# Patient Record
Sex: MALE | Race: WHITE | NOT HISPANIC OR LATINO | URBAN - METROPOLITAN AREA
[De-identification: names, ages, dates, MRNs, and addresses within clinical notes are randomized per-mention and may not be internally consistent; named-entity substitution may affect disease eponyms.]

---

## 2017-10-31 ENCOUNTER — INPATIENT (INPATIENT)
Facility: HOSPITAL | Age: 72
LOS: 6 days | Discharge: HOME | End: 2017-11-07
Attending: INTERNAL MEDICINE | Admitting: INTERNAL MEDICINE
Payer: MEDICARE

## 2017-10-31 VITALS
SYSTOLIC BLOOD PRESSURE: 108 MMHG | OXYGEN SATURATION: 96 % | HEART RATE: 119 BPM | DIASTOLIC BLOOD PRESSURE: 69 MMHG | RESPIRATION RATE: 18 BRPM

## 2017-10-31 DIAGNOSIS — Z29.9 ENCOUNTER FOR PROPHYLACTIC MEASURES, UNSPECIFIED: ICD-10-CM

## 2017-10-31 DIAGNOSIS — I10 ESSENTIAL (PRIMARY) HYPERTENSION: ICD-10-CM

## 2017-10-31 DIAGNOSIS — R55 SYNCOPE AND COLLAPSE: ICD-10-CM
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
## 2017-10-31 DIAGNOSIS — I48.91 UNSPECIFIED ATRIAL FIBRILLATION: ICD-10-CM

## 2017-10-31 DIAGNOSIS — I50.9 HEART FAILURE, UNSPECIFIED: ICD-10-CM

## 2017-10-31 DIAGNOSIS — G47.30 SLEEP APNEA, UNSPECIFIED: ICD-10-CM

## 2017-10-31 PROBLEM — Z00.00 ENCOUNTER FOR PREVENTIVE HEALTH EXAMINATION: Status: ACTIVE | Noted: 2017-10-31

## 2017-10-31 LAB
ALBUMIN SERPL ELPH-MCNC: 3.9 G/DL — SIGNIFICANT CHANGE UP (ref 3.3–5)
ALP SERPL-CCNC: 48 U/L — SIGNIFICANT CHANGE UP (ref 40–120)
ALT FLD-CCNC: 13 U/L — SIGNIFICANT CHANGE UP (ref 4–41)
APPEARANCE UR: CLEAR — SIGNIFICANT CHANGE UP
APTT BLD: 27 SEC — LOW (ref 27.5–37.4)
AST SERPL-CCNC: 17 U/L — SIGNIFICANT CHANGE UP (ref 4–40)
BACTERIA # UR AUTO: SIGNIFICANT CHANGE UP
BASOPHILS # BLD AUTO: 0.03 K/UL — SIGNIFICANT CHANGE UP (ref 0–0.2)
BASOPHILS NFR BLD AUTO: 0.4 % — SIGNIFICANT CHANGE UP (ref 0–2)
BILIRUB SERPL-MCNC: 0.5 MG/DL — SIGNIFICANT CHANGE UP (ref 0.2–1.2)
BILIRUB UR-MCNC: NEGATIVE — SIGNIFICANT CHANGE UP
BLD GP AB SCN SERPL QL: NEGATIVE — SIGNIFICANT CHANGE UP
BLOOD UR QL VISUAL: NEGATIVE — SIGNIFICANT CHANGE UP
BUN SERPL-MCNC: 62 MG/DL — HIGH (ref 7–23)
CALCIUM SERPL-MCNC: 8.8 MG/DL — SIGNIFICANT CHANGE UP (ref 8.4–10.5)
CHLORIDE SERPL-SCNC: 98 MMOL/L — SIGNIFICANT CHANGE UP (ref 98–107)
CK MB BLD-MCNC: 2.51 NG/ML — SIGNIFICANT CHANGE UP (ref 1–6.6)
CK MB BLD-MCNC: SIGNIFICANT CHANGE UP (ref 0–2.5)
CK SERPL-CCNC: 55 U/L — SIGNIFICANT CHANGE UP (ref 30–200)
CO2 SERPL-SCNC: 28 MMOL/L — SIGNIFICANT CHANGE UP (ref 22–31)
COLOR SPEC: SIGNIFICANT CHANGE UP
CREAT SERPL-MCNC: 1.81 MG/DL — HIGH (ref 0.5–1.3)
EOSINOPHIL # BLD AUTO: 0.07 K/UL — SIGNIFICANT CHANGE UP (ref 0–0.5)
EOSINOPHIL NFR BLD AUTO: 0.9 % — SIGNIFICANT CHANGE UP (ref 0–6)
GLUCOSE SERPL-MCNC: 148 MG/DL — HIGH (ref 70–99)
GLUCOSE UR-MCNC: NEGATIVE — SIGNIFICANT CHANGE UP
HCT VFR BLD CALC: 29 % — LOW (ref 39–50)
HGB BLD-MCNC: 8.9 G/DL — LOW (ref 13–17)
HYALINE CASTS # UR AUTO: SIGNIFICANT CHANGE UP (ref 0–?)
IMM GRANULOCYTES # BLD AUTO: 0.06 # — SIGNIFICANT CHANGE UP
IMM GRANULOCYTES NFR BLD AUTO: 0.8 % — SIGNIFICANT CHANGE UP (ref 0–1.5)
INR BLD: 1.19 — HIGH (ref 0.88–1.17)
KETONES UR-MCNC: NEGATIVE — SIGNIFICANT CHANGE UP
LEUKOCYTE ESTERASE UR-ACNC: NEGATIVE — SIGNIFICANT CHANGE UP
LYMPHOCYTES # BLD AUTO: 1.81 K/UL — SIGNIFICANT CHANGE UP (ref 1–3.3)
LYMPHOCYTES # BLD AUTO: 23.3 % — SIGNIFICANT CHANGE UP (ref 13–44)
MCHC RBC-ENTMCNC: 27.8 PG — SIGNIFICANT CHANGE UP (ref 27–34)
MCHC RBC-ENTMCNC: 30.7 % — LOW (ref 32–36)
MCV RBC AUTO: 90.6 FL — SIGNIFICANT CHANGE UP (ref 80–100)
MONOCYTES # BLD AUTO: 0.53 K/UL — SIGNIFICANT CHANGE UP (ref 0–0.9)
MONOCYTES NFR BLD AUTO: 6.8 % — SIGNIFICANT CHANGE UP (ref 2–14)
MUCOUS THREADS # UR AUTO: SIGNIFICANT CHANGE UP
NEUTROPHILS # BLD AUTO: 5.28 K/UL — SIGNIFICANT CHANGE UP (ref 1.8–7.4)
NEUTROPHILS NFR BLD AUTO: 67.8 % — SIGNIFICANT CHANGE UP (ref 43–77)
NITRITE UR-MCNC: NEGATIVE — SIGNIFICANT CHANGE UP
NRBC # FLD: 0 — SIGNIFICANT CHANGE UP
OB PNL STL: POSITIVE — SIGNIFICANT CHANGE UP
PH UR: 6 — SIGNIFICANT CHANGE UP (ref 4.6–8)
PLATELET # BLD AUTO: 149 K/UL — LOW (ref 150–400)
PMV BLD: 11 FL — SIGNIFICANT CHANGE UP (ref 7–13)
POTASSIUM SERPL-MCNC: 4.6 MMOL/L — SIGNIFICANT CHANGE UP (ref 3.5–5.3)
POTASSIUM SERPL-SCNC: 4.6 MMOL/L — SIGNIFICANT CHANGE UP (ref 3.5–5.3)
PROT SERPL-MCNC: 6.8 G/DL — SIGNIFICANT CHANGE UP (ref 6–8.3)
PROT UR-MCNC: NEGATIVE — SIGNIFICANT CHANGE UP
PROTHROM AB SERPL-ACNC: 13.4 SEC — HIGH (ref 9.8–13.1)
RBC # BLD: 3.2 M/UL — LOW (ref 4.2–5.8)
RBC # FLD: 15.4 % — HIGH (ref 10.3–14.5)
RBC CASTS # UR COMP ASSIST: SIGNIFICANT CHANGE UP (ref 0–?)
RH IG SCN BLD-IMP: POSITIVE — SIGNIFICANT CHANGE UP
SODIUM SERPL-SCNC: 139 MMOL/L — SIGNIFICANT CHANGE UP (ref 135–145)
SP GR SPEC: 1.01 — SIGNIFICANT CHANGE UP (ref 1–1.03)
SQUAMOUS # UR AUTO: SIGNIFICANT CHANGE UP
TROPONIN T SERPL-MCNC: < 0.06 NG/ML — SIGNIFICANT CHANGE UP (ref 0–0.06)
UROBILINOGEN FLD QL: NORMAL E.U. — SIGNIFICANT CHANGE UP (ref 0.1–0.2)
WBC # BLD: 7.78 K/UL — SIGNIFICANT CHANGE UP (ref 3.8–10.5)
WBC # FLD AUTO: 7.78 K/UL — SIGNIFICANT CHANGE UP (ref 3.8–10.5)
WBC UR QL: SIGNIFICANT CHANGE UP (ref 0–?)

## 2017-10-31 PROCEDURE — 71010: CPT | Mod: 26

## 2017-10-31 PROCEDURE — 72125 CT NECK SPINE W/O DYE: CPT | Mod: 26

## 2017-10-31 PROCEDURE — 70450 CT HEAD/BRAIN W/O DYE: CPT | Mod: 26

## 2017-10-31 RX ORDER — METOPROLOL TARTRATE 50 MG
5 TABLET ORAL ONCE
Qty: 0 | Refills: 0 | Status: DISCONTINUED | OUTPATIENT
Start: 2017-10-31 | End: 2017-10-31

## 2017-10-31 RX ORDER — ATORVASTATIN CALCIUM 80 MG/1
40 TABLET, FILM COATED ORAL AT BEDTIME
Qty: 0 | Refills: 0 | Status: DISCONTINUED | OUTPATIENT
Start: 2017-10-31 | End: 2017-11-07

## 2017-10-31 RX ORDER — FUROSEMIDE 40 MG
40 TABLET ORAL DAILY
Qty: 0 | Refills: 0 | Status: DISCONTINUED | OUTPATIENT
Start: 2017-10-31 | End: 2017-11-01

## 2017-10-31 RX ORDER — OMEGA-3 ACID ETHYL ESTERS 1 G
2 CAPSULE ORAL
Qty: 0 | Refills: 0 | Status: DISCONTINUED | OUTPATIENT
Start: 2017-10-31 | End: 2017-11-07

## 2017-10-31 RX ORDER — DEXTROSE 50 % IN WATER 50 %
12.5 SYRINGE (ML) INTRAVENOUS ONCE
Qty: 0 | Refills: 0 | Status: DISCONTINUED | OUTPATIENT
Start: 2017-10-31 | End: 2017-11-07

## 2017-10-31 RX ORDER — METOPROLOL TARTRATE 50 MG
5 TABLET ORAL ONCE
Qty: 0 | Refills: 0 | Status: COMPLETED | OUTPATIENT
Start: 2017-10-31 | End: 2017-10-31

## 2017-10-31 RX ORDER — CARVEDILOL PHOSPHATE 80 MG/1
6.25 CAPSULE, EXTENDED RELEASE ORAL EVERY 12 HOURS
Qty: 0 | Refills: 0 | Status: DISCONTINUED | OUTPATIENT
Start: 2017-10-31 | End: 2017-11-02

## 2017-10-31 RX ORDER — PANTOPRAZOLE SODIUM 20 MG/1
40 TABLET, DELAYED RELEASE ORAL
Qty: 0 | Refills: 0 | Status: DISCONTINUED | OUTPATIENT
Start: 2017-10-31 | End: 2017-11-02

## 2017-10-31 RX ORDER — DEXTROSE 50 % IN WATER 50 %
1 SYRINGE (ML) INTRAVENOUS ONCE
Qty: 0 | Refills: 0 | Status: DISCONTINUED | OUTPATIENT
Start: 2017-10-31 | End: 2017-11-07

## 2017-10-31 RX ORDER — ASPIRIN/CALCIUM CARB/MAGNESIUM 324 MG
81 TABLET ORAL DAILY
Qty: 0 | Refills: 0 | Status: DISCONTINUED | OUTPATIENT
Start: 2017-10-31 | End: 2017-11-07

## 2017-10-31 RX ORDER — TAMSULOSIN HYDROCHLORIDE 0.4 MG/1
0.4 CAPSULE ORAL AT BEDTIME
Qty: 0 | Refills: 0 | Status: DISCONTINUED | OUTPATIENT
Start: 2017-10-31 | End: 2017-11-07

## 2017-10-31 RX ORDER — INSULIN LISPRO 100/ML
VIAL (ML) SUBCUTANEOUS
Qty: 0 | Refills: 0 | Status: DISCONTINUED | OUTPATIENT
Start: 2017-10-31 | End: 2017-11-07

## 2017-10-31 RX ORDER — DEXTROSE 50 % IN WATER 50 %
25 SYRINGE (ML) INTRAVENOUS ONCE
Qty: 0 | Refills: 0 | Status: DISCONTINUED | OUTPATIENT
Start: 2017-10-31 | End: 2017-11-07

## 2017-10-31 RX ORDER — CALCIUM CARBONATE 500(1250)
1 TABLET ORAL DAILY
Qty: 0 | Refills: 0 | Status: DISCONTINUED | OUTPATIENT
Start: 2017-10-31 | End: 2017-11-07

## 2017-10-31 RX ORDER — INSULIN LISPRO 100/ML
VIAL (ML) SUBCUTANEOUS AT BEDTIME
Qty: 0 | Refills: 0 | Status: DISCONTINUED | OUTPATIENT
Start: 2017-10-31 | End: 2017-11-07

## 2017-10-31 RX ORDER — SODIUM CHLORIDE 9 MG/ML
1000 INJECTION, SOLUTION INTRAVENOUS
Qty: 0 | Refills: 0 | Status: DISCONTINUED | OUTPATIENT
Start: 2017-10-31 | End: 2017-11-07

## 2017-10-31 RX ORDER — BECLOMETHASONE DIPROPIONATE 40 UG/1
1 AEROSOL, METERED RESPIRATORY (INHALATION)
Qty: 0 | Refills: 0 | Status: DISCONTINUED | OUTPATIENT
Start: 2017-10-31 | End: 2017-11-07

## 2017-10-31 RX ORDER — TIOTROPIUM BROMIDE 18 UG/1
1 CAPSULE ORAL; RESPIRATORY (INHALATION) DAILY
Qty: 0 | Refills: 0 | Status: DISCONTINUED | OUTPATIENT
Start: 2017-10-31 | End: 2017-11-07

## 2017-10-31 RX ORDER — GLUCAGON INJECTION, SOLUTION 0.5 MG/.1ML
1 INJECTION, SOLUTION SUBCUTANEOUS ONCE
Qty: 0 | Refills: 0 | Status: DISCONTINUED | OUTPATIENT
Start: 2017-10-31 | End: 2017-11-07

## 2017-10-31 RX ADMIN — TAMSULOSIN HYDROCHLORIDE 0.4 MILLIGRAM(S): 0.4 CAPSULE ORAL at 23:49

## 2017-10-31 RX ADMIN — Medication 5 MILLIGRAM(S): at 21:04

## 2017-10-31 RX ADMIN — ATORVASTATIN CALCIUM 40 MILLIGRAM(S): 80 TABLET, FILM COATED ORAL at 23:49

## 2017-10-31 NOTE — ED ADULT NURSE NOTE - OBJECTIVE STATEMENT
Pt. received in [facility]. Pt. is 71 yo male brought in after a syncopal episode with +LOC lasting 20 seconds. Pt. states he was sitting down and fell backwards pt. and son denies he hit his head. No evidence of trauma to head. Pt was on coumadin but has been off for 1 week because last INR was 11. Denies lightheadedness, dizziness, chest pain, n/v, fever/chills. Respirations appear even and unlabored. Abdomen is soft, nontender, nondistended. Skin is warm, dry, and intact. 20g IV lock in place right AC. Labs sent. Will continue to monitor.

## 2017-10-31 NOTE — ED ADULT TRIAGE NOTE - CHIEF COMPLAINT QUOTE
Pt had syncopal episode with +LOC lasting 20 seconds. Was sitting down and fell backwards. Hit back of head, on coumadin and aspirin. Hypotensive on scene 90/30. EMs placed 20g to left wrist, infusing normal saline. Pt had syncopal episode with +LOC lasting 20 seconds. Was sitting down and fell backwards. Hit back of head, on coumadin and aspirin. Hypotensive on scene 90/30. EMS placed 20g to left wrist, infusing normal saline. Pt had syncopal episode with +LOC lasting 20 seconds. Was sitting down and fell backwards. Hit back of head, on coumadin and aspirin. Off coumadin for 1 week because last INR was 11. Hypotensive on scene 90/30. EMS placed 20g to left wrist, infusing normal saline.

## 2017-10-31 NOTE — H&P ADULT - ASSESSMENT
73 y/o male with PMHx of MI, HERIBERTO on CPAP, CHF on Lasix, A-fib was on Coumadin stopped 1 week ago due to INR of 11,pre-syncope due to orthostatic hypotension, borderline DM, HTN, BPH, GERD presents to ED after syncopal episode. Admitted to telemetry.

## 2017-10-31 NOTE — ED PROVIDER NOTE - ATTENDING CONTRIBUTION TO CARE
Dr. Kirkland:  I have personally performed a face to face bedside history and physical examination of this patient. I have discussed the history, examination, review of systems, assessment and plan of management with the resident. I have reviewed the electronic medical record and amended it to reflect my history, review of systems, physical exam, assessment and plan.    72M h/o afib on Coumadin, CHF, DM, HTN, presents after a syncopal episode at home today.  Per son, patient was trying on different BIPAP masks, has had his BP running a bit low all day (low 100s, high 90s), and then suddenly syncopized from sitting position and fell backwards, hitting occiput of head.  Regained consciousness in approximately 20 seconds with normal mental status.  Denies fever/chills, cp, sob, n/v/d, ha.  Last week, INR was 11, has been not taking his coumadin the last few days.    Exam:  - nad  - head atraumatic  - perrl, eomi  - irregular heart rate  - ctab  - abd soft ntnd  - no focal neuro deficits    A/P  - syncope and fall with head trauma  - cbc, cmp, trop, coags  - cxr  - ekg  - ct head Dr. Kirkland:  I performed a face to face bedside interview with patient regarding history of present illness, review of symptoms and past medical history. I completed an independent physical exam.  I have discussed patient's plan of care with PA.   I agree with note as stated above, having amended the EMR as needed to reflect my findings.   This includes HISTORY OF PRESENT ILLNESS, HIV, PAST MEDICAL/SURGICAL/FAMILY/SOCIAL HISTORY, ALLERGIES AND HOME MEDICATIONS, REVIEW OF SYSTEMS, PHYSICAL EXAM, and any PROGRESS NOTES during the time I functioned as the attending physician for this patient.    72M h/o afib on Coumadin, CHF, DM, HTN, presents after a syncopal episode at home today.  Per son, patient was trying on different BIPAP masks, has had his BP running a bit low all day (low 100s, high 90s), and then suddenly syncopized from sitting position and fell backwards, hitting occiput of head.  Regained consciousness in approximately 20 seconds with normal mental status.  Denies fever/chills, cp, sob, n/v/d, ha.  Last week, INR was 11, has been not taking his coumadin the last few days.    Exam:  - nad  - head atraumatic  - perrl, eomi  - irregular heart rate  - ctab  - abd soft ntnd  - no focal neuro deficits    A/P  - syncope and fall with head trauma  - cbc, cmp, trop, coags  - cxr  - ekg  - ct head

## 2017-10-31 NOTE — H&P ADULT - NSHPPHYSICALEXAM_GEN_ALL_CORE
Appearance: Normal, NAD, NRD.  HEENT:   Normal oral mucosa, PERRL, EOMI	  Lymphatic: No lymphadenopathy  Cardiovascular: Normal S1 S2, No JVD, No murmurs, No edema  Respiratory: Lungs clear to auscultation, no rales/rhonchi/wheezing	  Psychiatry: A & O x 3, Mood & affect appropriate  Gastrointestinal:  Soft, Non-tender, ND + BS	  Skin: No rashes, No ecchymoses, No cyanosis  Neurologic: Non-focal, sensation intact,  strength 5/5 B/L, CN grossly intact  Extremities: Normal range of motion, No clubbing, cyanosis 1+ non-pitting LE L>R  Vascular: Peripheral pulses palpable

## 2017-10-31 NOTE — ED PROVIDER NOTE - MUSCULOSKELETAL NECK EXAM
no deformity, pain or tenderness. no restriction of movement/No bony tenderness. full ROM of all joints b/l

## 2017-10-31 NOTE — H&P ADULT - HISTORY OF PRESENT ILLNESS
71 y/o male with PMHx of MI, HERIBERTO on CPAP, CHF on Lasix, A-fib was on Coumadin stopped 1 week ago due to INR of 11,pre-syncope due to orthostatic hypotension, borderline DM, HTN, BPH, GERD presents to ED after syncopal episode. Patient states he was at home sitting in chair trying on bipap masks with his respiratory therapist. Paitent then became dizzy with mask on then passed out. Son who witnessed syncope said episode lasted 20 seconds, with fall backwards hitting his head. Endorses previous episodes of dizziness during the fall-self resolved. Per patient normal echo 6 months ago with cardiologist Dr. Schmidt at [facility]. 90/30 hypotensive at scene by EMS. Son states has syncope due to orthostatics. Orthostatics performed in ED negative. Patient states he was instructed by cardiologist to stop Coumadin stopped due to INR of 11. Patient noticed darker stools for last 2-3days, found to be occult positive in ED. Denies any other bleeding sx/signs.    Currently asymptomatic, no pain or further trauma. 71 y/o male with PMHx of MI, HERIBERTO on CPAP, CHF on Lasix, A-fib was on Coumadin stopped 1 week ago due to INR of 11, borderline DM, HTN, BPH, GERD presents to ED after syncopal episode. Patient states he was at home sitting in chair trying on bipap masks with his respiratory therapist. Paitent then became dizzy with mask on then passed out. Son who witnessed syncope said episode lasted 20 seconds, with fall backwards hitting his head. Endorses previous episodes of dizziness during the fall-self resolved. Per patient normal echo 6 months ago with cardiologist Dr. Schmidt at [facility]. 90/30 hypotensive at scene by EMS. Son states has syncope due to orthostatics. Orthostatics performed in ED negative. Patient states he was instructed by cardiologist to stop Coumadin stopped due to INR of 11. Patient noticed darker stools for last 2-3days, found to be occult positive in ED. Denies any other bleeding sx/signs.    Currently asymptomatic, no pain or further trauma.

## 2017-10-31 NOTE — H&P ADULT - PROBLEM SELECTOR PLAN 1
Monitor on telemetry for continuos cardiac monitoring  Serial EKGs and trend cardiac enzymes  Fall and seizure precautions  Orthostatics negative in ED, will repeat in AM  Echocardiogram to evaluate LVSF.

## 2017-10-31 NOTE — H&P ADULT - NSHPLABSRESULTS_GEN_ALL_CORE
CT BRAIN: No acute intracranial bleeding, calvarial fracture, or scalp   hematoma.  CT CERVICAL SPINE: No fracture or subluxation. Degenerative changes of the cervical spine.  EKG: Afib with RVR 108bpm  CEx 1 neg  UA: clear

## 2017-10-31 NOTE — ED PROVIDER NOTE - PMH
AF (atrial fibrillation)    BPH (benign prostatic hyperplasia)    CHF (congestive heart failure)    Diabetes insipidus    Essential hypertension, benign    GERD (gastroesophageal reflux disease)    Hypertension    MI (myocardial infarction)    Sleep apnea

## 2017-10-31 NOTE — H&P ADULT - NSHPREVIEWOFSYSTEMS_GEN_ALL_CORE
Constitutional: No fever; no chills; fatigue or weight loss/gain; no diaphoresis  Skin: No rash, itching, ulcerations, dryness, pressure ulcer.  Eyes: no diplopia; no photophobia; no blurred vision   ENT: no hearing difficulty; no ear pain; no tinnitus; no vertigo; no nasal congestion; no nasal discharge; no nose bleeds no sore throat.  Endocrine: No thyroid problems.  Cardiovascular: No chest pain; no palpitations; no dyspnea on exertion; no PND; no orthopnea;   Respiratory: no shortness of breath; no wheezing; no dyspnea; no cough.  Gastrointestinal: No abdominal pain; no nausea; vomiting; or diarrhea.  Genitourinary: No dysuria.  Musculoskeletal: no arthralgia; no arthritis; no joint swelling; no muscle weakness  Neurologic: no transient paralysis; no weakness; no paresthesias; no difficulty walking; no headache; no syncope

## 2017-10-31 NOTE — ED PROVIDER NOTE - CARDIAC, MLM
Normal rate, regular rhythm.  Heart sounds S1, S2.  No murmurs, rubs or gallops. No LE edema. No calf tenderness b/l.

## 2017-10-31 NOTE — ED PROVIDER NOTE - OBJECTIVE STATEMENT
73 y/o male with pmhx of MI, CHF, A-fib on aspirin and Lasix, was on Coumadin stopped 1 week ago due to INR of 11),pre-syncope due to orthostatic hypotension, borderline DM, HTN, BPH, GERD, sleep apnea, presents to ED for fall secondary to syncope. Pt state he was at home sitting in chair trying on bipap masks with his respiratory therapist. Pt became lightheaded with mask on, witnessed syncope by son 20 seconds, with fall backwards hitting his head. No other preceding sx, no dizziness. Normal echo 6 months ago with cardiologist Dr. Schmidt at [facility]. 90/30 hypotensive at scene by EMS. Son states has syncope due to orthostatics. Currently asymptomatic, no pain or further trauma. No fever, chills, cp, sob, palpitations, edema, recent travel, seizure activity, jerking, weakness, numbness, facial drooping, slurred speech, urinary or bowel incontinence. Social drinker once a week.

## 2017-10-31 NOTE — H&P ADULT - ATTENDING COMMENTS
72 year old man with CAD, a-fib, and HERIBERTO presents with syncope in the setting of acute blood loss anemia and SHEYLA.    #Syncope- in the setting of acute blood loss anemia and FOBT+ stools.  Please type and screen patient, as H/H continues to fall.  Dr. Mccray of GI called.  Please check echo.    #A-fib- currently rate controlled.  No AC in the setting of blood loss anemia and labile INRs.  Continue Coreg.    #SHEYLA- likely due to poor perfusion in the setting of blood loss.  Baseline creatinine 0.8.  Renal consult called- Dr. Nuñez.     Care discussed at length with patient. 72 year old man with CAD, a-fib, and HERIBERTO presents with syncope in the setting of acute blood loss anemia and SHEYLA.    #Syncope- in the setting of acute blood loss anemia and FOBT+ stools.  Please type and screen patient, as H/H continues to fall.  Dr. Mccray of GI called.  Please check echo.    #A-fib- currently rate controlled.  No AC in the setting of blood loss anemia and labile INRs.  Continue Coreg.    #SHEYLA- likely due to poor perfusion in the setting of blood loss.  Baseline creatinine 0.8.  Hold Lasix.  Renal consult called- Dr. Nuñez.     Care discussed at length with patient.

## 2017-10-31 NOTE — H&P ADULT - PROBLEM SELECTOR PLAN 6
Monitor on telemetry for continuous cardiac monitoring  Currently euvolemic-Monitor fluid status and I&Os closely  PO lasix 40mg QD   TTE to evaluate LVEF  Consider ACE   Monitor daily weights, 1500 cc fluid restriction, sodium restriction.  Check CBC, BMP, Mg, BNP. Monitor on telemetry for continuous cardiac monitoring  Currently euvolemic-Monitor fluid status and I&Os closely  Prelim CXR shows clear lungs will f/u official read   PO lasix 40mg QD   TTE to evaluate LVEF  Consider ACE   Monitor daily weights, 1500 cc fluid restriction, sodium restriction.  Check CBC, BMP, Mg, BNP.

## 2017-10-31 NOTE — H&P ADULT - NSHPSOCIALHISTORY_GEN_ALL_CORE
Social History:  · Marital Status	   · Occupation	Retired- [employer]  · Lives With	spouse; dog    Substance Use History:  · Substance Use	never used    Alcohol Use History:  · Have you ever consumed alcohol	yes... 1 glass of wine every other day      Tobacco Usage:  · Tobacco Usage: Former smoker  · Tobacco Type: cigarettes  · Last Tobacco Use: 20 years ago  · Number of Packs per Day: 4-5 PPD  · Number of yrs: 20 years

## 2017-10-31 NOTE — ED ADULT NURSE NOTE - CHIEF COMPLAINT QUOTE
Pt had syncopal episode with +LOC lasting 20 seconds. Was sitting down and fell backwards. Hit back of head, on coumadin and aspirin. Off coumadin for 1 week because last INR was 11. Hypotensive on scene 90/30. EMS placed 20g to left wrist, infusing normal saline.

## 2017-10-31 NOTE — ED PROVIDER NOTE - MEDICAL DECISION MAKING DETAILS
71 y/o male with pmhx of MI, CHF, A-fib on aspirin and Lasix, was on Coumadin stopped 1 week ago due to INR of 11),pre-syncope due to orthostatic hypotension, borderline DM, HTN, BPH, GERD, sleep apnea, presents to ED for fall secondary to syncope. plan: r/o hemorrhage, CT head, c-spine, labs, cardiac enzymes, ekg, admit

## 2017-11-01 DIAGNOSIS — N40.0 BENIGN PROSTATIC HYPERPLASIA WITHOUT LOWER URINARY TRACT SYMPTOMS: ICD-10-CM

## 2017-11-01 DIAGNOSIS — I10 ESSENTIAL (PRIMARY) HYPERTENSION: ICD-10-CM

## 2017-11-01 DIAGNOSIS — N17.9 ACUTE KIDNEY FAILURE, UNSPECIFIED: ICD-10-CM

## 2017-11-01 DIAGNOSIS — R60.0 LOCALIZED EDEMA: ICD-10-CM

## 2017-11-01 LAB
BASOPHILS # BLD AUTO: 0.02 K/UL — SIGNIFICANT CHANGE UP (ref 0–0.2)
BASOPHILS # BLD AUTO: 0.02 K/UL — SIGNIFICANT CHANGE UP (ref 0–0.2)
BASOPHILS NFR BLD AUTO: 0.3 % — SIGNIFICANT CHANGE UP (ref 0–2)
BASOPHILS NFR BLD AUTO: 0.3 % — SIGNIFICANT CHANGE UP (ref 0–2)
BLD GP AB SCN SERPL QL: NEGATIVE — SIGNIFICANT CHANGE UP
BUN SERPL-MCNC: 56 MG/DL — HIGH (ref 7–23)
CALCIUM SERPL-MCNC: 8.6 MG/DL — SIGNIFICANT CHANGE UP (ref 8.4–10.5)
CHLORIDE SERPL-SCNC: 101 MMOL/L — SIGNIFICANT CHANGE UP (ref 98–107)
CHOLEST SERPL-MCNC: 142 MG/DL — SIGNIFICANT CHANGE UP (ref 120–199)
CK MB BLD-MCNC: 2.22 NG/ML — SIGNIFICANT CHANGE UP (ref 1–6.6)
CK SERPL-CCNC: 53 U/L — SIGNIFICANT CHANGE UP (ref 30–200)
CO2 SERPL-SCNC: 24 MMOL/L — SIGNIFICANT CHANGE UP (ref 22–31)
CREAT ?TM UR-MCNC: 32.4 MG/DL — SIGNIFICANT CHANGE UP
CREAT SERPL-MCNC: 1.42 MG/DL — HIGH (ref 0.5–1.3)
EOSINOPHIL # BLD AUTO: 0.09 K/UL — SIGNIFICANT CHANGE UP (ref 0–0.5)
EOSINOPHIL # BLD AUTO: 0.09 K/UL — SIGNIFICANT CHANGE UP (ref 0–0.5)
EOSINOPHIL NFR BLD AUTO: 1.1 % — SIGNIFICANT CHANGE UP (ref 0–6)
EOSINOPHIL NFR BLD AUTO: 1.3 % — SIGNIFICANT CHANGE UP (ref 0–6)
FERRITIN SERPL-MCNC: 22.33 NG/ML — LOW (ref 30–400)
FERRITIN SERPL-MCNC: 29.15 NG/ML — LOW (ref 30–400)
FOLATE SERPL-MCNC: 12 NG/ML — SIGNIFICANT CHANGE UP (ref 4.7–20)
GLUCOSE SERPL-MCNC: 109 MG/DL — HIGH (ref 70–99)
HBA1C BLD-MCNC: 6.4 % — HIGH (ref 4–5.6)
HCT VFR BLD CALC: 24.6 % — LOW (ref 39–50)
HCT VFR BLD CALC: 25.5 % — LOW (ref 39–50)
HDLC SERPL-MCNC: 32 MG/DL — LOW (ref 35–55)
HGB BLD-MCNC: 7.7 G/DL — LOW (ref 13–17)
HGB BLD-MCNC: 8.2 G/DL — LOW (ref 13–17)
IMM GRANULOCYTES # BLD AUTO: 0.04 # — SIGNIFICANT CHANGE UP
IMM GRANULOCYTES # BLD AUTO: 0.05 # — SIGNIFICANT CHANGE UP
IMM GRANULOCYTES NFR BLD AUTO: 0.5 % — SIGNIFICANT CHANGE UP (ref 0–1.5)
IMM GRANULOCYTES NFR BLD AUTO: 0.7 % — SIGNIFICANT CHANGE UP (ref 0–1.5)
INR BLD: 1.19 — HIGH (ref 0.88–1.17)
IRON SATN MFR SERPL: 293 UG/DL — SIGNIFICANT CHANGE UP (ref 155–535)
IRON SATN MFR SERPL: 314 UG/DL — SIGNIFICANT CHANGE UP (ref 155–535)
IRON SATN MFR SERPL: 37 UG/DL — LOW (ref 45–165)
IRON SATN MFR SERPL: 45 UG/DL — SIGNIFICANT CHANGE UP (ref 45–165)
LIPID PNL WITH DIRECT LDL SERPL: 81 MG/DL — SIGNIFICANT CHANGE UP
LYMPHOCYTES # BLD AUTO: 1.9 K/UL — SIGNIFICANT CHANGE UP (ref 1–3.3)
LYMPHOCYTES # BLD AUTO: 1.99 K/UL — SIGNIFICANT CHANGE UP (ref 1–3.3)
LYMPHOCYTES # BLD AUTO: 24.1 % — SIGNIFICANT CHANGE UP (ref 13–44)
LYMPHOCYTES # BLD AUTO: 28.8 % — SIGNIFICANT CHANGE UP (ref 13–44)
MAGNESIUM SERPL-MCNC: 1.6 MG/DL — SIGNIFICANT CHANGE UP (ref 1.6–2.6)
MCHC RBC-ENTMCNC: 28.4 PG — SIGNIFICANT CHANGE UP (ref 27–34)
MCHC RBC-ENTMCNC: 28.6 PG — SIGNIFICANT CHANGE UP (ref 27–34)
MCHC RBC-ENTMCNC: 31.3 % — LOW (ref 32–36)
MCHC RBC-ENTMCNC: 32.2 % — SIGNIFICANT CHANGE UP (ref 32–36)
MCV RBC AUTO: 88.2 FL — SIGNIFICANT CHANGE UP (ref 80–100)
MCV RBC AUTO: 91.4 FL — SIGNIFICANT CHANGE UP (ref 80–100)
MONOCYTES # BLD AUTO: 0.59 K/UL — SIGNIFICANT CHANGE UP (ref 0–0.9)
MONOCYTES # BLD AUTO: 0.74 K/UL — SIGNIFICANT CHANGE UP (ref 0–0.9)
MONOCYTES NFR BLD AUTO: 8.6 % — SIGNIFICANT CHANGE UP (ref 2–14)
MONOCYTES NFR BLD AUTO: 9.4 % — SIGNIFICANT CHANGE UP (ref 2–14)
NEUTROPHILS # BLD AUTO: 4.16 K/UL — SIGNIFICANT CHANGE UP (ref 1.8–7.4)
NEUTROPHILS # BLD AUTO: 5.11 K/UL — SIGNIFICANT CHANGE UP (ref 1.8–7.4)
NEUTROPHILS NFR BLD AUTO: 60.3 % — SIGNIFICANT CHANGE UP (ref 43–77)
NEUTROPHILS NFR BLD AUTO: 64.6 % — SIGNIFICANT CHANGE UP (ref 43–77)
NRBC # FLD: 0 — SIGNIFICANT CHANGE UP
NRBC # FLD: 0 — SIGNIFICANT CHANGE UP
NT-PROBNP SERPL-SCNC: 1375 PG/ML — SIGNIFICANT CHANGE UP
PHOSPHATE SERPL-MCNC: 3.1 MG/DL — SIGNIFICANT CHANGE UP (ref 2.5–4.5)
PLATELET # BLD AUTO: 133 K/UL — LOW (ref 150–400)
PLATELET # BLD AUTO: 157 K/UL — SIGNIFICANT CHANGE UP (ref 150–400)
PMV BLD: 10.9 FL — SIGNIFICANT CHANGE UP (ref 7–13)
PMV BLD: 11.6 FL — SIGNIFICANT CHANGE UP (ref 7–13)
POTASSIUM SERPL-MCNC: 4 MMOL/L — SIGNIFICANT CHANGE UP (ref 3.5–5.3)
POTASSIUM SERPL-SCNC: 4 MMOL/L — SIGNIFICANT CHANGE UP (ref 3.5–5.3)
PROTHROM AB SERPL-ACNC: 13.4 SEC — HIGH (ref 9.8–13.1)
RBC # BLD: 2.69 M/UL — LOW (ref 4.2–5.8)
RBC # BLD: 2.89 M/UL — LOW (ref 4.2–5.8)
RBC # FLD: 15.5 % — HIGH (ref 10.3–14.5)
RBC # FLD: 15.5 % — HIGH (ref 10.3–14.5)
RETICS #: 0.1 10X6/UL — HIGH (ref 0.02–0.07)
RETICS/RBC NFR: 3 % — HIGH (ref 0.5–2.5)
RH IG SCN BLD-IMP: POSITIVE — SIGNIFICANT CHANGE UP
SODIUM SERPL-SCNC: 142 MMOL/L — SIGNIFICANT CHANGE UP (ref 135–145)
SODIUM UR-SCNC: 90 MEQ/L — SIGNIFICANT CHANGE UP
TRIGL SERPL-MCNC: 252 MG/DL — HIGH (ref 10–149)
TROPONIN T SERPL-MCNC: < 0.06 NG/ML — SIGNIFICANT CHANGE UP (ref 0–0.06)
TSH SERPL-MCNC: 1.84 UIU/ML — SIGNIFICANT CHANGE UP (ref 0.27–4.2)
UIBC SERPL-MCNC: 256 UG/DL — SIGNIFICANT CHANGE UP (ref 110–370)
UIBC SERPL-MCNC: 269 UG/DL — SIGNIFICANT CHANGE UP (ref 110–370)
UUN UR-MCNC: 409.8 MG/DL — SIGNIFICANT CHANGE UP
VIT B12 SERPL-MCNC: 327 PG/ML — SIGNIFICANT CHANGE UP (ref 200–900)
WBC # BLD: 6.9 K/UL — SIGNIFICANT CHANGE UP (ref 3.8–10.5)
WBC # BLD: 7.9 K/UL — SIGNIFICANT CHANGE UP (ref 3.8–10.5)
WBC # FLD AUTO: 6.9 K/UL — SIGNIFICANT CHANGE UP (ref 3.8–10.5)
WBC # FLD AUTO: 7.9 K/UL — SIGNIFICANT CHANGE UP (ref 3.8–10.5)

## 2017-11-01 PROCEDURE — 74176 CT ABD & PELVIS W/O CONTRAST: CPT | Mod: 26

## 2017-11-01 RX ORDER — DIGOXIN 250 MCG
0.5 TABLET ORAL ONCE
Qty: 0 | Refills: 0 | Status: COMPLETED | OUTPATIENT
Start: 2017-11-01 | End: 2017-11-01

## 2017-11-01 RX ORDER — SODIUM CHLORIDE 9 MG/ML
500 INJECTION INTRAMUSCULAR; INTRAVENOUS; SUBCUTANEOUS ONCE
Qty: 0 | Refills: 0 | Status: COMPLETED | OUTPATIENT
Start: 2017-11-01 | End: 2017-11-01

## 2017-11-01 RX ORDER — DIGOXIN 250 MCG
0.25 TABLET ORAL EVERY 8 HOURS
Qty: 0 | Refills: 0 | Status: COMPLETED | OUTPATIENT
Start: 2017-11-01 | End: 2017-11-02

## 2017-11-01 RX ORDER — INFLUENZA VIRUS VACCINE 15; 15; 15; 15 UG/.5ML; UG/.5ML; UG/.5ML; UG/.5ML
0.5 SUSPENSION INTRAMUSCULAR ONCE
Qty: 0 | Refills: 0 | Status: DISCONTINUED | OUTPATIENT
Start: 2017-11-01 | End: 2017-11-07

## 2017-11-01 RX ORDER — DIGOXIN 250 MCG
0.12 TABLET ORAL DAILY
Qty: 0 | Refills: 0 | Status: DISCONTINUED | OUTPATIENT
Start: 2017-11-03 | End: 2017-11-02

## 2017-11-01 RX ADMIN — Medication 40 MILLIGRAM(S): at 05:31

## 2017-11-01 RX ADMIN — Medication 81 MILLIGRAM(S): at 11:18

## 2017-11-01 RX ADMIN — TAMSULOSIN HYDROCHLORIDE 0.4 MILLIGRAM(S): 0.4 CAPSULE ORAL at 21:13

## 2017-11-01 RX ADMIN — ATORVASTATIN CALCIUM 40 MILLIGRAM(S): 80 TABLET, FILM COATED ORAL at 21:13

## 2017-11-01 RX ADMIN — Medication 0.5 MILLIGRAM(S): at 23:24

## 2017-11-01 RX ADMIN — BECLOMETHASONE DIPROPIONATE 1 PUFF(S): 40 AEROSOL, METERED RESPIRATORY (INHALATION) at 10:14

## 2017-11-01 RX ADMIN — Medication 2 GRAM(S): at 17:34

## 2017-11-01 RX ADMIN — TIOTROPIUM BROMIDE 1 CAPSULE(S): 18 CAPSULE ORAL; RESPIRATORY (INHALATION) at 10:14

## 2017-11-01 RX ADMIN — BECLOMETHASONE DIPROPIONATE 1 PUFF(S): 40 AEROSOL, METERED RESPIRATORY (INHALATION) at 21:13

## 2017-11-01 RX ADMIN — Medication 1: at 17:35

## 2017-11-01 RX ADMIN — Medication 1: at 13:43

## 2017-11-01 RX ADMIN — CARVEDILOL PHOSPHATE 6.25 MILLIGRAM(S): 80 CAPSULE, EXTENDED RELEASE ORAL at 05:31

## 2017-11-01 RX ADMIN — PANTOPRAZOLE SODIUM 40 MILLIGRAM(S): 20 TABLET, DELAYED RELEASE ORAL at 05:31

## 2017-11-01 RX ADMIN — SODIUM CHLORIDE 1000 MILLILITER(S): 9 INJECTION INTRAMUSCULAR; INTRAVENOUS; SUBCUTANEOUS at 17:55

## 2017-11-01 RX ADMIN — Medication 1 TABLET(S): at 11:18

## 2017-11-01 RX ADMIN — Medication 2 GRAM(S): at 06:32

## 2017-11-01 NOTE — CHART NOTE - NSCHARTNOTEFT_GEN_A_CORE
Pt noted in Afib w/ RVR on telemetry, -140bpm. Pt was in the bathroom at this time, now resting HR 120s. Pt is asymptomatic, resting comfortably with no complaints. Denies CP, SOB, palpitations, dizziness, weakness, lightheadedness, abd pain, n/v/d.     Vital Signs: T(C): 37 (01 Nov 2017 13:20), Max: 37 (01 Nov 2017 13:20). T(F): 98.6 (01 Nov 2017 13:20), Max: 98.6 (01 Nov 2017 13:20). HR: 106 (01 Nov 2017 15:53) (84 - 135). BP: 92/63 (01 Nov 2017 15:47) (87/51 - 125/81). RR: 18 (01 Nov 2017 13:20) (18 - 20). SpO2: 98% (01 Nov 2017 15:53) (95% - 100%)    A+O x 3, resting comfortably, NAD  Cardio: Irregular rhythm, tachycardic, no m/g/r  Pulm: CTA b/l, no w/r/r  Abd: Soft NT, ND. No guarding, no rebound tenderness.  LE: no edema, cyanosis    TELE: Afib with RVR -140bpm, previously Afib HR 80-90bpm    71 y/o male with PMHx of MI, HERIBERTO on CPAP, HF on Lasix, borderline DM, HTN, BPH, GERD, A-fib was on Coumadin stopped 1 week ago due to INR of 11 (did not come to ED at this time, p/w syncopal episode, found to have anemia in setting of occult positive stool, now with Afib w/ RVR.   1) Afib w/ RVR: Case discussed with Dr. Garcia, recommend IV Cardizem, will given 5mg x 1, if no improvement will consider additional dose of 5mg if BP can tolerate. Pt current off AC in setting of anemia likely due to GIB. Continue Coreg 6.25mg BID. Will continue to monitoring closely. Continue telemetry.  2) Acute anemia, occult blood positive: GI c/s appreciated, conservative management per Dr. Mccray, CT abd/pelvis ordered. Continue PPI daily. Will follow up results. Continue to hold AC for now. Venodynes for DVT ppx. Pt noted in Afib w/ RVR on telemetry, -140bpm. Pt was in the bathroom at this time, now resting HR 120s. Pt is asymptomatic, resting comfortably with no complaints. Denies CP, SOB, palpitations, dizziness, weakness, lightheadedness, abd pain, n/v/d.     Vital Signs: T(C): 37 (01 Nov 2017 13:20), Max: 37 (01 Nov 2017 13:20). T(F): 98.6 (01 Nov 2017 13:20), Max: 98.6 (01 Nov 2017 13:20). HR: 106 (01 Nov 2017 15:53) (84 - 135). BP: 92/63 (01 Nov 2017 15:47) (87/51 - 125/81). RR: 18 (01 Nov 2017 13:20) (18 - 20). SpO2: 98% (01 Nov 2017 15:53) (95% - 100%)    A+O x 3, resting comfortably, NAD  Cardio: Irregular rhythm, tachycardic, no m/g/r  Pulm: CTA b/l, no w/r/r  Abd: Soft NT, ND. No guarding, no rebound tenderness.  LE: no edema, cyanosis    TELE: Afib with RVR -140bpm, previously Afib HR 80-90bpm    73 y/o male with PMHx of MI, HERIBERTO on CPAP, HF on Lasix, borderline DM, HTN, BPH, GERD, A-fib was on Coumadin stopped 1 week ago due to INR of 11 (did not come to ED at this time, p/w syncopal episode, found to have anemia in setting of occult positive stool, now with Afib w/ RVR.   1) Afib w/ RVR: Case discussed with Dr. Garcia, recommend IV Cardizem, will given 5mg x 1, if no improvement will consider additional dose of 5mg if BP can tolerate. Pt current off AC in setting of anemia likely due to GIB. Continue Coreg 6.25mg BID. Will continue to monitoring closely. Continue telemetry.  2) Acute anemia, occult blood positive: GI c/s appreciated, conservative management per Dr. Mccray, CT abd/pelvis ordered. Continue PPI daily. Will follow up results. Continue to hold AC for now. Venodynes for DVT ppx.    Addendum * 11/1, 17:52  Pt with hypotension shortly after Cardizem dose, asymptomatic, resting comfortably with wife and son at bedside. Denies dizziness, weakness, lightheadedness, change in vision, CP, SOB, palpitations. Telemetry: Afib, HR 90-115bpm. Discussed with Dr. Garcia, will given 500cc bolus NS x 1. Will continue to monitoring and reassess BP and HR. If HR still poorly controlled this evening, will consider starting digoxin load.

## 2017-11-01 NOTE — CHART NOTE - NSCHARTNOTEFT_GEN_A_CORE
Patient laying in bed in NAD. Denies chest pain , shortness of breath or palpitations .   On telemetry Heart rate remains 110BPM - 150   Will order digoxin load - as previously outlined   discussed with patient

## 2017-11-01 NOTE — PROVIDER CONTACT NOTE (OTHER) - ASSESSMENT
Patient has denies c/p or sob. Patient denies any dizziness threw out shift. Patient sitting at edge of bed with family at bedside. Vs (see flow sheet from 1453 - [id number]). Afib rvr on tele

## 2017-11-01 NOTE — PROGRESS NOTE ADULT - SUBJECTIVE AND OBJECTIVE BOX
Patient is a 72y old  Male who presents with a chief complaint of syncope    HPI:  71 y/o male with PMHx of MI, HERIBERTO on CPAP, CHF on Lasix, A-fib was on Coumadin stopped 1 week ago due to INR of 11, borderline DM, HTN, BPH, GERD presents to ED after syncopal episode. Patient states he was at home sitting in chair trying on bipap masks with his respiratory therapist. Paitent then became dizzy with mask on then passed out. Son who witnessed syncope said episode lasted 20 seconds, with fall backwards hitting his head. Endorses previous episodes of dizziness during the fall-self resolved. Per patient normal echo 6 months ago with cardiologist Dr. Schmidt at [facility]. 90/30 hypotensive at scene by EMS. Son states has syncope due to orthostatics. Orthostatics performed in ED negative. Patient states he was instructed by cardiologist to stop Coumadin stopped due to INR of 11. Patient noticed darker stools for last 2-3days, found to be occult positive in ED. Denies any other bleeding sx/signs.    Currently asymptomatic, no pain or further trauma. (31 Oct 2017 21:42)      PAST MEDICAL & SURGICAL HISTORY:  Sleep apnea  GERD (gastroesophageal reflux disease)  BPH (benign prostatic hyperplasia)  CHF (congestive heart failure)  MI (myocardial infarction)  Hypertension  Essential hypertension, benign  Diabetes insipidus  AF (atrial fibrillation)  No significant past surgical history      MEDICATIONS  (STANDING):  aspirin enteric coated 81 milliGRAM(s) Oral daily  atorvastatin 40 milliGRAM(s) Oral at bedtime  beclomethasone  80 MICROgram(s) Inhaler 1 Puff(s) Inhalation two times a day  calcium carbonate 500 mG (Tums) Chewable 1 Tablet(s) Chew daily  carvedilol 6.25 milliGRAM(s) Oral every 12 hours  dextrose 5%. 1000 milliLiter(s) (50 mL/Hr) IV Continuous <Continuous>  dextrose 50% Injectable 12.5 Gram(s) IV Push once  dextrose 50% Injectable 25 Gram(s) IV Push once  dextrose 50% Injectable 25 Gram(s) IV Push once  influenza   Vaccine 0.5 milliLiter(s) IntraMuscular once  insulin lispro (HumaLOG) corrective regimen sliding scale   SubCutaneous three times a day before meals  insulin lispro (HumaLOG) corrective regimen sliding scale   SubCutaneous at bedtime  omega-3-Acid Ethyl Esters 2 Gram(s) Oral two times a day  pantoprazole    Tablet 40 milliGRAM(s) Oral before breakfast  tamsulosin 0.4 milliGRAM(s) Oral at bedtime  tiotropium 18 MICROgram(s) Capsule 1 Capsule(s) Inhalation daily      Allergies    penicillin (Unknown)    Intolerances        SOCIAL HISTORY:  Denies ETOh,Smoking,     FAMILY HISTORY:  No pertinent family history in first degree relatives      REVIEW OF SYSTEMS:    CONSTITUTIONAL: No weakness, fevers or chills  EYES/ENT: No visual changes;  No vertigo or throat pain   NECK: No pain or stiffness  RESPIRATORY: No cough, wheezing, hemoptysis; No shortness of breath  CARDIOVASCULAR: No chest pain or palpitations  GASTROINTESTINAL: No abdominal or epigastric pain. No nausea, vomiting, or hematemesis; No diarrhea or constipation. No melena or hematochezia.  GENITOURINARY: No dysuria, frequency or hematuria  NEUROLOGICAL: No numbness or weakness  SKIN: No itching, burning, rashes, or lesions   All other review of systems is negative unless indicated above.    VITAL:  T(C): , Max: 36.9 (10-31-17 @ 23:44)  T(F): , Max: 98.5 (10-31-17 @ 23:44)  HR: 104 (11-01-17 @ 11:32)  BP: 110/68 (11-01-17 @ 05:28)  BP(mean): --  RR: 18 (11-01-17 @ 05:28)  SpO2: 96% (11-01-17 @ 11:32)  Wt(kg): --    I and O's:    Height (cm): 165.1 (10-31 @ 23:47)  Weight (kg): 85.729 (10-31 @ 23:47)  BMI (kg/m2): 31.5 (10-31 @ 23:47)  BSA (m2): 1.93 (10-31 @ 23:47)    PHYSICAL EXAM:    Constitutional: NAD  HEENT: PERRLA,   Neck: No JVD  Respiratory: CTA B/L  Cardiovascular: S1 and S2  Gastrointestinal: BS+, soft, NT/ND  Extremities: No peripheral edema  Neurological: A/O x 3, no focal deficits  Psychiatric: Normal mood, normal affect  : No Reeder  Skin: No rashes  Access: Not applicable  Back: No CVA tenderness    LABS:                        8.2    7.90  )-----------( 157      ( 01 Nov 2017 11:33 )             25.5     11-01    142  |  101  |  56<H>  ----------------------------<  109<H>  4.0   |  24  |  1.42<H>    Ca    8.6      01 Nov 2017 06:30  Phos  3.1     11-01  Mg     1.6     11-01    TPro  6.8  /  Alb  3.9  /  TBili  0.5  /  DBili  x   /  AST  17  /  ALT  13  /  AlkPhos  48  10-31          RADIOLOGY & ADDITIONAL STUDIES:

## 2017-11-01 NOTE — CONSULT NOTE ADULT - ATTENDING COMMENTS
[facility] NEPHROLOGY  Liam Hernandez M.D.  Jose C Nuñez D.O.  Christine Nguyen M.D.  Shruthi Schmidt, MSN, ANP-C    Telephone: [phone]  Facsimile: [phone]    [address] [place] [address]

## 2017-11-01 NOTE — CONSULT NOTE ADULT - PROBLEM SELECTOR RECOMMENDATION 3
Patient with trace RLE edema which he states is chronic and secondary to varicose veins. Agree with holding lasix 40 mg daily for now (patient received a dose this morning). Monitor UO.

## 2017-11-02 DIAGNOSIS — D50.0 IRON DEFICIENCY ANEMIA SECONDARY TO BLOOD LOSS (CHRONIC): ICD-10-CM

## 2017-11-02 LAB
BACTERIA UR CULT: SIGNIFICANT CHANGE UP
BUN SERPL-MCNC: 49 MG/DL — HIGH (ref 7–23)
CALCIUM SERPL-MCNC: 8.2 MG/DL — LOW (ref 8.4–10.5)
CHLORIDE SERPL-SCNC: 100 MMOL/L — SIGNIFICANT CHANGE UP (ref 98–107)
CO2 SERPL-SCNC: 24 MMOL/L — SIGNIFICANT CHANGE UP (ref 22–31)
CREAT SERPL-MCNC: 1.34 MG/DL — HIGH (ref 0.5–1.3)
DIGOXIN SERPL-MCNC: 8.5 NG/ML — CRITICAL HIGH (ref 0.8–2)
GLUCOSE SERPL-MCNC: 110 MG/DL — HIGH (ref 70–99)
HCT VFR BLD CALC: 21.6 % — LOW (ref 39–50)
HCT VFR BLD CALC: 24.6 % — LOW (ref 39–50)
HCT VFR BLD CALC: 27.2 % — LOW (ref 39–50)
HGB BLD-MCNC: 6.8 G/DL — CRITICAL LOW (ref 13–17)
HGB BLD-MCNC: 7.7 G/DL — LOW (ref 13–17)
HGB BLD-MCNC: 8.6 G/DL — LOW (ref 13–17)
MAGNESIUM SERPL-MCNC: 1.7 MG/DL — SIGNIFICANT CHANGE UP (ref 1.6–2.6)
MCHC RBC-ENTMCNC: 27.4 PG — SIGNIFICANT CHANGE UP (ref 27–34)
MCHC RBC-ENTMCNC: 28.5 PG — SIGNIFICANT CHANGE UP (ref 27–34)
MCHC RBC-ENTMCNC: 28.6 PG — SIGNIFICANT CHANGE UP (ref 27–34)
MCHC RBC-ENTMCNC: 31.3 % — LOW (ref 32–36)
MCHC RBC-ENTMCNC: 31.5 % — LOW (ref 32–36)
MCHC RBC-ENTMCNC: 31.6 % — LOW (ref 32–36)
MCV RBC AUTO: 86.6 FL — SIGNIFICANT CHANGE UP (ref 80–100)
MCV RBC AUTO: 90.8 FL — SIGNIFICANT CHANGE UP (ref 80–100)
MCV RBC AUTO: 91.1 FL — SIGNIFICANT CHANGE UP (ref 80–100)
NRBC # FLD: 0 — SIGNIFICANT CHANGE UP
PHOSPHATE SERPL-MCNC: 3.2 MG/DL — SIGNIFICANT CHANGE UP (ref 2.5–4.5)
PLATELET # BLD AUTO: 140 K/UL — LOW (ref 150–400)
PLATELET # BLD AUTO: 142 K/UL — LOW (ref 150–400)
PLATELET # BLD AUTO: 170 K/UL — SIGNIFICANT CHANGE UP (ref 150–400)
PMV BLD: 11.1 FL — SIGNIFICANT CHANGE UP (ref 7–13)
PMV BLD: 11.5 FL — SIGNIFICANT CHANGE UP (ref 7–13)
PMV BLD: 12 FL — SIGNIFICANT CHANGE UP (ref 7–13)
POTASSIUM SERPL-MCNC: 4 MMOL/L — SIGNIFICANT CHANGE UP (ref 3.5–5.3)
POTASSIUM SERPL-SCNC: 4 MMOL/L — SIGNIFICANT CHANGE UP (ref 3.5–5.3)
RBC # BLD: 2.38 M/UL — LOW (ref 4.2–5.8)
RBC # BLD: 2.7 M/UL — LOW (ref 4.2–5.8)
RBC # BLD: 3.14 M/UL — LOW (ref 4.2–5.8)
RBC # FLD: 15.6 % — HIGH (ref 10.3–14.5)
RBC # FLD: 15.7 % — HIGH (ref 10.3–14.5)
RBC # FLD: 18.2 % — HIGH (ref 10.3–14.5)
SODIUM SERPL-SCNC: 137 MMOL/L — SIGNIFICANT CHANGE UP (ref 135–145)
SPECIMEN SOURCE: SIGNIFICANT CHANGE UP
WBC # BLD: 6.24 K/UL — SIGNIFICANT CHANGE UP (ref 3.8–10.5)
WBC # BLD: 7.31 K/UL — SIGNIFICANT CHANGE UP (ref 3.8–10.5)
WBC # BLD: 8.16 K/UL — SIGNIFICANT CHANGE UP (ref 3.8–10.5)
WBC # FLD AUTO: 6.24 K/UL — SIGNIFICANT CHANGE UP (ref 3.8–10.5)
WBC # FLD AUTO: 7.31 K/UL — SIGNIFICANT CHANGE UP (ref 3.8–10.5)
WBC # FLD AUTO: 8.16 K/UL — SIGNIFICANT CHANGE UP (ref 3.8–10.5)

## 2017-11-02 RX ORDER — CARVEDILOL PHOSPHATE 80 MG/1
3.12 CAPSULE, EXTENDED RELEASE ORAL EVERY 12 HOURS
Qty: 0 | Refills: 0 | Status: DISCONTINUED | OUTPATIENT
Start: 2017-11-02 | End: 2017-11-07

## 2017-11-02 RX ORDER — DIPHENHYDRAMINE HCL 50 MG
25 CAPSULE ORAL ONCE
Qty: 0 | Refills: 0 | Status: COMPLETED | OUTPATIENT
Start: 2017-11-02 | End: 2017-11-02

## 2017-11-02 RX ORDER — ACETAMINOPHEN 500 MG
650 TABLET ORAL ONCE
Qty: 0 | Refills: 0 | Status: COMPLETED | OUTPATIENT
Start: 2017-11-02 | End: 2017-11-02

## 2017-11-02 RX ORDER — LANOLIN ALCOHOL/MO/W.PET/CERES
3 CREAM (GRAM) TOPICAL AT BEDTIME
Qty: 0 | Refills: 0 | Status: DISCONTINUED | OUTPATIENT
Start: 2017-11-02 | End: 2017-11-07

## 2017-11-02 RX ORDER — PANTOPRAZOLE SODIUM 20 MG/1
40 TABLET, DELAYED RELEASE ORAL EVERY 12 HOURS
Qty: 0 | Refills: 0 | Status: DISCONTINUED | OUTPATIENT
Start: 2017-11-02 | End: 2017-11-07

## 2017-11-02 RX ADMIN — Medication 25 MILLIGRAM(S): at 10:02

## 2017-11-02 RX ADMIN — Medication 2 GRAM(S): at 06:13

## 2017-11-02 RX ADMIN — Medication 3 MILLIGRAM(S): at 03:12

## 2017-11-02 RX ADMIN — Medication 2 GRAM(S): at 18:30

## 2017-11-02 RX ADMIN — PANTOPRAZOLE SODIUM 40 MILLIGRAM(S): 20 TABLET, DELAYED RELEASE ORAL at 18:30

## 2017-11-02 RX ADMIN — Medication 1 TABLET(S): at 11:53

## 2017-11-02 RX ADMIN — Medication: at 18:31

## 2017-11-02 RX ADMIN — PANTOPRAZOLE SODIUM 40 MILLIGRAM(S): 20 TABLET, DELAYED RELEASE ORAL at 06:13

## 2017-11-02 RX ADMIN — ATORVASTATIN CALCIUM 40 MILLIGRAM(S): 80 TABLET, FILM COATED ORAL at 21:21

## 2017-11-02 RX ADMIN — CARVEDILOL PHOSPHATE 3.12 MILLIGRAM(S): 80 CAPSULE, EXTENDED RELEASE ORAL at 18:31

## 2017-11-02 RX ADMIN — TIOTROPIUM BROMIDE 1 CAPSULE(S): 18 CAPSULE ORAL; RESPIRATORY (INHALATION) at 09:36

## 2017-11-02 RX ADMIN — Medication 0.25 MILLIGRAM(S): at 06:50

## 2017-11-02 RX ADMIN — Medication 650 MILLIGRAM(S): at 10:02

## 2017-11-02 RX ADMIN — BECLOMETHASONE DIPROPIONATE 1 PUFF(S): 40 AEROSOL, METERED RESPIRATORY (INHALATION) at 09:36

## 2017-11-02 RX ADMIN — TAMSULOSIN HYDROCHLORIDE 0.4 MILLIGRAM(S): 0.4 CAPSULE ORAL at 21:21

## 2017-11-02 RX ADMIN — Medication 81 MILLIGRAM(S): at 11:53

## 2017-11-02 RX ADMIN — BECLOMETHASONE DIPROPIONATE 1 PUFF(S): 40 AEROSOL, METERED RESPIRATORY (INHALATION) at 21:22

## 2017-11-02 RX ADMIN — Medication 650 MILLIGRAM(S): at 11:00

## 2017-11-02 RX ADMIN — Medication 1: at 13:13

## 2017-11-02 NOTE — PROVIDER CONTACT NOTE (OTHER) - BACKGROUND
(Admit Diagnosis) Syncope and collapse  (PMH) Hypertension  (PMH) BPH (benign prostatic hyperplasia)  (PMH) GERD (gastroesophageal reflux disease

## 2017-11-02 NOTE — PROGRESS NOTE ADULT - PROBLEM SELECTOR PLAN 4
Patient with trace RLE edema which he states is chronic and secondary to varicose veins. Continue holding lasix 40 mg daily for now in setting of GIB and hypotension. Monitor UO.

## 2017-11-02 NOTE — PROGRESS NOTE ADULT - SUBJECTIVE AND OBJECTIVE BOX
chief complaint : syncope    Subjective : pt denies chest pain, shortness of breath, nausea,v  , bleeding per rectum    MEDICATIONS  (STANDING):  aspirin enteric coated 81 milliGRAM(s) Oral daily  atorvastatin 40 milliGRAM(s) Oral at bedtime  beclomethasone  80 MICROgram(s) Inhaler 1 Puff(s) Inhalation two times a day  calcium carbonate 500 mG (Tums) Chewable 1 Tablet(s) Chew daily  carvedilol 3.125 milliGRAM(s) Oral every 12 hours  dextrose 5%. 1000 milliLiter(s) (50 mL/Hr) IV Continuous <Continuous>  dextrose 50% Injectable 12.5 Gram(s) IV Push once  dextrose 50% Injectable 25 Gram(s) IV Push once  dextrose 50% Injectable 25 Gram(s) IV Push once  influenza   Vaccine 0.5 milliLiter(s) IntraMuscular once  insulin lispro (HumaLOG) corrective regimen sliding scale   SubCutaneous three times a day before meals  insulin lispro (HumaLOG) corrective regimen sliding scale   SubCutaneous at bedtime  omega-3-Acid Ethyl Esters 2 Gram(s) Oral two times a day  pantoprazole  Injectable 40 milliGRAM(s) IV Push every 12 hours  tamsulosin 0.4 milliGRAM(s) Oral at bedtime  tiotropium 18 MICROgram(s) Capsule 1 Capsule(s) Inhalation daily    MEDICATIONS  (PRN):  dextrose Gel 1 Dose(s) Oral once PRN Blood Glucose LESS THAN 70 milliGRAM(s)/deciliter  glucagon  Injectable 1 milliGRAM(s) IntraMuscular once PRN Glucose LESS THAN 70 milligrams/deciliter  melatonin 3 milliGRAM(s) Oral at bedtime PRN Insomnia    Vital Signs Last 24 Hrs  T(C): 36.7 (2017 20:04), Max: 36.8 (2017 06:06)  T(F): 98 (2017 20:04), Max: 98.3 (2017 14:03)  HR: 104 (2017 20:32) (70 - 110)  BP: 111/79 (2017 20:04) (97/69 - 111/79)  BP(mean): --  RR: 18 (2017 20:04) (16 - 18)  SpO2: 98% (2017 20:32) (92% - 100%)    Constitutional: No fever, fatigue  Skin: No rash.  Eyes: No recent vision problems or eye pain.  ENT: No congestion, ear pain, or sore throat.  Cardiovascular: No chest pain or palpation.  Respiratory: No cough, shortness of breath, congestion, or wheezing.  Gastrointestinal: No abdominal pain, nausea, vomiting, or diarrhea.  Genitourinary: No dysuria.  Musculoskeletal: No joint swelling.  Neurologic: No headache.      PHYSICAL EXAM:    Gen: NAD, calm  Cards: Irregularly irregular, +S1/S2, no M/G/R  Resp: CTA B/L  GI: soft, NT/ND, NABS  Vascular: trace RLE edema chronic as per patient  neuro - pt alert awake oriented     LABS:      137  |  100  |  49<H>  ----------------------------<  110<H>  4.0   |  24  |  1.34<H>    Ca    8.2<L>      2017 06:45  Phos  3.2       Mg     1.7         TPro  6.8  /  Alb  3.9  /  TBili  0.5  /  DBili      /  AST  17  /  ALT  13  /  AlkPhos  48  10-31    Creatinine Trend: 1.34 <--, 1.42 <--, 1.81 <--                        6.8    6.24  )-----------( 140      ( 2017 06:45 )             21.6     Urine Studies:  Urinalysis Basic - ( 31 Oct 2017 20:00 )    Color: PLYEL / Appearance: CLEAR / S.009 / pH: 6.0  Gluc: NEGATIVE / Ketone: NEGATIVE  / Bili: NEGATIVE / Urobili: NORMAL E.U.   Blood: NEGATIVE / Protein: NEGATIVE / Nitrite: NEGATIVE   Leuk Esterase: NEGATIVE / RBC: 0-2 / WBC 2-5   Sq Epi: OCC / Non Sq Epi:  / Bacteria: FEW      Sodium, Random Urine: 90 meq/L ( @ 11:49)  Creatinine, Random Urine: 32.40 mg/dL ( @ 11:49)

## 2017-11-02 NOTE — PROGRESS NOTE ADULT - SUBJECTIVE AND OBJECTIVE BOX
[facility] NEPHROLOGY- PROGRESS NOTE    72 year old male with history of CHF presents with syncopal episode. Nephrology consulted for elevated Scr.    REVIEW OF SYSTEMS:  Gen: no changes in weight  Cards: no chest pain  Resp: no dyspnea  GI: no nausea or vomiting or diarrhea  Vascular: no LE edema    penicillin (Unknown)      Hospital Medications: Medications reviewed    VITALS:  T(F): 98.3 (17 @ 14:03), Max: 98.6 (17 @ 21:10)  HR: 103 (17 @ 15:20)  BP: 99/70 (17 @ 14:03)  RR: 16 (17 @ 14:03)  SpO2: 95% (17 @ 15:20)  Wt(kg): --  Height (cm): 165.1 (10-31 @ 23:47)  Weight (kg): 85.729 (10-31 @ 23:47)  BMI (kg/m2): 31.5 (10-31 @ 23:47)  BSA (m2): 1.93 (10-31 @ 23:47)     @ 07:  -   @ 07:00  --------------------------------------------------------  IN: 1260 mL / OUT: 200 mL / NET: 1060 mL     @ 07:01  -   @ 15:35  --------------------------------------------------------  IN: 240 mL / OUT: 0 mL / NET: 240 mL      PHYSICAL EXAM:    Gen: NAD, calm  Cards: Irregularly irregular, +S1/S2, no M/G/R  Resp: CTA B/L  GI: soft, NT/ND, NABS  Vascular: trace RLE edema chronic as per patient    LABS:      137  |  100  |  49<H>  ----------------------------<  110<H>  4.0   |  24  |  1.34<H>    Ca    8.2<L>      2017 06:45  Phos  3.2       Mg     1.7         TPro  6.8  /  Alb  3.9  /  TBili  0.5  /  DBili      /  AST  17  /  ALT  13  /  AlkPhos  48  10-31    Creatinine Trend: 1.34 <--, 1.42 <--, 1.81 <--                        6.8    6.24  )-----------( 140      ( 2017 06:45 )             21.6     Urine Studies:  Urinalysis Basic - ( 31 Oct 2017 20:00 )    Color: PLYEL / Appearance: CLEAR / S.009 / pH: 6.0  Gluc: NEGATIVE / Ketone: NEGATIVE  / Bili: NEGATIVE / Urobili: NORMAL E.U.   Blood: NEGATIVE / Protein: NEGATIVE / Nitrite: NEGATIVE   Leuk Esterase: NEGATIVE / RBC: 0-2 / WBC 2-5   Sq Epi: OCC / Non Sq Epi:  / Bacteria: FEW      Sodium, Random Urine: 90 meq/L ( @ 11:49)  Creatinine, Random Urine: 32.40 mg/dL ( @ 11:49)

## 2017-11-02 NOTE — CHART NOTE - NSCHARTNOTEFT_GEN_A_CORE
Case discussed with Dr. Dupont, labs reviewed, H/H 6.8/21.6. Pt admits to melena on admission, but denies any further episodes over the last 24hrs. Dr. Garcia recommended 1 unit PRBC, consent obtained, indication, risk/benefits explained to Pt. Pt admits to receiving transfusion in the past without complication or adverse reaction. Tylenol and Benadryl ordered prior to PRBC. CT a/p reviewed. Continue IV PPI BID. Repeat CBC post-PRBC. Will continue to monitor closely, follow up GI recommendations.

## 2017-11-02 NOTE — PROGRESS NOTE ADULT - PROBLEM SELECTOR PLAN 2
Likely hemodynamically mediated secondary to hypotension in setting of lasix use, anemia and supratherapeutic INR which can cause warfarin nephropathy. UA bland with hyaline casts and low FeUrea suggestive of low renal perfusion. Defer renal US at this time given improving renal function. Avoid nephrotoxins. Monitor electrolytes.

## 2017-11-02 NOTE — PROGRESS NOTE ADULT - SUBJECTIVE AND OBJECTIVE BOX
Cardiovascular Disease Progress Note    Overnight events: No acute events overnight. Mr. De Dios feels well. No chest pain or SOB.   Otherwise review of systems negative    Objective Findings:  T(C): 36.8 (17 @ 06:06), Max: 37 (17 @ 13:20)  HR: 108 (17 @ 07:30) (70 - 140)  BP: 97/69 (17 @ 06:06) (87/51 - 108/76)  RR: 16 (17 @ 06:06) (16 - 18)  SpO2: 93% (17 @ 07:30) (92% - 100%)  Wt(kg): --  Daily     Daily Weight in k.2 (2017 07:30)      Physical Exam:  Gen: NAD  HEENT: EOMI  CV: IIR, normal S1 + S2, no m/r/g  Lungs: CTAB  Abd: soft, non-tender  Ext: No edema    Telemetry: A-fib 100-110d    Laboratory Data:                        7.7    8.16  )-----------( 170      ( 2017 23:30 )             24.6         142  |  101  |  56<H>  ----------------------------<  109<H>  4.0   |  24  |  1.42<H>    Ca    8.6      2017 06:30  Phos  3.1       Mg     1.6         TPro  6.8  /  Alb  3.9  /  TBili  0.5  /  DBili  x   /  AST  17  /  ALT  13  /  AlkPhos  48  10-31    PT/INR - ( 2017 07:00 )   PT: 13.4 SEC;   INR: 1.19          PTT - ( 31 Oct 2017 18:03 )  PTT:27.0 SEC  CARDIAC MARKERS ( 2017 00:45 )  x     / < 0.06 ng/mL / 53 u/L / 2.22 ng/mL / x      CARDIAC MARKERS ( 31 Oct 2017 17:31 )  x     / < 0.06 ng/mL / 55 u/L / 2.51 ng/mL / x              Inpatient Medications:  MEDICATIONS  (STANDING):  aspirin enteric coated 81 milliGRAM(s) Oral daily  atorvastatin 40 milliGRAM(s) Oral at bedtime  beclomethasone  80 MICROgram(s) Inhaler 1 Puff(s) Inhalation two times a day  calcium carbonate 500 mG (Tums) Chewable 1 Tablet(s) Chew daily  carvedilol 6.25 milliGRAM(s) Oral every 12 hours  dextrose 5%. 1000 milliLiter(s) (50 mL/Hr) IV Continuous <Continuous>  dextrose 50% Injectable 12.5 Gram(s) IV Push once  dextrose 50% Injectable 25 Gram(s) IV Push once  dextrose 50% Injectable 25 Gram(s) IV Push once  digoxin     Tablet 0.125 milliGRAM(s) Oral daily  digoxin  Injectable 0.25 milliGRAM(s) IV Push every 8 hours  influenza   Vaccine 0.5 milliLiter(s) IntraMuscular once  insulin lispro (HumaLOG) corrective regimen sliding scale   SubCutaneous three times a day before meals  insulin lispro (HumaLOG) corrective regimen sliding scale   SubCutaneous at bedtime  omega-3-Acid Ethyl Esters 2 Gram(s) Oral two times a day  pantoprazole    Tablet 40 milliGRAM(s) Oral before breakfast  tamsulosin 0.4 milliGRAM(s) Oral at bedtime  tiotropium 18 MICROgram(s) Capsule 1 Capsule(s) Inhalation daily      Assessment: 72 year old man with CAD, a-fib, and HERIBERTO presents with syncope in the setting of acute blood loss anemia and SHEYLA.    #Syncope- in the setting of acute blood loss anemia and FOBT+ stools.  Please check iron studies.  Hemoglobin pending this morning.  GI input noted.  Awaiting echo    #A-fib- better rate controlled with digoxin.  No AC for now.  GI work up pending.   Continue Coreg.    #SHEYLA- likely due to poor perfusion in the setting of anemia, and a-fib with RVR.  Continue holding Lasix.  Renal input appreciated.     Care discussed at length with patient.  72 year old man with CAD, a-fib, and HERIBERTO presents with syncope in the setting of acute blood loss anemia and SHEYLA.      Over 35 minutes spent on total encounter; more than 50% of the visit was spent counseling and/or coordinating care by the attending physician.      Ed Garcia M.D.   Cardiovascular Disease  [phone]

## 2017-11-03 LAB
BASOPHILS # BLD AUTO: 0.04 K/UL — SIGNIFICANT CHANGE UP (ref 0–0.2)
BASOPHILS NFR BLD AUTO: 0.5 % — SIGNIFICANT CHANGE UP (ref 0–2)
BUN SERPL-MCNC: 36 MG/DL — HIGH (ref 7–23)
CALCIUM SERPL-MCNC: 9 MG/DL — SIGNIFICANT CHANGE UP (ref 8.4–10.5)
CHLORIDE SERPL-SCNC: 103 MMOL/L — SIGNIFICANT CHANGE UP (ref 98–107)
CO2 SERPL-SCNC: 23 MMOL/L — SIGNIFICANT CHANGE UP (ref 22–31)
CREAT SERPL-MCNC: 1.23 MG/DL — SIGNIFICANT CHANGE UP (ref 0.5–1.3)
EOSINOPHIL # BLD AUTO: 0.11 K/UL — SIGNIFICANT CHANGE UP (ref 0–0.5)
EOSINOPHIL NFR BLD AUTO: 1.3 % — SIGNIFICANT CHANGE UP (ref 0–6)
GLUCOSE SERPL-MCNC: 102 MG/DL — HIGH (ref 70–99)
HCT VFR BLD CALC: 28.8 % — LOW (ref 39–50)
HGB BLD-MCNC: 9.1 G/DL — LOW (ref 13–17)
IMM GRANULOCYTES # BLD AUTO: 0.05 # — SIGNIFICANT CHANGE UP
IMM GRANULOCYTES NFR BLD AUTO: 0.6 % — SIGNIFICANT CHANGE UP (ref 0–1.5)
LYMPHOCYTES # BLD AUTO: 2.95 K/UL — SIGNIFICANT CHANGE UP (ref 1–3.3)
LYMPHOCYTES # BLD AUTO: 34.2 % — SIGNIFICANT CHANGE UP (ref 13–44)
MAGNESIUM SERPL-MCNC: 1.9 MG/DL — SIGNIFICANT CHANGE UP (ref 1.6–2.6)
MCHC RBC-ENTMCNC: 27.7 PG — SIGNIFICANT CHANGE UP (ref 27–34)
MCHC RBC-ENTMCNC: 31.6 % — LOW (ref 32–36)
MCV RBC AUTO: 87.5 FL — SIGNIFICANT CHANGE UP (ref 80–100)
MONOCYTES # BLD AUTO: 0.81 K/UL — SIGNIFICANT CHANGE UP (ref 0–0.9)
MONOCYTES NFR BLD AUTO: 9.4 % — SIGNIFICANT CHANGE UP (ref 2–14)
NEUTROPHILS # BLD AUTO: 4.66 K/UL — SIGNIFICANT CHANGE UP (ref 1.8–7.4)
NEUTROPHILS NFR BLD AUTO: 54 % — SIGNIFICANT CHANGE UP (ref 43–77)
NRBC # FLD: 0 — SIGNIFICANT CHANGE UP
PHOSPHATE SERPL-MCNC: 3.7 MG/DL — SIGNIFICANT CHANGE UP (ref 2.5–4.5)
PLATELET # BLD AUTO: 143 K/UL — LOW (ref 150–400)
PMV BLD: 11.5 FL — SIGNIFICANT CHANGE UP (ref 7–13)
POTASSIUM SERPL-MCNC: 4.6 MMOL/L — SIGNIFICANT CHANGE UP (ref 3.5–5.3)
POTASSIUM SERPL-SCNC: 4.6 MMOL/L — SIGNIFICANT CHANGE UP (ref 3.5–5.3)
RBC # BLD: 3.29 M/UL — LOW (ref 4.2–5.8)
RBC # FLD: 18.3 % — HIGH (ref 10.3–14.5)
SODIUM SERPL-SCNC: 141 MMOL/L — SIGNIFICANT CHANGE UP (ref 135–145)
WBC # BLD: 8.62 K/UL — SIGNIFICANT CHANGE UP (ref 3.8–10.5)
WBC # FLD AUTO: 8.62 K/UL — SIGNIFICANT CHANGE UP (ref 3.8–10.5)

## 2017-11-03 RX ORDER — SODIUM FERRIC GLUCONAT/SUCROSE 62.5MG/5ML
125 AMPUL (ML) INTRAVENOUS ONCE
Qty: 0 | Refills: 0 | Status: COMPLETED | OUTPATIENT
Start: 2017-11-03 | End: 2017-11-03

## 2017-11-03 RX ORDER — GABAPENTIN 400 MG/1
300 CAPSULE ORAL THREE TIMES A DAY
Qty: 0 | Refills: 0 | Status: DISCONTINUED | OUTPATIENT
Start: 2017-11-03 | End: 2017-11-07

## 2017-11-03 RX ORDER — MAGNESIUM OXIDE 400 MG ORAL TABLET 241.3 MG
400 TABLET ORAL
Qty: 0 | Refills: 0 | Status: COMPLETED | OUTPATIENT
Start: 2017-11-03 | End: 2017-11-05

## 2017-11-03 RX ORDER — SOD SULF/SODIUM/NAHCO3/KCL/PEG
4000 SOLUTION, RECONSTITUTED, ORAL ORAL ONCE
Qty: 0 | Refills: 0 | Status: COMPLETED | OUTPATIENT
Start: 2017-11-03 | End: 2017-11-04

## 2017-11-03 RX ADMIN — Medication 81 MILLIGRAM(S): at 11:19

## 2017-11-03 RX ADMIN — Medication 110 MILLIGRAM(S): at 11:17

## 2017-11-03 RX ADMIN — BECLOMETHASONE DIPROPIONATE 1 PUFF(S): 40 AEROSOL, METERED RESPIRATORY (INHALATION) at 09:05

## 2017-11-03 RX ADMIN — Medication 2 GRAM(S): at 17:14

## 2017-11-03 RX ADMIN — CARVEDILOL PHOSPHATE 3.12 MILLIGRAM(S): 80 CAPSULE, EXTENDED RELEASE ORAL at 05:57

## 2017-11-03 RX ADMIN — Medication 3 MILLIGRAM(S): at 21:49

## 2017-11-03 RX ADMIN — MAGNESIUM OXIDE 400 MG ORAL TABLET 400 MILLIGRAM(S): 241.3 TABLET ORAL at 17:14

## 2017-11-03 RX ADMIN — TIOTROPIUM BROMIDE 1 CAPSULE(S): 18 CAPSULE ORAL; RESPIRATORY (INHALATION) at 09:05

## 2017-11-03 RX ADMIN — PANTOPRAZOLE SODIUM 40 MILLIGRAM(S): 20 TABLET, DELAYED RELEASE ORAL at 05:57

## 2017-11-03 RX ADMIN — PANTOPRAZOLE SODIUM 40 MILLIGRAM(S): 20 TABLET, DELAYED RELEASE ORAL at 17:14

## 2017-11-03 RX ADMIN — BECLOMETHASONE DIPROPIONATE 1 PUFF(S): 40 AEROSOL, METERED RESPIRATORY (INHALATION) at 21:48

## 2017-11-03 RX ADMIN — GABAPENTIN 300 MILLIGRAM(S): 400 CAPSULE ORAL at 21:48

## 2017-11-03 RX ADMIN — CARVEDILOL PHOSPHATE 3.12 MILLIGRAM(S): 80 CAPSULE, EXTENDED RELEASE ORAL at 17:14

## 2017-11-03 RX ADMIN — TAMSULOSIN HYDROCHLORIDE 0.4 MILLIGRAM(S): 0.4 CAPSULE ORAL at 21:48

## 2017-11-03 RX ADMIN — Medication 2 GRAM(S): at 05:57

## 2017-11-03 RX ADMIN — ATORVASTATIN CALCIUM 40 MILLIGRAM(S): 80 TABLET, FILM COATED ORAL at 21:48

## 2017-11-03 RX ADMIN — Medication 1 TABLET(S): at 11:19

## 2017-11-03 NOTE — PROGRESS NOTE ADULT - PROBLEM SELECTOR PLAN 3
Patient with pedal edema noted today for which can restart lasix 40 mg daily. Monitor UO. Patient with pedal edema noted today for which restart lasix 40 mg daily on 11/4. Monitor UO.

## 2017-11-03 NOTE — CONSULT NOTE ADULT - ASSESSMENT
72 year old male with history of CHF presents with syncopal episode. Nephrology consulted for elevated Scr.
72M with anemia and iron deficiency from GI source, better since the transfusion, will be going for GI w/u, will recommend:  - IV iron for now until after GI w/u when PO iron can be started  - obtain B12, folate, retic, LD  - protein electrophoresis, IFx  - full GI w/u.  - hold off anticoagulation for now  - rest of the treatment as per medicine, cardiology and GI  - discussed issues in detail with him and then PA

## 2017-11-03 NOTE — PROGRESS NOTE ADULT - SUBJECTIVE AND OBJECTIVE BOX
[facility] NEPHROLOGY- PROGRESS NOTE    72 year old male with history of CHF presents with syncopal episode. Nephrology consulted for elevated Scr.    REVIEW OF SYSTEMS:  Gen: no changes in weight  Cards: no chest pain  Resp: no dyspnea  GI: no nausea or vomiting or diarrhea  Vascular: no LE edema    penicillin (Unknown)      Hospital Medications: Medications reviewed    VITALS:  T(F): 98.3 (11-03-17 @ 13:50), Max: 98.3 (11-03-17 @ 13:50)  HR: 97 (11-03-17 @ 13:50)  BP: 108/66 (11-03-17 @ 13:50)  RR: 18 (11-03-17 @ 13:50)  SpO2: 96% (11-03-17 @ 13:50)  Wt(kg): --    11-02 @ 07:01  -  11-03 @ 07:00  --------------------------------------------------------  IN: 460 mL / OUT: 0 mL / NET: 460 mL      PHYSICAL EXAM:    Gen: NAD, calm  Cards: Irregularly irregular, +S1/S2, no M/G/R  Resp: CTA B/L  GI: soft, NT/ND, NABS  Vascular: trace RLE edema chronic as per patient    LABS:  11-03    141  |  103  |  36<H>  ----------------------------<  102<H>  4.6   |  23  |  1.23    Ca    9.0      03 Nov 2017 06:00  Phos  3.7     11-03  Mg     1.9     11-03      Creatinine Trend: 1.23 <--, 1.34 <--, 1.42 <--, 1.81 <--                        9.1    8.62  )-----------( 143      ( 03 Nov 2017 06:00 )             28.[facility] NEPHROLOGY- PROGRESS NOTE    72 year old male with history of CHF presents with syncopal episode. Nephrology consulted for elevated Scr.    REVIEW OF SYSTEMS:  Gen: no changes in weight  Cards: no chest pain  Resp: no dyspnea  GI: no nausea or vomiting or diarrhea  Vascular: no LE edema    penicillin (Unknown)      Hospital Medications: Medications reviewed    VITALS:  T(F): 98.3 (11-03-17 @ 13:50), Max: 98.3 (11-03-17 @ 13:50)  HR: 97 (11-03-17 @ 13:50)  BP: 108/66 (11-03-17 @ 13:50)  RR: 18 (11-03-17 @ 13:50)  SpO2: 96% (11-03-17 @ 13:50)  Wt(kg): --    11-02 @ 07:01  -  11-03 @ 07:00  --------------------------------------------------------  IN: 460 mL / OUT: 0 mL / NET: 460 mL      PHYSICAL EXAM:    Gen: NAD, calm  Cards: Irregularly irregular, +S1/S2, no M/G/R  Resp: CTA B/L  GI: soft, NT/ND, NABS  Vascular: + pedal edema only    LABS:  11-03    141  |  103  |  36<H>  ----------------------------<  102<H>  4.6   |  23  |  1.23    Ca    9.0      03 Nov 2017 06:00  Phos  3.7     11-03  Mg     1.9     11-03      Creatinine Trend: 1.23 <--, 1.34 <--, 1.42 <--, 1.81 <--                        9.1    8.62  )-----------( 143      ( 03 Nov 2017 06:00 )             28.8

## 2017-11-03 NOTE — PROGRESS NOTE ADULT - SUBJECTIVE AND OBJECTIVE BOX
chief complaint : syncope    Subjective : pt denies chest pain, shortness of breath, nausea,v  , bleeding per rectum      MEDICATIONS  (STANDING):  aspirin enteric coated 81 milliGRAM(s) Oral daily  atorvastatin 40 milliGRAM(s) Oral at bedtime  beclomethasone  80 MICROgram(s) Inhaler 1 Puff(s) Inhalation two times a day  calcium carbonate 500 mG (Tums) Chewable 1 Tablet(s) Chew daily  carvedilol 3.125 milliGRAM(s) Oral every 12 hours  dextrose 5%. 1000 milliLiter(s) (50 mL/Hr) IV Continuous <Continuous>  dextrose 50% Injectable 12.5 Gram(s) IV Push once  dextrose 50% Injectable 25 Gram(s) IV Push once  dextrose 50% Injectable 25 Gram(s) IV Push once  gabapentin 300 milliGRAM(s) Oral three times a day  influenza   Vaccine 0.5 milliLiter(s) IntraMuscular once  insulin lispro (HumaLOG) corrective regimen sliding scale   SubCutaneous three times a day before meals  insulin lispro (HumaLOG) corrective regimen sliding scale   SubCutaneous at bedtime  magnesium oxide 400 milliGRAM(s) Oral two times a day with meals  omega-3-Acid Ethyl Esters 2 Gram(s) Oral two times a day  pantoprazole  Injectable 40 milliGRAM(s) IV Push every 12 hours  polyethylene glycol/electrolyte Solution. 4000 milliLiter(s) Oral once  tamsulosin 0.4 milliGRAM(s) Oral at bedtime  tiotropium 18 MICROgram(s) Capsule 1 Capsule(s) Inhalation daily    MEDICATIONS  (PRN):  dextrose Gel 1 Dose(s) Oral once PRN Blood Glucose LESS THAN 70 milliGRAM(s)/deciliter  glucagon  Injectable 1 milliGRAM(s) IntraMuscular once PRN Glucose LESS THAN 70 milligrams/deciliter  melatonin 3 milliGRAM(s) Oral at bedtime PRN Insomnia      Vital Signs Last 24 Hrs  T(C): 36.7 (2017 21:46), Max: 36.8 (2017 13:50)  T(F): 98 (2017 21:46), Max: 98.3 (2017 13:50)  HR: 97 (2017 21:46) (85 - 109)  BP: 108/63 (2017 21:46) (101/73 - 123/71)  BP(mean): --  RR: 18 (2017 21:46) (18 - 20)  SpO2: 98% (2017 21:46) (92% - 98%)  Constitutional: No fever, fatigue  Skin: No rash.  Eyes: No recent vision problems or eye pain.  ENT: No congestion, ear pain, or sore throat.  Cardiovascular: No chest pain or palpation.  Respiratory: No cough, shortness of breath, congestion, or wheezing.  Gastrointestinal: No abdominal pain, nausea, vomiting, or diarrhea.  Genitourinary: No dysuria.  Musculoskeletal: No joint swelling.  Neurologic: No headache.      PHYSICAL EXAM:    Gen: NAD, calm  Cards: Irregularly irregular, +S1/S2, no M/G/R  Resp: CTA B/L  GI: soft, NT/ND, NABS  Vascular: trace RLE edema chronic as per patient  neuro - pt alert awake oriented     LABS:      141  |  103  |  36<H>  ----------------------------<  102<H>  4.6   |  23  |  1.23    Ca    9.0      2017 06:00  Phos  3.7       Mg     1.9           Creatinine Trend: 1.23 <--, 1.34 <--, 1.42 <--, 1.81 <--                        9.1    8.62  )-----------( 143      ( 2017 06:00 )             28.8     Urine Studies:  Urinalysis Basic - ( 31 Oct 2017 20:00 )    Color: PLYEL / Appearance: CLEAR / S.009 / pH: 6.0  Gluc: NEGATIVE / Ketone: NEGATIVE  / Bili: NEGATIVE / Urobili: NORMAL E.U.   Blood: NEGATIVE / Protein: NEGATIVE / Nitrite: NEGATIVE   Leuk Esterase: NEGATIVE / RBC: 0-2 / WBC 2-5   Sq Epi: OCC / Non Sq Epi:  / Bacteria: FEW      Sodium, Random Urine: 90 meq/L ( @ 11:49)  Creatinine, Random Urine: 32.40 mg/dL ( @ 11:49)

## 2017-11-03 NOTE — PROGRESS NOTE ADULT - SUBJECTIVE AND OBJECTIVE BOX
Cardiovascular Disease Progress Note    Overnight events: Mr. Pinto received 1U pRBCs yesterday. He denies chest pain or SOB.   Otherwise review of systems negative    Objective Findings:  T(C): 36.2 (17 @ 05:54), Max: 36.8 (17 @ 14:03)  HR: 86 (17 @ 07:00) (86 - 109)  BP: 101/73 (17 @ 05:54) (99/70 - 111/79)  RR: 18 (17 @ 05:54) (16 - 18)  SpO2: 92% (17 @ 07:00) (92% - 100%)  Wt(kg): --  Daily     Daily Weight in k (2017 05:54)      Physical Exam:  Gen: NAD  HEENT: EOMI  CV: IIR, normal S1 + S2, no m/r/g  Lungs: CTAB  Abd: soft, non-tender  Ext: No edema    Telemetry: A-fib 90s    Laboratory Data:                        9.1    8.62  )-----------( 143      ( 2017 06:00 )             28.8     11-    141  |  103  |  36<H>  ----------------------------<  102<H>  4.6   |  23  |  1.23    Ca    9.0      2017 06:00  Phos  3.7     11-03  Mg     1.9     11-03        Inpatient Medications:  MEDICATIONS  (STANDING):  aspirin enteric coated 81 milliGRAM(s) Oral daily  atorvastatin 40 milliGRAM(s) Oral at bedtime  beclomethasone  80 MICROgram(s) Inhaler 1 Puff(s) Inhalation two times a day  calcium carbonate 500 mG (Tums) Chewable 1 Tablet(s) Chew daily  carvedilol 3.125 milliGRAM(s) Oral every 12 hours  dextrose 5%. 1000 milliLiter(s) (50 mL/Hr) IV Continuous <Continuous>  dextrose 50% Injectable 12.5 Gram(s) IV Push once  dextrose 50% Injectable 25 Gram(s) IV Push once  dextrose 50% Injectable 25 Gram(s) IV Push once  influenza   Vaccine 0.5 milliLiter(s) IntraMuscular once  insulin lispro (HumaLOG) corrective regimen sliding scale   SubCutaneous three times a day before meals  insulin lispro (HumaLOG) corrective regimen sliding scale   SubCutaneous at bedtime  omega-3-Acid Ethyl Esters 2 Gram(s) Oral two times a day  pantoprazole  Injectable 40 milliGRAM(s) IV Push every 12 hours  polyethylene glycol/electrolyte Solution. 4000 milliLiter(s) Oral once  tamsulosin 0.4 milliGRAM(s) Oral at bedtime  tiotropium 18 MICROgram(s) Capsule 1 Capsule(s) Inhalation daily      Assessment: 72 year old man with CAD, a-fib, and HERIBERTO presents with syncope in the setting of acute blood loss anemia and SHEYLA.    #Syncope- in the setting of acute blood loss anemia and FOBT+ stools.  Patient s/p 1 Unit pRBCs on  with adequate response  GI f/u this morning noted- plan to prep over the weekend and scope next week unless patient decompensates.   Awaiting echo    #A-fib- better rate controlled   Hold digoxin for supratherapeutic levels.  No AC until GI work up completed.  Continue ASA, as Hb currently stable.  Continue Coreg.    #Anemia- Fe studies consistent with Fe deficiency with low ferritin.  Hematology called- Dr. Sotomayor.     #SHEYLA- likely due to poor perfusion in the setting of anemia, and a-fib with RVR.  Creatinine improving.  Continue holding Lasix.  Renal input appreciated.     Care discussed at length with patient. 72 year old man with CAD, a-fib, and HERIBERTO presents with syncope in the setting of acute blood loss anemia and SHEYLA.      Over 35 minutes spent on total encounter; more than 50% of the visit was spent counseling and/or coordinating care by the attending physician.      Ed Garcia M.D.   Cardiovascular Disease  [phone]

## 2017-11-03 NOTE — CONSULT NOTE ADULT - SUBJECTIVE AND OBJECTIVE BOX
73 y/o male with PMHx of MI, HERIBERTO on CPAP, CHF on Lasix, A-fib was on Coumadin stopped 1 week ago due to INR of 11, borderline DM, HTN, BPH, GERD presents to ED after syncopal episode. Patient states he was at home sitting in chair trying on bipap masks with his respiratory therapist. Paitent then became dizzy with mask on then passed out. Son who witnessed syncope said episode lasted 20 seconds, with fall backwards hitting his head. Endorses previous episodes of dizziness during the fall-self resolved. Per patient normal echo 6 months ago with cardiologist Dr. Schmidt at [facility]. 90/30 hypotensive at scene by EMS. Son states has syncope due to orthostatics. Orthostatics performed in ED negative. Patient states he was instructed by cardiologist to stop Coumadin stopped due to INR of 11. Patient noticed darker stools for last 2-3days, found to be occult positive in ED. Denies any other bleeding sx/signs.    Currently asymptomatic, no pain or further trauma.     Review of Systems:  Review of Systems: Constitutional: No fever; no chills; fatigue or weight loss/gain; no diaphoresis  Skin: No rash, itching, ulcerations, dryness, pressure ulcer.  Eyes: no diplopia; no photophobia; no blurred vision   ENT: no hearing difficulty; no ear pain; no tinnitus; no vertigo; no nasal congestion; no nasal discharge; no nose bleeds no sore throat.  Endocrine: No thyroid problems.  Cardiovascular: No chest pain; no palpitations; no dyspnea on exertion; no PND; no orthopnea;   Respiratory: no shortness of breath; no wheezing; no dyspnea; no cough.  Gastrointestinal: No abdominal pain; no nausea; vomiting; or diarrhea.  Genitourinary: No dysuria.  Musculoskeletal: no arthralgia; no arthritis; no joint swelling; no muscle weakness Neurologic: no transient paralysis; no weakness; no paresthesias; no difficulty walking; no headache; no syncope	      Allergies and Intolerances:        Allergies:  	penicillin: Drug, Unknown    Home Medications:   * Patient Currently Takes Medications as of 31-Oct-2017 21:05 documented in Structured Notes  · 	Fish Oil 500 mg oral capsule: 2 cap(s) orally 2 times a day  · 	Protonix 40 mg oral delayed release tablet: 1 tab(s) orally once a day  · 	Qvar 80 mcg/inh inhalation aerosol: 1 puff(s) inhaled 2 times a day  · 	Spiriva 18 mcg inhalation capsule: 1 cap(s) inhaled once a day  · 	tamsulosin 0.4 mg oral capsule: 1 cap(s) orally once a day  · 	atorvastatin 40 mg oral tablet: 1 tab(s) orally once a day  · 	Lasix 40 mg oral tablet: 1 tab(s) orally once a day  · 	metFORMIN 500 mg oral tablet: 1 tab(s) orally 2 times a day  · 	Coreg 6.25 mg oral tablet: 1 tab(s) orally 2 times a day  · 	calcium (as carbonate) 500 mg oral tablet: 500 milligram(s) orally once a day  · 	aspirin 81 mg oral tablet: 1 tab(s) orally once a day    Patient History:   Past Medical History:  AF (atrial fibrillation)    BPH (benign prostatic hyperplasia)    CHF (congestive heart failure)    Diabetes insipidus    Essential hypertension, benign    GERD (gastroesophageal reflux disease)    Hypertension    MI (myocardial infarction)    Sleep apnea.    Past Surgical History:  No significant past surgical history.    Family History:  No pertinent family history in first degree relatives.    Social History:  Social History (marital status, living situation, occupation, tobacco use, alcohol and drug use, and sexual history): Social History:  · Marital Status    · Occupation Retired- Banker  · Lives With spouse; dog   Substance Use History:  · Substance Use never used   Alcohol Use History:  · Have you ever consumed alcohol yes... 1 glass of wine every other day    Tobacco Usage:  · Tobacco Usage: Former smoker  · Tobacco Type: cigarettes  · Last Tobacco Use: 20 years ago  · Number of Packs per Day: 4-5 PPD · Number of yrs: 20 years	    Tobacco Screening:  · Core Measure Site	No	  · Has the patient used tobacco in the past 30 days?	No	    Risk Assessment:   Present on Admission:  Deep Venous Thrombosis	no	  Pulmonary Embolus	no	  Urinary Catheter	no	  Central Venous Catheter/PICC Line	no	  Surgical Site Incision	no	  Pressure Ulcer(s)	no	    Heart Failure:  Does this patient have a history of or has been diagnosed with heart failure? yes.     LV Function Assessment (LVS function was evaluated before arrival and/or during hospitalization) no.    Hepatitis C Screen (per [facility] Department of Health, hepatitis C screening must be offered to every individual born between 1945 and 1965)	Offered and patient declined	      Physical Exam:  Physical Exam: Appearance: Normal, NAD, NRD.  HEENT:   Normal oral mucosa, PERRL, EOMI   Lymphatic: No lymphadenopathy  Cardiovascular: Normal S1 S2, No JVD, No murmurs, No edema  Respiratory: Lungs clear to auscultation, no rales/rhonchi/wheezing   Psychiatry: A & O x 3, Mood & affect appropriate  Gastrointestinal:  Soft, Non-tender, ND + BS   Skin: No rashes, No ecchymoses, No cyanosis  Neurologic: Non-focal, sensation intact,  strength 5/5 B/L, CN grossly intact  Extremities: Normal range of motion, No clubbing, cyanosis 1+ non-pitting LE L>R Vascular: Peripheral pulses palpable	      Labs and Results:  Labs, Radiology, Cardiology, and Other Results: CT BRAIN: No acute intracranial bleeding, calvarial fracture, or scalp   hematoma.  CT CERVICAL SPINE: No fracture or subluxation. Degenerative changes of the cervical spine.  EKG: Afib with RVR 108bpm  CEx 1 neg UA: clear	    Assessment and Plan:   Assessment:  · Assessment		  73 y/o male with PMHx of MI, HERIBERTO on CPAP, CHF on Lasix, A-fib was on Coumadin stopped 1 week ago due to INR of 11,pre-syncope due to orthostatic hypotension, borderline DM, HTN, BPH, GERD presents to ED after syncopal episode. Admitted to telemetry.     Problem/Plan - 1:  ·  Problem: Syncope.  Plan: Monitor on telemetry for continuos cardiac monitoring  Serial EKGs and trend cardiac enzymes  Fall and seizure precautions  Orthostatics negative in ED, will repeat in AM  Echocardiogram to evaluate LVSF.     Problem/Plan - 2:  ·  Problem: Fall.  Plan: CTH negative  CT cervical spine- negative for fractures  Fall precautions.     Problem/Plan - 3:  ·  Problem: AF (atrial fibrillation).  Plan: Patient's admission EKG revealed CHADS2 score of 4    TTE ordered to evaluate LVEF  AC on hold due to occult positive- f/u CBC in AM.     Problem/Plan - 4:  ·  Problem: Hypertension.  Plan: Continue antihypertensives  Monitor BP serially  Sodium restriction.     Problem/Plan - 5:  ·  Problem: Sleep apnea.  Plan: On CPAP QHS.     Problem/Plan - 6:  Problem: CHF (congestive heart failure). Plan: Monitor on telemetry for continuous cardiac monitoring  Currently euvolemic-Monitor fluid status and I&Os closely
[facility] NEPHROLOGY- CONSULTATION NOTE    72 year old male with history of below presents with syncopal episode. Nephrology consulted for elevated Scr.    Patient denies any prior history of kidney disease, microscopic hematuria or proteinuria. Patient admits to poor PO intake at home PTA with light-headedness. Patient was found to be hypotensive by EMS on arrival and reports taking lasix 40 mg daily at home. Patient admits to supratherapeutic INR for which advised to hold coumadin by outpatient cardiologist. Patient admits to h/o BPH but denies any urinary symptoms including nocturia.    REVIEW OF SYSTEMS:  Gen: no changes in weight, + light-headedness now resolved  HEENT: no rhinorrhea  Neck: no sore throat  Cards: no chest pain  Resp: no dyspnea  GI: no nausea or vomiting or diarrhea, + poor PO intake PTA  : no dysuria or hematuria  Vascular: trace RLE edema secondary to varicose veins as per patient  Derm: no rashes  Neuro: no numbness/tingling    penicillin (Unknown)      Home Medications Reviewed  Hospital Medications:   MEDICATIONS  (STANDING):  aspirin enteric coated 81 milliGRAM(s) Oral daily  atorvastatin 40 milliGRAM(s) Oral at bedtime  beclomethasone  80 MICROgram(s) Inhaler 1 Puff(s) Inhalation two times a day  calcium carbonate 500 mG (Tums) Chewable 1 Tablet(s) Chew daily  carvedilol 6.25 milliGRAM(s) Oral every 12 hours  dextrose 5%. 1000 milliLiter(s) (50 mL/Hr) IV Continuous <Continuous>  dextrose 50% Injectable 12.5 Gram(s) IV Push once  dextrose 50% Injectable 25 Gram(s) IV Push once  dextrose 50% Injectable 25 Gram(s) IV Push once  influenza   Vaccine 0.5 milliLiter(s) IntraMuscular once  insulin lispro (HumaLOG) corrective regimen sliding scale   SubCutaneous three times a day before meals  insulin lispro (HumaLOG) corrective regimen sliding scale   SubCutaneous at bedtime  omega-3-Acid Ethyl Esters 2 Gram(s) Oral two times a day  pantoprazole    Tablet 40 milliGRAM(s) Oral before breakfast  tamsulosin 0.4 milliGRAM(s) Oral at bedtime  tiotropium 18 MICROgram(s) Capsule 1 Capsule(s) Inhalation daily      PAST MEDICAL & SURGICAL HISTORY:  Sleep apnea  GERD (gastroesophageal reflux disease)  BPH (benign prostatic hyperplasia)  CHF (congestive heart failure)  MI (myocardial infarction)  Hypertension  Essential hypertension, benign  Diabetes insipidus  AF (atrial fibrillation)  No significant past surgical history    FAMILY HISTORY:  No pertinent family history in first degree relatives      SOCIAL HISTORY:  Denies toxic substance use     VITALS:  T(F): 98.1 (17 @ 05:28), Max: 98.5 (10-31-17 @ 23:44)  HR: 100 (17 @ 06:50)  BP: 110/68 (17 @ 05:28)  RR: 18 (17 @ 05:28)  SpO2: 95% (17 @ 06:50)  Wt(kg): --    Height (cm): 165.1 (10-31 @ 23:47)  Weight (kg): 85.729 (10-31 @ 23:47)  BMI (kg/m2): 31.5 (10-31 @ 23:47)  BSA (m2): 1.93 (10-31 @ 23:47)    PHYSICAL EXAM:  Gen: NAD, calm  HEENT: MMM  Neck: no JVD  Cards: Irregularly irregular, +S1/S2, no M/G/R  Resp: CTA B/L  GI: soft, NT/ND, NABS  : no CVA tenderness  Vascular: trace RLE edema only  Derm: no rashes  Neuro: non-focal    LABS:      142  |  101  |  56<H>  ----------------------------<  109<H>  4.0   |  24  |  1.42<H>    Ca    8.6      2017 06:30  Phos  3.1       Mg     1.6         TPro  6.8  /  Alb  3.9  /  TBili  0.5  /  DBili      /  AST  17  /  ALT  13  /  AlkPhos  48  10-31    Creatinine Trend: 1.42 <--, 1.81 <--                        7.7    6.90  )-----------( 133      ( 2017 07:00 )             24.6     Urine Studies:  Urinalysis Basic - ( 31 Oct 2017 20:00 )    Color: PLYEL / Appearance: CLEAR / S.009 / pH: 6.0  Gluc: NEGATIVE / Ketone: NEGATIVE  / Bili: NEGATIVE / Urobili: NORMAL E.U.   Blood: NEGATIVE / Protein: NEGATIVE / Nitrite: NEGATIVE   Leuk Esterase: NEGATIVE / RBC: 0-2 / WBC 2-5   Sq Epi: OCC / Non Sq Epi:  / Bacteria: FEW          RADIOLOGY & ADDITIONAL STUDIES:  < from: Xray Chest 1 View AP-PORTABLE IMMEDIATE (10.31.17 @ 18:37) >  IMPRESSION:   Clear lungs.    < end of copied text >
Patient is a 72y old  Male who presents with a chief complaint os passing out, found to have severe anemia and guaiac positive, s/p transfusion and doing better. Pt has h/o bleeding before but does not remember if he ever had any blood transfusion. A detailed ROS is otherwise unremarkable, no weight loss.    PAST MEDICAL & SURGICAL HISTORY:  Sleep apnea  GERD (gastroesophageal reflux disease)  BPH (benign prostatic hyperplasia)  CHF (congestive heart failure)  MI (myocardial infarction)  Hypertension  Essential hypertension, benign  Diabetes insipidus  AF (atrial fibrillation)  No significant past surgical history    Meds:  aspirin enteric coated 81 milliGRAM(s) Oral daily  atorvastatin 40 milliGRAM(s) Oral at bedtime  beclomethasone  80 MICROgram(s) Inhaler 1 Puff(s) Inhalation two times a day  calcium carbonate 500 mG (Tums) Chewable 1 Tablet(s) Chew daily  carvedilol 3.125 milliGRAM(s) Oral every 12 hours  dextrose 5%. 1000 milliLiter(s) IV Continuous <Continuous>  dextrose 50% Injectable 12.5 Gram(s) IV Push once  dextrose 50% Injectable 25 Gram(s) IV Push once  dextrose 50% Injectable 25 Gram(s) IV Push once  dextrose Gel 1 Dose(s) Oral once PRN  glucagon  Injectable 1 milliGRAM(s) IntraMuscular once PRN  influenza   Vaccine 0.5 milliLiter(s) IntraMuscular once  insulin lispro (HumaLOG) corrective regimen sliding scale   SubCutaneous three times a day before meals  insulin lispro (HumaLOG) corrective regimen sliding scale   SubCutaneous at bedtime  melatonin 3 milliGRAM(s) Oral at bedtime PRN  omega-3-Acid Ethyl Esters 2 Gram(s) Oral two times a day  pantoprazole  Injectable 40 milliGRAM(s) IV Push every 12 hours  polyethylene glycol/electrolyte Solution. 4000 milliLiter(s) Oral once  tamsulosin 0.4 milliGRAM(s) Oral at bedtime  tiotropium 18 MICROgram(s) Capsule 1 Capsule(s) Inhalation daily    Allergies:  penicillin (Unknown)    SHx: Quit smoking 20 years ago, smoked 3-4 PPD for 20 years, drinks wine socially.    FAMILY HISTORY:  No pertinent family history in first degree relatives      Vital Signs Last 24 Hrs  T(C): 36.2 (03 Nov 2017 05:54), Max: 36.8 (02 Nov 2017 14:03)  T(F): 97.2 (03 Nov 2017 05:54), Max: 98.3 (02 Nov 2017 14:03)  HR: 86 (03 Nov 2017 07:00) (86 - 109)  BP: 101/73 (03 Nov 2017 05:54) (99/70 - 111/79)  BP(mean): --  RR: 18 (03 Nov 2017 05:54) (16 - 18)  SpO2: 92% (03 Nov 2017 07:00) (92% - 100%)                          9.1    8.62  )-----------( 143      ( 03 Nov 2017 06:00 )             28.8       11-03    141  |  103  |  36<H>  ----------------------------<  102<H>  4.6   |  23  |  1.23    Ca    9.0      03 Nov 2017 06:00  Phos  3.7     11-03  Mg     1.9     11-03        ferritin ~20-30 (low)    CT abd and pelvis: IMPRESSION:  No acute abnormality in the abdomen and pelvis.  Colonic diverticulosis without acute diverticulitis.

## 2017-11-03 NOTE — PROGRESS NOTE ADULT - SUBJECTIVE AND OBJECTIVE BOX
APPLE CROW:[id number],   72yMale followed for:  penicillin (Unknown)    PAST MEDICAL & SURGICAL HISTORY:  Sleep apnea  GERD (gastroesophageal reflux disease)  BPH (benign prostatic hyperplasia)  CHF (congestive heart failure)  MI (myocardial infarction)  Hypertension  Essential hypertension, benign  Diabetes insipidus  AF (atrial fibrillation)  No significant past surgical history    FAMILY HISTORY:  No pertinent family history in first degree relatives    MEDICATIONS  (STANDING):  aspirin enteric coated 81 milliGRAM(s) Oral daily  atorvastatin 40 milliGRAM(s) Oral at bedtime  beclomethasone  80 MICROgram(s) Inhaler 1 Puff(s) Inhalation two times a day  calcium carbonate 500 mG (Tums) Chewable 1 Tablet(s) Chew daily  carvedilol 3.125 milliGRAM(s) Oral every 12 hours  dextrose 5%. 1000 milliLiter(s) (50 mL/Hr) IV Continuous <Continuous>  dextrose 50% Injectable 12.5 Gram(s) IV Push once  dextrose 50% Injectable 25 Gram(s) IV Push once  dextrose 50% Injectable 25 Gram(s) IV Push once  influenza   Vaccine 0.5 milliLiter(s) IntraMuscular once  insulin lispro (HumaLOG) corrective regimen sliding scale   SubCutaneous three times a day before meals  insulin lispro (HumaLOG) corrective regimen sliding scale   SubCutaneous at bedtime  omega-3-Acid Ethyl Esters 2 Gram(s) Oral two times a day  pantoprazole  Injectable 40 milliGRAM(s) IV Push every 12 hours  tamsulosin 0.4 milliGRAM(s) Oral at bedtime  tiotropium 18 MICROgram(s) Capsule 1 Capsule(s) Inhalation daily    MEDICATIONS  (PRN):  dextrose Gel 1 Dose(s) Oral once PRN Blood Glucose LESS THAN 70 milliGRAM(s)/deciliter  glucagon  Injectable 1 milliGRAM(s) IntraMuscular once PRN Glucose LESS THAN 70 milligrams/deciliter  melatonin 3 milliGRAM(s) Oral at bedtime PRN Insomnia      Vital Signs Last 24 Hrs  T(C): 36.2 (03 Nov 2017 05:54), Max: 36.8 (02 Nov 2017 14:03)  T(F): 97.2 (03 Nov 2017 05:54), Max: 98.3 (02 Nov 2017 14:03)  HR: 109 (03 Nov 2017 05:54) (93 - 109)  BP: 101/73 (03 Nov 2017 05:54) (99/70 - 111/79)  BP(mean): --  RR: 18 (03 Nov 2017 05:54) (16 - 18)  SpO2: 96% (03 Nov 2017 05:54) (94% - 100%)  nc/at  s1s2  cta  soft, nt, nd no guarding or rebound  no c/c/e    CBC Full  -  ( 03 Nov 2017 06:00 )  WBC Count : 8.62 K/uL  Hemoglobin : 9.1 g/dL  Hematocrit : 28.8 %  Platelet Count - Automated : 143 K/uL  Mean Cell Volume : 87.5 fL  Mean Cell Hemoglobin : 27.7 pg  Mean Cell Hemoglobin Concentration : 31.6 %  Auto Neutrophil # : 4.66 K/uL  Auto Lymphocyte # : 2.95 K/uL  Auto Monocyte # : 0.81 K/uL  Auto Eosinophil # : 0.11 K/uL  Auto Basophil # : 0.04 K/uL  Auto Neutrophil % : 54.0 %  Auto Lymphocyte % : 34.2 %  Auto Monocyte % : 9.4 %  Auto Eosinophil % : 1.3 %  Auto Basophil % : 0.5 %    11-02    137  |  100  |  49<H>  ----------------------------<  110<H>  4.0   |  24  |  1.34<H>    Ca    8.2<L>      02 Nov 2017 06:45  Phos  3.2     11-02  Mg     1.7     11-02

## 2017-11-03 NOTE — PROGRESS NOTE ADULT - PROBLEM SELECTOR PLAN 1
Resolving and likely hemodynamically mediated secondary to hypotension in setting of lasix use, anemia and supratherapeutic INR which can cause warfarin nephropathy. UA bland with hyaline casts and low FeUrea suggestive of low renal perfusion. Avoid nephrotoxins. Monitor electrolytes.

## 2017-11-04 LAB
BUN SERPL-MCNC: 22 MG/DL — SIGNIFICANT CHANGE UP (ref 7–23)
CALCIUM SERPL-MCNC: 8.5 MG/DL — SIGNIFICANT CHANGE UP (ref 8.4–10.5)
CHLORIDE SERPL-SCNC: 106 MMOL/L — SIGNIFICANT CHANGE UP (ref 98–107)
CO2 SERPL-SCNC: 28 MMOL/L — SIGNIFICANT CHANGE UP (ref 22–31)
CREAT SERPL-MCNC: 1.18 MG/DL — SIGNIFICANT CHANGE UP (ref 0.5–1.3)
FOLATE SERPL-MCNC: 15.9 NG/ML — SIGNIFICANT CHANGE UP (ref 4.7–20)
GLUCOSE SERPL-MCNC: 103 MG/DL — HIGH (ref 70–99)
HCT VFR BLD CALC: 25 % — LOW (ref 39–50)
HGB BLD-MCNC: 7.9 G/DL — LOW (ref 13–17)
LDH SERPL L TO P-CCNC: 139 U/L — SIGNIFICANT CHANGE UP (ref 135–225)
MAGNESIUM SERPL-MCNC: 1.9 MG/DL — SIGNIFICANT CHANGE UP (ref 1.6–2.6)
MCHC RBC-ENTMCNC: 27.7 PG — SIGNIFICANT CHANGE UP (ref 27–34)
MCHC RBC-ENTMCNC: 31.6 % — LOW (ref 32–36)
MCV RBC AUTO: 87.7 FL — SIGNIFICANT CHANGE UP (ref 80–100)
NRBC # FLD: 0 — SIGNIFICANT CHANGE UP
PHOSPHATE SERPL-MCNC: 3.9 MG/DL — SIGNIFICANT CHANGE UP (ref 2.5–4.5)
PLATELET # BLD AUTO: 127 K/UL — LOW (ref 150–400)
PMV BLD: 11.5 FL — SIGNIFICANT CHANGE UP (ref 7–13)
POTASSIUM SERPL-MCNC: 4.7 MMOL/L — SIGNIFICANT CHANGE UP (ref 3.5–5.3)
POTASSIUM SERPL-SCNC: 4.7 MMOL/L — SIGNIFICANT CHANGE UP (ref 3.5–5.3)
PROT SERPL-MCNC: 5.6 G/DL — LOW (ref 6–8.3)
PROT UR-MCNC: 8.3 MG/DL — SIGNIFICANT CHANGE UP
RBC # BLD: 2.85 M/UL — LOW (ref 4.2–5.8)
RBC # FLD: 18.1 % — HIGH (ref 10.3–14.5)
RETICS #: 0.1 10X6/UL — HIGH (ref 0.02–0.07)
RETICS/RBC NFR: 3.7 % — HIGH (ref 0.5–2.5)
SODIUM SERPL-SCNC: 144 MMOL/L — SIGNIFICANT CHANGE UP (ref 135–145)
VIT B12 SERPL-MCNC: 358 PG/ML — SIGNIFICANT CHANGE UP (ref 200–900)
WBC # BLD: 5.84 K/UL — SIGNIFICANT CHANGE UP (ref 3.8–10.5)
WBC # FLD AUTO: 5.84 K/UL — SIGNIFICANT CHANGE UP (ref 3.8–10.5)

## 2017-11-04 PROCEDURE — 84166 PROTEIN E-PHORESIS/URINE/CSF: CPT | Mod: 26

## 2017-11-04 PROCEDURE — 86335 IMMUNFIX E-PHORSIS/URINE/CSF: CPT | Mod: 26

## 2017-11-04 PROCEDURE — 84165 PROTEIN E-PHORESIS SERUM: CPT | Mod: 26

## 2017-11-04 PROCEDURE — 86334 IMMUNOFIX E-PHORESIS SERUM: CPT | Mod: 26

## 2017-11-04 RX ORDER — SODIUM FERRIC GLUCONAT/SUCROSE 62.5MG/5ML
125 AMPUL (ML) INTRAVENOUS ONCE
Qty: 0 | Refills: 0 | Status: COMPLETED | OUTPATIENT
Start: 2017-11-05 | End: 2017-11-05

## 2017-11-04 RX ORDER — SOD SULF/SODIUM/NAHCO3/KCL/PEG
4000 SOLUTION, RECONSTITUTED, ORAL ORAL ONCE
Qty: 0 | Refills: 0 | Status: COMPLETED | OUTPATIENT
Start: 2017-11-04 | End: 2017-11-05

## 2017-11-04 RX ADMIN — Medication 2 GRAM(S): at 05:32

## 2017-11-04 RX ADMIN — BECLOMETHASONE DIPROPIONATE 1 PUFF(S): 40 AEROSOL, METERED RESPIRATORY (INHALATION) at 09:23

## 2017-11-04 RX ADMIN — Medication 1 TABLET(S): at 13:14

## 2017-11-04 RX ADMIN — BECLOMETHASONE DIPROPIONATE 1 PUFF(S): 40 AEROSOL, METERED RESPIRATORY (INHALATION) at 21:38

## 2017-11-04 RX ADMIN — ATORVASTATIN CALCIUM 40 MILLIGRAM(S): 80 TABLET, FILM COATED ORAL at 21:38

## 2017-11-04 RX ADMIN — MAGNESIUM OXIDE 400 MG ORAL TABLET 400 MILLIGRAM(S): 241.3 TABLET ORAL at 09:24

## 2017-11-04 RX ADMIN — CARVEDILOL PHOSPHATE 3.12 MILLIGRAM(S): 80 CAPSULE, EXTENDED RELEASE ORAL at 17:17

## 2017-11-04 RX ADMIN — Medication 10 MILLIGRAM(S): at 13:13

## 2017-11-04 RX ADMIN — CARVEDILOL PHOSPHATE 3.12 MILLIGRAM(S): 80 CAPSULE, EXTENDED RELEASE ORAL at 05:32

## 2017-11-04 RX ADMIN — TIOTROPIUM BROMIDE 1 CAPSULE(S): 18 CAPSULE ORAL; RESPIRATORY (INHALATION) at 09:23

## 2017-11-04 RX ADMIN — PANTOPRAZOLE SODIUM 40 MILLIGRAM(S): 20 TABLET, DELAYED RELEASE ORAL at 17:17

## 2017-11-04 RX ADMIN — MAGNESIUM OXIDE 400 MG ORAL TABLET 400 MILLIGRAM(S): 241.3 TABLET ORAL at 17:17

## 2017-11-04 RX ADMIN — GABAPENTIN 300 MILLIGRAM(S): 400 CAPSULE ORAL at 21:38

## 2017-11-04 RX ADMIN — PANTOPRAZOLE SODIUM 40 MILLIGRAM(S): 20 TABLET, DELAYED RELEASE ORAL at 05:32

## 2017-11-04 RX ADMIN — TAMSULOSIN HYDROCHLORIDE 0.4 MILLIGRAM(S): 0.4 CAPSULE ORAL at 21:38

## 2017-11-04 RX ADMIN — Medication 81 MILLIGRAM(S): at 13:13

## 2017-11-04 RX ADMIN — Medication 4000 MILLILITER(S): at 13:13

## 2017-11-04 RX ADMIN — GABAPENTIN 300 MILLIGRAM(S): 400 CAPSULE ORAL at 13:14

## 2017-11-04 RX ADMIN — GABAPENTIN 300 MILLIGRAM(S): 400 CAPSULE ORAL at 05:32

## 2017-11-04 RX ADMIN — Medication 2 GRAM(S): at 17:17

## 2017-11-04 NOTE — PROGRESS NOTE ADULT - SUBJECTIVE AND OBJECTIVE BOX
APPLE CROW:[id number],   72yMale followed for: gib  penicillin (Unknown)    PAST MEDICAL & SURGICAL HISTORY:  Sleep apnea  GERD (gastroesophageal reflux disease)  BPH (benign prostatic hyperplasia)  CHF (congestive heart failure)  MI (myocardial infarction)  Hypertension  Essential hypertension, benign  Diabetes insipidus  AF (atrial fibrillation)  No significant past surgical history    FAMILY HISTORY:  No pertinent family history in first degree relatives    MEDICATIONS  (STANDING):  aspirin enteric coated 81 milliGRAM(s) Oral daily  atorvastatin 40 milliGRAM(s) Oral at bedtime  beclomethasone  80 MICROgram(s) Inhaler 1 Puff(s) Inhalation two times a day  calcium carbonate 500 mG (Tums) Chewable 1 Tablet(s) Chew daily  carvedilol 3.125 milliGRAM(s) Oral every 12 hours  dextrose 5%. 1000 milliLiter(s) (50 mL/Hr) IV Continuous <Continuous>  dextrose 50% Injectable 12.5 Gram(s) IV Push once  dextrose 50% Injectable 25 Gram(s) IV Push once  dextrose 50% Injectable 25 Gram(s) IV Push once  gabapentin 300 milliGRAM(s) Oral three times a day  influenza   Vaccine 0.5 milliLiter(s) IntraMuscular once  insulin lispro (HumaLOG) corrective regimen sliding scale   SubCutaneous three times a day before meals  insulin lispro (HumaLOG) corrective regimen sliding scale   SubCutaneous at bedtime  magnesium oxide 400 milliGRAM(s) Oral two times a day with meals  omega-3-Acid Ethyl Esters 2 Gram(s) Oral two times a day  pantoprazole  Injectable 40 milliGRAM(s) IV Push every 12 hours  polyethylene glycol/electrolyte Solution. 4000 milliLiter(s) Oral once  tamsulosin 0.4 milliGRAM(s) Oral at bedtime  tiotropium 18 MICROgram(s) Capsule 1 Capsule(s) Inhalation daily    MEDICATIONS  (PRN):  dextrose Gel 1 Dose(s) Oral once PRN Blood Glucose LESS THAN 70 milliGRAM(s)/deciliter  glucagon  Injectable 1 milliGRAM(s) IntraMuscular once PRN Glucose LESS THAN 70 milligrams/deciliter  melatonin 3 milliGRAM(s) Oral at bedtime PRN Insomnia      Vital Signs Last 24 Hrs  T(C): 36.6 (04 Nov 2017 05:31), Max: 36.8 (03 Nov 2017 13:50)  T(F): 97.9 (04 Nov 2017 05:31), Max: 98.3 (03 Nov 2017 13:50)  HR: 92 (04 Nov 2017 05:31) (85 - 100)  BP: 107/67 (04 Nov 2017 05:31) (107/67 - 123/71)  BP(mean): --  RR: 18 (04 Nov 2017 05:31) (18 - 20)  SpO2: 96% (04 Nov 2017 05:31) (93% - 98%)  nc/at  s1s2  cta  soft, nt, nd no guarding or rebound  no c/c/e    CBC Full  -  ( 04 Nov 2017 06:40 )  WBC Count : 5.84 K/uL  Hemoglobin : 7.9 g/dL  Hematocrit : 25.0 %  Platelet Count - Automated : 127 K/uL  Mean Cell Volume : 87.7 fL  Mean Cell Hemoglobin : 27.7 pg  Mean Cell Hemoglobin Concentration : 31.6 %  Auto Neutrophil # : x  Auto Lymphocyte # : x  Auto Monocyte # : x  Auto Eosinophil # : x  Auto Basophil # : x  Auto Neutrophil % : x  Auto Lymphocyte % : x  Auto Monocyte % : x  Auto Eosinophil % : x  Auto Basophil % : x    11-04    144  |  106  |  22  ----------------------------<  103<H>  4.7   |  28  |  1.18    Ca    8.5      04 Nov 2017 06:40  Phos  3.9     11-04  Mg     1.9     11-04    TPro  5.6<L>  /  Alb  x   /  TBili  x   /  DBili  x   /  AST  x   /  ALT  x   /  AlkPhos  x   11-04

## 2017-11-04 NOTE — PROGRESS NOTE ADULT - PROBLEM SELECTOR PLAN 3
Patient with pedal edema noted. c/w Lasix 40 mg daily (started 11/4). Pt on Golyte, if frequent diarrhea; will hold Lasix. Monitor UO.

## 2017-11-04 NOTE — PROGRESS NOTE ADULT - SUBJECTIVE AND OBJECTIVE BOX
[facility] NEPHROLOGY- PROGRESS NOTE    72 year old male with history of CHF presents with syncopal episode. Nephrology consulted for elevated Scr.    REVIEW OF SYSTEMS:  Gen: no changes in weight  Cards: no chest pain  Resp: no dyspnea  GI: no nausea or vomiting or diarrhea  Vascular: no LE edema    penicillin (Unknown)      Hospital Medications: Medications reviewed    VITALS:  T(F): 97.9 (17 @ 05:31), Max: 98.3 (17 @ 13:50)  HR: 92 (17 @ 09:13)  BP: 107/67 (17 @ 05:31)  RR: 18 (17 @ 05:31)  SpO2: 95% (17 @ 09:13)  Wt(kg): --     @ 07:01  -   @ 07:00  --------------------------------------------------------  IN: 1367 mL / OUT: 0 mL / NET: 1367 mL     @ 08:01  -   @ 12:42  --------------------------------------------------------  IN: 0 mL / OUT: 200 mL / NET: -200 mL      PHYSICAL EXAM:    Gen: NAD, calm  Cards: Irregularly irregular, +S1/S2, no M/G/R  Resp: CTA B/L  GI: soft, NT/ND, NABS  Vascular: + pedal edema only    LABS:      144  |  106  |  22  ----------------------------<  103<H>  4.7   |  28  |  1.18    Ca    8.5      2017 06:40  Phos  3.9       Mg     1.9         TPro  5.6<L>  /  Alb      /  TBili      /  DBili      /  AST      /  ALT      /  AlkPhos          Creatinine Trend: 1.18 <--, 1.23 <--, 1.34 <--, 1.42 <--, 1.81 <--                        7.9    5.84  )-----------( 127      ( 2017 06:40 )             25.0     Urine Studies:  Urinalysis Basic - ( 31 Oct 2017 20:00 )    Color: PLYEL / Appearance: CLEAR / S.009 / pH: 6.0  Gluc: NEGATIVE / Ketone: NEGATIVE  / Bili: NEGATIVE / Urobili: NORMAL E.U.   Blood: NEGATIVE / Protein: NEGATIVE / Nitrite: NEGATIVE   Leuk Esterase: NEGATIVE / RBC: 0-2 / WBC 2-5   Sq Epi: OCC / Non Sq Epi:  / Bacteria: FEW      Protein, Random Urine: 8.3 mg/dL ( @ 09:14)  Sodium, Random Urine: 90 meq/L ( @ 11:49)  Creatinine, Random Urine: 32.40 mg/dL ( @ 11:49)

## 2017-11-04 NOTE — PROGRESS NOTE ADULT - SUBJECTIVE AND OBJECTIVE BOX
Cardiovascular Disease Progress Note    Overnight events: No acute events overnight. Mr. Pinto feels well. No chest pain or SOB.   Otherwise review of systems negative    Objective Findings:  T(C): 36.6 (17 @ 05:31), Max: 36.8 (17 @ 13:50)  HR: 92 (17 @ 09:13) (85 - 100)  BP: 107/67 (17 @ 05:31) (107/67 - 110/78)  RR: 18 (17 @ 05:31) (18 - 18)  SpO2: 95% (17 @ 09:13) (93% - 98%)  Wt(kg): --  Daily     Daily Weight in k.3 (2017 07:37)      Physical Exam:  Gen: NAD  HEENT: EOMI  CV: RRR, normal S1 + S2, no m/r/g  Lungs: CTAB  Abd: soft, non-tender  Ext: No edema    Telemetry: a-fib 100s    Laboratory Data:                        7.9    5.84  )-----------( 127      ( 2017 06:40 )             25.0     11-    144  |  106  |  22  ----------------------------<  103<H>  4.7   |  28  |  1.18    Ca    8.5      2017 06:40  Phos  3.9     -  Mg     1.9         TPro  5.6<L>  /  Alb  x   /  TBili  x   /  DBili  x   /  AST  x   /  ALT  x   /  AlkPhos  x   11-04              Inpatient Medications:  MEDICATIONS  (STANDING):  aspirin enteric coated 81 milliGRAM(s) Oral daily  atorvastatin 40 milliGRAM(s) Oral at bedtime  beclomethasone  80 MICROgram(s) Inhaler 1 Puff(s) Inhalation two times a day  bisacodyl 10 milliGRAM(s) Oral once  calcium carbonate 500 mG (Tums) Chewable 1 Tablet(s) Chew daily  carvedilol 3.125 milliGRAM(s) Oral every 12 hours  dextrose 5%. 1000 milliLiter(s) (50 mL/Hr) IV Continuous <Continuous>  dextrose 50% Injectable 12.5 Gram(s) IV Push once  dextrose 50% Injectable 25 Gram(s) IV Push once  dextrose 50% Injectable 25 Gram(s) IV Push once  gabapentin 300 milliGRAM(s) Oral three times a day  influenza   Vaccine 0.5 milliLiter(s) IntraMuscular once  insulin lispro (HumaLOG) corrective regimen sliding scale   SubCutaneous three times a day before meals  insulin lispro (HumaLOG) corrective regimen sliding scale   SubCutaneous at bedtime  magnesium oxide 400 milliGRAM(s) Oral two times a day with meals  omega-3-Acid Ethyl Esters 2 Gram(s) Oral two times a day  pantoprazole  Injectable 40 milliGRAM(s) IV Push every 12 hours  polyethylene glycol/electrolyte Solution. 4000 milliLiter(s) Oral once  polyethylene glycol/electrolyte Solution. 4000 milliLiter(s) Oral once  tamsulosin 0.4 milliGRAM(s) Oral at bedtime  tiotropium 18 MICROgram(s) Capsule 1 Capsule(s) Inhalation daily      Assessment: 72 year old man with CAD, a-fib, and HERIBERTO presents with syncope in the setting of acute blood loss anemia and SHEYLA.    #Syncope- in the setting of acute blood loss anemia and FOBT+ stools.  Patient s/p 1 Unit pRBCs on  with adequate response  GI f/u this morning noted- plan for EGD .  Awaiting echo    #A-fib- better rate controlled   Hold digoxin for supratherapeutic levels.  No AC until GI work up completed.  Continue ASA, as Hb currently stable.  Continue Coreg.    #Anemia- Fe studies consistent with Fe deficiency with low ferritin.  Hematology input noted- patient s/p IV iron on 11/3.     #SHEYLA- likely due to poor perfusion in the setting of anemia, and a-fib with RVR.  Creatinine improving.  Resume Lasix today.  Renal input appreciated.     Care discussed at length with patient. 72 year old man with CAD, a-fib, and HERIBERTO presents with syncope in the setting of acute blood loss anemia and SHEYLA.    Over 35 minutes spent on total encounter; more than 50% of the visit was spent counseling and/or coordinating care by the attending physician.      Ed Garcia M.D.   Cardiovascular Disease  [phone]

## 2017-11-05 LAB
BLD GP AB SCN SERPL QL: NEGATIVE — SIGNIFICANT CHANGE UP
BUN SERPL-MCNC: 15 MG/DL — SIGNIFICANT CHANGE UP (ref 7–23)
CALCIUM SERPL-MCNC: 8.2 MG/DL — LOW (ref 8.4–10.5)
CHLORIDE SERPL-SCNC: 102 MMOL/L — SIGNIFICANT CHANGE UP (ref 98–107)
CO2 SERPL-SCNC: 28 MMOL/L — SIGNIFICANT CHANGE UP (ref 22–31)
CREAT SERPL-MCNC: 1.18 MG/DL — SIGNIFICANT CHANGE UP (ref 0.5–1.3)
GLUCOSE SERPL-MCNC: 96 MG/DL — SIGNIFICANT CHANGE UP (ref 70–99)
HAV IGM SER-ACNC: NONREACTIVE — SIGNIFICANT CHANGE UP
HBV CORE IGM SER-ACNC: NONREACTIVE — SIGNIFICANT CHANGE UP
HBV SURFACE AG SER-ACNC: NONREACTIVE — SIGNIFICANT CHANGE UP
HCT VFR BLD CALC: 25.1 % — LOW (ref 39–50)
HCT VFR BLD CALC: 27.2 % — LOW (ref 39–50)
HCV AB S/CO SERPL IA: 0.14 S/CO — SIGNIFICANT CHANGE UP
HCV AB SERPL-IMP: SIGNIFICANT CHANGE UP
HGB BLD-MCNC: 7.8 G/DL — LOW (ref 13–17)
HGB BLD-MCNC: 8.8 G/DL — LOW (ref 13–17)
MAGNESIUM SERPL-MCNC: 2 MG/DL — SIGNIFICANT CHANGE UP (ref 1.6–2.6)
MCHC RBC-ENTMCNC: 27.7 PG — SIGNIFICANT CHANGE UP (ref 27–34)
MCHC RBC-ENTMCNC: 27.8 PG — SIGNIFICANT CHANGE UP (ref 27–34)
MCHC RBC-ENTMCNC: 31.1 % — LOW (ref 32–36)
MCHC RBC-ENTMCNC: 32.4 % — SIGNIFICANT CHANGE UP (ref 32–36)
MCV RBC AUTO: 85.8 FL — SIGNIFICANT CHANGE UP (ref 80–100)
MCV RBC AUTO: 89 FL — SIGNIFICANT CHANGE UP (ref 80–100)
NRBC # FLD: 0 — SIGNIFICANT CHANGE UP
NRBC # FLD: 0 — SIGNIFICANT CHANGE UP
PHOSPHATE SERPL-MCNC: 3.8 MG/DL — SIGNIFICANT CHANGE UP (ref 2.5–4.5)
PLATELET # BLD AUTO: 129 K/UL — LOW (ref 150–400)
PLATELET # BLD AUTO: 143 K/UL — LOW (ref 150–400)
PMV BLD: 11 FL — SIGNIFICANT CHANGE UP (ref 7–13)
PMV BLD: 11.7 FL — SIGNIFICANT CHANGE UP (ref 7–13)
POTASSIUM SERPL-MCNC: 3.9 MMOL/L — SIGNIFICANT CHANGE UP (ref 3.5–5.3)
POTASSIUM SERPL-SCNC: 3.9 MMOL/L — SIGNIFICANT CHANGE UP (ref 3.5–5.3)
RBC # BLD: 2.82 M/UL — LOW (ref 4.2–5.8)
RBC # BLD: 3.17 M/UL — LOW (ref 4.2–5.8)
RBC # FLD: 17.2 % — HIGH (ref 10.3–14.5)
RBC # FLD: 17.7 % — HIGH (ref 10.3–14.5)
RH IG SCN BLD-IMP: POSITIVE — SIGNIFICANT CHANGE UP
RHEUMATOID FACT SERPL-ACNC: 8.9 IU/ML — SIGNIFICANT CHANGE UP
SODIUM SERPL-SCNC: 140 MMOL/L — SIGNIFICANT CHANGE UP (ref 135–145)
WBC # BLD: 5.4 K/UL — SIGNIFICANT CHANGE UP (ref 3.8–10.5)
WBC # BLD: 7.01 K/UL — SIGNIFICANT CHANGE UP (ref 3.8–10.5)
WBC # FLD AUTO: 5.4 K/UL — SIGNIFICANT CHANGE UP (ref 3.8–10.5)
WBC # FLD AUTO: 7.01 K/UL — SIGNIFICANT CHANGE UP (ref 3.8–10.5)

## 2017-11-05 RX ADMIN — TIOTROPIUM BROMIDE 1 CAPSULE(S): 18 CAPSULE ORAL; RESPIRATORY (INHALATION) at 09:38

## 2017-11-05 RX ADMIN — Medication 4000 MILLILITER(S): at 09:41

## 2017-11-05 RX ADMIN — CARVEDILOL PHOSPHATE 3.12 MILLIGRAM(S): 80 CAPSULE, EXTENDED RELEASE ORAL at 17:10

## 2017-11-05 RX ADMIN — Medication 3 MILLIGRAM(S): at 21:26

## 2017-11-05 RX ADMIN — BECLOMETHASONE DIPROPIONATE 1 PUFF(S): 40 AEROSOL, METERED RESPIRATORY (INHALATION) at 21:26

## 2017-11-05 RX ADMIN — Medication 2 GRAM(S): at 17:10

## 2017-11-05 RX ADMIN — MAGNESIUM OXIDE 400 MG ORAL TABLET 400 MILLIGRAM(S): 241.3 TABLET ORAL at 09:38

## 2017-11-05 RX ADMIN — Medication 2 GRAM(S): at 05:13

## 2017-11-05 RX ADMIN — TAMSULOSIN HYDROCHLORIDE 0.4 MILLIGRAM(S): 0.4 CAPSULE ORAL at 21:27

## 2017-11-05 RX ADMIN — GABAPENTIN 300 MILLIGRAM(S): 400 CAPSULE ORAL at 05:12

## 2017-11-05 RX ADMIN — Medication 1 TABLET(S): at 13:12

## 2017-11-05 RX ADMIN — ATORVASTATIN CALCIUM 40 MILLIGRAM(S): 80 TABLET, FILM COATED ORAL at 21:26

## 2017-11-05 RX ADMIN — PANTOPRAZOLE SODIUM 40 MILLIGRAM(S): 20 TABLET, DELAYED RELEASE ORAL at 17:10

## 2017-11-05 RX ADMIN — CARVEDILOL PHOSPHATE 3.12 MILLIGRAM(S): 80 CAPSULE, EXTENDED RELEASE ORAL at 05:12

## 2017-11-05 RX ADMIN — PANTOPRAZOLE SODIUM 40 MILLIGRAM(S): 20 TABLET, DELAYED RELEASE ORAL at 05:13

## 2017-11-05 RX ADMIN — GABAPENTIN 300 MILLIGRAM(S): 400 CAPSULE ORAL at 21:27

## 2017-11-05 RX ADMIN — GABAPENTIN 300 MILLIGRAM(S): 400 CAPSULE ORAL at 13:12

## 2017-11-05 RX ADMIN — Medication 110 MILLIGRAM(S): at 05:12

## 2017-11-05 RX ADMIN — BECLOMETHASONE DIPROPIONATE 1 PUFF(S): 40 AEROSOL, METERED RESPIRATORY (INHALATION) at 09:38

## 2017-11-05 RX ADMIN — Medication 81 MILLIGRAM(S): at 13:12

## 2017-11-05 NOTE — PROGRESS NOTE ADULT - SUBJECTIVE AND OBJECTIVE BOX
HPI:  73 y/o male with PMHx of MI, HERIBERTO on CPAP, CHF on Lasix, A-fib was on Coumadin stopped 1 week ago due to INR of 11, borderline DM, HTN, BPH, GERD presents to ED after syncopal episode. Patient states he was at home sitting in chair trying on bipap masks with his respiratory therapist. Paitent then became dizzy with mask on then passed out. Son who witnessed syncope said episode lasted 20 seconds, with fall backwards hitting his head. Endorses previous episodes of dizziness during the fall-self resolved. Per patient normal echo 6 months ago with cardiologist Dr. Schmidt at [facility]. 90/30 hypotensive at scene by EMS. Son states has syncope due to orthostatics. Orthostatics performed in ED negative. Patient states he was instructed by cardiologist to stop Coumadin stopped due to INR of 11. Patient noticed darker stools for last 2-3days, found to be occult positive in ED. Denies any other bleeding sx/signs.    Currently asymptomatic, no pain or further trauma. (31 Oct 2017 21:42)      aspirin enteric coated 81 milliGRAM(s) Oral daily  atorvastatin 40 milliGRAM(s) Oral at bedtime  beclomethasone  80 MICROgram(s) Inhaler 1 Puff(s) Inhalation two times a day  calcium carbonate 500 mG (Tums) Chewable 1 Tablet(s) Chew daily  carvedilol 3.125 milliGRAM(s) Oral every 12 hours  dextrose 5%. 1000 milliLiter(s) IV Continuous <Continuous>  dextrose 50% Injectable 12.5 Gram(s) IV Push once  dextrose 50% Injectable 25 Gram(s) IV Push once  dextrose 50% Injectable 25 Gram(s) IV Push once  dextrose Gel 1 Dose(s) Oral once PRN  gabapentin 300 milliGRAM(s) Oral three times a day  glucagon  Injectable 1 milliGRAM(s) IntraMuscular once PRN  influenza   Vaccine 0.5 milliLiter(s) IntraMuscular once  insulin lispro (HumaLOG) corrective regimen sliding scale   SubCutaneous three times a day before meals  insulin lispro (HumaLOG) corrective regimen sliding scale   SubCutaneous at bedtime  melatonin 3 milliGRAM(s) Oral at bedtime PRN  omega-3-Acid Ethyl Esters 2 Gram(s) Oral two times a day  pantoprazole  Injectable 40 milliGRAM(s) IV Push every 12 hours  tamsulosin 0.4 milliGRAM(s) Oral at bedtime  tiotropium 18 MICROgram(s) Capsule 1 Capsule(s) Inhalation daily      Vital Signs Last 24 Hrs  T(C): 36.8 (05 Nov 2017 11:38), Max: 36.8 (05 Nov 2017 11:38)  T(F): 98.2 (05 Nov 2017 11:38), Max: 98.2 (05 Nov 2017 11:38)  HR: 96 (05 Nov 2017 11:38) (69 - 100)  BP: 110/71 (05 Nov 2017 11:38) (102/65 - 110/71)  BP(mean): --  RR: 18 (05 Nov 2017 11:38) (17 - 18)  SpO2: 95% (05 Nov 2017 11:38) (94% - 97%)                          7.8    5.40  )-----------( 129      ( 05 Nov 2017 06:20 )             25.1       11-05    140  |  102  |  15  ----------------------------<  96  3.9   |  28  |  1.18    Ca    8.2<L>      05 Nov 2017 06:20  Phos  3.8     11-05  Mg     2.0     11-05    TPro  5.6<L>  /  Alb  x   /  TBili  x   /  DBili  x   /  AST  x   /  ALT  x   /  AlkPhos  x   11-04

## 2017-11-05 NOTE — PROGRESS NOTE ADULT - SUBJECTIVE AND OBJECTIVE BOX
Cardiovascular Disease Progress Note    Overnight events: No acute events overnight. Mr. Pinto feels well. No chest pain or SOB.   Otherwise review of systems negative    Objective Findings:  T(C): 36.6 (11-05-17 @ 05:11), Max: 36.8 (11-04-17 @ 12:59)  HR: 98 (11-05-17 @ 05:11) (69 - 98)  BP: 102/65 (11-05-17 @ 05:11) (102/65 - 111/71)  RR: 18 (11-05-17 @ 05:11) (17 - 18)  SpO2: 96% (11-05-17 @ 05:11) (95% - 97%)  Wt(kg): --  Daily     Daily       Physical Exam:  Gen: NAD  HEENT: EOMI  CV: IIR, normal S1 + S2, no m/r/g  Lungs: CTAB  Abd: soft, non-tender  Ext: 1+ pedal edema    Telemetry: a-fib 100s    Laboratory Data:                        7.8    5.40  )-----------( 129      ( 05 Nov 2017 06:20 )             25.1     11-04    144  |  106  |  22  ----------------------------<  103<H>  4.7   |  28  |  1.18    Ca    8.5      04 Nov 2017 06:40  Phos  3.9     11-04  Mg     1.9     11-04    TPro  5.6<L>  /  Alb  x   /  TBili  x   /  DBili  x   /  AST  x   /  ALT  x   /  AlkPhos  x   11-04              Inpatient Medications:  MEDICATIONS  (STANDING):  aspirin enteric coated 81 milliGRAM(s) Oral daily  atorvastatin 40 milliGRAM(s) Oral at bedtime  beclomethasone  80 MICROgram(s) Inhaler 1 Puff(s) Inhalation two times a day  calcium carbonate 500 mG (Tums) Chewable 1 Tablet(s) Chew daily  carvedilol 3.125 milliGRAM(s) Oral every 12 hours  dextrose 5%. 1000 milliLiter(s) (50 mL/Hr) IV Continuous <Continuous>  dextrose 50% Injectable 12.5 Gram(s) IV Push once  dextrose 50% Injectable 25 Gram(s) IV Push once  dextrose 50% Injectable 25 Gram(s) IV Push once  gabapentin 300 milliGRAM(s) Oral three times a day  influenza   Vaccine 0.5 milliLiter(s) IntraMuscular once  insulin lispro (HumaLOG) corrective regimen sliding scale   SubCutaneous three times a day before meals  insulin lispro (HumaLOG) corrective regimen sliding scale   SubCutaneous at bedtime  magnesium oxide 400 milliGRAM(s) Oral two times a day with meals  omega-3-Acid Ethyl Esters 2 Gram(s) Oral two times a day  pantoprazole  Injectable 40 milliGRAM(s) IV Push every 12 hours  polyethylene glycol/electrolyte Solution. 4000 milliLiter(s) Oral once  tamsulosin 0.4 milliGRAM(s) Oral at bedtime  tiotropium 18 MICROgram(s) Capsule 1 Capsule(s) Inhalation daily      Assessment:  72 year old man with CAD, a-fib, and HERIBERTO presents with syncope in the setting of acute blood loss anemia and SHEYLA.    #Syncope- in the setting of acute blood loss anemia and FOBT+ stools.  Patient s/p 1 Unit pRBCs on 11/2 with adequate response  GI f/u this morning noted- plan for EGD 11/6.  No signs or symptoms of ACS or decompensated CHF. A-fib rate controlled.  No cardiac contraindication to proceeding with EGD.     #A-fib- better rate controlled   No AC until GI work up completed.  Continue ASA, as Hb currently stable.  Continue Coreg.    #Anemia- Fe studies consistent with Fe deficiency with low ferritin.  Hematology input noted- patient s/p IV iron on 11/3.     #SHEYLA- likely due to poor perfusion in the setting of anemia, and a-fib with RVR.  Creatinine pending today.  Lasix now resumed.  Renal input appreciated.     Over 35 minutes spent on total encounter; more than 50% of the visit was spent counseling and/or coordinating care by the attending physician.      Ed Garcia M.D.   Cardiovascular Disease  [phone] Cardiovascular Disease Progress Note    Overnight events: No acute events overnight. Mr. Pinto feels well. No chest pain or SOB.   Otherwise review of systems negative    Objective Findings:  T(C): 36.6 (11-05-17 @ 05:11), Max: 36.8 (11-04-17 @ 12:59)  HR: 98 (11-05-17 @ 05:11) (69 - 98)  BP: 102/65 (11-05-17 @ 05:11) (102/65 - 111/71)  RR: 18 (11-05-17 @ 05:11) (17 - 18)  SpO2: 96% (11-05-17 @ 05:11) (95% - 97%)  Wt(kg): --  Daily     Daily       Physical Exam:  Gen: NAD  HEENT: EOMI  CV: IIR, normal S1 + S2, no m/r/g  Lungs: CTAB  Abd: soft, non-tender  Ext: trace pedal edema in R leg.    Telemetry: a-fib 100s    Laboratory Data:                        7.8    5.40  )-----------( 129      ( 05 Nov 2017 06:20 )             25.1     11-04    144  |  106  |  22  ----------------------------<  103<H>  4.7   |  28  |  1.18    Ca    8.5      04 Nov 2017 06:40  Phos  3.9     11-04  Mg     1.9     11-04    TPro  5.6<L>  /  Alb  x   /  TBili  x   /  DBili  x   /  AST  x   /  ALT  x   /  AlkPhos  x   11-04              Inpatient Medications:  MEDICATIONS  (STANDING):  aspirin enteric coated 81 milliGRAM(s) Oral daily  atorvastatin 40 milliGRAM(s) Oral at bedtime  beclomethasone  80 MICROgram(s) Inhaler 1 Puff(s) Inhalation two times a day  calcium carbonate 500 mG (Tums) Chewable 1 Tablet(s) Chew daily  carvedilol 3.125 milliGRAM(s) Oral every 12 hours  dextrose 5%. 1000 milliLiter(s) (50 mL/Hr) IV Continuous <Continuous>  dextrose 50% Injectable 12.5 Gram(s) IV Push once  dextrose 50% Injectable 25 Gram(s) IV Push once  dextrose 50% Injectable 25 Gram(s) IV Push once  gabapentin 300 milliGRAM(s) Oral three times a day  influenza   Vaccine 0.5 milliLiter(s) IntraMuscular once  insulin lispro (HumaLOG) corrective regimen sliding scale   SubCutaneous three times a day before meals  insulin lispro (HumaLOG) corrective regimen sliding scale   SubCutaneous at bedtime  magnesium oxide 400 milliGRAM(s) Oral two times a day with meals  omega-3-Acid Ethyl Esters 2 Gram(s) Oral two times a day  pantoprazole  Injectable 40 milliGRAM(s) IV Push every 12 hours  polyethylene glycol/electrolyte Solution. 4000 milliLiter(s) Oral once  tamsulosin 0.4 milliGRAM(s) Oral at bedtime  tiotropium 18 MICROgram(s) Capsule 1 Capsule(s) Inhalation daily      Assessment:  72 year old man with CAD, a-fib, and HERIBERTO presents with syncope in the setting of acute blood loss anemia and SHEYLA.    #Syncope- in the setting of acute blood loss anemia and FOBT+ stools.  Patient s/p 1 Unit pRBCs on 11/2 with adequate response  GI f/u this morning noted- plan for EGD 11/6.  No signs or symptoms of ACS or decompensated CHF. A-fib rate controlled.  No cardiac contraindication to proceeding with EGD.     #A-fib- better rate controlled   No AC until GI work up completed.  Continue ASA, as Hb currently stable.  Continue Coreg.    #Anemia- Fe studies consistent with Fe deficiency with low ferritin.  Hematology input noted- patient s/p IV iron on 11/3.     #SHEYLA- likely due to poor perfusion in the setting of anemia, and a-fib with RVR.  Creatinine pending today.  Lasix now resumed (patient euvolemic on exam).  Renal input appreciated.     Over 35 minutes spent on total encounter; more than 50% of the visit was spent counseling and/or coordinating care by the attending physician.      Ed Garcia M.D.   Cardiovascular Disease  [phone]

## 2017-11-05 NOTE — PROGRESS NOTE ADULT - SUBJECTIVE AND OBJECTIVE BOX
[facility] NEPHROLOGY- PROGRESS NOTE    72 year old male with history of CHF presents with syncopal episode. Nephrology consulted for elevated Scr.    REVIEW OF SYSTEMS: Pt receiving 1 unit prbc tx.   Gen: no changes in weight  Cards: no chest pain  Resp: no dyspnea  GI: no nausea or vomiting. Pt with 10 episodes of diarrhea this am (due to Golyte)  Vascular: no LE edema    penicillin (Unknown)      Hospital Medications: Medications reviewed    VITALS:  T(F): 98.2 (17 @ 11:38), Max: 98.2 (17 @ 11:38)  HR: 96 (17 @ 11:38)  BP: 110/71 (17 @ 11:38)  RR: 18 (17 @ 11:38)  SpO2: 95% (17 @ 11:38)  Wt(kg): --     @ 08:01  -   @ 07:00  --------------------------------------------------------  IN: 514 mL / OUT: 200 mL / NET: 314 mL     @ 07:01  -   @ 13:54  --------------------------------------------------------  IN: 200 mL / OUT: 0 mL / NET: 200 mL        PHYSICAL EXAM:    Gen: NAD, calm  Cards: Irregularly irregular, +S1/S2, no M/G/R  Resp: CTA B/L  GI: soft, NT/ND, NABS  Vascular: + Rt trace pedal edema, L pedal edema resolved.     LABS:      140  |  102  |  15  ----------------------------<  96  3.9   |  28  |  1.18    Ca    8.2<L>      2017 06:20  Phos  3.8       Mg     2.0         TPro  5.6<L>  /  Alb      /  TBili      /  DBili      /  AST      /  ALT      /  AlkPhos          Creatinine Trend: 1.18 <--, 1.18 <--, 1.23 <--, 1.34 <--, 1.42 <--, 1.81 <--                        7.8    5.40  )-----------( 129      ( 2017 06:20 )             25.1     Urine Studies:  Urinalysis Basic - ( 31 Oct 2017 20:00 )    Color: PLYEL / Appearance: CLEAR / S.009 / pH: 6.0  Gluc: NEGATIVE / Ketone: NEGATIVE  / Bili: NEGATIVE / Urobili: NORMAL E.U.   Blood: NEGATIVE / Protein: NEGATIVE / Nitrite: NEGATIVE   Leuk Esterase: NEGATIVE / RBC: 0-2 / WBC 2-5   Sq Epi: OCC / Non Sq Epi:  / Bacteria: FEW      Protein, Random Urine: 8.3 mg/dL ( @ 09:14)  Sodium, Random Urine: 90 meq/L ( @ 11:49)  Creatinine, Random Urine: 32.40 mg/dL ( @ 11:49)

## 2017-11-05 NOTE — PROGRESS NOTE ADULT - PROBLEM SELECTOR PLAN 3
pedal edema- improving. Pt with multiple episodes of diarrhea 2/2 Golyte. Will hold Lasix for now. Monitor UO.

## 2017-11-06 ENCOUNTER — RESULT REVIEW (OUTPATIENT)
Age: 72
End: 2017-11-06

## 2017-11-06 LAB
ANA TITR SER: NEGATIVE — SIGNIFICANT CHANGE UP
BUN SERPL-MCNC: 13 MG/DL — SIGNIFICANT CHANGE UP (ref 7–23)
CALCIUM SERPL-MCNC: 8.2 MG/DL — LOW (ref 8.4–10.5)
CHLORIDE SERPL-SCNC: 100 MMOL/L — SIGNIFICANT CHANGE UP (ref 98–107)
CO2 SERPL-SCNC: 27 MMOL/L — SIGNIFICANT CHANGE UP (ref 22–31)
CREAT SERPL-MCNC: 1.26 MG/DL — SIGNIFICANT CHANGE UP (ref 0.5–1.3)
GAS PNL BLDMV: SIGNIFICANT CHANGE UP
GAS PNL BLDMV: SIGNIFICANT CHANGE UP
GLUCOSE SERPL-MCNC: 89 MG/DL — SIGNIFICANT CHANGE UP (ref 70–99)
HCT VFR BLD CALC: 26.1 % — LOW (ref 39–50)
HGB BLD-MCNC: 8.4 G/DL — LOW (ref 13–17)
MAGNESIUM SERPL-MCNC: 1.9 MG/DL — SIGNIFICANT CHANGE UP (ref 1.6–2.6)
MCHC RBC-ENTMCNC: 27.9 PG — SIGNIFICANT CHANGE UP (ref 27–34)
MCHC RBC-ENTMCNC: 32.2 % — SIGNIFICANT CHANGE UP (ref 32–36)
MCV RBC AUTO: 86.7 FL — SIGNIFICANT CHANGE UP (ref 80–100)
NRBC # FLD: 0 — SIGNIFICANT CHANGE UP
PHOSPHATE SERPL-MCNC: 3.9 MG/DL — SIGNIFICANT CHANGE UP (ref 2.5–4.5)
PLATELET # BLD AUTO: 134 K/UL — LOW (ref 150–400)
PMV BLD: 10.3 FL — SIGNIFICANT CHANGE UP (ref 7–13)
POTASSIUM SERPL-MCNC: 3.8 MMOL/L — SIGNIFICANT CHANGE UP (ref 3.5–5.3)
POTASSIUM SERPL-SCNC: 3.8 MMOL/L — SIGNIFICANT CHANGE UP (ref 3.5–5.3)
RBC # BLD: 3.01 M/UL — LOW (ref 4.2–5.8)
RBC # FLD: 17.7 % — HIGH (ref 10.3–14.5)
SODIUM SERPL-SCNC: 139 MMOL/L — SIGNIFICANT CHANGE UP (ref 135–145)
WBC # BLD: 8.01 K/UL — SIGNIFICANT CHANGE UP (ref 3.8–10.5)
WBC # FLD AUTO: 8.01 K/UL — SIGNIFICANT CHANGE UP (ref 3.8–10.5)

## 2017-11-06 PROCEDURE — 88305 TISSUE EXAM BY PATHOLOGIST: CPT | Mod: 26

## 2017-11-06 PROCEDURE — 93306 TTE W/DOPPLER COMPLETE: CPT | Mod: 26

## 2017-11-06 RX ORDER — FERROUS SULFATE 325(65) MG
325 TABLET ORAL DAILY
Qty: 0 | Refills: 0 | Status: DISCONTINUED | OUTPATIENT
Start: 2017-11-06 | End: 2017-11-07

## 2017-11-06 RX ORDER — FUROSEMIDE 40 MG
40 TABLET ORAL DAILY
Qty: 0 | Refills: 0 | Status: DISCONTINUED | OUTPATIENT
Start: 2017-11-06 | End: 2017-11-06

## 2017-11-06 RX ORDER — PREGABALIN 225 MG/1
1000 CAPSULE ORAL DAILY
Qty: 0 | Refills: 0 | Status: DISCONTINUED | OUTPATIENT
Start: 2017-11-06 | End: 2017-11-07

## 2017-11-06 RX ADMIN — Medication 2 GRAM(S): at 05:32

## 2017-11-06 RX ADMIN — GABAPENTIN 300 MILLIGRAM(S): 400 CAPSULE ORAL at 20:40

## 2017-11-06 RX ADMIN — Medication 2 GRAM(S): at 18:08

## 2017-11-06 RX ADMIN — CARVEDILOL PHOSPHATE 3.12 MILLIGRAM(S): 80 CAPSULE, EXTENDED RELEASE ORAL at 05:33

## 2017-11-06 RX ADMIN — ATORVASTATIN CALCIUM 40 MILLIGRAM(S): 80 TABLET, FILM COATED ORAL at 20:40

## 2017-11-06 RX ADMIN — BECLOMETHASONE DIPROPIONATE 1 PUFF(S): 40 AEROSOL, METERED RESPIRATORY (INHALATION) at 20:40

## 2017-11-06 RX ADMIN — Medication 3 MILLIGRAM(S): at 20:40

## 2017-11-06 RX ADMIN — CARVEDILOL PHOSPHATE 3.12 MILLIGRAM(S): 80 CAPSULE, EXTENDED RELEASE ORAL at 18:08

## 2017-11-06 RX ADMIN — GABAPENTIN 300 MILLIGRAM(S): 400 CAPSULE ORAL at 05:33

## 2017-11-06 RX ADMIN — Medication 81 MILLIGRAM(S): at 13:07

## 2017-11-06 RX ADMIN — GABAPENTIN 300 MILLIGRAM(S): 400 CAPSULE ORAL at 13:07

## 2017-11-06 RX ADMIN — Medication 1 TABLET(S): at 13:07

## 2017-11-06 RX ADMIN — PANTOPRAZOLE SODIUM 40 MILLIGRAM(S): 20 TABLET, DELAYED RELEASE ORAL at 18:09

## 2017-11-06 RX ADMIN — PANTOPRAZOLE SODIUM 40 MILLIGRAM(S): 20 TABLET, DELAYED RELEASE ORAL at 05:33

## 2017-11-06 RX ADMIN — TAMSULOSIN HYDROCHLORIDE 0.4 MILLIGRAM(S): 0.4 CAPSULE ORAL at 20:40

## 2017-11-06 NOTE — PROGRESS NOTE ADULT - NEGATIVE GASTROINTESTINAL SYMPTOMS
no vomiting/no diarrhea/no nausea/no constipation
no constipation/no nausea/no vomiting/no melena
no nausea/no vomiting/no diarrhea/no constipation

## 2017-11-06 NOTE — PROGRESS NOTE ADULT - SUBJECTIVE AND OBJECTIVE BOX
Cardiovascular Disease Progress Note    Overnight events: No acute events overnight. Mr. Pinto underwent EGD and colonoscopy this morning. He denies chest pain or SOB.   Otherwise review of systems negative    Objective Findings:  T(C): 36.7 (17 @ 05:56), Max: 37 (17 @ 22:35)  HR: 82 (17 @ 07:50) (71 - 110)  BP: 122/84 (17 @ 05:56) (104/65 - 123/67)  RR: 16 (17 @ 05:56) (16 - 18)  SpO2: 94% (17 @ 07:36) (94% - 96%)  Wt(kg): --  Daily Height in cm: 165 (2017 02:01)    Daily Weight in k.6 (2017 05:56)      Physical Exam:  Gen: NAD  HEENT: EOMI  CV: RRR, normal S1 + S2, no m/r/g  Lungs: CTAB  Abd: soft, non-tender  Ext: No edema    Telemetry: a-fib 70-100s; no ectopy    Laboratory Data:                        8.4    8.01  )-----------( 134      ( 2017 04:40 )             26.1     11-    139  |  100  |  13  ----------------------------<  89  3.8   |  27  |  1.26    Ca    8.2<L>      2017 04:40  Phos  3.9       Mg     1.9     -      Inpatient Medications:  MEDICATIONS  (STANDING):  aspirin enteric coated 81 milliGRAM(s) Oral daily  atorvastatin 40 milliGRAM(s) Oral at bedtime  beclomethasone  80 MICROgram(s) Inhaler 1 Puff(s) Inhalation two times a day  calcium carbonate 500 mG (Tums) Chewable 1 Tablet(s) Chew daily  carvedilol 3.125 milliGRAM(s) Oral every 12 hours  dextrose 5%. 1000 milliLiter(s) (50 mL/Hr) IV Continuous <Continuous>  dextrose 50% Injectable 12.5 Gram(s) IV Push once  dextrose 50% Injectable 25 Gram(s) IV Push once  dextrose 50% Injectable 25 Gram(s) IV Push once  gabapentin 300 milliGRAM(s) Oral three times a day  influenza   Vaccine 0.5 milliLiter(s) IntraMuscular once  insulin lispro (HumaLOG) corrective regimen sliding scale   SubCutaneous three times a day before meals  insulin lispro (HumaLOG) corrective regimen sliding scale   SubCutaneous at bedtime  omega-3-Acid Ethyl Esters 2 Gram(s) Oral two times a day  pantoprazole  Injectable 40 milliGRAM(s) IV Push every 12 hours  tamsulosin 0.4 milliGRAM(s) Oral at bedtime  tiotropium 18 MICROgram(s) Capsule 1 Capsule(s) Inhalation daily      Assessment:  72 year old man with CAD, a-fib, and HERIBERTO presents with syncope in the setting of acute blood loss anemia and SHEYLA.    #Syncope- in the setting of acute blood loss anemia and FOBT+ stools.  EGD/colonoscopy performed this morning- no signs of acute bleed; possible secondary to diverticulosis.   No signs or symptoms of post-procedural ACS or decompensated CHF.    #A-fib- better rate controlled   Would resume anticoagulation with warfarin if okay with GI (CHADSII-Vasc = 4)  Continue ASA.  Continue Coreg.    #Anemia- Fe studies consistent with Fe deficiency with low ferritin.  Hematology input noted- patient s/p IV iron.     #SHEYLA- likely due to poor perfusion in the setting of anemia, and a-fib with RVR.  Creatinine at baseline today.   Resume Lasix once patient tolerating PO and okay with renal team.    D/C Planning likely .      Over 35 minutes spent on total encounter; more than 50% of the visit was spent counseling and/or coordinating care by the attending physician.      Ed Garcia M.D.   Cardiovascular Disease  [phone] Cardiovascular Disease Progress Note    Overnight events: No acute events overnight. Mr. Pinto underwent EGD and colonoscopy this morning. He denies chest pain or SOB.   Otherwise review of systems negative    Objective Findings:  T(C): 36.7 (17 @ 05:56), Max: 37 (17 @ 22:35)  HR: 82 (17 @ 07:50) (71 - 110)  BP: 122/84 (17 @ 05:56) (104/65 - 123/67)  RR: 16 (17 @ 05:56) (16 - 18)  SpO2: 94% (17 @ 07:36) (94% - 96%)  Wt(kg): --  Daily Height in cm: 165 (2017 02:01)    Daily Weight in k.6 (2017 05:56)      Physical Exam:  Gen: NAD  HEENT: EOMI  CV: RRR, normal S1 + S2, no m/r/g  Lungs: CTAB  Abd: soft, non-tender  Ext: No edema    Telemetry: a-fib 70-100s; no ectopy    Laboratory Data:                        8.4    8.01  )-----------( 134      ( 2017 04:40 )             26.1     11-    139  |  100  |  13  ----------------------------<  89  3.8   |  27  |  1.26    Ca    8.2<L>      2017 04:40  Phos  3.9       Mg     1.9     -      Inpatient Medications:  MEDICATIONS  (STANDING):  aspirin enteric coated 81 milliGRAM(s) Oral daily  atorvastatin 40 milliGRAM(s) Oral at bedtime  beclomethasone  80 MICROgram(s) Inhaler 1 Puff(s) Inhalation two times a day  calcium carbonate 500 mG (Tums) Chewable 1 Tablet(s) Chew daily  carvedilol 3.125 milliGRAM(s) Oral every 12 hours  dextrose 5%. 1000 milliLiter(s) (50 mL/Hr) IV Continuous <Continuous>  dextrose 50% Injectable 12.5 Gram(s) IV Push once  dextrose 50% Injectable 25 Gram(s) IV Push once  dextrose 50% Injectable 25 Gram(s) IV Push once  gabapentin 300 milliGRAM(s) Oral three times a day  influenza   Vaccine 0.5 milliLiter(s) IntraMuscular once  insulin lispro (HumaLOG) corrective regimen sliding scale   SubCutaneous three times a day before meals  insulin lispro (HumaLOG) corrective regimen sliding scale   SubCutaneous at bedtime  omega-3-Acid Ethyl Esters 2 Gram(s) Oral two times a day  pantoprazole  Injectable 40 milliGRAM(s) IV Push every 12 hours  tamsulosin 0.4 milliGRAM(s) Oral at bedtime  tiotropium 18 MICROgram(s) Capsule 1 Capsule(s) Inhalation daily      Assessment:  72 year old man with CAD, a-fib, and HERIBERTO presents with syncope in the setting of acute blood loss anemia and SHEYLA.    #Syncope- in the setting of acute blood loss anemia and FOBT+ stools.  EGD/colonoscopy performed this morning- no signs of acute bleed; possible secondary to diverticulosis.   No signs or symptoms of post-procedural ACS or decompensated CHF.    #A-fib- better rate controlled   CHADSII-Vasc = 4  Case discussed with GI- plan to resume warfarin after capsule study, as no obvious source of bleeding was identified.   Risk and benefits explained to the patient.   Continue ASA.  Continue Coreg.    #Anemia- Fe studies consistent with Fe deficiency with low ferritin.  Hematology input noted- patient s/p IV iron.     #SHEYLA- likely due to poor perfusion in the setting of anemia, and a-fib with RVR.  Creatinine at baseline today.   Resume Lasix , once patient tolerating PO today.    D/C Planning .    Over 35 minutes spent on total encounter; more than 50% of the visit was spent counseling and/or coordinating care by the attending physician.      Ed Garcia M.D.   Cardiovascular Disease  [phone]

## 2017-11-06 NOTE — PROGRESS NOTE ADULT - SUBJECTIVE AND OBJECTIVE BOX
[facility] NEPHROLOGY- PROGRESS NOTE    72 year old male with history of CHF presents with syncopal episode. Nephrology consulted for elevated Scr.    REVIEW OF SYSTEMS:   Gen: no changes in weight  Cards: no chest pain  Resp: no dyspnea  GI: no nausea or vomiting, + resolving diarrhea  Vascular: no LE edema    penicillin (Unknown)      Hospital Medications: Medications reviewed    VITALS:  T(F): 98.1 (11-06-17 @ 05:56), Max: 98.6 (11-05-17 @ 22:35)  HR: 102 (11-06-17 @ 13:42)  BP: 125/82 (11-06-17 @ 13:42)  RR: 17 (11-06-17 @ 13:42)  SpO2: 92% (11-06-17 @ 13:42)  Wt(kg): --    11-05 @ 07:01  -  11-06 @ 07:00  --------------------------------------------------------  IN: 500 mL / OUT: 0 mL / NET: 500 mL    11-06 @ 07:01  -  11-06 @ 16:09  --------------------------------------------------------  IN: 240 mL / OUT: 0 mL / NET: 240 mL      PHYSICAL EXAM:    Gen: NAD, calm  Cards: Irregularly irregular, +S1/S2, no M/G/R  Resp: CTA B/L  GI: soft, NT/ND, NABS  Vascular: no LE edema B/L      LABS:  11-06    139  |  100  |  13  ----------------------------<  89  3.8   |  27  |  1.26    Ca    8.2<L>      06 Nov 2017 04:40  Phos  3.9     11-06  Mg     1.9     11-06      Creatinine Trend: 1.26 <--, 1.18 <--, 1.18 <--, 1.23 <--, 1.34 <--, 1.42 <--, 1.81 <--                        8.4    8.01  )-----------( 134      ( 06 Nov 2017 04:40 )             26.1

## 2017-11-06 NOTE — PROGRESS NOTE ADULT - SUBJECTIVE AND OBJECTIVE BOX
APPLE CROW:[id number],   72yMale followed for: gib  penicillin (Unknown)    PAST MEDICAL & SURGICAL HISTORY:  Sleep apnea  GERD (gastroesophageal reflux disease)  BPH (benign prostatic hyperplasia)  CHF (congestive heart failure)  MI (myocardial infarction)  Hypertension  Essential hypertension, benign  Diabetes insipidus  AF (atrial fibrillation)  No significant past surgical history    FAMILY HISTORY:  No pertinent family history in first degree relatives    MEDICATIONS  (STANDING):  aspirin enteric coated 81 milliGRAM(s) Oral daily  atorvastatin 40 milliGRAM(s) Oral at bedtime  beclomethasone  80 MICROgram(s) Inhaler 1 Puff(s) Inhalation two times a day  calcium carbonate 500 mG (Tums) Chewable 1 Tablet(s) Chew daily  carvedilol 3.125 milliGRAM(s) Oral every 12 hours  dextrose 5%. 1000 milliLiter(s) (50 mL/Hr) IV Continuous <Continuous>  dextrose 50% Injectable 12.5 Gram(s) IV Push once  dextrose 50% Injectable 25 Gram(s) IV Push once  dextrose 50% Injectable 25 Gram(s) IV Push once  gabapentin 300 milliGRAM(s) Oral three times a day  influenza   Vaccine 0.5 milliLiter(s) IntraMuscular once  insulin lispro (HumaLOG) corrective regimen sliding scale   SubCutaneous three times a day before meals  insulin lispro (HumaLOG) corrective regimen sliding scale   SubCutaneous at bedtime  omega-3-Acid Ethyl Esters 2 Gram(s) Oral two times a day  pantoprazole  Injectable 40 milliGRAM(s) IV Push every 12 hours  tamsulosin 0.4 milliGRAM(s) Oral at bedtime  tiotropium 18 MICROgram(s) Capsule 1 Capsule(s) Inhalation daily    MEDICATIONS  (PRN):  dextrose Gel 1 Dose(s) Oral once PRN Blood Glucose LESS THAN 70 milliGRAM(s)/deciliter  glucagon  Injectable 1 milliGRAM(s) IntraMuscular once PRN Glucose LESS THAN 70 milligrams/deciliter  melatonin 3 milliGRAM(s) Oral at bedtime PRN Insomnia      Vital Signs Last 24 Hrs  T(C): 36.7 (06 Nov 2017 05:56), Max: 37 (05 Nov 2017 22:35)  T(F): 98.1 (06 Nov 2017 05:56), Max: 98.6 (05 Nov 2017 22:35)  HR: 82 (06 Nov 2017 07:50) (71 - 110)  BP: 122/84 (06 Nov 2017 05:56) (104/65 - 123/67)  BP(mean): --  RR: 16 (06 Nov 2017 05:56) (16 - 18)  SpO2: 94% (06 Nov 2017 07:36) (94% - 96%)  nc/at  s1s2  cta  soft, nt, nd no guarding or rebound  no c/c/e    CBC Full  -  ( 06 Nov 2017 04:40 )  WBC Count : 8.01 K/uL  Hemoglobin : 8.4 g/dL  Hematocrit : 26.1 %  Platelet Count - Automated : 134 K/uL  Mean Cell Volume : 86.7 fL  Mean Cell Hemoglobin : 27.9 pg  Mean Cell Hemoglobin Concentration : 32.2 %  Auto Neutrophil # : x  Auto Lymphocyte # : x  Auto Monocyte # : x  Auto Eosinophil # : x  Auto Basophil # : x  Auto Neutrophil % : x  Auto Lymphocyte % : x  Auto Monocyte % : x  Auto Eosinophil % : x  Auto Basophil % : x    11-06    139  |  100  |  13  ----------------------------<  89  3.8   |  27  |  1.26    Ca    8.2<L>      06 Nov 2017 04:40  Phos  3.9     11-06  Mg     1.9     11-06

## 2017-11-06 NOTE — PROGRESS NOTE ADULT - SUBJECTIVE AND OBJECTIVE BOX
Overnight events: No acute events overnight. pt underwent EGD and colonoscopy this morning. He denies chest pain or SOB.   Otherwise review of systems negative    Objective Findings:  T(C): 36.7 (17 @ 05:56), Max: 37 (17 @ 22:35)  HR: 82 (17 @ 07:50) (71 - 110)  BP: 122/84 (17 @ 05:56) (104/65 - 123/67)  RR: 16 (17 @ 05:56) (16 - 18)  SpO2: 94% (17 @ 07:36) (94% - 96%)  Wt(kg): --  Daily Height in cm: 165 (2017 02:01)    Daily Weight in k.6 (2017 05:56)      Physical Exam:  Gen: NAD  HEENT: EOMI  CV: RRR, normal S1 + S2, no m/r/g  Lungs: CTAB  Abd: soft, non-tender  Ext: No edema    Telemetry: a-fib 70-100s; no ectopy    Laboratory Data:                        8.4    8.01  )-----------( 134      ( 2017 04:40 )             26.1     11-    139  |  100  |  13  ----------------------------<  89  3.8   |  27  |  1.26    Ca    8.2<L>      2017 04:40  Phos  3.9       Mg     1.9     -      Inpatient Medications:  MEDICATIONS  (STANDING):  aspirin enteric coated 81 milliGRAM(s) Oral daily  atorvastatin 40 milliGRAM(s) Oral at bedtime  beclomethasone  80 MICROgram(s) Inhaler 1 Puff(s) Inhalation two times a day  calcium carbonate 500 mG (Tums) Chewable 1 Tablet(s) Chew daily  carvedilol 3.125 milliGRAM(s) Oral every 12 hours  dextrose 5%. 1000 milliLiter(s) (50 mL/Hr) IV Continuous <Continuous>  dextrose 50% Injectable 12.5 Gram(s) IV Push once  dextrose 50% Injectable 25 Gram(s) IV Push once  dextrose 50% Injectable 25 Gram(s) IV Push once  gabapentin 300 milliGRAM(s) Oral three times a day  influenza   Vaccine 0.5 milliLiter(s) IntraMuscular once  insulin lispro (HumaLOG) corrective regimen sliding scale   SubCutaneous three times a day before meals  insulin lispro (HumaLOG) corrective regimen sliding scale   SubCutaneous at bedtime  omega-3-Acid Ethyl Esters 2 Gram(s) Oral two times a day  pantoprazole  Injectable 40 milliGRAM(s) IV Push every 12 hours  tamsulosin 0.4 milliGRAM(s) Oral at bedtime  tiotropium 18 MICROgram(s) Capsule 1 Capsule(s) Inhalation daily      Assessment:  72 year old man with CAD, a-fib, and HERIBERTO presents with syncope in the setting of acute blood loss anemia and SHEYLA.    #Syncope- in the setting of acute blood loss anemia and FOBT+ stools.  EGD/colonoscopy performed this morning- no signs of acute bleed; possible secondary to diverticulosis.   No signs or symptoms of post-procedural ACS or decompensated CHF.    #A-fib- better rate controlled   CHADSII-Vasc = 4  Case discussed with GI- plan to resume warfarin after capsule study, as no obvious source of bleeding was identified.   Risk and benefits explained to the patient.   Continue ASA.  Continue Coreg.    #Anemia- Fe studies consistent with Fe deficiency with low ferritin.  Hematology input noted- patient s/p IV iron.     #SHEYLA- likely due to poor perfusion in the setting of anemia, and a-fib with RVR.  Creatinine at baseline today.   Resume Lasix , once patient tolerating PO today.

## 2017-11-06 NOTE — PROGRESS NOTE ADULT - SUBJECTIVE AND OBJECTIVE BOX
Pt has been sleepy since the procedures today, was fine after EGD, colonoscopy as per him but tired since the echo. None other active symptoms on ROS.    Meds:  aspirin enteric coated 81 milliGRAM(s) Oral daily  atorvastatin 40 milliGRAM(s) Oral at bedtime  beclomethasone  80 MICROgram(s) Inhaler 1 Puff(s) Inhalation two times a day  calcium carbonate 500 mG (Tums) Chewable 1 Tablet(s) Chew daily  carvedilol 3.125 milliGRAM(s) Oral every 12 hours  dextrose 5%. 1000 milliLiter(s) IV Continuous <Continuous>  dextrose 50% Injectable 12.5 Gram(s) IV Push once  dextrose 50% Injectable 25 Gram(s) IV Push once  dextrose 50% Injectable 25 Gram(s) IV Push once  dextrose Gel 1 Dose(s) Oral once PRN  gabapentin 300 milliGRAM(s) Oral three times a day  glucagon  Injectable 1 milliGRAM(s) IntraMuscular once PRN  influenza   Vaccine 0.5 milliLiter(s) IntraMuscular once  insulin lispro (HumaLOG) corrective regimen sliding scale   SubCutaneous three times a day before meals  insulin lispro (HumaLOG) corrective regimen sliding scale   SubCutaneous at bedtime  melatonin 3 milliGRAM(s) Oral at bedtime PRN  omega-3-Acid Ethyl Esters 2 Gram(s) Oral two times a day  pantoprazole  Injectable 40 milliGRAM(s) IV Push every 12 hours  tamsulosin 0.4 milliGRAM(s) Oral at bedtime  tiotropium 18 MICROgram(s) Capsule 1 Capsule(s) Inhalation daily      Vital Signs Last 24 Hrs  T(C): 36.7 (2017 05:56), Max: 37 (2017 22:35)  T(F): 98.1 (2017 05:56), Max: 98.6 (2017 22:35)  HR: 102 (2017 13:42) (71 - 110)  BP: 125/82 (2017 13:42) (122/84 - 125/82)  BP(mean): --  RR: 17 (2017 13:42) (16 - 17)  SpO2: 92% (2017 13:42) (92% - 96%)                          8.4    8.01  )-----------( 134      ( 2017 04:40 )             26.1       11-06    139  |  100  |  13  ----------------------------<  89  3.8   |  27  |  1.26    Ca    8.2<L>      2017 04:40  Phos  3.9     11-  Mg     1.9     -          [facility]: Special Hematology Pathology:                       Department of Pathology   Laboratory Medicine                           [phone]  [place], [place] [id number] [phone]            PATIENT: APPLE CROW                            /SEX:      47 - M         ACCT#: [id number]                             REG DR:       MELISSA MORENO                            UNIT#: [id number]                               LOCATION:     [facility]                                                 SPECIAL HEMATOLOGY PATHOLOGY              SUBMITTING DR:  MELISSA MORENO                  SPECIMEN #: [id number]                           [address] [place] [address]                          DATE OBTAINED : 17                       [place]     [id number]                     DATE SUBMITTED: 17                                                        DATE REPORTED:  17       TELEPHONE:      [phone]                       STATUS: DAVID                                 DR: MELISSA MORENO                   ====================================================================================              ****DIAGNOSIS****                                      URINE PROTEIN ELECTROPHORESIS      ===========================================================                      Albumin  -  PRESENT      Alpha-1 Globulin  -  NONE SEEN      Alpha-2 Globulin  -  NONE SEEN      Beta Globulins    -  TRACE      Gamma Globulins   -  TARCE      Total Protein     -  8.3 MG/DL      Specimen Type     -  RANDOM URINE X100 CONC             ASSESSMENT:        NORMAL URINE PROTEIN ELECTROPHORESIS           PROCEDURES: 17535 - PE CSF, D89.0    --------------------------------------------------------------------------------------------         Gavi STRATTON MD 17 1716              --------------------------------------------------------------------------------------------                                                                                                                       ** END OF REPORT **     (17 @ 08:41)    Special Hematology Pathology:                       Department of Pathology   Laboratory Medicine                           [phone] [facility].  [place], [place] [id number] [phone]            PATIENT: APPLE CROW                            /SEX:      47 - M         ACCT#: [id number]                             REG DR:       MELISSA MORENO                            UNIT#: [id number]                               LOCATION:     [facility]                                                 SPECIAL HEMATOLOGY PATHOLOGY              SUBMITTING DR:  MELISSA MORENO                  SPECIMEN #: [id number]                           [address] [place] [address]                          DATE OBTAINED : 17                       [place]     [address]                     DATE SUBMITTED: 17                                                        DATE REPORTED:  17       TELEPHONE:      [phone]                       STATUS: DAVID                                 DR: MELISSA MORENO I                   ====================================================================================              ****DIAGNOSIS****                                      SERUM PROTEIN ELECTROPHORESIS      ===========================================================                                            Reference Range         Total Protein -  5.60                6.2 - 8.2 G/DL             Albumin   -  2.86                3.3 - 4.4 G/DL      Alpha-1 Globulin -  0.27                0.1 - 0.3 G/DL      Alpha-2 Globulin -  1.00                0.6 - 1.0 G/DL      Beta Globulins   -  0.92                0.6 - 1.1 G/DL      Gamma Globulins  -  0.55                0.7 - 1.7 G/DL             ASSESSMENT:        HYPOALBUMINEMIA AND HYPOGAMMAGLOBULINEMIA. MONOCLONAL GAMMOPATHY CANNOT BE      RULED OUT. SERUM AND URINE IMMUNOFIXATION ELECTROPHORESIS      ARE RECOMMENDED IF NOT PREVIOUSLY PERFORMED.           PROCEDURES: 04318 - PROT EL, E88.09, D80.1    --------------------------------------------------------------------------------------------         Signed                                          KODI STRATTON MD 17 1710              --------------------------------------------------------------------------------------------     EGD: Findings:       The esophagus was normal.       The stomach was normal. gastric polyps bx       The examined duodenum was normal.                                                                    Impression:          - Normal esophagus.                       - Normal stomach. gastric polyps, bx                       - Normal examined duodenum.                       - duodenum bx, r/o celiac  Recommendation:      - Resume regular diet.                                                                                   Attending Participation:       I personally performed the entire procedure.      Colonoscopy: Findings:       Multiple small and large-mouthed diverticula were found in the entire        colon.       The colon (entire examined portion) appeared normal.                                                                                   Impression:          - Preparation of the colon was poor.             - Diverticulosis in the entire examined colon.                       - No specimens collected.  Recommendation:      - Discharge patient to home. ? divertcular bleed, will                        need outpatient capsule. risk of missing lesion or mass                        discussed                                                                                                               ** END OF REPORT **     (17 @ 08:41)

## 2017-11-07 ENCOUNTER — TRANSCRIPTION ENCOUNTER (OUTPATIENT)
Age: 72
End: 2017-11-07

## 2017-11-07 VITALS — WEIGHT: 185.41 LBS

## 2017-11-07 LAB
BUN SERPL-MCNC: 15 MG/DL — SIGNIFICANT CHANGE UP (ref 7–23)
CALCIUM SERPL-MCNC: 8.4 MG/DL — SIGNIFICANT CHANGE UP (ref 8.4–10.5)
CHLORIDE SERPL-SCNC: 101 MMOL/L — SIGNIFICANT CHANGE UP (ref 98–107)
CO2 SERPL-SCNC: 26 MMOL/L — SIGNIFICANT CHANGE UP (ref 22–31)
CREAT SERPL-MCNC: 1.35 MG/DL — HIGH (ref 0.5–1.3)
GAS PNL BLDMV: SIGNIFICANT CHANGE UP
GAS PNL BLDMV: SIGNIFICANT CHANGE UP
GLUCOSE SERPL-MCNC: 128 MG/DL — HIGH (ref 70–99)
HCT VFR BLD CALC: 29.3 % — LOW (ref 39–50)
HGB BLD-MCNC: 9.3 G/DL — LOW (ref 13–17)
MAGNESIUM SERPL-MCNC: 2 MG/DL — SIGNIFICANT CHANGE UP (ref 1.6–2.6)
MCHC RBC-ENTMCNC: 28 PG — SIGNIFICANT CHANGE UP (ref 27–34)
MCHC RBC-ENTMCNC: 31.7 % — LOW (ref 32–36)
MCV RBC AUTO: 88.3 FL — SIGNIFICANT CHANGE UP (ref 80–100)
NRBC # FLD: 0 — SIGNIFICANT CHANGE UP
PLATELET # BLD AUTO: 135 K/UL — LOW (ref 150–400)
PMV BLD: 11.3 FL — SIGNIFICANT CHANGE UP (ref 7–13)
POTASSIUM SERPL-MCNC: 4.1 MMOL/L — SIGNIFICANT CHANGE UP (ref 3.5–5.3)
POTASSIUM SERPL-SCNC: 4.1 MMOL/L — SIGNIFICANT CHANGE UP (ref 3.5–5.3)
RBC # BLD: 3.32 M/UL — LOW (ref 4.2–5.8)
RBC # FLD: 17.6 % — HIGH (ref 10.3–14.5)
SODIUM SERPL-SCNC: 140 MMOL/L — SIGNIFICANT CHANGE UP (ref 135–145)
WBC # BLD: 6.06 K/UL — SIGNIFICANT CHANGE UP (ref 3.8–10.5)
WBC # FLD AUTO: 6.06 K/UL — SIGNIFICANT CHANGE UP (ref 3.8–10.5)

## 2017-11-07 RX ORDER — FUROSEMIDE 40 MG
1 TABLET ORAL
Qty: 0 | Refills: 0 | COMMUNITY

## 2017-11-07 RX ORDER — CARVEDILOL PHOSPHATE 80 MG/1
1 CAPSULE, EXTENDED RELEASE ORAL
Qty: 60 | Refills: 0
Start: 2017-11-07 | End: 2017-12-07

## 2017-11-07 RX ORDER — PREGABALIN 225 MG/1
1 CAPSULE ORAL
Qty: 0 | Refills: 0 | DISCHARGE
Start: 2017-11-07

## 2017-11-07 RX ORDER — FUROSEMIDE 40 MG
40 TABLET ORAL DAILY
Qty: 0 | Refills: 0 | Status: DISCONTINUED | OUTPATIENT
Start: 2017-11-07 | End: 2017-11-07

## 2017-11-07 RX ORDER — FUROSEMIDE 40 MG
40 TABLET ORAL EVERY OTHER DAY
Qty: 0 | Refills: 0 | Status: DISCONTINUED | OUTPATIENT
Start: 2017-11-07 | End: 2017-11-07

## 2017-11-07 RX ORDER — CARVEDILOL PHOSPHATE 80 MG/1
1 CAPSULE, EXTENDED RELEASE ORAL
Qty: 0 | Refills: 0 | COMMUNITY

## 2017-11-07 RX ADMIN — PANTOPRAZOLE SODIUM 40 MILLIGRAM(S): 20 TABLET, DELAYED RELEASE ORAL at 06:23

## 2017-11-07 RX ADMIN — Medication 2 GRAM(S): at 06:23

## 2017-11-07 RX ADMIN — Medication 325 MILLIGRAM(S): at 11:54

## 2017-11-07 RX ADMIN — Medication 40 MILLIGRAM(S): at 06:23

## 2017-11-07 RX ADMIN — CARVEDILOL PHOSPHATE 3.12 MILLIGRAM(S): 80 CAPSULE, EXTENDED RELEASE ORAL at 06:23

## 2017-11-07 RX ADMIN — Medication 1 TABLET(S): at 11:54

## 2017-11-07 RX ADMIN — PREGABALIN 1000 MICROGRAM(S): 225 CAPSULE ORAL at 11:54

## 2017-11-07 RX ADMIN — Medication 81 MILLIGRAM(S): at 11:54

## 2017-11-07 RX ADMIN — Medication 40 MILLIGRAM(S): at 11:54

## 2017-11-07 RX ADMIN — GABAPENTIN 300 MILLIGRAM(S): 400 CAPSULE ORAL at 06:23

## 2017-11-07 NOTE — DISCHARGE NOTE ADULT - CARE PROVIDER_API CALL
Senthil Mccray (DO), Gastroenterology; Internal Medicine  [address] [place] [address]  Phone: [phone]  Fax: [phone]

## 2017-11-07 NOTE — DISCHARGE NOTE ADULT - PATIENT PORTAL LINK FT
“You can access the [employer] Patient Portal, offered by [facility], by registering with the following website: [email or web address]”

## 2017-11-07 NOTE — DISCHARGE NOTE ADULT - MEDICATION SUMMARY - MEDICATIONS TO CHANGE
I will SWITCH the dose or number of times a day I take the medications listed below when I get home from the hospital:    Lasix 40 mg oral tablet  -- 1 tab(s) by mouth once a day    Coreg 6.25 mg oral tablet  -- 1 tab(s) by mouth 2 times a day

## 2017-11-07 NOTE — DISCHARGE NOTE ADULT - HOSPITAL COURSE
71 y/o male with PMHx of MI, HERIBERTO on CPAP, CHF on Lasix, A-fib was on Coumadin stopped 1 week ago due to INR of 11, borderline DM, HTN, BPH, GERD presents to ED after syncopal episode. Patient states he was at home sitting in chair trying on bipap masks with his respiratory therapist. Paitent then became dizzy with mask on then passed out. Son who witnessed syncope said episode lasted 20 seconds, with fall backwards hitting his head. Endorses previous episodes of dizziness during the fall-self resolved. Per patient normal echo 6 months ago with cardiologist Dr. Schmidt at [facility]. 90/30 hypotensive at scene by EMS. Son states has syncope due to orthostatics. Orthostatics performed in ED negative. Patient states he was instructed by cardiologist to stop Coumadin stopped due to INR of 11. Patient noticed darker stools for last 2-3days, found to be occult positive in ED. Denies any other bleeding sx/signs.    CT BRAIN: No acute intracranial bleeding, calvarial fracture, or scalp hematoma.  CT CERVICAL SPINE: No fracture or subluxation. Degenerative changes of the cervical spine.  CXR clear lungs  EKG: Afib with RVR 108bpm  CEx 2 neg  UA: clear  11/1: Cardio: acute blood loss anemia and FOBT+ stools. Please type and screen patient, as H/H continues to fall. Dr. Mccray of GI called. Please check echo. Hold lasix, renal c/s  11/1: Renal: SHEYLA secondary to hypotension in setting of lasix use, anemia and supratherapeutic INR which can cause warfarin nephropathy. UA bland with hyaline casts suggestive of low renal perfusion. Agree with holding lasix for now. Check urine sodium, urine urea, urine creatinine. Defer renal US at this time given improving renal function but will check on 11/2 if SHEYLA has not resolved given h/o BPH r/o urinary retention. Avoid nephrotoxins. Monitor electrolytes. Likely hemodynamically mediated secondary to hypotension in setting of lasix use, anemia and supratherapeutic INR which can cause warfarin nephropathy. UA bland with hyaline casts suggestive of low renal perfusion. Agree with holding lasix for now. Defer renal US at this time given improving renal function but will check on 11/2 if SHEYLA has not resolved given h/o BPH r/o urinary retention. Avoid nephrotoxins. Monitor electrolytes.    11/1: GI c/s Shazia: pt reports dark stool but not black. constipation and straining for last few days reports hemorrhoids, normal colon reported 4 years.  recommend iron studies, conservaitve managment sec to sycocpe and chf.  ppi dialy, ct.  r/b a/c will be discussed after exams complete.  11/1: Cardio: acute blood loss anemia and FOBT+ stools. Please type and screen patient, as H/H continues to fall. Dr. Mccray of GI called. Please check echo. Hold lasix, renal c/s    11/2: CT abd/pelvis: No acute abnormality in the abdomen and pelvis. Colonic diverticulosis without acute diverticulitis.  11/2: GI: ct scan, reports fairly recent endosocpic eval.  conservaitve managment. further recomemndations after ct.  11/2: Renal: Decrease coreg to 3.125 mg twice daily. Monitor BP.   11/3 Renal: Acute kidney injury- Resolving and likely hemodynamically mediated secondary to hypotension in setting of lasix use, anemia and supratherapeutic INR which can cause warfarin nephropathy. UA bland with hyaline casts and low FeUrea suggestive of low renal perfusion. Avoid nephrotoxins. Monitor electrolytes.  Edema, lower extremity.  Plan: Patient with pedal edema noted today for which restart lasix 40 mg daily on 11/4. Monitor UO.  11/3 Med: pt denies chest pain, shortness of breath, nausea, v, bleeding per rectum. Anemia due to blood loss: fobt, gi f/u , poss endoscopy, transfuse and frequent cbc check. SHEYLA: Defer renal US at this time given improving renal function.  11/3 Heme/Onc: severe anemia and guaiac positive, s/p transfusion and doing better. Pt has h/o bleeding before but does not remember if he ever had any blood transfusion. A detailed ROS is otherwise unremarkable, no weight loss.- IV iron for now until after GI w/u when PO iron can be started, obtain B12, folate, retic, LD, protein electrophoresis, IFx, full GI w/u, hold off anticoagulation for now  11/3 Cards: #Syncope- in the setting of acute blood loss anemia and FOBT+ stools.  Patient s/p 1 Unit pRBCs on 11/2 with adequate response, GI f/u this morning noted- plan to prep over the weekend and scope next week unless patient decompensates. Awaiting echo. #A-fib- better rate controlled-Hold digoxin for supratherapeutic levels. No AC until GI work up completed.Continue ASA, as Hb currently stable. Continue Coreg. #Anemia- Fe studies consistent with Fe deficiency with low ferritin. Hematology called- Dr. Sotomayor. #SHEYLA- likely due to poor perfusion in the setting of anemia, and a-fib with RVR. Creatinine improving. Continue holding Lasix. Renal input appreciated.  11/3 GI: pt with decrease in hemeglobin, but hard stool.  does not seem like acute gib?  ct negative. recommend guaic and hematology eval.  will plan pan endosocpy next week.  can not do over weekend unless emergent.  will do 2 day prep with goltye.  can do over weekend, if drop in hgb or overt bleeding  11/4 GI:plan for egd Monday. 8am.   11/4 cards: awaiting tte, plan for EGD 11/6. hold AC until; GI workup, resume lasix today  11/4 renal: resolving SHEYLA likely hypotension from lasix and anemia, continue lasix   11/4 hem: IV iron for now. hold off AC  11/5 cards: syncope in setting of acute blood looss anemia, + FOBT. plan for EGD. Afib rate controlled. no cardiac contraindication for scope.   11/5 hem: IV iron for now until agfter GI workup. start b12 [id number] agfter GI w/ui. hold off on AC. f/u viraj hepatitis profile   11/5 renal: UA bland and hyaline casts suggestive of low renal perfusion. SHEYLA resolved.   11/6 GI: egd and colon without source of bleeidng, patient with diverticulosis, ? source.  recommend capsule as outpatient.  11/6 cards: plan to resume warfarin after capsule study, as no obvious source of bleeding was identified. Continue ASA.Continue Coreg.  Anemia- Fe studies consistent with Fe deficiency with low ferritin.  Hematology input noted- patient s/p IV iron.   SHEYLA- likely due to poor perfusion in the setting of anemia, and a-fib with RVR.Resume Lasix 11/7, once patient tolerating PO today.  Echo- EF 62%, Mitral annular calcification, otherwise normal mitral    valve. Mild mitral regurgitation.  2. Calcified trileaflet aortic valve with decreased  opening. Peak transaortic valve gradient equals 46 mm Hg,  mean transaortic valve gradient equals 20 mm Hg, estimated  aortic valve area equals 1.1 sqcm (by continuity equation),  consistent with moderate aortic stenosis.  3. Moderately dilated left atrium.  LA volume index = 45  cc/m2.  4. Normal left ventricular internal dimensions and wall  thicknesses.  5. Normal left ventricular systolic function. No segmental  wall motion abnormalities.  6. The right ventricle is not well visualized; grossly  normal right ventricular systolic function.  7. Estimated right ventricular systolic pressure equals 49  mm Hg, assuming right atrial pressure equals 10 mm Hg,  consistent with mild pulmonary hypertension.    D/C Planning 11/7.    11/6 Renal:Can restart lasix 40 mg daily tomorrow on discharge. 71 y/o male with PMHx of MI, HERIBERTO on CPAP, CHF on Lasix, A-fib was on Coumadin stopped 1 week ago due to INR of 11, borderline DM, HTN, BPH, GERD presents to ED after syncopal episode. Patient states he was at home sitting in chair trying on bipap masks with his respiratory therapist. Paitent then became dizzy with mask on then passed out. Son who witnessed syncope said episode lasted 20 seconds, with fall backwards hitting his head. Endorses previous episodes of dizziness during the fall-self resolved. Per patient normal echo 6 months ago with cardiologist Dr. Schmidt at [facility]. 90/30 hypotensive at scene by EMS. Son states has syncope due to orthostatics. Orthostatics performed in ED negative. Patient states he was instructed by cardiologist to stop Coumadin stopped due to INR of 11. Patient noticed darker stools for last 2-3days, found to be occult positive in ED. Denies any other bleeding sx/signs.    On admission:   CT BRAIN: No acute intracranial bleeding, calvarial fracture, or scalp hematoma.  CT CERVICAL SPINE: No fracture or subluxation. Degenerative changes of the cervical spine.  CXR clear lungs  EKG: Afib with RVR 108bpm  CEx 2 neg  UA: clear    11/1: Cardio: acute blood loss anemia and FOBT+ stools. Please type and screen patient, as H/H continues to fall. Dr. Mccray of GI called. Please check echo. Hold lasix, renal c/s  11/1: Renal: SHEYLA secondary to hypotension in setting of lasix use, anemia and supratherapeutic INR which can cause warfarin nephropathy. UA bland with hyaline casts suggestive of low renal perfusion. Agree with holding lasix for now. Check urine sodium, urine urea, urine creatinine. Defer renal US at this time given improving renal function but will check on 11/2 if SHEYLA has not resolved given h/o BPH r/o urinary retention. Avoid nephrotoxins. Monitor electrolytes. Likely hemodynamically mediated secondary to hypotension in setting of lasix use, anemia and supratherapeutic INR which can cause warfarin nephropathy. UA bland with hyaline casts suggestive of low renal perfusion. Agree with holding lasix for now. Defer renal US at this time given improving renal function but will check on 11/2 if SHEYLA has not resolved given h/o BPH r/o urinary retention. Avoid nephrotoxins. Monitor electrolytes.    11/1: GI c/s Shazia: pt reports dark stool but not black. constipation and straining for last few days reports hemorrhoids, normal colon reported 4 years.  recommend iron studies, conservaitve managment sec to sycocpe and chf.  ppi dialy, ct.  r/b a/c will be discussed after exams complete.  11/1: Cardio: acute blood loss anemia and FOBT+ stools. Please type and screen patient, as H/H continues to fall. Dr. Mccray of GI called. Please check echo. Hold lasix, renal c/s    11/2: CT abd/pelvis: No acute abnormality in the abdomen and pelvis. Colonic diverticulosis without acute diverticulitis.    11/2: Renal: Decrease coreg to 3.125 mg twice daily. Monitor BP.   Renal consulted for Acute kidney injury- Resolving and likely hemodynamically mediated secondary to hypotension in setting of lasix use, anemia and supratherapeutic INR which can cause warfarin nephropathy. UA bland with hyaline casts and low FeUrea suggestive of low renal perfusion. Coreg dose decreased to 3.124mg twice a day.  11/3 Med: pt denies chest pain, shortness of breath, nausea, v, bleeding per rectum. Anemia due to blood loss: fobt, gi f/u , poss endoscopy, transfuse and frequent cbc check. SHEYLA: Defer renal US at this time given improving renal function.  11/3 Heme/Onc: severe anemia and guaiac positive, s/p transfusion and doing better. Pt has h/o bleeding before but does not remember if he ever had any blood transfusion. A detailed ROS is otherwise unremarkable, no weight loss.- IV iron for now until after GI w/u when PO iron can be started, obtain B12, folate, retic, LD, protein electrophoresis, IFx, full GI w/u, hold off anticoagulation for now  11/3 Cards: #Syncope- in the setting of acute blood loss anemia and FOBT+ stools.  Patient s/p 1 Unit pRBCs on 11/2 with adequate response, GI f/u this morning noted- plan to prep over the weekend and scope next week unless patient decompensates. Awaiting echo. #A-fib- better rate controlled-Hold digoxin for supratherapeutic levels. No AC until GI work up completed.Continue ASA, as Hb currently stable. Continue Coreg. #Anemia- Fe studies consistent with Fe deficiency with low ferritin. Hematology called- Dr. Sotomayor. #SHEYLA- likely due to poor perfusion in the setting of anemia, and a-fib with RVR. Creatinine improving. Continue holding Lasix. Renal input appreciated.  11/3 GI: pt with decrease in hemeglobin, but hard stool.  does not seem like acute gib?  ct negative. recommend guaic and hematology eval.  will plan pan endosocpy next week.  can not do over weekend unless emergent.  will do 2 day prep with goltye.  can do over weekend, if drop in hgb or overt bleeding  11/4 GI:plan for egd Monday. 8am.   11/4 cards: awaiting tte, plan for EGD 11/6. hold AC until; GI workup, resume lasix today  11/4 renal: resolving SHEYLA likely hypotension from lasix and anemia, continue lasix   11/4 hem: IV iron for now. hold off AC  11/5 cards: syncope in setting of acute blood looss anemia, + FOBT. plan for EGD. Afib rate controlled. no cardiac contraindication for scope.   11/5 hem: IV iron for now until agfter GI workup. start b12 [id number] agfter GI w/ui. hold off on AC. f/u viraj hepatitis profile   11/5 renal: UA bland and hyaline casts suggestive of low renal perfusion. SHEYLA resolved.   11/6 GI: egd and colon without source of bleeidng, patient with diverticulosis, ? source.  recommend capsule as outpatient.  11/6 cards: plan to resume warfarin after capsule study, as no obvious source of bleeding was identified. Continue ASA.Continue Coreg.  Anemia- Fe studies consistent with Fe deficiency with low ferritin.  Hematology input noted- patient s/p IV iron.   SHEYLA- likely due to poor perfusion in the setting of anemia, and a-fib with RVR.Resume Lasix 11/7, once patient tolerating PO today.  Echo- EF 62%, Mitral annular calcification, otherwise normal mitral    valve. Mild mitral regurgitation.  2. Calcified trileaflet aortic valve with decreased  opening. Peak transaortic valve gradient equals 46 mm Hg,  mean transaortic valve gradient equals 20 mm Hg, estimated  aortic valve area equals 1.1 sqcm (by continuity equation),  consistent with moderate aortic stenosis.  3. Moderately dilated left atrium.  LA volume index = 45  cc/m2.  4. Normal left ventricular internal dimensions and wall  thicknesses.  5. Normal left ventricular systolic function. No segmental  wall motion abnormalities.  6. The right ventricle is not well visualized; grossly  normal right ventricular systolic function.  7. Estimated right ventricular systolic pressure equals 49  mm Hg, assuming right atrial pressure equals 10 mm Hg,  consistent with mild pulmonary hypertension.    D/C Planning 11/7.    11/6 Renal:Can restart lasix 40 mg daily tomorrow on discharge. 71 y/o male with PMHx of MI, HERIBERTO on CPAP, CHF on Lasix, A-fib was on Coumadin stopped 1 week ago due to INR of 11, borderline DM, HTN, BPH, GERD presents to ED after syncopal episode. Patient states he was at home sitting in chair trying on bipap masks with his respiratory therapist. Paitent then became dizzy with mask on then passed out. Son who witnessed syncope said episode lasted 20 seconds, with fall backwards hitting his head. Endorses previous episodes of dizziness during the fall-self resolved. Per patient normal echo 6 months ago with cardiologist Dr. Schmidt at [facility]. 90/30 hypotensive at scene by EMS. Son states has syncope due to orthostatics. Orthostatics performed in ED negative. Patient states he was instructed by cardiologist to stop Coumadin stopped due to INR of 11. Patient noticed darker stools for last 2-3days, found to be occult positive in ED. Denies any other bleeding sx/signs.    On admission:   CT BRAIN: No acute intracranial bleeding, calvarial fracture, or scalp hematoma.  CT CERVICAL SPINE: No fracture or subluxation. Degenerative changes of the cervical spine.  CXR clear lungs  EKG: Afib with RVR 108bpm  CEx 2 neg  UA: clear  A-fib with RVR on admission s/p cardizem IV and dig load. Pt became hypotensive but responded to IVF bolus.     11/1: Cardio: acute blood loss anemia and FOBT+ stools. Please type and screen patient, as H/H continues to fall. Dr. Mccray of GI called. Please check echo. Hold lasix, renal c/s    11/2: CT abd/pelvis: No acute abnormality in the abdomen and pelvis. Colonic diverticulosis without acute diverticulitis.    GI c/s with Dr. Mccray: pt reports dark stool but not black. constipation and straining for last few days reports hemorrhoids, normal colon reported 4 years.  recommend iron studies, conservaitve managment sec to sycocpe and chf.  ppi dialy, ct.  r/b a/c will be discussed after exams complete. Egd and colon performed without source of bleeidng, patient with diverticulosis, ? source.  Recommend capsule as outpatient.    Renal consulted for Acute kidney injury- Resolving and likely hemodynamically mediated secondary to hypotension in setting of lasix use, anemia and supratherapeutic INR which can cause warfarin nephropathy. UA bland with hyaline casts and low FeUrea suggestive of low renal perfusion. Coreg dose decreased to 3.124mg twice a day.    Heme/Onc c/s for severe anemia and guaiac positive, s/p transfusion and doing better.  IV iron for now until after GI w/u when PO iron can be started, hold off anticoagulation for now    Echo- EF 62%, Mitral annular calcification, otherwise normal mitral    valve. Mild mitral regurgitation.  2. Calcified trileaflet aortic valve with decreased  opening. Peak transaortic valve gradient equals 46 mm Hg,  mean transaortic valve gradient equals 20 mm Hg, estimated  aortic valve area equals 1.1 sqcm (by continuity equation),  consistent with moderate aortic stenosis.  3. Moderately dilated left atrium.  LA volume index = 45  cc/m2.  4. Normal left ventricular internal dimensions and wall  thicknesses.  5. Normal left ventricular systolic function. No segmental  wall motion abnormalities.  6. The right ventricle is not well visualized; grossly  normal right ventricular systolic function.  7. Estimated right ventricular systolic pressure equals 49  mm Hg, assuming right atrial pressure equals 10 mm Hg,  consistent with mild pulmonary hypertension.    Cards: No signs or symptoms of post-procedural ACS or decompensated CHF.A-fib- better rate controlled CHADSII-Vasc = 4 plan to resume warfarin after capsule study, as no obvious source of bleeding was identified.Continue ASA.Continue Coreg.    SHEYLA resolved.  Restart Lasix 40mg every other day as per nephrology. Case discussed with nephrology and Dr. Garcia, pt not to be sent home on AC (pending capsule study) pt cleared for d/c with outpt cardiology and GI follow up, 73 y/o male with PMHx of MI, HERIBERTO on CPAP, CHF on Lasix, A-fib was on Coumadin stopped 1 week ago due to INR of 11, borderline DM, HTN, BPH, GERD presents to ED after syncopal episode. Patient states he was at home sitting in chair trying on bipap masks with his respiratory therapist. Paitent then became dizzy with mask on then passed out. Son who witnessed syncope said episode lasted 20 seconds, with fall backwards hitting his head. Endorses previous episodes of dizziness during the fall-self resolved. Per patient normal echo 6 months ago with cardiologist Dr. Schmidt at [facility]. 90/30 hypotensive at scene by EMS. Son states has syncope due to orthostatics. Orthostatics performed in ED negative. Patient states he was instructed by cardiologist to stop Coumadin stopped due to INR of 11. Patient noticed darker stools for last 2-3days, found to be occult positive in ED. Denies any other bleeding sx/signs.    On admission:   CT BRAIN: No acute intracranial bleeding, calvarial fracture, or scalp hematoma.  CT CERVICAL SPINE: No fracture or subluxation. Degenerative changes of the cervical spine.  CXR clear lungs  EKG: Afib with RVR 108bpm  CEx 2 neg  UA: clear  A-fib with RVR on admission s/p cardizem IV and dig load. Pt became hypotensive but responded to IVF bolus.     11/1: Cardio: acute blood loss anemia and FOBT+ stools. Please type and screen patient, as H/H continues to fall. Dr. Mccray of GI called. Please check echo. Hold lasix, renal c/s    11/2: CT abd/pelvis: No acute abnormality in the abdomen and pelvis. Colonic diverticulosis without acute diverticulitis.    GI c/s with Dr. Mccray: pt reports dark stool but not black. constipation and straining for last few days reports hemorrhoids, normal colon reported 4 years.  recommend iron studies, conservaitve managment sec to sycocpe and chf.  ppi dialy, ct.  r/b a/c will be discussed after exams complete. Egd and colon performed without source of bleeidng, patient with diverticulosis, ? source.  Recommend capsule as outpatient.    Renal consulted for Acute kidney injury- Resolving and likely hemodynamically mediated secondary to hypotension in setting of lasix use, anemia and supratherapeutic INR which can cause warfarin nephropathy. UA bland with hyaline casts and low FeUrea suggestive of low renal perfusion. Coreg dose decreased to 3.124mg twice a day.    Heme/Onc c/s for severe anemia and guaiac positive, s/p transfusion and doing better.  IV iron for now until after GI w/u when PO iron can be started, hold off anticoagulation for now    Echo- EF 62%, Mitral annular calcification, otherwise normal mitral    valve. Mild mitral regurgitation.  2. Calcified trileaflet aortic valve with decreased  opening. Peak transaortic valve gradient equals 46 mm Hg,  mean transaortic valve gradient equals 20 mm Hg, estimated  aortic valve area equals 1.1 sqcm (by continuity equation),  consistent with moderate aortic stenosis.  3. Moderately dilated left atrium.  LA volume index = 45  cc/m2.  4. Normal left ventricular internal dimensions and wall  thicknesses.  5. Normal left ventricular systolic function. No segmental  wall motion abnormalities.  6. The right ventricle is not well visualized; grossly  normal right ventricular systolic function.  7. Estimated right ventricular systolic pressure equals 49  mm Hg, assuming right atrial pressure equals 10 mm Hg,  consistent with mild pulmonary hypertension.    Cards: No signs or symptoms of post-procedural ACS or decompensated CHF.A-fib- better rate controlled CHADSII-Vasc = 4 plan to resume warfarin after capsule study, as no obvious source of bleeding was identified.Continue ASA.Continue Coreg.    SHEYLA resolved.  Restart Lasix 40mg every other day as per nephrology. Case discussed with nephrology and Dr. Garcia, pt not to be sent home on AC (pending capsule study) pt cleared for d/c with outpt cardiology (patient has a private cardiologist) and GI follow up,

## 2017-11-07 NOTE — PROGRESS NOTE ADULT - SUBJECTIVE AND OBJECTIVE BOX
PAPLE CROW:[id number],   72yMale followed for:  penicillin (Unknown)    PAST MEDICAL & SURGICAL HISTORY:  Sleep apnea  GERD (gastroesophageal reflux disease)  BPH (benign prostatic hyperplasia)  CHF (congestive heart failure)  MI (myocardial infarction)  Hypertension  Essential hypertension, benign  Diabetes insipidus  AF (atrial fibrillation)  No significant past surgical history    FAMILY HISTORY:  No pertinent family history in first degree relatives    MEDICATIONS  (STANDING):  aspirin enteric coated 81 milliGRAM(s) Oral daily  atorvastatin 40 milliGRAM(s) Oral at bedtime  beclomethasone  80 MICROgram(s) Inhaler 1 Puff(s) Inhalation two times a day  calcium carbonate 500 mG (Tums) Chewable 1 Tablet(s) Chew daily  carvedilol 3.125 milliGRAM(s) Oral every 12 hours  cyanocobalamin 1000 MICROGram(s) Oral daily  dextrose 5%. 1000 milliLiter(s) (50 mL/Hr) IV Continuous <Continuous>  dextrose 50% Injectable 12.5 Gram(s) IV Push once  dextrose 50% Injectable 25 Gram(s) IV Push once  dextrose 50% Injectable 25 Gram(s) IV Push once  ferrous    sulfate 325 milliGRAM(s) Oral daily  furosemide    Tablet 40 milliGRAM(s) Oral daily  gabapentin 300 milliGRAM(s) Oral three times a day  influenza   Vaccine 0.5 milliLiter(s) IntraMuscular once  insulin lispro (HumaLOG) corrective regimen sliding scale   SubCutaneous three times a day before meals  insulin lispro (HumaLOG) corrective regimen sliding scale   SubCutaneous at bedtime  omega-3-Acid Ethyl Esters 2 Gram(s) Oral two times a day  pantoprazole  Injectable 40 milliGRAM(s) IV Push every 12 hours  tamsulosin 0.4 milliGRAM(s) Oral at bedtime  tiotropium 18 MICROgram(s) Capsule 1 Capsule(s) Inhalation daily    MEDICATIONS  (PRN):  dextrose Gel 1 Dose(s) Oral once PRN Blood Glucose LESS THAN 70 milliGRAM(s)/deciliter  glucagon  Injectable 1 milliGRAM(s) IntraMuscular once PRN Glucose LESS THAN 70 milligrams/deciliter  melatonin 3 milliGRAM(s) Oral at bedtime PRN Insomnia      Vital Signs Last 24 Hrs  T(C): 37 (07 Nov 2017 06:30), Max: 37 (07 Nov 2017 06:30)  T(F): 98.6 (07 Nov 2017 06:30), Max: 98.6 (07 Nov 2017 06:30)  HR: 98 (07 Nov 2017 06:30) (71 - 107)  BP: 106/65 (07 Nov 2017 06:30) (106/65 - 125/82)  BP(mean): --  RR: 16 (07 Nov 2017 06:30) (15 - 17)  SpO2: 96% (07 Nov 2017 06:30) (92% - 100%)  nc/at  s1s2  cta  soft, nt, nd no guarding or rebound  no c/c/e    CBC Full  -  ( 06 Nov 2017 04:40 )  WBC Count : 8.01 K/uL  Hemoglobin : 8.4 g/dL  Hematocrit : 26.1 %  Platelet Count - Automated : 134 K/uL  Mean Cell Volume : 86.7 fL  Mean Cell Hemoglobin : 27.9 pg  Mean Cell Hemoglobin Concentration : 32.2 %  Auto Neutrophil # : x  Auto Lymphocyte # : x  Auto Monocyte # : x  Auto Eosinophil # : x  Auto Basophil # : x  Auto Neutrophil % : x  Auto Lymphocyte % : x  Auto Monocyte % : x  Auto Eosinophil % : x  Auto Basophil % : x    11-06    139  |  100  |  13  ----------------------------<  89  3.8   |  27  |  1.26    Ca    8.2<L>      06 Nov 2017 04:40  Phos  3.9     11-06  Mg     1.9     11-06

## 2017-11-07 NOTE — PROGRESS NOTE ADULT - SUBJECTIVE AND OBJECTIVE BOX
[facility] NEPHROLOGY- PROGRESS NOTE    72 year old male with history of CHF presents with syncopal episode. Nephrology consulted for elevated Scr.    REVIEW OF SYSTEMS:   Gen: no changes in weight  Cards: no chest pain  Resp: no dyspnea  GI: no nausea or vomiting, + resolving diarrhea  Vascular: no LE edema    penicillin (Unknown)      Hospital Medications: Medications reviewed      VITALS:  T(F): 98.6 (11-07-17 @ 06:30), Max: 98.6 (11-07-17 @ 06:30)  HR: 93 (11-07-17 @ 07:35)  BP: 106/65 (11-07-17 @ 06:30)  RR: 16 (11-07-17 @ 06:30)  SpO2: 96% (11-07-17 @ 07:35)  Wt(kg): --    11-06 @ 07:01  -  11-07 @ 07:00  --------------------------------------------------------  IN: 640 mL / OUT: 0 mL / NET: 640 mL      PHYSICAL EXAM:    Gen: NAD, calm  Cards: Irregularly irregular, +S1/S2, no M/G/R  Resp: CTA B/L  GI: soft, NT/ND, NABS  Vascular: no LE edema B/L      LABS:  11-07    140  |  101  |  15  ----------------------------<  128<H>  4.1   |  26  |  1.35<H>    Ca    8.4      07 Nov 2017 07:00  Phos  3.9     11-06  Mg     2.0     11-07      Creatinine Trend: 1.35 <--, 1.26 <--, 1.18 <--, 1.18 <--, 1.23 <--, 1.34 <--, 1.42 <--, 1.81 <--                        9.3    6.06  )-----------( 135      ( 07 Nov 2017 07:00 )             29.3

## 2017-11-07 NOTE — PROGRESS NOTE ADULT - ASSESSMENT
pt with decrease in hemeglobin, but hard stool.  does not seem like acute gib?  ct negative. recommend guaic and hematology eval.  will plan pan endosocpy next week.  can not do over weekend unless emergent.  will do 2 day prep with goltye.  can do over weekend in er if drop in hgb or overt bleeding
72 year old male with history of CHF presents with syncopal episode. Nephrology consulted for elevated Scr.
72 year old male with history of CHF presents with syncopal episode. found to have acute blood loss anemia
72 year old male with history of CHF presents with syncopal episode. found to have acute blood loss anemia
appreciate hematology.  still ? of gib.  will plan pan endosocpy on monday. scheduled 8 am. r/b/a discussed
ct scan, reports fairly recent endosocpic eval.  conservaitve managment. further recomemndations after ct.
egd and colon without source of bleeidng, patient with diverticulosis, ? source.  recommend capsule as outpatient.
no sign gib.  continue rx op capsule
pt reports dark stool but not black. constipation and straining for last few days reports hemorrhoids, normal colon reported 4 years.  recommend iron studies, conservaitve managment sec to sycnocpe and chf.  ppi dialy, ct.  r/b a/c will be discussed after exams complete.
72M with anemia and iron deficiency from GI source, better since the transfusion, will be going for GI w/u, will recommend:  - IV iron for now until after GI w/u when PO iron can be started  - B12 low normal, start oral B12 1000 mcg daily after GI w/u  - protein electrophoresis, IFx  - full GI w/u.  - hold off anticoagulation for now  - plts lower and fluctuating, will obtain RF, ESPINOZA, hepatitis profile and HIV 1/2.  - rest of the treatment as per medicine, cardiology and GI  - discussed issues in detail with him and questions answered
72M with anemia and iron deficiency from GI source, needing another transfusion today, on IV iron, will be going for GI w/u, will recommend:  - IV iron for now until after GI w/u when PO iron can be started  - B12 low normal, start oral B12 1000 mcg daily after GI w/u  - protein electrophoresis, IFx  - full on GI w/u.  - hold off anticoagulation for now  - plts lower and fluctuating, f/u on ESPINOZA, hepatitis profile and HIV 1/2.  - rest of the treatment as per medicine, cardiology and GI  - discussed issues in detail with him and questions answered
72M with anemia and iron deficiency from GI source, needing another transfusion today, on IV iron, will be going for GI w/u, will recommend:  - IV iron for now until after GI w/u when PO iron can be started  - B12 low normal, start oral B12 1000 mcg daily after GI w/u  - f/u on IFx studies  - full on GI w/u to continue as outpt  - hold off anticoagulation for now  - rest of the treatment as per medicine, cardiology and GI

## 2017-11-07 NOTE — PROGRESS NOTE ADULT - PROBLEM SELECTOR PLAN 1
Resolving and likely hemodynamically mediated secondary to hypotension in setting of lasix use, anemia and supratherapeutic INR which can cause warfarin nephropathy. UA bland with hyaline casts and low FeUrea suggestive of low renal perfusion. Scr mildly increased today however within acceptable range. Avoid nephrotoxins. Monitor electrolytes.

## 2017-11-07 NOTE — PROGRESS NOTE ADULT - PROBLEM SELECTOR PLAN 3
Resolved. Would start lasix 40 mg every other day on discharge and titrate as needed to daily as outpatient. Monitor UO.

## 2017-11-07 NOTE — DISCHARGE NOTE ADULT - MEDICATION SUMMARY - MEDICATIONS TO TAKE
I will START or STAY ON the medications listed below when I get home from the hospital:    aspirin 81 mg oral tablet  -- 1 tab(s) by mouth once a day  -- Indication: For A-fib, heart health    calcium (as carbonate) 500 mg oral tablet  -- 500 milligram(s) by mouth once a day  -- Indication: For Antacid    tamsulosin 0.4 mg oral capsule  -- 1 cap(s) by mouth once a day  -- Indication: For BPH (benign prostatic hyperplasia)    metFORMIN 500 mg oral tablet  -- 1 tab(s) by mouth 2 times a day  -- Indication: For diabetes    atorvastatin 40 mg oral tablet  -- 1 tab(s) by mouth once a day  -- Indication: For Cholesterol    carvedilol 3.125 mg oral tablet  -- 1 tab(s) by mouth every 12 hours  -- Indication: For AFib    Spiriva 18 mcg inhalation capsule  -- 1 cap(s) inhaled once a day  -- Indication: For Shortness of breath    Lasix 40 mg oral tablet  -- 1 tab(s) by mouth every other day   -- Indication: For CHF (congestive heart failure)    ferrous sulfate 325 mg (65 mg elemental iron) oral tablet  -- 1 tab(s) by mouth once a day  -- Indication: For Anemia     Fish Oil 500 mg oral capsule  -- 2 cap(s) by mouth 2 times a day  -- Indication: For CHolesterol    Protonix 40 mg oral delayed release tablet  -- 1 tab(s) by mouth once a day  -- Indication: For GERD (gastroesophageal reflux disease)    Qvar 80 mcg/inh inhalation aerosol  -- 1 puff(s) inhaled 2 times a day  -- Indication: For Shorthness of breath    cyanocobalamin 1000 mcg oral tablet  -- 1 tab(s) by mouth once a day  -- Indication: For Supplement

## 2017-11-07 NOTE — PROGRESS NOTE ADULT - PROVIDER SPECIALTY LIST ADULT
Cardiology
Gastroenterology
Heme/Onc
Internal Medicine
Nephrology
Gastroenterology
Heme/Onc
Heme/Onc

## 2017-11-07 NOTE — PROGRESS NOTE ADULT - ATTENDING COMMENTS
[facility] NEPHROLOGY  Liam Hernandez M.D.  Jose C Nuñez D.O.  Christine Nguyen M.D.  Shruthi Schmidt, MSN, ANP-C    Telephone: [phone]  Facsimile: [phone]    [address] [place] [address]
[facility] NEPHROLOGY  Liam Hernandez M.D.  Jose C Nuñez D.O.  Christine Nguyen M.D.  Shruthi Schmidt, MSN, ANP-C    Telephone: [phone]  Facsimile: [phone]    [address] [place] [address]
[facility] NEPHROLOGY  Liam Hernandez M.D.  Jose C Nuñez D.O.  Christine Nguyen M.D.  Shruthi Schmidt, MSN, ANP-C    Telephone: [phone]  Facsimile: [phone]    [address] [place] [address]
[facility] NEPHROLOGY  Liam Hernandez M.D.  Jose C Nuñez D.O.  Christine Nguyen M.D.  Shruthi Schmidt, MSN, ANP-C    Telephone: [phone]  Facsimile: [phone]    [address] [place] [address]
[facility] NEPHROLOGY  Liam Hernandez M.D.  Jose C Nuñez D.O.  Christine Nguyen M.D.  Shruthi Schmidt, MSN, ANP-C    Telephone: [phone]  Facsimile: [phone]    [address] [place] [address]
[facility] NEPHROLOGY  Liam Hernandez M.D.  Jose C Nuñez D.O.  Christine Nguyen M.D.  Shruthi Schmidt, MSN, ANP-C    Telephone: [phone]  Facsimile: [phone]    [address] [place] [address]
[facility] NEPHROLOGY  Liam Hernandez M.D.  Jose C Nuñez D.O.  Christine Nguyen M.D.  Shruthi Schmidt, MSN, ANP-C    Telephone: [phone]  Facsimile: [phone]    [address] [place] [address]

## 2017-11-07 NOTE — DISCHARGE NOTE ADULT - INSTRUCTIONS
Continue diet modification. Avoid complex carbohydrates such as bread, pasta, cereal, white rice, white potatoes, etc. Avoid concentrated sugar as found in desserts, candy, soda, juice, etc. Consume a diet based on lean protein (chicken, fish) and vegetables.

## 2017-11-07 NOTE — DISCHARGE NOTE ADULT - PLAN OF CARE
No source of bleeding identified with endoscopy and coloscopy. You will need capsule study as outpatient to identify source of bleeding.   Follow up with your GI doctor for capsule study. Please call to arrange appointment as soon as possible. Do not take coumadin until source of bleeding has been identified with capsule study. Continue Aspirin. Continue Coreg at decreased dose.   Follow up with your cardiologist for further monitoring in 1. Please call to arrange appointment. Ensure medication compliance. Take Lasix every other day for now until you see your cardiologist. Follow up with your primary care physician for further monitoring in 1 week. Please call to arrange appointment. Now resolved. Monitor renal function Follow up with your primary care physician for further monitoring in 1-2 weeks. Please call to arrange appointment. Ensure medication compliance to maintain A1C<7 Continue metformin.  Follow up with your primary care physician for further monitoring in 1-2 weeks. Please call to arrange appointment.   Continue diet modification. Avoid complex carbohydrates such as bread, pasta, cereal, white rice, white potatoes, etc. Avoid concentrated sugar as found in desserts, candy, soda, juice, etc. Consume a diet based on lean protein (chicken, fish) and vegetables.

## 2018-01-23 NOTE — ED PROVIDER NOTE - GASTROINTESTINAL, MLM
Patient's mother called and wanted to schedule her follow up appointments from her hospital stay. I confirmed times, dates, providers and locations.  
Abdomen soft, non-tender, no guarding. No CVA tenderness b/l.

## 2019-12-12 NOTE — CONSULT NOTE ADULT - PROBLEM SELECTOR RECOMMENDATION 9
Eating Heart-Healthy Food: Using the DASH Plan    Eating for your heart doesnt have to be hard or boring. You just need to know how to make healthier choices. The DASH eating plan has been developed to help you do just that. DASH stands for Dietary Approaches to Stop Hypertension. It is a plan that has been proven to be healthier for your heart and to lower your risk for high blood pressure. It can also help lower your risk for cancer, heart disease, osteoporosis, and diabetes.  Choosing from each food group  Choose foods from each of the food groups below each day. Try to get the recommended number of servings for each food group. The serving numbers are based on a diet of 2,000 calories a day. Talk to your doctor if youre unsure about your calorie needs. Along with getting the correct servings, the DASH plan also recommends a sodium intake less than 2,300 mg per day.        Grains  Servings: 6 to 8 a day  A serving is:  · 1 slice bread  · 1 ounce dry cereal  · Half a cup cooked rice, pasta or cereal  Best choices: Whole grains and any grains high in fiber. Vegetables  Servings: 4 to 5 a day  A serving is:  · 1 cup raw leafy vegetable  · Half a cup cut-up raw or cooked vegetable  · Half a cup vegetable juice  Best choices: Fresh or frozen vegetables prepared without added salt or fat.   Fruits  Servings: 4 to 5 a day  A serving is:  · 1 medium fruit  · One-quarter cup dried fruit  · Half a cup fresh, frozen, or canned fruit  · Half a cup of 100% fruit juices  Best choices: A variety of fresh fruits of different colors. Whole fruits are a better choice than fruit juices. Low-fat or fat-free dairy  Servings: 2 to 3 a day  A serving is:  · 1 cup milk  · 1 cup yogurt  · One and a half ounces cheese  Best choices: Skim or 1% milk, low-fat or fat-free yogurt or buttermilk, and low-fat cheeses.         Lean meats, poultry, fish  Servings: 6 or fewer a day  A serving is:  · 1 ounce cooked meats, poultry, or fish  · 1  egg  Best choices: Lean poultry and fish. Trim away visible fat. Broil, grill, roast, or boil instead of frying. Remove skin from poultry before eating. Limit how much red meat you eat.  Nuts, seeds, beans  Servings: 4 to 5 a week  A serving is:  · One-third cup nuts (one and a half ounces)  · 2 tablespoons nut butter or seeds  · Half a cup cooked dry beans or legumes  Best choices: Dry roasted nuts with no salt added, lentils, kidney beans, garbanzo beans, and whole treadwell beans.   Fats and oils  Servings: 2 to 3 a day  A serving is:  · 1 teaspoon vegetable oil  · 1 teaspoon soft margarine  · 1 tablespoon mayonnaise  · 2 tablespoons salad dressing  Best choices: Nut and vegetable oils (nontropical vegetable oils), such as olive and canola oil. Sweets  Servings: 5 a week or fewer  A serving is:  · 1 tablespoon sugar, maple syrup, or honey  · 1 tablespoon jam or jelly  · 1 half-ounce jelly beans (about 15)  · 1 cup lemonade  Best choices: Dried fruit can be a satisfying sweet. Choose low-fat sweets. And watch your serving sizes!      For more on the DASH eating plan, visit:  www.nhlbi.nih.gov/health/health-topics/topics/dash   Date Last Reviewed: 6/1/2016  © 3101-4299 [employer]. [address] [place] [address], [place] [address]. All rights reserved. This information is not intended as a substitute for professional medical care. Always follow your healthcare professional's instructions.        Aerobic Exercise for a Healthy Heart  Exercise is a lot more than an energy booster and a stress reliever. It also strengthens your heart muscle, lowers your blood pressure and cholesterol, and burns calories. It can also improve your resting muscle tone, and your mood.     Remember, some activity is better than none.    Choose an aerobic activity  Choose an activity that makes your heart and lungs work harder than they do when you rest or walk normally. This aerobic exercise can improve the way your heart and other  muscles use oxygen. Make it fun by exercising with a friend and choosing an activity you enjoy. Here are some ideas:  · Walking  · Swimming  · Bicycling  · Stair climbing  · Dancing  · Jogging  · Gardening  Exercise regularly  If you havent been exercising regularly,  get your doctors OK first. Then start slowly.  Here are some tips:  · Begin exercising 3 times a week for 5 to 10 minutes at a time.  · When you feel comfortable, add a few minutes each session.  · Slowly build up to exercising 3 to 4 times each week. Each session should last for 40 minutes, on average, and involve moderate- to vigorous-intensity physical activity.  · If you have been given nitroglycerin, be sure to carry it when you exercise.  · If you get chest pain (angina) when youre exercising, stop what youre doing, take your nitroglycerin, and call your doctor.  Date Last Reviewed: 6/2/2016  © 7013-8536 [employer]. [address] [place] [address], [place] [address]. All rights reserved. This information is not intended as a substitute for professional medical care. Always follow your healthcare professional's instructions.         Likely hemodynamically mediated secondary to hypotension in setting of lasix use, anemia and supratherapeutic INR which can cause warfarin nephropathy. UA bland with hyaline casts suggestive of low renal perfusion. Agree with holding lasix for now. Defer renal US at this time given improving renal function but will check on 11/2 if SHEYLA has not resolved given h/o BPH r/o urinary retention. Avoid nephrotoxins. Monitor electrolytes. Likely hemodynamically mediated secondary to hypotension in setting of lasix use, anemia and supratherapeutic INR which can cause warfarin nephropathy. UA bland with hyaline casts suggestive of low renal perfusion. Agree with holding lasix for now. Check urine sodium, urine urea, urine creatinine. Defer renal US at this time given improving renal function but will check on 11/2 if SHEYLA has not resolved given h/o BPH r/o urinary retention. Avoid nephrotoxins. Monitor electrolytes.

## 2021-01-13 ENCOUNTER — INPATIENT (INPATIENT)
Facility: HOSPITAL | Age: 76
LOS: 2 days | Discharge: HOME | End: 2021-01-16
Attending: HOSPITALIST | Admitting: HOSPITALIST
Payer: MEDICARE

## 2021-01-13 VITALS
HEART RATE: 123 BPM | TEMPERATURE: 98 F | SYSTOLIC BLOOD PRESSURE: 123 MMHG | DIASTOLIC BLOOD PRESSURE: 77 MMHG | OXYGEN SATURATION: 100 % | RESPIRATION RATE: 18 BRPM | HEIGHT: 64.96 IN

## 2021-01-13 DIAGNOSIS — K21.9 GASTRO-ESOPHAGEAL REFLUX DISEASE WITHOUT ESOPHAGITIS: ICD-10-CM

## 2021-01-13 DIAGNOSIS — I50.9 HEART FAILURE, UNSPECIFIED: ICD-10-CM

## 2021-01-13 DIAGNOSIS — R79.1 ABNORMAL COAGULATION PROFILE: ICD-10-CM

## 2021-01-13 DIAGNOSIS — N17.9 ACUTE KIDNEY FAILURE, UNSPECIFIED: ICD-10-CM

## 2021-01-13 DIAGNOSIS — I48.91 UNSPECIFIED ATRIAL FIBRILLATION: ICD-10-CM

## 2021-01-13 DIAGNOSIS — K92.2 GASTROINTESTINAL HEMORRHAGE, UNSPECIFIED: ICD-10-CM

## 2021-01-13 DIAGNOSIS — Z29.9 ENCOUNTER FOR PROPHYLACTIC MEASURES, UNSPECIFIED: ICD-10-CM

## 2021-01-13 DIAGNOSIS — D64.9 ANEMIA, UNSPECIFIED: ICD-10-CM

## 2021-01-13 DIAGNOSIS — I10 ESSENTIAL (PRIMARY) HYPERTENSION: ICD-10-CM

## 2021-01-13 DIAGNOSIS — N40.0 BENIGN PROSTATIC HYPERPLASIA WITHOUT LOWER URINARY TRACT SYMPTOMS: ICD-10-CM

## 2021-01-13 LAB
ALBUMIN SERPL ELPH-MCNC: 4.1 G/DL — SIGNIFICANT CHANGE UP (ref 3.3–5)
ALP SERPL-CCNC: 59 U/L — SIGNIFICANT CHANGE UP (ref 40–120)
ALT FLD-CCNC: 15 U/L — SIGNIFICANT CHANGE UP (ref 4–41)
ANION GAP SERPL CALC-SCNC: 12 MMOL/L — SIGNIFICANT CHANGE UP (ref 7–14)
APTT BLD: 48.8 SEC — HIGH (ref 27–36.3)
AST SERPL-CCNC: 16 U/L — SIGNIFICANT CHANGE UP (ref 4–40)
BILIRUB SERPL-MCNC: 0.6 MG/DL — SIGNIFICANT CHANGE UP (ref 0.2–1.2)
BLD GP AB SCN SERPL QL: NEGATIVE — SIGNIFICANT CHANGE UP
BLOOD GAS ARTERIAL COMPREHENSIVE WITH CREATININE RESULT: SIGNIFICANT CHANGE UP
BUN SERPL-MCNC: 59 MG/DL — HIGH (ref 7–23)
CALCIUM SERPL-MCNC: 9.6 MG/DL — SIGNIFICANT CHANGE UP (ref 8.4–10.5)
CHLORIDE SERPL-SCNC: 99 MMOL/L — SIGNIFICANT CHANGE UP (ref 98–107)
CO2 SERPL-SCNC: 28 MMOL/L — SIGNIFICANT CHANGE UP (ref 22–31)
CREAT SERPL-MCNC: 1.64 MG/DL — HIGH (ref 0.5–1.3)
GLUCOSE SERPL-MCNC: 128 MG/DL — HIGH (ref 70–99)
HCT VFR BLD CALC: 26.4 % — LOW (ref 39–50)
HCT VFR BLD CALC: 28.8 % — LOW (ref 39–50)
HGB BLD-MCNC: 8.2 G/DL — LOW (ref 13–17)
HGB BLD-MCNC: 8.9 G/DL — LOW (ref 13–17)
INR BLD: 7.04 RATIO — CRITICAL HIGH (ref 0.88–1.16)
LIDOCAIN IGE QN: 44 U/L — SIGNIFICANT CHANGE UP (ref 7–60)
MCHC RBC-ENTMCNC: 29.7 PG — SIGNIFICANT CHANGE UP (ref 27–34)
MCHC RBC-ENTMCNC: 30.1 PG — SIGNIFICANT CHANGE UP (ref 27–34)
MCHC RBC-ENTMCNC: 30.9 GM/DL — LOW (ref 32–36)
MCHC RBC-ENTMCNC: 31.1 GM/DL — LOW (ref 32–36)
MCV RBC AUTO: 95.7 FL — SIGNIFICANT CHANGE UP (ref 80–100)
MCV RBC AUTO: 97.3 FL — SIGNIFICANT CHANGE UP (ref 80–100)
NRBC # BLD: 0 /100 WBCS — SIGNIFICANT CHANGE UP
NRBC # BLD: 0 /100 WBCS — SIGNIFICANT CHANGE UP
NRBC # FLD: 0 K/UL — SIGNIFICANT CHANGE UP
NRBC # FLD: 0 K/UL — SIGNIFICANT CHANGE UP
NT-PROBNP SERPL-SCNC: 2337 PG/ML — HIGH
OB PNL STL: POSITIVE
PLATELET # BLD AUTO: 147 K/UL — LOW (ref 150–400)
PLATELET # BLD AUTO: 165 K/UL — SIGNIFICANT CHANGE UP (ref 150–400)
POTASSIUM SERPL-MCNC: 4 MMOL/L — SIGNIFICANT CHANGE UP (ref 3.5–5.3)
POTASSIUM SERPL-SCNC: 4 MMOL/L — SIGNIFICANT CHANGE UP (ref 3.5–5.3)
PROT SERPL-MCNC: 6.8 G/DL — SIGNIFICANT CHANGE UP (ref 6–8.3)
PROTHROM AB SERPL-ACNC: 73.1 SEC — HIGH (ref 10.6–13.6)
RBC # BLD: 2.76 M/UL — LOW (ref 4.2–5.8)
RBC # BLD: 2.96 M/UL — LOW (ref 4.2–5.8)
RBC # FLD: 16.8 % — HIGH (ref 10.3–14.5)
RBC # FLD: 16.9 % — HIGH (ref 10.3–14.5)
RH IG SCN BLD-IMP: POSITIVE — SIGNIFICANT CHANGE UP
SARS-COV-2 RNA SPEC QL NAA+PROBE: SIGNIFICANT CHANGE UP
SODIUM SERPL-SCNC: 139 MMOL/L — SIGNIFICANT CHANGE UP (ref 135–145)
WBC # BLD: 8.23 K/UL — SIGNIFICANT CHANGE UP (ref 3.8–10.5)
WBC # BLD: 9.26 K/UL — SIGNIFICANT CHANGE UP (ref 3.8–10.5)
WBC # FLD AUTO: 8.23 K/UL — SIGNIFICANT CHANGE UP (ref 3.8–10.5)
WBC # FLD AUTO: 9.26 K/UL — SIGNIFICANT CHANGE UP (ref 3.8–10.5)

## 2021-01-13 PROCEDURE — 99285 EMERGENCY DEPT VISIT HI MDM: CPT

## 2021-01-13 PROCEDURE — 71045 X-RAY EXAM CHEST 1 VIEW: CPT | Mod: 26

## 2021-01-13 PROCEDURE — 99223 1ST HOSP IP/OBS HIGH 75: CPT | Mod: GC

## 2021-01-13 RX ORDER — ATORVASTATIN CALCIUM 80 MG/1
40 TABLET, FILM COATED ORAL AT BEDTIME
Refills: 0 | Status: DISCONTINUED | OUTPATIENT
Start: 2021-01-13 | End: 2021-01-16

## 2021-01-13 RX ORDER — FERROUS SULFATE 325(65) MG
1 TABLET ORAL
Qty: 0 | Refills: 0 | DISCHARGE

## 2021-01-13 RX ORDER — TIOTROPIUM BROMIDE 18 UG/1
1 CAPSULE ORAL; RESPIRATORY (INHALATION) DAILY
Refills: 0 | Status: DISCONTINUED | OUTPATIENT
Start: 2021-01-13 | End: 2021-01-16

## 2021-01-13 RX ORDER — SODIUM CHLORIDE 9 MG/ML
1000 INJECTION INTRAMUSCULAR; INTRAVENOUS; SUBCUTANEOUS
Refills: 0 | Status: DISCONTINUED | OUTPATIENT
Start: 2021-01-13 | End: 2021-01-14

## 2021-01-13 RX ORDER — PANTOPRAZOLE SODIUM 20 MG/1
40 TABLET, DELAYED RELEASE ORAL EVERY 12 HOURS
Refills: 0 | Status: DISCONTINUED | OUTPATIENT
Start: 2021-01-13 | End: 2021-01-16

## 2021-01-13 RX ORDER — PANTOPRAZOLE SODIUM 20 MG/1
80 TABLET, DELAYED RELEASE ORAL ONCE
Refills: 0 | Status: COMPLETED | OUTPATIENT
Start: 2021-01-13 | End: 2021-01-13

## 2021-01-13 RX ORDER — PROTHROMBIN COMPLEX CONCENTRATE (HUMAN) 25.5; 16.5; 24; 22; 22; 26 [IU]/ML; [IU]/ML; [IU]/ML; [IU]/ML; [IU]/ML; [IU]/ML
1500 POWDER, FOR SOLUTION INTRAVENOUS ONCE
Refills: 0 | Status: COMPLETED | OUTPATIENT
Start: 2021-01-13 | End: 2021-01-13

## 2021-01-13 RX ORDER — OMEGA-3 ACID ETHYL ESTERS 1 G
2 CAPSULE ORAL
Refills: 0 | Status: DISCONTINUED | OUTPATIENT
Start: 2021-01-13 | End: 2021-01-16

## 2021-01-13 RX ORDER — TAMSULOSIN HYDROCHLORIDE 0.4 MG/1
0.4 CAPSULE ORAL AT BEDTIME
Refills: 0 | Status: DISCONTINUED | OUTPATIENT
Start: 2021-01-13 | End: 2021-01-16

## 2021-01-13 RX ORDER — PHYTONADIONE (VIT K1) 5 MG
10 TABLET ORAL ONCE
Refills: 0 | Status: COMPLETED | OUTPATIENT
Start: 2021-01-13 | End: 2021-01-13

## 2021-01-13 RX ORDER — METFORMIN HYDROCHLORIDE 850 MG/1
1 TABLET ORAL
Qty: 0 | Refills: 0 | DISCHARGE

## 2021-01-13 RX ORDER — CARVEDILOL PHOSPHATE 80 MG/1
3.12 CAPSULE, EXTENDED RELEASE ORAL EVERY 12 HOURS
Refills: 0 | Status: DISCONTINUED | OUTPATIENT
Start: 2021-01-13 | End: 2021-01-14

## 2021-01-13 RX ORDER — FUROSEMIDE 40 MG
1 TABLET ORAL
Qty: 0 | Refills: 0 | DISCHARGE

## 2021-01-13 RX ADMIN — TAMSULOSIN HYDROCHLORIDE 0.4 MILLIGRAM(S): 0.4 CAPSULE ORAL at 22:25

## 2021-01-13 RX ADMIN — PANTOPRAZOLE SODIUM 80 MILLIGRAM(S): 20 TABLET, DELAYED RELEASE ORAL at 19:41

## 2021-01-13 RX ADMIN — ATORVASTATIN CALCIUM 40 MILLIGRAM(S): 80 TABLET, FILM COATED ORAL at 22:25

## 2021-01-13 RX ADMIN — PROTHROMBIN COMPLEX CONCENTRATE (HUMAN) 400 INTERNATIONAL UNIT(S): 25.5; 16.5; 24; 22; 22; 26 POWDER, FOR SOLUTION INTRAVENOUS at 19:41

## 2021-01-13 RX ADMIN — Medication 102 MILLIGRAM(S): at 19:41

## 2021-01-13 RX ADMIN — CARVEDILOL PHOSPHATE 3.12 MILLIGRAM(S): 80 CAPSULE, EXTENDED RELEASE ORAL at 22:29

## 2021-01-13 RX ADMIN — SODIUM CHLORIDE 75 MILLILITER(S): 9 INJECTION INTRAMUSCULAR; INTRAVENOUS; SUBCUTANEOUS at 22:25

## 2021-01-13 NOTE — H&P ADULT - PROBLEM SELECTOR PLAN 9
Hx of anemia, baseline hb of 8-9  -previously was on iron tablets, but no longer  -will continue to monitor  -outpatient followup with his hematologist

## 2021-01-13 NOTE — H&P ADULT - PROBLEM SELECTOR PLAN 5
Hx of CHF, on lasix 40 daily  -holding lasix for now  -TTE in 2017 showing EF 62%, nm LV, mild MR and AS, no need to repeat TTE at this point Hx of CHF  -TTE in 2017 showing EF 62%, nm LV, mild MR and AS, no need to repeat TTE at this point Hx of CHF, was previously on lasix, now on carvidilol and valsartan  -TTE in 2017 showing EF 62%, nm LV, mild MR and AS, no need to repeat TTE at this point  -f/u CXR and pro-bnp Hx of CHF, was previously on lasix, now on carvidilol and valsartan  -hold valsartan and resume coreg  -pt states on lasix but does not know does and is not on his list of medications - confirm with pharmacy and hold off on resume for now pending stabilization of GIB, mild edema on lower extremities  -duplex to r/o DVT given RLE swelling worse  -TTE in 2017 showing EF 62%, nm LV, mild MR and AS, no need to repeat TTE at this point  -f/u CXR and pro-bnp

## 2021-01-13 NOTE — H&P ADULT - PROBLEM SELECTOR PLAN 8
holding valsartan in the setting of srinath  holding carvedilol in the setting of volume down status holding valsartan in the setting of srinath  c/w carvedilol

## 2021-01-13 NOTE — H&P ADULT - ATTENDING COMMENTS
I have personally seen, examined, and participated in the care of this patient.  I have reviewed all pertinent clinical information, including history, physical exam, plan, and the resident's note and agree except as noted.    74 yo male with PMH of HTN, GERD and Afib on Coumadin (5mg daily) who p/w melena and abdominal pain x3 days, found to have INR Of 7.0. Hgb 8.9--8.2, HD    #GIB  Melena for several days. Reportedly past EGD normal several years ago and C-scope with diverticulosis in 2017. Hgb 8.9 and repeat 8.2. HD stable. INR initially elevated to 7 and now repeat 1.6 s/p kcentra and vit K  -repeat CBC in AM for trend q12  -s/p kcentra and vitamin K and INR 7--1.60  -hold coumadin  -GI consult emailed  -IV PPI  -iron studies    #Supratherapeutic INR  INR elevated to 7.04 repeat 1.60  -unclear etiology of elevated INR, given that patient is not any new diet, has been having adequate po intake, and has not changed coumadin dose (5mg daily)  -hold coumadin  -s/p kaycentra and iv vit K  -daily INR    #Afib with RVR  Now rate controlled and resume coreg, hold coumadin    #CHF  RLE swelling worse than LLE. Will order duplex. No SOB.   -confirm home lasix but hold for now, hold valsartan given SHEYLA, resume coreg  -CXR clear, no crackles  -TTE in 2017 showing EF 62%, nm LV, mild MR and AS, no need to repeat TTE at this point    #SHEYLA  Cr rise to 1.64 (baseline 1.35 from 2017) likely dehydration and gi bleed   -BUN also elevated in the setting of likely upper gi bleed  -hold further maint fluids for now given no longer dizzy and LE edema    #GERD  PPI    #BPH  Tamsulosin    #HTN  Resume coreg. Hold valsartan and confirm lasix dose but hold for now

## 2021-01-13 NOTE — ED PROVIDER NOTE - PROGRESS NOTE DETAILS
RANDELL ACOSTA:  Lab work shows pO2 of 28.  Lab work was ordered accidentally as arterial blood gas.  Sample was a venous blood gas.

## 2021-01-13 NOTE — H&P ADULT - NSHPOUTPATIENTPROVIDERS_GEN_ALL_CORE
Hematologist: Dr. Sylvester [phone]  PCP: Dr. Tim Navarrete [address] [place] [address] [phone]

## 2021-01-13 NOTE — H&P ADULT - HISTORY OF PRESENT ILLNESS
APPLE CROW  75y  Male  Patient is a 75y old  Male who presents with a chief complaint of melena APPLE CROW  75y  Male  HPI: Patient is a 76 yo male with PMH of HTN, GERD and Afib on Coumadin who presents with "black stools" for the past 2-3 days. Patient reports that the stools have been very dark and black but hasn’t noticed any gross red blood with his bowel movements. His last bowel movement was yesterday morning which he said looked slightly less black/more brown than his few previous stools. He went to his hematologist this morning regarding these complaints and was told to come in to the hospital. Patient also reports that he had right and left lower abdominal pain, 6/10 in severity, that started around the same time 2-3 days ago but has resolved after taking Imodium. He reports feeling feverish but was afebrile when he took his temperature at home. Also endorses mild nausea last night but no episodes of vomiting. Denies chest pain, increased urinary frequency, hematuria or dysuria. Reports feeling dizzy occasionally but says he’s been feeling this way for a long time.   ED course: Temp 98.4, , /77, RR 18, SpO2 100%. CBC s/f HgB 8.9, Hct 28.8. PT 73.1, INR 7.04, aPTT 48.8. CMP s/f BUN 59, Cr 1.64. Fecal occult blood positive. Given IV pantoprazole 80 mg, IV phytonadione 10 mg, IV PCC 1500 units.    APPLE CROW  75y  Male  HPI: Patient is a 74 yo male with PMH of HTN, GERD and Afib on Coumadin (5mg daily) who presents with "black stools" for the past 2-3 days. Patient reports that the stools have been very dark and black but hasn’t noticed any gross red blood with his bowel movements. His last bowel movement was yesterday morning (1/12 AM) which he said looked slightly less black/more brown than his few previous stools. He went to his hematologist this morning regarding these complaints and was told to come in to the hospital. Patient also reports that he had right and left lower abdominal pain, 6/10 in severity, that started around the same time 2-3 days ago but has resolved after taking Imodium. He reports feeling feverish but was afebrile when he took his temperature at home. Also endorses mild nausea last night but no episodes of vomiting. Denies chest pain, increased urinary frequency, hematuria or dysuria. Reports feeling dizzy occasionally but says he’s been feeling this way for a long time.   ED course: Temp 98.4,  (afib), /77, RR 18, SpO2 100%. CBC s/f HgB 8.9, PT 73.1, INR 7.04, CMP s/f BUN 59, Cr 1.64. Fecal occult blood positive. Given IV pantoprazole 80 mg, IV phytonadione 10 mg, Kaycentra x1.    76 yo male with PMH of HTN, GERD and Afib on Coumadin (5mg daily) who presents with "black stools" for the past 2-3 days. Patient reports that the stools have been very dark and black but hasn’t noticed any gross red blood with his bowel movements. His last bowel movement was yesterday morning (1/12 AM) which he said looked slightly less black/more brown than his few previous stools. He went to his hematologist this morning regarding these complaints and was told to come in to the hospital. Patient also reports that he had right and left lower abdominal pain, 6/10 in severity, that started around the same time 2-3 days ago but has resolved after taking Imodium. He reports feeling feverish but was afebrile when he took his temperature at home. Also endorses mild nausea last night but no episodes of vomiting. Denies chest pain, increased urinary frequency, hematuria or dysuria. Reports feeling dizzy occasionally but says he’s been feeling this way for a long time.   ED course: Temp 98.4,  (afib), /77, RR 18, SpO2 100%. CBC s/f HgB 8.9, PT 73.1, INR 7.04, CMP s/f BUN 59, Cr 1.64. Fecal occult blood positive. Given IV pantoprazole 80 mg, IV phytonadione 10 mg, Kaycentra x1.

## 2021-01-13 NOTE — H&P ADULT - ASSESSMENT
Mr. Pinto is a pleasant 74 yo male with PMH of HTN, GERD and Afib on Coumadin (5mg daily) who p/w melena and abdominal pain x3 days, found to have INR Of 7.0.  Mr. Pinto is a pleasant 74 yo male with PMH of HTN, GERD and Afib on Coumadin (5mg daily) who p/w melena and abdominal pain x3 days, found to have INR Of 7.0. Hgb 8.9--8.2, HD stable

## 2021-01-13 NOTE — H&P ADULT - PROBLEM SELECTOR PLAN 3
Cr rise to 1.64 (baseline 1.35 from 2017) likely dehydration and gi bleed   -BUN also elevated in the setting of likely upper gi bleed  -will start maintenance fluids: 75cc NS/hr for now Cr rise to 1.64 (baseline 1.35 from 2017) likely dehydration and gi bleed   -BUN also elevated in the setting of likely upper gi bleed  -hold further maint fluids for now given no longer dizzy and LE edema

## 2021-01-13 NOTE — ED ADULT NURSE REASSESSMENT NOTE - NS ED NURSE REASSESS COMMENT FT1
Report received from previous shift RN, pt is AOX4, ambulatory, A.fib on cardiac monitor, ambulated to bathroom without difficulty, denies any SOB, dizziness, or chest pain at this time. Rpt labs to be done at 2330. Pt is well-appearing, offers no complaints. Pt currently taken for CXR. Awaiting bed assignment. WIll continue to monitor

## 2021-01-13 NOTE — H&P ADULT - NSHPLABSRESULTS_GEN_ALL_CORE
LABS:  The Labs were reviewed by me   The Radiology was reviewed by me    EKG tracing reviewed by me    01-13    139  |  99  |  59<H>  ----------------------------<  128<H>  4.0   |  28  |  1.64<H>    Ca    9.6      13 Jan 2021 18:00    TPro  6.8  /  Alb  4.1  /  TBili  0.6  /  DBili  x   /  AST  16  /  ALT  15  /  AlkPhos  59  01-13          PT/INR - ( 13 Jan 2021 18:00 )   PT: 73.1 sec;   INR: 7.04 ratio         PTT - ( 13 Jan 2021 18:00 )  PTT:48.8 sec                ABG - ( 13 Jan 2021 18:00 )  pH, Arterial: 7.35  pH, Blood: x     /  pCO2: 54    /  pO2: 28    / HCO3: 26    / Base Excess: 3.7   /  SaO2: 41.1                         8.9    9.26  )-----------( 165      ( 13 Jan 2021 18:00 )             28.8     CAPILLARY BLOOD GLUCOSE      POCT Blood Glucose.: 147 mg/dL (13 Jan 2021 16:56)    RADIOLOGY & ADDITIONAL TESTS:    EKG: a fib w/ RVR hr in 127. no st elevations/depressions, no t wave abnormalities. LABS:  The Labs were reviewed by me   The Radiology was reviewed by me    EKG tracing reviewed by me    01-13    139  |  99  |  59<H>  ----------------------------<  128<H>  4.0   |  28  |  1.64<H>    Ca    9.6      13 Jan 2021 18:00    TPro  6.8  /  Alb  4.1  /  TBili  0.6  /  DBili  x   /  AST  16  /  ALT  15  /  AlkPhos  59  01-13          PT/INR - ( 13 Jan 2021 18:00 )   PT: 73.1 sec;   INR: 7.04 ratio         PTT - ( 13 Jan 2021 18:00 )  PTT:48.8 sec                ABG - ( 13 Jan 2021 18:00 )  pH, Arterial: 7.35  pH, Blood: x     /  pCO2: 54    /  pO2: 28    / HCO3: 26    / Base Excess: 3.7   /  SaO2: 41.1                         8.9    9.26  )-----------( 165      ( 13 Jan 2021 18:00 )             28.8     CAPILLARY BLOOD GLUCOSE      POCT Blood Glucose.: 147 mg/dL (13 Jan 2021 16:56)    RADIOLOGY & ADDITIONAL TESTS:    I have personally reviewed this patient's EKG and my interpretation is afib with rvr @127bpm    I have personally reviewed this patient's CXR and my interpretation is clear lungs

## 2021-01-13 NOTE — H&P ADULT - NSICDXPASTMEDICALHX_GEN_ALL_CORE_FT
PAST MEDICAL HISTORY:  AF (atrial fibrillation)     BPH (benign prostatic hyperplasia)     CHF (congestive heart failure)     Diabetes insipidus     Essential hypertension, benign     GERD (gastroesophageal reflux disease)     Hypertension     MI (myocardial infarction)     Sleep apnea

## 2021-01-13 NOTE — H&P ADULT - PROBLEM SELECTOR PLAN 2
INR elevated to 7.04  -unclear etiology of elevated INR, given that patient is not any new diet, has been having adequate po intake, and has not changed coumadin dose (5mg daily)  -hold coumadin  -s/p kaycentra and iv vit K  -daily INR INR elevated to 7.04  -unclear etiology of elevated INR, given that patient is not any new diet, has been having adequate po intake, and has not changed coumadin dose (5mg daily)  -hold coumadin  -s/p kaycentra and iv vit K  -daily INR  -repeat INR tonight INR elevated to 7.04 repeat 1.60  -unclear etiology of elevated INR, given that patient is not any new diet, has been having adequate po intake, and has not changed coumadin dose (5mg daily)  -hold coumadin  -s/p kaycentra and iv vit K  -daily INR

## 2021-01-13 NOTE — ED PROVIDER NOTE - CLINICAL SUMMARY MEDICAL DECISION MAKING FREE TEXT BOX
Pt is a 76 y/o M PMhx GERD, Afib on coumadin, GERD p/w dark red stools x 3 days -- r/o GI bleed -- labs, pt/inr, ekg

## 2021-01-13 NOTE — H&P ADULT - NSHPSOCIALHISTORY_GEN_ALL_CORE
Retired, previous . Lives at home w/ wife and dog. Former smoker ~150pack years (5ppd x 30 years) and quit 25 years ago. denies alcohol or other illicit drug use.

## 2021-01-13 NOTE — ED PROVIDER NOTE - ATTENDING CONTRIBUTION TO CARE
Dr Bloch, ATTENDING MD-  I performed a face to face bedside interview with patient regarding history of present illness, review of symptoms and past medical history. I completed an independent physical exam.  I have discussed patient's plan of care with PA.   Documentation as above in the note.  Patient examined, well appearing NAD HEENT nml heart sounds s1s2 irregular tachy lungs clear, abd soft nontender extrem right lower leg swelling no calf tenderness, skin nml Mental status nml

## 2021-01-13 NOTE — H&P ADULT - NSHPPHYSICALEXAM_GEN_ALL_CORE
T(C): 36.9 (01-13-21 @ 16:48), Max: 36.9 (01-13-21 @ 16:48)  HR: 117 (01-13-21 @ 18:41) (117 - 123)  BP: 107/57 (01-13-21 @ 18:41) (107/57 - 123/77)  RR: 16 (01-13-21 @ 18:41) (16 - 18)  SpO2: 97% (01-13-21 @ 18:41) (97% - 100%)  Wt(kg): --Vital Signs Last 24 Hrs  T(C): 36.9 (13 Jan 2021 16:48), Max: 36.9 (13 Jan 2021 16:48)  T(F): 98.4 (13 Jan 2021 16:48), Max: 98.4 (13 Jan 2021 16:48)  HR: 117 (13 Jan 2021 18:41) (117 - 123)  BP: 107/57 (13 Jan 2021 18:41) (107/57 - 123/77)  BP(mean): --  RR: 16 (13 Jan 2021 18:41) (16 - 18)  SpO2: 97% (13 Jan 2021 18:41) (97% - 100%)    PHYSICAL EXAM:  GENERAL: NAD, well-groomed, well-developed  HEAD:  Atraumatic, Normocephalic  EYES: EOMI, PERRLA, conjunctiva and sclera clear  ENMT: No tonsillar erythema, exudates, or enlargement; Moist mucous membranes, No lesions  NECK: Supple, No JVD, Normal thyroid  NERVOUS SYSTEM:  Alert & Oriented X3, Good concentration; Motor Strength 5/5 B/L upper and lower extremities; DTRs 2+ intact and symmetric  CHEST/LUNG: Clear to percussion bilaterally; No rales, rhonchi, wheezing, or rubs  HEART: Regular rate and rhythm; No murmurs, rubs, or gallops  ABDOMEN: Soft, Nontender, Nondistended; Bowel sounds present.   EXTREMITIES:  2+ Peripheral Pulses, No clubbing, cyanosis, or edema  LYMPH: No lymphadenopathy noted  SKIN: bruises on Left upper extremity  Rectal done by ED (deferred by me given patient is in hallway without privacy): stool normal color T(C): 36.9 (01-13-21 @ 16:48), Max: 36.9 (01-13-21 @ 16:48)  HR: 117 (01-13-21 @ 18:41) (117 - 123)  BP: 107/57 (01-13-21 @ 18:41) (107/57 - 123/77)  RR: 16 (01-13-21 @ 18:41) (16 - 18)  SpO2: 97% (01-13-21 @ 18:41) (97% - 100%)  Wt(kg): --Vital Signs Last 24 Hrs  T(C): 36.9 (13 Jan 2021 16:48), Max: 36.9 (13 Jan 2021 16:48)  T(F): 98.4 (13 Jan 2021 16:48), Max: 98.4 (13 Jan 2021 16:48)  HR: 117 (13 Jan 2021 18:41) (117 - 123)  BP: 107/57 (13 Jan 2021 18:41) (107/57 - 123/77)  BP(mean): --  RR: 16 (13 Jan 2021 18:41) (16 - 18)  SpO2: 97% (13 Jan 2021 18:41) (97% - 100%)    PHYSICAL EXAM:  GENERAL: NAD, well-groomed, well-developed  HEAD:  Atraumatic, Normocephalic  EYES: EOMI, PERRLA, conjunctiva and sclera clear  ENMT: No tonsillar erythema, exudates, or enlargement; Moist mucous membranes, No lesions  NECK: Supple, No JVD, Normal thyroid  NERVOUS SYSTEM:  Alert & Oriented X3, Good concentration; Motor Strength 5/5 B/L upper and lower extremities; DTRs 2+ intact and symmetric  CHEST/LUNG: Clear to percussion bilaterally; No rales, rhonchi, wheezing, or rubs  HEART: Regular rate and rhythm; No murmurs, rubs, or gallops  ABDOMEN: Soft, Nontender, Nondistended; Bowel sounds present.   EXTREMITIES:  2+ Peripheral Pulses, No clubbing, cyanosis. 1+ pitting edema b/l lower extremities, subjectively pt reports R>L  SKIN: bruises on Left upper extremity  Rectal done by ED (deferred by me given patient is in hallway without privacy): stool normal color

## 2021-01-13 NOTE — H&P ADULT - PROBLEM SELECTOR PLAN 1
P/w melanotic stools x 3 days, w/ elevated BUN and BUN/Cr ratio of 36, likely has a component of upper GI bleed (also has hx of GERD)  -Hb stable right now at 8.9 (baseline seems to be 8-10 from 2017)  -s/p protonix 80 IV, will start protonix 40 IV BID  -s/p reversal of coumadin with kaycentra and IV vitamin K  -given stable hb and hemodynamic stability, will avoid transfusion in CHF pt for now  -keep active type and screen and 2 large bore IVs  -GI emailed for role of endoscopy/colonscopy (pt had both in 2017, showed only diverticulosis) P/w melanotic stools x 3 days, w/ elevated BUN and BUN/Cr ratio of 36, likely has a component of upper GI bleed (also has hx of GERD)  -Hb stable right now at 8.9 (baseline seems to be 8-10 from 2017)  -s/p protonix 80 IV, will start protonix 40 IV BID  -s/p reversal of coumadin with kaycentra and IV vitamin K  -given stable hb and hemodynamic stability, will avoid transfusion in CHF pt for now  -keep active type and screen and 2 large bore IVs  -GI emailed for role of endoscopy/colonscopy (pt had both in 2017, EGD was normal, colonoscopy showed  diverticulosis) P/w melanotic stools x 3 days, w/ elevated BUN and BUN/Cr ratio of 36, likely has a component of upper GI bleed (also has hx of GERD)  -Hb stable right now at 8.9 (baseline seems to be 8-10 from 2017) and repeat 8.2  -s/p protonix 80 IV, will start protonix 40 IV BID  -s/p reversal of coumadin with kaycentra and IV vitamin K  -given stable hb and hemodynamic stability, will avoid transfusion in CHF pt for now  -keep active type and screen and 2 large bore IVs  -GI emailed for role of endoscopy/colonscopy (pt had both in 2017, EGD was normal, colonoscopy showed  diverticulosis)

## 2021-01-13 NOTE — ED PROVIDER NOTE - OBJECTIVE STATEMENT
Pt is a 74 y/o M PMhx GERD, Afib on coumadin, GERD p/w dark red stools x 3 days.  Pt reports developing dark red stools for past 3 days.  Pt reported mild lower abdominal pain, which resolved after taking anti-diarrheal medication.  Pt denies any fevers, chills, nausea, vomiting, chest pain, SOB, palpitations, lightheadedness, brbpr.

## 2021-01-13 NOTE — ED ADULT NURSE NOTE - OBJECTIVE STATEMENT
Received pt to bed 24, A+Ox4, ambulatory. C/O Dark stool x 3 days. pt states he is on Coumadin. also endorses abdominal pain. Respirations even and unlabored, normal work of breathing, no accessory muscle use, speaking in full clear uninterrupted sentences. ABD is soft, tender, non distended with normal active bowel sounds. Pt denies any chest pain, SOB, headache, dizziness, N/V/D, fever, chills.   20G to RAC and 20G to the Right forearm, Labs sent, will continue to monitor.

## 2021-01-14 PROBLEM — I10 ESSENTIAL (PRIMARY) HYPERTENSION: Chronic | Status: ACTIVE | Noted: 2017-10-31

## 2021-01-14 PROBLEM — I21.9 ACUTE MYOCARDIAL INFARCTION, UNSPECIFIED: Chronic | Status: ACTIVE | Noted: 2017-10-31

## 2021-01-14 PROBLEM — G47.30 SLEEP APNEA, UNSPECIFIED: Chronic | Status: ACTIVE | Noted: 2017-10-31

## 2021-01-14 PROBLEM — K21.9 GASTRO-ESOPHAGEAL REFLUX DISEASE WITHOUT ESOPHAGITIS: Chronic | Status: ACTIVE | Noted: 2017-10-31

## 2021-01-14 PROBLEM — N40.0 BENIGN PROSTATIC HYPERPLASIA WITHOUT LOWER URINARY TRACT SYMPTOMS: Chronic | Status: ACTIVE | Noted: 2017-10-31

## 2021-01-14 LAB
ALBUMIN SERPL ELPH-MCNC: 3.8 G/DL — SIGNIFICANT CHANGE UP (ref 3.3–5)
ALP SERPL-CCNC: 49 U/L — SIGNIFICANT CHANGE UP (ref 40–120)
ALT FLD-CCNC: 13 U/L — SIGNIFICANT CHANGE UP (ref 4–41)
ANION GAP SERPL CALC-SCNC: 11 MMOL/L — SIGNIFICANT CHANGE UP (ref 7–14)
APTT BLD: 31.4 SEC — SIGNIFICANT CHANGE UP (ref 27–36.3)
AST SERPL-CCNC: 15 U/L — SIGNIFICANT CHANGE UP (ref 4–40)
BASOPHILS # BLD AUTO: 0.02 K/UL — SIGNIFICANT CHANGE UP (ref 0–0.2)
BASOPHILS NFR BLD AUTO: 0.3 % — SIGNIFICANT CHANGE UP (ref 0–2)
BILIRUB SERPL-MCNC: 1 MG/DL — SIGNIFICANT CHANGE UP (ref 0.2–1.2)
BLD GP AB SCN SERPL QL: NEGATIVE — SIGNIFICANT CHANGE UP
BUN SERPL-MCNC: 51 MG/DL — HIGH (ref 7–23)
CALCIUM SERPL-MCNC: 9.4 MG/DL — SIGNIFICANT CHANGE UP (ref 8.4–10.5)
CHLORIDE SERPL-SCNC: 104 MMOL/L — SIGNIFICANT CHANGE UP (ref 98–107)
CO2 SERPL-SCNC: 26 MMOL/L — SIGNIFICANT CHANGE UP (ref 22–31)
CREAT SERPL-MCNC: 1.42 MG/DL — HIGH (ref 0.5–1.3)
EOSINOPHIL # BLD AUTO: 0.09 K/UL — SIGNIFICANT CHANGE UP (ref 0–0.5)
EOSINOPHIL NFR BLD AUTO: 1.2 % — SIGNIFICANT CHANGE UP (ref 0–6)
FERRITIN SERPL-MCNC: 69 NG/ML — SIGNIFICANT CHANGE UP (ref 30–400)
GLUCOSE SERPL-MCNC: 82 MG/DL — SIGNIFICANT CHANGE UP (ref 70–99)
HCT VFR BLD CALC: 27 % — LOW (ref 39–50)
HCV AB S/CO SERPL IA: 0.06 S/CO — SIGNIFICANT CHANGE UP (ref 0–0.99)
HCV AB SERPL-IMP: SIGNIFICANT CHANGE UP
HGB BLD-MCNC: 8.2 G/DL — LOW (ref 13–17)
IANC: 4.62 K/UL — SIGNIFICANT CHANGE UP (ref 1.5–8.5)
IMM GRANULOCYTES NFR BLD AUTO: 1.2 % — SIGNIFICANT CHANGE UP (ref 0–1.5)
INR BLD: 1.45 RATIO — HIGH (ref 0.88–1.16)
INR BLD: 1.6 RATIO — HIGH (ref 0.88–1.16)
IRON SATN MFR SERPL: 26 % — SIGNIFICANT CHANGE UP (ref 14–50)
IRON SATN MFR SERPL: 74 UG/DL — SIGNIFICANT CHANGE UP (ref 45–165)
LYMPHOCYTES # BLD AUTO: 2.14 K/UL — SIGNIFICANT CHANGE UP (ref 1–3.3)
LYMPHOCYTES # BLD AUTO: 28.3 % — SIGNIFICANT CHANGE UP (ref 13–44)
MAGNESIUM SERPL-MCNC: 1.8 MG/DL — SIGNIFICANT CHANGE UP (ref 1.6–2.6)
MCHC RBC-ENTMCNC: 29.8 PG — SIGNIFICANT CHANGE UP (ref 27–34)
MCHC RBC-ENTMCNC: 30.4 GM/DL — LOW (ref 32–36)
MCV RBC AUTO: 98.2 FL — SIGNIFICANT CHANGE UP (ref 80–100)
MONOCYTES # BLD AUTO: 0.59 K/UL — SIGNIFICANT CHANGE UP (ref 0–0.9)
MONOCYTES NFR BLD AUTO: 7.8 % — SIGNIFICANT CHANGE UP (ref 2–14)
NEUTROPHILS # BLD AUTO: 4.62 K/UL — SIGNIFICANT CHANGE UP (ref 1.8–7.4)
NEUTROPHILS NFR BLD AUTO: 61.2 % — SIGNIFICANT CHANGE UP (ref 43–77)
NRBC # BLD: 0 /100 WBCS — SIGNIFICANT CHANGE UP
NRBC # FLD: 0.02 K/UL — HIGH
PHOSPHATE SERPL-MCNC: 3.1 MG/DL — SIGNIFICANT CHANGE UP (ref 2.5–4.5)
PLATELET # BLD AUTO: 155 K/UL — SIGNIFICANT CHANGE UP (ref 150–400)
POTASSIUM SERPL-MCNC: 3.6 MMOL/L — SIGNIFICANT CHANGE UP (ref 3.5–5.3)
POTASSIUM SERPL-SCNC: 3.6 MMOL/L — SIGNIFICANT CHANGE UP (ref 3.5–5.3)
PROT SERPL-MCNC: 6 G/DL — SIGNIFICANT CHANGE UP (ref 6–8.3)
PROTHROM AB SERPL-ACNC: 16.4 SEC — HIGH (ref 10.6–13.6)
PROTHROM AB SERPL-ACNC: 18 SEC — HIGH (ref 10.6–13.6)
RBC # BLD: 2.75 M/UL — LOW (ref 4.2–5.8)
RBC # FLD: 16.8 % — HIGH (ref 10.3–14.5)
RH IG SCN BLD-IMP: POSITIVE — SIGNIFICANT CHANGE UP
SODIUM SERPL-SCNC: 141 MMOL/L — SIGNIFICANT CHANGE UP (ref 135–145)
TIBC SERPL-MCNC: 282 UG/DL — SIGNIFICANT CHANGE UP (ref 220–430)
UIBC SERPL-MCNC: 208 UG/DL — SIGNIFICANT CHANGE UP (ref 110–370)
WBC # BLD: 7.55 K/UL — SIGNIFICANT CHANGE UP (ref 3.8–10.5)
WBC # FLD AUTO: 7.55 K/UL — SIGNIFICANT CHANGE UP (ref 3.8–10.5)

## 2021-01-14 PROCEDURE — 99233 SBSQ HOSP IP/OBS HIGH 50: CPT | Mod: GC

## 2021-01-14 PROCEDURE — 93970 EXTREMITY STUDY: CPT | Mod: 26

## 2021-01-14 RX ORDER — BUDESONIDE, MICRONIZED 100 %
0.5 POWDER (GRAM) MISCELLANEOUS
Refills: 0 | Status: DISCONTINUED | OUTPATIENT
Start: 2021-01-14 | End: 2021-01-16

## 2021-01-14 RX ORDER — ALLOPURINOL 300 MG
100 TABLET ORAL DAILY
Refills: 0 | Status: DISCONTINUED | OUTPATIENT
Start: 2021-01-14 | End: 2021-01-16

## 2021-01-14 RX ORDER — BUDESONIDE, MICRONIZED 100 %
0.5 POWDER (GRAM) MISCELLANEOUS
Refills: 0 | Status: DISCONTINUED | OUTPATIENT
Start: 2021-01-14 | End: 2021-01-14

## 2021-01-14 RX ORDER — CARVEDILOL PHOSPHATE 80 MG/1
3.12 CAPSULE, EXTENDED RELEASE ORAL EVERY 12 HOURS
Refills: 0 | Status: DISCONTINUED | OUTPATIENT
Start: 2021-01-14 | End: 2021-01-16

## 2021-01-14 RX ADMIN — Medication 2 GRAM(S): at 06:35

## 2021-01-14 RX ADMIN — PANTOPRAZOLE SODIUM 40 MILLIGRAM(S): 20 TABLET, DELAYED RELEASE ORAL at 06:35

## 2021-01-14 RX ADMIN — PANTOPRAZOLE SODIUM 40 MILLIGRAM(S): 20 TABLET, DELAYED RELEASE ORAL at 16:45

## 2021-01-14 RX ADMIN — TIOTROPIUM BROMIDE 1 CAPSULE(S): 18 CAPSULE ORAL; RESPIRATORY (INHALATION) at 09:56

## 2021-01-14 RX ADMIN — Medication 2 GRAM(S): at 16:45

## 2021-01-14 RX ADMIN — Medication 100 MILLIGRAM(S): at 10:31

## 2021-01-14 RX ADMIN — TAMSULOSIN HYDROCHLORIDE 0.4 MILLIGRAM(S): 0.4 CAPSULE ORAL at 21:14

## 2021-01-14 RX ADMIN — CARVEDILOL PHOSPHATE 3.12 MILLIGRAM(S): 80 CAPSULE, EXTENDED RELEASE ORAL at 11:14

## 2021-01-14 RX ADMIN — ATORVASTATIN CALCIUM 40 MILLIGRAM(S): 80 TABLET, FILM COATED ORAL at 21:14

## 2021-01-14 NOTE — PROGRESS NOTE ADULT - PROBLEM SELECTOR PLAN 5
Hx of CHF, was previously on lasix, now on carvidilol and valsartan  -hold valsartan and resume coreg  -pt states on lasix but does not know does and is not on his list of medications - confirm with pharmacy and hold off on resume for now pending stabilization of GIB, mild edema on lower extremities  -duplex to r/o DVT given RLE swelling worse  -TTE in 2017 showing EF 62%, nm LV, mild MR and AS, no need to repeat TTE at this point  -f/u CXR and pro-bnp -hold valsartan and lasix and c/w carvedilol  -B/l LE duplex negative for DVT  -TTE in 2017 showing EF 62%, nm LV, mild MR and AS, no need to repeat TTE at this point  -Pro-BNP 2337, CXR clear lungs.   -Orthostatics negative    #COPD  -150 pack year history, quit 25 years ago  -C/w Spiriva and Qvar  -SCDs

## 2021-01-14 NOTE — PROGRESS NOTE ADULT - PROBLEM SELECTOR PLAN 4
EKG showing afib, on coumadin and carvedilol at home  -holding AC in the setting of active gi bleed  -c/w carvedilol 3.125 BID for rate control  -TTE in 2017 showing EF 62%, nm LV, mild MR and AS, no need to repeat TTE at this point EKG showing afib, on coumadin and carvedilol at home  -CHADSVASC=4  -holding AC in the setting of active gi bleed  -c/w carvedilol 3.125 BID for rate control with changes in hold parameters  -TTE in 2017 showing EF 62%, nm LV, mild MR and AS, no need to repeat TTE at this point.  -Continue tele monitoring

## 2021-01-14 NOTE — PROGRESS NOTE ADULT - SUBJECTIVE AND OBJECTIVE BOX
Princess Clement, PGY-1  Internal Medicine  Pager: [phone]/CHRISTIAN [phone]    PROGRESS NOTE:     Patient is a 75y old  Male who presents with a chief complaint of melena (13 Jan 2021 20:30)      SUBJECTIVE / OVERNIGHT EVENTS:    ADDITIONAL REVIEW OF SYSTEMS:    MEDICATIONS  (STANDING):  atorvastatin 40 milliGRAM(s) Oral at bedtime  carvedilol 3.125 milliGRAM(s) Oral every 12 hours  omega-3-Acid Ethyl Esters 2 Gram(s) Oral two times a day  pantoprazole  Injectable 40 milliGRAM(s) IV Push every 12 hours  tamsulosin 0.4 milliGRAM(s) Oral at bedtime  tiotropium 18 MICROgram(s) Capsule 1 Capsule(s) Inhalation daily    MEDICATIONS  (PRN):      CAPILLARY BLOOD GLUCOSE      POCT Blood Glucose.: 147 mg/dL (13 Jan 2021 16:56)    I&O's Summary      PHYSICAL EXAM:  Vital Signs Last 24 Hrs  T(C): 36.6 (14 Jan 2021 06:26), Max: 36.9 (13 Jan 2021 16:48)  T(F): 97.9 (14 Jan 2021 06:26), Max: 98.4 (13 Jan 2021 16:48)  HR: 105 (14 Jan 2021 06:26) (88 - 123)  BP: 117/96 (14 Jan 2021 06:26) (107/57 - 130/89)  BP(mean): --  RR: 18 (14 Jan 2021 06:26) (16 - 18)  SpO2: 95% (14 Jan 2021 06:26) (95% - 100%)    CONSTITUTIONAL: NAD, well-developed  RESPIRATORY: Normal respiratory effort; lungs are clear to auscultation bilaterally  CARDIOVASCULAR: Regular rate and rhythm, normal S1 and S2, no murmur/rub/gallop; No lower extremity edema; Peripheral pulses are 2+ bilaterally  ABDOMEN: Nontender to palpation, normoactive bowel sounds, no rebound/guarding; No hepatosplenomegaly  MUSCLOSKELETAL: no clubbing or cyanosis of digits; no joint swelling or tenderness to palpation  PSYCH: A+O to person, place, and time; affect appropriate    LABS:                        8.2    8.23  )-----------( 147      ( 13 Jan 2021 23:44 )             26.4     01-13    139  |  99  |  59<H>  ----------------------------<  128<H>  4.0   |  28  |  1.64<H>    Ca    9.6      13 Jan 2021 18:00    TPro  6.8  /  Alb  4.1  /  TBili  0.6  /  DBili  x   /  AST  16  /  ALT  15  /  AlkPhos  59  01-13    PT/INR - ( 13 Jan 2021 23:44 )   PT: 18.0 sec;   INR: 1.60 ratio         PTT - ( 13 Jan 2021 18:00 )  PTT:48.8 sec            RADIOLOGY & ADDITIONAL TESTS:  Results Reviewed:   Imaging Personally Reviewed:  Electrocardiogram Personally Reviewed:    COORDINATION OF CARE:  Care Discussed with Consultants/Other Providers [Y/N]:  Prior or Outpatient Records Reviewed [Y/N]:   Princess Clement, PGY-1  Internal Medicine  Pager: [phone]/CHRISTIAN [phone]    PROGRESS NOTE:     Patient is a 75y old  Male who presents with a chief complaint of melena (13 Jan 2021 20:30)      SUBJECTIVE / OVERNIGHT EVENTS: Mr. Pinto is a pleasant 76 yo man with PMH of Afib on Coumadin 5mg qd, GERD, CHF, COPD, and HTN, who presents with melena and abdominal pain x3 days, found to have INR 7, most likely consistent with UGIB in the setting of supratherapeutic INR of unclear etiology vs GERD.    ADDITIONAL REVIEW OF SYSTEMS: Above, otherwise negative.     MEDICATIONS  (STANDING):  atorvastatin 40 milliGRAM(s) Oral at bedtime  carvedilol 3.125 milliGRAM(s) Oral every 12 hours  omega-3-Acid Ethyl Esters 2 Gram(s) Oral two times a day  pantoprazole  Injectable 40 milliGRAM(s) IV Push every 12 hours  tamsulosin 0.4 milliGRAM(s) Oral at bedtime  tiotropium 18 MICROgram(s) Capsule 1 Capsule(s) Inhalation daily    MEDICATIONS  (PRN):      CAPILLARY BLOOD GLUCOSE      POCT Blood Glucose.: 147 mg/dL (13 Jan 2021 16:56)    I&O's Summary      PHYSICAL EXAM:  Vital Signs Last 24 Hrs  T(C): 36.6 (14 Jan 2021 06:26), Max: 36.9 (13 Jan 2021 16:48)  T(F): 97.9 (14 Jan 2021 06:26), Max: 98.4 (13 Jan 2021 16:48)  HR: 105 (14 Jan 2021 06:26) (88 - 123)  BP: 117/96 (14 Jan 2021 06:26) (107/57 - 130/89)  BP(mean): --  RR: 18 (14 Jan 2021 06:26) (16 - 18)  SpO2: 95% (14 Jan 2021 06:26) (95% - 100%)    CONSTITUTIONAL: NAD, well-developed, middle aged male  RESPIRATORY: Normal respiratory effort; lungs are clear to auscultation bilaterally  CARDIOVASCULAR: Regular rate and rhythm, normal S1 and S2, no murmur/rub/gallop; No lower extremity edema; Peripheral pulses are 2+ bilaterally  ABDOMEN: Soft, Nondistended, Nontender to palpation, normoactive bowel sounds, no rebound/guarding; No hepatosplenomegaly  MUSCLOSKELETAL: no clubbing or cyanosis of digits; no joint swelling or tenderness to palpation  PSYCH: A+O to person, place, and time; affect appropriate    LABS:                        8.2    8.23  )-----------( 147      ( 13 Jan 2021 23:44 )             26.4     01-13    139  |  99  |  59<H>  ----------------------------<  128<H>  4.0   |  28  |  1.64<H>    Ca    9.6      13 Jan 2021 18:00    TPro  6.8  /  Alb  4.1  /  TBili  0.6  /  DBili  x   /  AST  16  /  ALT  15  /  AlkPhos  59  01-13    PT/INR - ( 13 Jan 2021 23:44 )   PT: 18.0 sec;   INR: 1.60 ratio         PTT - ( 13 Jan 2021 18:00 )  PTT:48.8 sec            RADIOLOGY & ADDITIONAL TESTS:  Results Reviewed:   Imaging Personally Reviewed:  Electrocardiogram Personally Reviewed:    COORDINATION OF CARE:  Care Discussed with Consultants/Other Providers [Y/N]:  Prior or Outpatient Records Reviewed [Y/N]:

## 2021-01-14 NOTE — PROGRESS NOTE ADULT - PROBLEM SELECTOR PLAN 10
Diet: NPO  Dvt PPx: SCDs (INR now 7)  Dispo: pending gi workup    #COPD: c/w spiriva and qvar Diet: Clear, NPO MN  Dvt PPx: SCDs (INR now 7)  Dispo: pending gi workup    #COPD: c/w spiriva and qvar

## 2021-01-14 NOTE — PROGRESS NOTE ADULT - ATTENDING COMMENTS
Patient seen and examined. Agree with above note by resident.    #Melena in setting of supratherapeutic INR - s/p INR reversal. No further melena today. Seen by Gi. Plan for EGD tomorrow. C/w protonix 40IV BID. Clear diet today.     #Afib - rate controlled. C/w coreg with parameters. Hold coumadin in setting of GIB.    Plan discussed with patient and HS Patient seen and examined. Agree with above note by resident.    This is a 74 y/o M with afib on coumadin who presented with melena and found to have supratherapeutic INR. S/p INR reversal in ED with Kcentra.    #Melena in setting of supratherapeutic INR - s/p INR reversal. No further melena today. Seen by GI. Plan for EGD on 1/15. C/w protonix 40IV BID. Clear diet today.     #Afib - rate controlled. C/w coreg with parameters. Hold coumadin in setting of GIB.    #SHEYLA - baseline cr unknown. In 2017 1.1-1.3. Possibly from GIB? Cr improving today.    #HFpEF - TTE in 2017 with preserved EF. Currently appears euvolemic. Holding lasix and ARB due to low BP    Plan discussed with patient and HS

## 2021-01-14 NOTE — PROGRESS NOTE ADULT - PROBLEM SELECTOR PLAN 1
P/w melanotic stools x 3 days, w/ elevated BUN and BUN/Cr ratio of 36, likely has a component of upper GI bleed (also has hx of GERD)  -Hb stable right now at 8.9 (baseline seems to be 8-10 from 2017) and repeat 8.2  -s/p protonix 80 IV, will start protonix 40 IV BID  -s/p reversal of coumadin with kaycentra and IV vitamin K  -given stable hb and hemodynamic stability, will avoid transfusion in CHF pt for now  -keep active type and screen and 2 large bore IVs  -GI emailed for role of endoscopy/colonscopy (pt had both in 2017, EGD was normal, colonoscopy showed  diverticulosis) P/w melanotic stools x 3 days, w/ elevated BUN and BUN/Cr ratio of 36, most likely secondary to supratherapeutic INR vs GERD  -Hb stable at 8.2, with baseline ~8-10 in 2017. Obtain CBC if evidence of bleeding.  -C/w protonix 40 IV BID.  -EGD scheduled for tomorrow. Prior EGD in 2017 was normal and prior colonoscopy in 2017 showed diverticulosis.  -s/p reversal of coumadin with kaycentra and IV vitamin K  -given stable hb and hemodynamic stability, will avoid transfusion in CHF pt for now  -keep active type and screen and 2 large bore IVs

## 2021-01-14 NOTE — PROGRESS NOTE ADULT - PROBLEM SELECTOR PLAN 2
INR elevated to 7.04 repeat 1.60  -unclear etiology of elevated INR, given that patient is not any new diet, has been having adequate po intake, and has not changed coumadin dose (5mg daily)  -hold coumadin  -s/p kaycentra and iv vit K  -daily INR INR elevated to 7.04 s/p kaycentra and iv vit K, now repeat INR 1.45. Due to unclear etiology given no change in diet, adequate PO intake, no change in Coumadin dose (5mg daily), and no new medications. Endorses prior history of elevated INR with unremarkable workup for etiology.  -hold Coumadin  -daily INR  -s/p kaycentra and iv vit K

## 2021-01-14 NOTE — PROGRESS NOTE ADULT - PROBLEM SELECTOR PLAN 3
Cr rise to 1.64 (baseline 1.35 from 2017) likely dehydration and gi bleed   -BUN also elevated in the setting of likely upper gi bleed  -hold further maint fluids for now given no longer dizzy and LE edema Cr rise to 1.64 (baseline 1.35 from 2017) likely dehydration and gi bleed, BUN also elevated in the setting of likely upper gi bleed  -Hold valsartan  -No maintenance fluids given LE edema and lack of dizziness

## 2021-01-15 ENCOUNTER — RESULT REVIEW (OUTPATIENT)
Age: 76
End: 2021-01-15

## 2021-01-15 ENCOUNTER — TRANSCRIPTION ENCOUNTER (OUTPATIENT)
Age: 76
End: 2021-01-15

## 2021-01-15 LAB
ALBUMIN SERPL ELPH-MCNC: 3.3 G/DL — SIGNIFICANT CHANGE UP (ref 3.3–5)
ALP SERPL-CCNC: 47 U/L — SIGNIFICANT CHANGE UP (ref 40–120)
ALT FLD-CCNC: 10 U/L — SIGNIFICANT CHANGE UP (ref 4–41)
ANION GAP SERPL CALC-SCNC: 8 MMOL/L — SIGNIFICANT CHANGE UP (ref 7–14)
APTT BLD: 27.9 SEC — SIGNIFICANT CHANGE UP (ref 27–36.3)
AST SERPL-CCNC: 13 U/L — SIGNIFICANT CHANGE UP (ref 4–40)
BILIRUB SERPL-MCNC: 1.1 MG/DL — SIGNIFICANT CHANGE UP (ref 0.2–1.2)
BUN SERPL-MCNC: 32 MG/DL — HIGH (ref 7–23)
CALCIUM SERPL-MCNC: 9.1 MG/DL — SIGNIFICANT CHANGE UP (ref 8.4–10.5)
CHLORIDE SERPL-SCNC: 105 MMOL/L — SIGNIFICANT CHANGE UP (ref 98–107)
CO2 SERPL-SCNC: 28 MMOL/L — SIGNIFICANT CHANGE UP (ref 22–31)
CREAT SERPL-MCNC: 1.23 MG/DL — SIGNIFICANT CHANGE UP (ref 0.5–1.3)
GLUCOSE BLDC GLUCOMTR-MCNC: 91 MG/DL — SIGNIFICANT CHANGE UP (ref 70–99)
GLUCOSE SERPL-MCNC: 85 MG/DL — SIGNIFICANT CHANGE UP (ref 70–99)
HCT VFR BLD CALC: 24.8 % — LOW (ref 39–50)
HGB BLD-MCNC: 7.7 G/DL — LOW (ref 13–17)
INR BLD: 1.22 RATIO — HIGH (ref 0.88–1.16)
MAGNESIUM SERPL-MCNC: 1.8 MG/DL — SIGNIFICANT CHANGE UP (ref 1.6–2.6)
MCHC RBC-ENTMCNC: 30.4 PG — SIGNIFICANT CHANGE UP (ref 27–34)
MCHC RBC-ENTMCNC: 31 GM/DL — LOW (ref 32–36)
MCV RBC AUTO: 98 FL — SIGNIFICANT CHANGE UP (ref 80–100)
NRBC # BLD: 0 /100 WBCS — SIGNIFICANT CHANGE UP
NRBC # FLD: 0.02 K/UL — HIGH
PHOSPHATE SERPL-MCNC: 4.6 MG/DL — HIGH (ref 2.5–4.5)
PLATELET # BLD AUTO: 130 K/UL — LOW (ref 150–400)
POTASSIUM SERPL-MCNC: 3.8 MMOL/L — SIGNIFICANT CHANGE UP (ref 3.5–5.3)
POTASSIUM SERPL-SCNC: 3.8 MMOL/L — SIGNIFICANT CHANGE UP (ref 3.5–5.3)
PROT SERPL-MCNC: 5.5 G/DL — LOW (ref 6–8.3)
PROTHROM AB SERPL-ACNC: 13.9 SEC — HIGH (ref 10.6–13.6)
RBC # BLD: 2.53 M/UL — LOW (ref 4.2–5.8)
RBC # FLD: 16.9 % — HIGH (ref 10.3–14.5)
SODIUM SERPL-SCNC: 141 MMOL/L — SIGNIFICANT CHANGE UP (ref 135–145)
WBC # BLD: 5.78 K/UL — SIGNIFICANT CHANGE UP (ref 3.8–10.5)
WBC # FLD AUTO: 5.78 K/UL — SIGNIFICANT CHANGE UP (ref 3.8–10.5)

## 2021-01-15 PROCEDURE — 88305 TISSUE EXAM BY PATHOLOGIST: CPT | Mod: 26

## 2021-01-15 PROCEDURE — 88312 SPECIAL STAINS GROUP 1: CPT | Mod: 26

## 2021-01-15 PROCEDURE — 99232 SBSQ HOSP IP/OBS MODERATE 35: CPT | Mod: GC

## 2021-01-15 RX ORDER — FUROSEMIDE 40 MG
1 TABLET ORAL
Qty: 0 | Refills: 0 | DISCHARGE

## 2021-01-15 RX ADMIN — ATORVASTATIN CALCIUM 40 MILLIGRAM(S): 80 TABLET, FILM COATED ORAL at 21:26

## 2021-01-15 RX ADMIN — Medication 2 GRAM(S): at 18:57

## 2021-01-15 RX ADMIN — Medication 2 GRAM(S): at 06:09

## 2021-01-15 RX ADMIN — PANTOPRAZOLE SODIUM 40 MILLIGRAM(S): 20 TABLET, DELAYED RELEASE ORAL at 18:57

## 2021-01-15 RX ADMIN — PANTOPRAZOLE SODIUM 40 MILLIGRAM(S): 20 TABLET, DELAYED RELEASE ORAL at 06:09

## 2021-01-15 RX ADMIN — TAMSULOSIN HYDROCHLORIDE 0.4 MILLIGRAM(S): 0.4 CAPSULE ORAL at 21:26

## 2021-01-15 RX ADMIN — Medication 100 MILLIGRAM(S): at 11:58

## 2021-01-15 RX ADMIN — Medication 1 DROP(S): at 11:58

## 2021-01-15 RX ADMIN — CARVEDILOL PHOSPHATE 3.12 MILLIGRAM(S): 80 CAPSULE, EXTENDED RELEASE ORAL at 18:57

## 2021-01-15 RX ADMIN — TIOTROPIUM BROMIDE 1 CAPSULE(S): 18 CAPSULE ORAL; RESPIRATORY (INHALATION) at 09:27

## 2021-01-15 NOTE — DISCHARGE NOTE PROVIDER - CARE PROVIDER_API CALL
Senthil Mccray (DO)  Gastroenterology; Internal Medicine  [address] [place] [address]  Phone: [phone]  Fax: [phone]  Established Patient  Follow Up Time: 1 week

## 2021-01-15 NOTE — PROGRESS NOTE ADULT - ATTENDING COMMENTS
Patient seen and examined. Agree with above note by resident.    This is a 76 y/o M with afib on coumadin who presented with melena and found to have supratherapeutic INR. S/p INR reversal in ED with Kcentra.    #Melena in setting of supratherapeutic INR - s/p INR reversal. No further melena today. Seen by GI. Plan for EGD on 1/15. C/w protonix 40IV BID. Clear diet today.     #Afib - rate controlled. C/w coreg with parameters. Hold coumadin in setting of GIB. Restart if cleared by GI. Patient to f/u with PCP for INR checks mid next week. Will discharge on 5mg daily.     #SHEYLA - baseline cr unknown. In 2017 1.1-1.3. Possibly from GIB? Cr improving today.    #HFpEF - TTE in 2017 with preserved EF. Currently appears euvolemic. Holding lasix and ARB due to low BP    Plan discussed with patient. He wishes to be discharge post EGD. Plan to DC home later today or tomorrow depending on timing of EGD.

## 2021-01-15 NOTE — DISCHARGE NOTE PROVIDER - CARE PROVIDERS DIRECT ADDRESSES
[email or web address] [facility], later [email or web address]

## 2021-01-15 NOTE — PROGRESS NOTE ADULT - PROBLEM SELECTOR PLAN 4
EKG showing afib, on coumadin and carvedilol at home  -CHADSVASC=4  -holding AC in the setting of active gi bleed  -c/w carvedilol 3.125 BID for rate control with changes in hold parameters  -TTE in 2017 showing EF 62%, nm LV, mild MR and AS, no need to repeat TTE at this point.  -Continue tele monitoring

## 2021-01-15 NOTE — PROGRESS NOTE ADULT - SUBJECTIVE AND OBJECTIVE BOX
Princess Clement, PGY-1  Internal Medicine  Pager: [phone]/[facility] [phone]    PROGRESS NOTE:     Patient is a 75y old  Male who presents with a chief complaint of melena (14 Jan 2021 10:11)      SUBJECTIVE / OVERNIGHT EVENTS:    ADDITIONAL REVIEW OF SYSTEMS:    MEDICATIONS  (STANDING):  allopurinol 100 milliGRAM(s) Oral daily  artificial tears (preservative free) Ophthalmic Solution 1 Drop(s) Both EYES daily  atorvastatin 40 milliGRAM(s) Oral at bedtime  buDESOnide    Inhalation Suspension 0.5 milliGRAM(s) Nebulizer two times a day  carvedilol 3.125 milliGRAM(s) Oral every 12 hours  omega-3-Acid Ethyl Esters 2 Gram(s) Oral two times a day  pantoprazole  Injectable 40 milliGRAM(s) IV Push every 12 hours  tamsulosin 0.4 milliGRAM(s) Oral at bedtime  tiotropium 18 MICROgram(s) Capsule 1 Capsule(s) Inhalation daily    MEDICATIONS  (PRN):      CAPILLARY BLOOD GLUCOSE        I&O's Summary    14 Jan 2021 07:01  -  15 Manjeet 2021 07:00  --------------------------------------------------------  IN: 716 mL / OUT: 400 mL / NET: 316 mL        PHYSICAL EXAM:  Vital Signs Last 24 Hrs  T(C): 36.6 (15 Manjeet 2021 06:04), Max: 36.7 (14 Jan 2021 09:53)  T(F): 97.9 (15 Manjeet 2021 06:04), Max: 98 (14 Jan 2021 09:53)  HR: 105 (15 Manjeet 2021 06:04) (105 - 110)  BP: 99/69 (15 Manjeet 2021 06:04) (99/69 - 107/71)  BP(mean): --  RR: 16 (15 Manjeet 2021 06:04) (16 - 17)  SpO2: 95% (15 Manjeet 2021 06:04) (95% - 95%)    CONSTITUTIONAL: NAD, well-developed  RESPIRATORY: Normal respiratory effort; lungs are clear to auscultation bilaterally  CARDIOVASCULAR: Regular rate and rhythm, normal S1 and S2, no murmur/rub/gallop; No lower extremity edema; Peripheral pulses are 2+ bilaterally  ABDOMEN: Nontender to palpation, normoactive bowel sounds, no rebound/guarding; No hepatosplenomegaly  MUSCLOSKELETAL: no clubbing or cyanosis of digits; no joint swelling or tenderness to palpation  PSYCH: A+O to person, place, and time; affect appropriate    LABS:                        8.2    7.55  )-----------( 155      ( 14 Jan 2021 07:01 )             27.0     01-14    141  |  104  |  51<H>  ----------------------------<  82  3.6   |  26  |  1.42<H>    Ca    9.4      14 Jan 2021 07:01  Phos  3.1     01-14  Mg     1.8     01-14    TPro  6.0  /  Alb  3.8  /  TBili  1.0  /  DBili  x   /  AST  15  /  ALT  13  /  AlkPhos  49  01-14    PT/INR - ( 14 Jan 2021 07:01 )   PT: 16.4 sec;   INR: 1.45 ratio         PTT - ( 14 Jan 2021 07:01 )  PTT:31.4 sec            RADIOLOGY & ADDITIONAL TESTS:  Results Reviewed:   Imaging Personally Reviewed:  Electrocardiogram Personally Reviewed:    COORDINATION OF CARE:  Care Discussed with Consultants/Other Providers [Y/N]:  Prior or Outpatient Records Reviewed [Y/N]:   Princess Clement, PGY-1  Internal Medicine  Pager: [phone]/[facility] [phone]    PROGRESS NOTE:     Patient is a 75y old  Male who presents with a chief complaint of melena (14 Jan 2021 10:11)      SUBJECTIVE / OVERNIGHT EVENTS: No acute events overnight. Tele showed rapid Afib to the 150s overnight. Endorses 1 bowel movement mid-day yesterday, less dark than prior to admission, solid, no bright red blood per rectum. Denies dizziness, nausea, vomiting, or abdominal pain. Expressed frustration with EGD schedule.       ADDITIONAL REVIEW OF SYSTEMS: Above, otherwise negative     MEDICATIONS  (STANDING):  allopurinol 100 milliGRAM(s) Oral daily  artificial tears (preservative free) Ophthalmic Solution 1 Drop(s) Both EYES daily  atorvastatin 40 milliGRAM(s) Oral at bedtime  buDESOnide    Inhalation Suspension 0.5 milliGRAM(s) Nebulizer two times a day  carvedilol 3.125 milliGRAM(s) Oral every 12 hours  omega-3-Acid Ethyl Esters 2 Gram(s) Oral two times a day  pantoprazole  Injectable 40 milliGRAM(s) IV Push every 12 hours  tamsulosin 0.4 milliGRAM(s) Oral at bedtime  tiotropium 18 MICROgram(s) Capsule 1 Capsule(s) Inhalation daily    MEDICATIONS  (PRN):      CAPILLARY BLOOD GLUCOSE        I&O's Summary    14 Jan 2021 07:01  -  15 Manjeet 2021 07:00  --------------------------------------------------------  IN: 716 mL / OUT: 400 mL / NET: 316 mL        PHYSICAL EXAM:  Vital Signs Last 24 Hrs  T(C): 36.6 (15 Manjeet 2021 06:04), Max: 36.7 (14 Jan 2021 09:53)  T(F): 97.9 (15 Manjeet 2021 06:04), Max: 98 (14 Jan 2021 09:53)  HR: 105 (15 Manjeet 2021 06:04) (105 - 110)  BP: 99/69 (15 Amnjeet 2021 06:04) (99/69 - 107/71)  BP(mean): --  RR: 16 (15 Manjeet 2021 06:04) (16 - 17)  SpO2: 95% (15 Manjeet 2021 06:04) (95% - 95%)    CONSTITUTIONAL: NAD, well-developed male  RESPIRATORY: Normal respiratory effort; lungs are clear to auscultation bilaterally  CARDIOVASCULAR: Irregular rate and rhythm, normal S1 and S2, no murmur/rub/gallop; trace pitting extremity edema b/l; Peripheral pulses are 2+ bilaterally  ABDOMEN: Soft, nondistended, nontender to palpation, normoactive bowel sounds, no rebound/guarding; No hepatosplenomegaly  MUSCLOSKELETAL: no clubbing or cyanosis of digits; no joint swelling or tenderness to palpation  PSYCH: A+O to person, place, and time; affect appropriate    LABS:                        8.2    7.55  )-----------( 155      ( 14 Jan 2021 07:01 )             27.0     01-14    141  |  104  |  51<H>  ----------------------------<  82  3.6   |  26  |  1.42<H>    Ca    9.4      14 Jan 2021 07:01  Phos  3.1     01-14  Mg     1.8     01-14    TPro  6.0  /  Alb  3.8  /  TBili  1.0  /  DBili  x   /  AST  15  /  ALT  13  /  AlkPhos  49  01-14    PT/INR - ( 14 Jan 2021 07:01 )   PT: 16.4 sec;   INR: 1.45 ratio         PTT - ( 14 Jan 2021 07:01 )  PTT:31.4 sec            RADIOLOGY & ADDITIONAL TESTS:  Results Reviewed:   Imaging Personally Reviewed:  Electrocardiogram Personally Reviewed:    COORDINATION OF CARE:  Care Discussed with Consultants/Other Providers [Y/N]:  Prior or Outpatient Records Reviewed [Y/N]:

## 2021-01-15 NOTE — DISCHARGE NOTE PROVIDER - HOSPITAL COURSE
Patient is a 74 yo male with PMH of HTN, GERD and Afib on Coumadin (5mg daily) who presented with "black stools" and abdominal pain for the past 2-3 days. In ED, vitals were temp 98.4,  (afib), /77, RR 18, SpO2 100%. CBC s/f  Labs significant for HgB 8.9, PT 73.1, INR 7.04, CMP s/f BUN 59, Cr 1.64. Fecal occult blood positive. Given IV pantoprazole 80 mg, IV phytonadione 10 mg, Kaycentra x1. EGD performed showed _________.   Patient is hemodynamically stable without episodes of melena throughout admissions and prepared for discharge.    Patient is a 74 yo male with PMH of HTN, GERD and Afib on Coumadin (5mg daily) who presented with "black stools" and abdominal pain for the past 2-3 days. In ED, vitals were temp 98.4,  (afib), /77, RR 18, SpO2 100%. CBC s/f  Labs significant for HgB 8.9, PT 73.1, INR 7.04, CMP s/f BUN 59, Cr 1.64. Fecal occult blood positive. Given IV pantoprazole 80 mg, IV phytonadione 10 mg, Kaycentra x1. EGD performed showed no signs of bleed. Per GI, patient will need capsule endoscopy and colonoscopy which can be followed up outpatient. GI wants to hold AC for now with outpatient followup. Patient had a self limited episodes of afib with RVR. Patient is hemodynamically stable without episodes of melena throughout admissions and prepared for discharge.

## 2021-01-15 NOTE — DISCHARGE NOTE PROVIDER - NSDCMRMEDTOKEN_GEN_ALL_CORE_FT
allopurinol 300 mg oral tablet: 1 tab(s) orally once a day  aspirin 81 mg oral tablet: 1 tab(s) orally once a day  atorvastatin 40 mg oral tablet: 1 tab(s) orally once a day  calcium (as carbonate) 500 mg oral tablet: 500 milligram(s) orally once a day  carvedilol 3.125 mg oral tablet: 1 tab(s) orally every 12 hours  Coumadin 5 mg oral tablet: 1 tab(s) orally once a day  Fish Oil 500 mg oral capsule: 2 cap(s) orally 2 times a day  furosemide 40 mg oral tablet: 1 tab(s) orally once a day  Protonix 40 mg oral delayed release tablet: 1 tab(s) orally once a day  Qvar 80 mcg/inh inhalation aerosol: 1 puff(s) inhaled 2 times a day  Spiriva 18 mcg inhalation capsule: 1 cap(s) inhaled once a day  tamsulosin 0.4 mg oral capsule: 1 cap(s) orally once a day  valsartan 160 mg oral tablet: 1 tab(s) orally once a day   allopurinol 300 mg oral tablet: 1 tab(s) orally once a day  aspirin 81 mg oral tablet: 1 tab(s) orally once a day  atorvastatin 40 mg oral tablet: 1 tab(s) orally once a day  calcium (as carbonate) 500 mg oral tablet: 500 milligram(s) orally once a day  carvedilol 3.125 mg oral tablet: 1 tab(s) orally every 12 hours  Coumadin 5 mg oral tablet: 1 tab(s) orally once a day  Fish Oil 500 mg oral capsule: 2 cap(s) orally 2 times a day  Protonix 40 mg oral delayed release tablet: 1 tab(s) orally once a day  Qvar 80 mcg/inh inhalation aerosol: 1 puff(s) inhaled 2 times a day  Spiriva 18 mcg inhalation capsule: 1 cap(s) inhaled once a day  tamsulosin 0.4 mg oral capsule: 1 cap(s) orally once a day  valsartan 160 mg oral tablet: 1 tab(s) orally once a day   allopurinol 300 mg oral tablet: 1 tab(s) orally once a day  aspirin 81 mg oral tablet: 1 tab(s) orally once a day  atorvastatin 40 mg oral tablet: 1 tab(s) orally once a day  calcium (as carbonate) 500 mg oral tablet: 500 milligram(s) orally once a day  carvedilol 3.125 mg oral tablet: 1 tab(s) orally every 12 hours  Fish Oil 500 mg oral capsule: 2 cap(s) orally 2 times a day  Protonix 40 mg oral delayed release tablet: 1 tab(s) orally once a day  Qvar 80 mcg/inh inhalation aerosol: 1 puff(s) inhaled 2 times a day  Spiriva 18 mcg inhalation capsule: 1 cap(s) inhaled once a day  tamsulosin 0.4 mg oral capsule: 1 cap(s) orally once a day  valsartan 160 mg oral tablet: 1 tab(s) orally once a day   allopurinol 300 mg oral tablet: 1 tab(s) orally once a day  aspirin 81 mg oral tablet: 1 tab(s) orally once a day  atorvastatin 40 mg oral tablet: 1 tab(s) orally once a day  calcium (as carbonate) 500 mg oral tablet: 500 milligram(s) orally once a day  carvedilol 6.25 mg oral tablet: 1 tab(s) orally 2 times a day   Fish Oil 500 mg oral capsule: 2 cap(s) orally 2 times a day  Protonix 40 mg oral delayed release tablet: 1 tab(s) orally once a day  Qvar 80 mcg/inh inhalation aerosol: 1 puff(s) inhaled 2 times a day  Spiriva 18 mcg inhalation capsule: 1 cap(s) inhaled once a day  tamsulosin 0.4 mg oral capsule: 1 cap(s) orally once a day  valsartan 160 mg oral tablet: 1 tab(s) orally once a day

## 2021-01-15 NOTE — PROGRESS NOTE ADULT - PROBLEM SELECTOR PLAN 5
-hold valsartan and lasix and c/w carvedilol  -B/l LE duplex negative for DVT  -TTE in 2017 showing EF 62%, nm LV, mild MR and AS, no need to repeat TTE at this point  -Pro-BNP 2337, CXR clear lungs.   -Orthostatics negative    #COPD  -150 pack year history, quit 25 years ago  -C/w Spiriva and Qvar  -SCDs -Restarting valsartan  -Holding Lasix  -c/w carvedilol  -B/l LE duplex negative for DVT  -TTE in 2017 showing EF 62%, nm LV, mild MR and AS, no need to repeat TTE at this point  -Pro-BNP 2337, CXR clear lungs.   -Orthostatics negative    #COPD  -150 pack year history, quit 25 years ago  -C/w Spiriva and Qvar  -SCDs

## 2021-01-15 NOTE — PROGRESS NOTE ADULT - PROBLEM SELECTOR PLAN 1
P/w melanotic stools x 3 days, w/ elevated BUN and BUN/Cr ratio of 36, most likely secondary to supratherapeutic INR vs GERD  -Hb stable at 8.2, with baseline ~8-10 in 2017. Obtain CBC if evidence of bleeding.  -C/w protonix 40 IV BID.  -EGD scheduled for tomorrow. Prior EGD in 2017 was normal and prior colonoscopy in 2017 showed diverticulosis.  -s/p reversal of coumadin with kaycentra and IV vitamin K  -given stable hb and hemodynamic stability, will avoid transfusion in CHF pt for now  -keep active type and screen and 2 large bore IVs P/w melanotic stools x 3 days, w/ elevated BUN and BUN/Cr ratio of 36, most likely secondary to supratherapeutic INR vs GERD  -EGD today. Prior EGD in 2017 was normal and prior colonoscopy in 2017 showed diverticulosis.  -Hb stable at 8.2, with baseline ~8-10 in 2017. Obtain CBC if evidence of bleeding.  -C/w protonix 40 IV BID.  -s/p reversal of coumadin with kaycentra and IV vitamin K  -given stable hb and hemodynamic stability, will avoid transfusion in CHF pt for now  -keep active type and screen and 2 large bore IVs P/w melanotic stools x 3 days, w/ elevated BUN and BUN/Cr ratio of 36, most likely secondary to supratherapeutic INR vs GERD  -EGD today. Prior EGD in 2017 was normal and prior colonoscopy in 2017 showed diverticulosis.  -Hb stable at 8.2, with baseline ~8-10 in 2017. Obtain CBC if evidence of bleeding.  -C/w protonix 40 IV BID.  -s/p reversal of coumadin with kaycentra and IV vitamin K  -given stable hb and hemodynamic stability, will avoid transfusion in CHF pt for now  -keep active type and screen and 2 large bore IVs  -F/u GI re: when to restart Coumadin

## 2021-01-15 NOTE — DISCHARGE NOTE PROVIDER - NSDCCPCAREPLAN_GEN_ALL_CORE_FT
PRINCIPAL DISCHARGE DIAGNOSIS  Diagnosis: GI bleed  Assessment and Plan of Treatment: You were admitted for melena, which are black colored stools due to bleeding in your gastrointestinal tract. An esophagogastroduodenoscopy (EGD) was performed to locate the source of the bleeding. The EGD showed _____________. You were found to have a supratherapeutic INR at approximately 7. We will prescribing your Warfarin at a lower dose to reduce your risk of bleeding. Please follow-up with your primary care provider regarding the dosing of your Warfarin. You were also found to have an acute kidney injury on admission. We are holding your Lasix and continuing valsartan to reduce further injury to your kidneys. Please follow-up with your primary care provider regarding your blood pressure medications.       PRINCIPAL DISCHARGE DIAGNOSIS  Diagnosis: GI bleed  Assessment and Plan of Treatment: You were admitted for melena, which are black colored stools due to bleeding in your gastrointestinal tract. An esophagogastroduodenoscopy (EGD) was performed to locate the source of the bleeding. The EGD showed no stigmata of bleeding. You were found to have a supratherapeutic INR at approximately 7. We are holding your coumadin due to the bleeding please followup with your GI and PCP within 1 week for this. Please also followup with you GI doctor for a capsule endoscopy and CT, unless you agree to a colonoscopy. You were also found to have an acute kidney injury on admission. We are holding your Lasix and continuing valsartan to reduce further injury to your kidneys. WE Please follow-up with your primary care provider regarding your blood pressure medications.      SECONDARY DISCHARGE DIAGNOSES  Diagnosis: AF (atrial fibrillation)  Assessment and Plan of Treatment: Please continue to take your coreg as prescribed and followup with your PCP and cardiologist for this problem. You had minor episodes where your heart rate was fast while you were in the hospital.     PRINCIPAL DISCHARGE DIAGNOSIS  Diagnosis: GI bleed  Assessment and Plan of Treatment: You were admitted for melena, which are black colored stools due to bleeding in your gastrointestinal tract. An esophagogastroduodenoscopy (EGD) was performed to locate the source of the bleeding. The EGD showed no stigmata of bleeding. You were found to have a supratherapeutic INR at approximately 7. We are holding your coumadin due to the bleeding please followup with your GI and PCP within 1 week for this. Please also followup with you GI doctor for a capsule endoscopy and CT, unless you agree to a colonoscopy. You were also found to have an acute kidney injury on admission. We are holding your Lasix and continuing valsartan to reduce further injury to your kidneys. WE Please follow-up with your primary care provider regarding your blood pressure medications.      SECONDARY DISCHARGE DIAGNOSES  Diagnosis: AF (atrial fibrillation)  Assessment and Plan of Treatment: Please continue to take your coreg as prescribed and followup with your PCP and cardiologist for this problem. You had minor episodes where your heart rate was fast while you were in the hospital. We are discharging you on 6.25mg of carvedilol twice daily. Please follow this dose up with your PCP or cardiologist.

## 2021-01-15 NOTE — PROGRESS NOTE ADULT - PROBLEM SELECTOR PLAN 3
Cr rise to 1.64 (baseline 1.35 from 2017) likely dehydration and gi bleed, BUN also elevated in the setting of likely upper gi bleed  -Hold valsartan  -No maintenance fluids given LE edema and lack of dizziness Cr rise to 1.64 (baseline 1.35 from 2017) likely dehydration and gi bleed, BUN also elevated in the setting of likely upper gi bleed  -Ok to start valsartan  -Holding Lasix  -No maintenance fluids given LE edema and lack of dizziness

## 2021-01-15 NOTE — PROGRESS NOTE ADULT - SUBJECTIVE AND OBJECTIVE BOX
APPLE [id number]  75yMale  T(C): 36.5 (01-15-21 @ 15:46), Max: 36.8 (01-15-21 @ 11:56)  HR: 137 (01-15-21 @ 16:46) (103 - 137)  BP: 142/96 (01-15-21 @ 16:46) (99/69 - 142/96)  RR: 16 (01-15-21 @ 16:46) (16 - 17)  SpO2: 99% (01-15-21 @ 16:46) (95% - 99%)  Wt(kg): --  01-14 @ 07:01  -  01-15 @ 07:00  --------------------------------------------------------  IN: 716 mL / OUT: 400 mL / NET: 316 mL      normal cephalic atraumatic  s1s2   clear to ascultation bilaterally  soft, non tender, non distended no guarding or rebound  no clubbing cyanosis or edema

## 2021-01-15 NOTE — PROGRESS NOTE ADULT - PROBLEM SELECTOR PLAN 2
INR elevated to 7.04 s/p kaycentra and iv vit K, now repeat INR 1.45. Due to unclear etiology given no change in diet, adequate PO intake, no change in Coumadin dose (5mg daily), and no new medications. Endorses prior history of elevated INR with unremarkable workup for etiology.  -hold Coumadin  -daily INR  -s/p kaycentra and iv vit K INR elevated to 7.04 s/p kaycentra and iv vit K, now repeat INR 1.45. Due to unclear etiology given no change in diet, adequate PO intake, no change in Coumadin dose (5mg daily), and no new medications. Endorses prior history of elevated INR with unremarkable workup for etiology.  -Hold Coumadin. When restart Coumadin, consider lower dose to get therapeutic INR  -daily INR  -s/p kaycentra and iv vit K

## 2021-01-15 NOTE — PROGRESS NOTE ADULT - PROBLEM SELECTOR PLAN 10
Diet: Clear, NPO MN  Dvt PPx: SCDs (INR now 7)  Dispo: pending gi workup    #COPD: c/w spiriva and qvar

## 2021-01-16 ENCOUNTER — TRANSCRIPTION ENCOUNTER (OUTPATIENT)
Age: 76
End: 2021-01-16

## 2021-01-16 VITALS
OXYGEN SATURATION: 93 % | HEART RATE: 122 BPM | RESPIRATION RATE: 18 BRPM | DIASTOLIC BLOOD PRESSURE: 53 MMHG | SYSTOLIC BLOOD PRESSURE: 103 MMHG | TEMPERATURE: 98 F

## 2021-01-16 LAB
HCT VFR BLD CALC: 25 % — LOW (ref 39–50)
HGB BLD-MCNC: 7.8 G/DL — LOW (ref 13–17)
MCHC RBC-ENTMCNC: 30.7 PG — SIGNIFICANT CHANGE UP (ref 27–34)
MCHC RBC-ENTMCNC: 31.2 GM/DL — LOW (ref 32–36)
MCV RBC AUTO: 98.4 FL — SIGNIFICANT CHANGE UP (ref 80–100)
NRBC # BLD: 0 /100 WBCS — SIGNIFICANT CHANGE UP
NRBC # FLD: 0 K/UL — SIGNIFICANT CHANGE UP
PLATELET # BLD AUTO: 108 K/UL — LOW (ref 150–400)
RBC # BLD: 2.54 M/UL — LOW (ref 4.2–5.8)
RBC # FLD: 17.2 % — HIGH (ref 10.3–14.5)
WBC # BLD: 5.82 K/UL — SIGNIFICANT CHANGE UP (ref 3.8–10.5)
WBC # FLD AUTO: 5.82 K/UL — SIGNIFICANT CHANGE UP (ref 3.8–10.5)

## 2021-01-16 PROCEDURE — 99239 HOSP IP/OBS DSCHRG MGMT >30: CPT | Mod: GC

## 2021-01-16 RX ORDER — CARVEDILOL PHOSPHATE 80 MG/1
1 CAPSULE, EXTENDED RELEASE ORAL
Qty: 60 | Refills: 0
Start: 2021-01-16 | End: 2021-02-14

## 2021-01-16 RX ORDER — CARVEDILOL PHOSPHATE 80 MG/1
6.25 CAPSULE, EXTENDED RELEASE ORAL EVERY 12 HOURS
Refills: 0 | Status: DISCONTINUED | OUTPATIENT
Start: 2021-01-16 | End: 2021-01-16

## 2021-01-16 RX ORDER — WARFARIN SODIUM 2.5 MG/1
1 TABLET ORAL
Qty: 0 | Refills: 0 | DISCHARGE

## 2021-01-16 RX ADMIN — TIOTROPIUM BROMIDE 1 CAPSULE(S): 18 CAPSULE ORAL; RESPIRATORY (INHALATION) at 11:25

## 2021-01-16 RX ADMIN — Medication 1 DROP(S): at 11:25

## 2021-01-16 RX ADMIN — Medication 2 GRAM(S): at 05:16

## 2021-01-16 RX ADMIN — CARVEDILOL PHOSPHATE 3.12 MILLIGRAM(S): 80 CAPSULE, EXTENDED RELEASE ORAL at 05:16

## 2021-01-16 RX ADMIN — Medication 100 MILLIGRAM(S): at 11:25

## 2021-01-16 RX ADMIN — PANTOPRAZOLE SODIUM 40 MILLIGRAM(S): 20 TABLET, DELAYED RELEASE ORAL at 05:16

## 2021-01-16 NOTE — PROGRESS NOTE ADULT - ATTENDING COMMENTS
74 yo man with PMH of HTN, GERD, Afib on Coumadin admitted 1/13 w/ GIB in setting of supratherapeutic INR. Plan for d/c today. Outpatient GI/cards f/u.     #Melena in setting of supratherapeutic INR - s/p INR reversal. pt reports last melanotic event was 1/13. EGD negative, offered capsule and colonoscopy which pt refused. Hgb 7.8 no pRBC required. HD stable. Plan for outpatient follow up with Dr. Mccray. No AC at this time on discharge.     #Afib - runs RVR this morning, increased coreg to 6.25 bid on discharge, was on 20 bid prior to admission. Will f/u with cardiology in outpatient setting. No AC at this time as risk bleed. 76 yo man with PMH of HTN, GERD, Afib on Coumadin admitted 1/13 w/ GIB in setting of supratherapeutic INR. Plan for d/c today. Outpatient GI/cards f/u.     #Melena in setting of supratherapeutic INR - s/p INR reversal. pt reports last melanotic event was 1/13. EGD negative, offered capsule and colonoscopy which pt refused. Hgb 7.8 no pRBC required. HD stable. Plan for outpatient follow up with Dr. Mccray. No AC at this time on discharge.     #Afib - runs RVR this morning, increased coreg to 6.25 bid on discharge, was on 20 bid prior to admission. Will f/u with cardiology in outpatient setting. No AC at this time as risk bleed.    Total time d/c planning 36 minutes.

## 2021-01-16 NOTE — PROGRESS NOTE ADULT - PROBLEM SELECTOR PLAN 5
-Restarting valsartan  -Holding Lasix  -c/w carvedilol  -B/l LE duplex negative for DVT  -TTE in 2017 showing EF 62%, nm LV, mild MR and AS, no need to repeat TTE at this point  -Pro-BNP 2337, CXR clear lungs.   -Orthostatics negative    #COPD  -150 pack year history, quit 25 years ago  -C/w Spiriva and Qvar  -SCDs

## 2021-01-16 NOTE — DISCHARGE NOTE NURSING/CASE MANAGEMENT/SOCIAL WORK - PATIENT PORTAL LINK FT
You can access the FollowMyHealth Patient Portal offered by [facility] by registering at the following website: [email or web address]. By joining [employer]’s FollowMyHealth portal, you will also be able to view your health information using other applications (apps) compatible with our system.

## 2021-01-16 NOTE — PROGRESS NOTE ADULT - ASSESSMENT
Mr. Pinto is a pleasant 74 yo male with PMH of HTN, GERD and Afib on Coumadin (5mg daily) who p/w melena and abdominal pain x3 days, found to have INR Of 7.0. Hgb down trending, will monitor. Pending pill endoscopy. 
full consult to follow, no further bleeding post coagulopathy, pt with po, for egd tomorrow recommend icu/ emergent egd tontight if drop in hgb or overt bleeding
pt with ? avm.  will need capsule, poss repeat colon.  need to weigh risk: benefit fo a/c would not start 10a
workup for gib four years ago negative.  endosocpy normal.  ok with d/c off a/c  pt will need colon but refused, wants less invasive based on risk.  will do ct and capsule can be op.  r/b/a discussed. 
 Mr. Pinto is a pleasant 74 yo male with PMH of HTN, GERD and Afib on Coumadin (5mg daily) who p/w melena and abdominal pain x3 days, found to have INR Of 7.0. Hgb 8.9--8.2, HD stable
 Mr. Pinto is a pleasant 74 yo male with PMH of HTN, GERD and Afib on Coumadin (5mg daily) who p/w melena and abdominal pain x3 days, found to have INR Of 7.0. Hgb 8.9--8.2, HD stable

## 2021-01-16 NOTE — PROGRESS NOTE ADULT - PROBLEM SELECTOR PLAN 1
P/w melanotic stools x 3 days, w/ elevated BUN and BUN/Cr ratio of 36, most likely secondary to supratherapeutic INR vs GERD  -S/p EGD wnl  -Hb stable at 8.2, with baseline ~8-10 in 2017. Obtain CBC if evidence of bleeding.  -C/w protonix 40 IV BID.  -s/p reversal of coumadin with kaycentra and IV vitamin K  -given stable hb and hemodynamic stability, will avoid transfusion in CHF pt for now  -keep active type and screen and 2 large bore IVs  -F/u GI re: when to restart Coumadin P/w melanotic stools x 3 days, w/ elevated BUN and BUN/Cr ratio of 36, most likely secondary to supratherapeutic INR vs GERD  -S/p EGD wnl  -Hb stable at 8.2, with baseline ~8-10 in 2017. Obtain CBC if evidence of bleeding.  -C/w protonix 40 IV BID.  -s/p reversal of coumadin with kaycentra and IV vitamin K  -given stable hb and hemodynamic stability, will avoid transfusion in CHF pt for now  -keep active type and screen and 2 large bore IVs  -F/u GI re: when to restart Coumadin  -Pill endoscopy per GI

## 2021-01-16 NOTE — PROGRESS NOTE ADULT - PROBLEM SELECTOR PLAN 8
holding valsartan in the setting of srinath  c/w carvedilol

## 2021-01-16 NOTE — PROGRESS NOTE ADULT - PROBLEM SELECTOR PLAN 3
Cr rise to 1.64 (baseline 1.35 from 2017) likely dehydration and gi bleed, BUN also elevated in the setting of likely upper gi bleed  -Ok to start valsartan  -Holding Lasix  -No maintenance fluids given LE edema and lack of dizziness

## 2021-01-16 NOTE — PROGRESS NOTE ADULT - SUBJECTIVE AND OBJECTIVE BOX
APPLE [id number]  75yMale  T(C): 36.4 (01-16-21 @ 05:14), Max: 36.8 (01-15-21 @ 11:56)  HR: 115 (01-16-21 @ 05:14) (103 - 137)  BP: 106/69 (01-16-21 @ 05:14) (84/63 - 142/96)  RR: 16 (01-16-21 @ 05:14) (16 - 23)  SpO2: 93% (01-16-21 @ 05:14) (93% - 99%)  Wt(kg): --  normal cephalic atraumatic  s1s2   clear to ascultation bilaterally  soft, non tender, non distended no guarding or rebound  no clubbing cyanosis or edema

## 2021-01-16 NOTE — PROGRESS NOTE ADULT - SUBJECTIVE AND OBJECTIVE BOX
Dr. Danilo Riggs   Internal Medicine PGY-2  Pager [phone] ([facility])/[phone] ([facility])    Patient is a 75y old  Male who presents with a chief complaint of melena (15 Manjeet 2021 17:23)      SUBJECTIVE / OVERNIGHT EVENTS:  Tachy to 115.    MEDICATIONS  (STANDING):  allopurinol 100 milliGRAM(s) Oral daily  artificial tears (preservative free) Ophthalmic Solution 1 Drop(s) Both EYES daily  atorvastatin 40 milliGRAM(s) Oral at bedtime  buDESOnide    Inhalation Suspension 0.5 milliGRAM(s) Nebulizer two times a day  carvedilol 3.125 milliGRAM(s) Oral every 12 hours  omega-3-Acid Ethyl Esters 2 Gram(s) Oral two times a day  pantoprazole  Injectable 40 milliGRAM(s) IV Push every 12 hours  tamsulosin 0.4 milliGRAM(s) Oral at bedtime  tiotropium 18 MICROgram(s) Capsule 1 Capsule(s) Inhalation daily    MEDICATIONS  (PRN):      Vital Signs Last 24 Hrs  T(C): 36.4 (16 Jan 2021 05:14), Max: 36.8 (15 Manjeet 2021 11:56)  T(F): 97.6 (16 Jan 2021 05:14), Max: 98.2 (15 Manjeet 2021 11:56)  HR: 115 (16 Jan 2021 05:14) (103 - 137)  BP: 106/69 (16 Jan 2021 05:14) (84/63 - 142/96)  BP(mean): 68 (15 Manjeet 2021 21:23) (68 - 68)  RR: 16 (16 Jan 2021 05:14) (16 - 23)  SpO2: 93% (16 Jan 2021 05:14) (93% - 99%)  CAPILLARY BLOOD GLUCOSE      POCT Blood Glucose.: 91 mg/dL (15 Manjeet 2021 15:46)    I&O's Summary      CONSTITUTIONAL: NAD, well-developed male  RESPIRATORY: Normal respiratory effort; lungs are clear to auscultation bilaterally  CARDIOVASCULAR: Irregular rate and rhythm, normal S1 and S2, no murmur/rub/gallop; trace pitting extremity edema b/l; Peripheral pulses are 2+ bilaterally  ABDOMEN: Soft, nondistended, nontender to palpation, normoactive bowel sounds, no rebound/guarding; No hepatosplenomegaly  MUSCLOSKELETAL: no clubbing or cyanosis of digits; no joint swelling or tenderness to palpation  PSYCH: A+O to person, place, and time; affect appropriate    LABS:                        7.7    5.78  )-----------( 130      ( 15 Manjeet 2021 07:24 )             24.8     01-15    141  |  105  |  32<H>  ----------------------------<  85  3.8   |  28  |  1.23    Ca    9.1      15 Manjeet 2021 07:24  Phos  4.6     01-15  Mg     1.8     01-15    TPro  5.5<L>  /  Alb  3.3  /  TBili  1.1  /  DBili  x   /  AST  13  /  ALT  10  /  AlkPhos  47  01-15    PT/INR - ( 15 Manjeet 2021 07:24 )   PT: 13.9 sec;   INR: 1.22 ratio         PTT - ( 15 Manjeet 2021 07:24 )  PTT:27.9 sec          RADIOLOGY & ADDITIONAL TESTS:    Imaging Personally Reviewed:    Consultant(s) Notes Reviewed:      Care Discussed with Consultants/Other Providers:   Dr. Danilo Riggs   Internal Medicine PGY-2  Pager [phone] ([facility])/[phone] ([facility])    Patient is a 75y old  Male who presents with a chief complaint of melena (15 Manjeet 2021 17:23)      SUBJECTIVE / OVERNIGHT EVENTS:  Tachy to 115. Denies melena, abdominal pain. No fevers, sweats, chills, CP, SOB, or abdominal pain. Passing gas.     MEDICATIONS  (STANDING):  allopurinol 100 milliGRAM(s) Oral daily  artificial tears (preservative free) Ophthalmic Solution 1 Drop(s) Both EYES daily  atorvastatin 40 milliGRAM(s) Oral at bedtime  buDESOnide    Inhalation Suspension 0.5 milliGRAM(s) Nebulizer two times a day  carvedilol 3.125 milliGRAM(s) Oral every 12 hours  omega-3-Acid Ethyl Esters 2 Gram(s) Oral two times a day  pantoprazole  Injectable 40 milliGRAM(s) IV Push every 12 hours  tamsulosin 0.4 milliGRAM(s) Oral at bedtime  tiotropium 18 MICROgram(s) Capsule 1 Capsule(s) Inhalation daily    MEDICATIONS  (PRN):      Vital Signs Last 24 Hrs  T(C): 36.4 (16 Jan 2021 05:14), Max: 36.8 (15 Manjeet 2021 11:56)  T(F): 97.6 (16 Jan 2021 05:14), Max: 98.2 (15 Manjeet 2021 11:56)  HR: 115 (16 Jan 2021 05:14) (103 - 137)  BP: 106/69 (16 Jan 2021 05:14) (84/63 - 142/96)  BP(mean): 68 (15 Manjeet 2021 21:23) (68 - 68)  RR: 16 (16 Jan 2021 05:14) (16 - 23)  SpO2: 93% (16 Jan 2021 05:14) (93% - 99%)  CAPILLARY BLOOD GLUCOSE      POCT Blood Glucose.: 91 mg/dL (15 Manjeet 2021 15:46)    I&O's Summary      CONSTITUTIONAL: NAD, well-developed male  RESPIRATORY: Normal respiratory effort; lungs are clear to auscultation bilaterally  CARDIOVASCULAR: Irregular rate and rhythm, normal S1 and S2, no murmur/rub/gallop; trace pitting extremity edema b/l; Peripheral pulses are 2+ bilaterally  ABDOMEN: Soft, nondistended, nontender to palpation, normoactive bowel sounds, no rebound/guarding; No hepatosplenomegaly  MUSCLOSKELETAL: no clubbing or cyanosis of digits; no joint swelling or tenderness to palpation  PSYCH: A+O to person, place, and time; affect appropriate    LABS:                        7.7    5.78  )-----------( 130      ( 15 Manjeet 2021 07:24 )             24.8     01-15    141  |  105  |  32<H>  ----------------------------<  85  3.8   |  28  |  1.23    Ca    9.1      15 Manjeet 2021 07:24  Phos  4.6     01-15  Mg     1.8     01-15    TPro  5.5<L>  /  Alb  3.3  /  TBili  1.1  /  DBili  x   /  AST  13  /  ALT  10  /  AlkPhos  47  01-15    PT/INR - ( 15 Manjeet 2021 07:24 )   PT: 13.9 sec;   INR: 1.22 ratio         PTT - ( 15 Manjeet 2021 07:24 )  PTT:27.9 sec          RADIOLOGY & ADDITIONAL TESTS:    Imaging Personally Reviewed:    Consultant(s) Notes Reviewed:      Care Discussed with Consultants/Other Providers:

## 2021-01-16 NOTE — PROGRESS NOTE ADULT - PROBLEM SELECTOR PLAN 2
INR elevated to 7.04 s/p kaycentra and iv vit K, now repeat INR 1.45. Due to unclear etiology given no change in diet, adequate PO intake, no change in Coumadin dose (5mg daily), and no new medications. Endorses prior history of elevated INR with unremarkable workup for etiology.  -Hold Coumadin. When restart Coumadin, consider lower dose to get therapeutic INR  -daily INR  -s/p kaycentra and iv vit K

## 2021-01-26 LAB — SURGICAL PATHOLOGY STUDY: SIGNIFICANT CHANGE UP

## 2021-03-18 NOTE — H&P ADULT - PMH
A/P: 36yo  at 32w6d presents for evaluation of vaginal bleeding episode this afternoon from 130-2pm which resolved. Patient without vaginal bleeding or cramping at this time therefore low concern for abruption vs PTL. Fetal status reassuring on BPP  and NST. Will reevaluate for possible dc to home pending labs a    -CBC, coags, fibrinogen  -T&S and KB sent   -UA     Dr. Hall notified will discuss pending labs   KBeetham PGY3 AF (atrial fibrillation)    BPH (benign prostatic hyperplasia)    CHF (congestive heart failure)    Diabetes insipidus    Essential hypertension, benign    GERD (gastroesophageal reflux disease)    Hypertension    MI (myocardial infarction)    Sleep apnea

## 2021-04-02 ENCOUNTER — INPATIENT (INPATIENT)
Facility: HOSPITAL | Age: 76
LOS: 3 days | Discharge: HOME | End: 2021-04-06
Attending: HOSPITALIST | Admitting: HOSPITALIST
Payer: MEDICARE

## 2021-04-02 VITALS
OXYGEN SATURATION: 90 % | SYSTOLIC BLOOD PRESSURE: 90 MMHG | HEIGHT: 64.96 IN | DIASTOLIC BLOOD PRESSURE: 70 MMHG | HEART RATE: 120 BPM | RESPIRATION RATE: 22 BRPM | TEMPERATURE: 101 F

## 2021-04-02 DIAGNOSIS — R63.8 OTHER SYMPTOMS AND SIGNS CONCERNING FOOD AND FLUID INTAKE: ICD-10-CM

## 2021-04-02 DIAGNOSIS — I48.91 UNSPECIFIED ATRIAL FIBRILLATION: ICD-10-CM

## 2021-04-02 DIAGNOSIS — I50.9 HEART FAILURE, UNSPECIFIED: ICD-10-CM

## 2021-04-02 DIAGNOSIS — I10 ESSENTIAL (PRIMARY) HYPERTENSION: ICD-10-CM

## 2021-04-02 DIAGNOSIS — J96.01 ACUTE RESPIRATORY FAILURE WITH HYPOXIA: ICD-10-CM

## 2021-04-02 DIAGNOSIS — A41.9 SEPSIS, UNSPECIFIED ORGANISM: ICD-10-CM

## 2021-04-02 DIAGNOSIS — U07.1 COVID-19: ICD-10-CM

## 2021-04-02 DIAGNOSIS — J44.9 CHRONIC OBSTRUCTIVE PULMONARY DISEASE, UNSPECIFIED: ICD-10-CM

## 2021-04-02 DIAGNOSIS — Z29.9 ENCOUNTER FOR PROPHYLACTIC MEASURES, UNSPECIFIED: ICD-10-CM

## 2021-04-02 DIAGNOSIS — N17.9 ACUTE KIDNEY FAILURE, UNSPECIFIED: ICD-10-CM

## 2021-04-02 DIAGNOSIS — Z20.822 CONTACT WITH AND (SUSPECTED) EXPOSURE TO COVID-19: ICD-10-CM

## 2021-04-02 LAB
ALBUMIN SERPL ELPH-MCNC: 3.8 G/DL — SIGNIFICANT CHANGE UP (ref 3.3–5)
ALP SERPL-CCNC: 65 U/L — SIGNIFICANT CHANGE UP (ref 40–120)
ALT FLD-CCNC: 18 U/L — SIGNIFICANT CHANGE UP (ref 4–41)
ANION GAP SERPL CALC-SCNC: 16 MMOL/L — HIGH (ref 7–14)
APTT BLD: 33.5 SEC — SIGNIFICANT CHANGE UP (ref 27–36.3)
AST SERPL-CCNC: 31 U/L — SIGNIFICANT CHANGE UP (ref 4–40)
BASOPHILS # BLD AUTO: 0 K/UL — SIGNIFICANT CHANGE UP (ref 0–0.2)
BASOPHILS NFR BLD AUTO: 0 % — SIGNIFICANT CHANGE UP (ref 0–2)
BILIRUB SERPL-MCNC: 0.6 MG/DL — SIGNIFICANT CHANGE UP (ref 0.2–1.2)
BLOOD GAS VENOUS COMPREHENSIVE RESULT: SIGNIFICANT CHANGE UP
BUN SERPL-MCNC: 55 MG/DL — HIGH (ref 7–23)
CALCIUM SERPL-MCNC: 9.1 MG/DL — SIGNIFICANT CHANGE UP (ref 8.4–10.5)
CHLORIDE SERPL-SCNC: 94 MMOL/L — LOW (ref 98–107)
CO2 SERPL-SCNC: 25 MMOL/L — SIGNIFICANT CHANGE UP (ref 22–31)
CREAT SERPL-MCNC: 2.14 MG/DL — HIGH (ref 0.5–1.3)
CRP SERPL-MCNC: 81.9 MG/L — HIGH
D DIMER BLD IA.RAPID-MCNC: <150 NG/ML DDU — SIGNIFICANT CHANGE UP
EOSINOPHIL # BLD AUTO: 0 K/UL — SIGNIFICANT CHANGE UP (ref 0–0.5)
EOSINOPHIL NFR BLD AUTO: 0 % — SIGNIFICANT CHANGE UP (ref 0–6)
FERRITIN SERPL-MCNC: 170 NG/ML — SIGNIFICANT CHANGE UP (ref 30–400)
GLUCOSE SERPL-MCNC: 96 MG/DL — SIGNIFICANT CHANGE UP (ref 70–99)
HCT VFR BLD CALC: 36.4 % — LOW (ref 39–50)
HGB BLD-MCNC: 11.5 G/DL — LOW (ref 13–17)
IANC: 2.58 K/UL — SIGNIFICANT CHANGE UP (ref 1.5–8.5)
IMM GRANULOCYTES NFR BLD AUTO: 0.5 % — SIGNIFICANT CHANGE UP (ref 0–1.5)
INR BLD: 1.41 RATIO — HIGH (ref 0.88–1.16)
LYMPHOCYTES # BLD AUTO: 0.83 K/UL — LOW (ref 1–3.3)
LYMPHOCYTES # BLD AUTO: 21.6 % — SIGNIFICANT CHANGE UP (ref 13–44)
MCHC RBC-ENTMCNC: 26.9 PG — LOW (ref 27–34)
MCHC RBC-ENTMCNC: 31.6 GM/DL — LOW (ref 32–36)
MCV RBC AUTO: 85 FL — SIGNIFICANT CHANGE UP (ref 80–100)
MONOCYTES # BLD AUTO: 0.41 K/UL — SIGNIFICANT CHANGE UP (ref 0–0.9)
MONOCYTES NFR BLD AUTO: 10.7 % — SIGNIFICANT CHANGE UP (ref 2–14)
NEUTROPHILS # BLD AUTO: 2.58 K/UL — SIGNIFICANT CHANGE UP (ref 1.8–7.4)
NEUTROPHILS NFR BLD AUTO: 67.2 % — SIGNIFICANT CHANGE UP (ref 43–77)
NRBC # BLD: 0 /100 WBCS — SIGNIFICANT CHANGE UP
NRBC # FLD: 0 K/UL — SIGNIFICANT CHANGE UP
NT-PROBNP SERPL-SCNC: 2634 PG/ML — HIGH
PLATELET # BLD AUTO: 115 K/UL — LOW (ref 150–400)
POTASSIUM SERPL-MCNC: 3.6 MMOL/L — SIGNIFICANT CHANGE UP (ref 3.5–5.3)
POTASSIUM SERPL-SCNC: 3.6 MMOL/L — SIGNIFICANT CHANGE UP (ref 3.5–5.3)
PROCALCITONIN SERPL-MCNC: 0.2 NG/ML — HIGH (ref 0.02–0.1)
PROT SERPL-MCNC: 6.7 G/DL — SIGNIFICANT CHANGE UP (ref 6–8.3)
PROTHROM AB SERPL-ACNC: 15.9 SEC — HIGH (ref 10.6–13.6)
RBC # BLD: 4.28 M/UL — SIGNIFICANT CHANGE UP (ref 4.2–5.8)
RBC # FLD: 16.7 % — HIGH (ref 10.3–14.5)
SARS-COV-2 RNA SPEC QL NAA+PROBE: DETECTED
SODIUM SERPL-SCNC: 135 MMOL/L — SIGNIFICANT CHANGE UP (ref 135–145)
WBC # BLD: 3.84 K/UL — SIGNIFICANT CHANGE UP (ref 3.8–10.5)
WBC # FLD AUTO: 3.84 K/UL — SIGNIFICANT CHANGE UP (ref 3.8–10.5)

## 2021-04-02 PROCEDURE — 99285 EMERGENCY DEPT VISIT HI MDM: CPT | Mod: 25

## 2021-04-02 PROCEDURE — 71045 X-RAY EXAM CHEST 1 VIEW: CPT | Mod: 26

## 2021-04-02 PROCEDURE — 99223 1ST HOSP IP/OBS HIGH 75: CPT

## 2021-04-02 PROCEDURE — 93010 ELECTROCARDIOGRAM REPORT: CPT

## 2021-04-02 RX ORDER — ACETAMINOPHEN 500 MG
650 TABLET ORAL ONCE
Refills: 0 | Status: COMPLETED | OUTPATIENT
Start: 2021-04-02 | End: 2021-04-02

## 2021-04-02 RX ORDER — SODIUM CHLORIDE 9 MG/ML
1000 INJECTION, SOLUTION INTRAVENOUS ONCE
Refills: 0 | Status: COMPLETED | OUTPATIENT
Start: 2021-04-02 | End: 2021-04-02

## 2021-04-02 RX ORDER — PANTOPRAZOLE SODIUM 20 MG/1
40 TABLET, DELAYED RELEASE ORAL
Refills: 0 | Status: DISCONTINUED | OUTPATIENT
Start: 2021-04-02 | End: 2021-04-06

## 2021-04-02 RX ORDER — ENOXAPARIN SODIUM 100 MG/ML
40 INJECTION SUBCUTANEOUS EVERY 24 HOURS
Refills: 0 | Status: DISCONTINUED | OUTPATIENT
Start: 2021-04-02 | End: 2021-04-02

## 2021-04-02 RX ORDER — REMDESIVIR 5 MG/ML
100 INJECTION INTRAVENOUS EVERY 24 HOURS
Refills: 0 | Status: DISCONTINUED | OUTPATIENT
Start: 2021-04-03 | End: 2021-04-06

## 2021-04-02 RX ORDER — CARVEDILOL PHOSPHATE 80 MG/1
6.25 CAPSULE, EXTENDED RELEASE ORAL EVERY 12 HOURS
Refills: 0 | Status: DISCONTINUED | OUTPATIENT
Start: 2021-04-02 | End: 2021-04-06

## 2021-04-02 RX ORDER — DEXAMETHASONE 0.5 MG/5ML
6 ELIXIR ORAL DAILY
Refills: 0 | Status: DISCONTINUED | OUTPATIENT
Start: 2021-04-03 | End: 2021-04-03

## 2021-04-02 RX ORDER — APIXABAN 2.5 MG/1
2.5 TABLET, FILM COATED ORAL EVERY 12 HOURS
Refills: 0 | Status: DISCONTINUED | OUTPATIENT
Start: 2021-04-02 | End: 2021-04-06

## 2021-04-02 RX ORDER — ALBUTEROL 90 UG/1
2 AEROSOL, METERED ORAL EVERY 4 HOURS
Refills: 0 | Status: DISCONTINUED | OUTPATIENT
Start: 2021-04-02 | End: 2021-04-06

## 2021-04-02 RX ORDER — OMEGA-3 ACID ETHYL ESTERS 1 G
2 CAPSULE ORAL
Refills: 0 | Status: DISCONTINUED | OUTPATIENT
Start: 2021-04-02 | End: 2021-04-04

## 2021-04-02 RX ORDER — VALSARTAN 80 MG/1
160 TABLET ORAL DAILY
Refills: 0 | Status: DISCONTINUED | OUTPATIENT
Start: 2021-04-02 | End: 2021-04-02

## 2021-04-02 RX ORDER — TIOTROPIUM BROMIDE 18 UG/1
1 CAPSULE ORAL; RESPIRATORY (INHALATION) DAILY
Refills: 0 | Status: DISCONTINUED | OUTPATIENT
Start: 2021-04-02 | End: 2021-04-06

## 2021-04-02 RX ORDER — CARVEDILOL PHOSPHATE 80 MG/1
6.25 CAPSULE, EXTENDED RELEASE ORAL ONCE
Refills: 0 | Status: COMPLETED | OUTPATIENT
Start: 2021-04-02 | End: 2021-04-02

## 2021-04-02 RX ORDER — ASPIRIN/CALCIUM CARB/MAGNESIUM 324 MG
81 TABLET ORAL DAILY
Refills: 0 | Status: DISCONTINUED | OUTPATIENT
Start: 2021-04-02 | End: 2021-04-06

## 2021-04-02 RX ORDER — ATORVASTATIN CALCIUM 80 MG/1
40 TABLET, FILM COATED ORAL AT BEDTIME
Refills: 0 | Status: DISCONTINUED | OUTPATIENT
Start: 2021-04-02 | End: 2021-04-06

## 2021-04-02 RX ORDER — REMDESIVIR 5 MG/ML
200 INJECTION INTRAVENOUS EVERY 24 HOURS
Refills: 0 | Status: COMPLETED | OUTPATIENT
Start: 2021-04-02 | End: 2021-04-02

## 2021-04-02 RX ORDER — BECLOMETHASONE DIPROPIONATE 40 UG/1
2 AEROSOL, METERED RESPIRATORY (INHALATION)
Refills: 0 | Status: DISCONTINUED | OUTPATIENT
Start: 2021-04-02 | End: 2021-04-03

## 2021-04-02 RX ORDER — DEXAMETHASONE 0.5 MG/5ML
6 ELIXIR ORAL ONCE
Refills: 0 | Status: COMPLETED | OUTPATIENT
Start: 2021-04-02 | End: 2021-04-02

## 2021-04-02 RX ORDER — REMDESIVIR 5 MG/ML
INJECTION INTRAVENOUS
Refills: 0 | Status: DISCONTINUED | OUTPATIENT
Start: 2021-04-02 | End: 2021-04-06

## 2021-04-02 RX ORDER — ALLOPURINOL 300 MG
300 TABLET ORAL DAILY
Refills: 0 | Status: DISCONTINUED | OUTPATIENT
Start: 2021-04-02 | End: 2021-04-06

## 2021-04-02 RX ADMIN — Medication 650 MILLIGRAM(S): at 10:27

## 2021-04-02 RX ADMIN — PANTOPRAZOLE SODIUM 40 MILLIGRAM(S): 20 TABLET, DELAYED RELEASE ORAL at 18:26

## 2021-04-02 RX ADMIN — REMDESIVIR 500 MILLIGRAM(S): 5 INJECTION INTRAVENOUS at 21:37

## 2021-04-02 RX ADMIN — SODIUM CHLORIDE 1000 MILLILITER(S): 9 INJECTION, SOLUTION INTRAVENOUS at 11:56

## 2021-04-02 RX ADMIN — Medication 6 MILLIGRAM(S): at 10:13

## 2021-04-02 RX ADMIN — Medication 81 MILLIGRAM(S): at 18:25

## 2021-04-02 RX ADMIN — ATORVASTATIN CALCIUM 40 MILLIGRAM(S): 80 TABLET, FILM COATED ORAL at 21:38

## 2021-04-02 RX ADMIN — ALBUTEROL 2 PUFF(S): 90 AEROSOL, METERED ORAL at 18:26

## 2021-04-02 RX ADMIN — CARVEDILOL PHOSPHATE 6.25 MILLIGRAM(S): 80 CAPSULE, EXTENDED RELEASE ORAL at 10:13

## 2021-04-02 RX ADMIN — APIXABAN 2.5 MILLIGRAM(S): 2.5 TABLET, FILM COATED ORAL at 18:26

## 2021-04-02 RX ADMIN — CARVEDILOL PHOSPHATE 6.25 MILLIGRAM(S): 80 CAPSULE, EXTENDED RELEASE ORAL at 18:26

## 2021-04-02 RX ADMIN — SODIUM CHLORIDE 1000 MILLILITER(S): 9 INJECTION, SOLUTION INTRAVENOUS at 10:13

## 2021-04-02 RX ADMIN — ALBUTEROL 2 PUFF(S): 90 AEROSOL, METERED ORAL at 21:38

## 2021-04-02 RX ADMIN — Medication 650 MILLIGRAM(S): at 10:13

## 2021-04-02 NOTE — H&P ADULT - PROBLEM SELECTOR PLAN 2
COVID-19 positive. Wife also has COVID.   - Remdesivir (CrCl 35 4/2) - trend Cr and d/c if creatinine clearance worsens. Presents with SOB and cough. Hypoxic to 90% on RA. Has COPD and says baseline normally around 94%. CXR with interstitial infiltrates consistent with COVID. Hypoxia likely 2/2 COVID PNA. Pending swab.   - F/u pro-BNP, hx of CHF however more likely hypoxia is due to COVID PNA   - Remdesivir and decadron if positive  - Albuterol q4 hours   - ICS  - Goal oxygen level >92%. Has COPD.

## 2021-04-02 NOTE — ED PROVIDER NOTE - ATTENDING CONTRIBUTION TO CARE
75M h/o COPD, baseline O2 94% not on home oxygen.  feeling SOB and back spasm some cough x 2 weeks.  Wife dx with covid.  Fever 2 days ago.  No CP, no abd pain, no N/V, occ loose stool.  Pt was admitted here in Jan for GI bleeding, eliquis was stopped and pt had EGD which was normal - pt later followed up as outpt and had colonoscopy and capsule endoscopy which were normal.  On 4L with sat 99%, RA sat was 90%.  Noted to be hypotensive in triage, improved to 130's systolic in room.  Denies back pain at present.  Likely COVID, will check labs, CXR, EKG, rx steroids, admit given hypoxia.  EKG afib at 130 no luigi.  qtc 441.  no std.  Afib with RVR here however pt febrile.  Will treat fever and rx pt's home coreg to treat RVR.  Admit.  VS:  fever tachycardia.  BP soft    GEN - NAD; malaise;   A+O x3   HEAD - NC/AT     ENT - PEERL, EOMI, mucous membranes  dry, no discharge      NECK: Neck supple, non-tender without lymphadenopathy, no masses, no JVD  PULM - CTA b/l,  symmetric breath sounds  COR -  fast heart sounds    ABD - , ND, NT, soft,  BACK - no CVA tenderness, nontender spine     EXTREMS - no edema, no deformity, warm and well perfused    SKIN - no rash    or bruising      NEUROLOGIC - alert, face symmetric, speech fluent, sensation nl, motor no focal deficit.

## 2021-04-02 NOTE — ED ADULT NURSE NOTE - OBJECTIVE STATEMENT
Pt rec'd in 24, A&Ox4, c/o cough x 2 weeks, had fever 2 days ago, also c/o back spasms over the past 2 weeks. Pt with hx of COPD (not on home O2), Afib on Eliquis. Baseline room air O2 is 94%. Denies chest pain, denies palpitations. Pt's wife currently sick with covid at home. Pt rec'd in 24, A&Ox4, c/o cough x 2 weeks, had fever 2 days ago, also c/o back spasms over the past 2 weeks. Pt with hx of COPD (not on home O2), Afib on Eliquis. Baseline room air O2 is 94%. Denies chest pain, denies palpitations. Pt's wife currently sick with covid at home. Afib noted on cardiac monitor, HR between 110s to 140s.

## 2021-04-02 NOTE — ED ADULT NURSE REASSESSMENT NOTE - NS ED NURSE REASSESS COMMENT FT1
Pt awake, alert and oriented x 4 denies chest pain or shortness of breath.   Respiratory status improved from arrival - pt remains on 2L nasal cannula maintaining spo2 95% and greater.   VSS.   IV site unremarkable.  Fever improved after tylenol.    Pt denies n/v/d.   Voiding to urinal.

## 2021-04-02 NOTE — H&P ADULT - PROBLEM SELECTOR PLAN 6
HTN. Normotensive   - C/w home meds Hx of CHF. TTE in 2017 showing EF 62%, nm LV, mild MR and AS.   - C/w carvedilol 6.125mg BID   - F/u proBNP
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
## 2021-04-02 NOTE — ED PROVIDER NOTE - CLINICAL SUMMARY MEDICAL DECISION MAKING FREE TEXT BOX
75yM w/pmhx HTN, HLD, GERD, afib on eliquis, COPD (not on home O2) presenting with cough x 2 weeks, fever, wife is covid positive. In triage O2 sat 90% on RA, pt states his baseline is 94%, tachycardic, febrile, BP improved after placed in room. Pt is well appearing, lungs clear to auscultation. Concern for covid vs copd exacerbation. Plan; cbc/cmp/vbg/d-dimer, CXR, EKG, will give fluids/tylenol/steroids, pt home medications. Will reassess, pt will require admission 75yM w/pmhx HTN, HLD, GERD, afib on eliquis, COPD (not on home O2) presenting with cough x 2 weeks, fever, wife is covid positive. In triage O2 sat 90% on RA, pt states his baseline is 94%, tachycardic, febrile, BP improved after placed in room. O2 sat 98% on 2L NC. Pt is well appearing, lungs clear to auscultation. Concern for covid vs copd exacerbation. Plan; cbc/cmp/vbg/d-dimer, CXR, EKG, will give fluids/tylenol/steroids, pt home medications. Will reassess, pt will require admission

## 2021-04-02 NOTE — H&P ADULT - NSHPREVIEWOFSYSTEMS_GEN_ALL_CORE
REVIEW OF SYSTEMS:    CONSTITUTIONAL: No fevers or chills. (+) weakness   EYES/ENT: No visual changes;  No vertigo or throat pain   NECK: No pain or stiffness  RESPIRATORY: (+) cough - no wheezing, hemoptysis; No shortness of breath  CARDIOVASCULAR: No chest pain or palpitations  GASTROINTESTINAL: No abdominal or epigastric pain. No nausea, vomiting, or hematemesis; No diarrhea or constipation. No melena or hematochezia.  GENITOURINARY: No dysuria, frequency or hematuria  NEUROLOGICAL: No numbness or weakness  SKIN: No itching, rashes

## 2021-04-02 NOTE — ED PROVIDER NOTE - PROGRESS NOTE DETAILS
RANDELL Huerta: Labs reviewed, pt with SHEYLA, CXR with covid like findings, O2 sat 94% on 3L NC. Spoke with hospitalist who accepts pt, text paged MAR

## 2021-04-02 NOTE — ED PROVIDER NOTE - CARE PLAN
Principal Discharge DX:	Suspected 2019-nCoV infection  Secondary Diagnosis:	Hypoxia  Secondary Diagnosis:	Cough

## 2021-04-02 NOTE — H&P ADULT - NSHPLABSRESULTS_GEN_ALL_CORE
.  LABS:                         11.5   3.84  )-----------( 115      ( 02 Apr 2021 10:39 )             36.4     04-02    135  |  94<L>  |  55<H>  ----------------------------<  96  3.6   |  25  |  2.14<H>    Ca    9.1      02 Apr 2021 10:39    TPro  6.7  /  Alb  3.8  /  TBili  0.6  /  DBili  x   /  AST  31  /  ALT  18  /  AlkPhos  65  04-02    PT/INR - ( 02 Apr 2021 10:39 )   PT: 15.9 sec;   INR: 1.41 ratio         PTT - ( 02 Apr 2021 10:39 )  PTT:33.5 sec              RADIOLOGY, EKG & ADDITIONAL TESTS: Reviewed.

## 2021-04-02 NOTE — H&P ADULT - ASSESSMENT
76 y/o M with PMH HTN, GERD, afib on Eliquis who presents for SOB. He reports that he has been feeling generally unwell for ~2 weeks found to have hypoxic respiratory failure 2/2 COVID PNA.

## 2021-04-02 NOTE — H&P ADULT - NSHPPHYSICALEXAM_GEN_ALL_CORE
.  VITAL SIGNS:  T(C): 37.3 (04-02-21 @ 12:44), Max: 38.2 (04-02-21 @ 08:42)  T(F): 99.2 (04-02-21 @ 12:44), Max: 100.8 (04-02-21 @ 08:42)  HR: 114 (04-02-21 @ 12:44) (108 - 128)  BP: 124/83 (04-02-21 @ 12:44) (90/70 - 135/79)  BP(mean): --  RR: 20 (04-02-21 @ 12:44) (18 - 22)  SpO2: 98% (04-02-21 @ 12:44) (90% - 98%)  Wt(kg): --    PHYSICAL EXAM:    Constitutional: WDWN male resting comfortably in bed; NAD  Head: NC/AT  Eyes: PERRL, EOMI, anicteric sclera  ENT: no nasal discharge; uvula midline, no oropharyngeal erythema or exudates; MMM  Neck: supple; no JVD or thyromegaly  Respiratory: Decreased breath sounds bilaterally, no crackles, no respiratory distress, no tachypnea, comfortable appearing   Cardiac: +S1/S2; RRR; no M/R/G; PMI non-displaced  Gastrointestinal: abdomen soft, NT/ND; no rebound or guarding; +BSx4  Back: spine midline, no bony tenderness or step-offs; no CVAT B/L  Extremities: WWP, no clubbing or cyanosis; no peripheral edema  Musculoskeletal: NROM x4; no joint swelling, tenderness or erythema  Vascular: 2+ radial, femoral, DP/PT pulses B/L  Dermatologic: skin warm, dry and intact; no rashes, wounds, or scars  Lymphatic: no submandibular or cervical LAD  Neurologic: AAOx3; CNII-XII grossly intact; no focal deficits  Psychiatric: affect and characteristics of appearance, verbalizations, behaviors are appropriate

## 2021-04-02 NOTE — H&P ADULT - PROBLEM SELECTOR PLAN 4
Hx of afib. Recently changed from coumadin to Eliquis. EKG on admission with afib with RVR. Tachycardic likely in the setting of infection/sepsis.   - Tele monitoring   - Resume home medications, carvedilol 6.125mg BID   - Eliquis 2.5mg BID BUN 55, Cr. 2.14. Baseline Cr. 1.23 in Jan 2021. Likely pre-renal in the setting of COVID and poor PO intake.   - S/p 1L LR   - Trend Cr.   - If patient is started on remdesivir - CrCl 35 - d/c if kidney function worsens. BUN 55, Cr. 2.14. Baseline Cr. 1.23 in Jan 2021. Likely pre-renal in the setting of COVID and poor PO intake.   - S/p 1L LR   - Trend Cr.   - If patient is started on remdesivir - CrCl 35 - d/c if kidney function worsens.  - Hold valsartan (home med) for now

## 2021-04-02 NOTE — H&P ADULT - PROBLEM SELECTOR PLAN 5
Hx of CHF. TTE in 2017 showing EF 62%, nm LV, mild MR and AS.   - C/w carvedilol 6.125mg BID   - F/u proBNP Hx of afib. Recently changed from coumadin to Eliquis. EKG on admission with afib with RVR. Tachycardic likely in the setting of infection/sepsis.   - Tele monitoring   - Resume home medications, carvedilol 6.125mg BID   - Eliquis 2.5mg BID

## 2021-04-02 NOTE — H&P ADULT - PROBLEM SELECTOR PLAN 3
BUN 55, Cr. 2.14. Baseline Cr. 1.23 in Jan 2021. Likely pre-renal in the setting of COVID and poor PO intake.   - S/p 1L LR   - Trend Cr.   - On remdesivir - CrCl 35 - d/c if kidney function worsens. COVID-19 high suspicion. Wife also has COVID.   - Remdesivir (CrCl 35 4/2) - trend Cr and d/c if creatinine clearance worsens.

## 2021-04-02 NOTE — H&P ADULT - HISTORY OF PRESENT ILLNESS
76 y/o M with PMH HTN, GERD, afib on Eliquis who presents for SOB. He reports that he has been feeling generally unwell for ~2 weeks. He is describing a cough that has been present during that time. He does not know if he's been having fevers. He also had an episode of diarrhea last week which has resolved. He denies SOB. He is reporting lower back pain but no weakness/numbness/tingling in his extremities. Of note, his wife is also COVID positive and admitted to the hospital. He currently feels like his symptoms have improved now that he is on oxygen. He denies HA, vision changes, sore throat, chest pain, abdominal pain, nausea/vomiting diarrhea/constipation, LE swelling, numbness or tingling.

## 2021-04-02 NOTE — H&P ADULT - PROBLEM SELECTOR PLAN 1
Presents with SOB and cough. Hypoxic to 90% on RA. Has COPD and says baseline normally around 94%. CXR with interstitial infiltrates consistent with COVID. Hypoxia likely 2/2 COVID PNA.   - F/u pro-BNP, hx of CHF however more likely hypoxia is due to COVID PNA   - Remdesivir and decadron   - Albuterol q4 hours   - ICS  - Goal oxygen level >92%. Has COPD. Sepsis 2/2 presumed COVID-19 infection. Tachycardic and febrile. S/p 1L fluid resuscitation.  - F/u BCx   - Plan as stated below

## 2021-04-02 NOTE — ED PROVIDER NOTE - OBJECTIVE STATEMENT
75yM w/pmhx HTN, HLD, GERD, afib on eliquis, COPD (not on home O2) presenting with cough x 2 weeks. Pt states his wife is covid positive. Reports cough x 2 weeks at times with yellow phelgm. He states he gets intermittent back pain/spasm and needs to brace against something so he doesn't collapse. Pt feels he may be getting more easily fatigued. Associated he has fever, tmax 102 2 days ago. Denies chest pain, shortness of breath, headache, dizziness, near syncope, abd pain, n/v/d, numbness/tingling, weakness, bowel or bladder incontinence, saddle anesthesia, leg swelling, recent travel or any other concerns. 75yM w/pmhx HTN, HLD, GERD, afib on eliquis, COPD (not on home O2) presenting with cough x 2 weeks. Pt states his wife is covid positive. Reports cough x 2 weeks at times with yellow phlegm. He states he gets intermittent back pain/spasm and needs to brace against something so he doesn't collapse. Pt feels he may be getting more easily fatigued. Associated he has fever, tmax 102 2 days ago. Denies chest pain, shortness of breath, headache, dizziness, near syncope, abd pain, n/v/d, numbness/tingling, weakness, bowel or bladder incontinence, saddle anesthesia, leg swelling, recent travel or any other concerns.

## 2021-04-02 NOTE — ED ADULT TRIAGE NOTE - CHIEF COMPLAINT QUOTE
wife covid + pt has had exposure hx of Afib takes Eliquis wife covid + pt has had exposure hx of Afib takes Eliquis placed on 4L N/C

## 2021-04-03 LAB
ALBUMIN SERPL ELPH-MCNC: 3.6 G/DL — SIGNIFICANT CHANGE UP (ref 3.3–5)
ALP SERPL-CCNC: 62 U/L — SIGNIFICANT CHANGE UP (ref 40–120)
ALT FLD-CCNC: 25 U/L — SIGNIFICANT CHANGE UP (ref 4–41)
ANION GAP SERPL CALC-SCNC: 15 MMOL/L — HIGH (ref 7–14)
AST SERPL-CCNC: 37 U/L — SIGNIFICANT CHANGE UP (ref 4–40)
BASOPHILS # BLD AUTO: 0 K/UL — SIGNIFICANT CHANGE UP (ref 0–0.2)
BASOPHILS NFR BLD AUTO: 0 % — SIGNIFICANT CHANGE UP (ref 0–2)
BILIRUB SERPL-MCNC: 0.4 MG/DL — SIGNIFICANT CHANGE UP (ref 0.2–1.2)
BUN SERPL-MCNC: 57 MG/DL — HIGH (ref 7–23)
CALCIUM SERPL-MCNC: 8.7 MG/DL — SIGNIFICANT CHANGE UP (ref 8.4–10.5)
CHLORIDE SERPL-SCNC: 97 MMOL/L — LOW (ref 98–107)
CO2 SERPL-SCNC: 24 MMOL/L — SIGNIFICANT CHANGE UP (ref 22–31)
COVID-19 SPIKE DOMAIN AB INTERP: NEGATIVE — SIGNIFICANT CHANGE UP
COVID-19 SPIKE DOMAIN ANTIBODY RESULT: 0.4 U/ML — SIGNIFICANT CHANGE UP
CREAT SERPL-MCNC: 1.6 MG/DL — HIGH (ref 0.5–1.3)
EOSINOPHIL # BLD AUTO: 0 K/UL — SIGNIFICANT CHANGE UP (ref 0–0.5)
EOSINOPHIL NFR BLD AUTO: 0 % — SIGNIFICANT CHANGE UP (ref 0–6)
GLUCOSE SERPL-MCNC: 183 MG/DL — HIGH (ref 70–99)
HCT VFR BLD CALC: 36.8 % — LOW (ref 39–50)
HGB BLD-MCNC: 11.6 G/DL — LOW (ref 13–17)
IANC: 1.61 K/UL — SIGNIFICANT CHANGE UP (ref 1.5–8.5)
IMM GRANULOCYTES NFR BLD AUTO: 0.4 % — SIGNIFICANT CHANGE UP (ref 0–1.5)
LYMPHOCYTES # BLD AUTO: 0.7 K/UL — LOW (ref 1–3.3)
LYMPHOCYTES # BLD AUTO: 27.6 % — SIGNIFICANT CHANGE UP (ref 13–44)
MAGNESIUM SERPL-MCNC: 1.8 MG/DL — SIGNIFICANT CHANGE UP (ref 1.6–2.6)
MCHC RBC-ENTMCNC: 27 PG — SIGNIFICANT CHANGE UP (ref 27–34)
MCHC RBC-ENTMCNC: 31.5 GM/DL — LOW (ref 32–36)
MCV RBC AUTO: 85.6 FL — SIGNIFICANT CHANGE UP (ref 80–100)
MONOCYTES # BLD AUTO: 0.22 K/UL — SIGNIFICANT CHANGE UP (ref 0–0.9)
MONOCYTES NFR BLD AUTO: 8.7 % — SIGNIFICANT CHANGE UP (ref 2–14)
NEUTROPHILS # BLD AUTO: 1.61 K/UL — LOW (ref 1.8–7.4)
NEUTROPHILS NFR BLD AUTO: 63.3 % — SIGNIFICANT CHANGE UP (ref 43–77)
NRBC # BLD: 0 /100 WBCS — SIGNIFICANT CHANGE UP
NRBC # FLD: 0 K/UL — SIGNIFICANT CHANGE UP
PHOSPHATE SERPL-MCNC: 3.3 MG/DL — SIGNIFICANT CHANGE UP (ref 2.5–4.5)
PLATELET # BLD AUTO: 135 K/UL — LOW (ref 150–400)
POTASSIUM SERPL-MCNC: 4.2 MMOL/L — SIGNIFICANT CHANGE UP (ref 3.5–5.3)
POTASSIUM SERPL-SCNC: 4.2 MMOL/L — SIGNIFICANT CHANGE UP (ref 3.5–5.3)
PROT SERPL-MCNC: 6.6 G/DL — SIGNIFICANT CHANGE UP (ref 6–8.3)
RBC # BLD: 4.3 M/UL — SIGNIFICANT CHANGE UP (ref 4.2–5.8)
RBC # FLD: 16.5 % — HIGH (ref 10.3–14.5)
SARS-COV-2 IGG+IGM SERPL QL IA: 0.4 U/ML — SIGNIFICANT CHANGE UP
SARS-COV-2 IGG+IGM SERPL QL IA: NEGATIVE — SIGNIFICANT CHANGE UP
SODIUM SERPL-SCNC: 136 MMOL/L — SIGNIFICANT CHANGE UP (ref 135–145)
WBC # BLD: 2.54 K/UL — LOW (ref 3.8–10.5)
WBC # FLD AUTO: 2.54 K/UL — LOW (ref 3.8–10.5)

## 2021-04-03 PROCEDURE — 99233 SBSQ HOSP IP/OBS HIGH 50: CPT

## 2021-04-03 RX ORDER — BECLOMETHASONE DIPROPIONATE 40 UG/1
1 AEROSOL, METERED RESPIRATORY (INHALATION)
Refills: 0 | Status: DISCONTINUED | OUTPATIENT
Start: 2021-04-03 | End: 2021-04-06

## 2021-04-03 RX ORDER — SODIUM CHLORIDE 9 MG/ML
1000 INJECTION INTRAMUSCULAR; INTRAVENOUS; SUBCUTANEOUS
Refills: 0 | Status: DISCONTINUED | OUTPATIENT
Start: 2021-04-03 | End: 2021-04-06

## 2021-04-03 RX ORDER — LANOLIN ALCOHOL/MO/W.PET/CERES
9 CREAM (GRAM) TOPICAL AT BEDTIME
Refills: 0 | Status: DISCONTINUED | OUTPATIENT
Start: 2021-04-03 | End: 2021-04-06

## 2021-04-03 RX ORDER — ACETAMINOPHEN 500 MG
650 TABLET ORAL EVERY 6 HOURS
Refills: 0 | Status: DISCONTINUED | OUTPATIENT
Start: 2021-04-03 | End: 2021-04-06

## 2021-04-03 RX ADMIN — APIXABAN 2.5 MILLIGRAM(S): 2.5 TABLET, FILM COATED ORAL at 17:48

## 2021-04-03 RX ADMIN — Medication 650 MILLIGRAM(S): at 11:01

## 2021-04-03 RX ADMIN — APIXABAN 2.5 MILLIGRAM(S): 2.5 TABLET, FILM COATED ORAL at 06:05

## 2021-04-03 RX ADMIN — Medication 300 MILLIGRAM(S): at 17:49

## 2021-04-03 RX ADMIN — PANTOPRAZOLE SODIUM 40 MILLIGRAM(S): 20 TABLET, DELAYED RELEASE ORAL at 06:08

## 2021-04-03 RX ADMIN — Medication 300 MILLIGRAM(S): at 01:46

## 2021-04-03 RX ADMIN — ALBUTEROL 2 PUFF(S): 90 AEROSOL, METERED ORAL at 11:01

## 2021-04-03 RX ADMIN — Medication 2 GRAM(S): at 07:45

## 2021-04-03 RX ADMIN — SODIUM CHLORIDE 50 MILLILITER(S): 9 INJECTION INTRAMUSCULAR; INTRAVENOUS; SUBCUTANEOUS at 17:49

## 2021-04-03 RX ADMIN — TIOTROPIUM BROMIDE 1 CAPSULE(S): 18 CAPSULE ORAL; RESPIRATORY (INHALATION) at 23:04

## 2021-04-03 RX ADMIN — CARVEDILOL PHOSPHATE 6.25 MILLIGRAM(S): 80 CAPSULE, EXTENDED RELEASE ORAL at 07:45

## 2021-04-03 RX ADMIN — CARVEDILOL PHOSPHATE 6.25 MILLIGRAM(S): 80 CAPSULE, EXTENDED RELEASE ORAL at 22:18

## 2021-04-03 RX ADMIN — Medication 2 GRAM(S): at 17:49

## 2021-04-03 RX ADMIN — Medication 9 MILLIGRAM(S): at 23:04

## 2021-04-03 RX ADMIN — ALBUTEROL 2 PUFF(S): 90 AEROSOL, METERED ORAL at 22:19

## 2021-04-03 RX ADMIN — ALBUTEROL 2 PUFF(S): 90 AEROSOL, METERED ORAL at 15:27

## 2021-04-03 RX ADMIN — ALBUTEROL 2 PUFF(S): 90 AEROSOL, METERED ORAL at 06:07

## 2021-04-03 RX ADMIN — BECLOMETHASONE DIPROPIONATE 2 PUFF(S): 40 AEROSOL, METERED RESPIRATORY (INHALATION) at 22:19

## 2021-04-03 RX ADMIN — REMDESIVIR 500 MILLIGRAM(S): 5 INJECTION INTRAVENOUS at 22:19

## 2021-04-03 RX ADMIN — ALBUTEROL 2 PUFF(S): 90 AEROSOL, METERED ORAL at 20:01

## 2021-04-03 RX ADMIN — Medication 81 MILLIGRAM(S): at 11:01

## 2021-04-03 RX ADMIN — Medication 1 DROP(S): at 06:07

## 2021-04-03 RX ADMIN — Medication 6 MILLIGRAM(S): at 06:07

## 2021-04-03 RX ADMIN — ATORVASTATIN CALCIUM 40 MILLIGRAM(S): 80 TABLET, FILM COATED ORAL at 22:20

## 2021-04-04 LAB
ALBUMIN SERPL ELPH-MCNC: 3.4 G/DL — SIGNIFICANT CHANGE UP (ref 3.3–5)
ALP SERPL-CCNC: 59 U/L — SIGNIFICANT CHANGE UP (ref 40–120)
ALT FLD-CCNC: 36 U/L — SIGNIFICANT CHANGE UP (ref 4–41)
ANION GAP SERPL CALC-SCNC: 12 MMOL/L — SIGNIFICANT CHANGE UP (ref 7–14)
AST SERPL-CCNC: 49 U/L — HIGH (ref 4–40)
BASOPHILS # BLD AUTO: 0.01 K/UL — SIGNIFICANT CHANGE UP (ref 0–0.2)
BASOPHILS NFR BLD AUTO: 0.1 % — SIGNIFICANT CHANGE UP (ref 0–2)
BILIRUB SERPL-MCNC: 0.4 MG/DL — SIGNIFICANT CHANGE UP (ref 0.2–1.2)
BUN SERPL-MCNC: 62 MG/DL — HIGH (ref 7–23)
CALCIUM SERPL-MCNC: 8.4 MG/DL — SIGNIFICANT CHANGE UP (ref 8.4–10.5)
CHLORIDE SERPL-SCNC: 101 MMOL/L — SIGNIFICANT CHANGE UP (ref 98–107)
CO2 SERPL-SCNC: 26 MMOL/L — SIGNIFICANT CHANGE UP (ref 22–31)
CREAT SERPL-MCNC: 1.59 MG/DL — HIGH (ref 0.5–1.3)
CRP SERPL-MCNC: 44.5 MG/L — HIGH
D DIMER BLD IA.RAPID-MCNC: <150 NG/ML DDU — SIGNIFICANT CHANGE UP
EOSINOPHIL # BLD AUTO: 0 K/UL — SIGNIFICANT CHANGE UP (ref 0–0.5)
EOSINOPHIL NFR BLD AUTO: 0 % — SIGNIFICANT CHANGE UP (ref 0–6)
FERRITIN SERPL-MCNC: 198 NG/ML — SIGNIFICANT CHANGE UP (ref 30–400)
GLUCOSE SERPL-MCNC: 221 MG/DL — HIGH (ref 70–99)
HCT VFR BLD CALC: 35.7 % — LOW (ref 39–50)
HGB BLD-MCNC: 11 G/DL — LOW (ref 13–17)
IANC: 6.2 K/UL — SIGNIFICANT CHANGE UP (ref 1.5–8.5)
IMM GRANULOCYTES NFR BLD AUTO: 0.5 % — SIGNIFICANT CHANGE UP (ref 0–1.5)
LYMPHOCYTES # BLD AUTO: 0.84 K/UL — LOW (ref 1–3.3)
LYMPHOCYTES # BLD AUTO: 11.3 % — LOW (ref 13–44)
MAGNESIUM SERPL-MCNC: 1.8 MG/DL — SIGNIFICANT CHANGE UP (ref 1.6–2.6)
MCHC RBC-ENTMCNC: 26.8 PG — LOW (ref 27–34)
MCHC RBC-ENTMCNC: 30.8 GM/DL — LOW (ref 32–36)
MCV RBC AUTO: 87.1 FL — SIGNIFICANT CHANGE UP (ref 80–100)
MONOCYTES # BLD AUTO: 0.32 K/UL — SIGNIFICANT CHANGE UP (ref 0–0.9)
MONOCYTES NFR BLD AUTO: 4.3 % — SIGNIFICANT CHANGE UP (ref 2–14)
NEUTROPHILS # BLD AUTO: 6.2 K/UL — SIGNIFICANT CHANGE UP (ref 1.8–7.4)
NEUTROPHILS NFR BLD AUTO: 83.8 % — HIGH (ref 43–77)
NRBC # BLD: 0 /100 WBCS — SIGNIFICANT CHANGE UP
NRBC # FLD: 0 K/UL — SIGNIFICANT CHANGE UP
PHOSPHATE SERPL-MCNC: 3.3 MG/DL — SIGNIFICANT CHANGE UP (ref 2.5–4.5)
PLATELET # BLD AUTO: 102 K/UL — LOW (ref 150–400)
POTASSIUM SERPL-MCNC: 4.6 MMOL/L — SIGNIFICANT CHANGE UP (ref 3.5–5.3)
POTASSIUM SERPL-SCNC: 4.6 MMOL/L — SIGNIFICANT CHANGE UP (ref 3.5–5.3)
PROT SERPL-MCNC: 6.1 G/DL — SIGNIFICANT CHANGE UP (ref 6–8.3)
RBC # BLD: 4.1 M/UL — LOW (ref 4.2–5.8)
RBC # FLD: 16.2 % — HIGH (ref 10.3–14.5)
SODIUM SERPL-SCNC: 139 MMOL/L — SIGNIFICANT CHANGE UP (ref 135–145)
WBC # BLD: 7.41 K/UL — SIGNIFICANT CHANGE UP (ref 3.8–10.5)
WBC # FLD AUTO: 7.41 K/UL — SIGNIFICANT CHANGE UP (ref 3.8–10.5)

## 2021-04-04 PROCEDURE — 99233 SBSQ HOSP IP/OBS HIGH 50: CPT

## 2021-04-04 RX ORDER — OMEGA-3 ACID ETHYL ESTERS 1 G
4 CAPSULE ORAL
Refills: 0 | Status: DISCONTINUED | OUTPATIENT
Start: 2021-04-04 | End: 2021-04-06

## 2021-04-04 RX ORDER — DEXAMETHASONE 0.5 MG/5ML
6 ELIXIR ORAL DAILY
Refills: 0 | Status: DISCONTINUED | OUTPATIENT
Start: 2021-04-04 | End: 2021-04-06

## 2021-04-04 RX ADMIN — Medication 6 MILLIGRAM(S): at 18:14

## 2021-04-04 RX ADMIN — Medication 2 GRAM(S): at 05:44

## 2021-04-04 RX ADMIN — Medication 9 MILLIGRAM(S): at 22:55

## 2021-04-04 RX ADMIN — ATORVASTATIN CALCIUM 40 MILLIGRAM(S): 80 TABLET, FILM COATED ORAL at 22:55

## 2021-04-04 RX ADMIN — ALBUTEROL 2 PUFF(S): 90 AEROSOL, METERED ORAL at 18:17

## 2021-04-04 RX ADMIN — Medication 650 MILLIGRAM(S): at 13:12

## 2021-04-04 RX ADMIN — TIOTROPIUM BROMIDE 1 CAPSULE(S): 18 CAPSULE ORAL; RESPIRATORY (INHALATION) at 09:25

## 2021-04-04 RX ADMIN — SODIUM CHLORIDE 50 MILLILITER(S): 9 INJECTION INTRAMUSCULAR; INTRAVENOUS; SUBCUTANEOUS at 18:15

## 2021-04-04 RX ADMIN — BECLOMETHASONE DIPROPIONATE 1 PUFF(S): 40 AEROSOL, METERED RESPIRATORY (INHALATION) at 09:25

## 2021-04-04 RX ADMIN — ALBUTEROL 2 PUFF(S): 90 AEROSOL, METERED ORAL at 12:12

## 2021-04-04 RX ADMIN — Medication 4 GRAM(S): at 18:15

## 2021-04-04 RX ADMIN — ALBUTEROL 2 PUFF(S): 90 AEROSOL, METERED ORAL at 22:54

## 2021-04-04 RX ADMIN — CARVEDILOL PHOSPHATE 6.25 MILLIGRAM(S): 80 CAPSULE, EXTENDED RELEASE ORAL at 18:14

## 2021-04-04 RX ADMIN — Medication 100 MILLIGRAM(S): at 05:42

## 2021-04-04 RX ADMIN — REMDESIVIR 500 MILLIGRAM(S): 5 INJECTION INTRAVENOUS at 22:55

## 2021-04-04 RX ADMIN — PANTOPRAZOLE SODIUM 40 MILLIGRAM(S): 20 TABLET, DELAYED RELEASE ORAL at 08:25

## 2021-04-04 RX ADMIN — APIXABAN 2.5 MILLIGRAM(S): 2.5 TABLET, FILM COATED ORAL at 05:43

## 2021-04-04 RX ADMIN — Medication 650 MILLIGRAM(S): at 12:12

## 2021-04-04 RX ADMIN — Medication 81 MILLIGRAM(S): at 12:12

## 2021-04-04 RX ADMIN — APIXABAN 2.5 MILLIGRAM(S): 2.5 TABLET, FILM COATED ORAL at 18:14

## 2021-04-04 RX ADMIN — Medication 300 MILLIGRAM(S): at 12:12

## 2021-04-04 RX ADMIN — BECLOMETHASONE DIPROPIONATE 1 PUFF(S): 40 AEROSOL, METERED RESPIRATORY (INHALATION) at 22:55

## 2021-04-04 RX ADMIN — Medication 100 MILLIGRAM(S): at 18:13

## 2021-04-04 RX ADMIN — ALBUTEROL 2 PUFF(S): 90 AEROSOL, METERED ORAL at 05:42

## 2021-04-04 RX ADMIN — CARVEDILOL PHOSPHATE 6.25 MILLIGRAM(S): 80 CAPSULE, EXTENDED RELEASE ORAL at 05:44

## 2021-04-05 LAB
ALBUMIN SERPL ELPH-MCNC: 3 G/DL — LOW (ref 3.3–5)
ALP SERPL-CCNC: 56 U/L — SIGNIFICANT CHANGE UP (ref 40–120)
ALT FLD-CCNC: 46 U/L — HIGH (ref 4–41)
ANION GAP SERPL CALC-SCNC: 10 MMOL/L — SIGNIFICANT CHANGE UP (ref 7–14)
AST SERPL-CCNC: 40 U/L — SIGNIFICANT CHANGE UP (ref 4–40)
BASOPHILS # BLD AUTO: 0 K/UL — SIGNIFICANT CHANGE UP (ref 0–0.2)
BASOPHILS NFR BLD AUTO: 0 % — SIGNIFICANT CHANGE UP (ref 0–2)
BILIRUB SERPL-MCNC: 0.3 MG/DL — SIGNIFICANT CHANGE UP (ref 0.2–1.2)
BUN SERPL-MCNC: 46 MG/DL — HIGH (ref 7–23)
CALCIUM SERPL-MCNC: 8 MG/DL — LOW (ref 8.4–10.5)
CHLORIDE SERPL-SCNC: 103 MMOL/L — SIGNIFICANT CHANGE UP (ref 98–107)
CO2 SERPL-SCNC: 25 MMOL/L — SIGNIFICANT CHANGE UP (ref 22–31)
CREAT SERPL-MCNC: 1.29 MG/DL — SIGNIFICANT CHANGE UP (ref 0.5–1.3)
CRP SERPL-MCNC: 30 MG/L — HIGH
D DIMER BLD IA.RAPID-MCNC: <150 NG/ML DDU — SIGNIFICANT CHANGE UP
EOSINOPHIL # BLD AUTO: 0 K/UL — SIGNIFICANT CHANGE UP (ref 0–0.5)
EOSINOPHIL NFR BLD AUTO: 0 % — SIGNIFICANT CHANGE UP (ref 0–6)
FERRITIN SERPL-MCNC: 143 NG/ML — SIGNIFICANT CHANGE UP (ref 30–400)
GLUCOSE SERPL-MCNC: 243 MG/DL — HIGH (ref 70–99)
HCT VFR BLD CALC: 34 % — LOW (ref 39–50)
HGB BLD-MCNC: 10.5 G/DL — LOW (ref 13–17)
IANC: 5.82 K/UL — SIGNIFICANT CHANGE UP (ref 1.5–8.5)
IMM GRANULOCYTES NFR BLD AUTO: 0.4 % — SIGNIFICANT CHANGE UP (ref 0–1.5)
LDH SERPL L TO P-CCNC: 201 U/L — SIGNIFICANT CHANGE UP (ref 135–225)
LYMPHOCYTES # BLD AUTO: 0.62 K/UL — LOW (ref 1–3.3)
LYMPHOCYTES # BLD AUTO: 9.1 % — LOW (ref 13–44)
MAGNESIUM SERPL-MCNC: 1.9 MG/DL — SIGNIFICANT CHANGE UP (ref 1.6–2.6)
MCHC RBC-ENTMCNC: 26.7 PG — LOW (ref 27–34)
MCHC RBC-ENTMCNC: 30.9 GM/DL — LOW (ref 32–36)
MCV RBC AUTO: 86.5 FL — SIGNIFICANT CHANGE UP (ref 80–100)
MONOCYTES # BLD AUTO: 0.35 K/UL — SIGNIFICANT CHANGE UP (ref 0–0.9)
MONOCYTES NFR BLD AUTO: 5.1 % — SIGNIFICANT CHANGE UP (ref 2–14)
NEUTROPHILS # BLD AUTO: 5.82 K/UL — SIGNIFICANT CHANGE UP (ref 1.8–7.4)
NEUTROPHILS NFR BLD AUTO: 85.4 % — HIGH (ref 43–77)
NRBC # BLD: 0 /100 WBCS — SIGNIFICANT CHANGE UP
NRBC # FLD: 0 K/UL — SIGNIFICANT CHANGE UP
PHOSPHATE SERPL-MCNC: 2.7 MG/DL — SIGNIFICANT CHANGE UP (ref 2.5–4.5)
PLATELET # BLD AUTO: 90 K/UL — LOW (ref 150–400)
POTASSIUM SERPL-MCNC: 4.8 MMOL/L — SIGNIFICANT CHANGE UP (ref 3.5–5.3)
POTASSIUM SERPL-SCNC: 4.8 MMOL/L — SIGNIFICANT CHANGE UP (ref 3.5–5.3)
PROCALCITONIN SERPL-MCNC: 0.05 NG/ML — SIGNIFICANT CHANGE UP (ref 0.02–0.1)
PROT SERPL-MCNC: 5.9 G/DL — LOW (ref 6–8.3)
RBC # BLD: 3.93 M/UL — LOW (ref 4.2–5.8)
RBC # FLD: 16.2 % — HIGH (ref 10.3–14.5)
SODIUM SERPL-SCNC: 138 MMOL/L — SIGNIFICANT CHANGE UP (ref 135–145)
WBC # BLD: 6.82 K/UL — SIGNIFICANT CHANGE UP (ref 3.8–10.5)
WBC # FLD AUTO: 6.82 K/UL — SIGNIFICANT CHANGE UP (ref 3.8–10.5)

## 2021-04-05 PROCEDURE — 99233 SBSQ HOSP IP/OBS HIGH 50: CPT

## 2021-04-05 RX ADMIN — SODIUM CHLORIDE 50 MILLILITER(S): 9 INJECTION INTRAMUSCULAR; INTRAVENOUS; SUBCUTANEOUS at 11:11

## 2021-04-05 RX ADMIN — TIOTROPIUM BROMIDE 1 CAPSULE(S): 18 CAPSULE ORAL; RESPIRATORY (INHALATION) at 11:10

## 2021-04-05 RX ADMIN — CARVEDILOL PHOSPHATE 6.25 MILLIGRAM(S): 80 CAPSULE, EXTENDED RELEASE ORAL at 06:52

## 2021-04-05 RX ADMIN — ALBUTEROL 2 PUFF(S): 90 AEROSOL, METERED ORAL at 06:51

## 2021-04-05 RX ADMIN — CARVEDILOL PHOSPHATE 6.25 MILLIGRAM(S): 80 CAPSULE, EXTENDED RELEASE ORAL at 18:17

## 2021-04-05 RX ADMIN — ALBUTEROL 2 PUFF(S): 90 AEROSOL, METERED ORAL at 11:10

## 2021-04-05 RX ADMIN — Medication 300 MILLIGRAM(S): at 11:10

## 2021-04-05 RX ADMIN — REMDESIVIR 500 MILLIGRAM(S): 5 INJECTION INTRAVENOUS at 21:56

## 2021-04-05 RX ADMIN — Medication 6 MILLIGRAM(S): at 06:51

## 2021-04-05 RX ADMIN — BECLOMETHASONE DIPROPIONATE 1 PUFF(S): 40 AEROSOL, METERED RESPIRATORY (INHALATION) at 08:59

## 2021-04-05 RX ADMIN — PANTOPRAZOLE SODIUM 40 MILLIGRAM(S): 20 TABLET, DELAYED RELEASE ORAL at 06:52

## 2021-04-05 RX ADMIN — APIXABAN 2.5 MILLIGRAM(S): 2.5 TABLET, FILM COATED ORAL at 18:17

## 2021-04-05 RX ADMIN — Medication 4 GRAM(S): at 06:51

## 2021-04-05 RX ADMIN — SODIUM CHLORIDE 50 MILLILITER(S): 9 INJECTION INTRAMUSCULAR; INTRAVENOUS; SUBCUTANEOUS at 20:00

## 2021-04-05 RX ADMIN — ALBUTEROL 2 PUFF(S): 90 AEROSOL, METERED ORAL at 21:58

## 2021-04-05 RX ADMIN — Medication 4 GRAM(S): at 18:17

## 2021-04-05 RX ADMIN — ATORVASTATIN CALCIUM 40 MILLIGRAM(S): 80 TABLET, FILM COATED ORAL at 21:57

## 2021-04-05 RX ADMIN — APIXABAN 2.5 MILLIGRAM(S): 2.5 TABLET, FILM COATED ORAL at 06:52

## 2021-04-05 RX ADMIN — BECLOMETHASONE DIPROPIONATE 1 PUFF(S): 40 AEROSOL, METERED RESPIRATORY (INHALATION) at 21:58

## 2021-04-05 RX ADMIN — Medication 81 MILLIGRAM(S): at 11:10

## 2021-04-05 RX ADMIN — ALBUTEROL 2 PUFF(S): 90 AEROSOL, METERED ORAL at 18:17

## 2021-04-05 RX ADMIN — Medication 9 MILLIGRAM(S): at 21:56

## 2021-04-05 RX ADMIN — ALBUTEROL 2 PUFF(S): 90 AEROSOL, METERED ORAL at 08:57

## 2021-04-06 ENCOUNTER — TRANSCRIPTION ENCOUNTER (OUTPATIENT)
Age: 76
End: 2021-04-06

## 2021-04-06 VITALS — HEART RATE: 89 BPM | SYSTOLIC BLOOD PRESSURE: 144 MMHG | DIASTOLIC BLOOD PRESSURE: 89 MMHG

## 2021-04-06 DIAGNOSIS — R73.9 HYPERGLYCEMIA, UNSPECIFIED: ICD-10-CM

## 2021-04-06 LAB
A1C WITH ESTIMATED AVERAGE GLUCOSE RESULT: 7 % — HIGH (ref 4–5.6)
ANION GAP SERPL CALC-SCNC: 10 MMOL/L — SIGNIFICANT CHANGE UP (ref 7–14)
BUN SERPL-MCNC: 45 MG/DL — HIGH (ref 7–23)
CALCIUM SERPL-MCNC: 7.9 MG/DL — LOW (ref 8.4–10.5)
CHLORIDE SERPL-SCNC: 104 MMOL/L — SIGNIFICANT CHANGE UP (ref 98–107)
CO2 SERPL-SCNC: 24 MMOL/L — SIGNIFICANT CHANGE UP (ref 22–31)
CREAT SERPL-MCNC: 0.99 MG/DL — SIGNIFICANT CHANGE UP (ref 0.5–1.3)
ESTIMATED AVERAGE GLUCOSE: 154 MG/DL — HIGH (ref 68–114)
GLUCOSE BLDC GLUCOMTR-MCNC: 132 MG/DL — HIGH (ref 70–99)
GLUCOSE BLDC GLUCOMTR-MCNC: 374 MG/DL — HIGH (ref 70–99)
GLUCOSE BLDC GLUCOMTR-MCNC: 384 MG/DL — HIGH (ref 70–99)
GLUCOSE SERPL-MCNC: 320 MG/DL — HIGH (ref 70–99)
HCT VFR BLD CALC: 32.9 % — LOW (ref 39–50)
HGB BLD-MCNC: 9.8 G/DL — LOW (ref 13–17)
MAGNESIUM SERPL-MCNC: 1.8 MG/DL — SIGNIFICANT CHANGE UP (ref 1.6–2.6)
MCHC RBC-ENTMCNC: 26.6 PG — LOW (ref 27–34)
MCHC RBC-ENTMCNC: 29.8 GM/DL — LOW (ref 32–36)
MCV RBC AUTO: 89.4 FL — SIGNIFICANT CHANGE UP (ref 80–100)
NRBC # BLD: 0 /100 WBCS — SIGNIFICANT CHANGE UP
NRBC # FLD: 0 K/UL — SIGNIFICANT CHANGE UP
PHOSPHATE SERPL-MCNC: 2.3 MG/DL — LOW (ref 2.5–4.5)
PLATELET # BLD AUTO: 114 K/UL — LOW (ref 150–400)
POTASSIUM SERPL-MCNC: 4.7 MMOL/L — SIGNIFICANT CHANGE UP (ref 3.5–5.3)
POTASSIUM SERPL-SCNC: 4.7 MMOL/L — SIGNIFICANT CHANGE UP (ref 3.5–5.3)
RBC # BLD: 3.68 M/UL — LOW (ref 4.2–5.8)
RBC # FLD: 16.2 % — HIGH (ref 10.3–14.5)
SODIUM SERPL-SCNC: 138 MMOL/L — SIGNIFICANT CHANGE UP (ref 135–145)
WBC # BLD: 6.29 K/UL — SIGNIFICANT CHANGE UP (ref 3.8–10.5)
WBC # FLD AUTO: 6.29 K/UL — SIGNIFICANT CHANGE UP (ref 3.8–10.5)

## 2021-04-06 PROCEDURE — 99239 HOSP IP/OBS DSCHRG MGMT >30: CPT

## 2021-04-06 RX ORDER — DEXTROSE 50 % IN WATER 50 %
15 SYRINGE (ML) INTRAVENOUS ONCE
Refills: 0 | Status: DISCONTINUED | OUTPATIENT
Start: 2021-04-06 | End: 2021-04-06

## 2021-04-06 RX ORDER — SODIUM CHLORIDE 9 MG/ML
1000 INJECTION, SOLUTION INTRAVENOUS
Refills: 0 | Status: DISCONTINUED | OUTPATIENT
Start: 2021-04-06 | End: 2021-04-06

## 2021-04-06 RX ORDER — ISOPROPYL ALCOHOL, BENZOCAINE .7; .06 ML/ML; ML/ML
1 SWAB TOPICAL
Qty: 100 | Refills: 1
Start: 2021-04-06 | End: 2021-05-25

## 2021-04-06 RX ORDER — METFORMIN HYDROCHLORIDE 850 MG/1
1 TABLET ORAL
Qty: 56 | Refills: 0
Start: 2021-04-06 | End: 2021-05-03

## 2021-04-06 RX ORDER — DEXTROSE 50 % IN WATER 50 %
25 SYRINGE (ML) INTRAVENOUS ONCE
Refills: 0 | Status: DISCONTINUED | OUTPATIENT
Start: 2021-04-06 | End: 2021-04-06

## 2021-04-06 RX ORDER — DEXTROSE 50 % IN WATER 50 %
12.5 SYRINGE (ML) INTRAVENOUS ONCE
Refills: 0 | Status: DISCONTINUED | OUTPATIENT
Start: 2021-04-06 | End: 2021-04-06

## 2021-04-06 RX ORDER — INSULIN LISPRO 100/ML
VIAL (ML) SUBCUTANEOUS
Refills: 0 | Status: DISCONTINUED | OUTPATIENT
Start: 2021-04-06 | End: 2021-04-06

## 2021-04-06 RX ORDER — DEXAMETHASONE 0.5 MG/5ML
1 ELIXIR ORAL
Qty: 5 | Refills: 0
Start: 2021-04-06 | End: 2021-04-10

## 2021-04-06 RX ORDER — GLUCAGON INJECTION, SOLUTION 0.5 MG/.1ML
1 INJECTION, SOLUTION SUBCUTANEOUS ONCE
Refills: 0 | Status: DISCONTINUED | OUTPATIENT
Start: 2021-04-06 | End: 2021-04-06

## 2021-04-06 RX ORDER — ALBUTEROL 90 UG/1
2 AEROSOL, METERED ORAL
Qty: 1 | Refills: 0
Start: 2021-04-06

## 2021-04-06 RX ORDER — VALSARTAN 80 MG/1
1 TABLET ORAL
Qty: 0 | Refills: 0 | DISCHARGE

## 2021-04-06 RX ADMIN — Medication 5: at 17:09

## 2021-04-06 RX ADMIN — ALBUTEROL 2 PUFF(S): 90 AEROSOL, METERED ORAL at 05:03

## 2021-04-06 RX ADMIN — CARVEDILOL PHOSPHATE 6.25 MILLIGRAM(S): 80 CAPSULE, EXTENDED RELEASE ORAL at 17:09

## 2021-04-06 RX ADMIN — APIXABAN 2.5 MILLIGRAM(S): 2.5 TABLET, FILM COATED ORAL at 05:03

## 2021-04-06 RX ADMIN — ALBUTEROL 2 PUFF(S): 90 AEROSOL, METERED ORAL at 00:52

## 2021-04-06 RX ADMIN — TIOTROPIUM BROMIDE 1 CAPSULE(S): 18 CAPSULE ORAL; RESPIRATORY (INHALATION) at 10:22

## 2021-04-06 RX ADMIN — ALBUTEROL 2 PUFF(S): 90 AEROSOL, METERED ORAL at 09:30

## 2021-04-06 RX ADMIN — Medication 300 MILLIGRAM(S): at 13:17

## 2021-04-06 RX ADMIN — BECLOMETHASONE DIPROPIONATE 1 PUFF(S): 40 AEROSOL, METERED RESPIRATORY (INHALATION) at 09:30

## 2021-04-06 RX ADMIN — SODIUM CHLORIDE 50 MILLILITER(S): 9 INJECTION INTRAMUSCULAR; INTRAVENOUS; SUBCUTANEOUS at 13:17

## 2021-04-06 RX ADMIN — ALBUTEROL 2 PUFF(S): 90 AEROSOL, METERED ORAL at 17:12

## 2021-04-06 RX ADMIN — CARVEDILOL PHOSPHATE 6.25 MILLIGRAM(S): 80 CAPSULE, EXTENDED RELEASE ORAL at 05:03

## 2021-04-06 RX ADMIN — ALBUTEROL 2 PUFF(S): 90 AEROSOL, METERED ORAL at 13:16

## 2021-04-06 RX ADMIN — Medication 4 GRAM(S): at 05:03

## 2021-04-06 RX ADMIN — APIXABAN 2.5 MILLIGRAM(S): 2.5 TABLET, FILM COATED ORAL at 17:09

## 2021-04-06 RX ADMIN — PANTOPRAZOLE SODIUM 40 MILLIGRAM(S): 20 TABLET, DELAYED RELEASE ORAL at 06:48

## 2021-04-06 RX ADMIN — Medication 81 MILLIGRAM(S): at 13:17

## 2021-04-06 RX ADMIN — Medication 6 MILLIGRAM(S): at 05:03

## 2021-04-06 NOTE — PROGRESS NOTE ADULT - PROBLEM SELECTOR PROBLEM 5
CHF (congestive heart failure)
AF (atrial fibrillation)

## 2021-04-06 NOTE — PROGRESS NOTE ADULT - PROBLEM SELECTOR PLAN 4
Hx of afib. Recently changed from coumadin to Eliquis. EKG on admission with afib with RVR. Tachycardic likely in the setting of infection/sepsis.   - Tele monitoring   - Resume home medications, Carvedilol 6.125mg BID   - Eliquis 2.5mg BID
- Likely pre-renal in the setting of COVID and poor PO intake. S/p 1L LR

## 2021-04-06 NOTE — PROGRESS NOTE ADULT - PROBLEM SELECTOR PLAN 10
DVT ppx: on Eliquis  Dispo: home, with home O2, (desaturation to 97 during ambulation).   DC planning time: 34 min in coordinating DC plan with patient and team.

## 2021-04-06 NOTE — DISCHARGE NOTE PROVIDER - CARE PROVIDER_API CALL
Dr. Tim Navarrete,   Follow up within 1 week  Phone: (   )    -  Fax: (   )    -  Follow Up Time: 1 week    Dr. Po Jimenes,   Follow up in 1 week for echocardiogram.  Phone: (   )    -  Fax: (   )    -  Follow Up Time: 1 week

## 2021-04-06 NOTE — DISCHARGE NOTE PROVIDER - NSDCMRMEDTOKEN_GEN_ALL_CORE_FT
albuterol 90 mcg/inh inhalation aerosol: 2 puff(s) inhaled every 4 hours  alcohol swabs : Apply topically to affected area 4 times a day   allopurinol 300 mg oral tablet: 1 tab(s) orally once a day  aspirin 81 mg oral tablet: 1 tab(s) orally once a day  atorvastatin 40 mg oral tablet: 1 tab(s) orally once a day  calcium (as carbonate) 500 mg oral tablet: 500 milligram(s) orally once a day  carvedilol 6.25 mg oral tablet: 1 tab(s) orally 2 times a day   dexamethasone 6 mg oral tablet: 1 tab(s) orally once a day  Eliquis 2.5 mg oral tablet: 1 tab(s) orally 2 times a day  Fish Oil 500 mg oral capsule: 2 cap(s) orally 2 times a day  glucometer (per patient&#x27;s insurance): 1 application subcutaneous 2 times a day   lancets: 1 application subcutaneously 4 times a day   metFORMIN 500 mg oral tablet: 1 tab(s) orally 2 times a day   Protonix 40 mg oral delayed release tablet: 1 tab(s) orally once a day  Qvar 80 mcg/inh inhalation aerosol: 1 puff(s) inhaled 2 times a day  Spiriva 18 mcg inhalation capsule: 1 cap(s) inhaled once a day  tamsulosin 0.4 mg oral capsule: 1 cap(s) orally once a day  test strips (per patient&#x27;s insurance): 1 application subcutaneously 4 times a day. ** Compatible with patient&#x27;s glucometer **

## 2021-04-06 NOTE — DISCHARGE NOTE NURSING/CASE MANAGEMENT/SOCIAL WORK - PATIENT PORTAL LINK FT
You can access the FollowMyHealth Patient Portal offered by [facility] by registering at the following website: [email or web address]. By joining [employer]’s FollowMyHealth portal, you will also be able to view your health information using other applications (apps) compatible with our system.

## 2021-04-06 NOTE — DISCHARGE NOTE PROVIDER - HOSPITAL COURSE
76 y/o M with PMH HTN, GERD, afib on Eliquis who presented for SOB, found to have hypoxic respiratory failure 2/2 COVID PNA. CXR with +insterstitial infiltrates. Remained on 2L NC. S/P Rem/Dex. Course c/b SHEYLA, s/p IVF and improved. Afib with intermittent bouts of RVR, stable on Carvedilol and CVA ppx continued with Eliquis. Remaining comorbidities managed approriately while in house. Ambulatory sats stable. CM facilitated delivery of home O2. Patient to f/u outpatient with Cards, and PCP and undergo outpatient TTE. Metformin started on D/C given A1C 7.0. Discussed with Attending Dr. Norton, patient stable for discharge.

## 2021-04-06 NOTE — PROGRESS NOTE ADULT - PROBLEM SELECTOR PLAN 7
- HTN. Normotensive   - Hold Valsartan in the setting of SHEYLA.   - C/w Carvedilol
- Hx of COPD.   - C/w home meds
Hx of COPD.   - C/w home meds
Hx of COPD.   - C/w home meds

## 2021-04-06 NOTE — PROGRESS NOTE ADULT - PROBLEM SELECTOR PLAN 9
DVT ppx: on Eliquis  PT evaluation. Check O2 saturation during ambulation.
DVT ppx: on Eliquis
N: DASH/TLC
DVT ppx: on Eliquis  PT eval

## 2021-04-06 NOTE — PROGRESS NOTE ADULT - ASSESSMENT
76 y/o M with PMH HTN, GERD, afib on Eliquis who presents for SOB. He reports that he has been feeling generally unwell for ~2 weeks found to have hypoxic respiratory failure 2/2 COVID PNA. pulse ox 90%RA in ER. Desaturates on RA on floor w/ bursts of rapid afib
76 y/o M with PMH HTN, GERD, afib on Eliquis who presents for SOB. He reports that he has been feeling generally unwell for ~2 weeks found to have hypoxic respiratory failure 2/2 COVID PNA.
76 y/o M with PMH HTN, GERD, afib on Eliquis who presents for SOB. He reports that he has been feeling generally unwell for ~2 weeks found to have hypoxic respiratory failure 2/2 COVID PNA. pulse ox 90%RA in ER  now 85% on RA on floor w/ bursts of rapid afib
74 y/o M with PMH HTN, GERD, afib on Eliquis who presents for SOB. He reports that he has been feeling generally unwell for ~2 weeks found to have hypoxic respiratory failure 2/2 COVID PNA. pulse ox 90%RA in ER

## 2021-04-06 NOTE — DISCHARGE NOTE PROVIDER - NSDCCPCAREPLAN_GEN_ALL_CORE_FT
PRINCIPAL DISCHARGE DIAGNOSIS  Diagnosis: COVID-19  Assessment and Plan of Treatment: You have been diagnosed with the COVID-19 virus during your hospital stay. You must self quarantine to complete a 14 day time period and until you no longer have fever or symptoms for 3 consecutive days.  Monitor your temperature daily to not any changes and increases.  Please remain in self quaratine while you continue to recover at home.  For questions regarding COVID-19 the  [place] Department of [facility] can be reached at [phone] for further information about COVID-19.   1. You should restrict activities outside your home, except for getting medical care.  2. Do not go to work, school, or public areas.  3. Avoid using public transportation, ride-sharing, or taxis.  4. Separate yourself from other people and animals in your home; Use a seperate bathroom if possible.   5. Call ahead before visiting your doctor and notify the staff that you had or may have COVID 19.   6. Wear a facemask when you are around other people (e.g., sharing a room or vehicle) or pets and before you enter a healthcare provider’s office. If you are unable to wear a mask then the people around you should wear one.   8. Clean your hands often.  Soap and water are the best option if hands are visibly dirty. Avoid touching your eyes, nose, and mouth with unwashed hands.  9. Avoid sharing personal household items.  10. Clean all “high-touch” surfaces everyday to help limit spread of the virus  11. Monitor your symptoms and Seek prompt medical attention if your illness is worsening. If you have a medical emergency and need to call 911, notify the dispatch personnel.  You were on supplemental oxygen during your stay which you will continue at home. Your oxygen tank will be delivered to your house today. You should continue dexamethasone 6 mg once daily for 5 more days to complete your course.      SECONDARY DISCHARGE DIAGNOSES  Diagnosis: SHEYLA (acute kidney injury)  Assessment and Plan of Treatment: You had an acute kidney injury during your stay. This was resolved by the time of your discharge. You should stop taking Valsartan on discharge as this medication can contribute to kidney injury. Follow u pwith your Primary Doctor regarding whether to continue this medicine. Continue your remaining medications.    Diagnosis: Diabetes mellitus, new onset  Assessment and Plan of Treatment: You were diagnosed with new onset diabetes during your stay. Your Hgb A1c was 7.0. On discharge you will start taking Metformin 500 mg twice daily. Check your blood sugars twice daily as instructed by your nurse during your stay. Your medicines have been sent ot your requested pharmacy.    Diagnosis: CHF (congestive heart failure)  Assessment and Plan of Treatment: Please follow up with your Primary Care Doctor and Cardiologist to obtain an echocardiogram as an outpatient.

## 2021-04-06 NOTE — PROGRESS NOTE ADULT - PROBLEM SELECTOR PLAN 3
Likely pre-renal in the setting of COVID and poor PO intake.   - S/p 1L LR   - will give NS at 50/hr due to elevated bnp- bun/cr improving
- Likely pre-renal in the setting of COVID and poor PO intake. S/p 1L LR   - Will give NS at 50/hr due to elevated bnp- bun/cr improving
HbA1c 7. Likely steroid induced hyperglycemia. 200s- uptrended to 300s today.   Anticipate glucose with improved after steroids completed (in 5 days).   DC with Metformin 1000mg BID, and with DM teaching, glucometer.   Patient aware to hold steroids if glucose >400s.   Outpatient f/u with PCP Dr. Tim Navarrete on DC.
BUN 55, Cr. 2.14. Baseline Cr. 1.23 in Jan 2021. Likely pre-renal in the setting of COVID and poor PO intake.   - S/p 1L LR   - will give ns 50 for 1 liter

## 2021-04-06 NOTE — PROGRESS NOTE ADULT - PROBLEM SELECTOR PROBLEM 4
SHEYLA (acute kidney injury)
AF (atrial fibrillation)

## 2021-04-06 NOTE — DISCHARGE NOTE PROVIDER - PROVIDER TOKENS
FREE:[LAST:[Dr. Tim Navarrete],PHONE:[(   )    -],FAX:[(   )    -],ADDRESS:[Follow up within 1 week],FOLLOWUP:[1 week]],FREE:[LAST:[Dr. Po Jimenes],PHONE:[(   )    -],FAX:[(   )    -],ADDRESS:[Follow up in 1 week for echocardiogram.],FOLLOWUP:[1 week]]

## 2021-04-06 NOTE — PROGRESS NOTE ADULT - PROBLEM SELECTOR PLAN 1
- Has COPD and says baseline normally around 94%. Hypoxia 2/2 COVID PNA.   - CXR with interstitial infiltrates consistent with COVID.   - F/u pro-BNP, hx of CHF however more likely hypoxia is due to COVID PNA   - Remdesivir/Decadron stopped 4/3 as O2 levels were improving but restarted 4/4  - Albuterol q4 hours   - Incentive spirometry, chest pt as needed.   - Goal oxygen level >92%. Has COPD.
Presents with SOB and cough. Hypoxic to 90% on RA. Has COPD and says baseline normally around 94%. CXR with interstitial infiltrates consistent with COVID. Hypoxia likely 2/2 COVID PNA.   - F/u pro-BNP, hx of CHF however more likely hypoxia is due to COVID PNA   - Remdesivir for now- will hold off on Decadron  - Albuterol q4 hours   - ICS  - Goal oxygen level >92%. Has COPD.
- Has COPD and says baseline normally around 94%. Hypoxia 2/2 COVID PNA.   - CXR with interstitial infiltrates consistent with COVID.   - Hx of CHF however more likely hypoxia is due to COVID PNA   - s/p Remdesivir/Decadron  - Albuterol q4 hours   - Incentive spirometry, chest pt as needed.   - Goal oxygen level >92%. Has COPD. Ambulatory sats 87%, requires home O2.
Has COPD and says baseline normally around 94%. CXR with interstitial infiltrates consistent with COVID. Hypoxia likely 2/2 COVID PNA.   - F/u pro-BNP, hx of CHF however more likely hypoxia is due to COVID PNA   - Remdesivir - Decadron stopped yesterday as oxygen levels were improving but restarted 4/4  - Albuterol q4 hours   - ICS  - Goal oxygen level >92%. Has COPD.

## 2021-04-06 NOTE — CHART NOTE - NSCHARTNOTEFT_GEN_A_CORE
Pt with AHRF 2/2 COVID 19. Will require home oxygen 2/2 desaturation on ambulation. Room air at rest sat of 96%. Room air ambulation sat of 87% with recovery 93% on ambulation with 2L NC. Patient will require home o2. Tested in a chronic, stable state.

## 2021-04-06 NOTE — PROGRESS NOTE ADULT - PROBLEM SELECTOR PROBLEM 8
COPD (chronic obstructive pulmonary disease)
Nutrition, metabolism, and development symptoms

## 2021-04-06 NOTE — PROGRESS NOTE ADULT - PROBLEM SELECTOR PROBLEM 7
COPD (chronic obstructive pulmonary disease)
Hypertension
COPD (chronic obstructive pulmonary disease)
COPD (chronic obstructive pulmonary disease)

## 2021-04-06 NOTE — PROGRESS NOTE ADULT - PROBLEM SELECTOR PLAN 2
COVID-19 high suspicion. Wife also has COVID. 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
COVID-19 high suspicion. Wife also has COVID. 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  - Remdesivir (CrCl 35 4/2) - trend Cr and d/c if creatinine clearance worsens.
COVID-19 high suspicion. Wife also has COVID. 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  - s/p Remdesivir
COVID-19 high suspicion. Wife also has COVID.   - Remdesivir (CrCl 35 4/2) - trend Cr and d/c if creatinine clearance worsens.

## 2021-04-06 NOTE — PROGRESS NOTE ADULT - SUBJECTIVE AND OBJECTIVE BOX
PROGRESS NOTE:     Patient is a 75y old  Male who presents with a chief complaint of SOB (04 Apr 2021 15:32)    SUBJECTIVE / OVERNIGHT EVENTS: Patient seen and evaluated at bedside. No acute distress evident, no overnight events. Reports feeling well. On 2L NC  saturating 94%. Denies any sob, chest pain, abdominal pain, nausea, vomiting. Gets SOB on exertion.     ADDITIONAL REVIEW OF SYSTEMS:    MEDICATIONS  (STANDING):  ALBUTerol    90 MICROgram(s) HFA Inhaler 2 Puff(s) Inhalation every 4 hours  allopurinol 300 milliGRAM(s) Oral daily  apixaban 2.5 milliGRAM(s) Oral every 12 hours  aspirin  chewable 81 milliGRAM(s) Oral daily  atorvastatin 40 milliGRAM(s) Oral at bedtime  beclomethasone  80 MICROgram(s) Inhaler 1 Puff(s) Inhalation two times a day  carvedilol 6.25 milliGRAM(s) Oral every 12 hours  dexAMETHasone     Tablet 6 milliGRAM(s) Oral daily  melatonin 9 milliGRAM(s) Oral at bedtime  omega-3-Acid Ethyl Esters 4 Gram(s) Oral two times a day  pantoprazole    Tablet 40 milliGRAM(s) Oral before breakfast  remdesivir  IVPB 100 milliGRAM(s) IV Intermittent every 24 hours  remdesivir  IVPB   IV Intermittent   sodium chloride 0.9%. 1000 milliLiter(s) (50 mL/Hr) IV Continuous <Continuous>  tiotropium 18 MICROgram(s) Capsule 1 Capsule(s) Inhalation daily    MEDICATIONS  (PRN):  acetaminophen    Suspension .. 650 milliGRAM(s) Oral every 6 hours PRN Mild Pain (1 - 3), Moderate Pain (4 - 6)  artificial  tears Solution 1 Drop(s) Both EYES three times a day PRN Dry Eyes  benzonatate 100 milliGRAM(s) Oral every 8 hours PRN Cough    CAPILLARY BLOOD GLUCOSE    I&O's Summary  PHYSICAL EXAM:  Vital Signs Last 24 Hrs  T(C): 36.6 (05 Apr 2021 11:16), Max: 36.6 (05 Apr 2021 06:30)  T(F): 97.9 (05 Apr 2021 11:16), Max: 97.9 (05 Apr 2021 11:16)  HR: 104 (05 Apr 2021 11:16) (84 - 104)  BP: 115/71 (05 Apr 2021 11:16) (98/56 - 115/71)  BP(mean): --  RR: 17 (05 Apr 2021 11:16) (17 - 20)  SpO2: 95% (05 Apr 2021 11:16) (94% - 96%)    CONSTITUTIONAL: NAD, well-developed, elderly man, comfortable appearing. NC +   RESPIRATORY: Normal respiratory effort; lungs are clear to auscultation bilaterally  CARDIOVASCULAR: Regular rate and rhythm, normal S1 and S2, no murmur/rub/gallop;   No lower extremity edema; Peripheral pulses are 2+ bilaterally  ABDOMEN: Nontender to palpation, normoactive bowel sounds,  MUSCLOSKELETAL: no clubbing or cyanosis of digits; no joint swelling or tenderness to palpation  PSYCH: A+O to person, place, and time; affect appropriate    LABS: reviewed                         10.5   6.82  )-----------( 90       ( 05 Apr 2021 08:03 )             34.0     04-05    138  |  103  |  46<H>  ----------------------------<  243<H>  4.8   |  25  |  1.29    Ca    8.0<L>      05 Apr 2021 08:03  Phos  2.7     04-05  Mg     1.9     04-05    TPro  5.9<L>  /  Alb  3.0<L>  /  TBili  0.3  /  DBili  x   /  AST  40  /  ALT  46<H>  /  AlkPhos  56  04-05    RADIOLOGY & ADDITIONAL TESTS:  Results Reviewed:   Imaging Personally Reviewed:  Electrocardiogram Personally Reviewed:    COORDINATION OF CARE:  Care Discussed with Consultants/Other Providers [Y/N]:  Prior or Outpatient Records Reviewed [Y/N]:  
[facility] Division of Hospital Medicine  Rajni Haynes MD  Pager [phone]    Patient is a 75y old  Male who presents with a chief complaint of SOB (02 Apr 2021 14:06)      SUBJECTIVE / OVERNIGHT EVENTS: pt seen and examined at 11am- said he came in bc his family wanted him evaluated for weakness.  said he's been having decr po intake, some diarrhea, nausea, leg heaviness.  denied SOB  said he had the GI w/u done from Jan and it was neg- now on eliquis.    per son Uriel over the phone at [phone]- pt with fevers and sore throat for a week  pulse ox 92% Ra at rest at home.    ADDITIONAL REVIEW OF SYSTEMS:    MEDICATIONS  (STANDING):  ALBUTerol    90 MICROgram(s) HFA Inhaler 2 Puff(s) Inhalation every 4 hours  allopurinol 300 milliGRAM(s) Oral daily  apixaban 2.5 milliGRAM(s) Oral every 12 hours  aspirin  chewable 81 milliGRAM(s) Oral daily  atorvastatin 40 milliGRAM(s) Oral at bedtime  beclomethasone  40 MICROgram(s) Inhaler 2 Puff(s) Inhalation two times a day  carvedilol 6.25 milliGRAM(s) Oral every 12 hours  omega-3-Acid Ethyl Esters 2 Gram(s) Oral two times a day  pantoprazole    Tablet 40 milliGRAM(s) Oral before breakfast  remdesivir  IVPB 100 milliGRAM(s) IV Intermittent every 24 hours  remdesivir  IVPB   IV Intermittent   sodium chloride 0.9%. 1000 milliLiter(s) (50 mL/Hr) IV Continuous <Continuous>  tiotropium 18 MICROgram(s) Capsule 1 Capsule(s) Inhalation daily    MEDICATIONS  (PRN):  acetaminophen    Suspension .. 650 milliGRAM(s) Oral every 6 hours PRN Mild Pain (1 - 3), Moderate Pain (4 - 6)  artificial  tears Solution 1 Drop(s) Both EYES three times a day PRN Dry Eyes      CAPILLARY BLOOD GLUCOSE        I&O's Summary      PHYSICAL EXAM:  Vital Signs Last 24 Hrs  T(C): 36.4 (03 Apr 2021 10:58), Max: 36.6 (02 Apr 2021 21:20)  T(F): 97.5 (03 Apr 2021 10:58), Max: 97.8 (02 Apr 2021 21:20)  HR: 89 (03 Apr 2021 10:58) (89 - 94)  BP: 101/70 (03 Apr 2021 10:58) (101/70 - 123/76)  BP(mean): --  RR: 21 (03 Apr 2021 15:24) (18 - 21)  SpO2: 93% (03 Apr 2021 15:24) (93% - 98%)  CONSTITUTIONAL: NAD,  NECK: Supple  RESPIRATORY: Normal respiratory effort; lungs are clear to auscultation bilaterally  CARDIOVASCULAR: Regular rate and rhythm, normal S1 and S2, no murmur/rub/gallop; No lower extremity edema;   ABDOMEN: Nontender to palpation, normoactive bowel sounds, not distended;   MUSCULOSKELETAL:  no clubbing or cyanosis of digits; no joint swelling or tenderness to palpation  PSYCH: A+O to person, place, and time; affect appropriate      LABS:                        11.6   2.54  )-----------( 135      ( 03 Apr 2021 08:54 )             36.8     04-03    136  |  97<L>  |  57<H>  ----------------------------<  183<H>  4.2   |  24  |  1.60<H>    Ca    8.7      03 Apr 2021 09:02  Phos  3.3     04-03  Mg     1.8     04-03    TPro  6.6  /  Alb  3.6  /  TBili  0.4  /  DBili  x   /  AST  37  /  ALT  25  /  AlkPhos  62  04-03    PT/INR - ( 02 Apr 2021 10:39 )   PT: 15.9 sec;   INR: 1.41 ratio         PTT - ( 02 Apr 2021 10:39 )  PTT:33.5 sec          Culture - Blood (collected 02 Apr 2021 14:23)  Source: .Blood Blood-Peripheral  Preliminary Report (03 Apr 2021 15:05):    No growth to date.    Culture - Blood (collected 02 Apr 2021 14:23)  Source: .Blood Blood-Venous  Preliminary Report (03 Apr 2021 15:05):    No growth to date.        RADIOLOGY & ADDITIONAL TESTS:  Results Reviewed:   Imaging Personally Reviewed:  Electrocardiogram Personally Reviewed:    COORDINATION OF CARE:  Care Discussed with Consultants/Other Providers [Y/N]:  Prior or Outpatient Records Reviewed [Y/N]:  
[facility] Division of Hospital Medicine  Rajni Haynes MD  Pager [phone]    Patient is a 75y old  Male who presents with a chief complaint of SOB (03 Apr 2021 16:10)      SUBJECTIVE / OVERNIGHT EVENTS: pt seen and examined at 11a- felt tired still; per RN pt goes into rapid afib when he coughs or moves; did not cough during evaluation- states he's coughing intermittently; pt is worried that his songua will not be walked bc he and his wife are both sick  ADDITIONAL REVIEW OF SYSTEMS:    MEDICATIONS  (STANDING):  ALBUTerol    90 MICROgram(s) HFA Inhaler 2 Puff(s) Inhalation every 4 hours  allopurinol 300 milliGRAM(s) Oral daily  apixaban 2.5 milliGRAM(s) Oral every 12 hours  aspirin  chewable 81 milliGRAM(s) Oral daily  atorvastatin 40 milliGRAM(s) Oral at bedtime  beclomethasone  80 MICROgram(s) Inhaler 1 Puff(s) Inhalation two times a day  carvedilol 6.25 milliGRAM(s) Oral every 12 hours  dexAMETHasone     Tablet 6 milliGRAM(s) Oral daily  melatonin 9 milliGRAM(s) Oral at bedtime  omega-3-Acid Ethyl Esters 4 Gram(s) Oral two times a day  pantoprazole    Tablet 40 milliGRAM(s) Oral before breakfast  remdesivir  IVPB 100 milliGRAM(s) IV Intermittent every 24 hours  remdesivir  IVPB   IV Intermittent   sodium chloride 0.9%. 1000 milliLiter(s) (50 mL/Hr) IV Continuous <Continuous>  tiotropium 18 MICROgram(s) Capsule 1 Capsule(s) Inhalation daily    MEDICATIONS  (PRN):  acetaminophen    Suspension .. 650 milliGRAM(s) Oral every 6 hours PRN Mild Pain (1 - 3), Moderate Pain (4 - 6)  artificial  tears Solution 1 Drop(s) Both EYES three times a day PRN Dry Eyes  benzonatate 100 milliGRAM(s) Oral every 8 hours PRN Cough      CAPILLARY BLOOD GLUCOSE        I&O's Summary      PHYSICAL EXAM:  Vital Signs Last 24 Hrs  T(C): 36.5 (04 Apr 2021 11:45), Max: 36.5 (04 Apr 2021 11:45)  T(F): 97.7 (04 Apr 2021 11:45), Max: 97.7 (04 Apr 2021 11:45)  HR: 106 (04 Apr 2021 11:45) (86 - 106)  BP: 107/65 (04 Apr 2021 11:45) (105/57 - 108/77)  BP(mean): --  RR: 18 (04 Apr 2021 11:45) (18 - 22)  SpO2: 93% (04 Apr 2021 11:45) (86% - 98%)  CONSTITUTIONAL: NAD, well-developed, well-groomed  EYES: conjunctiva and sclera clear  NECK: Supple  RESPIRATORY: Normal respiratory effort; lungs are clear to auscultation bilaterally  CARDIOVASCULAR: Regular rate and rhythm, normal S1 and S2, no murmur/rub/gallop; No lower extremity edema;   ABDOMEN: Nontender to palpation, normoactive bowel sounds, not distended;   MUSCULOSKELETAL:   no clubbing or cyanosis of digits; no joint swelling or tenderness to palpation  PSYCH: A+O to person, place, and time; affect appropriate      LABS:                        11.0   7.41  )-----------( 102      ( 04 Apr 2021 07:24 )             35.7     04-04    139  |  101  |  62<H>  ----------------------------<  221<H>  4.6   |  26  |  1.59<H>    Ca    8.4      04 Apr 2021 07:24  Phos  3.3     04-04  Mg     1.8     04-04    TPro  6.1  /  Alb  3.4  /  TBili  0.4  /  DBili  x   /  AST  49<H>  /  ALT  36  /  AlkPhos  59  04-04              Culture - Blood (collected 02 Apr 2021 14:23)  Source: .Blood Blood-Peripheral  Preliminary Report (03 Apr 2021 15:05):    No growth to date.    Culture - Blood (collected 02 Apr 2021 14:23)  Source: .Blood Blood-Venous  Preliminary Report (03 Apr 2021 15:05):    No growth to date.        RADIOLOGY & ADDITIONAL TESTS:  Results Reviewed:   Imaging Personally Reviewed:  Electrocardiogram Personally Reviewed:    COORDINATION OF CARE:  Care Discussed with Consultants/Other Providers [Y/N]:  Prior or Outpatient Records Reviewed [Y/N]:  
PROGRESS NOTE:     Patient is a 75y old  Male who presents with a chief complaint of SOB (06 Apr 2021 16:52)    SUBJECTIVE / OVERNIGHT EVENTS: Patient seen and evaluated at bedside. NO acute distress evident, no overnight events. Reports feeling well, wants to go home. Denies any SOB, chest pain, abdominal pain, nausea, vomiting. Ambulatory sats 87% on room air- qualifying for home o2.     ADDITIONAL REVIEW OF SYSTEMS:    MEDICATIONS  (STANDING):  ALBUTerol    90 MICROgram(s) HFA Inhaler 2 Puff(s) Inhalation every 4 hours  allopurinol 300 milliGRAM(s) Oral daily  apixaban 2.5 milliGRAM(s) Oral every 12 hours  aspirin  chewable 81 milliGRAM(s) Oral daily  atorvastatin 40 milliGRAM(s) Oral at bedtime  beclomethasone  80 MICROgram(s) Inhaler 1 Puff(s) Inhalation two times a day  carvedilol 6.25 milliGRAM(s) Oral every 12 hours  dexAMETHasone     Tablet 6 milliGRAM(s) Oral daily  dextrose 40% Gel 15 Gram(s) Oral once  dextrose 5%. 1000 milliLiter(s) (50 mL/Hr) IV Continuous <Continuous>  dextrose 5%. 1000 milliLiter(s) (100 mL/Hr) IV Continuous <Continuous>  dextrose 50% Injectable 25 Gram(s) IV Push once  dextrose 50% Injectable 12.5 Gram(s) IV Push once  dextrose 50% Injectable 25 Gram(s) IV Push once  glucagon  Injectable 1 milliGRAM(s) IntraMuscular once  insulin lispro (ADMELOG) corrective regimen sliding scale   SubCutaneous Before meals and at bedtime  melatonin 9 milliGRAM(s) Oral at bedtime  omega-3-Acid Ethyl Esters 4 Gram(s) Oral two times a day  pantoprazole    Tablet 40 milliGRAM(s) Oral before breakfast  remdesivir  IVPB   IV Intermittent   remdesivir  IVPB 100 milliGRAM(s) IV Intermittent every 24 hours  sodium chloride 0.9%. 1000 milliLiter(s) (50 mL/Hr) IV Continuous <Continuous>  tiotropium 18 MICROgram(s) Capsule 1 Capsule(s) Inhalation daily    MEDICATIONS  (PRN):  acetaminophen    Suspension .. 650 milliGRAM(s) Oral every 6 hours PRN Mild Pain (1 - 3), Moderate Pain (4 - 6)  artificial  tears Solution 1 Drop(s) Both EYES three times a day PRN Dry Eyes  benzonatate 100 milliGRAM(s) Oral every 8 hours PRN Cough    CAPILLARY BLOOD GLUCOSE  POCT Blood Glucose.: 374 mg/dL (06 Apr 2021 17:00)  POCT Blood Glucose.: 384 mg/dL (06 Apr 2021 16:45)  POCT Blood Glucose.: 132 mg/dL (06 Apr 2021 13:30)    I&O's Summary    PHYSICAL EXAM:  Vital Signs Last 24 Hrs  T(C): 37 (06 Apr 2021 13:11), Max: 37 (06 Apr 2021 13:11)  T(F): 98.6 (06 Apr 2021 13:11), Max: 98.6 (06 Apr 2021 13:11)  HR: 89 (06 Apr 2021 17:03) (89 - 112)  BP: 144/89 (06 Apr 2021 17:03) (116/67 - 144/89)  BP(mean): --  RR: 18 (06 Apr 2021 13:11) (18 - 18)  SpO2: 96% (06 Apr 2021 13:11) (87% - 97%)    CONSTITUTIONAL: NAD, well-developed, comfortable appearing. NC+  RESPIRATORY: Normal respiratory effort; lungs are clear to auscultation bilaterally  CARDIOVASCULAR: Regular rate and rhythm, normal S1 and S2,  No lower extremity edema; Peripheral pulses are 2+ bilaterally  ABDOMEN: Nontender to palpation, normoactive bowel sounds,  MUSCLOSKELETAL: no clubbing or cyanosis of digits; no joint swelling or tenderness to palpation  PSYCH: A+O to person, place, and time; affect appropriate    LABS: reviewed                         9.8    6.29  )-----------( 114      ( 06 Apr 2021 07:18 )             32.9     04-06    138  |  104  |  45<H>  ----------------------------<  320<H>  4.7   |  24  |  0.99    Ca    7.9<L>      06 Apr 2021 07:18  Phos  2.3     04-06  Mg     1.8     04-06    TPro  5.9<L>  /  Alb  3.0<L>  /  TBili  0.3  /  DBili  x   /  AST  40  /  ALT  46<H>  /  AlkPhos  56  04-05    RADIOLOGY & ADDITIONAL TESTS:  Results Reviewed:   Imaging Personally Reviewed:  Electrocardiogram Personally Reviewed:    COORDINATION OF CARE:  Care Discussed with Consultants/Other Providers [Y/N]:  Prior or Outpatient Records Reviewed [Y/N]:

## 2021-04-06 NOTE — PROGRESS NOTE ADULT - PROBLEM SELECTOR PLAN 5
- Hx of CHF. TTE in 2017 showing EF 62%, nm LV, mild MR and AS.   - C/w Carvedilol 6.125mg BID   - Check new Echo
Hx of CHF. TTE in 2017 showing EF 62%, nm LV, mild MR and AS.   - C/w carvedilol 6.125mg BID   - F/u proBNP
Hx of afib. Recently changed from coumadin to Eliquis. EKG on admission with afib with RVR. Tachycardic likely in the setting of infection/sepsis.   - Resume home medications, Carvedilol 6.125mg BID   - Eliquis 2.5mg BID
Hx of CHF. TTE in 2017 showing EF 62%, nm LV, mild MR and AS.   - C/w carvedilol 6.125mg BID   check new echo

## 2021-04-07 ENCOUNTER — TRANSCRIPTION ENCOUNTER (OUTPATIENT)
Age: 76
End: 2021-04-07

## 2021-04-07 LAB
CULTURE RESULTS: SIGNIFICANT CHANGE UP
CULTURE RESULTS: SIGNIFICANT CHANGE UP
SPECIMEN SOURCE: SIGNIFICANT CHANGE UP
SPECIMEN SOURCE: SIGNIFICANT CHANGE UP

## 2021-04-16 NOTE — PROGRESS NOTE ADULT - SUBJECTIVE AND OBJECTIVE BOX
House supervisor has been notified. [place] [facility] [place] at 0745 and will be admitted at 0800.     Writer LM per Snapshot with date and time of admission.   APPLE CROW:[id number],   72yMale followed for: anemia  penicillin (Unknown)    PAST MEDICAL & SURGICAL HISTORY:  Sleep apnea  GERD (gastroesophageal reflux disease)  BPH (benign prostatic hyperplasia)  CHF (congestive heart failure)  MI (myocardial infarction)  Hypertension  Essential hypertension, benign  Diabetes insipidus  AF (atrial fibrillation)  No significant past surgical history    FAMILY HISTORY:  No pertinent family history in first degree relatives    MEDICATIONS  (STANDING):  aspirin enteric coated 81 milliGRAM(s) Oral daily  atorvastatin 40 milliGRAM(s) Oral at bedtime  beclomethasone  80 MICROgram(s) Inhaler 1 Puff(s) Inhalation two times a day  calcium carbonate 500 mG (Tums) Chewable 1 Tablet(s) Chew daily  carvedilol 6.25 milliGRAM(s) Oral every 12 hours  dextrose 5%. 1000 milliLiter(s) (50 mL/Hr) IV Continuous <Continuous>  dextrose 50% Injectable 12.5 Gram(s) IV Push once  dextrose 50% Injectable 25 Gram(s) IV Push once  dextrose 50% Injectable 25 Gram(s) IV Push once  digoxin     Tablet 0.125 milliGRAM(s) Oral daily  digoxin  Injectable 0.25 milliGRAM(s) IV Push every 8 hours  influenza   Vaccine 0.5 milliLiter(s) IntraMuscular once  insulin lispro (HumaLOG) corrective regimen sliding scale   SubCutaneous three times a day before meals  insulin lispro (HumaLOG) corrective regimen sliding scale   SubCutaneous at bedtime  omega-3-Acid Ethyl Esters 2 Gram(s) Oral two times a day  pantoprazole    Tablet 40 milliGRAM(s) Oral before breakfast  tamsulosin 0.4 milliGRAM(s) Oral at bedtime  tiotropium 18 MICROgram(s) Capsule 1 Capsule(s) Inhalation daily    MEDICATIONS  (PRN):  dextrose Gel 1 Dose(s) Oral once PRN Blood Glucose LESS THAN 70 milliGRAM(s)/deciliter  glucagon  Injectable 1 milliGRAM(s) IntraMuscular once PRN Glucose LESS THAN 70 milligrams/deciliter  melatonin 3 milliGRAM(s) Oral at bedtime PRN Insomnia      Vital Signs Last 24 Hrs  T(C): 36.8 (02 Nov 2017 06:06), Max: 37 (01 Nov 2017 13:20)  T(F): 98.2 (02 Nov 2017 06:06), Max: 98.6 (01 Nov 2017 13:20)  HR: 108 (02 Nov 2017 07:30) (70 - 140)  BP: 97/69 (02 Nov 2017 06:06) (87/51 - 108/76)  BP(mean): --  RR: 16 (02 Nov 2017 06:06) (16 - 18)  SpO2: 93% (02 Nov 2017 07:30) (92% - 100%)  nc/at  s1s2  cta  soft, nt, nd no guarding or rebound  no c/c/e    CBC Full  -  ( 02 Nov 2017 06:45 )  WBC Count : 6.24 K/uL  Hemoglobin : 6.8 g/dL  Hematocrit : 21.6 %  Platelet Count - Automated : 140 K/uL  Mean Cell Volume : 90.8 fL  Mean Cell Hemoglobin : 28.6 pg  Mean Cell Hemoglobin Concentration : 31.5 %  Auto Neutrophil # : x  Auto Lymphocyte # : x  Auto Monocyte # : x  Auto Eosinophil # : x  Auto Basophil # : x  Auto Neutrophil % : x  Auto Lymphocyte % : x  Auto Monocyte % : x  Auto Eosinophil % : x  Auto Basophil % : x    11-02    137  |  100  |  49<H>  ----------------------------<  110<H>  4.0   |  24  |  1.34<H>    Ca    8.2<L>      02 Nov 2017 06:45  Phos  3.2     11-02  Mg     1.7     11-02    TPro  6.8  /  Alb  3.9  /  TBili  0.5  /  DBili  x   /  AST  17  /  ALT  13  /  AlkPhos  48  10-31    PT/INR - ( 01 Nov 2017 07:00 )   PT: 13.4 SEC;   INR: 1.19          PTT - ( 31 Oct 2017 18:03 )  PTT:27.0 SEC

## 2021-07-22 NOTE — ED ADULT NURSE NOTE - CHIEF COMPLAINT QUOTE
Report received from JENNIFER Romero at 12:00.   PT C/O black stools x 5 days, ABD cramping. PHX: AFIB on coumadin, MI, HF, HTN, DM.

## 2021-10-11 NOTE — ED PROVIDER NOTE - CRITICAL CARE INDICATION, MLM
Patient was critically ill with a high probability of imminent or life threatening deterioration. patient was critically ill... Spiral Flap Text: The defect edges were debeveled with a #15 scalpel blade.  Given the location of the defect, shape of the defect and the proximity to free margins a spiral flap was deemed most appropriate.  Using a sterile surgical marker, an appropriate rotation flap was drawn incorporating the defect and placing the expected incisions within the relaxed skin tension lines where possible. The area thus outlined was incised deep to adipose tissue with a #15 scalpel blade.  The skin margins were undermined to an appropriate distance in all directions utilizing iris scissors.

## 2022-01-04 NOTE — PROGRESS NOTE ADULT - SUBJECTIVE AND OBJECTIVE BOX
Pt returned to [facility] room 16. Vitals and assessment as charted. 0.9 infusing, @500  ml to count from PACU. Pt has crackers and water. Family at the bedside. Pt and family verbalized understanding of discharge criteria and call light use. Call light in reach. Cardiovascular Disease Progress Note    Overnight events: No acute events overnight. Mr. Pinto feels well. No chest pain or SOB.   Otherwise review of systems negative    Objective Findings:  T(C): 37 (17 @ 06:30), Max: 37 (17 @ 06:30)  HR: 93 (17 @ 07:35) (74 - 107)  BP: 106/65 (17 @ 06:30) (106/65 - 125/82)  RR: 16 (17 @ 06:30) (15 - 17)  SpO2: 96% (17 @ 07:35) (92% - 100%)  Wt(kg): --  Daily     Daily Weight in k.1 (2017 07:35)      Physical Exam:  Gen: NAD  HEENT: EOMI  CV: IIR, normal S1 + S2, no m/r/g  Lungs: CTAB  Abd: soft, non-tender  Ext: No edema    Telemetry: a-fib 80s    Laboratory Data:                        9.3    6.06  )-----------( 135      ( 2017 07:00 )             29.3     11-07    140  |  101  |  15  ----------------------------<  128<H>  4.1   |  26  |  1.35<H>    Ca    8.4      2017 07:00  Phos  3.9     11-  Mg     2.0     11-07      Inpatient Medications:  MEDICATIONS  (STANDING):  aspirin enteric coated 81 milliGRAM(s) Oral daily  atorvastatin 40 milliGRAM(s) Oral at bedtime  beclomethasone  80 MICROgram(s) Inhaler 1 Puff(s) Inhalation two times a day  calcium carbonate 500 mG (Tums) Chewable 1 Tablet(s) Chew daily  carvedilol 3.125 milliGRAM(s) Oral every 12 hours  cyanocobalamin 1000 MICROGram(s) Oral daily  dextrose 5%. 1000 milliLiter(s) (50 mL/Hr) IV Continuous <Continuous>  dextrose 50% Injectable 12.5 Gram(s) IV Push once  dextrose 50% Injectable 25 Gram(s) IV Push once  dextrose 50% Injectable 25 Gram(s) IV Push once  ferrous    sulfate 325 milliGRAM(s) Oral daily  furosemide    Tablet 40 milliGRAM(s) Oral daily  gabapentin 300 milliGRAM(s) Oral three times a day  influenza   Vaccine 0.5 milliLiter(s) IntraMuscular once  insulin lispro (HumaLOG) corrective regimen sliding scale   SubCutaneous three times a day before meals  insulin lispro (HumaLOG) corrective regimen sliding scale   SubCutaneous at bedtime  omega-3-Acid Ethyl Esters 2 Gram(s) Oral two times a day  pantoprazole  Injectable 40 milliGRAM(s) IV Push every 12 hours  tamsulosin 0.4 milliGRAM(s) Oral at bedtime  tiotropium 18 MICROgram(s) Capsule 1 Capsule(s) Inhalation daily      Assessment: 72 year old man with CAD, a-fib, and HERIBERTO presents with syncope in the setting of acute blood loss anemia and SHEYLA.    #Syncope- in the setting of acute blood loss anemia and FOBT+ stools.  EGD/colonoscopy performed - no signs of acute bleed; possible secondary to diverticulosis.   No signs or symptoms of post-procedural ACS or decompensated CHF.    #A-fib- better rate controlled   CHADSII-Vasc = 4  Case discussed with GI- plan to resume warfarin after capsule study, as no obvious source of bleeding was identified.   Risk and benefits explained to the patient.   Continue ASA.  Continue Coreg.    #Moderate Aortic stenosis-   With preserved LV dysfunction.  Monitor with serial echos q 6-12 months.    #Anemia- Fe studies consistent with Fe deficiency with low ferritin.  Hematology input noted- patient s/p IV iron.     #SHEYLA- likely due to poor perfusion in the setting of anemia, and a-fib with RVR.   Resume Lasix today, as patient tolerating PO.    D/C Planning today.      Over 35 minutes spent on total encounter; more than 50% of the visit was spent counseling and/or coordinating care by the attending physician.      Ed Garcia M.D.   Cardiovascular Disease  [phone] Cardiovascular Disease Progress Note    Overnight events: No acute events overnight. Mr. Pinto feels well. No chest pain or SOB.   Otherwise review of systems negative    Objective Findings:  T(C): 37 (17 @ 06:30), Max: 37 (17 @ 06:30)  HR: 93 (17 @ 07:35) (74 - 107)  BP: 106/65 (17 @ 06:30) (106/65 - 125/82)  RR: 16 (17 @ 06:30) (15 - 17)  SpO2: 96% (17 @ 07:35) (92% - 100%)  Wt(kg): --  Daily     Daily Weight in k.1 (2017 07:35)      Physical Exam:  Gen: NAD  HEENT: EOMI  CV: IIR, normal S1 + S2  Lungs: CTAB  Abd: soft, non-tender  Ext: No edema    Telemetry: a-fib 80s    Laboratory Data:                        9.3    6.06  )-----------( 135      ( 2017 07:00 )             29.3     11-07    140  |  101  |  15  ----------------------------<  128<H>  4.1   |  26  |  1.35<H>    Ca    8.4      2017 07:00  Phos  3.9     11-  Mg     2.0     11-      Inpatient Medications:  MEDICATIONS  (STANDING):  aspirin enteric coated 81 milliGRAM(s) Oral daily  atorvastatin 40 milliGRAM(s) Oral at bedtime  beclomethasone  80 MICROgram(s) Inhaler 1 Puff(s) Inhalation two times a day  calcium carbonate 500 mG (Tums) Chewable 1 Tablet(s) Chew daily  carvedilol 3.125 milliGRAM(s) Oral every 12 hours  cyanocobalamin 1000 MICROGram(s) Oral daily  dextrose 5%. 1000 milliLiter(s) (50 mL/Hr) IV Continuous <Continuous>  dextrose 50% Injectable 12.5 Gram(s) IV Push once  dextrose 50% Injectable 25 Gram(s) IV Push once  dextrose 50% Injectable 25 Gram(s) IV Push once  ferrous    sulfate 325 milliGRAM(s) Oral daily  furosemide    Tablet 40 milliGRAM(s) Oral daily  gabapentin 300 milliGRAM(s) Oral three times a day  influenza   Vaccine 0.5 milliLiter(s) IntraMuscular once  insulin lispro (HumaLOG) corrective regimen sliding scale   SubCutaneous three times a day before meals  insulin lispro (HumaLOG) corrective regimen sliding scale   SubCutaneous at bedtime  omega-3-Acid Ethyl Esters 2 Gram(s) Oral two times a day  pantoprazole  Injectable 40 milliGRAM(s) IV Push every 12 hours  tamsulosin 0.4 milliGRAM(s) Oral at bedtime  tiotropium 18 MICROgram(s) Capsule 1 Capsule(s) Inhalation daily      Assessment: 72 year old man with CAD, a-fib, and HERIBERTO presents with syncope in the setting of acute blood loss anemia and SHEYLA.    #Syncope- in the setting of acute blood loss anemia and FOBT+ stools.  EGD/colonoscopy performed - no signs of acute bleed; possible secondary to diverticulosis.   No signs or symptoms of post-procedural ACS or decompensated CHF.    #A-fib- better rate controlled   CHADSII-Vasc = 4  Case discussed with GI- plan to resume warfarin after capsule study, as no obvious source of bleeding was identified.   Risk and benefits explained to the patient.   Continue ASA.  Continue Coreg.    #Moderate Aortic stenosis-   With preserved LV dysfunction.  Monitor with serial echos q 6-12 months.    #Anemia- Fe studies consistent with Fe deficiency with low ferritin.  Hematology input noted- patient s/p IV iron.     #SHEYLA- likely due to poor perfusion in the setting of anemia, and a-fib with RVR.   Resume Lasix today, as patient tolerating PO.    D/C Planning today.  Patient will f/u with Dr. Mccray for capsule study and his private cardiologist.       Over 35 minutes spent on total encounter; more than 50% of the visit was spent counseling and/or coordinating care by the attending physician.      Ed Garcia M.D.   Cardiovascular Disease  [phone]

## 2022-01-19 NOTE — DISCHARGE NOTE ADULT - USE THE 5 A'S (ASK, ADVISE, ASSESS, ASSIST, ARRANGE)
I lmohm informing pt of this and to prep for colon then she will be ready for EGD  I asked her to please call back if she had any questions  Statement Selected

## 2022-03-09 NOTE — DISCHARGE NOTE ADULT - NS MD DC PLAN IMMU FLU REF OTH
Called patient to schedule the following:   Dr. Donald and Angela in June 2022  Carotid US in June 2022  Pet myocardial in June 2022 [address]  Echo due in June 2022 [address]  Dr. Shine in July 2022    Patient has no voicemail set up.  Writer will try again later to call patient.   Refused

## 2022-03-17 NOTE — DISCHARGE NOTE NURSING/CASE MANAGEMENT/SOCIAL WORK - NSDCPEPT PROEDHF_GEN_ALL_CORE
Call primary care provider for follow up after discharge/Activities as tolerated/Low salt diet/Monitor weight daily/Report signs and symptoms to primary care provider
Temples.../Clavicles.../Calf.../Dorsal hand...

## 2022-05-25 ENCOUNTER — APPOINTMENT (OUTPATIENT)
Dept: ORTHOPEDIC SURGERY | Facility: CLINIC | Age: 77
End: 2022-05-25
Payer: MEDICARE

## 2022-05-25 VITALS — BODY MASS INDEX: 30.05 KG/M2 | HEIGHT: 66 IN | WEIGHT: 187 LBS

## 2022-05-25 DIAGNOSIS — M67.911 UNSPECIFIED DISORDER OF SYNOVIUM AND TENDON, RIGHT SHOULDER: ICD-10-CM

## 2022-05-25 DIAGNOSIS — M67.912 UNSPECIFIED DISORDER OF SYNOVIUM AND TENDON, RIGHT SHOULDER: ICD-10-CM

## 2022-05-25 PROCEDURE — 20610 DRAIN/INJ JOINT/BURSA W/O US: CPT | Mod: LT

## 2022-05-25 PROCEDURE — 99203 OFFICE O/P NEW LOW 30 MIN: CPT | Mod: 25

## 2022-05-25 PROCEDURE — 73030 X-RAY EXAM OF SHOULDER: CPT | Mod: LT

## 2022-05-25 NOTE — DISCUSSION/SUMMARY
[de-identified] : Bilateral shoulder pain significant weakness with rotator cuff testing.  Concern for bilateral rotator cuff tears.  We discussed diagnostic options including MRI we discussed treatment options at length.  He requested injections today\par \par Injection: Right shoulder (Subacromial).\par Indication: Rotator cuff tear.\par \par A discussion was had with the patient regarding this procedure and all questions were answered. All risks, benefits and alternatives were discussed. These included but were not limited to bleeding, infection, and allergic reaction. Alcohol was used to clean the skin, and betadine was used to sterilize and prep the area in the posterior aspect of the right shoulder. Ethyl chloride spray was then used as a topical anesthetic. A 21-gauge needle was used to inject 4cc of 1% lidocaine and 1cc of 40mg/ml methylprednisolone into the right subacromial space. A sterile bandage was then applied. The patient tolerated the procedure well and there were no complications. \par \par Injection: Left shoulder (Subacromial).\par Indication: Rotator cuff tear.\par \par A discussion was had with the patient regarding this procedure and all questions were answered. All risks, benefits and alternatives were discussed. These included but were not limited to bleeding, infection, and allergic reaction. Alcohol was used to clean the skin, and betadine was used to sterilize and prep the area in the posterior aspect of the left shoulder. Ethyl chloride spray was then used as a topical anesthetic. A 21-gauge needle was used to inject 4cc of 1% lidocaine and 1cc of 40mg/ml methylprednisolone into the left subacromial space. A sterile bandage was then applied. The patient tolerated the procedure well and there were no complications. \par \par Physical therapy.  Follow-up as needed.  If not improved will obtain MRIs.  All questions answered

## 2022-05-25 NOTE — PHYSICAL EXAM
[de-identified] : General Exam\par \par Well developed, well nourished\par No apparent distress\par Oriented to person, place, and time\par Mood: Normal\par Affect: Normal\par Balance and coordination: Normal\par Gait: Normal\par \par Left shoulder exam\par \par Inspection: No swelling, ecchymosis or gross deformity.\par Skin: No masses, No lesions\par Tenderness: No bicipital tenderness, no tenderness to the greater tuberosity/RTC insertion, no anterior shoulder/lesser tuberosity tenderness. No tenderness SC joint, clavicle, AC joint.\par ROM: 160/60/T6\par Impingement tests: Positive Daniels\par AC Joint: no pain with cross arm testing\par Biceps: Negative speed\par Strength: 4/5 abduction, external rotation, and internal rotation  Positive drop arm positive external rotation lag positive belly press\par Neuro: AIN, PIN, Ulnar nerve motor intact\par Sensation: Intact to light touch in radial, median, ulnar, and axillary nerve distributions\par Vasc: 2+ radial pulse\par \par Right shoulder exam\par \par Inspection: No swelling, ecchymosis or gross deformity.\par Skin: No masses, No lesions\par Tenderness: No bicipital tenderness, no tenderness to the greater tuberosity/RTC insertion, no anterior shoulder/lesser tuberosity tenderness. No tenderness SC joint, clavicle, AC joint.\par ROM: 160/60/T6\par Impingement tests: Positive Daniels\par AC Joint: no pain with cross arm testing\par Biceps: Negative speed\par Strength: 4/5 abduction, external rotation, and internal rotation.  Positive drop arm positive external rotation lag positive belly press\par Neuro: AIN, PIN, Ulnar nerve motor intact\par Sensation: Intact to light touch in radial, median, ulnar, and axillary nerve distributions\par Vasc: 2+ radial pulse\par  [de-identified] : \par The following radiographs were ordered and read by me during this patients visit. I reviewed each radiograph in detail with the patient and discussed the findings as highlighted below. \par 3 views of both shoulders were obtained today.  There is mild glenohumeral joint spaces otherwise well-maintained.  No fracture

## 2022-08-10 NOTE — PROGRESS NOTE ADULT - PROBLEM SELECTOR PROBLEM 3
Department of Anesthesiology  Postprocedure Note    Patient: Isaura Jimenez  MRN: [id number]  YOB: 1944  Date of evaluation: 8/10/2022      Procedure Summary     Date: 08/10/22 Room / Location: [address] [place] [address] / [address] [place] [address]    Anesthesia Start: 1027 Anesthesia Stop: 3262    Procedure: CHOLECYSTECTOMY LAPAROSCOPIC WITH INTRAOPERATIVE CHOLANGIOGRAM (Abdomen) Diagnosis:       Acute cholecystitis      (Acute cholecystitis [K81.0])    Surgeons: Mulugeta Hamm MD Responsible Provider: Clay Emery DO    Anesthesia Type: general ASA Status: 4          Anesthesia Type: No value filed.     Lucero Phase I:      Lucero Phase II:        Anesthesia Post Evaluation    Patient location during evaluation: bedside  Patient participation: complete - patient participated  Level of consciousness: awake  Pain score: 3  Airway patency: patent  Nausea & Vomiting: no vomiting and no nausea  Complications: no  Cardiovascular status: hemodynamically stable  Respiratory status: acceptable  Hydration status: stable
SHEYLA (acute kidney injury)
Hyperglycemia

## 2022-09-30 ENCOUNTER — INPATIENT (INPATIENT)
Facility: HOSPITAL | Age: 77
LOS: 6 days | Discharge: HOME | End: 2022-10-07
Attending: STUDENT IN AN ORGANIZED HEALTH CARE EDUCATION/TRAINING PROGRAM | Admitting: STUDENT IN AN ORGANIZED HEALTH CARE EDUCATION/TRAINING PROGRAM

## 2022-09-30 VITALS
RESPIRATION RATE: 16 BRPM | HEART RATE: 122 BPM | DIASTOLIC BLOOD PRESSURE: 66 MMHG | OXYGEN SATURATION: 95 % | SYSTOLIC BLOOD PRESSURE: 95 MMHG | TEMPERATURE: 99 F | HEIGHT: 64.96 IN

## 2022-09-30 DIAGNOSIS — A41.9 SEPSIS, UNSPECIFIED ORGANISM: ICD-10-CM

## 2022-09-30 LAB
ALBUMIN SERPL ELPH-MCNC: 4.2 G/DL — SIGNIFICANT CHANGE UP (ref 3.3–5)
ALP SERPL-CCNC: 84 U/L — SIGNIFICANT CHANGE UP (ref 40–120)
ALT FLD-CCNC: 13 U/L — SIGNIFICANT CHANGE UP (ref 4–41)
ANION GAP SERPL CALC-SCNC: 14 MMOL/L — SIGNIFICANT CHANGE UP (ref 7–14)
APPEARANCE UR: CLEAR — SIGNIFICANT CHANGE UP
APTT BLD: 30.8 SEC — SIGNIFICANT CHANGE UP (ref 27–36.3)
AST SERPL-CCNC: 17 U/L — SIGNIFICANT CHANGE UP (ref 4–40)
B PERT DNA SPEC QL NAA+PROBE: SIGNIFICANT CHANGE UP
B PERT+PARAPERT DNA PNL SPEC NAA+PROBE: SIGNIFICANT CHANGE UP
BASOPHILS # BLD AUTO: 0.01 K/UL — SIGNIFICANT CHANGE UP (ref 0–0.2)
BASOPHILS NFR BLD AUTO: 0.1 % — SIGNIFICANT CHANGE UP (ref 0–2)
BILIRUB SERPL-MCNC: 1.3 MG/DL — HIGH (ref 0.2–1.2)
BILIRUB UR-MCNC: NEGATIVE — SIGNIFICANT CHANGE UP
BLD GP AB SCN SERPL QL: NEGATIVE — SIGNIFICANT CHANGE UP
BLOOD GAS VENOUS COMPREHENSIVE RESULT: SIGNIFICANT CHANGE UP
BORDETELLA PARAPERTUSSIS (RAPRVP): SIGNIFICANT CHANGE UP
BUN SERPL-MCNC: 87 MG/DL — HIGH (ref 7–23)
C PNEUM DNA SPEC QL NAA+PROBE: SIGNIFICANT CHANGE UP
CALCIUM SERPL-MCNC: 10.1 MG/DL — SIGNIFICANT CHANGE UP (ref 8.4–10.5)
CHLORIDE SERPL-SCNC: 101 MMOL/L — SIGNIFICANT CHANGE UP (ref 98–107)
CO2 SERPL-SCNC: 25 MMOL/L — SIGNIFICANT CHANGE UP (ref 22–31)
COLOR SPEC: SIGNIFICANT CHANGE UP
CREAT SERPL-MCNC: 2.2 MG/DL — HIGH (ref 0.5–1.3)
CRP SERPL-MCNC: 79.5 MG/L — HIGH
DIFF PNL FLD: NEGATIVE — SIGNIFICANT CHANGE UP
EGFR: 30 ML/MIN/1.73M2 — LOW
EOSINOPHIL # BLD AUTO: 0.02 K/UL — SIGNIFICANT CHANGE UP (ref 0–0.5)
EOSINOPHIL NFR BLD AUTO: 0.3 % — SIGNIFICANT CHANGE UP (ref 0–6)
ERYTHROCYTE [SEDIMENTATION RATE] IN BLOOD: 92 MM/HR — HIGH (ref 1–15)
FLUAV SUBTYP SPEC NAA+PROBE: SIGNIFICANT CHANGE UP
FLUBV RNA SPEC QL NAA+PROBE: SIGNIFICANT CHANGE UP
GLUCOSE SERPL-MCNC: 167 MG/DL — HIGH (ref 70–99)
GLUCOSE UR QL: NEGATIVE — SIGNIFICANT CHANGE UP
HADV DNA SPEC QL NAA+PROBE: SIGNIFICANT CHANGE UP
HCOV 229E RNA SPEC QL NAA+PROBE: SIGNIFICANT CHANGE UP
HCOV HKU1 RNA SPEC QL NAA+PROBE: SIGNIFICANT CHANGE UP
HCOV NL63 RNA SPEC QL NAA+PROBE: SIGNIFICANT CHANGE UP
HCOV OC43 RNA SPEC QL NAA+PROBE: SIGNIFICANT CHANGE UP
HCT VFR BLD CALC: 35.6 % — LOW (ref 39–50)
HGB BLD-MCNC: 10.6 G/DL — LOW (ref 13–17)
HMPV RNA SPEC QL NAA+PROBE: SIGNIFICANT CHANGE UP
HPIV1 RNA SPEC QL NAA+PROBE: SIGNIFICANT CHANGE UP
HPIV2 RNA SPEC QL NAA+PROBE: SIGNIFICANT CHANGE UP
HPIV3 RNA SPEC QL NAA+PROBE: SIGNIFICANT CHANGE UP
HPIV4 RNA SPEC QL NAA+PROBE: SIGNIFICANT CHANGE UP
IANC: 6.18 K/UL — SIGNIFICANT CHANGE UP (ref 1.8–7.4)
IMM GRANULOCYTES NFR BLD AUTO: 0.6 % — SIGNIFICANT CHANGE UP (ref 0–0.9)
INR BLD: 1.45 RATIO — HIGH (ref 0.88–1.16)
KETONES UR-MCNC: NEGATIVE — SIGNIFICANT CHANGE UP
LEUKOCYTE ESTERASE UR-ACNC: NEGATIVE — SIGNIFICANT CHANGE UP
LYMPHOCYTES # BLD AUTO: 0.94 K/UL — LOW (ref 1–3.3)
LYMPHOCYTES # BLD AUTO: 12 % — LOW (ref 13–44)
M PNEUMO DNA SPEC QL NAA+PROBE: SIGNIFICANT CHANGE UP
MCHC RBC-ENTMCNC: 26.4 PG — LOW (ref 27–34)
MCHC RBC-ENTMCNC: 29.8 GM/DL — LOW (ref 32–36)
MCV RBC AUTO: 88.6 FL — SIGNIFICANT CHANGE UP (ref 80–100)
MONOCYTES # BLD AUTO: 0.66 K/UL — SIGNIFICANT CHANGE UP (ref 0–0.9)
MONOCYTES NFR BLD AUTO: 8.4 % — SIGNIFICANT CHANGE UP (ref 2–14)
NEUTROPHILS # BLD AUTO: 6.18 K/UL — SIGNIFICANT CHANGE UP (ref 1.8–7.4)
NEUTROPHILS NFR BLD AUTO: 78.6 % — HIGH (ref 43–77)
NITRITE UR-MCNC: NEGATIVE — SIGNIFICANT CHANGE UP
NRBC # BLD: 0 /100 WBCS — SIGNIFICANT CHANGE UP (ref 0–0)
NRBC # FLD: 0 K/UL — SIGNIFICANT CHANGE UP (ref 0–0)
PH UR: 6 — SIGNIFICANT CHANGE UP (ref 5–8)
PLATELET # BLD AUTO: 117 K/UL — LOW (ref 150–400)
POTASSIUM SERPL-MCNC: 5.2 MMOL/L — SIGNIFICANT CHANGE UP (ref 3.5–5.3)
POTASSIUM SERPL-SCNC: 5.2 MMOL/L — SIGNIFICANT CHANGE UP (ref 3.5–5.3)
PROT SERPL-MCNC: 7.2 G/DL — SIGNIFICANT CHANGE UP (ref 6–8.3)
PROT UR-MCNC: NEGATIVE — SIGNIFICANT CHANGE UP
PROTHROM AB SERPL-ACNC: 16.9 SEC — HIGH (ref 10.5–13.4)
RAPID RVP RESULT: SIGNIFICANT CHANGE UP
RBC # BLD: 4.02 M/UL — LOW (ref 4.2–5.8)
RBC # FLD: 21.4 % — HIGH (ref 10.3–14.5)
RH IG SCN BLD-IMP: POSITIVE — SIGNIFICANT CHANGE UP
RSV RNA SPEC QL NAA+PROBE: SIGNIFICANT CHANGE UP
RV+EV RNA SPEC QL NAA+PROBE: SIGNIFICANT CHANGE UP
SARS-COV-2 RNA SPEC QL NAA+PROBE: SIGNIFICANT CHANGE UP
SODIUM SERPL-SCNC: 140 MMOL/L — SIGNIFICANT CHANGE UP (ref 135–145)
SP GR SPEC: 1.01 — SIGNIFICANT CHANGE UP (ref 1.01–1.05)
UROBILINOGEN FLD QL: SIGNIFICANT CHANGE UP
WBC # BLD: 7.86 K/UL — SIGNIFICANT CHANGE UP (ref 3.8–10.5)
WBC # FLD AUTO: 7.86 K/UL — SIGNIFICANT CHANGE UP (ref 3.8–10.5)

## 2022-09-30 PROCEDURE — 71045 X-RAY EXAM CHEST 1 VIEW: CPT | Mod: 26

## 2022-09-30 PROCEDURE — 76882 US LMTD JT/FCL EVL NVASC XTR: CPT | Mod: 26,RT

## 2022-09-30 PROCEDURE — 93010 ELECTROCARDIOGRAM REPORT: CPT

## 2022-09-30 PROCEDURE — 99285 EMERGENCY DEPT VISIT HI MDM: CPT | Mod: 25

## 2022-09-30 PROCEDURE — 99223 1ST HOSP IP/OBS HIGH 75: CPT | Mod: GC

## 2022-09-30 PROCEDURE — 73590 X-RAY EXAM OF LOWER LEG: CPT | Mod: 26,RT

## 2022-09-30 RX ORDER — SODIUM CHLORIDE 9 MG/ML
1000 INJECTION, SOLUTION INTRAVENOUS
Refills: 0 | Status: DISCONTINUED | OUTPATIENT
Start: 2022-09-30 | End: 2022-10-07

## 2022-09-30 RX ORDER — DEXTROSE 50 % IN WATER 50 %
15 SYRINGE (ML) INTRAVENOUS ONCE
Refills: 0 | Status: DISCONTINUED | OUTPATIENT
Start: 2022-09-30 | End: 2022-10-07

## 2022-09-30 RX ORDER — ACETAMINOPHEN 500 MG
1000 TABLET ORAL ONCE
Refills: 0 | Status: COMPLETED | OUTPATIENT
Start: 2022-09-30 | End: 2022-09-30

## 2022-09-30 RX ORDER — GLUCAGON INJECTION, SOLUTION 0.5 MG/.1ML
1 INJECTION, SOLUTION SUBCUTANEOUS ONCE
Refills: 0 | Status: DISCONTINUED | OUTPATIENT
Start: 2022-09-30 | End: 2022-10-07

## 2022-09-30 RX ORDER — VANCOMYCIN HCL 1 G
1000 VIAL (EA) INTRAVENOUS ONCE
Refills: 0 | Status: COMPLETED | OUTPATIENT
Start: 2022-09-30 | End: 2022-09-30

## 2022-09-30 RX ORDER — TAMSULOSIN HYDROCHLORIDE 0.4 MG/1
1 CAPSULE ORAL
Qty: 0 | Refills: 0 | DISCHARGE

## 2022-09-30 RX ORDER — DEXTROSE 50 % IN WATER 50 %
12.5 SYRINGE (ML) INTRAVENOUS ONCE
Refills: 0 | Status: DISCONTINUED | OUTPATIENT
Start: 2022-09-30 | End: 2022-10-07

## 2022-09-30 RX ORDER — SODIUM CHLORIDE 9 MG/ML
500 INJECTION INTRAMUSCULAR; INTRAVENOUS; SUBCUTANEOUS ONCE
Refills: 0 | Status: COMPLETED | OUTPATIENT
Start: 2022-09-30 | End: 2022-09-30

## 2022-09-30 RX ORDER — LANOLIN ALCOHOL/MO/W.PET/CERES
3 CREAM (GRAM) TOPICAL AT BEDTIME
Refills: 0 | Status: DISCONTINUED | OUTPATIENT
Start: 2022-09-30 | End: 2022-10-07

## 2022-09-30 RX ORDER — CEFAZOLIN SODIUM 1 G
2000 VIAL (EA) INJECTION ONCE
Refills: 0 | Status: COMPLETED | OUTPATIENT
Start: 2022-09-30 | End: 2022-09-30

## 2022-09-30 RX ORDER — ONDANSETRON 8 MG/1
4 TABLET, FILM COATED ORAL EVERY 8 HOURS
Refills: 0 | Status: DISCONTINUED | OUTPATIENT
Start: 2022-09-30 | End: 2022-10-07

## 2022-09-30 RX ORDER — INSULIN LISPRO 100/ML
VIAL (ML) SUBCUTANEOUS
Refills: 0 | Status: DISCONTINUED | OUTPATIENT
Start: 2022-09-30 | End: 2022-10-07

## 2022-09-30 RX ORDER — DEXTROSE 50 % IN WATER 50 %
25 SYRINGE (ML) INTRAVENOUS ONCE
Refills: 0 | Status: DISCONTINUED | OUTPATIENT
Start: 2022-09-30 | End: 2022-10-07

## 2022-09-30 RX ORDER — METFORMIN HYDROCHLORIDE 850 MG/1
0 TABLET ORAL
Qty: 0 | Refills: 0 | DISCHARGE

## 2022-09-30 RX ORDER — METOPROLOL TARTRATE 50 MG
0 TABLET ORAL
Qty: 0 | Refills: 0 | DISCHARGE

## 2022-09-30 RX ORDER — ACETAMINOPHEN 500 MG
650 TABLET ORAL EVERY 6 HOURS
Refills: 0 | Status: DISCONTINUED | OUTPATIENT
Start: 2022-09-30 | End: 2022-10-07

## 2022-09-30 RX ADMIN — SODIUM CHLORIDE 500 MILLILITER(S): 9 INJECTION INTRAMUSCULAR; INTRAVENOUS; SUBCUTANEOUS at 12:28

## 2022-09-30 RX ADMIN — SODIUM CHLORIDE 500 MILLILITER(S): 9 INJECTION INTRAMUSCULAR; INTRAVENOUS; SUBCUTANEOUS at 20:40

## 2022-09-30 RX ADMIN — Medication 250 MILLIGRAM(S): at 14:15

## 2022-09-30 RX ADMIN — Medication 100 MILLIGRAM(S): at 12:28

## 2022-09-30 RX ADMIN — Medication 400 MILLIGRAM(S): at 12:28

## 2022-09-30 NOTE — ED ADULT NURSE REASSESSMENT NOTE - PATIENT ON (OXYGEN DELIVERY METHOD)
-IV diuresis.   -Continue metoprolol.   -Monitor daily weights as well as strict intake and output.      room air

## 2022-09-30 NOTE — H&P ADULT - PROBLEM SELECTOR PLAN 6
Holding regimen i/s/o hypotension/sepsis and SHEYLA  -holding home Carvedilol 6.25mg BID  -holding home Valsartan--1.25 qd  -holding home furosemide 40mg qd Holding regimen i/s/o hypotension/sepsis and SHEYLA  -holding home Carvedilol 6.25mg BID     -Follow up with pt's son regarding this medication  -holding home Valsartan--1.25 qd  -holding home furosemide 40mg qd Holding regimen i/s/o hypotension/sepsis and SHEYLA  -Holding home Carvedilol 6.25mg BID     -Follow up with pt's son regarding this medication, as pt also on Metoprolol Succinate  -Holding home Valsartan-HCTZ 160-1.25mg qd  -Holding home furosemide 40mg qd

## 2022-09-30 NOTE — H&P ADULT - PROBLEM SELECTOR PLAN 3
Likely prerenal i/s/o hypotension/sepsis, but need to r/o post-renal  -f/u post-void bladder scan  -Renally dose medications  -reassess daily for improvement  -if no improvement w/ tx of sepsis/hypotension, consider intrarenal pathology i/s/o DM, HTN  -consider nephrology consult Likely prerenal i/s/o hypotension/sepsis  -Post-void residual bladder scan ~130cc-> likely not post-renal  -Renally dose medications  -reassess BMP daily for improvement  -if no improvement w/ tx of sepsis/hypotension, consider intrarenal pathology i/s/o DM, HTN     -consider nephrology consult Likely prerenal i/s/o hypotension/sepsis  -Post-void residual bladder scan ~130cc-> likely not post-renal  -Renally dose medications  -Reassess BMP daily for improvement  -Hold pt's home Valsartan-HCTZ and furosemide  -Avoid nephrotoxic agents  -If no improvement w/ tx of sepsis/hypotension, consider intrarenal pathology i/s/o DM, HTN     -consider nephrology consult

## 2022-09-30 NOTE — H&P ADULT - PROBLEM SELECTOR PLAN 8
Patient has Echo from 2017 showing Moderate Aortic Stenosis with LA index of 45  -F/u repeat Echo  -caution with fluid resuscitation Patient has Echo from 2017 showing Moderate Aortic Stenosis with LA index of 45  -F/u repeat TTE  -Caution with fluid resuscitation

## 2022-09-30 NOTE — ED PROVIDER NOTE - CLINICAL SUMMARY MEDICAL DECISION MAKING FREE TEXT BOX
Pt is a 76 y/o M PMHx DM type 2, HTN, COPD, a fib on eliquis and metoprolol p/w right shin pain x 1 week. -- concerning for sepsis from cellulitis, possibly infected hematoma, not concerning for compartment syndrome at this time -- labs, blood cx, urine cx, xray tib/fib, surgery consult

## 2022-09-30 NOTE — ED PROVIDER NOTE - PROGRESS NOTE DETAILS
RANDELL ACOSTA:  Surgery requests ultrasound of collection.  They do not recommend any surgical intervention at this time.  They recommend admission to medicine.  Pt accepted for admission under Dr. Sheth.  MAR text paged at this time.

## 2022-09-30 NOTE — ED PROVIDER NOTE - ATTENDING APP SHARED VISIT CONTRIBUTION OF CARE
Brief HPI:  78 y/o M PMHx DM type 2, HTN, COPD, a fib on eliquis and metoprolol p/w right shin pain x 1 week.  Patient struck right shin and since then has been having increasing redness, warmth, pain.  Denies numbness, tingling, fever, chills.      Vitals:   Reviewed    Exam:    GEN:  Non-toxic appearing, non-distressed, speaking full sentences, non-diaphoretic, AAOx3  HEENT:  NCAT, neck supple, EOMI, PERRLA, sclera anicteric, no conjunctival pallor or injection, no stridor, normal voice, no tonsillar exudate, uvula midline  CV:  irregular, s1/s2 audible, no murmurs, rubs or gallops, peripheral pulses 2+ and symmetric  PULM:  non-labored respirations, lungs clear to auscultation bilaterally, no wheezes, crackles or rales  ABD:  non distended, non-tender, no rebound, no guarding, negative Packer's sign, bowel sounds normal, no cvat  RLE:  Right lower leg:  + large area of erythema, +fluctuance, tenderness; no crepitus; no open areas; no palpable cords; 2+ pulse; < 2 sec capillary refill; 2 point discrimination intact; sensation intact to light touch; 5/t strength  NEURO:  AAOx3, CN II-XII intact, motor 5/5 in upper and lower extremities bilaterally, sensation grossly intact in extremities and trunk, finger to nose testing wnl, no nystagmus, negative Romberg, no pronator drift, no gait deficit  SKIN:  as above     A/P:    78 y/o M PMHx DM type 2, HTN, COPD, a fib on eliquis and metoprolol p/w right shin pain x 1 week.  VSS.  Febrile.  Exam consistent with likely infected hematoma with cellulitis.  Low c/f necrotizing fasciitis.  Plan for x-ray, labs, abx, surgery consult.  Anticipate admission.

## 2022-09-30 NOTE — ED ADULT TRIAGE NOTE - CHIEF COMPLAINT QUOTE
c/o wound to right leg s/p injury on bus step, pt states slipped and scraped leg onto bus step last week, noted to be swollen, red, discolored, Denies hitting head/LOC. Hx of afib on Eliqus, DMT2, HTN, BPH

## 2022-09-30 NOTE — H&P ADULT - ATTENDING COMMENTS
78 yo M w/ PMH of T2DM, HTN, HLD COPD, AFib on Eliquis, CAD, ?CHF, Moderate aortic stenosis, and Gout presents with 1 week of R shin pain w/ associated chills. Admitted for sepsis likely 2/2 cellulitis with hematoma vs. abscess. Currently on Vanc. Surgery following for possible I&D. Pt's condition c/b afib with RVR to 120s in setting of sepsis and SHEYLA. Will monitor on tele. F/u TTE given unclear CHF history. Possible Cardiology consult if remains in afib with RVR despite resolution of sepsis.

## 2022-09-30 NOTE — CONSULT NOTE ADULT - ASSESSMENT
INCOMPLETE    - No acute surgical intervention  - Recommend holding eliquis in setting of recent trauma  - Continue IV antibiotics  - F/u RLE ultrasound     Discussed with Dr. Ankit Wilkins, PGY-2  A Team Surgery  #[id number] INCOMPLETE    - No acute surgical intervention  - Recommend holding eliquis in setting of recent trauma  - Continue IV antibiotics  - F/u RLE ultrasound     Discussed with Dr. Ankit Wilkins, PGY-2  A Team Surgery  #[id number] 77M hx DM2, HTN, COPD, a fib on eliquis (last dose this morning 8AM) s/p shin strike with swelling and erythema, likely cellulitis vs. hematoma.     - No acute surgical intervention  - Recommend admission for work up of ?sepsis and SHEYLA  - Recommend holding eliquis in setting of recent trauma  - Continue IV antibiotics  - F/u RLE ultrasound     Discussed with Dr. Ankit Wilkins, PGY-2  A Team Surgery  #[id number] 77M hx DM2, HTN, COPD, a fib on eliquis (last dose this morning 8AM) s/p shin strike with swelling and erythema, likely cellulitis vs. hematoma.     - No acute surgical intervention  - Recommend admission for work up of ?sepsis and SHEYLA  - Recommend holding eliquis in setting of recent trauma  - Continue IV antibiotics  - F/u RLE ultrasound   - Will follow    Discussed with Dr. Ankit Wilkins, PGY-2  A Team Surgery  #[id number]

## 2022-09-30 NOTE — H&P ADULT - NSHPREVIEWOFSYSTEMS_GEN_ALL_CORE
REVIEW OF SYSTEMS:  Constitutional: [ ] negative [ ] fevers [ X ] chills [ ] weight loss [ ] weight gain  HEENT: [ X ] negative [ ] dry eyes [ ] eye irritation [ ] postnasal drip [ ] nasal congestion  CV: [ X ] negative  [ ] chest pain [ ] orthopnea [ ] palpitations [ ] murmur  Resp: [ X ] negative [ ] cough [ ] shortness of breath [ ] dyspnea [ ] wheezing [ ] sputum [ ] hemoptysis  GI: [ X ] negative [ ] nausea [ ] vomiting [ ] diarrhea [ ] constipation [ ] abd pain [ ] dysphagia   : [ X ] negative [ ] dysuria [ ] nocturia [ ] hematuria [ ] increased urinary frequency  Musculoskeletal: [ ] negative [ ] back pain [ ] myalgias [ ] arthralgias [ ] fracture     Right shin pain  Skin: [ X ] negative [ ] rash [ ] itch  Neurological: [ X ] negative [ ] headache [ ] dizziness [ ] syncope [ ] weakness [ ] numbness  Psychiatric: [ X ] negative [ ] anxiety [ ] depression  Endocrine: [ X ] negative [ ] diabetes [ ] thyroid problem  Hematologic/Lymphatic: [ X ] negative [ ] anemia [ ] bleeding problem  Allergic/Immunologic: [ X ] negative [ ] itchy eyes [ ] nasal discharge [ ] hives [ ] angioedema  [ ] All other systems negative  [ ] Unable to assess ROS because ________ Constitutional: +Chills. No generalized weakness, fevers, or weight loss  Eyes: No visual changes, double vision, or eye pain  Ears, Nose, Mouth, Throat: No runny nose, sinus pain, ear pain, tinnitus, sore throat, dysphagia, or odynophagia  Cardiovascular: No chest pain, palpitations, or LE edema  Respiratory: No cough, wheezing, hemoptysis, or shortness of breath  Gastrointestinal: No abdominal pain, nausea/vomiting, diarrhea/constipation, hematemesis, melena, or BRBPR  Genitourinary: No dysuria, frequency, urgency, or hematuria  Musculoskeletal: No joint pain, joint swelling, or decreased ROM  Skin: +R shin pain, swelling, and redness. No pruritus.  Neurologic: No syncope, seizures, headache, paresthesias, numbness, or limb weakness  Psychiatric: No depression, anxiety, or agitation  Endocrine: No heat/cold intolerance, mood swings, sweats, polydipsia, or polyuria  Hematologic/lymphatic: No purpura, petechia, or prolonged or excessive bleeding after dental extraction / injury  Allergic/Immunologic: No anaphylaxis or allergic response to materials, foods, animals    Positives and pertinent negatives noted and all other systems negative.

## 2022-09-30 NOTE — ED PROVIDER NOTE - PHYSICAL EXAMINATION
Right lower leg:  + large area of erythema, +fluctuance, tenderness; no crepitus; no open areas; no palpable cords; 2+ pulse; < 2 sec capillary refill; 2 point discrimination intact; sensation intact to light touch; 5/t strength

## 2022-09-30 NOTE — ED PROVIDER NOTE - NS ED ATTENDING STATEMENT MOD
This was a shared visit with the JOSI. I reviewed and verified the documentation and independently performed the documented:

## 2022-09-30 NOTE — ED ADULT NURSE REASSESSMENT NOTE - NS ED NURSE REASSESS COMMENT FT1
Received report from Day RN Caron Ferguson: Pt A&Ox4, no complaints at this moment, respirations are even and unlabored, CM in progress, NAD, VS noted, Leatha BURGESS at bedside and made aware of VS. Safety precautions implemented as per protocol, awaiting further MD orders, will continue to monitor.

## 2022-09-30 NOTE — ED ADULT NURSE NOTE - OBJECTIVE STATEMENT
pt received in rm 26 AAO x 3. pt reports slip and fall striking right shin with metal step from bus a week ago. pt c/o worsening pain to right shin and chills. pt denies sob, chest pain, n/v/d, fevers, couh. respirations even and unlabored. rect temp obtained 100.5F. right shin appears red and swollen. edema noted to right foot. + pedal pulse to right foot. 20g iv placed to left ac. pt placed on cardiac monitor. will continue to monitor.

## 2022-09-30 NOTE — H&P ADULT - ASSESSMENT
Pt is a 76yo M w/ PMH T2DM, HTN, COPD, A-Fib on eliquis, CAD, Aortic Stenosis, and gout, who presents with 1 week of R shin pain w/ associated chills. Likely etiology     SHEYLA  SIRS? Pt is a 78yo M w/ PMH T2DM, HTN, HLD COPD, A-Fib on eliquis, CAD, Aortic Stenosis, and gout, who presents with 1 week of R shin pain w/ associated chills. Likely etiology hematoma vs cellulitis. Notably, pt meeting SIRS criteria on presentation (Tachycardic, febrile), with likely source-> meets sepsis criteria. Pt also has possible SHEYLA (Cr 2.2, baseline 1.0-1.5), likely prerenal.

## 2022-09-30 NOTE — H&P ADULT - NSICDXPASTMEDICALHX_GEN_ALL_CORE_FT
PAST MEDICAL HISTORY:  AF (atrial fibrillation)     Aortic stenosis     BPH (benign prostatic hyperplasia)     COPD (chronic obstructive pulmonary disease)     GERD (gastroesophageal reflux disease)     Gout     Hypertension     MI (myocardial infarction)     Sleep apnea

## 2022-09-30 NOTE — H&P ADULT - PROBLEM SELECTOR PLAN 1
On admission Tachycardic (120-130), febrile (100.5F), with possible infectious source (R shin). Pt also notably hypotensive in ED to 94/48.  -s/p 1g Vancomycin x1, 2g cefazolin x1, and 1L NS in ED  -f/u BCx and UCx  -reculture q48h if growth or fever spike  -c/w Cefazolin 1g q8h     -redose if renal function deteriorates or CrCl <30 (CrCl 33 at admission)  -Tylenol PRN for fever control  -Gentle rehydration if persistently hypotensive On admission Tachycardic (120-130), febrile (100.5F), with possible infectious source (R shin). Pt also notably hypotensive in ED to 94/48.  -s/p 1g Vancomycin x1, 2g cefazolin x1, and 1L NS in ED  -f/u BCx and UCx  -reculture q48h if growth or fever spike  -c/w Vancomycin, dosed by trough     -f/u Vanc trough in AM prior to next dose  -f/u MRSA swab     -if negative, consider switch to Cefazolin 1g q8h  -Tylenol PRN for fever control  -Gentle rehydration if persistently hypotensive On admission, tachycardic (120-130), febrile (100.5F), with possible infectious source (R shin). Pt also notably hypotensive in ED to 94/48.  -S/p 1g Vancomycin x1, 2g cefazolin x1, and 1L NS in ED  -F/u BCx and UCx  -Reculture q48h if growth or fever spike  -C/w Vancomycin, dosed by level     -f/u Vanc trough in AM prior to next dose  -F/u MRSA swab     -if negative, consider switch to Cefazolin 1g q8h  -Tylenol PRN for fever control  -Gentle rehydration if persistently hypotensive

## 2022-09-30 NOTE — H&P ADULT - PROBLEM SELECTOR PLAN 4
-c/w Metoprolol 50mg ER qd  -Holding home AC i/s/o hematoma pt persistently tachycardic in 110s-120s. Notably, [id number] score 6. At home takes Metoprolol Succ 50mg qd  -Switch to immediate release formulation for finer control as inpatient     -Give Metoprolol Tartrate 12.5mg now if SBP >100     -start Metoprolol Tartrate 25mg BID 12hr after the above dose  -At home pt taking Eliquis 2.5mg BID (last dose this morning)  -Holding AC tonight i/s/o possible hematoma  -Reevaluate Hemoglobin in AM     -If stable, less concern for bleeding-> restart home Eliquis 2.5mg BID With RVR, as pt persistently tachycardic in 110s-120s. Notably, [id number] score 6. At home takes Metoprolol Succinate 50mg qd.  -Switch to immediate release formulation for finer control as inpatient     -Give Metoprolol Tartrate 12.5mg now if SBP >100     -Start Metoprolol Tartrate 25mg BID 12hrs after the above dose  -At home pt taking Eliquis 2.5mg BID (last dose this morning)  -Holding AC tonight i/s/o possible hematoma  -Reevaluate hemoglobin in AM     -If stable, less concern for bleeding-> restart home Eliquis 2.5mg BID  -If remains in afib with RVR despite Metoprolol Tartrate dose, monitor on tele and Cardiology consult  -F/u TTE

## 2022-09-30 NOTE — H&P ADULT - NSHPSOCIALHISTORY_GEN_ALL_CORE
Retired, previously a banker. Retired, previously a banker.  Denies any tobacco, alcohol, or illicit drug use.

## 2022-09-30 NOTE — H&P ADULT - PROBLEM SELECTOR PLAN 11
DVT Prophylaxis: Mechanical i/s/o hematoma (only one leg?)  Diet: consistent carb; DASH  GI Prophylaxis: Pantoprazole 40 DVT Prophylaxis: Pt received Eliquis this AM. Reevaluate following CBC as noted above  Diet: consistent carb; DASH  GI Prophylaxis: Pantoprazole 40 DVT Prophylaxis: Pt received Eliquis this AM. Reevaluate following CBC as noted above given possible hematoma.  Diet: consistent carb; DASH  GI Prophylaxis: Pantoprazole 40mg qd

## 2022-09-30 NOTE — CONSULT NOTE ADULT - SUBJECTIVE AND OBJECTIVE BOX
HPI:        PAST MEDICAL & SURGICAL HISTORY:  AF (atrial fibrillation)      Diabetes insipidus      Essential hypertension, benign      Hypertension      MI (myocardial infarction)      CHF (congestive heart failure)      BPH (benign prostatic hyperplasia)      GERD (gastroesophageal reflux disease)      Sleep apnea      No significant past surgical history          MEDICATIONS  (STANDING):    MEDICATIONS  (PRN):      Allergies    penicillin (Unknown)    Intolerances        SOCIAL HISTORY:    FAMILY HISTORY:          Physical Exam:  General: NAD, resting comfortably  HEENT: NC/AT, EOMI, normal hearing, no oral lesions, no LAD, neck supple  Pulmonary: normal resp effort, CTA-B  Cardiovascular: NSR, no murmurs  Abdominal: soft, ND/NT, no organomegaly  Extremities: WWP, normal strength, no clubbing/cyanosis/edema  Neuro: A/O x 3, CNs II-XII grossly intact, normal sensation, no focal deficits  Pulses: palpable distal pulses    Vital Signs Last 24 Hrs  T(C): 38.1 (30 Sep 2022 12:20), Max: 38.1 (30 Sep 2022 12:20)  T(F): 100.5 (30 Sep 2022 12:20), Max: 100.5 (30 Sep 2022 12:20)  HR: 108 (30 Sep 2022 15:25) (108 - 122)  BP: 101/74 (30 Sep 2022 15:25) (95/66 - 108/80)  BP(mean): --  RR: 20 (30 Sep 2022 15:25) (16 - 20)  SpO2: 98% (30 Sep 2022 15:25) (95% - 98%)    Parameters below as of 30 Sep 2022 15:25  Patient On (Oxygen Delivery Method): room air        I&O's Summary          LABS:                        10.6   7.86  )-----------( 117      ( 30 Sep 2022 12:58 )             35.6     09-30    140  |  101  |  87<H>  ----------------------------<  167<H>  5.2   |  25  |  2.20<H>    Ca    10.1      30 Sep 2022 12:58    TPro  7.2  /  Alb  4.2  /  TBili  1.3<H>  /  DBili  x   /  AST  17  /  ALT  13  /  AlkPhos  84  09-30    PT/INR - ( 30 Sep 2022 12:58 )   PT: 16.9 sec;   INR: 1.45 ratio         PTT - ( 30 Sep 2022 12:58 )  PTT:30.8 sec  Urinalysis Basic - ( 30 Sep 2022 15:20 )    Color: Light Yellow / Appearance: Clear / S.012 / pH: x  Gluc: x / Ketone: Negative  / Bili: Negative / Urobili: <2 mg/dL   Blood: x / Protein: Negative / Nitrite: Negative   Leuk Esterase: Negative / RBC: x / WBC x   Sq Epi: x / Non Sq Epi: x / Bacteria: x      CAPILLARY BLOOD GLUCOSE      POCT Blood Glucose.: 170 mg/dL (30 Sep 2022 11:20)    LIVER FUNCTIONS - ( 30 Sep 2022 12:58 )  Alb: 4.2 g/dL / Pro: 7.2 g/dL / ALK PHOS: 84 U/L / ALT: 13 U/L / AST: 17 U/L / GGT: x             Cultures:      RADIOLOGY & ADDITIONAL STUDIES:      Plan:           HPI:  77M hx DM2, HTN, COPD, a fib on eliquis (last dose this morning 8AM) presents with right shin pain of 1 week. Patient reports trying to get onto bus 1 week ago, however was unable to pull himself up steps, slipped, and struck shin. Reports mild pain, denies fevers. Denies chest pain, SOB, nausea, vomiting.       PAST MEDICAL & SURGICAL HISTORY:  AF (atrial fibrillation)      Diabetes insipidus      Essential hypertension, benign      Hypertension      MI (myocardial infarction)      CHF (congestive heart failure)      BPH (benign prostatic hyperplasia)      GERD (gastroesophageal reflux disease)      Sleep apnea      No significant past surgical history        Allergies    penicillin (Unknown)    Intolerances          Physical Exam:  General: NAD, resting comfortably  HEENT: NC/AT, EOMI, normal hearing  Pulmonary: normal resp effort  Cardiovascular: RRR, warm, well perfused  Abdominal: soft, ND/NT  Extremities: RLE with erythema and eschar, slightly tender to palpation, soft compartments, motor/sensation intact, palpable pulses   Neuro: A/O x 3, CNs II-XII grossly intact, no focal deficits    Vital Signs Last 24 Hrs  T(C): 38.1 (30 Sep 2022 12:20), Max: 38.1 (30 Sep 2022 12:20)  T(F): 100.5 (30 Sep 2022 12:20), Max: 100.5 (30 Sep 2022 12:20)  HR: 108 (30 Sep 2022 15:25) (108 - 122)  BP: 101/74 (30 Sep 2022 15:25) (95/66 - 108/80)  BP(mean): --  RR: 20 (30 Sep 2022 15:25) (16 - 20)  SpO2: 98% (30 Sep 2022 15:25) (95% - 98%)    Parameters below as of 30 Sep 2022 15:25  Patient On (Oxygen Delivery Method): room air        I&O's Summary          LABS:                        10.6   7.86  )-----------( 117      ( 30 Sep 2022 12:58 )             35.6     09-30    140  |  101  |  87<H>  ----------------------------<  167<H>  5.2   |  25  |  2.20<H>    Ca    10.1      30 Sep 2022 12:58    TPro  7.2  /  Alb  4.2  /  TBili  1.3<H>  /  DBili  x   /  AST  17  /  ALT  13  /  AlkPhos  84  09-30    PT/INR - ( 30 Sep 2022 12:58 )   PT: 16.9 sec;   INR: 1.45 ratio         PTT - ( 30 Sep 2022 12:58 )  PTT:30.8 sec  Urinalysis Basic - ( 30 Sep 2022 15:20 )    Color: Light Yellow / Appearance: Clear / S.012 / pH: x  Gluc: x / Ketone: Negative  / Bili: Negative / Urobili: <2 mg/dL   Blood: x / Protein: Negative / Nitrite: Negative   Leuk Esterase: Negative / RBC: x / WBC x   Sq Epi: x / Non Sq Epi: x / Bacteria: x      CAPILLARY BLOOD GLUCOSE      POCT Blood Glucose.: 170 mg/dL (30 Sep 2022 11:20)    LIVER FUNCTIONS - ( 30 Sep 2022 12:58 )  Alb: 4.2 g/dL / Pro: 7.2 g/dL / ALK PHOS: 84 U/L / ALT: 13 U/L / AST: 17 U/L / GGT: x             Cultures:      RADIOLOGY & ADDITIONAL STUDIES:      Plan:

## 2022-09-30 NOTE — H&P ADULT - NSHPPHYSICALEXAM_GEN_ALL_CORE
T(C): 36.7 (09-30-22 @ 22:54), Max: 38.1 (09-30-22 @ 12:20)  HR: 128 (09-30-22 @ 22:54) (108 - 128)  BP: 116/65 (09-30-22 @ 22:54) (87/53 - 116/65)  RR: 18 (09-30-22 @ 22:54) (16 - 22)  SpO2: 96% (09-30-22 @ 22:54) (95% - 98%)    General: Well-groomed, NAD, laying in bed, on RA  HEENT: PERRLA, EOMI, non-icteric  Neck:  symmetric,  JVD absent  Respiratory: Clear to ascultation bilaterally, no crackles/rales, no Resp distress; no accessory muscle use  Cardiovascular:  Irregularly irregular; tachycardic; no murmurs/rubs/gallops  Abdomen: Soft, NT, ND  Extremities: No edema noted  Skin: Right shin and foot are swollen, red, tender; Additional welt on R anterior shin with small scab; shin is tender, firm; no fluctuance noted  Neurological: Sensation grossly intact; strength impaired in RLE 2/2 pain.  Psychiatry: AOx3, appropriate insight/judgement, appropriate affect, recent/remote memory intact T(C): 36.7 (09-30-22 @ 22:54), Max: 38.1 (09-30-22 @ 12:20)  HR: 128 (09-30-22 @ 22:54) (108 - 128)  BP: 116/65 (09-30-22 @ 22:54) (87/53 - 116/65)  RR: 18 (09-30-22 @ 22:54) (16 - 22)  SpO2: 96% (09-30-22 @ 22:54) (95% - 98%)    PHYSICAL EXAM:  General: Awake and alert.  No acute distress.  Head: Normocephalic, atraumatic.    Eyes: PERRL.  EOMI.  No scleral icterus.  No conjunctival pallor.  Mouth: Moist MM.  No oropharyngeal exudates.    Neck: Supple.  Full range of motion.  No JVD.  No LAD.  No thyromegaly.  Trachea midline.    Heart: RRR.  Normal S1 and S2.  No murmurs, rubs, or gallops.  No LE edema b/l.   Lungs: Nonlabored breathing.  Good inspiratory effort.  CTAB.  No wheezes, crackles, or rhonchi.    Abdomen: BS+, soft, NT/ND.  No hepatomegaly.   Skin: Right shin and foot are swollen, red, tender.  Additional welt on R anterior shin with small scab; shin is tender, firm; no fluctuance noted.  Extremities: No cyanosis.  2+ peripheral pulses b/l.  Musculoskeletal: No joint deformities.  No spinal or paraspinal tenderness.  Neuro: A&Ox3. CN II-XII intact.  5/5 motor strength and intact tactile sensation in all extremities.  No focal deficits.

## 2022-09-30 NOTE — ED PROVIDER NOTE - OBJECTIVE STATEMENT
Pt is a 76 y/o M PMHx DM type 2, HTN, COPD, a fib on eliquis and metoprolol p/w right shin pain x 1 week.  Pt reports 1 week ago, pt slipped, striking right shin against the metal step of a bus.  Since then has had gradually worsening pain, redness and swelling at right shin associated with chills.  Pt reports he has been ambulating with walker assistance.  Denies any fevers, numbness, weakness, chest pain, SOB, orthopnea, PND, head trauma, LOC, nausea, vomiting, neck pain, back pain.  Last tetanus shot was 1 month ago.

## 2022-09-30 NOTE — ED ADULT NURSE NOTE - INTERVENTIONS DEFINITIONS
Kenneth to call system/Call bell, personal items and telephone within reach/Instruct patient to call for assistance/Non-slip footwear when patient is off stretcher/Physically safe environment: no spills, clutter or unnecessary equipment/Stretcher in lowest position, wheels locked, appropriate side rails in place/Monitor gait and stability/Monitor for mental status changes and reorient to person, place, and time/Review medications for side effects contributing to fall risk/Reinforce activity limits and safety measures with patient and family

## 2022-09-30 NOTE — H&P ADULT - PROBLEM SELECTOR PLAN 5
-Pt not on O2 at home. On RA this admission  -c/w Home inhaler regimen     -Beclomethasone 80mcg 1 puff BID-> Mometasone 220mcg qd     -Tiotropium 18mcg 1 capsule qd     -Albuterol 2 puffs q6h PRN for wheezing -Pt not on O2 at home. On RA this admission. No S/S of exacerbation.  -C/w home inhaler regimen     -Beclomethasone 80mcg 1 puff BID-> Mometasone 220mcg qd     -Tiotropium 18mcg 1 capsule qd     -Albuterol 2 puffs q6h PRN for wheezing

## 2022-09-30 NOTE — H&P ADULT - NSHPLABSRESULTS_GEN_ALL_CORE
LABS:                          10.6   7.86  )-----------( 117      ( 30 Sep 2022 12:58 )             35.6     09-30    140  |  101  |  87<H>  ----------------------------<  167<H>  5.2   |  25  |  2.20<H>    Ca    10.1      30 Sep 2022 12:58    TPro  7.2  /  Alb  4.2  /  TBili  1.3<H>  /  DBili  x   /  AST  17  /  ALT  13  /  AlkPhos  84  09-30 LABS:                          10.6   7.86  )-----------( 117      ( 30 Sep 2022 12:58 )             35.6         140  |  101  |  87<H>  ----------------------------<  167<H>  5.2   |  25  |  2.20<H>    Ca    10.1      30 Sep 2022 12:58    TPro  7.2  /  Alb  4.2  /  TBili  1.3<H>  /  DBili  x   /  AST  17  /  ALT  13  /  AlkPhos  84      C-Reactive Protein, Serum: 79.5 mg/L (22 @ 12:58)    Blood Gas Profile - Venous (22 @ 12:58)    pH, Venous: 7.35: No collection time indicated, please interpret with caution    pCO2, Venous: 48 mmHg    pO2, Venous: 34 mmHg    HCO3, Venous: 26 mmol/L    Base Excess, Venous: 0.5 mmol/L    Oxygen Saturation, Venous: 53.9 %    Total CO2, Venous: 28.0 mmol/L    Urinalysis Basic - ( 30 Sep 2022 15:20 )    Color: Light Yellow / Appearance: Clear / S.012 / pH: x  Gluc: x / Ketone: Negative  / Bili: Negative / Urobili: <2 mg/dL   Blood: x / Protein: Negative / Nitrite: Negative   Leuk Esterase: Negative / RBC: x / WBC x   Sq Epi: x / Non Sq Epi: x / Bacteria: x    Respiratory Viral Panel with COVID-19 by [facility] (22 @ 12:10)    [facility]    SARS-CoV-2: Not[facility]: This Respiratory Panel uses polymerase chain reaction (PCR) to detect for  adenovirus; coronavirus (HKU1, NL63, 229E, OC43); human metapneumovirus  (hMPV); human enterovirus/rhinovirus (Entero/RV); influenza A; influenza  A/H1; influenza A/H3; influenza A/H1-2009; influenza B; parainfluenza  viruses 1, 2, 3, 4; respiratory syncytial virus; Mycoplasma pneumoniae;  Chlamydophila pneumoniae; and SARS-CoV-2.      IMAGING:  ACC: [id number] EXAM:  XR TIB FIB AP LAT 2 VIEWS RT                        ACC: [id number] EXAM:  XR CHEST PORTABLE URGENT 1V  PROCEDURE DATE:  2022      FINDINGS:  Heart/Vascular: There is cardiomegaly. There are atherosclerotic changes.   The mediastinum appears within normal limits. Hilum cannot be adequately   evaluated.  Pulmonary: Midline trachea. There is no focal infiltrate, congestion or   effusion.  Bones: There is no fracture.  Lines and catheter: None  --- End of Report ---    ACC: [id number] EXAM:  US NONVASC EXT LTD RT   PROCEDURE DATE:  2022     FINDINGS:  In the region of clinical concern there is a complex heterogeneous in   echotexture focus identified measuring 7.5 x 5.0 x 3.3 cm which may   represent either abscess or hematoma. Further characterization with MR   evaluation can be performed. Correlate with clinical presentation.  --- End of Report --- EKG personally reviewed.    Labs personally reviewed.                       10.6   7.86  )-----------( 117      ( 30 Sep 2022 12:58 )             35.6         140  |  101  |  87<H>  ----------------------------<  167<H>  5.2   |  25  |  2.20<H>    Ca    10.1      30 Sep 2022 12:58    TPro  7.2  /  Alb  4.2  /  TBili  1.3<H>  /  DBili  x   /  AST  17  /  ALT  13  /  AlkPhos  84      C-Reactive Protein, Serum: 79.5 mg/L (22 @ 12:58)    Blood Gas Profile - Venous (22 @ 12:58)    pH, Venous: 7.35: No collection time indicated, please interpret with caution    pCO2, Venous: 48 mmHg    pO2, Venous: 34 mmHg    HCO3, Venous: 26 mmol/L    Base Excess, Venous: 0.5 mmol/L    Oxygen Saturation, Venous: 53.9 %    Total CO2, Venous: 28.0 mmol/L    Urinalysis Basic - ( 30 Sep 2022 15:20 )    Color: Light Yellow / Appearance: Clear / S.012 / pH: x  Gluc: x / Ketone: Negative  / Bili: Negative / Urobili: <2 mg/dL   Blood: x / Protein: Negative / Nitrite: Negative   Leuk Esterase: Negative / RBC: x / WBC x   Sq Epi: x / Non Sq Epi: x / Bacteria: x    Respiratory Viral Panel with COVID-19 by [facility] (22 @ 12:10)    Rapid RVP Result: [facility]    [facility]: This Respiratory Panel uses polymerase chain reaction (PCR) to detect for  adenovirus; coronavirus (HKU1, NL63, 229E, OC43); human metapneumovirus  (hMPV); human enterovirus/rhinovirus (Entero/RV); influenza A; influenza  A/H1; influenza A/H3; influenza A/H1-2009; influenza B; parainfluenza  viruses 1, 2, 3, 4; respiratory syncytial virus; Mycoplasma pneumoniae;  Chlamydophila pneumoniae; and SARS-CoV-2.    Imaging personally reviewed.  ACC: [id number] EXAM:  XR TIB FIB AP LAT 2 VIEWS RT                        ACC: [id number] EXAM:  XR CHEST PORTABLE URGENT 1V  PROCEDURE DATE:  2022      FINDINGS:  Heart/Vascular: There is cardiomegaly. There are atherosclerotic changes.   The mediastinum appears within normal limits. Hilum cannot be adequately   evaluated.  Pulmonary: Midline trachea. There is no focal infiltrate, congestion or   effusion.  Bones: There is no fracture.  Lines and catheter: None  --- End of Report ---    ACC: [id number] EXAM:  US NONVASC EXT LTD RT   PROCEDURE DATE:  2022     FINDINGS:  In the region of clinical concern there is a complex heterogeneous in   echotexture focus identified measuring 7.5 x 5.0 x 3.3 cm which may   represent either abscess or hematoma. Further characterization with MR   evaluation can be performed. Correlate with clinical presentation.  --- End of Report --- EKG personally reviewed.  Afib with RVR at 126 bpm. No acute ischemic changes. QTc 437 ms.    Labs personally reviewed.                       10.6   7.86  )-----------( 117      ( 30 Sep 2022 12:58 )             35.6         140  |  101  |  87<H>  ----------------------------<  167<H>  5.2   |  25  |  2.20<H>    Ca    10.1      30 Sep 2022 12:58    TPro  7.2  /  Alb  4.2  /  TBili  1.3<H>  /  DBili  x   /  AST  17  /  ALT  13  /  AlkPhos  84      C-Reactive Protein, Serum: 79.5 mg/L (22 @ 12:58)    Blood Gas Profile - Venous (22 @ 12:58)    pH, Venous: 7.35: No collection time indicated, please interpret with caution    pCO2, Venous: 48 mmHg    pO2, Venous: 34 mmHg    HCO3, Venous: 26 mmol/L    Base Excess, Venous: 0.5 mmol/L    Oxygen Saturation, Venous: 53.9 %    Total CO2, Venous: 28.0 mmol/L    Urinalysis Basic - ( 30 Sep 2022 15:20 )    Color: Light Yellow / Appearance: Clear / S.012 / pH: x  Gluc: x / Ketone: Negative  / Bili: Negative / Urobili: <2 mg/dL   Blood: x / Protein: Negative / Nitrite: Negative   Leuk Esterase: Negative / RBC: x / WBC x   Sq Epi: x / Non Sq Epi: x / Bacteria: x    Respiratory Viral Panel with COVID-19 by [facility] (22 @ 12:10)    Rapid RVP Result: [facility]    SARS-CoV-2: [facility]: This Respiratory Panel uses polymerase chain reaction (PCR) to detect for  adenovirus; coronavirus (HKU1, NL63, 229E, OC43); human metapneumovirus  (hMPV); human enterovirus/rhinovirus (Entero/RV); influenza A; influenza  A/H1; influenza A/H3; influenza A/H1-2009; influenza B; parainfluenza  viruses 1, 2, 3, 4; respiratory syncytial virus; Mycoplasma pneumoniae;  Chlamydophila pneumoniae; and SARS-CoV-2.    Imaging personally reviewed.  ACC: [id number] EXAM:  XR TIB FIB AP LAT 2 VIEWS RT                        ACC: [id number] EXAM:  XR CHEST PORTABLE URGENT 1V  PROCEDURE DATE:  2022      FINDINGS:  Heart/Vascular: There is cardiomegaly. There are atherosclerotic changes.   The mediastinum appears within normal limits. Hilum cannot be adequately   evaluated.  Pulmonary: Midline trachea. There is no focal infiltrate, congestion or   effusion.  Bones: There is no fracture.  Lines and catheter: None  --- End of Report ---    ACC: [id number] EXAM:  US NONVASC EXT LTD RT   PROCEDURE DATE:  2022     FINDINGS:  In the region of clinical concern there is a complex heterogeneous in   echotexture focus identified measuring 7.5 x 5.0 x 3.3 cm which may   represent either abscess or hematoma. Further characterization with MR   evaluation can be performed. Correlate with clinical presentation.  --- End of Report ---

## 2022-09-30 NOTE — H&P ADULT - NSICDXFAMHXNEG_GEN_ALL
No pertinent family history relevant to patient's current condition/cancer/congestive heart failure/heart disease

## 2022-09-30 NOTE — H&P ADULT - HISTORY OF PRESENT ILLNESS
Nathaniel Pinto is a 76yo M w/ PMH T2DM, HTN, COPD, A-Fib on eliquis, CAD, Aortic Stenosis, and gout, who presents with 1 week of R shin pain. Patient states that last Friday (24-Sep-2022), he slipped struck his shin against the metal step of a bus. The shin became swollen shortly thereafter. In the subsequent week, pt had worsening pain, swelling, and redness at the R shin; in the past few days he has also developed occasional chills. Pt has no other symptoms. Denies fevers, chest/abdominal pain, SOB, cough, diaphoresis, dizziness/lightheadedness, changes in BM/urination.    In ED, pt noted to be tachycardic to 120s-130s, with hypotension to 94/68. Notably, pt asymptomatic throughout. This was not responsive to fluid resuscitation with 500cc NS bolus x2. Nathaniel Pinto is a 78yo M w/ PMH T2DM, HTN, HLD, COPD, A-Fib on eliquis, CAD, Aortic Stenosis, and gout, who presents with 1 week of R shin pain. Patient states that last Friday (24-Sep-2022), he slipped struck his shin against the metal step of a bus. The shin became swollen shortly thereafter. In the subsequent week, pt had worsening pain, swelling, and redness at the R shin; in the past few days he has also developed occasional chills. Pt has no other symptoms. Denies fevers, chest/abdominal pain, SOB, cough, diaphoresis, dizziness/lightheadedness, changes in BM/urination.    In ED, pt noted to be tachycardic to 120s-130s, hypotensive to 94/68, and febrile to 100.5F (rectal). Notably, pt asymptomatic throughout. This was not responsive to fluid resuscitation with 500cc NS bolus x2. Nathaniel Pinto is a 76yo M w/ PMH T2DM, HTN, HLD, COPD, A-Fib on eliquis and Metoprolol, CAD, Aortic Stenosis, and gout, who presents with 1 week of R shin pain. Patient states that last Friday (24-Sep-2022), he slipped struck his shin against the metal step of a bus. The shin became swollen shortly thereafter. In the subsequent week, pt had worsening pain, swelling, and redness at the R shin; in the past few days he has also developed occasional chills. Pt has no other symptoms. Denies fevers, chest/abdominal pain, SOB, cough, diaphoresis, dizziness/lightheadedness, changes in BM/urination.    In ED, pt noted to be tachycardic to 120s-130s, hypotensive to 94/68, and febrile to 100.5F (rectal). Notably, pt asymptomatic throughout. This was not responsive to fluid resuscitation with 500cc NS bolus x2. Nathaniel Pinto is a 78yo M w/ PMH Type 2 DM, HTN, HLD, COPD (not on home O2), AFib on Eliquis, CAD, Aortic Stenosis, and Gout, who presents with 1 week of R shin pain. Patient states that last Friday (24-Sep-2022), he slipped struck his shin against the metal step of a bus. The shin became swollen shortly thereafter. In the subsequent week, pt had worsening pain, swelling, and redness at the R shin; in the past few days he has also developed occasional chills. Pt has no other symptoms. Denies fevers, chest/abdominal pain, SOB, cough, diaphoresis, dizziness/lightheadedness, changes in BM/urination.    In ED, pt noted to be tachycardic to 120s-130s, hypotensive to 94/68, and febrile to 100.5F (rectal). Notably, pt asymptomatic throughout. This was not responsive to fluid resuscitation with 500cc NS bolus x2. Nathaniel Pinto is a 78yo M w/ PMH of Type 2 DM, HTN, HLD, COPD (not on home O2), AFib on Eliquis, CAD, Aortic Stenosis, and Gout who presents with 1 week of R shin pain. Patient states that last Friday (24-Sep-2022), he slipped struck his shin against the metal step of a bus. The shin became swollen shortly thereafter. In the subsequent week, pt had worsening pain, swelling, and redness at the R shin; in the past few days he has also developed occasional chills. Pt has no other symptoms. Denies fevers, chest/abdominal pain, SOB, cough, diaphoresis, dizziness/lightheadedness, changes in BM/urination.    In ED, pt noted to be tachycardic to 120s-130s, hypotensive to 94/68, and febrile to 100.5F (rectal). Notably, pt asymptomatic throughout. This was not responsive to fluid resuscitation with 500cc NS bolus x2.

## 2022-09-30 NOTE — H&P ADULT - PROBLEM SELECTOR PLAN 7
Last A1c 7.0 (06-Apr-2021)  -F/u A1c  -holding home Metformin 500 BID  -FSG with meals and at bedtime  -Low-dose ISS  -add Lantus or Lispro as needed based on patient FSGs Last A1c 7.0 (06-Apr-2021)  -F/u A1c  -Holding home Metformin 500 BID  -Check FSG with meals and at bedtime  -Start low-dose ISS  -Add Lantus or Lispro as needed based on patient FSGs

## 2022-09-30 NOTE — H&P ADULT - PROBLEM SELECTOR PLAN 2
Cellulitis vs hematoma vs cellulitis 2/2 hematoma.  -Per gen surg, no intervention at this time.  -Reassess daily for changes in swelling/erythema/pain  -OOB and PT as tolerated  -Pain control w/ tylenol; avoid NSAIDS  -Hold AC i/s/o hematoma Cellulitis vs hematoma vs cellulitis 2/2 hematoma.  -RLE U/S unable to differentiate hematoma vs abscess.     -can f/u with MRI if appropriate  -Per gen surg, no intervention at this time.  -Reassess daily for changes in swelling/erythema/pain  -OOB and PT as tolerated  -Pain control w/ tylenol; avoid NSAIDS  -Hold AC i/s/o hematoma Cellulitis vs hematoma vs cellulitis 2/2 hematoma.  -RLE U/S unable to differentiate hematoma vs abscess.     -can f/u with MRI if appropriate  -General surgery consulted. Per general surgery, no intervention at this time.  -Reassess daily for changes in swelling/erythema/pain  -OOB and PT as tolerated  -Pain control w/ tylenol; avoid NSAIDS  -Hold AC i/s/o hematoma Pt with worsening wound on R shin. Unclear if cellulitis with hematoma vs. abscess.  -RLE U/S unable to differentiate hematoma vs abscess.     -can f/u with MRI if appropriate  -General surgery consulted. Per general surgery, no intervention at this time.  -C/w abx as above  -Reassess daily for changes in swelling/erythema/pain  -OOB and PT as tolerated  -Pain control w/ tylenol; avoid NSAIDS  -Hold AC i/s/o hematoma

## 2022-09-30 NOTE — H&P ADULT - PROBLEM SELECTOR PLAN 12
Pt has hx of CAD and aortic stenosis. PMH in chart notes CHF, but no documentation is found in medical record.  -F/u Echo

## 2022-10-01 DIAGNOSIS — E78.5 HYPERLIPIDEMIA, UNSPECIFIED: ICD-10-CM

## 2022-10-01 DIAGNOSIS — Z29.9 ENCOUNTER FOR PROPHYLACTIC MEASURES, UNSPECIFIED: ICD-10-CM

## 2022-10-01 DIAGNOSIS — I10 ESSENTIAL (PRIMARY) HYPERTENSION: ICD-10-CM

## 2022-10-01 DIAGNOSIS — M10.9 GOUT, UNSPECIFIED: ICD-10-CM

## 2022-10-01 DIAGNOSIS — I48.20 CHRONIC ATRIAL FIBRILLATION, UNSPECIFIED: ICD-10-CM

## 2022-10-01 DIAGNOSIS — I35.0 NONRHEUMATIC AORTIC (VALVE) STENOSIS: ICD-10-CM

## 2022-10-01 DIAGNOSIS — E11.9 TYPE 2 DIABETES MELLITUS WITHOUT COMPLICATIONS: ICD-10-CM

## 2022-10-01 DIAGNOSIS — S81.801A UNSPECIFIED OPEN WOUND, RIGHT LOWER LEG, INITIAL ENCOUNTER: ICD-10-CM

## 2022-10-01 DIAGNOSIS — N17.9 ACUTE KIDNEY FAILURE, UNSPECIFIED: ICD-10-CM

## 2022-10-01 DIAGNOSIS — J44.9 CHRONIC OBSTRUCTIVE PULMONARY DISEASE, UNSPECIFIED: ICD-10-CM

## 2022-10-01 DIAGNOSIS — A41.9 SEPSIS, UNSPECIFIED ORGANISM: ICD-10-CM

## 2022-10-01 LAB
A1C WITH ESTIMATED AVERAGE GLUCOSE RESULT: 7 % — HIGH (ref 4–5.6)
ACANTHOCYTES BLD QL SMEAR: SIGNIFICANT CHANGE UP
ALBUMIN SERPL ELPH-MCNC: 3.9 G/DL — SIGNIFICANT CHANGE UP (ref 3.3–5)
ALP SERPL-CCNC: 78 U/L — SIGNIFICANT CHANGE UP (ref 40–120)
ALT FLD-CCNC: 9 U/L — SIGNIFICANT CHANGE UP (ref 4–41)
ANION GAP SERPL CALC-SCNC: 14 MMOL/L — SIGNIFICANT CHANGE UP (ref 7–14)
ANION GAP SERPL CALC-SCNC: 16 MMOL/L — HIGH (ref 7–14)
AST SERPL-CCNC: 19 U/L — SIGNIFICANT CHANGE UP (ref 4–40)
BASOPHILS # BLD AUTO: 0.01 K/UL — SIGNIFICANT CHANGE UP (ref 0–0.2)
BASOPHILS NFR BLD AUTO: 0.1 % — SIGNIFICANT CHANGE UP (ref 0–2)
BILIRUB SERPL-MCNC: 1 MG/DL — SIGNIFICANT CHANGE UP (ref 0.2–1.2)
BUN SERPL-MCNC: 82 MG/DL — HIGH (ref 7–23)
BUN SERPL-MCNC: 84 MG/DL — HIGH (ref 7–23)
CALCIUM SERPL-MCNC: 10.1 MG/DL — SIGNIFICANT CHANGE UP (ref 8.4–10.5)
CALCIUM SERPL-MCNC: 9.5 MG/DL — SIGNIFICANT CHANGE UP (ref 8.4–10.5)
CHLORIDE SERPL-SCNC: 103 MMOL/L — SIGNIFICANT CHANGE UP (ref 98–107)
CHLORIDE SERPL-SCNC: 105 MMOL/L — SIGNIFICANT CHANGE UP (ref 98–107)
CHOLEST SERPL-MCNC: 128 MG/DL — SIGNIFICANT CHANGE UP
CO2 SERPL-SCNC: 22 MMOL/L — SIGNIFICANT CHANGE UP (ref 22–31)
CO2 SERPL-SCNC: 22 MMOL/L — SIGNIFICANT CHANGE UP (ref 22–31)
CREAT SERPL-MCNC: 1.98 MG/DL — HIGH (ref 0.5–1.3)
CREAT SERPL-MCNC: 2.08 MG/DL — HIGH (ref 0.5–1.3)
CULTURE RESULTS: NO GROWTH — SIGNIFICANT CHANGE UP
EGFR: 32 ML/MIN/1.73M2 — LOW
EGFR: 34 ML/MIN/1.73M2 — LOW
ELLIPTOCYTES BLD QL SMEAR: SIGNIFICANT CHANGE UP
EOSINOPHIL # BLD AUTO: 0.06 K/UL — SIGNIFICANT CHANGE UP (ref 0–0.5)
EOSINOPHIL NFR BLD AUTO: 0.7 % — SIGNIFICANT CHANGE UP (ref 0–6)
ESTIMATED AVERAGE GLUCOSE: 154 — SIGNIFICANT CHANGE UP
GLUCOSE BLDC GLUCOMTR-MCNC: 124 MG/DL — HIGH (ref 70–99)
GLUCOSE BLDC GLUCOMTR-MCNC: 132 MG/DL — HIGH (ref 70–99)
GLUCOSE BLDC GLUCOMTR-MCNC: 133 MG/DL — HIGH (ref 70–99)
GLUCOSE BLDC GLUCOMTR-MCNC: 139 MG/DL — HIGH (ref 70–99)
GLUCOSE SERPL-MCNC: 129 MG/DL — HIGH (ref 70–99)
GLUCOSE SERPL-MCNC: 143 MG/DL — HIGH (ref 70–99)
HCT VFR BLD CALC: 34.7 % — LOW (ref 39–50)
HDLC SERPL-MCNC: 43 MG/DL — SIGNIFICANT CHANGE UP
HGB BLD-MCNC: 10.3 G/DL — LOW (ref 13–17)
IANC: 6.12 K/UL — SIGNIFICANT CHANGE UP (ref 1.8–7.4)
IMM GRANULOCYTES NFR BLD AUTO: 0.7 % — SIGNIFICANT CHANGE UP (ref 0–0.9)
LIPID PNL WITH DIRECT LDL SERPL: 59 MG/DL — SIGNIFICANT CHANGE UP
LYMPHOCYTES # BLD AUTO: 1.15 K/UL — SIGNIFICANT CHANGE UP (ref 1–3.3)
LYMPHOCYTES # BLD AUTO: 13.9 % — SIGNIFICANT CHANGE UP (ref 13–44)
MAGNESIUM SERPL-MCNC: 1.6 MG/DL — SIGNIFICANT CHANGE UP (ref 1.6–2.6)
MANUAL SMEAR VERIFICATION: SIGNIFICANT CHANGE UP
MCHC RBC-ENTMCNC: 26.1 PG — LOW (ref 27–34)
MCHC RBC-ENTMCNC: 29.7 GM/DL — LOW (ref 32–36)
MCV RBC AUTO: 88.1 FL — SIGNIFICANT CHANGE UP (ref 80–100)
MONOCYTES # BLD AUTO: 0.89 K/UL — SIGNIFICANT CHANGE UP (ref 0–0.9)
MONOCYTES NFR BLD AUTO: 10.7 % — SIGNIFICANT CHANGE UP (ref 2–14)
NEUTROPHILS # BLD AUTO: 6.12 K/UL — SIGNIFICANT CHANGE UP (ref 1.8–7.4)
NEUTROPHILS NFR BLD AUTO: 73.9 % — SIGNIFICANT CHANGE UP (ref 43–77)
NON HDL CHOLESTEROL: 85 MG/DL — SIGNIFICANT CHANGE UP
NRBC # BLD: 0 /100 WBCS — SIGNIFICANT CHANGE UP (ref 0–0)
NRBC # FLD: 0 K/UL — SIGNIFICANT CHANGE UP (ref 0–0)
NT-PROBNP SERPL-SCNC: 3696 PG/ML — HIGH
PHOSPHATE SERPL-MCNC: 3.6 MG/DL — SIGNIFICANT CHANGE UP (ref 2.5–4.5)
PLAT MORPH BLD: NORMAL — SIGNIFICANT CHANGE UP
PLATELET # BLD AUTO: 132 K/UL — LOW (ref 150–400)
PLATELET COUNT - ESTIMATE: ABNORMAL
POIKILOCYTOSIS BLD QL AUTO: SIGNIFICANT CHANGE UP
POTASSIUM SERPL-MCNC: 4.6 MMOL/L — SIGNIFICANT CHANGE UP (ref 3.5–5.3)
POTASSIUM SERPL-MCNC: 4.9 MMOL/L — SIGNIFICANT CHANGE UP (ref 3.5–5.3)
POTASSIUM SERPL-SCNC: 4.6 MMOL/L — SIGNIFICANT CHANGE UP (ref 3.5–5.3)
POTASSIUM SERPL-SCNC: 4.9 MMOL/L — SIGNIFICANT CHANGE UP (ref 3.5–5.3)
PROT SERPL-MCNC: 7.1 G/DL — SIGNIFICANT CHANGE UP (ref 6–8.3)
RBC # BLD: 3.94 M/UL — LOW (ref 4.2–5.8)
RBC # FLD: 21.5 % — HIGH (ref 10.3–14.5)
RBC BLD AUTO: ABNORMAL
SODIUM SERPL-SCNC: 139 MMOL/L — SIGNIFICANT CHANGE UP (ref 135–145)
SODIUM SERPL-SCNC: 143 MMOL/L — SIGNIFICANT CHANGE UP (ref 135–145)
SPECIMEN SOURCE: SIGNIFICANT CHANGE UP
TRIGL SERPL-MCNC: 128 MG/DL — SIGNIFICANT CHANGE UP
VANCOMYCIN TROUGH SERPL-MCNC: 9.2 UG/ML — LOW (ref 10–20)
VARIANT LYMPHS # BLD: 2.6 % — SIGNIFICANT CHANGE UP (ref 0–6)
WBC # BLD: 8.29 K/UL — SIGNIFICANT CHANGE UP (ref 3.8–10.5)
WBC # FLD AUTO: 8.29 K/UL — SIGNIFICANT CHANGE UP (ref 3.8–10.5)

## 2022-10-01 PROCEDURE — 99233 SBSQ HOSP IP/OBS HIGH 50: CPT | Mod: GC

## 2022-10-01 PROCEDURE — 99222 1ST HOSP IP/OBS MODERATE 55: CPT

## 2022-10-01 RX ORDER — VANCOMYCIN HCL 1 G
1000 VIAL (EA) INTRAVENOUS EVERY 24 HOURS
Refills: 0 | Status: DISCONTINUED | OUTPATIENT
Start: 2022-10-01 | End: 2022-10-04

## 2022-10-01 RX ORDER — APIXABAN 2.5 MG/1
5 TABLET, FILM COATED ORAL EVERY 12 HOURS
Refills: 0 | Status: DISCONTINUED | OUTPATIENT
Start: 2022-10-01 | End: 2022-10-01

## 2022-10-01 RX ORDER — ALLOPURINOL 300 MG
100 TABLET ORAL DAILY
Refills: 0 | Status: DISCONTINUED | OUTPATIENT
Start: 2022-10-02 | End: 2022-10-07

## 2022-10-01 RX ORDER — ALLOPURINOL 300 MG
300 TABLET ORAL DAILY
Refills: 0 | Status: DISCONTINUED | OUTPATIENT
Start: 2022-10-01 | End: 2022-10-01

## 2022-10-01 RX ORDER — ALBUTEROL 90 UG/1
2 AEROSOL, METERED ORAL EVERY 6 HOURS
Refills: 0 | Status: DISCONTINUED | OUTPATIENT
Start: 2022-10-01 | End: 2022-10-07

## 2022-10-01 RX ORDER — INFLUENZA VIRUS VACCINE 15; 15; 15; 15 UG/.5ML; UG/.5ML; UG/.5ML; UG/.5ML
0.7 SUSPENSION INTRAMUSCULAR ONCE
Refills: 0 | Status: DISCONTINUED | OUTPATIENT
Start: 2022-10-01 | End: 2022-10-07

## 2022-10-01 RX ORDER — MOMETASONE FUROATE 220 UG/1
1 INHALANT RESPIRATORY (INHALATION) DAILY
Refills: 0 | Status: DISCONTINUED | OUTPATIENT
Start: 2022-10-01 | End: 2022-10-07

## 2022-10-01 RX ORDER — SODIUM CHLORIDE 9 MG/ML
1000 INJECTION, SOLUTION INTRAVENOUS
Refills: 0 | Status: DISCONTINUED | OUTPATIENT
Start: 2022-10-01 | End: 2022-10-02

## 2022-10-01 RX ORDER — TIOTROPIUM BROMIDE 18 UG/1
1 CAPSULE ORAL; RESPIRATORY (INHALATION) DAILY
Refills: 0 | Status: DISCONTINUED | OUTPATIENT
Start: 2022-10-01 | End: 2022-10-07

## 2022-10-01 RX ORDER — ATORVASTATIN CALCIUM 80 MG/1
40 TABLET, FILM COATED ORAL AT BEDTIME
Refills: 0 | Status: DISCONTINUED | OUTPATIENT
Start: 2022-10-01 | End: 2022-10-07

## 2022-10-01 RX ORDER — METOPROLOL TARTRATE 50 MG
12.5 TABLET ORAL ONCE
Refills: 0 | Status: COMPLETED | OUTPATIENT
Start: 2022-10-01 | End: 2022-10-01

## 2022-10-01 RX ORDER — METOPROLOL TARTRATE 50 MG
5 TABLET ORAL ONCE
Refills: 0 | Status: COMPLETED | OUTPATIENT
Start: 2022-10-01 | End: 2022-10-01

## 2022-10-01 RX ORDER — PANTOPRAZOLE SODIUM 20 MG/1
40 TABLET, DELAYED RELEASE ORAL
Refills: 0 | Status: DISCONTINUED | OUTPATIENT
Start: 2022-10-01 | End: 2022-10-07

## 2022-10-01 RX ORDER — ACETAMINOPHEN 500 MG
975 TABLET ORAL ONCE
Refills: 0 | Status: COMPLETED | OUTPATIENT
Start: 2022-10-01 | End: 2022-10-01

## 2022-10-01 RX ORDER — APIXABAN 2.5 MG/1
2.5 TABLET, FILM COATED ORAL
Refills: 0 | Status: DISCONTINUED | OUTPATIENT
Start: 2022-10-01 | End: 2022-10-01

## 2022-10-01 RX ADMIN — Medication 650 MILLIGRAM(S): at 05:53

## 2022-10-01 RX ADMIN — Medication 250 MILLIGRAM(S): at 14:15

## 2022-10-01 RX ADMIN — Medication 650 MILLIGRAM(S): at 05:23

## 2022-10-01 RX ADMIN — SODIUM CHLORIDE 100 MILLILITER(S): 9 INJECTION, SOLUTION INTRAVENOUS at 17:00

## 2022-10-01 RX ADMIN — Medication 300 MILLIGRAM(S): at 12:15

## 2022-10-01 RX ADMIN — Medication 1000 MILLIGRAM(S): at 16:59

## 2022-10-01 RX ADMIN — Medication 975 MILLIGRAM(S): at 11:06

## 2022-10-01 RX ADMIN — PANTOPRAZOLE SODIUM 40 MILLIGRAM(S): 20 TABLET, DELAYED RELEASE ORAL at 05:20

## 2022-10-01 RX ADMIN — Medication 975 MILLIGRAM(S): at 12:27

## 2022-10-01 RX ADMIN — Medication 5 MILLIGRAM(S): at 22:42

## 2022-10-01 RX ADMIN — SODIUM CHLORIDE 100 MILLILITER(S): 9 INJECTION, SOLUTION INTRAVENOUS at 15:15

## 2022-10-01 RX ADMIN — TIOTROPIUM BROMIDE 1 CAPSULE(S): 18 CAPSULE ORAL; RESPIRATORY (INHALATION) at 12:15

## 2022-10-01 RX ADMIN — ATORVASTATIN CALCIUM 40 MILLIGRAM(S): 80 TABLET, FILM COATED ORAL at 22:42

## 2022-10-01 NOTE — PHYSICAL THERAPY INITIAL EVALUATION ADULT - PERTINENT HX OF CURRENT PROBLEM, REHAB EVAL
Nathaniel Pinto is a 78yo M w/ PMH of Type 2 DM, HTN, HLD, COPD (not on home O2), AFib on Eliquis, CAD, Aortic Stenosis, and Gout who presents with 1 week of R shin pain. Patient states that last Friday (24-Sep-2022), he slipped struck his shin against the metal step of a bus. The shin became swollen shortly thereafter.

## 2022-10-01 NOTE — PROGRESS NOTE ADULT - ASSESSMENT
Pt is a 76yo M w/ PMH T2DM, HTN, HLD COPD, A-Fib on eliquis, CAD, Aortic Stenosis, and gout, who presents with 1 week of R shin pain w/ associated chills. Likely etiology hematoma vs cellulitis. Notably, pt meeting SIRS criteria on presentation (Tachycardic, febrile), with likely source-> meets sepsis criteria. Pt also has possible SHEYLA (Cr 2.2, baseline 1.0-1.5), likely prerenal.   Pt is a 78yo M w/ PMH T2DM, HTN, HLD COPD, A-Fib on eliquis, CAD, severe Aortic Stenosis, G3 diastolic dysfunction, and gout, who presents with 1 week of R shin pain w/ associated chills. Likely etiology hematoma vs cellulitis. Notably, pt meeting SIRS criteria on presentation (Tachycardic, febrile), with likely source-> meets sepsis criteria. Pt also has possible SHEYLA (Cr 2.2, baseline 1.0-1.5), likely prerenal. Persistently elevated HR s/p 1L NS, not on rate control due to hypotension.

## 2022-10-01 NOTE — PROGRESS NOTE ADULT - PROBLEM SELECTOR PLAN 1
On admission Tachycardic (120-130), febrile (100.5F), with possible infectious source (R shin). Pt also notably hypotensive in ED to 94/48.  -s/p 1g Vancomycin x1, 2g cefazolin x1, and 1L NS in ED  -f/u BCx and UCx  -reculture q48h if growth or fever spike  -c/w Vancomycin, dosed by trough     -f/u Vanc trough in AM prior to next dose  -f/u MRSA swab     -if negative, consider switch to Cefazolin 1g q8h  -Tylenol PRN for fever control  -Gentle rehydration if persistently hypotensive On admission Tachycardic (120-130), febrile (100.5F), with possible infectious source (R shin). Pt also notably hypotensive in ED to 94/48.  -s/p 1g Vancomycin x1, 2g cefazolin x1, and 1L NS in ED  -f/u BCx and UCx  -reculture q48h if growth or fever spike  -c/w Vancomycin, dosed by trough  -f/u MRSA swab     -if negative, consider switch to Cefazolin 1g q8h  -Tylenol PRN for fever control  -Gentle rehydration if persistently hypotensive

## 2022-10-01 NOTE — PROGRESS NOTE ADULT - ASSESSMENT
ASSESSMENT: 78yo M w RLE traumatic wound. US imaging suspicious for abscess vs hematoma.     PLAN:   - Recommend holding eliquis given possible hematoma  - MRI may be helpful if clinical concern for osteomyelitis  - No acute surgical intervention, will continue to monitor  Discussed with Attending Dr. LAURI Ca

## 2022-10-01 NOTE — PROVIDER CONTACT NOTE (OTHER) - ASSESSMENT
Pt alert and oriented x4 Pt alert and oriented x4 92/62  Pt alert and oriented x4 92/62 , pt with right lower leg reddened, swollen, c/o pain 9/10

## 2022-10-01 NOTE — CONSULT NOTE ADULT - NS ATTEND AMEND GEN_ALL_CORE FT
Patient seen and examined during morning rounds.  Assessment and recommendations reviewed with Cardiology/CCU NP, and as outlined above.

## 2022-10-01 NOTE — PROGRESS NOTE ADULT - SUBJECTIVE AND OBJECTIVE BOX
INCOMPLETE NOTE    Jose Carlos Butts | PGY1| Pager: [phone]  Interval Events:    REVIEW OF SYSTEMS:  CONSTITUTIONAL: No weakness, fevers or chills  EYES/ENT: No visual changes;  No vertigo or throat pain   NECK: No pain or stiffness  RESPIRATORY: No cough, wheezing, hemoptysis; No shortness of breath  CARDIOVASCULAR: No chest pain or palpitations  GASTROINTESTINAL: No abdominal or epigastric pain. No nausea, vomiting, or hematemesis; No diarrhea or constipation. No melena or hematochezia.  GENITOURINARY: No dysuria, frequency or hematuria  NEUROLOGICAL: No numbness or weakness  SKIN: No itching, burning, rashes, or lesions   All other review of systems is negative unless indicated above.    OBJECTIVE:  ICU Vital Signs Last 24 Hrs  T(C): 37 (01 Oct 2022 01:13), Max: 38.1 (30 Sep 2022 12:20)  T(F): 98.6 (01 Oct 2022 01:13), Max: 100.5 (30 Sep 2022 12:20)  HR: 118 (01 Oct 2022 03:46) (108 - 128)  BP: 98/63 (01 Oct 2022 03:46) (87/53 - 116/65)  BP(mean): --  ABP: --  ABP(mean): --  RR: 17 (01 Oct 2022 01:13) (16 - 22)  SpO2: 96% (01 Oct 2022 01:13) (95% - 98%)    O2 Parameters below as of 01 Oct 2022 01:13  Patient On (Oxygen Delivery Method): room air              CAPILLARY BLOOD GLUCOSE      POCT Blood Glucose.: 138 mg/dL (30 Sep 2022 18:10)      PHYSICAL EXAM:  General: WN/WD NAD  Neurology: A&Ox3, nonfocal, CARABALLO x 4  Eyes: PERRLA/ EOMI, Gross vision intact  ENT/Neck: Neck supple, trachea midline, No JVD, Gross hearing intact  Respiratory: CTA B/L, No wheezing, rales, rhonchi  CV: RRR, +S1/S2, -S3/S4, no murmurs, rubs or gallops  Abdominal: Soft, NT, ND +BS, No HSM  MSK: 5/5 strength UE/LE bilaterally  Extremities: No edema, 2+ peripheral pulses  Skin: No Rashes, Hematoma, Ecchymosis  Incisions:   Tubes:    HOSPITAL MEDICATIONS:  MEDICATIONS  (STANDING):  allopurinol 300 milliGRAM(s) Oral daily  atorvastatin 40 milliGRAM(s) Oral at bedtime  dextrose 5%. 1000 milliLiter(s) (50 mL/Hr) IV Continuous <Continuous>  dextrose 5%. 1000 milliLiter(s) (100 mL/Hr) IV Continuous <Continuous>  dextrose 50% Injectable 25 Gram(s) IV Push once  dextrose 50% Injectable 12.5 Gram(s) IV Push once  dextrose 50% Injectable 25 Gram(s) IV Push once  glucagon  Injectable 1 milliGRAM(s) IntraMuscular once  influenza  Vaccine (HIGH DOSE) 0.7 milliLiter(s) IntraMuscular once  insulin lispro (ADMELOG) corrective regimen sliding scale   SubCutaneous three times a day before meals  mometasone 220 MICROgram(s) Inhaler 1 Puff(s) Inhalation daily  pantoprazole    Tablet 40 milliGRAM(s) Oral before breakfast  tiotropium 18 MICROgram(s) Capsule 1 Capsule(s) Inhalation daily    MEDICATIONS  (PRN):  acetaminophen     Tablet .. 650 milliGRAM(s) Oral every 6 hours PRN Temp greater or equal to 38C (100.4F), Mild Pain (1 - 3)  ALBUTerol    90 MICROgram(s) HFA Inhaler 2 Puff(s) Inhalation every 6 hours PRN Shortness of Breath and/or Wheezing  aluminum hydroxide/magnesium hydroxide/simethicone Suspension 30 milliLiter(s) Oral every 4 hours PRN Dyspepsia  dextrose Oral Gel 15 Gram(s) Oral once PRN Blood Glucose LESS THAN 70 milliGRAM(s)/deciliter  melatonin 3 milliGRAM(s) Oral at bedtime PRN Insomnia  ondansetron Injectable 4 milliGRAM(s) IV Push every 8 hours PRN Nausea and/or Vomiting      LABS:                        10.3   8.29  )-----------( x        ( 01 Oct 2022 06:20 )             34.7     Hgb Trend: 10.3<--, 10.6<--  0930    140  |  101  |  87<H>  ----------------------------<  167<H>  5.2   |  25  |  2.20<H>    Ca    10.1      30 Sep 2022 12:58    TPro  7.2  /  Alb  4.2  /  TBili  1.3<H>  /  DBili  x   /  AST  17  /  ALT  13  /  AlkPhos  84      Creatinine Trend: 2.20<--  PT/INR - ( 30 Sep 2022 12:58 )   PT: 16.9 sec;   INR: 1.45 ratio         PTT - ( 30 Sep 2022 12:58 )  PTT:30.8 sec  Urinalysis Basic - ( 30 Sep 2022 15:20 )    Color: Light Yellow / Appearance: Clear / S.012 / pH: x  Gluc: x / Ketone: Negative  / Bili: Negative / Urobili: <2 mg/dL   Blood: x / Protein: Negative / Nitrite: Negative   Leuk Esterase: Negative / RBC: x / WBC x   Sq Epi: x / Non Sq Epi: x / Bacteria: x        Venous Blood Gas:   @ 12:58  7.35/48/34/26/53.9  VBG Lactate: 1.7      MICROBIOLOGY:      Jose Carlos Beckie | PGY1| Pager: [phone]  Interval Events: Patient was transferred to the floors; patient is continuously in AFib with tachycardia to 120s; reports continued pain in his RLE with ultrasound showing Abscess vs hematoma; likely requiring MRI for further differentiation. Patient is otherwise asymptomatic.    REVIEW OF SYSTEMS:  CONSTITUTIONAL: No weakness, fevers or chills  EYES/ENT: No visual changes;  No vertigo or throat pain   NECK: No pain or stiffness  RESPIRATORY: No cough, wheezing, hemoptysis; No shortness of breath  CARDIOVASCULAR: No chest pain or palpitations  GASTROINTESTINAL: No abdominal or epigastric pain. No nausea, vomiting, or hematemesis; No diarrhea or constipation. No melena or hematochezia.  NEUROLOGICAL: No numbness or weakness  SKIN: RLE around calf/shins apears erythematous, large non-fluctuant mass on shin, area surrounding the mass is painful. Significant 2+ pitting edema in RLE of the foot  All other review of systems is negative unless indicated above.    OBJECTIVE:  ICU Vital Signs Last 24 Hrs  T(C): 37 (01 Oct 2022 01:13), Max: 38.1 (30 Sep 2022 12:20)  T(F): 98.6 (01 Oct 2022 01:13), Max: 100.5 (30 Sep 2022 12:20)  HR: 118 (01 Oct 2022 03:46) (108 - 128)  BP: 98/63 (01 Oct 2022 03:46) (87/53 - 116/65)  BP(mean): --  ABP: --  ABP(mean): --  RR: 17 (01 Oct 2022 01:13) (16 - 22)  SpO2: 96% (01 Oct 2022 01:13) (95% - 98%)    O2 Parameters below as of 01 Oct 2022 01:13  Patient On (Oxygen Delivery Method): room air              CAPILLARY BLOOD GLUCOSE      POCT Blood Glucose.: 138 mg/dL (30 Sep 2022 18:10)      PHYSICAL EXAM:  General: NAD  Neurology: A&Ox3, nonfocal, CARABALLO x 4  Eyes: PERRLA/ EOMI, Gross vision intact  ENT/Neck: Neck supple, trachea midline, No JVD, Gross hearing intact  Respiratory: CTA B/L, No wheezing, rales, rhonchi  CV: irregularly irregular rate; tachycardic, awaiting telemetry floor acceptance  Abdominal: Soft, NT, ND +BS, No HSM  Extremities: No edema, 2+ peripheral pulses  Skin: No Rashes, Hematoma, Ecchymosis      HOSPITAL MEDICATIONS:  MEDICATIONS  (STANDING):  allopurinol 300 milliGRAM(s) Oral daily  atorvastatin 40 milliGRAM(s) Oral at bedtime  dextrose 5%. 1000 milliLiter(s) (50 mL/Hr) IV Continuous <Continuous>  dextrose 5%. 1000 milliLiter(s) (100 mL/Hr) IV Continuous <Continuous>  dextrose 50% Injectable 25 Gram(s) IV Push once  dextrose 50% Injectable 12.5 Gram(s) IV Push once  dextrose 50% Injectable 25 Gram(s) IV Push once  glucagon  Injectable 1 milliGRAM(s) IntraMuscular once  influenza  Vaccine (HIGH DOSE) 0.7 milliLiter(s) IntraMuscular once  insulin lispro (ADMELOG) corrective regimen sliding scale   SubCutaneous three times a day before meals  mometasone 220 MICROgram(s) Inhaler 1 Puff(s) Inhalation daily  pantoprazole    Tablet 40 milliGRAM(s) Oral before breakfast  tiotropium 18 MICROgram(s) Capsule 1 Capsule(s) Inhalation daily    MEDICATIONS  (PRN):  acetaminophen     Tablet .. 650 milliGRAM(s) Oral every 6 hours PRN Temp greater or equal to 38C (100.4F), Mild Pain (1 - 3)  ALBUTerol    90 MICROgram(s) HFA Inhaler 2 Puff(s) Inhalation every 6 hours PRN Shortness of Breath and/or Wheezing  aluminum hydroxide/magnesium hydroxide/simethicone Suspension 30 milliLiter(s) Oral every 4 hours PRN Dyspepsia  dextrose Oral Gel 15 Gram(s) Oral once PRN Blood Glucose LESS THAN 70 milliGRAM(s)/deciliter  melatonin 3 milliGRAM(s) Oral at bedtime PRN Insomnia  ondansetron Injectable 4 milliGRAM(s) IV Push every 8 hours PRN Nausea and/or Vomiting      LABS:                        10.3   8.29  )-----------( x        ( 01 Oct 2022 06:20 )             34.7     Hgb Trend: 10.3<--, 10.6<--  09-30    140  |  101  |  87<H>  ----------------------------<  167<H>  5.2   |  25  |  2.20<H>    Ca    10.1      30 Sep 2022 12:58    TPro  7.2  /  Alb  4.2  /  TBili  1.3<H>  /  DBili  x   /  AST  17  /  ALT  13  /  AlkPhos  84      Creatinine Trend: 2.20<--  PT/INR - ( 30 Sep 2022 12:58 )   PT: 16.9 sec;   INR: 1.45 ratio         PTT - ( 30 Sep 2022 12:58 )  PTT:30.8 sec  Urinalysis Basic - ( 30 Sep 2022 15:20 )    Color: Light Yellow / Appearance: Clear / S.012 / pH: x  Gluc: x / Ketone: Negative  / Bili: Negative / Urobili: <2 mg/dL   Blood: x / Protein: Negative / Nitrite: Negative   Leuk Esterase: Negative / RBC: x / WBC x   Sq Epi: x / Non Sq Epi: x / Bacteria: x        Venous Blood Gas:   @ 12:58  7.35/48/34/26/53.9  VBG Lactate: 1.7      MICROBIOLOGY:

## 2022-10-01 NOTE — PHYSICAL THERAPY INITIAL EVALUATION ADULT - GAIT PATTERN USED, PT EVAL
Difficulty with bearing weight on RLE.  Pt reporting 9/10 discomfort of RLE prior to ambulation.  Following ambulation pt reporting 8/10.  Improvement with normal gait mechanics after ambulating 15 ft./3-point gait

## 2022-10-01 NOTE — PATIENT PROFILE ADULT - NSPROEXTENSIONSOFSELF_GEN_A_NUR
Post-op shoe applied to pt, pt ambulates with steady gait and use of crutches, pt without any other complaints at this time, pt able to demonstrate proper movement and ambulation with crutches. Pt without SOB, chest apin, or signs of distress.   
none

## 2022-10-01 NOTE — PHYSICAL THERAPY INITIAL EVALUATION ADULT - MANUAL MUSCLE TESTING RESULTS, REHAB EVAL
Upper and lower extremities grossly assessed.  Bilaterally, pt presents with a 4/5 on MMT score./grossly assessed due to

## 2022-10-01 NOTE — PROGRESS NOTE ADULT - PROBLEM SELECTOR PLAN 12
Pt has hx of CAD and aortic stenosis. PMH in chart notes CHF, but no documentation is found in medical record.  -F/u Echo Pt has hx of CAD and aortic stenosis. PMH in chart notes CHF, but no documentation is found in medical record.  ECHO from June 2022 is in the chart  EF 55%  Severe Grade III dysfunction; No LVH  -F/u Echo

## 2022-10-01 NOTE — PROGRESS NOTE ADULT - PROBLEM SELECTOR PLAN 2
Cellulitis vs hematoma vs cellulitis 2/2 hematoma.  -RLE U/S unable to differentiate hematoma vs abscess.     -can f/u with MRI if appropriate  -Per gen surg, no intervention at this time.  -Reassess daily for changes in swelling/erythema/pain  -OOB and PT as tolerated  -Pain control w/ tylenol; avoid NSAIDS  -Hold AC i/s/o hematoma Cellulitis vs hematoma vs cellulitis 2/2 hematoma.  -RLE U/S unable to differentiate hematoma vs abscess.     -can f/u with MRI if appropriate  -Reassess daily for changes in swelling/erythema/pain  -OOB and PT as tolerated  -Pain control w/ tylenol; avoid NSAIDS  - Restarted Eliquis; given hgb is stable.    Appreciate Gen Surg Recs:  Hold Eliquis

## 2022-10-01 NOTE — PATIENT PROFILE ADULT - FALL HARM RISK - HARM RISK INTERVENTIONS

## 2022-10-01 NOTE — CONSULT NOTE ADULT - SUBJECTIVE AND OBJECTIVE BOX
Date of Admission: 9/30/2022    CHIEF COMPLAINT: RLE pain    HISTORY OF PRESENT ILLNESS: 77M with afib on apixaban, severe AS with normal LV function, CAD/MI 30 years ago- no stents, HTN, HLD, gout, COPD, and DM2 presents one week post R shin injury. Patient reports he was boarding a bus and missed the step as it was too high, slipped, and banged his R shin on step of bus. He proceeded to his cardiology appointment where all was well and returned home. Over the last week his R leg has become more painful, swollen, and red. Also reported mild chills. He denied any other associated symptoms such as chest pain, dizziness, SOB, fever, or palpitations.     Admitted to medicine and is now being treated for sepsis with vancomycin. Leg remains red and swollen and ultrasound is inconclusive for cellulitis vs. hematoma. Although patient has a CHADSVASC  of 6, apixaban is on hold due to concern for hematoma. Rhythm remains afib with rates up to 120 and SBP , has not been able to receive any rate control therapy at this time although patient remains asymptomatic without signs of volume overload.    Cardiology is consulted for assistance with rate control in setting of sepsis.       Allergies    penicillin (Unknown)    Intolerances    	    MEDICATIONS:    vancomycin  IVPB 1000 milliGRAM(s) IV Intermittent every 24 hours    ALBUTerol    90 MICROgram(s) HFA Inhaler 2 Puff(s) Inhalation every 6 hours PRN  mometasone 220 MICROgram(s) Inhaler 1 Puff(s) Inhalation daily  tiotropium 18 MICROgram(s) Capsule 1 Capsule(s) Inhalation daily    acetaminophen     Tablet .. 650 milliGRAM(s) Oral every 6 hours PRN  melatonin 3 milliGRAM(s) Oral at bedtime PRN  ondansetron Injectable 4 milliGRAM(s) IV Push every 8 hours PRN    pantoprazole    Tablet 40 milliGRAM(s) Oral before breakfast    allopurinol 300 milliGRAM(s) Oral daily  atorvastatin 40 milliGRAM(s) Oral at bedtime  dextrose 50% Injectable 25 Gram(s) IV Push once  dextrose 50% Injectable 12.5 Gram(s) IV Push once  dextrose 50% Injectable 25 Gram(s) IV Push once  dextrose Oral Gel 15 Gram(s) Oral once PRN  glucagon  Injectable 1 milliGRAM(s) IntraMuscular once  insulin lispro (ADMELOG) corrective regimen sliding scale   SubCutaneous three times a day before meals    dextrose 5%. 1000 milliLiter(s) IV Continuous <Continuous>  dextrose 5%. 1000 milliLiter(s) IV Continuous <Continuous>  influenza  Vaccine (HIGH DOSE) 0.7 milliLiter(s) IntraMuscular once      PAST MEDICAL & SURGICAL HISTORY:  AF (atrial fibrillation)      Hypertension      MI (myocardial infarction)      BPH (benign prostatic hyperplasia)      GERD (gastroesophageal reflux disease)      Sleep apnea      Gout      Aortic stenosis      COPD (chronic obstructive pulmonary disease)      No significant past surgical history      FAMILY HISTORY:  No pertinent family history in first degree relatives    SOCIAL HISTORY:    Smoker  denies  Alcohol denies  Drugs denies      REVIEW OF SYSTEMS:  See HPI. Otherwise, 10 point ROS done and otherwise negative.    PHYSICAL EXAM:  T(C): 36.5 (10-01-22 @ 12:24), Max: 37 (10-01-22 @ 01:13)  HR: 120 (10-01-22 @ 12:24) (108 - 128)  BP: 94/58 (10-01-22 @ 12:24) (87/53 - 116/65)  RR: 17 (10-01-22 @ 12:24) (17 - 22)  SpO2: 95% (10-01-22 @ 12:24) (95% - 98%)  Wt(kg): --  I&O's Summary    01 Oct 2022 07:01  -  01 Oct 2022 13:53  --------------------------------------------------------  IN: 200 mL / OUT: 0 mL / NET: 200 mL        Appearance: NAD, skin W & D	  HEENT:   Normal oral mucosa, PERRL, EOMI	  Cardiovascular: S1S2 irreg irreg  Respiratory: Lungs clear to auscultation	  Psychiatry: A & O x 3, Mood & affect appropriate  Gastrointestinal:  Soft, Non-tender, + BS	  Skin: No rashes, No ecchymoses, No cyanosis	  Neurologic: Non-focal  Extremities: LLE no C/C/E. RLE with significant edema, redness.           LABS:	 	                        10.3   8.29  )-----------( 132      ( 01 Oct 2022 06:20 )             34.7       10-01    139  |  103  |  84<H>  ----------------------------<  143<H>  4.6   |  22  |  2.08<H>    10-01    143  |  105  |  82<H>  ----------------------------<  129<H>  4.9   |  22  |  1.98<H>    Ca    9.5      01 Oct 2022 11:30  Ca    10.1      01 Oct 2022 06:20  Phos  3.6     10-01  Mg     1.60     10-01    TPro  7.1  /  Alb  3.9  /  TBili  1.0  /  DBili  x   /  AST  19  /  ALT  9   /  AlkPhos  78  10-01  TPro  7.2  /  Alb  4.2  /  TBili  1.3<H>  /  DBili  x   /  AST  17  /  ALT  13  /  AlkPhos  84  09-30      proBNP: Serum Pro-Brain Natriuretic Peptide: 3696 pg/mL (10-01 @ 06:20)    TELEMETRY: not on telemetry      ECG:  	atrial fibrillation with -120    RADIOLOGY:  INTERPRETATION:  INDICATION: Right lower leg pain    Chest x-ray    COMPARISON: 4/2/2021    FINDINGS:  Heart/Vascular: There is cardiomegaly. There are atherosclerotic changes.   The mediastinum appears within normal limits. Hilum cannot be adequately   evaluated.  Pulmonary: Midline trachea. There is no focal infiltrate, congestion or   effusion.    Bones: There is no fracture.  Lines and catheter: None    Right tibia and fibula, 4 views    FINDINGS: There is mild generalized swelling. There is no fracture,   dislocation or bony lesion. There are moderate atherosclerotic changes.   The ankle mortise is intact and the talar dome is smooth. There are tiny   plantar and retrocalcaneal spurs.      Impression:    No acute pulmonary disease.    Cardiomegaly.    Mild leg swelling.    --- End of Report ---            ERROL CORONEL DO; Attending Radiologist  This document has been electronically signed. Sep 30 2022  2:23PM

## 2022-10-01 NOTE — PROVIDER CONTACT NOTE (OTHER) - BACKGROUND
Pt admitted with right leg Pt admitted with right leg redness swelling Pt admitted with right shin pain, sepsis

## 2022-10-01 NOTE — PROGRESS NOTE ADULT - SUBJECTIVE AND OBJECTIVE BOX
Surgery Progress Note  76yo M here w R shin wound.    SUBJECTIVE: Pt seen and examined at bedside. Patient comfortable and in no-apparent distress. Denies resting pain, subjective fever, sob, cough.    Vital Signs Last 24 Hrs  T(C): 36.6 (01 Oct 2022 15:11), Max: 37 (01 Oct 2022 01:13)  T(F): 97.8 (01 Oct 2022 15:11), Max: 98.6 (01 Oct 2022 01:13)  HR: 118 (01 Oct 2022 15:11) (115 - 128)  BP: 92/52 (01 Oct 2022 15:11) (87/53 - 116/65)  BP(mean): --  RR: 17 (01 Oct 2022 15:11) (17 - 22)  SpO2: 94% (01 Oct 2022 15:11) (94% - 97%)    Parameters below as of 01 Oct 2022 15:11  Patient On (Oxygen Delivery Method): room air    Physical Exam:  General Appearance: Appears well, NAD  Respiratory: No labored breathing  CV: Pulse regularly present  Wound: R lower leg is    LABS:                        10.3   8.29  )-----------( 132      ( 01 Oct 2022 06:20 )             34.7      Surgery Progress Note  78yo M here w R shin wound.    SUBJECTIVE: Pt seen and examined at bedside. Patient comfortable and in no-apparent distress. Denies resting pain, subjective fever, sob, cough.    Vital Signs Last 24 Hrs  T(C): 36.6 (01 Oct 2022 15:11), Max: 37 (01 Oct 2022 01:13)  T(F): 97.8 (01 Oct 2022 15:11), Max: 98.6 (01 Oct 2022 01:13)  HR: 118 (01 Oct 2022 15:11) (115 - 128)  BP: 92/52 (01 Oct 2022 15:11) (87/53 - 116/65)  BP(mean): --  RR: 17 (01 Oct 2022 15:11) (17 - 22)  SpO2: 94% (01 Oct 2022 15:11) (94% - 97%)    Parameters below as of 01 Oct 2022 15:11  Patient On (Oxygen Delivery Method): room air    Physical Exam:  General Appearance: Appears well, NAD  Respiratory: No labored breathing  CV: Pulse regularly present  Wound: R lower leg is edematous and erythematous w dark central fluctuance present on lateral aspect of shin. Comparable to prior exam on view. The leg is not tender to palpation at skin, fluctuant region is tender to soft palpation.     LABS:                        10.3   8.29  )-----------( 132      ( 01 Oct 2022 06:20 )             34.7     IMAGING:    < from: US Extremity Nonvasc Limited, Right (09.30.22 @ 17:31) >    In the region of clinical concern there is a complex heterogeneous in   echotexture focus identified measuring 7.5 x 5.0 x 3.3 cm which may   represent either abscess or hematoma. Further characterization with MR   evaluation can be performed. Correlate with clinical presentation.    < end of copied text >

## 2022-10-01 NOTE — PROGRESS NOTE ADULT - PROBLEM SELECTOR PLAN 4
pt persistently tachycardic in 110s-120s. Notably, [id number] score 6. At home takes Metoprolol Succ 50mg qd  -Switch to immediate release formulation for finer control as inpatient     -Give Metoprolol Tartrate 12.5mg now if SBP >100     -start Metoprolol Tartrate 25mg BID 12hr after the above dose  -At home pt taking Eliquis 2.5mg BID (last dose this morning)  -Holding AC tonight i/s/o possible hematoma  -Reevaluate Hemoglobin in AM     -If stable, less concern for bleeding-> restart home Eliquis 2.5mg BID

## 2022-10-01 NOTE — PHYSICAL THERAPY INITIAL EVALUATION ADULT - ADDITIONAL COMMENTS
Pt reports living in an apartment no steps to negotiate with his wife.  Pt was independent roughly 2 weeks ago before his mechanical trip.  Pt since has been using DME in order to assist with ambulation.

## 2022-10-01 NOTE — PROGRESS NOTE ADULT - PROBLEM SELECTOR PLAN 3
Likely prerenal i/s/o hypotension/sepsis  -Post-void residual bladder scan ~130cc-> likely not post-renal  -Renally dose medications  -reassess BMP daily for improvement  -if no improvement w/ tx of sepsis/hypotension, consider intrarenal pathology i/s/o DM, HTN     -consider nephrology consult

## 2022-10-01 NOTE — CONSULT NOTE ADULT - ASSESSMENT
77M with afib on apixaban, severe AS with normal LV function, CAD/MI 30 years ago- no stents, HTN, HLD, gout, COPD, and DM2 presents one week post R shin injury, now with concern for sepsis. Patient in afib with -120s and SBP . Cardiology is consulted for assistance with rate control i/s/o sepsis.    D/W Dr. Saleem, cardio fellow:  1. Continuous telemetry  2. Given report of EF 55% and normal LV function can trial  cc/hr over 6 hours and observe  3. Goal HR is 110-115. As BP tolerates would trial metoprolol 12.5 mg po BID with parameters SBP < 90, HR < 50  4. For rates persistently above 120-125 can give IV lopressor  5. Given concern for possible hematoma, monitor off apixaban at present    Cardiology will follow.             77M with afib on apixaban, severe AS with normal LV function, CAD/MI 30 years ago- no stents, HTN, HLD, gout, COPD, and DM2 presents one week post R shin injury, now with concern for sepsis. Patient in afib with -120s and SBP . Cardiology is consulted for assistance with rate control i/s/o sepsis.    D/W Dr. Saleem, cardio fellow:  1. Continuous telemetry  2. Given report of EF 55% and normal LV function can trial  cc/hr over 6 hours and observe  3. Goal HR is 110-115. As BP tolerates would trial metoprolol 12.5 mg po BID with parameters SBP < 90, HR < 50  4. For rates persistently above 120-125 can give IV lopressor  5. A/C on hold given concern for hematoma. CHADSVASC is 6, should be on A/C when safe to do so.    Cardiology will follow.

## 2022-10-02 ENCOUNTER — TRANSCRIPTION ENCOUNTER (OUTPATIENT)
Age: 77
End: 2022-10-02

## 2022-10-02 LAB
ANION GAP SERPL CALC-SCNC: 12 MMOL/L — SIGNIFICANT CHANGE UP (ref 7–14)
BUN SERPL-MCNC: 80 MG/DL — HIGH (ref 7–23)
CALCIUM SERPL-MCNC: 9 MG/DL — SIGNIFICANT CHANGE UP (ref 8.4–10.5)
CHLORIDE SERPL-SCNC: 105 MMOL/L — SIGNIFICANT CHANGE UP (ref 98–107)
CO2 SERPL-SCNC: 24 MMOL/L — SIGNIFICANT CHANGE UP (ref 22–31)
CREAT SERPL-MCNC: 2.23 MG/DL — HIGH (ref 0.5–1.3)
EGFR: 30 ML/MIN/1.73M2 — LOW
GLUCOSE BLDC GLUCOMTR-MCNC: 121 MG/DL — HIGH (ref 70–99)
GLUCOSE BLDC GLUCOMTR-MCNC: 132 MG/DL — HIGH (ref 70–99)
GLUCOSE BLDC GLUCOMTR-MCNC: 161 MG/DL — HIGH (ref 70–99)
GLUCOSE BLDC GLUCOMTR-MCNC: 184 MG/DL — HIGH (ref 70–99)
GLUCOSE SERPL-MCNC: 134 MG/DL — HIGH (ref 70–99)
HCT VFR BLD CALC: 28.6 % — LOW (ref 39–50)
HCT VFR BLD CALC: 29.6 % — LOW (ref 39–50)
HGB BLD-MCNC: 8.6 G/DL — LOW (ref 13–17)
HGB BLD-MCNC: 8.9 G/DL — LOW (ref 13–17)
MAGNESIUM SERPL-MCNC: 1.6 MG/DL — SIGNIFICANT CHANGE UP (ref 1.6–2.6)
MCHC RBC-ENTMCNC: 26.5 PG — LOW (ref 27–34)
MCHC RBC-ENTMCNC: 26.8 PG — LOW (ref 27–34)
MCHC RBC-ENTMCNC: 30.1 GM/DL — LOW (ref 32–36)
MCHC RBC-ENTMCNC: 30.1 GM/DL — LOW (ref 32–36)
MCV RBC AUTO: 88.3 FL — SIGNIFICANT CHANGE UP (ref 80–100)
MCV RBC AUTO: 89.2 FL — SIGNIFICANT CHANGE UP (ref 80–100)
MRSA PCR RESULT.: SIGNIFICANT CHANGE UP
NRBC # BLD: 0 /100 WBCS — SIGNIFICANT CHANGE UP (ref 0–0)
NRBC # BLD: 0 /100 WBCS — SIGNIFICANT CHANGE UP (ref 0–0)
NRBC # FLD: 0 K/UL — SIGNIFICANT CHANGE UP (ref 0–0)
NRBC # FLD: 0 K/UL — SIGNIFICANT CHANGE UP (ref 0–0)
PHOSPHATE SERPL-MCNC: 3.6 MG/DL — SIGNIFICANT CHANGE UP (ref 2.5–4.5)
PLATELET # BLD AUTO: 119 K/UL — LOW (ref 150–400)
PLATELET # BLD AUTO: 125 K/UL — LOW (ref 150–400)
POTASSIUM SERPL-MCNC: 4.6 MMOL/L — SIGNIFICANT CHANGE UP (ref 3.5–5.3)
POTASSIUM SERPL-SCNC: 4.6 MMOL/L — SIGNIFICANT CHANGE UP (ref 3.5–5.3)
RBC # BLD: 3.24 M/UL — LOW (ref 4.2–5.8)
RBC # BLD: 3.32 M/UL — LOW (ref 4.2–5.8)
RBC # FLD: 21.1 % — HIGH (ref 10.3–14.5)
RBC # FLD: 21.1 % — HIGH (ref 10.3–14.5)
S AUREUS DNA NOSE QL NAA+PROBE: SIGNIFICANT CHANGE UP
SODIUM SERPL-SCNC: 141 MMOL/L — SIGNIFICANT CHANGE UP (ref 135–145)
VANCOMYCIN TROUGH SERPL-MCNC: 11.9 UG/ML — SIGNIFICANT CHANGE UP (ref 10–20)
WBC # BLD: 6.74 K/UL — SIGNIFICANT CHANGE UP (ref 3.8–10.5)
WBC # BLD: 7.2 K/UL — SIGNIFICANT CHANGE UP (ref 3.8–10.5)
WBC # FLD AUTO: 6.74 K/UL — SIGNIFICANT CHANGE UP (ref 3.8–10.5)
WBC # FLD AUTO: 7.2 K/UL — SIGNIFICANT CHANGE UP (ref 3.8–10.5)

## 2022-10-02 PROCEDURE — 99232 SBSQ HOSP IP/OBS MODERATE 35: CPT

## 2022-10-02 PROCEDURE — 99233 SBSQ HOSP IP/OBS HIGH 50: CPT | Mod: GC

## 2022-10-02 RX ORDER — SODIUM CHLORIDE 9 MG/ML
1000 INJECTION, SOLUTION INTRAVENOUS
Refills: 0 | Status: DISCONTINUED | OUTPATIENT
Start: 2022-10-02 | End: 2022-10-03

## 2022-10-02 RX ORDER — METOPROLOL TARTRATE 50 MG
5 TABLET ORAL ONCE
Refills: 0 | Status: COMPLETED | OUTPATIENT
Start: 2022-10-02 | End: 2022-10-02

## 2022-10-02 RX ORDER — METOPROLOL TARTRATE 50 MG
12.5 TABLET ORAL EVERY 12 HOURS
Refills: 0 | Status: DISCONTINUED | OUTPATIENT
Start: 2022-10-02 | End: 2022-10-03

## 2022-10-02 RX ORDER — METOPROLOL TARTRATE 50 MG
12.5 TABLET ORAL ONCE
Refills: 0 | Status: COMPLETED | OUTPATIENT
Start: 2022-10-02 | End: 2022-10-02

## 2022-10-02 RX ADMIN — SODIUM CHLORIDE 100 MILLILITER(S): 9 INJECTION, SOLUTION INTRAVENOUS at 10:03

## 2022-10-02 RX ADMIN — Medication 12.5 MILLIGRAM(S): at 11:11

## 2022-10-02 RX ADMIN — Medication 100 MILLIGRAM(S): at 11:12

## 2022-10-02 RX ADMIN — Medication 250 MILLIGRAM(S): at 17:05

## 2022-10-02 RX ADMIN — Medication 5 MILLIGRAM(S): at 01:31

## 2022-10-02 RX ADMIN — Medication 650 MILLIGRAM(S): at 01:39

## 2022-10-02 RX ADMIN — Medication 650 MILLIGRAM(S): at 02:39

## 2022-10-02 RX ADMIN — PANTOPRAZOLE SODIUM 40 MILLIGRAM(S): 20 TABLET, DELAYED RELEASE ORAL at 05:10

## 2022-10-02 RX ADMIN — TIOTROPIUM BROMIDE 1 CAPSULE(S): 18 CAPSULE ORAL; RESPIRATORY (INHALATION) at 09:13

## 2022-10-02 RX ADMIN — Medication 12.5 MILLIGRAM(S): at 17:06

## 2022-10-02 RX ADMIN — ATORVASTATIN CALCIUM 40 MILLIGRAM(S): 80 TABLET, FILM COATED ORAL at 21:17

## 2022-10-02 RX ADMIN — Medication 5 MILLIGRAM(S): at 06:42

## 2022-10-02 RX ADMIN — MOMETASONE FUROATE 1 PUFF(S): 220 INHALANT RESPIRATORY (INHALATION) at 09:12

## 2022-10-02 NOTE — PROGRESS NOTE ADULT - SUBJECTIVE AND OBJECTIVE BOX
OBJECTIVE  T(C): 36.7 (10-02-22 @ 05:10), Max: 36.7 (10-01-22 @ 21:30)  HR: 142 (10-02-22 @ 06:40) (100 - 150)  BP: 90/64 (10-02-22 @ 06:40) (84/56 - 102/66)  RR: 18 (10-02-22 @ 05:10) (17 - 18)  SpO2: 99% (10-02-22 @ 05:10) (94% - 99%)  I&O's Summary    01 Oct 2022 07:01  -  02 Oct 2022 07:00  --------------------------------------------------------  IN: 200 mL / OUT: 0 mL / NET: 200 mL      I&O's Detail    01 Oct 2022 07:01  -  02 Oct 2022 07:00  --------------------------------------------------------  IN:    Oral Fluid: 200 mL  Total IN: 200 mL    OUT:  Total OUT: 0 mL    Total NET: 200 mL        LABS                        8.6    6.74  )-----------( 119      ( 02 Oct 2022 07:26 )             28.6     10    141  |  105  |  80<H>  ----------------------------<  134<H>  4.6   |  24  |  2.23<H>    Ca    9.0      02 Oct 2022 07:26  Phos  3.6     10-  Mg     1.60     10-02    TPro  7.1  /  Alb  3.9  /  TBili  1.0  /  DBili  x   /  AST  19  /  ALT  9   /  AlkPhos  78  10-01    PT/INR - ( 30 Sep 2022 12:58 )   PT: 16.9 sec;   INR: 1.45 ratio         PTT - ( 30 Sep 2022 12:58 )  PTT:30.8 sec  Urinalysis Basic - ( 30 Sep 2022 15:20 )    Color: Light Yellow / Appearance: Clear / S.012 / pH: x  Gluc: x / Ketone: Negative  / Bili: Negative / Urobili: <2 mg/dL   Blood: x / Protein: Negative / Nitrite: Negative   Leuk Esterase: Negative / RBC: x / WBC x   Sq Epi: x / Non Sq Epi: x / Bacteria: x      ORDERS/MEDICATIONS  MEDICATIONS  (STANDING):  allopurinol 100 milliGRAM(s) Oral daily  atorvastatin 40 milliGRAM(s) Oral at bedtime  dextrose 5%. 1000 milliLiter(s) (50 mL/Hr) IV Continuous <Continuous>  dextrose 5%. 1000 milliLiter(s) (100 mL/Hr) IV Continuous <Continuous>  dextrose 50% Injectable 25 Gram(s) IV Push once  dextrose 50% Injectable 12.5 Gram(s) IV Push once  dextrose 50% Injectable 25 Gram(s) IV Push once  glucagon  Injectable 1 milliGRAM(s) IntraMuscular once  influenza  Vaccine (HIGH DOSE) 0.7 milliLiter(s) IntraMuscular once  insulin lispro (ADMELOG) corrective regimen sliding scale   SubCutaneous three times a day before meals  lactated ringers. 1000 milliLiter(s) (100 mL/Hr) IV Continuous <Continuous>  metoprolol tartrate 12.5 milliGRAM(s) Oral every 12 hours  mometasone 220 MICROgram(s) Inhaler 1 Puff(s) Inhalation daily  pantoprazole    Tablet 40 milliGRAM(s) Oral before breakfast  tiotropium 18 MICROgram(s) Capsule 1 Capsule(s) Inhalation daily  vancomycin  IVPB 1000 milliGRAM(s) IV Intermittent every 24 hours    MEDICATIONS  (PRN):  acetaminophen     Tablet .. 650 milliGRAM(s) Oral every 6 hours PRN Temp greater or equal to 38C (100.4F), Mild Pain (1 - 3)  ALBUTerol    90 MICROgram(s) HFA Inhaler 2 Puff(s) Inhalation every 6 hours PRN Shortness of Breath and/or Wheezing  dextrose Oral Gel 15 Gram(s) Oral once PRN Blood Glucose LESS THAN 70 milliGRAM(s)/deciliter  melatonin 3 milliGRAM(s) Oral at bedtime PRN Insomnia  ondansetron Injectable 4 milliGRAM(s) IV Push every 8 hours PRN Nausea and/or Vomiting    Surgery Progress Note  78yo M here w R shin wound.    SUBJECTIVE: Pt seen and examined at bedside. Patient comfortable and in no-apparent distress. Denies resting pain, subjective fever, sob, cough.      Physical Exam:  General Appearance: Appears well, NAD  Respiratory: No labored breathing  CV: Pulse regularly present  Wound: R lower leg is edematous and erythematous w dark central fluctuance present on lateral aspect of shin. Comparable to prior exam on view. The leg is not tender to palpation at skin, fluctuant region is tender to soft palpation.     IMAGING:    < from: US Extremity Nonvasc Limited, Right (22 @ 17:31) >    In the region of clinical concern there is a complex heterogeneous in   echotexture focus identified measuring 7.5 x 5.0 x 3.3 cm which may   represent either abscess or hematoma. Further characterization with MR   evaluation can be performed. Correlate with clinical presentation.    < end of copied text >

## 2022-10-02 NOTE — PROGRESS NOTE ADULT - PROBLEM SELECTOR PLAN 2
Cellulitis vs hematoma vs cellulitis 2/2 hematoma.  -RLE U/S unable to differentiate hematoma vs abscess.     -can f/u with MRI if appropriate  -Reassess daily for changes in swelling/erythema/pain  -OOB and PT as tolerated  -Pain control w/ tylenol; avoid NSAIDS  - Held Eliquis per gen surgery given concern for hematoma; trend Hgb closely

## 2022-10-02 NOTE — PROVIDER CONTACT NOTE (OTHER) - ASSESSMENT
Patient noted with a Rapid A-fib episode of 139 on the heart monitor. Patient in bed resting comfortably with no distress noted. B/P 92/68.

## 2022-10-02 NOTE — DISCHARGE NOTE PROVIDER - NSDCMRMEDTOKEN_GEN_ALL_CORE_FT
albuterol 90 mcg/inh inhalation aerosol: 2 puff(s) inhaled every 4 hours  alcohol swabs : Apply topically to affected area 4 times a day   allopurinol 300 mg oral tablet: 1 tab(s) orally once a day  aspirin 81 mg oral tablet: 1 tab(s) orally once a day  atorvastatin 40 mg oral tablet: 1 tab(s) orally once a day  calcium (as carbonate) 500 mg oral tablet: 500 milligram(s) orally once a day  carvedilol 6.25 mg oral tablet: 1 tab(s) orally 2 times a day   Eliquis 2.5 mg oral tablet: 1 tab(s) orally 2 times a day  Fish Oil 500 mg oral capsule: 2 cap(s) orally 2 times a day  FUROSEMIDE 40MG TAB:   glucometer (per patient&#x27;s insurance): 1 application subcutaneous 2 times a day   lancets: 1 application subcutaneously 4 times a day   METFORMIN 500MG TAB: 1 tab(s) orally 2 times a day  METOPROL SUC 50MG ER TAB: 1 tab(s) orally once a day  Protonix 40 mg oral delayed release tablet: 1 tab(s) orally once a day  Qvar 80 mcg/inh inhalation aerosol: 1 puff(s) inhaled 2 times a day  Spiriva 18 mcg inhalation capsule: 1 cap(s) inhaled once a day  test strips (per patient&#x27;s insurance): 1 application subcutaneously 4 times a day. ** Compatible with patient&#x27;s glucometer **  VALSARTN -12.5 TAB:    albuterol 90 mcg/inh inhalation aerosol: 2 puff(s) inhaled every 4 hours  alcohol swabs : Apply topically to affected area 4 times a day   allopurinol 100 mg oral tablet: 1 tab(s) orally once a day  atorvastatin 40 mg oral tablet: 1 tab(s) orally once a day  cefpodoxime 200 mg oral tablet: 2 tab(s) orally 2 times a day   Eliquis 2.5 mg oral tablet: 1 tab(s) orally 2 times a day  Fish Oil 500 mg oral capsule: 2 cap(s) orally 2 times a day  glucometer (per patient&#x27;s insurance): 1 application subcutaneous 2 times a day   lancets: 1 application subcutaneously 4 times a day   METFORMIN 500MG TAB: 1 tab(s) orally 2 times a day  metoprolol tartrate 75 mg oral tablet: 1 tab(s) orally every 12 hours  Protonix 40 mg oral delayed release tablet: 1 tab(s) orally once a day  Qvar 80 mcg/inh inhalation aerosol: 1 puff(s) inhaled 2 times a day  Spiriva 18 mcg inhalation capsule: 1 cap(s) inhaled once a day  test strips (per patient&#x27;s insurance): 1 application subcutaneously 4 times a day. ** Compatible with patient&#x27;s glucometer **   albuterol 90 mcg/inh inhalation aerosol: 2 puff(s) inhaled every 4 hours  alcohol swabs : Apply topically to affected area 4 times a day   allopurinol 100 mg oral tablet: 1 tab(s) orally once a day  atorvastatin 40 mg oral tablet: 1 tab(s) orally once a day  cefpodoxime 200 mg oral tablet: 2 tab(s) orally 2 times a day   cefpodoxime 200 mg oral tablet: 2 tab(s) orally 2 times a day   Eliquis 2.5 mg oral tablet: 1 tab(s) orally 2 times a day  Fish Oil 500 mg oral capsule: 2 cap(s) orally 2 times a day  glucometer (per patient&#x27;s insurance): 1 application subcutaneous 2 times a day   lancets: 1 application subcutaneously 4 times a day   METFORMIN 500MG TAB: 1 tab(s) orally 2 times a day  metoprolol tartrate 75 mg oral tablet: 1 tab(s) orally every 12 hours  Protonix 40 mg oral delayed release tablet: 1 tab(s) orally once a day  Qvar 80 mcg/inh inhalation aerosol: 1 puff(s) inhaled 2 times a day  Spiriva 18 mcg inhalation capsule: 1 cap(s) inhaled once a day  test strips (per patient&#x27;s insurance): 1 application subcutaneously 4 times a day. ** Compatible with patient&#x27;s glucometer **

## 2022-10-02 NOTE — PROVIDER CONTACT NOTE (OTHER) - ACTION/TREATMENT ORDERED:
Patient noted with a heart rate sustaining at 144 to 150 on the heart monitor. ACP Hansel made aware. One time order for Metoprolol 5mg IVP placed. Will continue to monitor.

## 2022-10-02 NOTE — PROVIDER CONTACT NOTE (OTHER) - ACTION/TREATMENT ORDERED:
Patient noted with a heart rate sustaining at 148 to 150 on the heart monitor. ACP Hansel made aware. Order given for Metoprolol 5mg IVP. No distress noted. Will continue to monitor.

## 2022-10-02 NOTE — PROGRESS NOTE ADULT - PROBLEM SELECTOR PLAN 1
On admission Tachycardic (120-130), febrile (100.5F), with possible infectious source (R shin). Pt also notably hypotensive in ED to 94/48.  -s/p 1g Vancomycin x1, 2g cefazolin x1, and 1L NS in ED  -f/u BCx and UCx  -reculture q48h if growth or fever spike  -c/w Vancomycin, dosed by trough  -f/u MRSA swab     -if negative, consider switch to Cefazolin 1g q8h  -Tylenol PRN for fever control  -Gentle rehydration if persistently hypotensive

## 2022-10-02 NOTE — PROGRESS NOTE ADULT - ASSESSMENT
Pt is a 76yo M w/ PMH T2DM, HTN, HLD COPD, A-Fib on eliquis, CAD, severe Aortic Stenosis, G3 diastolic dysfunction, and gout, who presents with 1 week of R shin pain w/ associated chills. Likely etiology hematoma vs cellulitis. Notably, pt meeting SIRS criteria on presentation (Tachycardic, febrile), with likely source-> meets sepsis criteria. Pt also has possible SHEYLA (Cr 2.2, baseline 1.0-1.5), likely prerenal. Persistently elevated HR s/p 1L NS, not on rate control due to hypotension.

## 2022-10-02 NOTE — PROGRESS NOTE ADULT - PROBLEM SELECTOR PLAN 4
pt persistently tachycardic in 110s-120s. Notably, [id number] score 6. At home takes Metoprolol Succ 50mg qd  -Switch to immediate release formulation to target heart rate    -spoke to cardiology 10/2, better to start metoprolol tartrate 12.5mg BID and better HR control should improve BP as well with improved cardiac output  -At home pt taking Eliquis 2.5mg BID   -Holding AC i/s/o possible hematoma per general surgery     -If Hgb persistently stable, less concern for bleeding-> restart home Eliquis 2.5mg BID

## 2022-10-02 NOTE — DISCHARGE NOTE PROVIDER - ATTENDING DISCHARGE PHYSICAL EXAMINATION:
GENERAL: NAD, well-developed  HEAD:  Atraumatic, Normocephalic  EYES: EOMI, conjunctiva and sclera clear  NECK: Supple  CHEST/LUNG: Clear to auscultation bilaterally; No wheeze, rhonchi, crackles or rales  HEART: Regular rate and rhythm; No murmurs, rubs, or gallops  ABDOMEN: Soft, Nontender, Nondistended; Bowel sounds present  EXTREMITIES:  Trace edema RLE  PSYCH: AAOx3  NEUROLOGY: non-focal  SKIN: +Hard palpable lesion RLE w/ surrounding ecchymosis, no TTP. Area marked off to monitor for improvement. Overall improving

## 2022-10-02 NOTE — PROVIDER CONTACT NOTE (OTHER) - ACTION/TREATMENT ORDERED:
Patient noted with a Rapid A-fib episode of 139 on the heart monitor. ACP Hanesl made aware. Order given for Metoprolol 5mg IVP. V/S in progress Q4H. No distress noted. Will continue to monitor.

## 2022-10-02 NOTE — PROGRESS NOTE ADULT - ASSESSMENT
ASSESSMENT: 76yo M w RLE traumatic wound. US imaging suspicious for abscess vs hematoma.     PLAN:   - Recommend holding eliquis given possible hematoma  - Erythematous region marked for comparison  - Recommend observation for 2 days then MRI if no clinical improvement or new sx  - No acute surgical intervention, will continue to monitor    Discussed with Attending Dr. LAURI BRAXTON Team Surgery  #[id number]

## 2022-10-02 NOTE — PROGRESS NOTE ADULT - PROBLEM SELECTOR PLAN 12
Pt has hx of CAD and aortic stenosis. PMH in chart notes CHF, but no documentation is found in medical record.  ECHO from June 2022 is in the chart  EF 55%  Severe Grade III dysfunction; No LVH  -F/u Echo Implemented All Fall with Harm Risk Interventions:  [place] to call system. Call bell, personal items and telephone within reach. Instruct patient to call for assistance. Room bathroom lighting operational. Non-slip footwear when patient is off stretcher. Physically safe environment: no spills, clutter or unnecessary equipment. Stretcher in lowest position, wheels locked, appropriate side rails in place. Provide visual cue, wrist band, yellow gown, etc. Monitor gait and stability. Monitor for mental status changes and reorient to person, place, and time. Review medications for side effects contributing to fall risk. Reinforce activity limits and safety measures with patient and family. Provide visual clues: red socks.

## 2022-10-02 NOTE — DISCHARGE NOTE PROVIDER - NSDCCPCAREPLAN_GEN_ALL_CORE_FT
PRINCIPAL DISCHARGE DIAGNOSIS  Diagnosis: Sepsis  Assessment and Plan of Treatment:       SECONDARY DISCHARGE DIAGNOSES  Diagnosis: Hematoma  Assessment and Plan of Treatment:

## 2022-10-02 NOTE — PROGRESS NOTE ADULT - SUBJECTIVE AND OBJECTIVE BOX
Patient seen and examined at bedside.    Overnight Events: No acute events, remains in fib w/ RVR, asymptomatic     Review Of Systems:   CONSTITUTIONAL: No weakness, fevers or chills  EYES/ENT: No visual changes;  No dysphagia  NECK: No pain or stiffness  RESPIRATORY: No cough, wheezing, hemoptysis  CARDIOVASCULAR: No chest pain or palpitations; No lower extremity edema  GASTROINTESTINAL: No abdominal or epigastric pain. No nausea, vomiting, or hematemesis; No diarrhea or constipation. No melena or hematochezia.  BACK: No back pain  GENITOURINARY: No dysuria, frequency or hematuria  NEUROLOGICAL: No numbness or weakness  SKIN: No itching, burning, rashes, or lesions   All other review of systems is negative unless indicated above.          Current Meds:  acetaminophen     Tablet .. 650 milliGRAM(s) Oral every 6 hours PRN  ALBUTerol    90 MICROgram(s) HFA Inhaler 2 Puff(s) Inhalation every 6 hours PRN  allopurinol 100 milliGRAM(s) Oral daily  atorvastatin 40 milliGRAM(s) Oral at bedtime  dextrose 5%. 1000 milliLiter(s) IV Continuous <Continuous>  dextrose 5%. 1000 milliLiter(s) IV Continuous <Continuous>  dextrose 50% Injectable 25 Gram(s) IV Push once  dextrose 50% Injectable 12.5 Gram(s) IV Push once  dextrose 50% Injectable 25 Gram(s) IV Push once  dextrose Oral Gel 15 Gram(s) Oral once PRN  glucagon  Injectable 1 milliGRAM(s) IntraMuscular once  influenza  Vaccine (HIGH DOSE) 0.7 milliLiter(s) IntraMuscular once  insulin lispro (ADMELOG) corrective regimen sliding scale   SubCutaneous three times a day before meals  lactated ringers. 1000 milliLiter(s) IV Continuous <Continuous>  melatonin 3 milliGRAM(s) Oral at bedtime PRN  mometasone 220 MICROgram(s) Inhaler 1 Puff(s) Inhalation daily  ondansetron Injectable 4 milliGRAM(s) IV Push every 8 hours PRN  pantoprazole    Tablet 40 milliGRAM(s) Oral before breakfast  tiotropium 18 MICROgram(s) Capsule 1 Capsule(s) Inhalation daily  vancomycin  IVPB 1000 milliGRAM(s) IV Intermittent every 24 hours      Vitals:  T(F): 98 (10-02), Max: 98.1 (10-01)  HR: 142 (10-02) (100 - 150)  BP: 90/64 (10-02) (84/56 - 102/66)  RR: 18 (10-02)  SpO2: 99% (10-02)  I&O's Summary    01 Oct 2022 07:01  -  02 Oct 2022 07:00  --------------------------------------------------------  IN: 200 mL / OUT: 0 mL / NET: 200 mL        Physical Exam:  Appearance: No acute distress; well appearing  Eyes: PERRL, EOMI, pink conjunctiva  HEENT: Normal oral mucosa  Cardiovascular: rapid, irreg, S1, S2, no murmurs, rubs, or gallops; no edema; no JVD  Respiratory: Clear to auscultation bilaterally  Gastrointestinal: soft, non-tender, non-distended with normal bowel sounds  Musculoskeletal: No clubbing; no joint deformity   Neurologic: Non-focal  Lymphatic: No lymphadenopathy  Psychiatry: AAOx3, mood & affect appropriate  Skin: No rashes, ecchymoses, or cyanosis                          8.6    6.74  )-----------( 119      ( 02 Oct 2022 07:26 )             28.6     10-02    141  |  105  |  80<H>  ----------------------------<  134<H>  4.6   |  24  |  2.23<H>    Ca    9.0      02 Oct 2022 07:26  Phos  3.6     10-02  Mg     1.60     10-02    TPro  7.1  /  Alb  3.9  /  TBili  1.0  /  DBili  x   /  AST  19  /  ALT  9   /  AlkPhos  78  10-01    PT/INR - ( 30 Sep 2022 12:58 )   PT: 16.9 sec;   INR: 1.45 ratio         PTT - ( 30 Sep 2022 12:58 )  PTT:30.8 sec      Serum Pro-Brain Natriuretic Peptide: 3696 pg/mL (10-01 @ 06:20)

## 2022-10-02 NOTE — PROVIDER CONTACT NOTE (OTHER) - ASSESSMENT
patient bp 85/55 and hr 137. patient has history of afib, currently no complaints of pain/discomfort asymptomatic

## 2022-10-02 NOTE — PROVIDER CONTACT NOTE (OTHER) - ASSESSMENT
Patient noted with a heart rate sustaining at 148 to 150 on the heart monitor. Patient in bed resting comfortably with no distress noted.

## 2022-10-02 NOTE — PROGRESS NOTE ADULT - PROBLEM SELECTOR PLAN 3
Likely prerenal i/s/o hypotension/sepsis  -Post-void residual bladder scan ~130cc-> likely not post-renal  -Renally dose medications  -reassess BMP daily for improvement  -if no improvement w/ tx of sepsis/hypotension, consider intrarenal pathology i/s/o DM, HTN     -consider nephrology consult if worsening SHEYLA

## 2022-10-02 NOTE — DISCHARGE NOTE PROVIDER - NSDCCPTREATMENT_GEN_ALL_CORE_FT
PRINCIPAL PROCEDURE  Procedure: US extremity right non-vascular limited  Findings and Treatment: < end of copied text >  TECHNIQUE: Real-time ultrasonography was performed utilizing grayscale   and color Doppler technique in the region of clinical concern within the   distal right shin. Multiple images are obtained.  FINDINGS:  In the region of clinical concern there is a complex heterogeneous in   echotexture focus identified measuring 7.5 x 5.0 x 3.3 cm which may   represent either abscess or hematoma. Further characterization with MR   evaluation can be performed. Correlate with clinical presentation.< from: US Extremity Nonvasc Limited, Right (09.30.22 @ 17:31) >        SECONDARY PROCEDURE  Procedure: XR tibia fibula RT 2V  Findings and Treatment:   < end of copied text >  FINDINGS:  Heart/Vascular: There is cardiomegaly. There are atherosclerotic changes.   The mediastinum appears within normal limits. Hilum cannot be adequately   evaluated.  Pulmonary: Midline trachea. There is no focal infiltrate, congestion or   effusion.  Bones: There is no fracture.  Lines and catheter: None  Right tibia and fibula, 4 views  FINDINGS: There is mild generalized swelling. There is no fracture,   dislocation or bony lesion. There are moderate atherosclerotic changes.   The ankle mortise is intact and the talar dome is smooth. There are tiny   plantar and retrocalcaneal spurs.      Procedure: XR chest, 1 view  Findings and Treatment:   < end of copied text >  Impression:  No acute pulmonary disease.  Cardiomegaly.  Mild leg swelling.< from: Xray Tibia + Fibula 2 Views, Right (09.30.22 @ 14:05) >

## 2022-10-02 NOTE — PROVIDER CONTACT NOTE (OTHER) - ASSESSMENT
Patient noted with a heart rate sustaining at 144 to 150 on the heart monitor. Patient in bed resting comfortably with no distress noted.

## 2022-10-02 NOTE — PROGRESS NOTE ADULT - ASSESSMENT
77M with afib on apixaban, severe AS with normal LV function, CAD/MI 30 years ago- no stents, HTN, HLD, gout, COPD, and DM2 presents one week post R shin injury, now with concern for sepsis. Patient in afib with -120s and SBP . Cardiology is consulted for assistance with rate control i/s/o sepsis.    - Continuous telemetry  - patient will benefit from better control of HR, particularly in setting of aortic stenosis, please start metoprolol PO, can trial 12.5mg bid, uptitrate to 25mg bid if HR peristently > 120 BPM, hold for SBP < 80 mmHg  - A/C on hold given concern for hematoma. CHADSVASC is 6, should be on A/C when safe from surgical / bleeding perspective   - please obtain TTE to assess progression of aortic stenosis  - Cards to follow

## 2022-10-02 NOTE — PROGRESS NOTE ADULT - SUBJECTIVE AND OBJECTIVE BOX
PROGRESS NOTE:   Authoted by Dr. Rachelle Melendez MD    Pager [phone] [facility], [id number] [facility]     Patient is a 77y old  Male who presents with a chief complaint of Right Hernandez Wound (02 Oct 2022 09:17)      SUBJECTIVE / OVERNIGHT EVENTS: Pt persistently tachcardic >130 recived 3 pushes of IV lopressor 5mg each. When pt seen, tachcyardic to 140s. Pt denies symptoms. Pt denies chest pain, palpitations, SOB, dizziness/lightheadedhess, syncope, abdominal pain, diarrhea/melena/BRBPR, dysuria/hematuria, focal deficits/weakness. He states that his R ankle/lower leg still is in pain with walking, but does not hurt with palpation. No new drainage or bleeding noticed.     MEDICATIONS  (STANDING):  allopurinol 100 milliGRAM(s) Oral daily  atorvastatin 40 milliGRAM(s) Oral at bedtime  dextrose 5%. 1000 milliLiter(s) (50 mL/Hr) IV Continuous <Continuous>  dextrose 5%. 1000 milliLiter(s) (100 mL/Hr) IV Continuous <Continuous>  dextrose 50% Injectable 25 Gram(s) IV Push once  dextrose 50% Injectable 12.5 Gram(s) IV Push once  dextrose 50% Injectable 25 Gram(s) IV Push once  glucagon  Injectable 1 milliGRAM(s) IntraMuscular once  influenza  Vaccine (HIGH DOSE) 0.7 milliLiter(s) IntraMuscular once  insulin lispro (ADMELOG) corrective regimen sliding scale   SubCutaneous three times a day before meals  lactated ringers. 1000 milliLiter(s) (100 mL/Hr) IV Continuous <Continuous>  metoprolol tartrate 12.5 milliGRAM(s) Oral every 12 hours  mometasone 220 MICROgram(s) Inhaler 1 Puff(s) Inhalation daily  pantoprazole    Tablet 40 milliGRAM(s) Oral before breakfast  tiotropium 18 MICROgram(s) Capsule 1 Capsule(s) Inhalation daily  vancomycin  IVPB 1000 milliGRAM(s) IV Intermittent every 24 hours    MEDICATIONS  (PRN):  acetaminophen     Tablet .. 650 milliGRAM(s) Oral every 6 hours PRN Temp greater or equal to 38C (100.4F), Mild Pain (1 - 3)  ALBUTerol    90 MICROgram(s) HFA Inhaler 2 Puff(s) Inhalation every 6 hours PRN Shortness of Breath and/or Wheezing  dextrose Oral Gel 15 Gram(s) Oral once PRN Blood Glucose LESS THAN 70 milliGRAM(s)/deciliter  melatonin 3 milliGRAM(s) Oral at bedtime PRN Insomnia  ondansetron Injectable 4 milliGRAM(s) IV Push every 8 hours PRN Nausea and/or Vomiting    CAPILLARY BLOOD GLUCOSE  161 (02 Oct 2022 09:00)    POCT Blood Glucose.: 139 mg/dL (01 Oct 2022 21:20)  POCT Blood Glucose.: 124 mg/dL (01 Oct 2022 17:18)  POCT Blood Glucose.: 133 mg/dL (01 Oct 2022 12:27)    I&O's Summary    01 Oct 2022 07:01  -  02 Oct 2022 07:00  --------------------------------------------------------  IN: 200 mL / OUT: 0 mL / NET: 200 mL    PHYSICAL EXAM:  Vital Signs Last 24 Hrs  T(C): 36.7 (02 Oct 2022 05:10), Max: 36.7 (01 Oct 2022 21:30)  T(F): 98 (02 Oct 2022 05:10), Max: 98.1 (01 Oct 2022 21:30)  HR: 142 (02 Oct 2022 06:40) (100 - 150)  BP: 90/64 (02 Oct 2022 06:40) (84/56 - 102/66)  RR: 18 (02 Oct 2022 05:10) (17 - 18)  SpO2: 99% (02 Oct 2022 05:10) (94% - 99%)    Parameters below as of 02 Oct 2022 05:10  Patient On (Oxygen Delivery Method): room air      General: NAD  Neurology: A&Ox3, nonfocal, CARABALLO x 4  Eyes: PERRLA/ EOMI, Gross vision intact  ENT/Neck: Neck supple, trachea midline, No JVD, Gross hearing intact  Respiratory: CTA B/L, No wheezing, rales, rhonchi  CV: irregularly irregular rate; tachycardic, awaiting telemetry floor acceptance  Abdominal: Soft, NT, ND +BS, No HSM  Extremities: No edema, 2+ peripheral pulses  Skin: cellulitis R ankle, Hematoma, Ecchymosis    LABS:                        8.6    6.74  )-----------( 119      ( 02 Oct 2022 07:26 )             28.6     10    141  |  105  |  80<H>  ----------------------------<  134<H>  4.6   |  24  |  2.23<H>    Ca    9.0      02 Oct 2022 07:26  Phos  3.6     10-  Mg     1.60     10-02    TPro  7.1  /  Alb  3.9  /  TBili  1.0  /  DBili  x   /  AST  19  /  ALT  9   /  AlkPhos  78  10    PT/INR - ( 30 Sep 2022 12:58 )   PT: 16.9 sec;   INR: 1.45 ratio         PTT - ( 30 Sep 2022 12:58 )  PTT:30.8 sec      Urinalysis Basic - ( 30 Sep 2022 15:20 )    Color: Light Yellow / Appearance: Clear / S.012 / pH: x  Gluc: x / Ketone: Negative  / Bili: Negative / Urobili: <2 mg/dL   Blood: x / Protein: Negative / Nitrite: Negative   Leuk Esterase: Negative / RBC: x / WBC x   Sq Epi: x / Non Sq Epi: x / Bacteria: x    Culture - Urine (collected 30 Sep 2022 15:20)  Source: Clean Catch Clean Catch (Midstream)  Final Report (01 Oct 2022 16:20):    No growth    Culture - Blood (collected 30 Sep 2022 12:20)  Source: .Blood Blood-Peripheral  Preliminary Report (01 Oct 2022 20:01):    No growth to date.    Culture - Blood (collected 30 Sep 2022 12:10)  Source: .Blood Blood-Peripheral  Preliminary Report (01 Oct 2022 20:01):    No growth to date.    RADIOLOGY & ADDITIONAL TESTS:  No new imaging or tests    COORDINATION OF CARE:  Care Discussed with Consultants/Other Providers [Y/N]:  Prior or Outpatient Records Reviewed [Y/N]:

## 2022-10-02 NOTE — DISCHARGE NOTE PROVIDER - HOSPITAL COURSE
HPI HPI: Nathaniel Pinto is a 78yo M w/ PMH of Type 2 DM, HTN, HLD, COPD (not on home O2), AFib on Eliquis, CAD, Aortic Stenosis, and Gout who presents with 1 week of R shin pain with worsening swelling after striking his shin against the metal step of a bus. Found to have signs of sepsis in the ED.    Hospital Course:   Patient was admitted to the floors out of concern for sepsis and started on vancomycin. He was seen by general surgery who recommended starting with medical management, and further imaging to evaluate for potential hematoma vs abscess. _________________    Patient is stable and safe for discharge to _______ 77M h/o T2DM, HTN, COPD, afib on Eliquis presented with sepsis iso RLE cellulitis, with concern for abscess vs hematoma of R shin, hospital course c/b SHEYLA and AFib w/ RVR.    #Clinical sepsis.   -Met sepsis criteria on admission w/ fever and tachycardia, likely 2/2 cellulitis  -US LE w/ complex heterogeneous in echotexture focus identified measuring 7.5 x 5.0 x 3.3 cm which may   represent either abscess or hematoma  -Likely hematoma w/ overlying cellulitis   -No surg intervention per surgery   -s/p 1g Vancomycin x1, 2g cefazolin x1, and 1L NS in ED  -s/p vancomycin (09/30-10/4)  -s/p IV cefazolin 2g q8   -On dc switch to cefpodoxime 400mg BID for 10 more days (14 day total abx)  -Blood and urine Cx NGTD    #Wound of right leg.   -Pt w/ cellulitis as above  -RLE U/S unable to differentiate hematoma vs abscess.  -Likely hematoma per surg  -Surg deferring resumption of A/C to primary team  -Risk/benefit discussion had w/ pt re: AC. Pt reasonably worried about risks of CVA off AC and worsening hematoma on AC. At this time pt would favor resuming AC while inpatient to monitor response. Pt expresses very strong wishes to be desired 10/7. Remains very resistant about remaining inpatient longer for further monitoring  -Will resumed AC 10/6 and stable response.       #Chronic atrial fibrillation.   - pAFib RVR to 110s-140s. Now improving 90s- low 100s  -On Toprol 50mg QD at home  -c/w lopressor 75mg BID   -[id number] score 6; on Eliquis at home   -Resume AC ; Eliquis 2.5mg BID     #SHEYLA (acute kidney injury).   ·  Plan: Suspect ATN in setting of sepsis  -Overall downtrending from admission  -Monitor sCR  -Renally dose meds  -Avoid nephrotoxic agents.     Problem/Plan - 5:  ·  Problem: COPD (chronic obstructive pulmonary disease).   ·  Plan: -Pt not on O2 at home. On RA this admission  -c/w Home inhaler regimen     -Beclomethasone 80mcg 1 puff BID-> Mometasone 220mcg qd     -Tiotropium 18mcg 1 capsule qd     -Albuterol 2 puffs q6h PRN for wheezing.     Problem/Plan - 6:  ·  Problem: Hypertension.   ·  Plan: Holding regimen i/s/o hypotension/sepsis and SHEYLA  -holding home Carvedilol 6.25mg BID     -Pt states he was likely taken off medication and switched to metoprolol. Will continue to hold for now. Follow up w/ Dr. Doe outpatient prior to resumption.   -holding home Valsartan--1.25 qd  -holding home furosemide 40mg qd.     Problem/Plan - 7:  ·  Problem: Type 2 diabetes mellitus.   ·  Plan: Last A1c 7.0 (06-Apr-2021)  -holding home Metformin 500 BID  -Low-dose ISS  -Monitor fingersticks.     Problem/Plan - 8:  ·  Problem: Aortic stenosis.   ·  Plan: Patient has Echo from 2017 showing Moderate Aortic Stenosis with LA index of 45  -Repeat echo w/ EF 64%, severe right atrial enlargement, moderate aortic stenosis, mod pericardial effusion  -Appreciate cards recs.     Problem/Plan - 9:  ·  Problem: HLD (hyperlipidemia).   ·  Plan: -c/w Atorvastatin 40mg qd.     Problem/Plan - 10:  ·  Problem: Gout.   ·  Plan; -Allopurinol 100 PO qd a/p pharmacy for renal dosing  -hold allopurinol 300 qd.     77M h/o T2DM, HTN, COPD, afib on Eliquis presented with sepsis iso RLE cellulitis, with concern for abscess vs hematoma of R shin, hospital course c/b SHEYLA and AFib w/ RVR.    #Clinical sepsis.   -Met sepsis criteria on admission w/ fever and tachycardia, likely 2/2 cellulitis  -US LE w/ complex heterogeneous in echotexture focus identified measuring 7.5 x 5.0 x 3.3 cm which may   represent either abscess or hematoma  -Likely hematoma w/ overlying cellulitis   -No surg intervention per surgery   -s/p 1g Vancomycin x1, 2g cefazolin x1, and 1L NS in ED  -s/p vancomycin (09/30-10/4)  -s/p IV cefazolin 2g q8   -On dc switch to cefpodoxime 400mg BID for 10 more days (14 day total abx)  -Blood and urine Cx NGTD    #Wound of right leg.   -Pt w/ cellulitis as above  -RLE U/S unable to differentiate hematoma vs abscess.  -Likely hematoma per surg  -Surg deferring resumption of A/C to primary team  -Risk/benefit discussion had w/ pt re: AC. Pt reasonably worried about risks of CVA off AC and worsening hematoma on AC. At this time pt would favor resuming AC while inpatient to monitor response. Pt expresses very strong wishes to be desired 10/7. Remains very resistant about remaining inpatient longer for further monitoring  -Will resumed AC 10/6 and stable response.     #Chronic atrial fibrillation.   - pAFib RVR to 110s-140s. Now improving 90s- low 100s  -On Toprol 50mg QD at home  -c/w lopressor 75mg BID   -[id number] score 6; on Eliquis at home   -Resume AC ; Eliquis 2.5mg BID     #SHEYLA (acute kidney injury).   ·  Plan: Suspect ATN in setting of sepsis  -Overall downtrending from admission  -Monitor sCR  -Renally dose meds  -Avoid nephrotoxic agents.    #COPD (chronic obstructive pulmonary disease).   -Pt not on O2 at home. On RA this admission  -c/w Home inhaler regimen     -Beclomethasone 80mcg 1 puff BID-> Mometasone 220mcg qd     -Tiotropium 18mcg 1 capsule qd     -Albuterol 2 puffs q6h PRN for wheezing.    #Hypertension.   -Holding regimen i/s/o hypotension/sepsis and SHEYLA  -holding home Carvedilol 6.25mg BID  -Pt states he was likely taken off medication and switched to metoprolol. Will continue to hold for now. Follow up w/ Dr. Doe outpatient prior to resumption.   -holding home Valsartan--1.25 qd  -holding home furosemide 40mg qd.    #Type 2 diabetes mellitus.   -Plan: Last A1c 7.0 (06-Apr-2021)  -holding home Metformin 500 BID  -Low-dose ISS  -Monitor fingersticks.    # Aortic stenosis.   - Patient has Echo from 2017 showing Moderate Aortic Stenosis with LA index of 45  - Repeat echo w/ EF 64%, severe right atrial enlargement, moderate aortic stenosis, mod pericardial effusion  -Appreciate cards recs.    HLD (hyperlipidemia).   -c/w Atorvastatin 40mg qd.    Gout.   -Allopurinol 100 PO qd a/p pharmacy for renal dosing  -hold allopurinol 300 qd.    Patient seen and evaluated. Reviewed discharge medications with patient and attending. All new medications requiring new prescriptions were sent to the pharmacy of patient's choice. Reviewed need for prescription for previous home medications and new prescriptions sent if requested. Medically cleared/stable for discharge as per Dr. Mckee with appropriate follow up. Patient understands and agrees with plan of care.

## 2022-10-03 LAB
ANION GAP SERPL CALC-SCNC: 12 MMOL/L — SIGNIFICANT CHANGE UP (ref 7–14)
BUN SERPL-MCNC: 71 MG/DL — HIGH (ref 7–23)
CALCIUM SERPL-MCNC: 8.8 MG/DL — SIGNIFICANT CHANGE UP (ref 8.4–10.5)
CHLORIDE SERPL-SCNC: 107 MMOL/L — SIGNIFICANT CHANGE UP (ref 98–107)
CO2 SERPL-SCNC: 21 MMOL/L — LOW (ref 22–31)
CREAT SERPL-MCNC: 1.96 MG/DL — HIGH (ref 0.5–1.3)
EGFR: 35 ML/MIN/1.73M2 — LOW
GLUCOSE BLDC GLUCOMTR-MCNC: 122 MG/DL — HIGH (ref 70–99)
GLUCOSE BLDC GLUCOMTR-MCNC: 123 MG/DL — HIGH (ref 70–99)
GLUCOSE BLDC GLUCOMTR-MCNC: 125 MG/DL — HIGH (ref 70–99)
GLUCOSE BLDC GLUCOMTR-MCNC: 147 MG/DL — HIGH (ref 70–99)
GLUCOSE SERPL-MCNC: 117 MG/DL — HIGH (ref 70–99)
HCT VFR BLD CALC: 26.9 % — LOW (ref 39–50)
HGB BLD-MCNC: 8.5 G/DL — LOW (ref 13–17)
MAGNESIUM SERPL-MCNC: 1.6 MG/DL — SIGNIFICANT CHANGE UP (ref 1.6–2.6)
MCHC RBC-ENTMCNC: 27.2 PG — SIGNIFICANT CHANGE UP (ref 27–34)
MCHC RBC-ENTMCNC: 31.6 GM/DL — LOW (ref 32–36)
MCV RBC AUTO: 85.9 FL — SIGNIFICANT CHANGE UP (ref 80–100)
NRBC # BLD: 0 /100 WBCS — SIGNIFICANT CHANGE UP (ref 0–0)
NRBC # FLD: 0 K/UL — SIGNIFICANT CHANGE UP (ref 0–0)
PHOSPHATE SERPL-MCNC: 3 MG/DL — SIGNIFICANT CHANGE UP (ref 2.5–4.5)
PLATELET # BLD AUTO: 121 K/UL — LOW (ref 150–400)
POTASSIUM SERPL-MCNC: 4.5 MMOL/L — SIGNIFICANT CHANGE UP (ref 3.5–5.3)
POTASSIUM SERPL-SCNC: 4.5 MMOL/L — SIGNIFICANT CHANGE UP (ref 3.5–5.3)
RBC # BLD: 3.13 M/UL — LOW (ref 4.2–5.8)
RBC # FLD: 21.1 % — HIGH (ref 10.3–14.5)
SODIUM SERPL-SCNC: 140 MMOL/L — SIGNIFICANT CHANGE UP (ref 135–145)
VANCOMYCIN TROUGH SERPL-MCNC: 14.7 UG/ML — SIGNIFICANT CHANGE UP (ref 10–20)
WBC # BLD: 6.82 K/UL — SIGNIFICANT CHANGE UP (ref 3.8–10.5)
WBC # FLD AUTO: 6.82 K/UL — SIGNIFICANT CHANGE UP (ref 3.8–10.5)

## 2022-10-03 PROCEDURE — 93306 TTE W/DOPPLER COMPLETE: CPT | Mod: 26

## 2022-10-03 PROCEDURE — 99233 SBSQ HOSP IP/OBS HIGH 50: CPT | Mod: GC

## 2022-10-03 RX ORDER — METOPROLOL TARTRATE 50 MG
25 TABLET ORAL EVERY 12 HOURS
Refills: 0 | Status: DISCONTINUED | OUTPATIENT
Start: 2022-10-03 | End: 2022-10-05

## 2022-10-03 RX ORDER — METOPROLOL TARTRATE 50 MG
5 TABLET ORAL ONCE
Refills: 0 | Status: COMPLETED | OUTPATIENT
Start: 2022-10-03 | End: 2022-10-03

## 2022-10-03 RX ORDER — MAGNESIUM SULFATE 500 MG/ML
2 VIAL (ML) INJECTION ONCE
Refills: 0 | Status: COMPLETED | OUTPATIENT
Start: 2022-10-03 | End: 2022-10-03

## 2022-10-03 RX ORDER — SODIUM CHLORIDE 9 MG/ML
500 INJECTION, SOLUTION INTRAVENOUS
Refills: 0 | Status: DISCONTINUED | OUTPATIENT
Start: 2022-10-03 | End: 2022-10-04

## 2022-10-03 RX ORDER — METOPROLOL TARTRATE 50 MG
12.5 TABLET ORAL ONCE
Refills: 0 | Status: COMPLETED | OUTPATIENT
Start: 2022-10-03 | End: 2022-10-03

## 2022-10-03 RX ADMIN — ATORVASTATIN CALCIUM 40 MILLIGRAM(S): 80 TABLET, FILM COATED ORAL at 21:20

## 2022-10-03 RX ADMIN — TIOTROPIUM BROMIDE 1 CAPSULE(S): 18 CAPSULE ORAL; RESPIRATORY (INHALATION) at 10:37

## 2022-10-03 RX ADMIN — Medication 25 MILLIGRAM(S): at 18:00

## 2022-10-03 RX ADMIN — PANTOPRAZOLE SODIUM 40 MILLIGRAM(S): 20 TABLET, DELAYED RELEASE ORAL at 05:48

## 2022-10-03 RX ADMIN — MOMETASONE FUROATE 1 PUFF(S): 220 INHALANT RESPIRATORY (INHALATION) at 10:37

## 2022-10-03 RX ADMIN — Medication 5 MILLIGRAM(S): at 03:42

## 2022-10-03 RX ADMIN — Medication 250 MILLIGRAM(S): at 17:11

## 2022-10-03 RX ADMIN — Medication 25 GRAM(S): at 10:36

## 2022-10-03 RX ADMIN — Medication 12.5 MILLIGRAM(S): at 05:49

## 2022-10-03 RX ADMIN — Medication 100 MILLIGRAM(S): at 12:47

## 2022-10-03 NOTE — PROGRESS NOTE ADULT - SUBJECTIVE AND OBJECTIVE BOX
INCOMPLETE NOTE    Jose Carlos Butts | PGY1| Pager: [phone]  Interval Events:    REVIEW OF SYSTEMS:  CONSTITUTIONAL: No weakness, fevers or chills  EYES/ENT: No visual changes;  No vertigo or throat pain   NECK: No pain or stiffness  RESPIRATORY: No cough, wheezing, hemoptysis; No shortness of breath  CARDIOVASCULAR: No chest pain or palpitations  GASTROINTESTINAL: No abdominal or epigastric pain. No nausea, vomiting, or hematemesis; No diarrhea or constipation. No melena or hematochezia.  GENITOURINARY: No dysuria, frequency or hematuria  NEUROLOGICAL: No numbness or weakness  SKIN: No itching, burning, rashes, or lesions   All other review of systems is negative unless indicated above.    OBJECTIVE:  ICU Vital Signs Last 24 Hrs  T(C): 36.2 (03 Oct 2022 05:40), Max: 37.1 (02 Oct 2022 21:00)  T(F): 97.2 (03 Oct 2022 05:40), Max: 98.7 (02 Oct 2022 21:00)  HR: 137 (03 Oct 2022 05:40) (116 - 145)  BP: 91/61 (03 Oct 2022 05:40) (85/55 - 105/68)  BP(mean): --  ABP: --  ABP(mean): --  RR: 18 (03 Oct 2022 05:40) (16 - 18)  SpO2: 99% (03 Oct 2022 05:40) (98% - 99%)    O2 Parameters below as of 03 Oct 2022 05:40  Patient On (Oxygen Delivery Method): room air              10-02 @ 07:01  -  10-03 @ 07:00  --------------------------------------------------------  IN: 698 mL / OUT: 0 mL / NET: 698 mL      CAPILLARY BLOOD GLUCOSE  161 (02 Oct 2022 09:00)      POCT Blood Glucose.: 121 mg/dL (02 Oct 2022 21:37)      PHYSICAL EXAM:  General: WN/WD NAD  Neurology: A&Ox3, nonfocal, CARABALLO x 4  Eyes: PERRLA/ EOMI, Gross vision intact  ENT/Neck: Neck supple, trachea midline, No JVD, Gross hearing intact  Respiratory: CTA B/L, No wheezing, rales, rhonchi  CV: RRR, +S1/S2, -S3/S4, no murmurs, rubs or gallops  Abdominal: Soft, NT, ND +BS, No HSM  MSK: 5/5 strength UE/LE bilaterally  Extremities: No edema, 2+ peripheral pulses  Skin: No Rashes, Hematoma, Ecchymosis  Incisions:   Tubes:    HOSPITAL MEDICATIONS:  MEDICATIONS  (STANDING):  allopurinol 100 milliGRAM(s) Oral daily  atorvastatin 40 milliGRAM(s) Oral at bedtime  dextrose 5%. 1000 milliLiter(s) (50 mL/Hr) IV Continuous <Continuous>  dextrose 5%. 1000 milliLiter(s) (100 mL/Hr) IV Continuous <Continuous>  dextrose 50% Injectable 25 Gram(s) IV Push once  dextrose 50% Injectable 12.5 Gram(s) IV Push once  dextrose 50% Injectable 25 Gram(s) IV Push once  glucagon  Injectable 1 milliGRAM(s) IntraMuscular once  influenza  Vaccine (HIGH DOSE) 0.7 milliLiter(s) IntraMuscular once  insulin lispro (ADMELOG) corrective regimen sliding scale   SubCutaneous three times a day before meals  lactated ringers. 1000 milliLiter(s) (100 mL/Hr) IV Continuous <Continuous>  metoprolol tartrate 12.5 milliGRAM(s) Oral every 12 hours  mometasone 220 MICROgram(s) Inhaler 1 Puff(s) Inhalation daily  pantoprazole    Tablet 40 milliGRAM(s) Oral before breakfast  tiotropium 18 MICROgram(s) Capsule 1 Capsule(s) Inhalation daily  vancomycin  IVPB 1000 milliGRAM(s) IV Intermittent every 24 hours    MEDICATIONS  (PRN):  acetaminophen     Tablet .. 650 milliGRAM(s) Oral every 6 hours PRN Temp greater or equal to 38C (100.4F), Mild Pain (1 - 3)  ALBUTerol    90 MICROgram(s) HFA Inhaler 2 Puff(s) Inhalation every 6 hours PRN Shortness of Breath and/or Wheezing  dextrose Oral Gel 15 Gram(s) Oral once PRN Blood Glucose LESS THAN 70 milliGRAM(s)/deciliter  melatonin 3 milliGRAM(s) Oral at bedtime PRN Insomnia  ondansetron Injectable 4 milliGRAM(s) IV Push every 8 hours PRN Nausea and/or Vomiting      LABS:                        8.9    7.20  )-----------( 125      ( 02 Oct 2022 12:30 )             29.6     Hgb Trend: 8.9<--, 8.6<--, 10.3<--, 10.6<--  10-02    141  |  105  |  80<H>  ----------------------------<  134<H>  4.6   |  24  |  2.23<H>    Ca    9.0      02 Oct 2022 07:26  Phos  3.6     10-02  Mg     1.60     10-02      Creatinine Trend: 2.23<--, 2.08<--, 1.98<--, 2.20<--            MICROBIOLOGY:     Culture - Urine (collected 30 Sep 2022 15:20)  Source: Clean Catch Clean Catch (Midstream)  Final Report (01 Oct 2022 16:20):    No growth    Culture - Blood (collected 30 Sep 2022 12:20)  Source: .Blood Blood-Peripheral  Preliminary Report (01 Oct 2022 20:01):    No growth to date.    Culture - Blood (collected 30 Sep 2022 12:10)  Source: .Blood Blood-Peripheral  Preliminary Report (01 Oct 2022 20:01):    No growth to date.       Jose Carlos Butts | PGY1| Pager: [phone]  Interval Events: Patient was given IV lopressor 5 after being in RVR; started on Lopressor 12.5 PO for continued RVR    REVIEW OF SYSTEMS:  CONSTITUTIONAL: No weakness, fevers or chills  EYES/ENT: No visual changes;  No vertigo or throat pain   NECK: No pain or stiffness  RESPIRATORY: No cough, wheezing, hemoptysis; No shortness of breath  CARDIOVASCULAR: No chest pain or palpitations  GASTROINTESTINAL: No abdominal or epigastric pain. No nausea, vomiting, or hematemesis; No diarrhea or constipation. No melena or hematochezia.  GENITOURINARY: No dysuria, frequency or hematuria  NEUROLOGICAL: No numbness or weakness  SKIN: No itching, burning, rashes, or lesions   All other review of systems is negative unless indicated above.    OBJECTIVE:  ICU Vital Signs Last 24 Hrs  T(C): 36.2 (03 Oct 2022 05:40), Max: 37.1 (02 Oct 2022 21:00)  T(F): 97.2 (03 Oct 2022 05:40), Max: 98.7 (02 Oct 2022 21:00)  HR: 137 (03 Oct 2022 05:40) (116 - 145)  BP: 91/61 (03 Oct 2022 05:40) (85/55 - 105/68)  BP(mean): --  ABP: --  ABP(mean): --  RR: 18 (03 Oct 2022 05:40) (16 - 18)  SpO2: 99% (03 Oct 2022 05:40) (98% - 99%)    O2 Parameters below as of 03 Oct 2022 05:40  Patient On (Oxygen Delivery Method): room air              10-02 @ 07:01  -  10-03 @ 07:00  --------------------------------------------------------  IN: 698 mL / OUT: 0 mL / NET: 698 mL      CAPILLARY BLOOD GLUCOSE  161 (02 Oct 2022 09:00)      POCT Blood Glucose.: 121 mg/dL (02 Oct 2022 21:37)      PHYSICAL EXAM:  General: WN/WD NAD  Neurology: A&Ox3, nonfocal, CARABALLO x 4  Eyes: PERRLA/ EOMI, Gross vision intact  ENT/Neck: Neck supple, trachea midline, No JVD, Gross hearing intact  Respiratory: CTA B/L, No wheezing, rales, rhonchi  CV: RRR, +S1/S2, -S3/S4, no murmurs, rubs or gallops  Abdominal: Soft, NT, ND +BS, No HSM  MSK: 5/5 strength UE/LE bilaterally  Extremities: No edema, 2+ peripheral pulses  Skin: No Rashes, Hematoma, Ecchymosis  Incisions:   Tubes:    HOSPITAL MEDICATIONS:  MEDICATIONS  (STANDING):  allopurinol 100 milliGRAM(s) Oral daily  atorvastatin 40 milliGRAM(s) Oral at bedtime  dextrose 5%. 1000 milliLiter(s) (50 mL/Hr) IV Continuous <Continuous>  dextrose 5%. 1000 milliLiter(s) (100 mL/Hr) IV Continuous <Continuous>  dextrose 50% Injectable 25 Gram(s) IV Push once  dextrose 50% Injectable 12.5 Gram(s) IV Push once  dextrose 50% Injectable 25 Gram(s) IV Push once  glucagon  Injectable 1 milliGRAM(s) IntraMuscular once  influenza  Vaccine (HIGH DOSE) 0.7 milliLiter(s) IntraMuscular once  insulin lispro (ADMELOG) corrective regimen sliding scale   SubCutaneous three times a day before meals  lactated ringers. 1000 milliLiter(s) (100 mL/Hr) IV Continuous <Continuous>  metoprolol tartrate 12.5 milliGRAM(s) Oral every 12 hours  mometasone 220 MICROgram(s) Inhaler 1 Puff(s) Inhalation daily  pantoprazole    Tablet 40 milliGRAM(s) Oral before breakfast  tiotropium 18 MICROgram(s) Capsule 1 Capsule(s) Inhalation daily  vancomycin  IVPB 1000 milliGRAM(s) IV Intermittent every 24 hours    MEDICATIONS  (PRN):  acetaminophen     Tablet .. 650 milliGRAM(s) Oral every 6 hours PRN Temp greater or equal to 38C (100.4F), Mild Pain (1 - 3)  ALBUTerol    90 MICROgram(s) HFA Inhaler 2 Puff(s) Inhalation every 6 hours PRN Shortness of Breath and/or Wheezing  dextrose Oral Gel 15 Gram(s) Oral once PRN Blood Glucose LESS THAN 70 milliGRAM(s)/deciliter  melatonin 3 milliGRAM(s) Oral at bedtime PRN Insomnia  ondansetron Injectable 4 milliGRAM(s) IV Push every 8 hours PRN Nausea and/or Vomiting      LABS:                        8.9    7.20  )-----------( 125      ( 02 Oct 2022 12:30 )             29.6     Hgb Trend: 8.9<--, 8.6<--, 10.3<--, 10.6<--  10-02    141  |  105  |  80<H>  ----------------------------<  134<H>  4.6   |  24  |  2.23<H>    Ca    9.0      02 Oct 2022 07:26  Phos  3.6     10-02  Mg     1.60     10-02      Creatinine Trend: 2.23<--, 2.08<--, 1.98<--, 2.20<--            MICROBIOLOGY:     Culture - Urine (collected 30 Sep 2022 15:20)  Source: Clean Catch Clean Catch (Midstream)  Final Report (01 Oct 2022 16:20):    No growth    Culture - Blood (collected 30 Sep 2022 12:20)  Source: .Blood Blood-Peripheral  Preliminary Report (01 Oct 2022 20:01):    No growth to date.    Culture - Blood (collected 30 Sep 2022 12:10)  Source: .Blood Blood-Peripheral  Preliminary Report (01 Oct 2022 20:01):    No growth to date.       Jose Carlos Butts | PGY1| Pager: [phone]  Interval Events: Patient was given IV lopressor 5 after being in RVR; started on Lopressor 12.5 PO for continued RVR; Telemetry shows AFib with RVR with HR of 110-150s     REVIEW OF SYSTEMS:  CONSTITUTIONAL: No weakness, fevers or chills  EYES/ENT: No visual changes;  No vertigo or throat pain   NECK: No pain or stiffness  RESPIRATORY: No cough, wheezing, hemoptysis; No shortness of breath  CARDIOVASCULAR: No chest pain or palpitations  GASTROINTESTINAL: No abdominal or epigastric pain. No nausea, vomiting, or hematemesis; No diarrhea or constipation. No melena or hematochezia.  GENITOURINARY: No dysuria, frequency or hematuria  NEUROLOGICAL: No numbness or weakness  SKIN: No itching, burning, rashes, or lesions   All other review of systems is negative unless indicated above.    OBJECTIVE:  ICU Vital Signs Last 24 Hrs  T(C): 36.2 (03 Oct 2022 05:40), Max: 37.1 (02 Oct 2022 21:00)  T(F): 97.2 (03 Oct 2022 05:40), Max: 98.7 (02 Oct 2022 21:00)  HR: 137 (03 Oct 2022 05:40) (116 - 145)  BP: 91/61 (03 Oct 2022 05:40) (85/55 - 105/68)  BP(mean): --  ABP: --  ABP(mean): --  RR: 18 (03 Oct 2022 05:40) (16 - 18)  SpO2: 99% (03 Oct 2022 05:40) (98% - 99%)    O2 Parameters below as of 03 Oct 2022 05:40  Patient On (Oxygen Delivery Method): room air              10-02 @ 07:01  -  10-03 @ 07:00  --------------------------------------------------------  IN: 698 mL / OUT: 0 mL / NET: 698 mL      CAPILLARY BLOOD GLUCOSE  161 (02 Oct 2022 09:00)      POCT Blood Glucose.: 121 mg/dL (02 Oct 2022 21:37)      PHYSICAL EXAM:  General: WN/WD NAD  Neurology: A&Ox3, nonfocal, CARABALLO x 4  Eyes: PERRLA/ EOMI, Gross vision intact  ENT/Neck: Neck supple, trachea midline, No JVD, Gross hearing intact  Respiratory: CTA B/L, No wheezing, rales, rhonchi  CV: RRR, +S1/S2, -S3/S4, no murmurs, rubs or gallops  Abdominal: Soft, NT, ND +BS, No HSM  MSK: 5/5 strength UE/LE bilaterally  Extremities: No edema, 2+ peripheral pulses  Skin: No Rashes, Hematoma, Ecchymosis  Incisions:   Tubes:    HOSPITAL MEDICATIONS:  MEDICATIONS  (STANDING):  allopurinol 100 milliGRAM(s) Oral daily  atorvastatin 40 milliGRAM(s) Oral at bedtime  dextrose 5%. 1000 milliLiter(s) (50 mL/Hr) IV Continuous <Continuous>  dextrose 5%. 1000 milliLiter(s) (100 mL/Hr) IV Continuous <Continuous>  dextrose 50% Injectable 25 Gram(s) IV Push once  dextrose 50% Injectable 12.5 Gram(s) IV Push once  dextrose 50% Injectable 25 Gram(s) IV Push once  glucagon  Injectable 1 milliGRAM(s) IntraMuscular once  influenza  Vaccine (HIGH DOSE) 0.7 milliLiter(s) IntraMuscular once  insulin lispro (ADMELOG) corrective regimen sliding scale   SubCutaneous three times a day before meals  lactated ringers. 1000 milliLiter(s) (100 mL/Hr) IV Continuous <Continuous>  metoprolol tartrate 12.5 milliGRAM(s) Oral every 12 hours  mometasone 220 MICROgram(s) Inhaler 1 Puff(s) Inhalation daily  pantoprazole    Tablet 40 milliGRAM(s) Oral before breakfast  tiotropium 18 MICROgram(s) Capsule 1 Capsule(s) Inhalation daily  vancomycin  IVPB 1000 milliGRAM(s) IV Intermittent every 24 hours    MEDICATIONS  (PRN):  acetaminophen     Tablet .. 650 milliGRAM(s) Oral every 6 hours PRN Temp greater or equal to 38C (100.4F), Mild Pain (1 - 3)  ALBUTerol    90 MICROgram(s) HFA Inhaler 2 Puff(s) Inhalation every 6 hours PRN Shortness of Breath and/or Wheezing  dextrose Oral Gel 15 Gram(s) Oral once PRN Blood Glucose LESS THAN 70 milliGRAM(s)/deciliter  melatonin 3 milliGRAM(s) Oral at bedtime PRN Insomnia  ondansetron Injectable 4 milliGRAM(s) IV Push every 8 hours PRN Nausea and/or Vomiting      LABS:                        8.9    7.20  )-----------( 125      ( 02 Oct 2022 12:30 )             29.6     Hgb Trend: 8.9<--, 8.6<--, 10.3<--, 10.6<--  10-02    141  |  105  |  80<H>  ----------------------------<  134<H>  4.6   |  24  |  2.23<H>    Ca    9.0      02 Oct 2022 07:26  Phos  3.6     10-02  Mg     1.60     10-02      Creatinine Trend: 2.23<--, 2.08<--, 1.98<--, 2.20<--            MICROBIOLOGY:     Culture - Urine (collected 30 Sep 2022 15:20)  Source: Clean Catch Clean Catch (Midstream)  Final Report (01 Oct 2022 16:20):    No growth    Culture - Blood (collected 30 Sep 2022 12:20)  Source: .Blood Blood-Peripheral  Preliminary Report (01 Oct 2022 20:01):    No growth to date.    Culture - Blood (collected 30 Sep 2022 12:10)  Source: .Blood Blood-Peripheral  Preliminary Report (01 Oct 2022 20:01):    No growth to date.

## 2022-10-03 NOTE — PROGRESS NOTE ADULT - ASSESSMENT
ASSESSMENT: 78yo M w RLE traumatic wound. US imaging suspicious for abscess vs hematoma.     PLAN:   - Erythematous region marked for comparison, hematoma improving.   - No acute surgical intervention    Discussed with Attending Dr. LAURI BRAXTON Team Surgery  #[id number]

## 2022-10-03 NOTE — PROVIDER CONTACT NOTE (OTHER) - ACTION/TREATMENT ORDERED:
Patient noted with a heart rate sustaining at 120s to 145 on the heart monitor. ACP  made aware. Order given for Metoprolol 5mg IVP. No distress noted. Will continue to monitor. Patient noted with a heart rate sustaining at 120s to 145 on the heart monitor. ACP Ming made aware. Order given for Metoprolol 5mg IVP. No distress noted. Will continue to monitor.

## 2022-10-03 NOTE — PROVIDER CONTACT NOTE (OTHER) - ASSESSMENT
Patient noted with a heart rate sustaining at 120s to 145 on the heart monitor. Patient in bed resting comfortably with no distress noted. B/P 88/63 and patient asymptomatic.

## 2022-10-03 NOTE — PROGRESS NOTE ADULT - ASSESSMENT
Pt is a 78yo M w/ PMH T2DM, HTN, HLD COPD, A-Fib on eliquis, CAD, severe Aortic Stenosis, G3 diastolic dysfunction, and gout, who presents with 1 week of R shin pain w/ associated chills. Likely etiology hematoma vs cellulitis. Notably, pt meeting SIRS criteria on presentation (Tachycardic, febrile), with likely source-> meets sepsis criteria. Pt also has possible SHEYLA (Cr 2.2, baseline 1.0-1.5), likely prerenal. Persistently elevated HR s/p 1L NS, not on rate control due to hypotension.   Pt is a 78yo M w/ PMH T2DM, HTN, HLD COPD, A-Fib on eliquis, CAD, severe Aortic Stenosis, G3 diastolic dysfunction, and gout, who presents with 1 week of R shin pain w/ associated chills. Likely etiology hematoma vs abscess, meeting SIRS criteria on presentation (Tachycardic, febrile) c/b likely prerenal SHEYLA (BL 1.4-1.6). With improvement seen in RLE on vancomycin currently with persistently elevated HR slowly titrating up lopressor as tolerated

## 2022-10-03 NOTE — PROGRESS NOTE ADULT - PROBLEM SELECTOR PLAN 1
On admission Tachycardic (120-130), febrile (100.5F), with possible infectious source (R shin). Pt also notably hypotensive in ED to 94/48.  -s/p 1g Vancomycin x1, 2g cefazolin x1, and 1L NS in ED  -f/u BCx and UCx  -reculture q48h if growth or fever spike  -c/w Vancomycin, dosed by trough  -f/u MRSA swab     -if negative, consider switch to Cefazolin 1g q8h  -Tylenol PRN for fever control  -Gentle rehydration if persistently hypotensive On admission Tachycardic (120-130), febrile (100.5F), with possible infectious source (R shin). Pt also notably hypotensive in ED to 94/48.  -s/p 1g Vancomycin x1, 2g cefazolin x1, and 1L NS in ED  -Tylenol PRN for fever control  -Gentle rehydration if persistently hypotensive (100cc/hr; 5 hours)    Continue 1g Vanc qd; Showing sings of improvement

## 2022-10-03 NOTE — PROGRESS NOTE ADULT - PROBLEM SELECTOR PLAN 12
Pt has hx of CAD and aortic stenosis. PMH in chart notes CHF, but no documentation is found in medical record.  ECHO from June 2022 is in the chart  EF 55%  Severe Grade III dysfunction; No LVH  -F/u Echo

## 2022-10-03 NOTE — PROGRESS NOTE ADULT - PROBLEM SELECTOR PLAN 2
Cellulitis vs hematoma vs cellulitis 2/2 hematoma.  -RLE U/S unable to differentiate hematoma vs abscess.     -can f/u with MRI if appropriate  -Reassess daily for changes in swelling/erythema/pain  -OOB and PT as tolerated  -Pain control w/ tylenol; avoid NSAIDS  - Held Eliquis per gen surgery given concern for hematoma; trend Hgb closely Cellulitis vs hematoma vs cellulitis 2/2 hematoma.  -RLE U/S unable to differentiate hematoma vs abscess.  No progression beyond border marked by GS 10/1  -OOB and PT as tolerated  -Pain control w/ tylenol; avoid NSAIDS  - Held Eliquis per gen surgery given concern for hematoma; trend Hgb closely

## 2022-10-03 NOTE — PROGRESS NOTE ADULT - PROBLEM SELECTOR PLAN 4
pt persistently tachycardic in 110s-120s. Notably, [id number] score 6. At home takes Metoprolol Succ 50mg qd  -Switch to immediate release formulation to target heart rate    -spoke to cardiology 10/2, better to start metoprolol tartrate 12.5mg BID and better HR control should improve BP as well with improved cardiac output  -At home pt taking Eliquis 2.5mg BID   -Holding AC i/s/o possible hematoma per general surgery     -If Hgb persistently stable, less concern for bleeding-> restart home Eliquis 2.5mg BID Likely prerenal i/s/o hypotension/sepsis  -Post-void residual bladder scan ~130cc-> likely not post-renal  -Renally dose medications  -reassess BMP daily for improvement  -if no improvement w/ tx of sepsis/hypotension, consider intrarenal pathology i/s/o DM, HTN     -consider nephrology consult if worsening SHEYLA

## 2022-10-03 NOTE — PROGRESS NOTE ADULT - PROBLEM SELECTOR PLAN 3
Likely prerenal i/s/o hypotension/sepsis  -Post-void residual bladder scan ~130cc-> likely not post-renal  -Renally dose medications  -reassess BMP daily for improvement  -if no improvement w/ tx of sepsis/hypotension, consider intrarenal pathology i/s/o DM, HTN     -consider nephrology consult if worsening SHEYLA pt persistently tachycardic in 110s-120s. Notably, [id number] score 6. At home takes Metoprolol Succ 50mg qd  -Switch to immediate release formulation to target heart rate    -spoke to cardiology 10/2, better to start metoprolol tartrate 12.5mg BID and better HR control should improve BP as well with improved cardiac output  -At home pt taking Eliquis 2.5mg BID   -Holding AC i/s/o possible hematoma per general surgery     -If Hgb persistently stable, less concern for bleeding-> restart home Eliquis 2.5mg BID    Spoke with Dr. Jimenes about patient's AFib and dHF  Reports he was on 100 BID of lopressor at one point, was titrated down to 50 BID; discussed that 12.5 BID is unlikely to do much for his AFib.  With regards to dHF; his dry weight is 170, and ideal weight is 178, when he was overloaded he was 191

## 2022-10-03 NOTE — PROGRESS NOTE ADULT - SUBJECTIVE AND OBJECTIVE BOX
Surgery Progress Note      SUBJECTIVE: Patient seen and examined at bedside with surgical team. No acute events overnight. Reports RLE feels better, less pain today.     OBJECTIVE:    Vital Signs Last 24 Hrs  T(C): 36.2 (03 Oct 2022 05:40), Max: 37.1 (02 Oct 2022 21:00)  T(F): 97.2 (03 Oct 2022 05:40), Max: 98.7 (02 Oct 2022 21:00)  HR: 137 (03 Oct 2022 05:40) (116 - 145)  BP: 91/61 (03 Oct 2022 05:40) (85/55 - 105/68)  BP(mean): --  RR: 18 (03 Oct 2022 05:40) (16 - 18)  SpO2: 99% (03 Oct 2022 05:40) (98% - 99%)    Parameters below as of 03 Oct 2022 05:40  Patient On (Oxygen Delivery Method): room air    I&O's Detail    02 Oct 2022 07:01  -  03 Oct 2022 07:00  --------------------------------------------------------  IN:    Oral Fluid: 698 mL  Total IN: 698 mL    OUT:  Total OUT: 0 mL    Total NET: 698 mL      MEDICATIONS  (STANDING):  allopurinol 100 milliGRAM(s) Oral daily  atorvastatin 40 milliGRAM(s) Oral at bedtime  dextrose 5%. 1000 milliLiter(s) (50 mL/Hr) IV Continuous <Continuous>  dextrose 5%. 1000 milliLiter(s) (100 mL/Hr) IV Continuous <Continuous>  dextrose 50% Injectable 25 Gram(s) IV Push once  dextrose 50% Injectable 12.5 Gram(s) IV Push once  dextrose 50% Injectable 25 Gram(s) IV Push once  glucagon  Injectable 1 milliGRAM(s) IntraMuscular once  influenza  Vaccine (HIGH DOSE) 0.7 milliLiter(s) IntraMuscular once  insulin lispro (ADMELOG) corrective regimen sliding scale   SubCutaneous three times a day before meals  lactated ringers. 1000 milliLiter(s) (100 mL/Hr) IV Continuous <Continuous>  metoprolol tartrate 12.5 milliGRAM(s) Oral every 12 hours  mometasone 220 MICROgram(s) Inhaler 1 Puff(s) Inhalation daily  pantoprazole    Tablet 40 milliGRAM(s) Oral before breakfast  tiotropium 18 MICROgram(s) Capsule 1 Capsule(s) Inhalation daily  vancomycin  IVPB 1000 milliGRAM(s) IV Intermittent every 24 hours    MEDICATIONS  (PRN):  acetaminophen     Tablet .. 650 milliGRAM(s) Oral every 6 hours PRN Temp greater or equal to 38C (100.4F), Mild Pain (1 - 3)  ALBUTerol    90 MICROgram(s) HFA Inhaler 2 Puff(s) Inhalation every 6 hours PRN Shortness of Breath and/or Wheezing  dextrose Oral Gel 15 Gram(s) Oral once PRN Blood Glucose LESS THAN 70 milliGRAM(s)/deciliter  melatonin 3 milliGRAM(s) Oral at bedtime PRN Insomnia  ondansetron Injectable 4 milliGRAM(s) IV Push every 8 hours PRN Nausea and/or Vomiting      PHYSICAL EXAM:  Constitutional: A&Ox3, NAD  Respiratory: Unlabored breathing  Extremities:  RLE hematoma slowly improving, receding from line. Nontender to palpation. No motor/sensory deficits. Palpable DP pulse.     LABS:                        8.9    7.20  )-----------( 125      ( 02 Oct 2022 12:30 )             29.6     10-02    141  |  105  |  80<H>  ----------------------------<  134<H>  4.6   |  24  |  2.23<H>    Ca    9.0      02 Oct 2022 07:26  Phos  3.6     10-02  Mg     1.60     10-02

## 2022-10-03 NOTE — PROVIDER CONTACT NOTE (OTHER) - ASSESSMENT
Patient noted with a heart rate of 137bpm. B/P 91/61. Patient in bed resting comfortably with no distress noted. B/P 88/63 and patient asymptomatic.

## 2022-10-03 NOTE — PROVIDER CONTACT NOTE (OTHER) - ACTION/TREATMENT ORDERED:
Patient noted with a heart rate of 137bpm. B/P 91/61. ACP Ming made aware. Okay given to administer  Metoprolol 12.5 mg PO. No distress noted. V/S in progress Q4H. Will continue to monitor.

## 2022-10-04 LAB
ANION GAP SERPL CALC-SCNC: 12 MMOL/L — SIGNIFICANT CHANGE UP (ref 7–14)
BUN SERPL-MCNC: 64 MG/DL — HIGH (ref 7–23)
CALCIUM SERPL-MCNC: 8.7 MG/DL — SIGNIFICANT CHANGE UP (ref 8.4–10.5)
CHLORIDE SERPL-SCNC: 105 MMOL/L — SIGNIFICANT CHANGE UP (ref 98–107)
CO2 SERPL-SCNC: 21 MMOL/L — LOW (ref 22–31)
CREAT SERPL-MCNC: 1.53 MG/DL — HIGH (ref 0.5–1.3)
EGFR: 47 ML/MIN/1.73M2 — LOW
GLUCOSE BLDC GLUCOMTR-MCNC: 127 MG/DL — HIGH (ref 70–99)
GLUCOSE BLDC GLUCOMTR-MCNC: 134 MG/DL — HIGH (ref 70–99)
GLUCOSE BLDC GLUCOMTR-MCNC: 143 MG/DL — HIGH (ref 70–99)
GLUCOSE BLDC GLUCOMTR-MCNC: 145 MG/DL — HIGH (ref 70–99)
GLUCOSE SERPL-MCNC: 108 MG/DL — HIGH (ref 70–99)
HCT VFR BLD CALC: 26.8 % — LOW (ref 39–50)
HGB BLD-MCNC: 8.2 G/DL — LOW (ref 13–17)
MAGNESIUM SERPL-MCNC: 2 MG/DL — SIGNIFICANT CHANGE UP (ref 1.6–2.6)
MCHC RBC-ENTMCNC: 26.5 PG — LOW (ref 27–34)
MCHC RBC-ENTMCNC: 30.6 GM/DL — LOW (ref 32–36)
MCV RBC AUTO: 86.7 FL — SIGNIFICANT CHANGE UP (ref 80–100)
NRBC # BLD: 0 /100 WBCS — SIGNIFICANT CHANGE UP (ref 0–0)
NRBC # FLD: 0 K/UL — SIGNIFICANT CHANGE UP (ref 0–0)
PHOSPHATE SERPL-MCNC: 3 MG/DL — SIGNIFICANT CHANGE UP (ref 2.5–4.5)
PLAT MORPH BLD: ABNORMAL
PLATELET # BLD AUTO: 95 K/UL — LOW (ref 150–400)
PLATELET CLUMP BLD QL SMEAR: ABNORMAL
PLATELET COUNT - ESTIMATE: ABNORMAL
POTASSIUM SERPL-MCNC: 4.5 MMOL/L — SIGNIFICANT CHANGE UP (ref 3.5–5.3)
POTASSIUM SERPL-SCNC: 4.5 MMOL/L — SIGNIFICANT CHANGE UP (ref 3.5–5.3)
RBC # BLD: 3.09 M/UL — LOW (ref 4.2–5.8)
RBC # FLD: 21.1 % — HIGH (ref 10.3–14.5)
RBC BLD AUTO: ABNORMAL
SODIUM SERPL-SCNC: 138 MMOL/L — SIGNIFICANT CHANGE UP (ref 135–145)
WBC # BLD: 6.26 K/UL — SIGNIFICANT CHANGE UP (ref 3.8–10.5)
WBC # FLD AUTO: 6.26 K/UL — SIGNIFICANT CHANGE UP (ref 3.8–10.5)

## 2022-10-04 PROCEDURE — 99233 SBSQ HOSP IP/OBS HIGH 50: CPT

## 2022-10-04 RX ORDER — CEFAZOLIN SODIUM 1 G
2000 VIAL (EA) INJECTION ONCE
Refills: 0 | Status: COMPLETED | OUTPATIENT
Start: 2022-10-04 | End: 2022-10-04

## 2022-10-04 RX ORDER — CEFAZOLIN SODIUM 1 G
VIAL (EA) INJECTION
Refills: 0 | Status: DISCONTINUED | OUTPATIENT
Start: 2022-10-04 | End: 2022-10-07

## 2022-10-04 RX ORDER — METOPROLOL TARTRATE 50 MG
2.5 TABLET ORAL ONCE
Refills: 0 | Status: COMPLETED | OUTPATIENT
Start: 2022-10-04 | End: 2022-10-04

## 2022-10-04 RX ORDER — CEFAZOLIN SODIUM 1 G
2000 VIAL (EA) INJECTION EVERY 8 HOURS
Refills: 0 | Status: DISCONTINUED | OUTPATIENT
Start: 2022-10-05 | End: 2022-10-07

## 2022-10-04 RX ADMIN — Medication 250 MILLIGRAM(S): at 14:19

## 2022-10-04 RX ADMIN — Medication 12.5 MILLIGRAM(S): at 00:31

## 2022-10-04 RX ADMIN — Medication 100 MILLIGRAM(S): at 11:05

## 2022-10-04 RX ADMIN — MOMETASONE FUROATE 1 PUFF(S): 220 INHALANT RESPIRATORY (INHALATION) at 11:09

## 2022-10-04 RX ADMIN — ATORVASTATIN CALCIUM 40 MILLIGRAM(S): 80 TABLET, FILM COATED ORAL at 21:28

## 2022-10-04 RX ADMIN — Medication 25 MILLIGRAM(S): at 17:23

## 2022-10-04 RX ADMIN — Medication 100 MILLIGRAM(S): at 21:28

## 2022-10-04 RX ADMIN — Medication 25 MILLIGRAM(S): at 06:00

## 2022-10-04 RX ADMIN — Medication 2.5 MILLIGRAM(S): at 22:05

## 2022-10-04 RX ADMIN — TIOTROPIUM BROMIDE 1 CAPSULE(S): 18 CAPSULE ORAL; RESPIRATORY (INHALATION) at 11:06

## 2022-10-04 RX ADMIN — PANTOPRAZOLE SODIUM 40 MILLIGRAM(S): 20 TABLET, DELAYED RELEASE ORAL at 06:00

## 2022-10-04 NOTE — PROGRESS NOTE ADULT - SUBJECTIVE AND OBJECTIVE BOX
Surgery Progress Note      SUBJECTIVE: Patient seen and examined at bedside with surgical team. No acute events overnight.     OBJECTIVE:    Vital Signs Last 24 Hrs  T(C): 36.7 (04 Oct 2022 00:30), Max: 36.9 (03 Oct 2022 17:50)  T(F): 98.1 (04 Oct 2022 00:30), Max: 98.4 (03 Oct 2022 17:50)  HR: 132 (04 Oct 2022 00:30) (114 - 133)  BP: 101/69 (04 Oct 2022 00:30) (88/65 - 116/71)  BP(mean): 82 (04 Oct 2022 00:30) (82 - 87)  RR: 18 (04 Oct 2022 00:30) (16 - 18)  SpO2: 95% (04 Oct 2022 00:30) (95% - 99%)    Parameters below as of 04 Oct 2022 00:30  Patient On (Oxygen Delivery Method): room air    I&O's Detail    02 Oct 2022 07:01  -  03 Oct 2022 07:00  --------------------------------------------------------  IN:    Oral Fluid: 698 mL  Total IN: 698 mL    OUT:  Total OUT: 0 mL    Total NET: 698 mL      03 Oct 2022 07:01  -  04 Oct 2022 06:54  --------------------------------------------------------  IN:    Oral Fluid: 590 mL  Total IN: 590 mL    OUT:  Total OUT: 0 mL    Total NET: 590 mL      MEDICATIONS  (STANDING):  allopurinol 100 milliGRAM(s) Oral daily  atorvastatin 40 milliGRAM(s) Oral at bedtime  dextrose 5%. 1000 milliLiter(s) (50 mL/Hr) IV Continuous <Continuous>  dextrose 5%. 1000 milliLiter(s) (100 mL/Hr) IV Continuous <Continuous>  dextrose 50% Injectable 25 Gram(s) IV Push once  dextrose 50% Injectable 12.5 Gram(s) IV Push once  dextrose 50% Injectable 25 Gram(s) IV Push once  glucagon  Injectable 1 milliGRAM(s) IntraMuscular once  influenza  Vaccine (HIGH DOSE) 0.7 milliLiter(s) IntraMuscular once  insulin lispro (ADMELOG) corrective regimen sliding scale   SubCutaneous three times a day before meals  lactated ringers. 500 milliLiter(s) (100 mL/Hr) IV Continuous <Continuous>  metoprolol tartrate 25 milliGRAM(s) Oral every 12 hours  mometasone 220 MICROgram(s) Inhaler 1 Puff(s) Inhalation daily  pantoprazole    Tablet 40 milliGRAM(s) Oral before breakfast  tiotropium 18 MICROgram(s) Capsule 1 Capsule(s) Inhalation daily  vancomycin  IVPB 1000 milliGRAM(s) IV Intermittent every 24 hours    MEDICATIONS  (PRN):  acetaminophen     Tablet .. 650 milliGRAM(s) Oral every 6 hours PRN Temp greater or equal to 38C (100.4F), Mild Pain (1 - 3)  ALBUTerol    90 MICROgram(s) HFA Inhaler 2 Puff(s) Inhalation every 6 hours PRN Shortness of Breath and/or Wheezing  dextrose Oral Gel 15 Gram(s) Oral once PRN Blood Glucose LESS THAN 70 milliGRAM(s)/deciliter  melatonin 3 milliGRAM(s) Oral at bedtime PRN Insomnia  ondansetron Injectable 4 milliGRAM(s) IV Push every 8 hours PRN Nausea and/or Vomiting      PHYSICAL EXAM:  Constitutional: A&Ox3, NAD  Respiratory: Unlabored breathing  Extremities: RLE hematoma slowly improving. No motor/sensory deficit. Minimally tender to palpation.     LABS:                        8.5    6.82  )-----------( 121      ( 03 Oct 2022 07:30 )             26.9     10-03    140  |  107  |  71<H>  ----------------------------<  117<H>  4.5   |  21<L>  |  1.96<H>    Ca    8.8      03 Oct 2022 07:30  Phos  3.0     10-03  Mg     1.60     10-03

## 2022-10-04 NOTE — PROGRESS NOTE ADULT - PROBLEM SELECTOR PLAN 1
-Met sepsis criteria on admission w/ fever and tachycardia , likely 2/2 cellulitis  -US LE w/  complex heterogeneous in echotexture focus identified measuring 7.5 x 5.0 x 3.3 cm which may   represent either abscess or hematoma  -Likely hematoma w/ overlying cellulitis   -No surg intervention per surgery   -s/p 1g Vancomycin x1, 2g cefazolin x1, and 1L NS in ED  -s/p vancomycin (09/30-10/4)  -Start IV cefazolin 2g q8 given neg MRSA  -Blood and urine Cx NGTD  -RVP negative  -COVID negative  -Will consider repeat imaging if w/o clinical improvement

## 2022-10-04 NOTE — PROGRESS NOTE ADULT - SUBJECTIVE AND OBJECTIVE BOX
Feli Mckee    Pager [phone]    Patient is a 77y old  Male who presents with a chief complaint of Right Hernandez Wound (04 Oct 2022 06:54)      SUBJECTIVE / OVERNIGHT EVENTS: No acute overnight events. This morning pt doing well. Expresses some frustration re: slow clinical improvement. Reports right lower leg pain (mostly around ankles) otherwise without complaints. Denies fevers, chills, nausea, vomiting, chest pain, SOB, abdominal pain, constipation, diarrhea.     MEDICATIONS  (STANDING):  allopurinol 100 milliGRAM(s) Oral daily  atorvastatin 40 milliGRAM(s) Oral at bedtime  dextrose 5%. 1000 milliLiter(s) (50 mL/Hr) IV Continuous <Continuous>  dextrose 5%. 1000 milliLiter(s) (100 mL/Hr) IV Continuous <Continuous>  dextrose 50% Injectable 25 Gram(s) IV Push once  dextrose 50% Injectable 12.5 Gram(s) IV Push once  dextrose 50% Injectable 25 Gram(s) IV Push once  glucagon  Injectable 1 milliGRAM(s) IntraMuscular once  influenza  Vaccine (HIGH DOSE) 0.7 milliLiter(s) IntraMuscular once  insulin lispro (ADMELOG) corrective regimen sliding scale   SubCutaneous three times a day before meals  lactated ringers. 500 milliLiter(s) (100 mL/Hr) IV Continuous <Continuous>  metoprolol tartrate 25 milliGRAM(s) Oral every 12 hours  mometasone 220 MICROgram(s) Inhaler 1 Puff(s) Inhalation daily  pantoprazole    Tablet 40 milliGRAM(s) Oral before breakfast  tiotropium 18 MICROgram(s) Capsule 1 Capsule(s) Inhalation daily  vancomycin  IVPB 1000 milliGRAM(s) IV Intermittent every 24 hours    MEDICATIONS  (PRN):  acetaminophen     Tablet .. 650 milliGRAM(s) Oral every 6 hours PRN Temp greater or equal to 38C (100.4F), Mild Pain (1 - 3)  ALBUTerol    90 MICROgram(s) HFA Inhaler 2 Puff(s) Inhalation every 6 hours PRN Shortness of Breath and/or Wheezing  dextrose Oral Gel 15 Gram(s) Oral once PRN Blood Glucose LESS THAN 70 milliGRAM(s)/deciliter  melatonin 3 milliGRAM(s) Oral at bedtime PRN Insomnia  ondansetron Injectable 4 milliGRAM(s) IV Push every 8 hours PRN Nausea and/or Vomiting    Allergies    penicillin (Unknown)    Intolerances        Vital Signs Last 24 Hrs  T(C): 36.6 (04 Oct 2022 11:20), Max: 36.9 (03 Oct 2022 21:20)  T(F): 97.9 (04 Oct 2022 11:20), Max: 98.4 (03 Oct 2022 21:20)  HR: 108 (04 Oct 2022 11:20) (108 - 132)  BP: 93/55 (04 Oct 2022 11:20) (93/55 - 116/71)  BP(mean): 86 (04 Oct 2022 04:55) (82 - 87)  RR: 17 (04 Oct 2022 11:20) (17 - 18)  SpO2: 96% (04 Oct 2022 11:20) (94% - 97%)    Parameters below as of 04 Oct 2022 11:20  Patient On (Oxygen Delivery Method): room air      Daily     Daily Weight in k.6 (04 Oct 2022 04:55)  CAPILLARY BLOOD GLUCOSE      POCT Blood Glucose.: 134 mg/dL (04 Oct 2022 17:11)  POCT Blood Glucose.: 145 mg/dL (04 Oct 2022 12:24)  POCT Blood Glucose.: 127 mg/dL (04 Oct 2022 08:32)  POCT Blood Glucose.: 125 mg/dL (03 Oct 2022 22:12)    I&O's Summary    03 Oct 2022 07:01  -  04 Oct 2022 07:00  --------------------------------------------------------  IN: 590 mL / OUT: 0 mL / NET: 590 mL    04 Oct 2022 07:01  -  04 Oct 2022 19:19  --------------------------------------------------------  IN: 240 mL / OUT: 0 mL / NET: 240 mL      PHYSICAL EXAM:  GENERAL: NAD, well-developed  HEAD:  Atraumatic, Normocephalic  EYES: EOMI, conjunctiva and sclera clear  NECK: Supple  CHEST/LUNG: Clear to auscultation bilaterally; No wheeze, rhonchi, crackles or rales  HEART: Regular rate and rhythm; No murmurs, rubs, or gallops  ABDOMEN: Soft, Nontender, Nondistended; Bowel sounds present  EXTREMITIES:  Trace edema RLE  PSYCH: AAOx3  NEUROLOGY: non-focal  SKIN: +Hard palpable lesion RLE w/ surrounding ecchymosis, mildly TTP. Area marked off to monitor for improvement         LABS:                        8.2    6.26  )-----------( 95       ( 04 Oct 2022 06:40 )             26.8     Hgb Trend: 8.2<--, 8.5<--, 8.9<--, 8.6<--, 10.3<--  10-04    138  |  105  |  64<H>  ----------------------------<  108<H>  4.5   |  21<L>  |  1.53<H>    Ca    8.7      04 Oct 2022 06:40  Phos  3.0     10-04  Mg     2.00     10-      Creatinine Trend: 1.53<--, 1.96<--, 2.23<--, 2.08<--, 1.98<--, 2.20<--              RADIOLOGY & ADDITIONAL TESTS:    Imaging Personally Reviewed.    Consultant(s) Notes Reviewed.    Care Discussed with Consultants/Other Providers.

## 2022-10-04 NOTE — PROGRESS NOTE ADULT - PROBLEM SELECTOR PLAN 3
Pt w/ AFib RVR to 110s-140s.  -On Toprol 50mg QD at home  -Switched to lopressor 25mg BID  -[id number] score 6; on Eliquis at home   -Holding AC i/s/o possible hematoma   -Monitor on Tele

## 2022-10-04 NOTE — PROGRESS NOTE ADULT - PROBLEM SELECTOR PLAN 2
Pt w/ cellulitis as above  -RLE U/S unable to differentiate hematoma vs abscess.  -Likely hematoma per surg  -Holding  Eliquis 2/2 c/f hematoma  -Rest of mgt as above

## 2022-10-04 NOTE — PROGRESS NOTE ADULT - PROBLEM SELECTOR PLAN 4
Suspect ATN in setting of sepsis  -Overall downtrending from admission  -Monitor sCR  -Renally dose meds  -Avoid nephrotoxic agents

## 2022-10-04 NOTE — PROGRESS NOTE ADULT - ASSESSMENT
77M h/o T2DM, HTN, COPD, afib on Eliquis presented with sepsis iso RLE cellulitis, with concern for abscess vs hematoma of R shin, hospital course c/b SHEYLA and AFib w/ RVR.

## 2022-10-04 NOTE — PROGRESS NOTE ADULT - ASSESSMENT
ASSESSMENT: 78yo M w RLE traumatic wound. US imaging suspicious for abscess vs hematoma.     PLAN:   - Erythematous region marked for comparison, hematoma improving.   - No acute surgical intervention        A Team Surgery  #[id number]

## 2022-10-05 LAB
ANION GAP SERPL CALC-SCNC: 14 MMOL/L — SIGNIFICANT CHANGE UP (ref 7–14)
BUN SERPL-MCNC: 64 MG/DL — HIGH (ref 7–23)
CALCIUM SERPL-MCNC: 8.5 MG/DL — SIGNIFICANT CHANGE UP (ref 8.4–10.5)
CHLORIDE SERPL-SCNC: 106 MMOL/L — SIGNIFICANT CHANGE UP (ref 98–107)
CO2 SERPL-SCNC: 20 MMOL/L — LOW (ref 22–31)
CREAT SERPL-MCNC: 1.85 MG/DL — HIGH (ref 0.5–1.3)
CULTURE RESULTS: SIGNIFICANT CHANGE UP
CULTURE RESULTS: SIGNIFICANT CHANGE UP
EGFR: 37 ML/MIN/1.73M2 — LOW
GLUCOSE BLDC GLUCOMTR-MCNC: 133 MG/DL — HIGH (ref 70–99)
GLUCOSE BLDC GLUCOMTR-MCNC: 137 MG/DL — HIGH (ref 70–99)
GLUCOSE BLDC GLUCOMTR-MCNC: 144 MG/DL — HIGH (ref 70–99)
GLUCOSE BLDC GLUCOMTR-MCNC: 213 MG/DL — HIGH (ref 70–99)
GLUCOSE SERPL-MCNC: 120 MG/DL — HIGH (ref 70–99)
HCT VFR BLD CALC: 26.8 % — LOW (ref 39–50)
HGB BLD-MCNC: 8.2 G/DL — LOW (ref 13–17)
MAGNESIUM SERPL-MCNC: 1.8 MG/DL — SIGNIFICANT CHANGE UP (ref 1.6–2.6)
MCHC RBC-ENTMCNC: 26.7 PG — LOW (ref 27–34)
MCHC RBC-ENTMCNC: 30.6 GM/DL — LOW (ref 32–36)
MCV RBC AUTO: 87.3 FL — SIGNIFICANT CHANGE UP (ref 80–100)
NRBC # BLD: 0 /100 WBCS — SIGNIFICANT CHANGE UP (ref 0–0)
NRBC # FLD: 0 K/UL — SIGNIFICANT CHANGE UP (ref 0–0)
PHOSPHATE SERPL-MCNC: 2.8 MG/DL — SIGNIFICANT CHANGE UP (ref 2.5–4.5)
PLATELET # BLD AUTO: 88 K/UL — LOW (ref 150–400)
POTASSIUM SERPL-MCNC: 4.5 MMOL/L — SIGNIFICANT CHANGE UP (ref 3.5–5.3)
POTASSIUM SERPL-SCNC: 4.5 MMOL/L — SIGNIFICANT CHANGE UP (ref 3.5–5.3)
RBC # BLD: 3.07 M/UL — LOW (ref 4.2–5.8)
RBC # FLD: 20.8 % — HIGH (ref 10.3–14.5)
SODIUM SERPL-SCNC: 140 MMOL/L — SIGNIFICANT CHANGE UP (ref 135–145)
SPECIMEN SOURCE: SIGNIFICANT CHANGE UP
SPECIMEN SOURCE: SIGNIFICANT CHANGE UP
WBC # BLD: 6.61 K/UL — SIGNIFICANT CHANGE UP (ref 3.8–10.5)
WBC # FLD AUTO: 6.61 K/UL — SIGNIFICANT CHANGE UP (ref 3.8–10.5)

## 2022-10-05 PROCEDURE — 99233 SBSQ HOSP IP/OBS HIGH 50: CPT

## 2022-10-05 RX ORDER — METOPROLOL TARTRATE 50 MG
25 TABLET ORAL ONCE
Refills: 0 | Status: COMPLETED | OUTPATIENT
Start: 2022-10-05 | End: 2022-10-05

## 2022-10-05 RX ORDER — METOPROLOL TARTRATE 50 MG
50 TABLET ORAL
Refills: 0 | Status: DISCONTINUED | OUTPATIENT
Start: 2022-10-05 | End: 2022-10-05

## 2022-10-05 RX ORDER — METOPROLOL TARTRATE 50 MG
75 TABLET ORAL EVERY 12 HOURS
Refills: 0 | Status: DISCONTINUED | OUTPATIENT
Start: 2022-10-06 | End: 2022-10-06

## 2022-10-05 RX ADMIN — ATORVASTATIN CALCIUM 40 MILLIGRAM(S): 80 TABLET, FILM COATED ORAL at 21:15

## 2022-10-05 RX ADMIN — PANTOPRAZOLE SODIUM 40 MILLIGRAM(S): 20 TABLET, DELAYED RELEASE ORAL at 06:34

## 2022-10-05 RX ADMIN — Medication 50 MILLIGRAM(S): at 17:02

## 2022-10-05 RX ADMIN — Medication 100 MILLIGRAM(S): at 11:17

## 2022-10-05 RX ADMIN — Medication 100 MILLIGRAM(S): at 21:15

## 2022-10-05 RX ADMIN — Medication 25 MILLIGRAM(S): at 13:42

## 2022-10-05 RX ADMIN — TIOTROPIUM BROMIDE 1 CAPSULE(S): 18 CAPSULE ORAL; RESPIRATORY (INHALATION) at 09:06

## 2022-10-05 RX ADMIN — Medication 25 MILLIGRAM(S): at 06:35

## 2022-10-05 RX ADMIN — Medication 25 MILLIGRAM(S): at 21:30

## 2022-10-05 RX ADMIN — Medication 100 MILLIGRAM(S): at 06:31

## 2022-10-05 RX ADMIN — Medication 3 MILLIGRAM(S): at 21:15

## 2022-10-05 RX ADMIN — MOMETASONE FUROATE 1 PUFF(S): 220 INHALANT RESPIRATORY (INHALATION) at 09:07

## 2022-10-05 RX ADMIN — Medication 100 MILLIGRAM(S): at 13:05

## 2022-10-05 NOTE — PROVIDER CONTACT NOTE (OTHER) - BACKGROUND
(Admit Diagnosis) Sepsis  (PMH) COPD (chronic obstructive pulmonary disease)  (PMH) Aortic stenosis  (PMH) Gout

## 2022-10-05 NOTE — PROVIDER CONTACT NOTE (OTHER) - REASON
PT BP 97/72 and HR
Pt c/o right leg pain 9/10
 , B/P 98/63
Heart rate sustaining at 144 to 150
Heart rate of 137. B/P 91/61
Heart rate sustaining at 120s to 145
Heart rate sustaining at 148 to 150
MEWs  score 7, HR in 130's-140's
bp 85/55
Rapid A-fib episode of 139
-140, asymptomatic
Pt refusing sliding scale insulin

## 2022-10-05 NOTE — PROGRESS NOTE ADULT - PROBLEM SELECTOR PLAN 3
Pt w/ AFib RVR to 110s-140s.  -On Toprol 50mg QD at home  -Switched to lopressor 25mg BID, increase to 75mg BID given improved pressures w/ persistent AFib   -[id number] score 6; on Eliquis at home   -Holding AC i/s/o  hematoma   -Monitor on Tele

## 2022-10-05 NOTE — PROVIDER CONTACT NOTE (OTHER) - ASSESSMENT
Pt fingerstick taken as per orders, resulted 213. Pt refusing Insulin, states "I will wait it out." Educate patient on importance of proper glucose control, pt still refusing insulin.

## 2022-10-05 NOTE — PROVIDER CONTACT NOTE (OTHER) - RECOMMENDATIONS
none at this time
Team made aware
As per ACP Anastasia Melgar, continue to educate patient on insulin control
Tylenol 325 mg 3 tabs for pain as per Dr Omalley at bedside. Dr Butts notified

## 2022-10-05 NOTE — PROGRESS NOTE ADULT - PROBLEM SELECTOR PLAN 1
-Met sepsis criteria on admission w/ fever and tachycardia , likely 2/2 cellulitis  -US LE w/  complex heterogeneous in echotexture focus identified measuring 7.5 x 5.0 x 3.3 cm which may   represent either abscess or hematoma  -Likely hematoma w/ overlying cellulitis   -No surg intervention per surgery   -s/p 1g Vancomycin x1, 2g cefazolin x1, and 1L NS in ED  -s/p vancomycin (09/30-10/4)  -c/w IV cefazolin 2g q8 (10/4-  -Blood and urine Cx NGTD  -RVP negative  -COVID negative  -Will consider repeat imaging if w/o clinical improvement

## 2022-10-05 NOTE — CHART NOTE - NSCHARTNOTEFT_GEN_A_CORE
ELiquis has been held 2/2 to RLE wound/poss hematoma discussed with general surgery resident Calixto Alanis regarding when to resume A/c. As per discussion with resident its risk vs benefits for the patient, decision left up to medicine team as to when to resume.

## 2022-10-05 NOTE — PROVIDER CONTACT NOTE (OTHER) - SITUATION
Pt fingerstick taken as per ore Pt fingerstick taken as per orders, resulted 213. Pt refusing Insulin.

## 2022-10-05 NOTE — PROGRESS NOTE ADULT - SUBJECTIVE AND OBJECTIVE BOX
Surgery Progress Note      SUBJECTIVE: Patient seen and examined at bedside with surgical team. No acute events overnight.     OBJECTIVE:    Vital Signs Last 24 Hrs  T(C): 36.7 (05 Oct 2022 00:48), Max: 36.7 (04 Oct 2022 08:06)  T(F): 98.1 (05 Oct 2022 00:48), Max: 98.1 (04 Oct 2022 21:31)  HR: 104 (05 Oct 2022 00:48) (104 - 130)  BP: 112/75 (05 Oct 2022 00:48) (93/55 - 112/75)  BP(mean): --  RR: 18 (05 Oct 2022 00:48) (16 - 18)  SpO2: 98% (05 Oct 2022 00:48) (94% - 98%)    Parameters below as of 05 Oct 2022 00:48  Patient On (Oxygen Delivery Method): room air    I&O's Detail    03 Oct 2022 07:01  -  04 Oct 2022 07:00  --------------------------------------------------------  IN:    Oral Fluid: 590 mL  Total IN: 590 mL    OUT:  Total OUT: 0 mL    Total NET: 590 mL      04 Oct 2022 07:01  -  05 Oct 2022 06:03  --------------------------------------------------------  IN:    Oral Fluid: 240 mL  Total IN: 240 mL    OUT:  Total OUT: 0 mL    Total NET: 240 mL      MEDICATIONS  (STANDING):  allopurinol 100 milliGRAM(s) Oral daily  atorvastatin 40 milliGRAM(s) Oral at bedtime  ceFAZolin   IVPB      ceFAZolin   IVPB 2000 milliGRAM(s) IV Intermittent every 8 hours  dextrose 5%. 1000 milliLiter(s) (50 mL/Hr) IV Continuous <Continuous>  dextrose 5%. 1000 milliLiter(s) (100 mL/Hr) IV Continuous <Continuous>  dextrose 50% Injectable 25 Gram(s) IV Push once  dextrose 50% Injectable 12.5 Gram(s) IV Push once  dextrose 50% Injectable 25 Gram(s) IV Push once  glucagon  Injectable 1 milliGRAM(s) IntraMuscular once  influenza  Vaccine (HIGH DOSE) 0.7 milliLiter(s) IntraMuscular once  insulin lispro (ADMELOG) corrective regimen sliding scale   SubCutaneous three times a day before meals  metoprolol tartrate 25 milliGRAM(s) Oral every 12 hours  mometasone 220 MICROgram(s) Inhaler 1 Puff(s) Inhalation daily  pantoprazole    Tablet 40 milliGRAM(s) Oral before breakfast  tiotropium 18 MICROgram(s) Capsule 1 Capsule(s) Inhalation daily    MEDICATIONS  (PRN):  acetaminophen     Tablet .. 650 milliGRAM(s) Oral every 6 hours PRN Temp greater or equal to 38C (100.4F), Mild Pain (1 - 3)  ALBUTerol    90 MICROgram(s) HFA Inhaler 2 Puff(s) Inhalation every 6 hours PRN Shortness of Breath and/or Wheezing  dextrose Oral Gel 15 Gram(s) Oral once PRN Blood Glucose LESS THAN 70 milliGRAM(s)/deciliter  melatonin 3 milliGRAM(s) Oral at bedtime PRN Insomnia  ondansetron Injectable 4 milliGRAM(s) IV Push every 8 hours PRN Nausea and/or Vomiting      PHYSICAL EXAM:  Constitutional: NAD  Respiratory: Unlabored breathing  Abdomen: Soft, nondistended, NTTP. No rebound or guarding.  Extremities: RLE hematoma improving. No motor/sensory deficits.     LABS:                        8.2    6.61  )-----------( 88       ( 05 Oct 2022 02:50 )             26.8     10-05    140  |  106  |  64<H>  ----------------------------<  120<H>  4.5   |  20<L>  |  1.85<H>    Ca    8.5      05 Oct 2022 02:50  Phos  2.8     10-05  Mg     1.80     10-05

## 2022-10-05 NOTE — PROVIDER CONTACT NOTE (OTHER) - DATE AND TIME:
01-Oct-2022 03:47
01-Oct-2022 10:05
01-Oct-2022 21:30
03-Oct-2022 03:30
04-Oct-2022
05-Oct-2022 17:52
02-Oct-2022 06:00
30-Sep-2022 23:09
02-Oct-2022 01:00
05-Oct-2022 23:58
02-Oct-2022 10:55
03-Oct-2022 05:40

## 2022-10-05 NOTE — PROGRESS NOTE ADULT - PROBLEM SELECTOR PLAN 2
Pt w/ cellulitis as above  -RLE U/S unable to differentiate hematoma vs abscess.  -Likely hematoma per surg  -Holding  Eliquis 2/2 c/f hematoma  -Surg deferring resumption of A/C to primary team  -Rest of mgt as above

## 2022-10-05 NOTE — PROGRESS NOTE ADULT - ASSESSMENT
ASSESSMENT: 76yo M w RLE traumatic wound. US imaging suspicious for abscess vs hematoma.     PLAN:   - Erythematous region marked for comparison, hematoma improving.   - Continue RLE elevation   - No acute surgical intervention        A Team Surgery  #[id number]

## 2022-10-05 NOTE — PROGRESS NOTE ADULT - SUBJECTIVE AND OBJECTIVE BOX
Feli Mckee    Pager [phone]    Patient is a 77y old  Male who presents with a chief complaint of Right Hernandez Wound (05 Oct 2022 06:03)      SUBJECTIVE / OVERNIGHT EVENTS: No acute overnight events. This morning pt doing well. States leg pain is improving. Denies fevers, chills, nausea, vomiting, chest pain, SOB.    MEDICATIONS  (STANDING):  allopurinol 100 milliGRAM(s) Oral daily  atorvastatin 40 milliGRAM(s) Oral at bedtime  ceFAZolin   IVPB      ceFAZolin   IVPB 2000 milliGRAM(s) IV Intermittent every 8 hours  dextrose 5%. 1000 milliLiter(s) (50 mL/Hr) IV Continuous <Continuous>  dextrose 5%. 1000 milliLiter(s) (100 mL/Hr) IV Continuous <Continuous>  dextrose 50% Injectable 25 Gram(s) IV Push once  dextrose 50% Injectable 12.5 Gram(s) IV Push once  dextrose 50% Injectable 25 Gram(s) IV Push once  glucagon  Injectable 1 milliGRAM(s) IntraMuscular once  influenza  Vaccine (HIGH DOSE) 0.7 milliLiter(s) IntraMuscular once  insulin lispro (ADMELOG) corrective regimen sliding scale   SubCutaneous three times a day before meals  metoprolol tartrate 25 milliGRAM(s) Oral once  mometasone 220 MICROgram(s) Inhaler 1 Puff(s) Inhalation daily  pantoprazole    Tablet 40 milliGRAM(s) Oral before breakfast  tiotropium 18 MICROgram(s) Capsule 1 Capsule(s) Inhalation daily    MEDICATIONS  (PRN):  acetaminophen     Tablet .. 650 milliGRAM(s) Oral every 6 hours PRN Temp greater or equal to 38C (100.4F), Mild Pain (1 - 3)  ALBUTerol    90 MICROgram(s) HFA Inhaler 2 Puff(s) Inhalation every 6 hours PRN Shortness of Breath and/or Wheezing  dextrose Oral Gel 15 Gram(s) Oral once PRN Blood Glucose LESS THAN 70 milliGRAM(s)/deciliter  melatonin 3 milliGRAM(s) Oral at bedtime PRN Insomnia  ondansetron Injectable 4 milliGRAM(s) IV Push every 8 hours PRN Nausea and/or Vomiting    Allergies    penicillin (Unknown)    Intolerances        Vital Signs Last 24 Hrs  T(C): 36.5 (05 Oct 2022 18:38), Max: 37.1 (05 Oct 2022 14:38)  T(F): 97.7 (05 Oct 2022 18:38), Max: 98.7 (05 Oct 2022 14:38)  HR: 104 (05 Oct 2022 18:38) (96 - 130)  BP: 144/87 (05 Oct 2022 18:38) (97/72 - 144/87)  BP(mean): --  RR: 18 (05 Oct 2022 18:38) (16 - 18)  SpO2: 95% (05 Oct 2022 18:38) (94% - 98%)    Parameters below as of 05 Oct 2022 18:38  Patient On (Oxygen Delivery Method): room air      Daily     Daily   CAPILLARY BLOOD GLUCOSE      POCT Blood Glucose.: 213 mg/dL (05 Oct 2022 17:13)  POCT Blood Glucose.: 133 mg/dL (05 Oct 2022 12:28)  POCT Blood Glucose.: 144 mg/dL (05 Oct 2022 08:31)  POCT Blood Glucose.: 143 mg/dL (04 Oct 2022 22:46)    I&O's Summary    04 Oct 2022 07:01  -  05 Oct 2022 07:00  --------------------------------------------------------  IN: 240 mL / OUT: 0 mL / NET: 240 mL        PHYSICAL EXAM:    GENERAL: NAD, well-developed  HEAD:  Atraumatic, Normocephalic  EYES: EOMI, conjunctiva and sclera clear  NECK: Supple  CHEST/LUNG: Clear to auscultation bilaterally; No wheeze, rhonchi, crackles or rales  HEART: Regular rate and rhythm; No murmurs, rubs, or gallops  ABDOMEN: Soft, Nontender, Nondistended; Bowel sounds present  EXTREMITIES:  Trace edema RLE  PSYCH: AAOx3  NEUROLOGY: non-focal  SKIN: +Hard palpable lesion RLE w/ surrounding ecchymosis, no TTP. Area marked off to monitor for improvement. Overall improving       LABS:                        8.2    6.61  )-----------( 88       ( 05 Oct 2022 02:50 )             26.8     Hgb Trend: 8.2<--, 8.2<--, 8.5<--, 8.9<--, 8.6<--  10-05    140  |  106  |  64<H>  ----------------------------<  120<H>  4.5   |  20<L>  |  1.85<H>    Ca    8.5      05 Oct 2022 02:50  Phos  2.8     10-05  Mg     1.80     10-05      Creatinine Trend: 1.85<--, 1.53<--, 1.96<--, 2.23<--, 2.08<--, 1.98<--              RADIOLOGY & ADDITIONAL TESTS:    Imaging Personally Reviewed.    Consultant(s) Notes Reviewed.    Care Discussed with Consultants/Other Providers.

## 2022-10-06 LAB
ANION GAP SERPL CALC-SCNC: 9 MMOL/L — SIGNIFICANT CHANGE UP (ref 7–14)
BUN SERPL-MCNC: 53 MG/DL — HIGH (ref 7–23)
CALCIUM SERPL-MCNC: 8.7 MG/DL — SIGNIFICANT CHANGE UP (ref 8.4–10.5)
CHLORIDE SERPL-SCNC: 109 MMOL/L — HIGH (ref 98–107)
CO2 SERPL-SCNC: 23 MMOL/L — SIGNIFICANT CHANGE UP (ref 22–31)
CREAT SERPL-MCNC: 1.59 MG/DL — HIGH (ref 0.5–1.3)
EGFR: 45 ML/MIN/1.73M2 — LOW
GLUCOSE BLDC GLUCOMTR-MCNC: 118 MG/DL — HIGH (ref 70–99)
GLUCOSE BLDC GLUCOMTR-MCNC: 126 MG/DL — HIGH (ref 70–99)
GLUCOSE BLDC GLUCOMTR-MCNC: 128 MG/DL — HIGH (ref 70–99)
GLUCOSE SERPL-MCNC: 126 MG/DL — HIGH (ref 70–99)
HCT VFR BLD CALC: 29.4 % — LOW (ref 39–50)
HGB BLD-MCNC: 8.9 G/DL — LOW (ref 13–17)
MAGNESIUM SERPL-MCNC: 2 MG/DL — SIGNIFICANT CHANGE UP (ref 1.6–2.6)
MCHC RBC-ENTMCNC: 26.8 PG — LOW (ref 27–34)
MCHC RBC-ENTMCNC: 30.3 GM/DL — LOW (ref 32–36)
MCV RBC AUTO: 88.6 FL — SIGNIFICANT CHANGE UP (ref 80–100)
NRBC # BLD: 0 /100 WBCS — SIGNIFICANT CHANGE UP (ref 0–0)
NRBC # FLD: 0 K/UL — SIGNIFICANT CHANGE UP (ref 0–0)
PHOSPHATE SERPL-MCNC: 3 MG/DL — SIGNIFICANT CHANGE UP (ref 2.5–4.5)
PLATELET # BLD AUTO: 113 K/UL — LOW (ref 150–400)
POTASSIUM SERPL-MCNC: 4.3 MMOL/L — SIGNIFICANT CHANGE UP (ref 3.5–5.3)
POTASSIUM SERPL-SCNC: 4.3 MMOL/L — SIGNIFICANT CHANGE UP (ref 3.5–5.3)
RBC # BLD: 3.32 M/UL — LOW (ref 4.2–5.8)
RBC # FLD: 20.7 % — HIGH (ref 10.3–14.5)
SODIUM SERPL-SCNC: 141 MMOL/L — SIGNIFICANT CHANGE UP (ref 135–145)
WBC # BLD: 6.26 K/UL — SIGNIFICANT CHANGE UP (ref 3.8–10.5)
WBC # FLD AUTO: 6.26 K/UL — SIGNIFICANT CHANGE UP (ref 3.8–10.5)

## 2022-10-06 RX ORDER — METOPROLOL TARTRATE 50 MG
75 TABLET ORAL EVERY 12 HOURS
Refills: 0 | Status: DISCONTINUED | OUTPATIENT
Start: 2022-10-06 | End: 2022-10-07

## 2022-10-06 RX ORDER — APIXABAN 2.5 MG/1
2.5 TABLET, FILM COATED ORAL EVERY 12 HOURS
Refills: 0 | Status: DISCONTINUED | OUTPATIENT
Start: 2022-10-06 | End: 2022-10-07

## 2022-10-06 RX ADMIN — PANTOPRAZOLE SODIUM 40 MILLIGRAM(S): 20 TABLET, DELAYED RELEASE ORAL at 05:02

## 2022-10-06 RX ADMIN — APIXABAN 2.5 MILLIGRAM(S): 2.5 TABLET, FILM COATED ORAL at 13:01

## 2022-10-06 RX ADMIN — Medication 100 MILLIGRAM(S): at 05:02

## 2022-10-06 RX ADMIN — Medication 75 MILLIGRAM(S): at 17:37

## 2022-10-06 RX ADMIN — Medication 100 MILLIGRAM(S): at 12:13

## 2022-10-06 RX ADMIN — Medication 100 MILLIGRAM(S): at 11:05

## 2022-10-06 RX ADMIN — Medication 75 MILLIGRAM(S): at 05:02

## 2022-10-06 RX ADMIN — Medication 100 MILLIGRAM(S): at 21:18

## 2022-10-06 RX ADMIN — ATORVASTATIN CALCIUM 40 MILLIGRAM(S): 80 TABLET, FILM COATED ORAL at 21:20

## 2022-10-06 RX ADMIN — TIOTROPIUM BROMIDE 1 CAPSULE(S): 18 CAPSULE ORAL; RESPIRATORY (INHALATION) at 09:02

## 2022-10-06 RX ADMIN — MOMETASONE FUROATE 1 PUFF(S): 220 INHALANT RESPIRATORY (INHALATION) at 09:02

## 2022-10-06 NOTE — PROGRESS NOTE ADULT - PROBLEM SELECTOR PLAN 8
Patient has Echo from 2017 showing Moderate Aortic Stenosis with LA index of 45  -Repeat echo w/ EF 64%, severe right atrial enlargement, moderate aortic stenosis, mod pericardial effusion  -Appreciate cards recs
Patient has Echo from 2017 showing Moderate Aortic Stenosis with LA index of 45  -Repeat echo w/ EF 64%, severe right atrial enlargement, moderate aortic stenosis, mod pericardial effusion  -Appreciate cards recs
Patient has Echo from 2017 showing Moderate Aortic Stenosis with LA index of 45  -F/u repeat Echo  -caution with fluid resuscitation
Patient has Echo from 2017 showing Moderate Aortic Stenosis with LA index of 45  -F/u repeat Echo  -caution with fluid resuscitation
Patient has Echo from 2017 showing Moderate Aortic Stenosis with LA index of 45  -Repeat echo w/ EF 64%, severe right atrial enlargement, moderate aortic stenosis, mod pericardial effusion  -Appreciate cards recs
Patient has Echo from 2017 showing Moderate Aortic Stenosis with LA index of 45  -F/u repeat Echo  -caution with fluid resuscitation

## 2022-10-06 NOTE — PROGRESS NOTE ADULT - PROBLEM SELECTOR PLAN 6
Holding regimen i/s/o hypotension/sepsis and SHEYLA  -holding home Carvedilol 6.25mg BID     -Follow up with pt's son regarding this medication  -holding home Valsartan--1.25 qd  -holding home furosemide 40mg qd
Holding regimen i/s/o hypotension/sepsis and SHEYLA  -holding home Carvedilol 6.25mg BID     -Pt states he was likely taken off medication and switched to metoprolol. Will continue to hold for now. Follow up w/ Dr. Doe outpatient prior to resumption.   -holding home Valsartan--1.25 qd  -holding home furosemide 40mg qd
Holding regimen i/s/o hypotension/sepsis and SHEYLA  -holding home Carvedilol 6.25mg BID     -Follow up with pt's son regarding this medication  -holding home Valsartan--1.25 qd  -holding home furosemide 40mg qd
Holding regimen i/s/o hypotension/sepsis and SHEYLA  -holding home Carvedilol 6.25mg BID     -Pt states he was likely taken off medication and switched to metoprolol. Will continue to hold for now. Follow up w/ Dr. Doe outpatient prior to resumption.   -holding home Valsartan--1.25 qd  -holding home furosemide 40mg qd

## 2022-10-06 NOTE — PROGRESS NOTE ADULT - PROBLEM SELECTOR PLAN 1
-Met sepsis criteria on admission w/ fever and tachycardia, likely 2/2 cellulitis  -US LE w/ complex heterogeneous in echotexture focus identified measuring 7.5 x 5.0 x 3.3 cm which may   represent either abscess or hematoma  -Likely hematoma w/ overlying cellulitis   -No surg intervention per surgery   -s/p 1g Vancomycin x1, 2g cefazolin x1, and 1L NS in ED  -s/p vancomycin (09/30-10/4)  -c/w IV cefazolin 2g q8 (10/4-  -On dc switch to cefpodoxime 400mg BID for 10 more days (14 day total abx)  -Blood and urine Cx NGTD  -RVP negative  -COVID negative

## 2022-10-06 NOTE — PROGRESS NOTE ADULT - PROBLEM SELECTOR PROBLEM 2
Wound of right leg

## 2022-10-06 NOTE — PROGRESS NOTE ADULT - PROBLEM SELECTOR PLAN 2
Pt w/ cellulitis as above  -RLE U/S unable to differentiate hematoma vs abscess.  -Likely hematoma per surg  -Improving   -Surg deferring resumption of A/C to primary team  -Risk/benefit discussion had w/ pt re: AC. Pt reasonably worried about risks of CVA off AC and worsening hematoma on AC. At this time pt would favor resuming AC while inpatient to monitor response. Pt expresses very strong wishes to be desired 10/7. Remains very resistant about remaining inpatient longer for further monitoring  -Will resume AC today and monitor response.   -Can likely dc tomorrow if stable.   -Rest of mgt as above

## 2022-10-06 NOTE — PROGRESS NOTE ADULT - PROBLEM SELECTOR PLAN 9
-c/w Atorvastatin 40mg qd

## 2022-10-06 NOTE — PROGRESS NOTE ADULT - PROBLEM SELECTOR PLAN 5
-Pt not on O2 at home. On RA this admission  -c/w Home inhaler regimen     -Beclomethasone 80mcg 1 puff BID-> Mometasone 220mcg qd     -Tiotropium 18mcg 1 capsule qd     -Albuterol 2 puffs q6h PRN for wheezing

## 2022-10-06 NOTE — PROGRESS NOTE ADULT - ASSESSMENT
ASSESSMENT: 76yo M w RLE traumatic wound. US imaging suspicious for abscess vs hematoma.     PLAN:   - Erythematous region marked for comparison, hematoma improving.   - Continue RLE elevation   - AC resumption risk/benefit per primary team   - Dispo per primary team   - No acute surgical intervention        A Team Surgery  #[id number]

## 2022-10-06 NOTE — PROGRESS NOTE ADULT - PROBLEM SELECTOR PLAN 7
Last A1c 7.0 (06-Apr-2021)  -holding home Metformin 500 BID  -Low-dose ISS  -Monitor fingersticks
Last A1c 7.0 (06-Apr-2021)  -holding home Metformin 500 BID  -Low-dose ISS  -Monitor fingersticks
Last A1c 7.0 (06-Apr-2021)  -F/u A1c  -holding home Metformin 500 BID  -FSG with meals and at bedtime  -Low-dose ISS  -add Lantus or Lispro as needed based on patient FSGs
Last A1c 7.0 (06-Apr-2021)  -F/u A1c  -holding home Metformin 500 BID  -FSG with meals and at bedtime  -Low-dose ISS  -add Lantus or Lispro as needed based on patient FSGs
Last A1c 7.0 (06-Apr-2021)  -holding home Metformin 500 BID  -Low-dose ISS  -Monitor fingersticks
Last A1c 7.0 (06-Apr-2021)  -F/u A1c  -holding home Metformin 500 BID  -FSG with meals and at bedtime  -Low-dose ISS  -add Lantus or Lispro as needed based on patient FSGs

## 2022-10-06 NOTE — PROGRESS NOTE ADULT - PROBLEM SELECTOR PLAN 3
Pt w/ AFib RVR to 110s-140s. Now improving 90s- low 100s  -On Toprol 50mg QD at home  -c/w 75mg BID   -[id number] score 6; on Eliquis at home   -Resume AC ; Eliquis 2.5mg BID   -Monitor on Tele

## 2022-10-06 NOTE — PROGRESS NOTE ADULT - SUBJECTIVE AND OBJECTIVE BOX
Feli Mckee    Pager [phone]    Patient is a 77y old  Male who presents with a chief complaint of Right Hernandez Wound (06 Oct 2022 07:24)      SUBJECTIVE / OVERNIGHT EVENTS: No acute overnight events. This morning pt doing well, states leg pain is improving overall. Denies fevers, chills, nausea, vomiting, chest pain, SOB, abdominal pain.    MEDICATIONS  (STANDING):  allopurinol 100 milliGRAM(s) Oral daily  apixaban 2.5 milliGRAM(s) Oral every 12 hours  atorvastatin 40 milliGRAM(s) Oral at bedtime  ceFAZolin   IVPB      ceFAZolin   IVPB 2000 milliGRAM(s) IV Intermittent every 8 hours  dextrose 5%. 1000 milliLiter(s) (50 mL/Hr) IV Continuous <Continuous>  dextrose 5%. 1000 milliLiter(s) (100 mL/Hr) IV Continuous <Continuous>  dextrose 50% Injectable 25 Gram(s) IV Push once  dextrose 50% Injectable 12.5 Gram(s) IV Push once  dextrose 50% Injectable 25 Gram(s) IV Push once  glucagon  Injectable 1 milliGRAM(s) IntraMuscular once  influenza  Vaccine (HIGH DOSE) 0.7 milliLiter(s) IntraMuscular once  insulin lispro (ADMELOG) corrective regimen sliding scale   SubCutaneous three times a day before meals  metoprolol tartrate 75 milliGRAM(s) Oral every 12 hours  mometasone 220 MICROgram(s) Inhaler 1 Puff(s) Inhalation daily  pantoprazole    Tablet 40 milliGRAM(s) Oral before breakfast  tiotropium 18 MICROgram(s) Capsule 1 Capsule(s) Inhalation daily    MEDICATIONS  (PRN):  acetaminophen     Tablet .. 650 milliGRAM(s) Oral every 6 hours PRN Temp greater or equal to 38C (100.4F), Mild Pain (1 - 3)  ALBUTerol    90 MICROgram(s) HFA Inhaler 2 Puff(s) Inhalation every 6 hours PRN Shortness of Breath and/or Wheezing  dextrose Oral Gel 15 Gram(s) Oral once PRN Blood Glucose LESS THAN 70 milliGRAM(s)/deciliter  melatonin 3 milliGRAM(s) Oral at bedtime PRN Insomnia  ondansetron Injectable 4 milliGRAM(s) IV Push every 8 hours PRN Nausea and/or Vomiting    Allergies    penicillin (Unknown)    Intolerances        Vital Signs Last 24 Hrs  T(C): 36.8 (06 Oct 2022 09:00), Max: 36.8 (06 Oct 2022 09:00)  T(F): 98.3 (06 Oct 2022 09:00), Max: 98.3 (06 Oct 2022 09:00)  HR: 95 (06 Oct 2022 09:00) (95 - 105)  BP: 100/70 (06 Oct 2022 09:00) (100/68 - 144/87)  BP(mean): --  RR: 18 (06 Oct 2022 09:00) (18 - 18)  SpO2: 95% (06 Oct 2022 09:00) (95% - 97%)    Parameters below as of 06 Oct 2022 09:00  Patient On (Oxygen Delivery Method): room air      Daily     Daily Weight in k.3 (06 Oct 2022 05:00)  CAPILLARY BLOOD GLUCOSE      POCT Blood Glucose.: 126 mg/dL (06 Oct 2022 17:07)  POCT Blood Glucose.: 118 mg/dL (06 Oct 2022 12:19)  POCT Blood Glucose.: 128 mg/dL (06 Oct 2022 08:24)  POCT Blood Glucose.: 137 mg/dL (05 Oct 2022 22:39)    I&O's Summary    05 Oct 2022 07:01  -  06 Oct 2022 07:00  --------------------------------------------------------  IN: 300 mL / OUT: 650 mL / NET: -350 mL        PHYSICAL EXAM:  GENERAL: NAD, well-developed  HEAD:  Atraumatic, Normocephalic  EYES: EOMI, conjunctiva and sclera clear  NECK: Supple  CHEST/LUNG: Clear to auscultation bilaterally; No wheeze, rhonchi, crackles or rales  HEART: Regular rate and rhythm; No murmurs, rubs, or gallops  ABDOMEN: Soft, Nontender, Nondistended; Bowel sounds present  EXTREMITIES:  Trace edema RLE  PSYCH: AAOx3  NEUROLOGY: non-focal  SKIN: +Hard palpable lesion RLE w/ surrounding ecchymosis, no TTP. Area marked off to monitor for improvement. Overall improving     LABS:                        8.9    6.26  )-----------( 113      ( 06 Oct 2022 06:58 )             29.4     Hgb Trend: 8.9<--, 8.2<--, 8.2<--, 8.5<--, 8.9<--  10    141  |  109<H>  |  53<H>  ----------------------------<  126<H>  4.3   |  23  |  1.59<H>    Ca    8.7      06 Oct 2022 06:58  Phos  3.0     10-06  Mg     2.00     10-06      Creatinine Trend: 1.59<--, 1.85<--, 1.53<--, 1.96<--, 2.23<--, 2.08<--              RADIOLOGY & ADDITIONAL TESTS:    Imaging Personally Reviewed.    Consultant(s) Notes Reviewed.    Care Discussed with Consultants/Other Providers.

## 2022-10-06 NOTE — PROGRESS NOTE ADULT - PROBLEM SELECTOR PLAN 11
DVT: Holding eliquis   DIET: CC DASH/TLC  DISPO: Home w/ home PT
DVT Prophylaxis: Pt received Eliquis this AM. Reevaluate following CBC as noted above  Diet: consistent carb; DASH  GI Prophylaxis: Pantoprazole 40
DVT: Holding eliquis   DIET: CC DASH/TLC  DISPO: Home w/ home PT
DVT: Eliquis   DIET: CC DASH/TLC  DISPO: Home w/ home PT

## 2022-10-06 NOTE — PROGRESS NOTE ADULT - SUBJECTIVE AND OBJECTIVE BOX
Surgery Progress Note      SUBJECTIVE: Patient seen and examined at bedside with surgical team. No acute events overnight.     OBJECTIVE:    Vital Signs Last 24 Hrs  T(C): 36.4 (06 Oct 2022 05:00), Max: 37.1 (05 Oct 2022 14:38)  T(F): 97.6 (06 Oct 2022 05:00), Max: 98.7 (05 Oct 2022 14:38)  HR: 99 (06 Oct 2022 05:00) (99 - 124)  BP: 100/68 (06 Oct 2022 05:00) (100/68 - 144/87)  BP(mean): --  RR: 18 (06 Oct 2022 05:00) (18 - 18)  SpO2: 97% (06 Oct 2022 05:00) (95% - 98%)    Parameters below as of 06 Oct 2022 05:00  Patient On (Oxygen Delivery Method): room air    I&O's Detail    05 Oct 2022 07:01  -  06 Oct 2022 07:00  --------------------------------------------------------  IN:    Oral Fluid: 300 mL  Total IN: 300 mL    OUT:    Voided (mL): 650 mL  Total OUT: 650 mL    Total NET: -350 mL      MEDICATIONS  (STANDING):  allopurinol 100 milliGRAM(s) Oral daily  atorvastatin 40 milliGRAM(s) Oral at bedtime  ceFAZolin   IVPB      ceFAZolin   IVPB 2000 milliGRAM(s) IV Intermittent every 8 hours  dextrose 5%. 1000 milliLiter(s) (50 mL/Hr) IV Continuous <Continuous>  dextrose 5%. 1000 milliLiter(s) (100 mL/Hr) IV Continuous <Continuous>  dextrose 50% Injectable 25 Gram(s) IV Push once  dextrose 50% Injectable 12.5 Gram(s) IV Push once  dextrose 50% Injectable 25 Gram(s) IV Push once  glucagon  Injectable 1 milliGRAM(s) IntraMuscular once  influenza  Vaccine (HIGH DOSE) 0.7 milliLiter(s) IntraMuscular once  insulin lispro (ADMELOG) corrective regimen sliding scale   SubCutaneous three times a day before meals  metoprolol tartrate 75 milliGRAM(s) Oral every 12 hours  mometasone 220 MICROgram(s) Inhaler 1 Puff(s) Inhalation daily  pantoprazole    Tablet 40 milliGRAM(s) Oral before breakfast  tiotropium 18 MICROgram(s) Capsule 1 Capsule(s) Inhalation daily    MEDICATIONS  (PRN):  acetaminophen     Tablet .. 650 milliGRAM(s) Oral every 6 hours PRN Temp greater or equal to 38C (100.4F), Mild Pain (1 - 3)  ALBUTerol    90 MICROgram(s) HFA Inhaler 2 Puff(s) Inhalation every 6 hours PRN Shortness of Breath and/or Wheezing  dextrose Oral Gel 15 Gram(s) Oral once PRN Blood Glucose LESS THAN 70 milliGRAM(s)/deciliter  melatonin 3 milliGRAM(s) Oral at bedtime PRN Insomnia  ondansetron Injectable 4 milliGRAM(s) IV Push every 8 hours PRN Nausea and/or Vomiting      PHYSICAL EXAM:  Constitutional:  NAD  Respiratory: Unlabored breathing  Abdomen: Soft, nondistended, NTTP. No rebound or guarding.  Extremities: RLE hematoma slowly improving. No motor/sensory deficit     LABS:                        8.2    6.61  )-----------( 88       ( 05 Oct 2022 02:50 )             26.8     10-05    140  |  106  |  64<H>  ----------------------------<  120<H>  4.5   |  20<L>  |  1.85<H>    Ca    8.5      05 Oct 2022 02:50  Phos  2.8     10-05  Mg     1.80     10-05

## 2022-10-07 ENCOUNTER — TRANSCRIPTION ENCOUNTER (OUTPATIENT)
Age: 77
End: 2022-10-07

## 2022-10-07 VITALS
DIASTOLIC BLOOD PRESSURE: 78 MMHG | OXYGEN SATURATION: 95 % | RESPIRATION RATE: 17 BRPM | TEMPERATURE: 98 F | SYSTOLIC BLOOD PRESSURE: 101 MMHG | HEART RATE: 98 BPM

## 2022-10-07 LAB
ANION GAP SERPL CALC-SCNC: 13 MMOL/L — SIGNIFICANT CHANGE UP (ref 7–14)
BUN SERPL-MCNC: 46 MG/DL — HIGH (ref 7–23)
CALCIUM SERPL-MCNC: 8.8 MG/DL — SIGNIFICANT CHANGE UP (ref 8.4–10.5)
CHLORIDE SERPL-SCNC: 104 MMOL/L — SIGNIFICANT CHANGE UP (ref 98–107)
CO2 SERPL-SCNC: 22 MMOL/L — SIGNIFICANT CHANGE UP (ref 22–31)
CREAT SERPL-MCNC: 1.42 MG/DL — HIGH (ref 0.5–1.3)
EGFR: 51 ML/MIN/1.73M2 — LOW
GLUCOSE BLDC GLUCOMTR-MCNC: 107 MG/DL — HIGH (ref 70–99)
GLUCOSE BLDC GLUCOMTR-MCNC: 131 MG/DL — HIGH (ref 70–99)
GLUCOSE SERPL-MCNC: 110 MG/DL — HIGH (ref 70–99)
HCT VFR BLD CALC: 27.6 % — LOW (ref 39–50)
HGB BLD-MCNC: 8.4 G/DL — LOW (ref 13–17)
MAGNESIUM SERPL-MCNC: 1.7 MG/DL — SIGNIFICANT CHANGE UP (ref 1.6–2.6)
MCHC RBC-ENTMCNC: 26.5 PG — LOW (ref 27–34)
MCHC RBC-ENTMCNC: 30.4 GM/DL — LOW (ref 32–36)
MCV RBC AUTO: 87.1 FL — SIGNIFICANT CHANGE UP (ref 80–100)
NRBC # BLD: 0 /100 WBCS — SIGNIFICANT CHANGE UP (ref 0–0)
NRBC # FLD: 0 K/UL — SIGNIFICANT CHANGE UP (ref 0–0)
PHOSPHATE SERPL-MCNC: 2.5 MG/DL — SIGNIFICANT CHANGE UP (ref 2.5–4.5)
PLATELET # BLD AUTO: 101 K/UL — LOW (ref 150–400)
POTASSIUM SERPL-MCNC: 4.3 MMOL/L — SIGNIFICANT CHANGE UP (ref 3.5–5.3)
POTASSIUM SERPL-SCNC: 4.3 MMOL/L — SIGNIFICANT CHANGE UP (ref 3.5–5.3)
RBC # BLD: 3.17 M/UL — LOW (ref 4.2–5.8)
RBC # FLD: 20.9 % — HIGH (ref 10.3–14.5)
SARS-COV-2 RNA SPEC QL NAA+PROBE: SIGNIFICANT CHANGE UP
SODIUM SERPL-SCNC: 139 MMOL/L — SIGNIFICANT CHANGE UP (ref 135–145)
WBC # BLD: 6.53 K/UL — SIGNIFICANT CHANGE UP (ref 3.8–10.5)
WBC # FLD AUTO: 6.53 K/UL — SIGNIFICANT CHANGE UP (ref 3.8–10.5)

## 2022-10-07 PROCEDURE — 99239 HOSP IP/OBS DSCHRG MGMT >30: CPT

## 2022-10-07 RX ORDER — ALLOPURINOL 300 MG
1 TABLET ORAL
Qty: 0 | Refills: 0 | DISCHARGE

## 2022-10-07 RX ORDER — CALCIUM CARBONATE 500(1250)
500 TABLET ORAL
Qty: 0 | Refills: 0 | DISCHARGE

## 2022-10-07 RX ORDER — METOPROLOL TARTRATE 50 MG
1 TABLET ORAL
Qty: 60 | Refills: 0
Start: 2022-10-07 | End: 2022-11-05

## 2022-10-07 RX ORDER — FUROSEMIDE 40 MG
0 TABLET ORAL
Qty: 0 | Refills: 0 | DISCHARGE

## 2022-10-07 RX ORDER — CEFPODOXIME PROXETIL 100 MG
2 TABLET ORAL
Qty: 24 | Refills: 0
Start: 2022-10-07 | End: 2022-10-12

## 2022-10-07 RX ORDER — METOPROLOL TARTRATE 50 MG
1 TABLET ORAL
Qty: 0 | Refills: 0 | DISCHARGE

## 2022-10-07 RX ORDER — ALLOPURINOL 300 MG
1 TABLET ORAL
Qty: 30 | Refills: 0
Start: 2022-10-07 | End: 2022-11-05

## 2022-10-07 RX ORDER — CEFPODOXIME PROXETIL 100 MG
2 TABLET ORAL
Qty: 40 | Refills: 0
Start: 2022-10-07 | End: 2022-10-16

## 2022-10-07 RX ORDER — ASPIRIN/CALCIUM CARB/MAGNESIUM 324 MG
1 TABLET ORAL
Qty: 0 | Refills: 0 | DISCHARGE

## 2022-10-07 RX ADMIN — APIXABAN 2.5 MILLIGRAM(S): 2.5 TABLET, FILM COATED ORAL at 00:00

## 2022-10-07 RX ADMIN — Medication 100 MILLIGRAM(S): at 11:14

## 2022-10-07 RX ADMIN — PANTOPRAZOLE SODIUM 40 MILLIGRAM(S): 20 TABLET, DELAYED RELEASE ORAL at 04:50

## 2022-10-07 RX ADMIN — Medication 100 MILLIGRAM(S): at 04:52

## 2022-10-07 RX ADMIN — MOMETASONE FUROATE 1 PUFF(S): 220 INHALANT RESPIRATORY (INHALATION) at 09:01

## 2022-10-07 RX ADMIN — TIOTROPIUM BROMIDE 1 CAPSULE(S): 18 CAPSULE ORAL; RESPIRATORY (INHALATION) at 09:01

## 2022-10-07 RX ADMIN — APIXABAN 2.5 MILLIGRAM(S): 2.5 TABLET, FILM COATED ORAL at 12:04

## 2022-10-07 RX ADMIN — Medication 75 MILLIGRAM(S): at 04:50

## 2022-10-07 NOTE — DISCHARGE NOTE NURSING/CASE MANAGEMENT/SOCIAL WORK - PATIENT PORTAL LINK FT
You can access the FollowMyHealth Patient Portal offered by [facility] by registering at the following website: [email or web address]. By joining [facility]’s FollowMyHealth portal, you will also be able to view your health information using other applications (apps) compatible with our system.

## 2022-10-07 NOTE — PROGRESS NOTE ADULT - ASSESSMENT
ASSESSMENT: 76yo M w RLE traumatic wound. US imaging suspicious for abscess vs hematoma.     PLAN:   - Erythematous region marked for comparison, hematoma improving.   - Continue RLE elevation   - AC resumption risk/benefit per primary team   - Dispo per primary team   - No acute surgical intervention  - Please call A team surgery back with questions/concerns         A Team Surgery  #[id number]

## 2022-10-07 NOTE — DISCHARGE NOTE NURSING/CASE MANAGEMENT/SOCIAL WORK - NSDCPEFALRISK_GEN_ALL_CORE
For information on Fall & Injury Prevention, visit: [email or web address] OR  [email or web address] OR  https://www.cdc.gov/steadi/patient.html

## 2022-10-07 NOTE — DIETITIAN INITIAL EVALUATION ADULT - PERTINENT MEDS FT
MEDICATIONS  (STANDING):  allopurinol 100 milliGRAM(s) Oral daily  apixaban 2.5 milliGRAM(s) Oral every 12 hours  atorvastatin 40 milliGRAM(s) Oral at bedtime  ceFAZolin   IVPB      ceFAZolin   IVPB 2000 milliGRAM(s) IV Intermittent every 8 hours  dextrose 5%. 1000 milliLiter(s) (50 mL/Hr) IV Continuous <Continuous>  dextrose 5%. 1000 milliLiter(s) (100 mL/Hr) IV Continuous <Continuous>  dextrose 50% Injectable 25 Gram(s) IV Push once  dextrose 50% Injectable 12.5 Gram(s) IV Push once  dextrose 50% Injectable 25 Gram(s) IV Push once  glucagon  Injectable 1 milliGRAM(s) IntraMuscular once  influenza  Vaccine (HIGH DOSE) 0.7 milliLiter(s) IntraMuscular once  insulin lispro (ADMELOG) corrective regimen sliding scale   SubCutaneous three times a day before meals  metoprolol tartrate 75 milliGRAM(s) Oral every 12 hours  mometasone 220 MICROgram(s) Inhaler 1 Puff(s) Inhalation daily  pantoprazole    Tablet 40 milliGRAM(s) Oral before breakfast  tiotropium 18 MICROgram(s) Capsule 1 Capsule(s) Inhalation daily    MEDICATIONS  (PRN):  acetaminophen     Tablet .. 650 milliGRAM(s) Oral every 6 hours PRN Temp greater or equal to 38C (100.4F), Mild Pain (1 - 3)  ALBUTerol    90 MICROgram(s) HFA Inhaler 2 Puff(s) Inhalation every 6 hours PRN Shortness of Breath and/or Wheezing  dextrose Oral Gel 15 Gram(s) Oral once PRN Blood Glucose LESS THAN 70 milliGRAM(s)/deciliter  melatonin 3 milliGRAM(s) Oral at bedtime PRN Insomnia  ondansetron Injectable 4 milliGRAM(s) IV Push every 8 hours PRN Nausea and/or Vomiting

## 2022-10-07 NOTE — PHARMACOTHERAPY INTERVENTION NOTE - COMMENTS
Discharge medications reviewed with the patient. Current medication schedule was discussed in detail including: medication name, indication, dose, administration times, treatment duration, side effects, drug interactions, and special instructions. Patient questions and concerns were answered and addressed. Patient demonstrated understanding.     Daxa Le, PharmD, [facility]  PGY-1 Pharmacy Resident  [id number]

## 2022-10-07 NOTE — PROGRESS NOTE ADULT - PROVIDER SPECIALTY LIST ADULT
Surgery
Cardiology
Internal Medicine
Internal Medicine
Surgery
Surgery
Internal Medicine
Surgery
Internal Medicine

## 2022-10-07 NOTE — DIETITIAN INITIAL EVALUATION ADULT - NS FNS DIET ORDER
Diet, Regular:   Consistent Carbohydrate {Evening Snack} (CSTCHOSN)  DASH/TLC {Sodium & Cholesterol Restricted} (DASH) (09-30-22 @ 23:27) [Active]

## 2022-10-07 NOTE — PROGRESS NOTE ADULT - ATTENDING COMMENTS
Patient seen and examined during morning rounds.  Assessment and plan reviewed with Cardiology fellow, and as outlined above.
date of service 10/5/22    pt seen and examined  pt chart and imaging reviewed in detail  agree with note above  77M on AC s/p fall with RLE cellulitis and swelling c/w hematoma   no acute surgical intervention at this time  RLE erythema slowly improving - area marked by Sx HS 10/1 to better monitor for improvement  no acute skin compromise  rle much less tender,  recommend cont RLE elevation  cont iv abx per med/ID   serial labs/exam  if no improvement consider repeat imaging in a few days or as needed.  cont supportive care per med/cv/ID  dc planning per med/cv  will follow with you  d/w pt @ bedside in detail.
date of service 10/6/22    pt seen and examined  pt chart and imaging reviewed in detail  agree with note above  77M on AC s/p fall with RLE cellulitis and swelling c/w hematoma   no acute surgical intervention at this time  RLE erythema slowly improving - area marked by Sx HS 10/1 to better monitor for improvement  no acute skin compromise  rle much less tender,  recommend cont RLE elevation  cont iv abx per med/ID   serial labs/exam  resume AC risks/benefits per primary team   cont supportive care per med/cv/ID  dc planning per med/cv  will follow with you  d/w pt @ bedside in detail.
date of service 10/2/22    pt seen and examined  pt chart and imaging reviewed in detail  agree with note above  77M on AC s/p fall with RLE cellulitis and swelling c/w hematoma   no acute surgical intervention at this time  pt reports RLE erythema unchanged - area marked by Sx HS yesterday to better monitor for improvement  no acute skin compromise  rle much less tender today   recommend cont RLE elevation  cont iv abx  serial labs/exam  if no improvement consider repeat imaging in a few days or as needed.  cont supportive care per med/cv/ID  will follow with you  d/w pt @ bedside in detail.
date of service 10/3/22    pt seen and examined  pt chart and imaging reviewed in detail  agree with note above  77M on AC s/p fall with RLE cellulitis and swelling c/w hematoma   no acute surgical intervention at this time  pt reports RLE erythema unchanged - area marked by Sx HS 10/1 to better monitor for improvement  no acute skin compromise  rle much less tender    recommend cont RLE elevation  cont iv abx  serial labs/exam  if no improvement consider repeat imaging in a few days or as needed.  cont supportive care per med/cv/ID  will follow with you  d/w pt @ bedside in detail.
date of service 10/4/22    pt seen and examined  pt chart and imaging reviewed in detail  agree with note above  77M on AC s/p fall with RLE cellulitis and swelling c/w hematoma   no acute surgical intervention at this time  RLE erythema slowly improving - area marked by Sx HS 10/1 to better monitor for improvement  no acute skin compromise  rle much less tender    recommend cont RLE elevation  cont iv abx  serial labs/exam  if no improvement consider repeat imaging in a few days or as needed.  cont supportive care per med/cv/ID  will follow with you  d/w pt @ bedside in detail.
date of service 10/7/22    pt seen and examined  pt chart and imaging reviewed in detail  agree with note above  77M on AC s/p fall with RLE cellulitis and swelling c/w hematoma   no acute surgical intervention at this time  RLE erythema slowly improving - area marked by Sx HS 10/1 to better monitor for improvement  no acute skin compromise  rle much less tender,  recommend cont RLE elevation  cont iv abx per med/ID   serial labs/exam  resume AC risks/benefits per primary team   cont supportive care per med/cv/ID  dc planning per med/cv  will signoff, reconsult prn   d/w pt @ bedside in detail.
date of service 10/1/22    pt seen and examined  pt chart and imaging reviewed in detail  agree with note above  77M on AC s/p fall with RLE cellulitis and swelling c/w hematoma   no acute surgical intervention at this time  pt reports RLE erythema unchanged - area marked by Sx HS today to better monitor for improvement  no acute skin compromise  recommend cont RLE elevation  cont iv abx  serial labs/exam  if no improvement consider repeat imaging in a few days or as needed.  cont supportive care per med/cv/ID  will follow with you  d/w pt @ bedside in detail.
77M h/o T2DM, HTN, COPD, afib on Eliquis presented with sepsis iso RLE cellulitis, with concern for abscess vs hematoma of R shin. Patient reports trauma to the region resulted in hematoma. He initially presented with severe pain and swelling in the area. Since hospitalization, patient now states the pain and swelling has improved clinically. On exam, the lesion is hard on palpation, R ankle and pedal edema noted, patient states it's related to the swelling. Culture has no yield thus far. no surgical intervention indercated per surgical team. Given clinical improvement will c/w abx. also with  Permanent Afib and RVR with rates up to 130s - 140s at rest. Metoprolol is restarted but patient's soft BP resulted in difficult up titration of home med. bolu challenge today, and titrate metoprolol as BP allows. repeat echo per cardiology. SHEYLA, iso sepsis, Cr stable at this time, likely ATN as Cr has not returned to baseline. ctm clinically, UOP. avoid nephrotoxics.     Rest of plan as above.   Plan discussed with patient and medicine team.
Patient seen and examined by myself , case discussed  with resident ,agree with the above finding and plan  76 yo M w/ PMH of T2DM, HTN, HLD COPD, AFib on Eliquis, CAD, ?CHF, Moderate aortic stenosis, and Gout presents with 1 week of R shin pain w/ associated chills. Admitted for sepsis likely 2/2 cellulitis with hematoma vs. abscess. Currently on Vanc. Surgery following for possible I&D. RLE US noted , a complex heterogeneous in  echotexture focus identified measuring 7.5 x 5.0 x 3.3 cm which may represent either abscess or hematoma.  SX F/U appreciated, recommend  observation for 2 days then MRI if no clinical improvement or new sx  , c/w Abx    Pt's condition c/b afib with RVR to 120s in setting of sepsis and SHEYLA. Will monitor on tele.  Cardiology f/u  appreciated recs noted,  Recommend for better control of HR, particularly in setting of aortic stenosis, start metoprolol PO, can trial 12.5mg bid, uptitrate to 25mg bid if HR peristently > 120 BPM, hold for SBP < 80 mmHg and check  TTE to assess progression of aortic stenosis    A/C on hold given concern for hematoma. CHADSVASC is 6,  will resume  A/C when safe to do so.   SHEYLA on CKD : monitor renal fns, avoid nephrotoxics, check urine lytes, agree with holding valsartan and diuretics , will check renal US as cr uptrended   will consider renal eval if Renal fn not improving ,   Rest of the management as above  plan d/w pt at bedside
Patient seen and examined by myself , case discussed  with resident ,agree with the above finding and plan  76 yo M w/ PMH of T2DM, HTN, HLD COPD, AFib on Eliquis, CAD, ?CHF, Moderate aortic stenosis, and Gout presents with 1 week of R shin pain w/ associated chills. Admitted for sepsis likely 2/2 cellulitis with hematoma vs. abscess. Currently on Vanc. Surgery following for possible I&D. RLE US noted , a complex heterogeneous in  echotexture focus identified measuring 7.5 x 5.0 x 3.3 cm which may represent either abscess or hematoma.  will   request SX F/U , c/w Abx    Pt's condition c/b afib with RVR to 120s in setting of sepsis and SHEYLA. Will monitor on tele. Bed board called for tele bed. Cardiology eval appreciated recs noted,  Goal HR is 110-115. As BP tolerates  can give metoprolol 12.5 mg po BID with parameters SBP < 90, HR < 50  and . For rates persistently above 120-125 can give IV lopressor,   A/C on hold given concern for hematoma. CHADSVASC is 6,  will resume  A/C when safe to do so.   SHEYLA on CKD : monitor renal fns, avoid nephrotoxics, check urine lytes, agree with holding valsartan and diuretics , will check renal US and renal eval if Renal fn not improving ,   Rest of the management as above  plan d/w pt at bedside

## 2022-10-07 NOTE — PROGRESS NOTE ADULT - SUBJECTIVE AND OBJECTIVE BOX
Surgery Progress Note      SUBJECTIVE: Patient seen and examined at bedside with surgical team. No acute events overnight.     OBJECTIVE:    Vital Signs Last 24 Hrs  T(C): 36.7 (07 Oct 2022 04:40), Max: 36.8 (06 Oct 2022 09:00)  T(F): 98 (07 Oct 2022 04:40), Max: 98.3 (06 Oct 2022 09:00)  HR: 93 (07 Oct 2022 04:40) (93 - 101)  BP: 103/62 (07 Oct 2022 04:40) (100/70 - 103/72)  BP(mean): --  RR: 17 (07 Oct 2022 04:40) (17 - 18)  SpO2: 97% (07 Oct 2022 04:40) (95% - 97%)    Parameters below as of 07 Oct 2022 04:40  Patient On (Oxygen Delivery Method): room air    I&O's Detail  MEDICATIONS  (STANDING):  allopurinol 100 milliGRAM(s) Oral daily  apixaban 2.5 milliGRAM(s) Oral every 12 hours  atorvastatin 40 milliGRAM(s) Oral at bedtime  ceFAZolin   IVPB      ceFAZolin   IVPB 2000 milliGRAM(s) IV Intermittent every 8 hours  dextrose 5%. 1000 milliLiter(s) (50 mL/Hr) IV Continuous <Continuous>  dextrose 5%. 1000 milliLiter(s) (100 mL/Hr) IV Continuous <Continuous>  dextrose 50% Injectable 25 Gram(s) IV Push once  dextrose 50% Injectable 12.5 Gram(s) IV Push once  dextrose 50% Injectable 25 Gram(s) IV Push once  glucagon  Injectable 1 milliGRAM(s) IntraMuscular once  influenza  Vaccine (HIGH DOSE) 0.7 milliLiter(s) IntraMuscular once  insulin lispro (ADMELOG) corrective regimen sliding scale   SubCutaneous three times a day before meals  metoprolol tartrate 75 milliGRAM(s) Oral every 12 hours  mometasone 220 MICROgram(s) Inhaler 1 Puff(s) Inhalation daily  pantoprazole    Tablet 40 milliGRAM(s) Oral before breakfast  tiotropium 18 MICROgram(s) Capsule 1 Capsule(s) Inhalation daily    MEDICATIONS  (PRN):  acetaminophen     Tablet .. 650 milliGRAM(s) Oral every 6 hours PRN Temp greater or equal to 38C (100.4F), Mild Pain (1 - 3)  ALBUTerol    90 MICROgram(s) HFA Inhaler 2 Puff(s) Inhalation every 6 hours PRN Shortness of Breath and/or Wheezing  dextrose Oral Gel 15 Gram(s) Oral once PRN Blood Glucose LESS THAN 70 milliGRAM(s)/deciliter  melatonin 3 milliGRAM(s) Oral at bedtime PRN Insomnia  ondansetron Injectable 4 milliGRAM(s) IV Push every 8 hours PRN Nausea and/or Vomiting      PHYSICAL EXAM:  Constitutional:  NAD  Respiratory: Unlabored breathing  Abdomen: Soft, nondistended, NTTP. No rebound or guarding.  Extremities: RLE hematoma, slowly improving     LABS:                        8.4    6.53  )-----------( 101      ( 07 Oct 2022 04:20 )             27.6     10-07    139  |  104  |  46<H>  ----------------------------<  110<H>  4.3   |  22  |  1.42<H>    Ca    8.8      07 Oct 2022 04:20  Phos  2.5     10-07  Mg     1.70     10-07

## 2022-10-07 NOTE — DIETITIAN INITIAL EVALUATION ADULT - OTHER INFO
77 male with pmh T2DM, HTN, COPD, afib on Eliquis presented with sepsis iso RLE cellulitis, with concern for abscess vs hematoma of R shin, hospital course c/b SHEYLA and AFib w/ RVR. Surgery following.    Pt reports usual weight ranges from 175 lbs - 191 lbs dependent on whether or not he is on lasix. 175 lbs appears to be dry weight. Current admission weight 194.6 lbs (88.3 kg) currently with 3+ right ankle, right foot edema. Pt reports good appetite at baseline. Pt states he does not check finger sticks at home, but he monitors his carbohydrate intake "within reason." As per h&p, pt takes metformin 2x daily at home for glycemic control. NKFA.    Patient reports good intake in house, consuming 100% of meals. No complaints of N/V/D/C at this time. Denies any issues chewing or swallowing. No nutrition related questions at this time. POCT controlled on admission. A1c 7.0%.

## 2022-10-07 NOTE — DIETITIAN INITIAL EVALUATION ADULT - PERTINENT LABORATORY DATA
10-07 Na 139 mmol/L Glu 110 mg/dL<H> K+ 4.3 mmol/L Cr 1.42 mg/dL<H> BUN 46 mg/dL<H> Phos 2.5 mg/dL    10-07 @ 12:20 POCT 107 mg/dL  10-07 @ 08:43 POCT 131 mg/dL  10-06 @ 17:07 POCT 126 mg/dL    POCT Blood Glucose.: 107 mg/dL (10-07-22 @ 12:20)  A1C with Estimated Average Glucose Result: 7.0 % (10-01-22 @ 06:20)

## 2022-10-07 NOTE — PROGRESS NOTE ADULT - REASON FOR ADMISSION
Right Hernandez Wound

## 2023-02-14 NOTE — H&P ADULT - PROBLEM SELECTOR PLAN 4
normal appearance , no jugular venous distention , Thyroid normal size EKG showing afib, on coumadin and carvedilol at home  -holding AC in the setting of active gi bleed  -holding carvedilol in the setting of volume down status and gi bleed  -if HR>130, would opt of lopressor if hemodynamically stable, or amio push  -TTE in 2017 showing EF 62%, nm LV, mild MR and AS, no need to repeat TTE at this point EKG showing afib, on coumadin and carvedilol at home  -holding AC in the setting of active gi bleed  -holding carvedilol in the setting of volume down status and gi bleed  -if HR>130, would opt for lopressor if hemodynamically stable, or amio push  -TTE in 2017 showing EF 62%, nm LV, mild MR and AS, no need to repeat TTE at this point EKG showing afib, on coumadin and carvedilol at home  -holding AC in the setting of active gi bleed  -c/w carvedilol 3.125 BID for rate control  -TTE in 2017 showing EF 62%, nm LV, mild MR and AS, no need to repeat TTE at this point

## 2023-04-18 NOTE — PATIENT PROFILE ADULT - DO YOU FEEL LIKE HURTING YOURSELF OR OTHERS?
Nursing Transfer Note       [facility] [address]    Transfer via bed    Transfer with cardiac monitoring    Transported by CARL Minor; report given to CARL Hudson     Medicines sent: yes    Chart sent with patient: Yes    Belongings sent with patient: black backpack, pair of black slippers, white tshirt, navy blue pants with brown belt      Notified: patient able to contact family     Bedside Neuro assessment performed: Yes    Upon arrival to floor: cardiac monitor applied, patient oriented to room, call bell in reach, and bed in lowest position     no

## 2023-07-03 ENCOUNTER — INPATIENT (INPATIENT)
Facility: HOSPITAL | Age: 78
LOS: 22 days | Discharge: HOME HEALTH | DRG: 286 | End: 2023-07-26
Attending: STUDENT IN AN ORGANIZED HEALTH CARE EDUCATION/TRAINING PROGRAM | Admitting: HOSPITALIST
Payer: MEDICARE

## 2023-07-03 VITALS
TEMPERATURE: 98 F | HEART RATE: 90 BPM | DIASTOLIC BLOOD PRESSURE: 86 MMHG | RESPIRATION RATE: 16 BRPM | HEIGHT: 66 IN | SYSTOLIC BLOOD PRESSURE: 148 MMHG | WEIGHT: 182.98 LBS | OXYGEN SATURATION: 88 %

## 2023-07-03 DIAGNOSIS — I30.9 ACUTE PERICARDITIS, UNSPECIFIED: ICD-10-CM

## 2023-07-03 LAB
ALBUMIN SERPL ELPH-MCNC: 4.4 G/DL — SIGNIFICANT CHANGE UP (ref 3.3–5)
ALP SERPL-CCNC: 108 U/L — SIGNIFICANT CHANGE UP (ref 40–120)
ALT FLD-CCNC: 20 U/L — SIGNIFICANT CHANGE UP (ref 10–45)
ANION GAP SERPL CALC-SCNC: 12 MMOL/L — SIGNIFICANT CHANGE UP (ref 5–17)
ANISOCYTOSIS BLD QL: SLIGHT — SIGNIFICANT CHANGE UP
AST SERPL-CCNC: 22 U/L — SIGNIFICANT CHANGE UP (ref 10–40)
BASE EXCESS BLDV CALC-SCNC: 5.6 MMOL/L — HIGH (ref -2–3)
BASOPHILS # BLD AUTO: 0.07 K/UL — SIGNIFICANT CHANGE UP (ref 0–0.2)
BASOPHILS NFR BLD AUTO: 0.9 % — SIGNIFICANT CHANGE UP (ref 0–2)
BILIRUB SERPL-MCNC: 0.9 MG/DL — SIGNIFICANT CHANGE UP (ref 0.2–1.2)
BUN SERPL-MCNC: 42 MG/DL — HIGH (ref 7–23)
CA-I SERPL-SCNC: 1.19 MMOL/L — SIGNIFICANT CHANGE UP (ref 1.15–1.33)
CALCIUM SERPL-MCNC: 9.4 MG/DL — SIGNIFICANT CHANGE UP (ref 8.4–10.5)
CHLORIDE BLDV-SCNC: 102 MMOL/L — SIGNIFICANT CHANGE UP (ref 96–108)
CHLORIDE SERPL-SCNC: 103 MMOL/L — SIGNIFICANT CHANGE UP (ref 96–108)
CO2 BLDV-SCNC: 35 MMOL/L — HIGH (ref 22–26)
CO2 SERPL-SCNC: 28 MMOL/L — SIGNIFICANT CHANGE UP (ref 22–31)
CREAT SERPL-MCNC: 1.36 MG/DL — HIGH (ref 0.5–1.3)
DACRYOCYTES BLD QL SMEAR: SLIGHT — SIGNIFICANT CHANGE UP
EGFR: 54 ML/MIN/1.73M2 — LOW
ELLIPTOCYTES BLD QL SMEAR: SLIGHT — SIGNIFICANT CHANGE UP
EOSINOPHIL # BLD AUTO: 0.07 K/UL — SIGNIFICANT CHANGE UP (ref 0–0.5)
EOSINOPHIL NFR BLD AUTO: 0.9 % — SIGNIFICANT CHANGE UP (ref 0–6)
GAS PNL BLDV: 140 MMOL/L — SIGNIFICANT CHANGE UP (ref 136–145)
GAS PNL BLDV: SIGNIFICANT CHANGE UP
GLUCOSE BLDV-MCNC: 108 MG/DL — HIGH (ref 70–99)
GLUCOSE SERPL-MCNC: 115 MG/DL — HIGH (ref 70–99)
HCO3 BLDV-SCNC: 33 MMOL/L — HIGH (ref 22–29)
HCT VFR BLD CALC: 38.5 % — LOW (ref 39–50)
HCT VFR BLDA CALC: 34 % — LOW (ref 39–51)
HGB BLD CALC-MCNC: 11.3 G/DL — LOW (ref 12.6–17.4)
HGB BLD-MCNC: 11.5 G/DL — LOW (ref 13–17)
LACTATE BLDV-MCNC: 0.8 MMOL/L — SIGNIFICANT CHANGE UP (ref 0.5–2)
LYMPHOCYTES # BLD AUTO: 1.03 K/UL — SIGNIFICANT CHANGE UP (ref 1–3.3)
LYMPHOCYTES # BLD AUTO: 14 % — SIGNIFICANT CHANGE UP (ref 13–44)
MANUAL SMEAR VERIFICATION: SIGNIFICANT CHANGE UP
MCHC RBC-ENTMCNC: 25.2 PG — LOW (ref 27–34)
MCHC RBC-ENTMCNC: 29.9 GM/DL — LOW (ref 32–36)
MCV RBC AUTO: 84.4 FL — SIGNIFICANT CHANGE UP (ref 80–100)
METAMYELOCYTES # FLD: 0.9 % — HIGH (ref 0–0)
MICROCYTES BLD QL: SLIGHT — SIGNIFICANT CHANGE UP
MONOCYTES # BLD AUTO: 0.51 K/UL — SIGNIFICANT CHANGE UP (ref 0–0.9)
MONOCYTES NFR BLD AUTO: 7 % — SIGNIFICANT CHANGE UP (ref 2–14)
MYELOCYTES NFR BLD: 0.9 % — HIGH (ref 0–0)
NEUTROPHILS # BLD AUTO: 5.53 K/UL — SIGNIFICANT CHANGE UP (ref 1.8–7.4)
NEUTROPHILS NFR BLD AUTO: 75.4 % — SIGNIFICANT CHANGE UP (ref 43–77)
NT-PROBNP SERPL-SCNC: 2312 PG/ML — HIGH (ref 0–300)
OVALOCYTES BLD QL SMEAR: SLIGHT — SIGNIFICANT CHANGE UP
PCO2 BLDV: 61 MMHG — HIGH (ref 42–55)
PH BLDV: 7.34 — SIGNIFICANT CHANGE UP (ref 7.32–7.43)
PLAT MORPH BLD: ABNORMAL
PLATELET # BLD AUTO: SIGNIFICANT CHANGE UP K/UL (ref 150–400)
PO2 BLDV: 18 MMHG — LOW (ref 25–45)
POIKILOCYTOSIS BLD QL AUTO: SIGNIFICANT CHANGE UP
POLYCHROMASIA BLD QL SMEAR: SLIGHT — SIGNIFICANT CHANGE UP
POTASSIUM BLDV-SCNC: 4.2 MMOL/L — SIGNIFICANT CHANGE UP (ref 3.5–5.1)
POTASSIUM SERPL-MCNC: 4.3 MMOL/L — SIGNIFICANT CHANGE UP (ref 3.5–5.3)
POTASSIUM SERPL-SCNC: 4.3 MMOL/L — SIGNIFICANT CHANGE UP (ref 3.5–5.3)
PROT SERPL-MCNC: 7.1 G/DL — SIGNIFICANT CHANGE UP (ref 6–8.3)
RBC # BLD: 4.56 M/UL — SIGNIFICANT CHANGE UP (ref 4.2–5.8)
RBC # FLD: 20 % — HIGH (ref 10.3–14.5)
RBC BLD AUTO: ABNORMAL
SAO2 % BLDV: 17.6 % — LOW (ref 67–88)
SCHISTOCYTES BLD QL AUTO: SLIGHT — SIGNIFICANT CHANGE UP
SODIUM SERPL-SCNC: 143 MMOL/L — SIGNIFICANT CHANGE UP (ref 135–145)
TROPONIN T, HIGH SENSITIVITY RESULT: 27 NG/L — SIGNIFICANT CHANGE UP (ref 0–51)
TROPONIN T, HIGH SENSITIVITY RESULT: 30 NG/L — SIGNIFICANT CHANGE UP (ref 0–51)
WBC # BLD: 7.34 K/UL — SIGNIFICANT CHANGE UP (ref 3.8–10.5)
WBC # FLD AUTO: 7.34 K/UL — SIGNIFICANT CHANGE UP (ref 3.8–10.5)

## 2023-07-03 PROCEDURE — 71045 X-RAY EXAM CHEST 1 VIEW: CPT | Mod: 26

## 2023-07-03 PROCEDURE — 99285 EMERGENCY DEPT VISIT HI MDM: CPT

## 2023-07-03 RX ORDER — FUROSEMIDE 40 MG
40 TABLET ORAL ONCE
Refills: 0 | Status: COMPLETED | OUTPATIENT
Start: 2023-07-03 | End: 2023-07-03

## 2023-07-03 RX ADMIN — Medication 40 MILLIGRAM(S): at 20:49

## 2023-07-03 NOTE — ED PROVIDER NOTE - CLINICAL SUMMARY MEDICAL DECISION MAKING FREE TEXT BOX
78-year-old male with past medical history of CHF, severe AS, prior MI, A-fib on Eliquis, TAYLER, hypertension, hyperlipidemia, presenting from cardiologist office with pericardial effusion. Patient has shortness of breath and leg swelling however this is at his baseline.  Cardiologist concerned as known pericardial effusion appears larger.  On presentation patient has O2 saturation 99% on 2 L but was initially 88% on room air.  Heart rate 79, /98.  On exam patient has distant heart sounds with normal S1-S2, clear lungs, soft nontender abdomen, bilateral lower extremity edema with 4+ pitting edema.  Patient is currently hemodynamically stable suggesting tamponade less likely.  Will perform cardiac work-up and consult cardiology regarding effusion. 78-year-old male with past medical history of CHF, severe AS, prior MI, A-fib on Eliquis, TAYLER, hypertension, hyperlipidemia, presenting from cardiologist office with inc in size of pericardial effusion and hypoxia to 88% on RA. Pt was there for routine eval. Patient has mild shortness of breath w exertion and leg swelling (R>L) however this is at his baseline.  Effusion measured as large today, previously small circumferential w area of moderate. On presentation patient has O2 saturation 99% on 2 L which we are titrating down, currently to 0.5L.  Heart rate 79, /98. Dry wt 175 lb, 182 lb today. On exam patient has distant heart sounds with normal S1-S2, clear lungs, soft nontender abdomen w mild abd wall pitting edema, bilateral lower extremity edema with 4+ pitting edema R>L.  Patient is currently hemodynamically stable suggesting tamponade less likely, does not require urgent intervention. Will send labs, cardiacs, CXR, EKG, monitor on tele, diuresis per cards recs, TBA for formal echo and further tx.

## 2023-07-03 NOTE — ED PROVIDER NOTE - DIFFERENTIAL DIAGNOSIS
Differential Diagnosis pericardial effusion, unlikley tamponade, acute chf exac w mild degree of hypoxia, COPD exac less likley no abn lung sounds

## 2023-07-03 NOTE — ED PROVIDER NOTE - NS ED ROS FT
General: denies fever, chills  HENT: denies nasal congestion, rhinorrhea  Eyes: denies visual changes, blurred vision  CV: denies chest pain, palpitations  Resp: +SOB denies cough  Abdominal: denies nausea, vomiting, diarrhea, abdominal pain  : denies urinary pain or discharge  MSK: +BL LE swelling, denies muscle aches  Neuro: denies headaches, numbness, tingling  Skin: denies rashes, bruises

## 2023-07-03 NOTE — ED ADULT NURSE REASSESSMENT NOTE - NS ED NURSE REASSESS COMMENT FT1
Pt AOX4, persistent on ambulating to bathroom, declining urinal, pt desats to 83-88% when ambulating, pt educated on episodes of hypoxia, pt verbalizes he understands risks of hypoxia but wants to continue using the bathroom without supplemental oxygen or wheelchair offered by RN. Pt immediately placed back on O2 when back in room

## 2023-07-03 NOTE — ED ADULT TRIAGE NOTE - INTERNATIONAL TRAVEL
I have discussed and recommended the following surgical procedure(s): L45 laminectomy CPT 12166        Admission Type: Observation  Time Needed: 120 minutes  Anesthesia: ORT BFT Anesthesia Types: General  Surgical Assist: Yes  Special Equipment: c-arm, kvng table  Pre-Op Medication Orders:Ancef 2g iv     Pre-Op Visit with Dr. Solis  Needs X-rays: Please send to OR  Pt need post-op Rehab: Physical Therapy Yes      Payor: [facility] HEALTH / Plan: [employer] MEDICARE ADVANTAGE / Product Type: [employer]     Estimated body mass index is 37.89 kg/m² as calculated from the following:    Height as of 12/20/21: 5' 5\" (1.651 m).    Weight as of 12/20/21: 103.3 kg (227 lb 11.2 oz).   No

## 2023-07-03 NOTE — ED PROVIDER NOTE - OBJECTIVE STATEMENT
This is a 78-year-old male with past medical history of CHF, severe AS, prior MI, A-fib on Eliquis, TAYLER, hypertension, hyperlipidemia, presenting from cardiologist office with pericardial effusion.  Patient was at his cardiologist office Dr. Dario Jimenes and was found to have on echo a large pericardial effusion without tamponade.  He has been known to have a moderate effusion noted on echo noted in September showing moderate pericardial effusion to the RA with small circumferential.  Patient is short of breath after walking 50 feet however this is at his baseline, he has also had lower extremity edema at his baseline.  He denies any acute symptoms including worsened short of breath, CP, abdominal pain, abdominal distention, nausea vomiting, numbness tingling, rash.

## 2023-07-03 NOTE — ED ADULT NURSE NOTE - OBJECTIVE STATEMENT
77 y/o male with PMH COPD CHF HTN HLD DM presenting to ED for SOB, and abnormal outpatient echo. Pt reports that outpatient echo today showed "fluid around the heart." Son at bedside notes worsening SOB and pt is unable to ambulate his normal distances before he becomes SOB.  Upon exam pt A&Ox3 gross neuro intact, lungs cta bilaterally, no difficulty speaking in complete sentences, s1s2 heart sounds heard, pulses x 4, ramos x4, abdomen soft nontender nondistended, skin intact. pt denies chest pain,   ha, n/v/d, abdominal pain, f/c, urinary symptoms, hematuria

## 2023-07-03 NOTE — ED PROVIDER NOTE - PHYSICAL EXAMINATION
GENERAL: well appearing in no acute distress, non-toxic appearing  HEAD: normocephalic, atraumatic  HEENT: normal conjunctiva, oral mucosa moist, neck supple, no JVD  CARDIAC: +soft heart sounds, normal S1S2, no appreciable murmurs, 2+ pulses in UE/LE b/l  PULM: normal breath sounds, clear to ascultation bilaterally, no rales, rhonchi, wheezing  GI: abdomen nondistended, soft, nontender, no guarding, rebound tenderness  NEURO: no focal motor or sensory deficits, normal speech, AAOx3  MSK: +BL 4+ pitting edema, no calf tenderness b/l  SKIN: well-perfused, extremities warm, no visible rashes  PSYCH: appropriate mood and affect

## 2023-07-03 NOTE — ED ADULT NURSE NOTE - NSFALLUNIVINTERV_ED_ALL_ED
Bed/Stretcher in lowest position, wheels locked, appropriate side rails in place/Call bell, personal items and telephone in reach/Instruct patient to call for assistance before getting out of bed/chair/stretcher/Non-slip footwear applied when patient is off stretcher/[place] to call system/Physically safe environment - no spills, clutter or unnecessary equipment/Purposeful proactive rounding/Room/bathroom lighting operational, light cord in reach

## 2023-07-04 DIAGNOSIS — I50.9 HEART FAILURE, UNSPECIFIED: ICD-10-CM

## 2023-07-04 DIAGNOSIS — M10.9 GOUT, UNSPECIFIED: ICD-10-CM

## 2023-07-04 DIAGNOSIS — I48.20 CHRONIC ATRIAL FIBRILLATION, UNSPECIFIED: ICD-10-CM

## 2023-07-04 DIAGNOSIS — J44.9 CHRONIC OBSTRUCTIVE PULMONARY DISEASE, UNSPECIFIED: ICD-10-CM

## 2023-07-04 DIAGNOSIS — Z79.899 OTHER LONG TERM (CURRENT) DRUG THERAPY: ICD-10-CM

## 2023-07-04 DIAGNOSIS — I50.32 CHRONIC DIASTOLIC (CONGESTIVE) HEART FAILURE: ICD-10-CM

## 2023-07-04 DIAGNOSIS — I35.0 NONRHEUMATIC AORTIC (VALVE) STENOSIS: ICD-10-CM

## 2023-07-04 DIAGNOSIS — I50.41 ACUTE COMBINED SYSTOLIC (CONGESTIVE) AND DIASTOLIC (CONGESTIVE) HEART FAILURE: ICD-10-CM

## 2023-07-04 DIAGNOSIS — E11.9 TYPE 2 DIABETES MELLITUS WITHOUT COMPLICATIONS: ICD-10-CM

## 2023-07-04 DIAGNOSIS — I38 ENDOCARDITIS, VALVE UNSPECIFIED: ICD-10-CM

## 2023-07-04 DIAGNOSIS — Z29.9 ENCOUNTER FOR PROPHYLACTIC MEASURES, UNSPECIFIED: ICD-10-CM

## 2023-07-04 DIAGNOSIS — J96.01 ACUTE RESPIRATORY FAILURE WITH HYPOXIA: ICD-10-CM

## 2023-07-04 DIAGNOSIS — I31.39 OTHER PERICARDIAL EFFUSION (NONINFLAMMATORY): ICD-10-CM

## 2023-07-04 DIAGNOSIS — I25.10 ATHEROSCLEROTIC HEART DISEASE OF NATIVE CORONARY ARTERY WITHOUT ANGINA PECTORIS: ICD-10-CM

## 2023-07-04 DIAGNOSIS — I50.23 ACUTE ON CHRONIC SYSTOLIC (CONGESTIVE) HEART FAILURE: ICD-10-CM

## 2023-07-04 LAB
ALBUMIN SERPL ELPH-MCNC: 3.8 G/DL — SIGNIFICANT CHANGE UP (ref 3.3–5)
ALP SERPL-CCNC: 89 U/L — SIGNIFICANT CHANGE UP (ref 40–120)
ALT FLD-CCNC: 13 U/L — SIGNIFICANT CHANGE UP (ref 10–45)
ANION GAP SERPL CALC-SCNC: 10 MMOL/L — SIGNIFICANT CHANGE UP (ref 5–17)
AST SERPL-CCNC: 15 U/L — SIGNIFICANT CHANGE UP (ref 10–40)
BASOPHILS # BLD AUTO: 0.02 K/UL — SIGNIFICANT CHANGE UP (ref 0–0.2)
BASOPHILS NFR BLD AUTO: 0.3 % — SIGNIFICANT CHANGE UP (ref 0–2)
BILIRUB SERPL-MCNC: 1 MG/DL — SIGNIFICANT CHANGE UP (ref 0.2–1.2)
BUN SERPL-MCNC: 40 MG/DL — HIGH (ref 7–23)
CALCIUM SERPL-MCNC: 9.1 MG/DL — SIGNIFICANT CHANGE UP (ref 8.4–10.5)
CHLORIDE SERPL-SCNC: 103 MMOL/L — SIGNIFICANT CHANGE UP (ref 96–108)
CLOSURE TME COLL+EPINEP BLD: 98 K/UL — LOW (ref 150–400)
CO2 SERPL-SCNC: 31 MMOL/L — SIGNIFICANT CHANGE UP (ref 22–31)
CREAT SERPL-MCNC: 1.42 MG/DL — HIGH (ref 0.5–1.3)
EGFR: 51 ML/MIN/1.73M2 — LOW
EOSINOPHIL # BLD AUTO: 0.12 K/UL — SIGNIFICANT CHANGE UP (ref 0–0.5)
EOSINOPHIL NFR BLD AUTO: 1.6 % — SIGNIFICANT CHANGE UP (ref 0–6)
GLUCOSE BLDC GLUCOMTR-MCNC: 105 MG/DL — HIGH (ref 70–99)
GLUCOSE BLDC GLUCOMTR-MCNC: 109 MG/DL — HIGH (ref 70–99)
GLUCOSE BLDC GLUCOMTR-MCNC: 115 MG/DL — HIGH (ref 70–99)
GLUCOSE BLDC GLUCOMTR-MCNC: 127 MG/DL — HIGH (ref 70–99)
GLUCOSE SERPL-MCNC: 112 MG/DL — HIGH (ref 70–99)
HCT VFR BLD CALC: 33.9 % — LOW (ref 39–50)
HGB BLD-MCNC: 10.3 G/DL — LOW (ref 13–17)
IMM GRANULOCYTES NFR BLD AUTO: 0.4 % — SIGNIFICANT CHANGE UP (ref 0–0.9)
LYMPHOCYTES # BLD AUTO: 1.28 K/UL — SIGNIFICANT CHANGE UP (ref 1–3.3)
LYMPHOCYTES # BLD AUTO: 17.4 % — SIGNIFICANT CHANGE UP (ref 13–44)
MAGNESIUM SERPL-MCNC: 1.8 MG/DL — SIGNIFICANT CHANGE UP (ref 1.6–2.6)
MCHC RBC-ENTMCNC: 25.4 PG — LOW (ref 27–34)
MCHC RBC-ENTMCNC: 30.4 GM/DL — LOW (ref 32–36)
MCV RBC AUTO: 83.5 FL — SIGNIFICANT CHANGE UP (ref 80–100)
MONOCYTES # BLD AUTO: 0.77 K/UL — SIGNIFICANT CHANGE UP (ref 0–0.9)
MONOCYTES NFR BLD AUTO: 10.5 % — SIGNIFICANT CHANGE UP (ref 2–14)
NEUTROPHILS # BLD AUTO: 5.14 K/UL — SIGNIFICANT CHANGE UP (ref 1.8–7.4)
NEUTROPHILS NFR BLD AUTO: 69.8 % — SIGNIFICANT CHANGE UP (ref 43–77)
NRBC # BLD: 0 /100 WBCS — SIGNIFICANT CHANGE UP (ref 0–0)
PHOSPHATE SERPL-MCNC: 4.1 MG/DL — SIGNIFICANT CHANGE UP (ref 2.5–4.5)
PLATELET # BLD AUTO: SIGNIFICANT CHANGE UP K/UL (ref 150–400)
POTASSIUM SERPL-MCNC: 3.6 MMOL/L — SIGNIFICANT CHANGE UP (ref 3.5–5.3)
POTASSIUM SERPL-SCNC: 3.6 MMOL/L — SIGNIFICANT CHANGE UP (ref 3.5–5.3)
PROT SERPL-MCNC: 6.1 G/DL — SIGNIFICANT CHANGE UP (ref 6–8.3)
RBC # BLD: 4.06 M/UL — LOW (ref 4.2–5.8)
RBC # FLD: 19.9 % — HIGH (ref 10.3–14.5)
SODIUM SERPL-SCNC: 144 MMOL/L — SIGNIFICANT CHANGE UP (ref 135–145)
WBC # BLD: 7.36 K/UL — SIGNIFICANT CHANGE UP (ref 3.8–10.5)
WBC # FLD AUTO: 7.36 K/UL — SIGNIFICANT CHANGE UP (ref 3.8–10.5)

## 2023-07-04 PROCEDURE — 12345: CPT | Mod: NC

## 2023-07-04 PROCEDURE — 99223 1ST HOSP IP/OBS HIGH 75: CPT

## 2023-07-04 RX ORDER — SODIUM CHLORIDE 9 MG/ML
1000 INJECTION, SOLUTION INTRAVENOUS
Refills: 0 | Status: DISCONTINUED | OUTPATIENT
Start: 2023-07-04 | End: 2023-07-26

## 2023-07-04 RX ORDER — ALBUTEROL 90 UG/1
2 AEROSOL, METERED ORAL EVERY 6 HOURS
Refills: 0 | Status: DISCONTINUED | OUTPATIENT
Start: 2023-07-04 | End: 2023-07-26

## 2023-07-04 RX ORDER — TAMSULOSIN HYDROCHLORIDE 0.4 MG/1
0.4 CAPSULE ORAL AT BEDTIME
Refills: 0 | Status: DISCONTINUED | OUTPATIENT
Start: 2023-07-04 | End: 2023-07-26

## 2023-07-04 RX ORDER — METOPROLOL TARTRATE 50 MG
75 TABLET ORAL DAILY
Refills: 0 | Status: DISCONTINUED | OUTPATIENT
Start: 2023-07-04 | End: 2023-07-04

## 2023-07-04 RX ORDER — APIXABAN 2.5 MG/1
2.5 TABLET, FILM COATED ORAL EVERY 12 HOURS
Refills: 0 | Status: DISCONTINUED | OUTPATIENT
Start: 2023-07-04 | End: 2023-07-06

## 2023-07-04 RX ORDER — ATORVASTATIN CALCIUM 80 MG/1
40 TABLET, FILM COATED ORAL AT BEDTIME
Refills: 0 | Status: DISCONTINUED | OUTPATIENT
Start: 2023-07-04 | End: 2023-07-26

## 2023-07-04 RX ORDER — DEXTROSE 50 % IN WATER 50 %
15 SYRINGE (ML) INTRAVENOUS ONCE
Refills: 0 | Status: DISCONTINUED | OUTPATIENT
Start: 2023-07-04 | End: 2023-07-26

## 2023-07-04 RX ORDER — ACETAMINOPHEN 500 MG
650 TABLET ORAL EVERY 6 HOURS
Refills: 0 | Status: DISCONTINUED | OUTPATIENT
Start: 2023-07-04 | End: 2023-07-26

## 2023-07-04 RX ORDER — DEXTROSE 50 % IN WATER 50 %
25 SYRINGE (ML) INTRAVENOUS ONCE
Refills: 0 | Status: DISCONTINUED | OUTPATIENT
Start: 2023-07-04 | End: 2023-07-26

## 2023-07-04 RX ORDER — CHLORHEXIDINE GLUCONATE 213 G/1000ML
1 SOLUTION TOPICAL DAILY
Refills: 0 | Status: DISCONTINUED | OUTPATIENT
Start: 2023-07-04 | End: 2023-07-26

## 2023-07-04 RX ORDER — INSULIN LISPRO 100/ML
VIAL (ML) SUBCUTANEOUS AT BEDTIME
Refills: 0 | Status: DISCONTINUED | OUTPATIENT
Start: 2023-07-04 | End: 2023-07-26

## 2023-07-04 RX ORDER — OMEGA-3 ACID ETHYL ESTERS 1 G
4 CAPSULE ORAL DAILY
Refills: 0 | Status: DISCONTINUED | OUTPATIENT
Start: 2023-07-04 | End: 2023-07-26

## 2023-07-04 RX ORDER — INSULIN LISPRO 100/ML
VIAL (ML) SUBCUTANEOUS
Refills: 0 | Status: DISCONTINUED | OUTPATIENT
Start: 2023-07-04 | End: 2023-07-26

## 2023-07-04 RX ORDER — GLUCAGON INJECTION, SOLUTION 0.5 MG/.1ML
1 INJECTION, SOLUTION SUBCUTANEOUS ONCE
Refills: 0 | Status: DISCONTINUED | OUTPATIENT
Start: 2023-07-04 | End: 2023-07-26

## 2023-07-04 RX ORDER — ALLOPURINOL 300 MG
100 TABLET ORAL DAILY
Refills: 0 | Status: DISCONTINUED | OUTPATIENT
Start: 2023-07-04 | End: 2023-07-26

## 2023-07-04 RX ORDER — PANTOPRAZOLE SODIUM 20 MG/1
40 TABLET, DELAYED RELEASE ORAL
Refills: 0 | Status: DISCONTINUED | OUTPATIENT
Start: 2023-07-04 | End: 2023-07-26

## 2023-07-04 RX ORDER — FUROSEMIDE 40 MG
40 TABLET ORAL
Refills: 0 | Status: DISCONTINUED | OUTPATIENT
Start: 2023-07-04 | End: 2023-07-06

## 2023-07-04 RX ORDER — ASPIRIN/CALCIUM CARB/MAGNESIUM 324 MG
81 TABLET ORAL DAILY
Refills: 0 | Status: DISCONTINUED | OUTPATIENT
Start: 2023-07-04 | End: 2023-07-06

## 2023-07-04 RX ORDER — METOPROLOL TARTRATE 50 MG
75 TABLET ORAL
Refills: 0 | Status: DISCONTINUED | OUTPATIENT
Start: 2023-07-04 | End: 2023-07-26

## 2023-07-04 RX ORDER — MOMETASONE FUROATE 220 UG/1
1 INHALANT RESPIRATORY (INHALATION) DAILY
Refills: 0 | Status: DISCONTINUED | OUTPATIENT
Start: 2023-07-04 | End: 2023-07-06

## 2023-07-04 RX ORDER — TIOTROPIUM BROMIDE 18 UG/1
2 CAPSULE ORAL; RESPIRATORY (INHALATION) DAILY
Refills: 0 | Status: DISCONTINUED | OUTPATIENT
Start: 2023-07-04 | End: 2023-07-26

## 2023-07-04 RX ORDER — DEXTROSE 50 % IN WATER 50 %
12.5 SYRINGE (ML) INTRAVENOUS ONCE
Refills: 0 | Status: DISCONTINUED | OUTPATIENT
Start: 2023-07-04 | End: 2023-07-26

## 2023-07-04 RX ADMIN — Medication 1 DROP(S): at 17:56

## 2023-07-04 RX ADMIN — CHLORHEXIDINE GLUCONATE 1 APPLICATION(S): 213 SOLUTION TOPICAL at 14:11

## 2023-07-04 RX ADMIN — Medication 4 GRAM(S): at 11:13

## 2023-07-04 RX ADMIN — Medication 40 MILLIGRAM(S): at 13:50

## 2023-07-04 RX ADMIN — Medication 75 MILLIGRAM(S): at 06:32

## 2023-07-04 RX ADMIN — Medication 100 MILLIGRAM(S): at 11:13

## 2023-07-04 RX ADMIN — Medication 75 MILLIGRAM(S): at 17:55

## 2023-07-04 RX ADMIN — TAMSULOSIN HYDROCHLORIDE 0.4 MILLIGRAM(S): 0.4 CAPSULE ORAL at 21:48

## 2023-07-04 RX ADMIN — ATORVASTATIN CALCIUM 40 MILLIGRAM(S): 80 TABLET, FILM COATED ORAL at 21:49

## 2023-07-04 RX ADMIN — TIOTROPIUM BROMIDE 2 PUFF(S): 18 CAPSULE ORAL; RESPIRATORY (INHALATION) at 11:14

## 2023-07-04 RX ADMIN — Medication 81 MILLIGRAM(S): at 11:13

## 2023-07-04 RX ADMIN — APIXABAN 2.5 MILLIGRAM(S): 2.5 TABLET, FILM COATED ORAL at 17:26

## 2023-07-04 RX ADMIN — APIXABAN 2.5 MILLIGRAM(S): 2.5 TABLET, FILM COATED ORAL at 06:32

## 2023-07-04 RX ADMIN — MOMETASONE FUROATE 1 PUFF(S): 220 INHALANT RESPIRATORY (INHALATION) at 11:14

## 2023-07-04 RX ADMIN — Medication 40 MILLIGRAM(S): at 08:23

## 2023-07-04 RX ADMIN — PANTOPRAZOLE SODIUM 40 MILLIGRAM(S): 20 TABLET, DELAYED RELEASE ORAL at 06:32

## 2023-07-04 NOTE — H&P ADULT - PROBLEM SELECTOR PLAN 6
Hx of COPD (not on O2 at home), former heavy smoker (up to 4.5 pks/day for ~20 yrs, quit >30 yrs ago).  - clinically does not appear to be in COPD exacerbation  - c/w home Spiriva and Qvar equivalent   - c/w albuterol HFA q6h PRN  - monitor respiratory status

## 2023-07-04 NOTE — H&P ADULT - PROBLEM SELECTOR PLAN 7
Hx of DM2. At home, on Metformin 500mg BID  - monitor FS qAC and qHS  - low ISS for now  - f/u HbA1c

## 2023-07-04 NOTE — H&P ADULT - HISTORY OF PRESENT ILLNESS
78M w/ hx of HFpEF, Afib (on Eliquis), severe AS, mod-severe TR, CAD, HTN, HLD, TAYLER, presenting with worsening pericardial effusion. Sent in by his cardiologist, Dr. Dario Jimenes. He had a TTE on day of presentation (7/3/23) that showed a "large circumferential pericardial effusion up to 2.6 cm without evidence of tamponade physiology." His prior TTE on 10/3/22 had shown a "moderate pericardial effusion, measuring about 1.0 cm superior to the RA; otherwise small circumferential pericardial effusion." Reported that at baseline he has GOFF and b/l LE swelling.    INCOMPLETE    In ED: Afebrile, HR 80s-90s, SBP 140s-150s, RR 16-20, 88% on RA, % on 1-2L O2NC. Labs notable for hsTrop 30->27, SCr 1.36, proBNP 2.3k; VBG pH 7.34, pCO2 61, HCO3 33, lactate 0.8. Admitted to Medicine for further management. 78M w/ hx of HFpEF, Afib (on Eliquis), severe AS, mod-severe TR, CAD, HTN, HLD, TAYLER, presenting with worsening pericardial effusion. Sent in by his cardiologist, Dr. Dario Jimenes. He had a TTE on day of presentation (7/3/23) that showed a "large circumferential pericardial effusion up to 2.6 cm without evidence of tamponade physiology." His prior TTE on 10/3/22 had shown a "moderate pericardial effusion, measuring about 1.0 cm superior to the RA; otherwise small circumferential pericardial effusion." Reported that at baseline he has GOFF and b/l LE swelling.    INCOMPLETE    In ED: Afebrile, HR 80s-90s, SBP 140s-150s, RR 16-20, 88% on RA, % on 1-2L O2NC. Labs notable for hsTrop 30->27, SCr 1.36, proBNP 2.3k; VBG pH 7.34, pCO2 61, HCO3 33, lactate 0.8. Given Lasix 40mg IV x1. Admitted to Medicine for further management. 78M (alt MRN [id number]) w/ hx of Afib (on Eliquis), severe AS, mod-severe TR, CAD/MI (30 yrs ago, no stents), ?CHF, COPD, HTN, HLD, DM2, gout, presenting with worsening pericardial effusion. Sent in by his cardiologist, Dr. Dario Jimenes. He had a TTE on day of presentation (7/3/23) that showed a "large circumferential pericardial effusion up to 2.6 cm without evidence of tamponade physiology." His prior TTE on 10/3/22 had shown a "moderate pericardial effusion, measuring about 1.0 cm superior to the RA; otherwise small circumferential pericardial effusion." Reported that at baseline he has GOFF and b/l LE swelling.    INCOMPLETE    In ED: Afebrile, HR 80s-90s, SBP 140s-150s, RR 16-20, 88% on RA, % on 1-2L O2NC. Labs notable for hsTrop 30->27, SCr 1.36, proBNP 2.3k; VBG pH 7.34, pCO2 61, HCO3 33, lactate 0.8. Given Lasix 40mg IV x1. Admitted to Medicine for further management. 78M (alt MRN [id number]) w/ hx of Afib (on Eliquis), severe AS, mod-severe TR, CAD/MI (30 yrs ago, medically managed, no PCI), ?CHF, COPD (not on O2 at home), former heavy smoker, HTN, HLD, DM2, gout, presenting with worsening pericardial effusion. Sent in from his cardiologist's office. He had a TTE on day of presentation (7/3/23) that showed a "large circumferential pericardial effusion up to 2.6 cm without evidence of tamponade physiology" (decreased RV systolic function was also noted). His prior TTE on 10/3/22 had shown a "moderate pericardial effusion, measuring about 1.0 cm superior to the RA; otherwise small circumferential pericardial effusion." He is unclear what is the cause of pericardial effusion and he is unsure if he has a hx of CHF. Reported that at baseline he has shortness of breath after walking 50 ft and swelling in both legs. Noted that the swelling in his legs today appear to be around his average size. Denied f/c/n/v, CP, SOB at rest, abdominal pain, dysuria, hematuria, hematochezia, melena, diarrhea, constipation.    In ED: Afebrile, HR 80s-90s, SBP 140s-150s, RR 16-20, 88% on RA, % on 1-2L O2NC. Labs notable for hsTrop 30->27, SCr 1.36, proBNP 2.3k; VBG pH 7.34, pCO2 61, HCO3 33, lactate 0.8. Given Lasix 40mg IV x1. Admitted to Medicine for further management. 78M (alt MRN [id number]) w/ hx of Afib (on Eliquis), severe AS, mod-severe TR, CAD/MI (30 yrs ago, medically managed, no PCI), ?CHF, COPD (not on O2 at home), former heavy smoker, HTN, HLD, DM2, gout, presenting with worsening pericardial effusion. Sent in from his cardiologist's office. He had a TTE on day of presentation (7/3/23) that showed a "large circumferential pericardial effusion up to 2.6 cm without evidence of tamponade physiology" (and mildly reduced RV systolic function was also noted). His prior TTE on 10/3/22 had shown a "moderate pericardial effusion, measuring about 1.0 cm superior to the RA; otherwise small circumferential pericardial effusion." He is unclear what is the cause of pericardial effusion and he is unsure if he has a hx of CHF. Reported that at baseline he has shortness of breath after walking 50 ft and swelling in both legs. Noted that the swelling in his legs today appear to be around his average size. Denied f/c/n/v, CP, SOB at rest, abdominal pain, dysuria, hematuria, hematochezia, melena, diarrhea, constipation.    In ED: Afebrile, HR 80s-90s, SBP 140s-150s, RR 16-20, 88% on RA, % on 1-2L O2NC. Labs notable for hsTrop 30->27, SCr 1.36, proBNP 2.3k; VBG pH 7.34, pCO2 61, HCO3 33, lactate 0.8. Given Lasix 40mg IV x1. Admitted to Medicine for further management.

## 2023-07-04 NOTE — PROGRESS NOTE ADULT - ASSESSMENT
78M (alt MRN [id number]) w/ hx of Afib (on Eliquis), severe AS, mod-severe TR, CAD/MI (30 yrs ago, medically managed, no PCI), ?CHF, COPD (not on O2 at home), former heavy smoker, HTN, HLD, DM2, gout, prior moderate pericardial effusion, now presenting with large pericardial effusion w/o tamponade physiology on outpt TTE.

## 2023-07-04 NOTE — H&P ADULT - NSHPREVIEWOFSYSTEMS_GEN_ALL_CORE
REVIEW OF SYSTEMS:    CONSTITUTIONAL: No fevers or chills  EYES:  No visual changes or eye pain  ENMT: No hearing loss or sore throat  RESPIRATORY: No cough, wheezing, hemoptysis; No SOB at rest, but +SOB after walking 50 ft (chronic)  CARDIOVASCULAR: No chest pain or palpitations; +chronic leg swelling  GASTROINTESTINAL: No abdominal pain; No nausea, vomiting, or hematemesis; No diarrhea or constipation; No melena or hematochezia  GENITOURINARY: No dysuria or hematuria  MUSCULOSKELETAL: No joint pain or swelling; No new back pain  NEUROLOGICAL: No headache; No numbness or loss of sensation; No loss of strength in extremities  PSYCHIATRIC: No anxiety or depression  SKIN: +chronic RLE hyperpigmentation; No itching, burning, rashes, or lesions

## 2023-07-04 NOTE — H&P ADULT - PROBLEM SELECTOR PLAN 3
Presented with O2 sat down to 88% on RA. Improved on 1-2L O2NC. Suspect in setting of acute on chronic CHF.  - diuresis for CHF/pericardial effusion as above  - monitor respiratory status, wean off supplemental O2 as tolerated

## 2023-07-04 NOTE — H&P ADULT - PROBLEM SELECTOR PLAN 9
Pt did not have list of meds with him and did not want wife to be called overnight. Prelim med rec performed based on prior discharge med rec in Oct 2022 (from alt MRN chart), Surescripts, and by pt's recollection  - further med rec needed in AM

## 2023-07-04 NOTE — H&P ADULT - ASSESSMENT
78M (alt MRN [id number]) w/ hx of Afib (on Eliquis), severe AS, mod-severe TR, CAD/MI (30 yrs ago, medically managed, no PCI), ?CHF, COPD (not on O2 at home), former heavy smoker, HTN, HLD, DM2, gout, prior moderate pericardial effusion, now presenting with large pericardial effusion w/o tamponade physiology on outpt TTE.

## 2023-07-04 NOTE — PROGRESS NOTE ADULT - PROBLEM SELECTOR PLAN 1
Found to have "large circumferential pericardial effusion up to 2.6 cm without evidence of tamponade physiology" on outpt TTE (7/3/23). Prior TTE (10/3/22) showed "moderate pericardial effusion, measuring about 1.0 cm superior to the RA; otherwise small circumferential pericardial effusion." Unclear underlying etiology of effusion, but possibly in setting of CHF.  - c/w Lasix 40mg IV BID for now  - strict I/Os, monitor UOP  - monitor hemodynamics closely  - eventual repeat TTE  - may need to consider pericardial window/drain if not effective  - f/u Cardiology recs (follows with Dr. Dario Jimenes)

## 2023-07-04 NOTE — PROGRESS NOTE ADULT - PROBLEM SELECTOR PLAN 2
Unclear hx of ?CHF, possibly has chronic HFpEF.   - c/w diuresis as above  - strict I/Os, standing weights daily  - f/u Cardiology recs

## 2023-07-04 NOTE — H&P ADULT - NSICDXPASTMEDICALHX_GEN_ALL_CORE_FT
PAST MEDICAL HISTORY:  CAD (coronary artery disease)     Chronic atrial fibrillation     COPD (chronic obstructive pulmonary disease)     Diabetes mellitus, type 2     Former smoker     Gout     HLD (hyperlipidemia)     HTN (hypertension)     Mild tricuspid regurgitation     Pericardial effusion     Severe aortic stenosis

## 2023-07-04 NOTE — H&P ADULT - PROBLEM SELECTOR PLAN 5
Hx of CAD/MI (30 yrs ago, medically managed, no PCI)  - hsTrop 30->27; EKG w/o signs of acute ischemia; low suspicion of ACS  - c/w home ASA, statin, and metoprolol

## 2023-07-04 NOTE — H&P ADULT - NSHPLABSRESULTS_GEN_ALL_CORE
LABS:                        11.5   7.34  )-----------( Clumped    ( 03 Jul 2023 17:48 )             38.5     07-03    143  |  103  |  42<H>  ----------------------------<  115<H>  4.3   |  28  |  1.36<H>    Ca    9.4      03 Jul 2023 17:48    TPro  7.1  /  Alb  4.4  /  TBili  0.9  /  DBili  x   /  AST  22  /  ALT  20  /  AlkPhos  108  07-03          Urinalysis Basic - ( 03 Jul 2023 17:48 )    Color: x / Appearance: x / SG: x / pH: x  Gluc: 115 mg/dL / Ketone: x  / Bili: x / Urobili: x   Blood: x / Protein: x / Nitrite: x   Leuk Esterase: x / RBC: x / WBC x   Sq Epi: x / Non Sq Epi: x / Bacteria: x        IMAGING/ADDITIONAL TESTS:    EKG (7/3/23) personally reviewed: Afib, HR 94, QTc 455    CXR (7/3/23) personally reviewed: slight haziness in bilateral mid-lower lung fields, concerning for pulm edema    TTE results from [facility] reviewed:    TTE 7/3/23  1.Normal left ventricular cavity size. The left ventricular systolic function is normal with an ejection fraction visually estimated at 55 %.  2.There is paradoxical septal motion.  3.Mildly enlarged right ventricular cavity size and mildly reduced right ventricular systolic function.  4.The right atrium is severely dilated.  5.The left atrium is severely dilated.  6.The aortic valve is severely thickened, calcified and restricted.  Peak and mean gradients are 46 and 26  mm Hg.  The dimensionless index is less than 0.25.  The calculated aortic valve area is 0.9 cm2.  7.Severe aortic stenosis.  8.Mild aortic regurgitation.  9.Mild mitral regurgitation.  10.Moderate-severe tricuspid regurgitation. There is mild to moderate pulmonary hypertension with estimated PA pressure of 50 mm Hg.  11.The aortic root appears normal in size.   12.There is a large circumferential pericardial effusion up to 2.6 cm without evidence of tamponade physiology.  13.Compared to the study from 6/22, the effusion has increased in size    TTE 10/3/22  1. Mitral annular calcification, otherwise normal mitral  valve. Mild mitral regurgitation.   2. Calcified trileaflet aortic valve with decreased  opening. Peak transaortic valve gradient equals 52 mm Hg,  mean transaortic valve gradient equals 23 mm Hg, consistent  with at least moderate aortic stenosis.  3. Normal left ventricular internal dimensions and wall  thicknesses.  4. Normal left ventricular systolic function. No segmental  wall motion abnormalities.  5. Severe right atrial enlargement.  6. Normal right ventricular size and function.  7. Estimated right ventricular systolic pressure equals 60  mm Hg, assuming right atrial pressure equals 10 mm Hg,  consistent with moderate pulmonary hypertension.  8. Moderate pericardial effusion, measuring about  1.0 cm  superior to the right atrium.  Otherwise small  circumferential pericardial effusion.

## 2023-07-04 NOTE — H&P ADULT - PROBLEM SELECTOR PLAN 4
Hx of Afib (on Eliquis at home). CHADSVASC score of 5-6.  - c/w home Eliquis 2.5mg BID (unclear why on reduced dose, clarify with Cardiology)  - c/w home metoprolol succinate 75mg (pt reported being on this BID, but need to clarify; ordered as daily for now)  - monitor on telemetry

## 2023-07-04 NOTE — H&P ADULT - PROBLEM SELECTOR PLAN 2
Unclear hx of ?CHF, possibly has chronic HFpEF. Pt unsure if he has diagnosis, but at home is on Lasix 40mg PO daily (or BID when weight >175lbs). Exam notable for b/l crackles and b/l LE pitting edema. proBNP >2.3k. CXR suggestive of possible pulm edema (on independent read). Prior TTE reports w/o LV systolic dysfunction. Most recent TTE (7/3/23, outpt) notable for EF 55%, mildly reduced RV systolic fx, large pericardial effusion, and severe AS as well as mod-severe TR; overall worsened compared to prior TTE in 10/2022. Concern for acute on chronic CHF, possibly in setting of worsening valvulopathy and c/b pericardial effusion.    - c/w diuresis as above  - strict I/Os, standing weights daily  - f/u Cardiology recs  - consider structural cardiology consult for valvulopathy

## 2023-07-04 NOTE — H&P ADULT - NSHPSOCIALHISTORY_GEN_ALL_CORE
Former heavy smoker (up to 4 to 4.5 pks/day for ~20 yrs, quit 32 yrs ago). Occasional/rare EtOH drinker. Denied ever using illicit/recreational drugs. Used to work as a banker. Ambulates without assistive devices at baseline (has a cane but rarely needs to use it).

## 2023-07-04 NOTE — PROGRESS NOTE ADULT - SUBJECTIVE AND OBJECTIVE BOX
Patient is a 78y old  Male who presents with a chief complaint of worsening pericardial effusion (04 Jul 2023 11:23)      SUBJECTIVE / OVERNIGHT EVENTS:  Pt seen and examined. No acute events overnight. He denies cp, sob. Sating 96% on 1L NC.    MEDICATIONS  (STANDING):  allopurinol 100 milliGRAM(s) Oral daily  apixaban 2.5 milliGRAM(s) Oral every 12 hours  aspirin enteric coated 81 milliGRAM(s) Oral daily  atorvastatin 40 milliGRAM(s) Oral at bedtime  chlorhexidine 2% Cloths 1 Application(s) Topical daily  dextrose 5%. 1000 milliLiter(s) (50 mL/Hr) IV Continuous <Continuous>  dextrose 5%. 1000 milliLiter(s) (100 mL/Hr) IV Continuous <Continuous>  dextrose 50% Injectable 25 Gram(s) IV Push once  dextrose 50% Injectable 12.5 Gram(s) IV Push once  dextrose 50% Injectable 25 Gram(s) IV Push once  furosemide   Injectable 40 milliGRAM(s) IV Push two times a day  glucagon  Injectable 1 milliGRAM(s) IntraMuscular once  insulin lispro (ADMELOG) corrective regimen sliding scale   SubCutaneous three times a day before meals  insulin lispro (ADMELOG) corrective regimen sliding scale   SubCutaneous at bedtime  metoprolol succinate ER 75 milliGRAM(s) Oral two times a day  mometasone 220 MICROgram(s) Inhaler 1 Puff(s) Inhalation daily  omega-3-Acid Ethyl Esters 4 Gram(s) Oral daily  pantoprazole    Tablet 40 milliGRAM(s) Oral before breakfast  tamsulosin 0.4 milliGRAM(s) Oral at bedtime  tiotropium 2.5 MICROgram(s) Inhaler 2 Puff(s) Inhalation daily    MEDICATIONS  (PRN):  acetaminophen     Tablet .. 650 milliGRAM(s) Oral every 6 hours PRN Temp greater or equal to 38C (100.4F), Mild Pain (1 - 3)  albuterol    90 MICROgram(s) HFA Inhaler 2 Puff(s) Inhalation every 6 hours PRN Shortness of Breath and/or Wheezing  carboxymethylcellulose 0.5% (Preservative-Free) Ophthalmic Solution 1 Drop(s) Both EYES two times a day PRN dry eyes  dextrose Oral Gel 15 Gram(s) Oral once PRN Blood Glucose LESS THAN 70 milliGRAM(s)/deciliter      Vital Signs Last 24 Hrs  T(C): 36.9 (04 Jul 2023 11:11), Max: 36.9 (04 Jul 2023 11:11)  T(F): 98.4 (04 Jul 2023 11:11), Max: 98.4 (04 Jul 2023 11:11)  HR: 77 (04 Jul 2023 11:11) (77 - 89)  BP: 118/76 (04 Jul 2023 11:11) (100/66 - 150/95)  BP(mean): --  RR: 18 (04 Jul 2023 12:14) (18 - 20)  SpO2: 95% (04 Jul 2023 12:14) (85% - 100%)    Parameters below as of 04 Jul 2023 12:14  Patient On (Oxygen Delivery Method): nasal cannula  O2 Flow (L/min): 1    CAPILLARY BLOOD GLUCOSE      POCT Blood Glucose.: 127 mg/dL (04 Jul 2023 16:31)  POCT Blood Glucose.: 109 mg/dL (04 Jul 2023 11:51)  POCT Blood Glucose.: 115 mg/dL (04 Jul 2023 07:38)    I&O's Summary      PHYSICAL EXAM:  GENERAL: NAD, well-groomed  HEAD:  Atraumatic, Normocephalic  EYES:conjunctiva and sclera clear  NECK: Supple, No JVD  CHEST/LUNG: Clear to auscultation bilaterally; No wheeze  HEART: Regular rate and rhythm; No murmurs, rubs, or gallops  ABDOMEN: Soft, Nontender, Nondistended; Bowel sounds present  EXTREMITIES:  2+ Peripheral Pulses, No clubbing, cyanosis, or edema  PSYCH: AAOx3  NEUROLOGY: non-focal  SKIN: No rashes or lesions    LABS:                        10.3   7.36  )-----------( Clumped    ( 04 Jul 2023 06:30 )             33.9     07-04    144  |  103  |  40<H>  ----------------------------<  112<H>  3.6   |  31  |  1.42<H>    Ca    9.1      04 Jul 2023 06:30  Phos  4.1     07-04  Mg     1.8     07-04    TPro  6.1  /  Alb  3.8  /  TBili  1.0  /  DBili  x   /  AST  15  /  ALT  13  /  AlkPhos  89  07-04          Urinalysis Basic - ( 04 Jul 2023 06:30 )    Color: x / Appearance: x / SG: x / pH: x  Gluc: 112 mg/dL / Ketone: x  / Bili: x / Urobili: x   Blood: x / Protein: x / Nitrite: x   Leuk Esterase: x / RBC: x / WBC x   Sq Epi: x / Non Sq Epi: x / Bacteria: x

## 2023-07-04 NOTE — H&P ADULT - NSHPPHYSICALEXAM_GEN_ALL_CORE
Vital Signs Last 24 Hrs  T(C): 36.7 (03 Jul 2023 22:00), Max: 36.7 (03 Jul 2023 22:00)  T(F): 98 (03 Jul 2023 22:00), Max: 98 (03 Jul 2023 22:00)  HR: 88 (03 Jul 2023 22:00) (82 - 90)  BP: 150/95 (03 Jul 2023 22:00) (148/86 - 150/95)  BP(mean): --  RR: 18 (03 Jul 2023 22:00) (16 - 20)  SpO2: 94% (03 Jul 2023 22:00) (88% - 100%)    Parameters below as of 03 Jul 2023 22:00  Patient On (Oxygen Delivery Method): nasal cannula  O2 Flow (L/min): 1      CONSTITUTIONAL: NAD, well-developed, well-groomed, laying in bed  EYES: PERRL; sclera clear  ENMT: Moist oral mucosa, no pharyngeal exudates  NECK: Supple, no palpable masses  RESPIRATORY: Normal respiratory effort; bibasilar rales (R>L); No wheezes  CARDIOVASCULAR: Irregularly irregular; Normal S1 and S2; +murmur, No rubs or gallops; 3+ pitting edema in b/l LEs; Peripheral pulses are palpable bilaterally  ABDOMEN: Soft, Nontender, Nondistended; Bowel sounds present  MUSCULOSKELETAL:  No clubbing or cyanosis of digits; No joint swelling or tenderness to palpation  PSYCH: AAOx3 (oriented to person, place, and time); affect appropriate  NEUROLOGY: CN II-XII are intact and symmetric; no gross sensory deficits  SKIN: chronic-appearing hyperpigmentation in RLE; No rashes; No palpable lesions

## 2023-07-04 NOTE — CHART NOTE - NSCHARTNOTEFT_GEN_A_CORE
Home Medication List as follows:    Tamsulosin 0.4 mg daily  Metoprolol succinate 25 mg and 50 mg BID (total 75 mg BID)  Pantoprazole 40 mg daily  Atorvastatin 40 mg daily  Aspirin 81 mg daily  Spiriva 18 mcg cap daily  Qvar 80 mg 2 puffs daily  Eliquis 2.5 mg BID  Furosemide 40 mg daily  Allopurinol 100 mg daily  Omega-3's 4 capsules daily  Metformin 500 mg BID    Copy of med list per patient placed in chart.    Yessica Thakkar PA-C  Department of Medicine

## 2023-07-05 LAB
A1C WITH ESTIMATED AVERAGE GLUCOSE RESULT: 6.1 % — HIGH (ref 4–5.6)
ANION GAP SERPL CALC-SCNC: 12 MMOL/L — SIGNIFICANT CHANGE UP (ref 5–17)
BUN SERPL-MCNC: 41 MG/DL — HIGH (ref 7–23)
CALCIUM SERPL-MCNC: 8.8 MG/DL — SIGNIFICANT CHANGE UP (ref 8.4–10.5)
CHLORIDE SERPL-SCNC: 101 MMOL/L — SIGNIFICANT CHANGE UP (ref 96–108)
CO2 SERPL-SCNC: 29 MMOL/L — SIGNIFICANT CHANGE UP (ref 22–31)
CREAT SERPL-MCNC: 1.74 MG/DL — HIGH (ref 0.5–1.3)
EGFR: 40 ML/MIN/1.73M2 — LOW
ESTIMATED AVERAGE GLUCOSE: 128 MG/DL — HIGH (ref 68–114)
GLUCOSE BLDC GLUCOMTR-MCNC: 107 MG/DL — HIGH (ref 70–99)
GLUCOSE BLDC GLUCOMTR-MCNC: 120 MG/DL — HIGH (ref 70–99)
GLUCOSE BLDC GLUCOMTR-MCNC: 122 MG/DL — HIGH (ref 70–99)
GLUCOSE BLDC GLUCOMTR-MCNC: 125 MG/DL — HIGH (ref 70–99)
GLUCOSE SERPL-MCNC: 100 MG/DL — HIGH (ref 70–99)
HCT VFR BLD CALC: 34.2 % — LOW (ref 39–50)
HCV AB S/CO SERPL IA: 0.1 S/CO — SIGNIFICANT CHANGE UP (ref 0–0.99)
HCV AB SERPL-IMP: SIGNIFICANT CHANGE UP
HGB BLD-MCNC: 10.3 G/DL — LOW (ref 13–17)
MCHC RBC-ENTMCNC: 25.3 PG — LOW (ref 27–34)
MCHC RBC-ENTMCNC: 30.1 GM/DL — LOW (ref 32–36)
MCV RBC AUTO: 84 FL — SIGNIFICANT CHANGE UP (ref 80–100)
NRBC # BLD: 0 /100 WBCS — SIGNIFICANT CHANGE UP (ref 0–0)
PLATELET # BLD AUTO: SIGNIFICANT CHANGE UP K/UL (ref 150–400)
POTASSIUM SERPL-MCNC: 3.8 MMOL/L — SIGNIFICANT CHANGE UP (ref 3.5–5.3)
POTASSIUM SERPL-SCNC: 3.8 MMOL/L — SIGNIFICANT CHANGE UP (ref 3.5–5.3)
RBC # BLD: 4.07 M/UL — LOW (ref 4.2–5.8)
RBC # FLD: 19.5 % — HIGH (ref 10.3–14.5)
SODIUM SERPL-SCNC: 142 MMOL/L — SIGNIFICANT CHANGE UP (ref 135–145)
WBC # BLD: 6.96 K/UL — SIGNIFICANT CHANGE UP (ref 3.8–10.5)
WBC # FLD AUTO: 6.96 K/UL — SIGNIFICANT CHANGE UP (ref 3.8–10.5)

## 2023-07-05 PROCEDURE — 99233 SBSQ HOSP IP/OBS HIGH 50: CPT

## 2023-07-05 PROCEDURE — 99222 1ST HOSP IP/OBS MODERATE 55: CPT

## 2023-07-05 PROCEDURE — 93306 TTE W/DOPPLER COMPLETE: CPT | Mod: 26

## 2023-07-05 RX ADMIN — Medication 100 MILLIGRAM(S): at 11:54

## 2023-07-05 RX ADMIN — APIXABAN 2.5 MILLIGRAM(S): 2.5 TABLET, FILM COATED ORAL at 17:01

## 2023-07-05 RX ADMIN — ATORVASTATIN CALCIUM 40 MILLIGRAM(S): 80 TABLET, FILM COATED ORAL at 21:52

## 2023-07-05 RX ADMIN — Medication 81 MILLIGRAM(S): at 11:55

## 2023-07-05 RX ADMIN — Medication 75 MILLIGRAM(S): at 06:14

## 2023-07-05 RX ADMIN — MOMETASONE FUROATE 1 PUFF(S): 220 INHALANT RESPIRATORY (INHALATION) at 11:55

## 2023-07-05 RX ADMIN — Medication 75 MILLIGRAM(S): at 17:00

## 2023-07-05 RX ADMIN — Medication 4 GRAM(S): at 11:54

## 2023-07-05 RX ADMIN — PANTOPRAZOLE SODIUM 40 MILLIGRAM(S): 20 TABLET, DELAYED RELEASE ORAL at 06:15

## 2023-07-05 RX ADMIN — TAMSULOSIN HYDROCHLORIDE 0.4 MILLIGRAM(S): 0.4 CAPSULE ORAL at 21:52

## 2023-07-05 RX ADMIN — TIOTROPIUM BROMIDE 2 PUFF(S): 18 CAPSULE ORAL; RESPIRATORY (INHALATION) at 11:55

## 2023-07-05 RX ADMIN — CHLORHEXIDINE GLUCONATE 1 APPLICATION(S): 213 SOLUTION TOPICAL at 11:55

## 2023-07-05 RX ADMIN — APIXABAN 2.5 MILLIGRAM(S): 2.5 TABLET, FILM COATED ORAL at 06:15

## 2023-07-05 RX ADMIN — Medication 40 MILLIGRAM(S): at 14:42

## 2023-07-05 RX ADMIN — Medication 40 MILLIGRAM(S): at 06:16

## 2023-07-05 NOTE — CONSULT NOTE ADULT - ASSESSMENT
78M w/ hx of Afib (on Eliquis), severe AS, mod-severe TR, CAD/MI (30 yrs ago, medically managed, no PCI), ?CHF, COPD (not on O2 at home), former heavy smoker, HTN, HLD, DM2, gout, who was sent in for concerning outpatient echocardiogram which demonstrated worsening pericardial effusion.    #Large pericardial effusion  No clinical tamponade patient is hemodynamically stable, with robust heart sounds and no obvious JVD. Outpatient TTE demonstrated LVEF of 55% with large pericardial effusion without tamponade physiology.     Recommendation  - Repeat TTE   - Would avoid hypovolemia   - Evaluate for occult malignancy     Recommendations are preliminary until attending attestation.    Carolyn Mueller MD  Cardiology Fellow- PGY 5 78M w/ hx of Afib (on Eliquis), severe AS, mod-severe TR, CAD/MI (30 yrs ago, medically managed, no PCI), ?CHF, COPD (not on O2 at home), former heavy smoker, HTN, HLD, DM2, gout, who was sent in for concerning outpatient echocardiogram which demonstrated worsening pericardial effusion.    #Large pericardial effusion  No clinical tamponade patient is hemodynamically stable, with robust heart sounds and no obvious JVD. Outpatient TTE demonstrated LVEF of 55% with large pericardial effusion without tamponade physiology.     Recommendation  - Repeat TTE   - Would avoid hypovolemia   - Evaluate for occult malignancy     Recommendations are preliminary until attending attestation.    Carolyn Mueller MD  Cardiology Fellow- PGY 5    This patient was seen and examined personally by me and the plan was discussed with the fellow and/or resident above. Amendments were made as necessary to the above. Agree with the excellent note and plan above. 78M w pAF on eliquis, severe AS, mod-severe TR, CAD no PCI, COPD, former heavy smoker, HTN, HL, DM2 here after outpt TTE w progressive pericardial effusion. Inpatient TTE pending.      Ralph Hernández MD, MPhil, [facility]  Cardiologist, [facility]  ; [facility] and [facility]  Email: [email or web address] on-service contact/call information, go to [email or web address] and use "[employer]" to login.  Outpatient Cardiology appointments, call [phone] to arrange with a colleague; I do not have outpatient Cardiology clinic.

## 2023-07-05 NOTE — PROGRESS NOTE ADULT - PROBLEM SELECTOR PLAN 4
Hx of Afib (on Eliquis at home). CHADSVASC score of 5-6.  - c/w home Eliquis 2.5mg BID (unclear why on reduced dose)  - c/w home metoprolol succinate 75mg   - monitor on telemetry

## 2023-07-05 NOTE — PROGRESS NOTE ADULT - SUBJECTIVE AND OBJECTIVE BOX
Patient is a 78y old  Male who presents with a chief complaint of worsening pericardial effusion (05 Jul 2023 11:42)      SUBJECTIVE / OVERNIGHT EVENTS:  Pt seen and examined. No acute events overnight. He denies cp. Reports mild sob with exertion.    MEDICATIONS  (STANDING):  allopurinol 100 milliGRAM(s) Oral daily  apixaban 2.5 milliGRAM(s) Oral every 12 hours  aspirin enteric coated 81 milliGRAM(s) Oral daily  atorvastatin 40 milliGRAM(s) Oral at bedtime  chlorhexidine 2% Cloths 1 Application(s) Topical daily  dextrose 5%. 1000 milliLiter(s) (100 mL/Hr) IV Continuous <Continuous>  dextrose 5%. 1000 milliLiter(s) (50 mL/Hr) IV Continuous <Continuous>  dextrose 50% Injectable 12.5 Gram(s) IV Push once  dextrose 50% Injectable 25 Gram(s) IV Push once  dextrose 50% Injectable 25 Gram(s) IV Push once  furosemide   Injectable 40 milliGRAM(s) IV Push two times a day  glucagon  Injectable 1 milliGRAM(s) IntraMuscular once  insulin lispro (ADMELOG) corrective regimen sliding scale   SubCutaneous three times a day before meals  insulin lispro (ADMELOG) corrective regimen sliding scale   SubCutaneous at bedtime  metoprolol succinate ER 75 milliGRAM(s) Oral two times a day  mometasone 220 MICROgram(s) Inhaler 1 Puff(s) Inhalation daily  omega-3-Acid Ethyl Esters 4 Gram(s) Oral daily  pantoprazole    Tablet 40 milliGRAM(s) Oral before breakfast  tamsulosin 0.4 milliGRAM(s) Oral at bedtime  tiotropium 2.5 MICROgram(s) Inhaler 2 Puff(s) Inhalation daily    MEDICATIONS  (PRN):  acetaminophen     Tablet .. 650 milliGRAM(s) Oral every 6 hours PRN Temp greater or equal to 38C (100.4F), Mild Pain (1 - 3)  albuterol    90 MICROgram(s) HFA Inhaler 2 Puff(s) Inhalation every 6 hours PRN Shortness of Breath and/or Wheezing  carboxymethylcellulose 0.5% (Preservative-Free) Ophthalmic Solution 1 Drop(s) Both EYES two times a day PRN dry eyes  dextrose Oral Gel 15 Gram(s) Oral once PRN Blood Glucose LESS THAN 70 milliGRAM(s)/deciliter      Vital Signs Last 24 Hrs  T(C): 36.3 (05 Jul 2023 12:27), Max: 37.3 (05 Jul 2023 04:49)  T(F): 97.3 (05 Jul 2023 12:27), Max: 99.1 (05 Jul 2023 04:49)  HR: 83 (05 Jul 2023 12:27) (68 - 83)  BP: 128/85 (05 Jul 2023 12:27) (119/75 - 128/85)  BP(mean): --  RR: 18 (05 Jul 2023 12:27) (18 - 18)  SpO2: 98% (05 Jul 2023 12:27) (95% - 98%)    Parameters below as of 05 Jul 2023 12:27  Patient On (Oxygen Delivery Method): nasal cannula  O2 Flow (L/min): 1    CAPILLARY BLOOD GLUCOSE      POCT Blood Glucose.: 120 mg/dL (05 Jul 2023 12:11)  POCT Blood Glucose.: 107 mg/dL (05 Jul 2023 07:49)  POCT Blood Glucose.: 105 mg/dL (04 Jul 2023 21:41)  POCT Blood Glucose.: 127 mg/dL (04 Jul 2023 16:31)    I&O's Summary      PHYSICAL EXAM:  GENERAL: NAD, well-groomed  HEAD:  Atraumatic, Normocephalic  EYES:conjunctiva and sclera clear  NECK: Supple, No JVD  CHEST/LUNG: Clear to auscultation bilaterally; No wheeze  HEART: Regular rate and rhythm; No murmurs, rubs, or gallops  ABDOMEN: Soft, Nontender, Nondistended; Bowel sounds present  EXTREMITIES:  2+ Peripheral Pulses, No clubbing, cyanosis, or edema  PSYCH: AAOx3  NEUROLOGY: non-focal  SKIN: No rashes or lesions    LABS:                        10.3   6.96  )-----------( Clumped    ( 05 Jul 2023 06:29 )             34.2     07-05    142  |  101  |  41<H>  ----------------------------<  100<H>  3.8   |  29  |  1.74<H>    Ca    8.8      05 Jul 2023 06:29  Phos  4.1     07-04  Mg     1.8     07-04    TPro  6.1  /  Alb  3.8  /  TBili  1.0  /  DBili  x   /  AST  15  /  ALT  13  /  AlkPhos  89  07-04          Urinalysis Basic - ( 05 Jul 2023 06:29 )    Color: x / Appearance: x / SG: x / pH: x  Gluc: 100 mg/dL / Ketone: x  / Bili: x / Urobili: x   Blood: x / Protein: x / Nitrite: x   Leuk Esterase: x / RBC: x / WBC x   Sq Epi: x / Non Sq Epi: x / Bacteria: x        Consultant(s) Notes Reviewed:  Cardiology

## 2023-07-05 NOTE — PROGRESS NOTE ADULT - ASSESSMENT
78M (alt MRN [id number]) w/ hx of Afib (on Eliquis), severe AS, mod-severe TR, CAD/MI (30 yrs ago, medically managed, no PCI), ?CHF, COPD (not on O2 at home), former heavy smoker, HTN, HLD, DM2, gout, prior moderate pericardial effusion, now presenting with large pericardial effusion w/o tamponade physiology on outpt TTE.

## 2023-07-05 NOTE — PROGRESS NOTE ADULT - PROBLEM SELECTOR PLAN 1
- Found to have "large circumferential pericardial effusion up to 2.6 cm without evidence of tamponade physiology" on outpt TTE (7/3/23).  - Prior TTE (10/3/22) showed "moderate pericardial effusion, measuring about 1.0 cm superior to the RA; otherwise small circumferential pericardial effusion." Unclear underlying etiology of effusion, but possibly in setting of CHF.  - c/w Lasix 40mg IV BID for now  - strict I/Os, monitor UOP  - monitor hemodynamics closely  - f/up  TTE report  - may need to consider pericardial window/drain if not effective  - f/u Cardiology recs

## 2023-07-05 NOTE — PROGRESS NOTE ADULT - PROBLEM SELECTOR PLAN 7
Hx of DM2. At home, on Metformin 500mg BID  - monitor FS qAC and qHS  - low ISS for now  - HbA1c 6.1

## 2023-07-06 DIAGNOSIS — N28.9 DISORDER OF KIDNEY AND URETER, UNSPECIFIED: ICD-10-CM

## 2023-07-06 DIAGNOSIS — I35.0 NONRHEUMATIC AORTIC (VALVE) STENOSIS: ICD-10-CM

## 2023-07-06 DIAGNOSIS — N17.9 ACUTE KIDNEY FAILURE, UNSPECIFIED: ICD-10-CM

## 2023-07-06 DIAGNOSIS — I50.33 ACUTE ON CHRONIC DIASTOLIC (CONGESTIVE) HEART FAILURE: ICD-10-CM

## 2023-07-06 DIAGNOSIS — N18.9 CHRONIC KIDNEY DISEASE, UNSPECIFIED: ICD-10-CM

## 2023-07-06 DIAGNOSIS — N18.30 CHRONIC KIDNEY DISEASE, STAGE 3 UNSPECIFIED: ICD-10-CM

## 2023-07-06 LAB
ANION GAP SERPL CALC-SCNC: 13 MMOL/L — SIGNIFICANT CHANGE UP (ref 5–17)
APTT BLD: 32.3 SEC — SIGNIFICANT CHANGE UP (ref 27.5–35.5)
APTT BLD: 85.8 SEC — HIGH (ref 27.5–35.5)
BUN SERPL-MCNC: 46 MG/DL — HIGH (ref 7–23)
CALCIUM SERPL-MCNC: 9.2 MG/DL — SIGNIFICANT CHANGE UP (ref 8.4–10.5)
CHLORIDE SERPL-SCNC: 99 MMOL/L — SIGNIFICANT CHANGE UP (ref 96–108)
CO2 SERPL-SCNC: 32 MMOL/L — HIGH (ref 22–31)
CREAT SERPL-MCNC: 1.66 MG/DL — HIGH (ref 0.5–1.3)
EGFR: 42 ML/MIN/1.73M2 — LOW
GLUCOSE BLDC GLUCOMTR-MCNC: 115 MG/DL — HIGH (ref 70–99)
GLUCOSE BLDC GLUCOMTR-MCNC: 118 MG/DL — HIGH (ref 70–99)
GLUCOSE BLDC GLUCOMTR-MCNC: 138 MG/DL — HIGH (ref 70–99)
GLUCOSE SERPL-MCNC: 103 MG/DL — HIGH (ref 70–99)
HCT VFR BLD CALC: 37.8 % — LOW (ref 39–50)
HCT VFR BLD CALC: 39.3 % — SIGNIFICANT CHANGE UP (ref 39–50)
HGB BLD-MCNC: 11.6 G/DL — LOW (ref 13–17)
HGB BLD-MCNC: 12 G/DL — LOW (ref 13–17)
MCHC RBC-ENTMCNC: 25.3 PG — LOW (ref 27–34)
MCHC RBC-ENTMCNC: 25.5 PG — LOW (ref 27–34)
MCHC RBC-ENTMCNC: 30.5 GM/DL — LOW (ref 32–36)
MCHC RBC-ENTMCNC: 30.7 GM/DL — LOW (ref 32–36)
MCV RBC AUTO: 82.5 FL — SIGNIFICANT CHANGE UP (ref 80–100)
MCV RBC AUTO: 83.6 FL — SIGNIFICANT CHANGE UP (ref 80–100)
NRBC # BLD: 0 /100 WBCS — SIGNIFICANT CHANGE UP (ref 0–0)
PLATELET # BLD AUTO: SIGNIFICANT CHANGE UP (ref 150–400)
PLATELET # BLD AUTO: SIGNIFICANT CHANGE UP K/UL (ref 150–400)
POTASSIUM SERPL-MCNC: 3.9 MMOL/L — SIGNIFICANT CHANGE UP (ref 3.5–5.3)
POTASSIUM SERPL-SCNC: 3.9 MMOL/L — SIGNIFICANT CHANGE UP (ref 3.5–5.3)
RBC # BLD: 4.58 M/UL — SIGNIFICANT CHANGE UP (ref 4.2–5.8)
RBC # BLD: 4.7 M/UL — SIGNIFICANT CHANGE UP (ref 4.2–5.8)
RBC # FLD: 19.3 % — HIGH (ref 10.3–14.5)
RBC # FLD: 19.4 % — HIGH (ref 10.3–14.5)
SODIUM SERPL-SCNC: 144 MMOL/L — SIGNIFICANT CHANGE UP (ref 135–145)
WBC # BLD: 7.66 K/UL — SIGNIFICANT CHANGE UP (ref 3.8–10.5)
WBC # BLD: 8.09 K/UL — SIGNIFICANT CHANGE UP (ref 3.8–10.5)
WBC # FLD AUTO: 7.66 K/UL — SIGNIFICANT CHANGE UP (ref 3.8–10.5)
WBC # FLD AUTO: 8.09 K/UL — SIGNIFICANT CHANGE UP (ref 3.8–10.5)

## 2023-07-06 PROCEDURE — 76770 US EXAM ABDO BACK WALL COMP: CPT | Mod: 26

## 2023-07-06 PROCEDURE — 99223 1ST HOSP IP/OBS HIGH 75: CPT

## 2023-07-06 PROCEDURE — 99222 1ST HOSP IP/OBS MODERATE 55: CPT | Mod: GC

## 2023-07-06 PROCEDURE — 93880 EXTRACRANIAL BILAT STUDY: CPT | Mod: 26

## 2023-07-06 PROCEDURE — 99233 SBSQ HOSP IP/OBS HIGH 50: CPT

## 2023-07-06 RX ORDER — HEPARIN SODIUM 5000 [USP'U]/ML
INJECTION INTRAVENOUS; SUBCUTANEOUS
Qty: 25000 | Refills: 0 | Status: DISCONTINUED | OUTPATIENT
Start: 2023-07-06 | End: 2023-07-14

## 2023-07-06 RX ORDER — HEPARIN SODIUM 5000 [USP'U]/ML
6500 INJECTION INTRAVENOUS; SUBCUTANEOUS EVERY 6 HOURS
Refills: 0 | Status: DISCONTINUED | OUTPATIENT
Start: 2023-07-06 | End: 2023-07-14

## 2023-07-06 RX ORDER — BECLOMETHASONE DIPROPIONATE 40 UG/1
2 AEROSOL, METERED RESPIRATORY (INHALATION)
Refills: 0 | Status: DISCONTINUED | OUTPATIENT
Start: 2023-07-06 | End: 2023-07-26

## 2023-07-06 RX ORDER — LANOLIN ALCOHOL/MO/W.PET/CERES
3 CREAM (GRAM) TOPICAL AT BEDTIME
Refills: 0 | Status: DISCONTINUED | OUTPATIENT
Start: 2023-07-06 | End: 2023-07-26

## 2023-07-06 RX ORDER — HEPARIN SODIUM 5000 [USP'U]/ML
3000 INJECTION INTRAVENOUS; SUBCUTANEOUS EVERY 6 HOURS
Refills: 0 | Status: DISCONTINUED | OUTPATIENT
Start: 2023-07-06 | End: 2023-07-14

## 2023-07-06 RX ADMIN — APIXABAN 2.5 MILLIGRAM(S): 2.5 TABLET, FILM COATED ORAL at 05:22

## 2023-07-06 RX ADMIN — BECLOMETHASONE DIPROPIONATE 2 PUFF(S): 40 AEROSOL, METERED RESPIRATORY (INHALATION) at 22:32

## 2023-07-06 RX ADMIN — HEPARIN SODIUM 1500 UNIT(S)/HR: 5000 INJECTION INTRAVENOUS; SUBCUTANEOUS at 12:43

## 2023-07-06 RX ADMIN — TIOTROPIUM BROMIDE 2 PUFF(S): 18 CAPSULE ORAL; RESPIRATORY (INHALATION) at 12:39

## 2023-07-06 RX ADMIN — PANTOPRAZOLE SODIUM 40 MILLIGRAM(S): 20 TABLET, DELAYED RELEASE ORAL at 05:22

## 2023-07-06 RX ADMIN — Medication 100 MILLIGRAM(S): at 12:39

## 2023-07-06 RX ADMIN — Medication 40 MILLIGRAM(S): at 13:05

## 2023-07-06 RX ADMIN — Medication 3 MILLIGRAM(S): at 23:24

## 2023-07-06 RX ADMIN — Medication 40 MILLIGRAM(S): at 05:21

## 2023-07-06 RX ADMIN — HEPARIN SODIUM 1500 UNIT(S)/HR: 5000 INJECTION INTRAVENOUS; SUBCUTANEOUS at 20:07

## 2023-07-06 RX ADMIN — CHLORHEXIDINE GLUCONATE 1 APPLICATION(S): 213 SOLUTION TOPICAL at 12:45

## 2023-07-06 RX ADMIN — TAMSULOSIN HYDROCHLORIDE 0.4 MILLIGRAM(S): 0.4 CAPSULE ORAL at 22:25

## 2023-07-06 RX ADMIN — Medication 75 MILLIGRAM(S): at 18:07

## 2023-07-06 RX ADMIN — Medication 4 GRAM(S): at 12:40

## 2023-07-06 RX ADMIN — Medication 75 MILLIGRAM(S): at 05:21

## 2023-07-06 RX ADMIN — ATORVASTATIN CALCIUM 40 MILLIGRAM(S): 80 TABLET, FILM COATED ORAL at 22:25

## 2023-07-06 NOTE — PROGRESS NOTE ADULT - ASSESSMENT
78M (alt MRN [id number]) w/ hx of Afib (on Eliquis), severe AS, mod-severe TR, CAD/MI (30 yrs ago, medically managed, no PCI), ?CHF, COPD (not on O2 at home), former heavy smoker, HTN, HLD, DM2, gout, prior moderate pericardial effusion, now presenting with large pericardial effusion w/o tamponade physiology on outpt TTE.

## 2023-07-06 NOTE — CONSULT NOTE ADULT - PROBLEM SELECTOR RECOMMENDATION 4
The patient's Creatinine is slightly elevated, possibly due to low flow. Would recommend getting the Renal Team involved, as he will need two further tests that require IV contrast, as well as the TAVR itself if proceeding, which runs the risk of worsening renal function due to the IV Contrast. Discussed with RANDELL Aguila from Medicine.

## 2023-07-06 NOTE — PROGRESS NOTE ADULT - PROBLEM SELECTOR PLAN 8
- c/w home allopurinol - Cr uptrend to 1.66 from 1.36 on presentation  - d/c IV Lasix  - Nephrology consulted  - monitor BMP

## 2023-07-06 NOTE — PROGRESS NOTE ADULT - ASSESSMENT
78M w/ hx of Afib (on Eliquis), severe AS, mod-severe TR, CAD/MI (30 yrs ago, medically managed, no PCI), HFpEF (TTE 6/3 EF 55%), COPD (not on O2 at home), former heavy smoker (over 25 years agO), HTN, HLD, DM2, gout, presenting with worsening pericardial effusion. Patient was sent in from his cardiologist's office.     He had a TTE on day of presentation (7/3/23) that showed a "large circumferential pericardial effusion up to 2.6 cm without evidence of tamponade physiology" (and mildly reduced RV systolic function was also noted). His prior TTE on 10/3/22 had shown a "moderate pericardial effusion, measuring about 1.0 cm superior to the RA; otherwise small circumferential pericardial effusion." Etiology of pericardial effusion is unclear. Cardiology is considering pericardiocentesis and CT TVAR, renal was consulted to evaluate the patient for contrast tolerance given CKD.     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 Per chart records, patient's renal function was progressively declining from ~2011. Patient is on 40 mg furosemide QD outpatient.   was given one dose of lasix on 6/3, Cre is noted to be uptrending from 1.36 on admission 6/3 to 1.66 today 7/6.     Plan:

## 2023-07-06 NOTE — CONSULT NOTE ADULT - PROBLEM SELECTOR RECOMMENDATION 9
Patient with CKD in setting of HTN, Tobacco Abuse Hx and Heart Disease. Per Javi/OLGA review, appears he has had a SCr ranging from 1.4-2.0 dating back to 2017. Most recent SCr prior to admission was 1.42 on 10/7/22. SCr on admission of 1.36 and has slightly increased to 1.66 today. He has received IV lasix on 7/3 and 7/4.     Recommend a UA. Check spot urine TP/CR to quantify proteinuria. Check HBsAg, Hep C Ab, serum immunofixation, C3 and C4. Check renal US to r/o structural causes. Monitor SCr, electrolytes, and I/O. Avoid NSAIDS, RCAs, and other nephrotoxins. Renally adjust all medications as per GFR or CrCl.     In regards to risk of IMELDA from either imaging or further evaluation requiring IV contrast is difficult to discern. If he were to solely go for PCI, a Stanley score could be used. Based on his current serum creatinine, his Stanley risk score was 7. He is at a 14% risk of contrast-induced nephropathy, and a 0.12% risk of needing dialysis. We conveyed the risk to the patient. However, this score is based on a steady kidney function. Depending on volume status, could consider gentle/judicious IVFs prior to and after contrast imaging but given his significant AV disease, this may be difficult to accomplish.
Patient with Moderate to Large Pericardial effusion. Cardiology to monitor.

## 2023-07-06 NOTE — PROGRESS NOTE ADULT - PROBLEM SELECTOR PLAN 4
Hx of Afib (on Eliquis at home). CHADSVASC score of 5-6.  - hold Eliquis and start heparin gtt for possible pericardiocentesis  - c/w home metoprolol succinate 75mg   - monitor on telemetry

## 2023-07-06 NOTE — CONSULT NOTE ADULT - NS ATTEND AMEND GEN_ALL_CORE FT
Events that transpired leading to the patient's current hospitalization reviewed along with the patient's hospital course.  The patient's past medical history/surgical status family history social history was reviewed.  Agree with 14 point review of systems and physical examination as noted above.    In summary he is a 78-year-old man with a past medical history significant atrial fibrillation/Eliquis, low-flow low gradient severe AS, moderate tricuspid regurgitation, coronary artery disease status post MI 30 years ago (medically managed), COPD, former heavy smoker, hypertension, hyperlipidemia, type 2 diabetes mellitus and gout.  He was sent in by his outpatient cardiologist due to concern for worsening pericardial effusion seen on TTE.    Discussed with the patient and his son Uriel who was contacted on the phone concerning findings on the echocardiogram study.  There is concern the patient has low-flow low gradient severe aortic valve stenosis.  Discussed what is aortic valve stenosis.  The associated signs and symptoms of severe aortic valve stenosis were reviewed.  The natural pathophysiology of untreated severe aortic valve stenosis was gone over.  The various treatment options were gone over along with their pros/cons and risks/benefits including medical therapy, TAVR and SAVR.  If the patient needs to undergo valve replacement he would prefer to undergo TAVR.  Indications and details of the TAVR procedure reviewed.  Benefits and risks were gone over.  Risks include but not limited to infection, bleeding, arrhythmia, TIA/stroke, hemodynamic instability, vascular injury, need for surgery and death.  The necessary work-up prior to the TAVR procedure were gone over including heart CTA, carotid ultrasound, spirometry and angiogram.  Indications and details for the studies were gone over.    At this time to confirm that the patient does have severe low-flow low gradient aortic valve stenosis is recommended that a heart CTA be performed.  Indications for the heart CTA were reviewed.  Timing of CTA is dependent upon clearance by nephrology team due to worsening renal insufficiency.  Creatinine has gone from 1.3-1.7.  Diuretics are currently being held.    Additionally, the patient was found to have a large pericardial effusion without tamponade physiology.  Plan for patient to undergo pericardiocentesis on 7/7/2023.  Unclear etiology of patient's pericardial effusion.  He has been transitioned to heparin drip.  He was previously on Eliquis 2.5 mg twice a day.    Continue metoprolol succinate 75 mg twice a day and atorvastatin 40 mg daily.    Continue telemetry monitoring.    All questions and concerns of the patient and his son/Uriel were reviewed.    Findings and plan were discussed with medicine team, nephrology team and cardiology/Dr. Hernández.

## 2023-07-06 NOTE — PROGRESS NOTE ADULT - SUBJECTIVE AND OBJECTIVE BOX
[facility] DIVISION OF KIDNEY DISEASES AND HYPERTENSION -- [phone]  -- INITIAL CONSULT NOTE  --------------------------------------------------------------------------------  HPI:  78M w/ hx of Afib (on Eliquis), severe AS, mod-severe TR, CAD/MI (30 yrs ago, medically managed, no PCI), HFpEF (TTE 6/3 EF 55%), COPD (not on O2 at home), former heavy smoker (over 25 years agO), HTN, HLD, DM2, gout, presenting with worsening pericardial effusion. Patient was sent in from his cardiologist's office.     He had a TTE on day of presentation (7/3/23) that showed a "large circumferential pericardial effusion up to 2.6 cm without evidence of tamponade physiology" (and mildly reduced RV systolic function was also noted). His prior TTE on 10/3/22 had shown a "moderate pericardial effusion, measuring about 1.0 cm superior to the RA; otherwise small circumferential pericardial effusion." Etiology of pericardial effusion is unclear. Cardiology is considering pericardiocentesis and CT TVAR, renal was consulted to evaluate the patient for contrast tolerance given CKD.     Patient seen and examined at bedside.     Patient denies f/c/n/v, CP abdominal pain, dysuria, hematuria, hematochezia, melena, diarrhea, constipation. His dyspnea is primarily on exertion. Satting well on 2L NC.    PAST HISTORY  --------------------------------------------------------------------------------  PAST MEDICAL & SURGICAL HISTORY:  COPD (chronic obstructive pulmonary disease)      Former smoker      HTN (hypertension)      HLD (hyperlipidemia)      CAD (coronary artery disease)      Chronic atrial fibrillation      Diabetes mellitus, type 2      Gout      Severe aortic stenosis      Pericardial effusion      TR (tricuspid regurgitation)      No significant past surgical history        FAMILY HISTORY:  FH: breast cancer (Mother)      PAST SOCIAL HISTORY: former smoker >25 years ago (4-5 packs per day), no alcohol use.     ALLERGIES & MEDICATIONS  --------------------------------------------------------------------------------  Allergies    penicillins (Unknown)    Intolerances      Standing Inpatient Medications  allopurinol 100 milliGRAM(s) Oral daily  atorvastatin 40 milliGRAM(s) Oral at bedtime  chlorhexidine 2% Cloths 1 Application(s) Topical daily  dextrose 5%. 1000 milliLiter(s) IV Continuous <Continuous>  dextrose 5%. 1000 milliLiter(s) IV Continuous <Continuous>  dextrose 50% Injectable 12.5 Gram(s) IV Push once  dextrose 50% Injectable 25 Gram(s) IV Push once  dextrose 50% Injectable 25 Gram(s) IV Push once  glucagon  Injectable 1 milliGRAM(s) IntraMuscular once  heparin  Infusion.  Unit(s)/Hr IV Continuous <Continuous>  insulin lispro (ADMELOG) corrective regimen sliding scale   SubCutaneous three times a day before meals  insulin lispro (ADMELOG) corrective regimen sliding scale   SubCutaneous at bedtime  metoprolol succinate ER 75 milliGRAM(s) Oral two times a day  mometasone 220 MICROgram(s) Inhaler 1 Puff(s) Inhalation daily  omega-3-Acid Ethyl Esters 4 Gram(s) Oral daily  pantoprazole    Tablet 40 milliGRAM(s) Oral before breakfast  tamsulosin 0.4 milliGRAM(s) Oral at bedtime  tiotropium 2.5 MICROgram(s) Inhaler 2 Puff(s) Inhalation daily    PRN Inpatient Medications  acetaminophen     Tablet .. 650 milliGRAM(s) Oral every 6 hours PRN  albuterol    90 MICROgram(s) HFA Inhaler 2 Puff(s) Inhalation every 6 hours PRN  carboxymethylcellulose 0.5% (Preservative-Free) Ophthalmic Solution 1 Drop(s) Both EYES two times a day PRN  dextrose Oral Gel 15 Gram(s) Oral once PRN  heparin   Injectable 3000 Unit(s) IV Push every 6 hours PRN  heparin   Injectable 6500 Unit(s) IV Push every 6 hours PRN      REVIEW OF SYSTEMS  --------------------------------------------------------------------------------  Gen: No fevers/chills  Head/Eyes/Ears: No HA  Respiratory: + dyspnea, no cough  CV: No chest pain  GI: No abdominal pain, diarrhea  : No dysuria, hematuria,   MSK: + edema b/l R>L  Skin: No rashes    All other systems were reviewed and are negative, except as noted.    VITALS/PHYSICAL EXAM  --------------------------------------------------------------------------------  T(C): 36.9 (07-06-23 @ 11:42), Max: 36.9 (07-06-23 @ 11:42)  HR: 81 (07-06-23 @ 11:42) (66 - 89)  BP: 112/73 (07-06-23 @ 11:42) (108/72 - 118/76)  RR: 18 (07-06-23 @ 11:42) (18 - 18)  SpO2: 95% (07-06-23 @ 11:42) (93% - 98%)  Wt(kg): --        07-05-23 @ 07:01  -  07-06-23 @ 07:00  --------------------------------------------------------  IN: 840 mL / OUT: 800 mL / NET: 40 mL    07-06-23 @ 07:01  -  07-06-23 @ 13:52  --------------------------------------------------------  IN: 480 mL / OUT: 475 mL / NET: 5 mL        Physical Exam:  	Gen: NAD  	HEENT: Anicteric  	Pulm: CTA B/L, no rales, crackles, wheezing   	CV: S1S2+  	Abd: Soft, +BS            Transplant site: RLQ non tender, well healed surgical scar.  	Ext: + LE edema R>L (per patient, hx of R leg trauma)  	Neuro: Awake          : Reeder+ with clear urine in the bag  	Skin: Warm and dry  	Dialysis access: N/A    LABS/STUDIES  --------------------------------------------------------------------------------              12.0   8.09  >-----------<  See note    [07-06-23 @ 07:56]              39.3     144  |  99  |  46  ----------------------------<  103      [07-06-23 @ 07:56]  3.9   |  32  |  1.66        Ca     9.2     [07-06-23 @ 07:56]        PTT: 32.3       [07-06-23 @ 11:42]      Creatinine Trend:  SCr 1.66 [07-06 @ 07:56]  SCr 1.74 [07-05 @ 06:29]  SCr 1.42 [07-04 @ 06:30]  SCr 1.36 [07-03 @ 17:48]    Urinalysis - [07-06-23 @ 07:56]      Color  / Appearance  / SG  / pH       Gluc 103 / Ketone   / Bili  / Urobili        Blood  / Protein  / Leuk Est  / Nitrite       RBC  / WBC  / Hyaline  / Gran  / Sq Epi  / Non Sq Epi  / Bacteria       HCV 0.10, Nonreact      [07-05-23 @ 06:29]      Tacrolimus  Cyclosporine  Sirolimus  Mycophenolate  BK PCR  CMV PCR  Parvo PCR  EBV PCR

## 2023-07-06 NOTE — PROGRESS NOTE ADULT - PROBLEM SELECTOR PLAN 2
Unclear hx of ?CHF, possibly has chronic HFpEF.   - c/w diuresis as above  - strict I/Os, standing weights daily  - Cardiology following ; reccs appreciated Unclear hx of ?CHF, possibly has chronic HFpEF.   - d/c diuresis given SHEYLA  - strict I/Os, standing weights daily  - Cardiology following ; reccs appreciated

## 2023-07-06 NOTE — CONSULT NOTE ADULT - PROBLEM SELECTOR RECOMMENDATION 2
We were asked to evaluate for possible TAVR. We will review his TTE with Dr. Gill Abrams, director of Structural Heart Echocardiography. If severe, he will need both a Cardiac CT to evaluate his Aortic Valve and access, as well as a Cardiac Angiogram to look at his coronary arteries. Both tests will need IV Contrast. Once all testing complete, we will review and come up with best treatment plan for him. Discussed with the patient, and he is agreeable to this plan, as he would prefer to go the ALFRED Route.

## 2023-07-06 NOTE — CONSULT NOTE ADULT - PROBLEM SELECTOR RECOMMENDATION 3
Patient appears to be volume up. He should continue with his furosemide 40 mg IV twice a day. Monitor his daily weight. Keep I<O

## 2023-07-06 NOTE — PROGRESS NOTE ADULT - SUBJECTIVE AND OBJECTIVE BOX
ID:[id number] (alt MRN [facility] [id number]) w/ hx of Afib (on Eliquis), severe AS, mod-severe TR, CAD/MI (30 yrs ago, medically managed, no PCI), ?CHF, COPD (not on O2 at home), former heavy smoker, HTN, HLD, DM2, gout, who was sent in for concerning outpatient echocardiogram which demonstrated worsening pericardial effusi    Patient seen and examined at bedside.    Overnight Events: NAEON. Feeling mildly improved today. Spoke to patient about his effusion being larger but without tamponade physiology. Discussed risk benefit of dx/tx pericardiocentesis, pt amenable. However he took his eliquis today so it cannot happen today. Spoke to IC Dr. Olman Chavez who will review the images to make sure window is safe for cath lab tap. Primary team also spoke to IR who deferred to cardiology. Structural heart team consulted for LFLG sev AS with sx (dyspnea), pending further eval. Pt also has an SHEYLA today Cr 1.3 --> 1.7 thus will hold diuresis as pt grossly asymptomatic and want to be careful iso sev AS. If pt needs CT TAVR he will need to have Cr improvement. Pt also noting he only wants TAVR not SAVR    Telemetry:  AF     Current Meds:  acetaminophen     Tablet .. 650 milliGRAM(s) Oral every 6 hours PRN  albuterol    90 MICROgram(s) HFA Inhaler 2 Puff(s) Inhalation every 6 hours PRN  allopurinol 100 milliGRAM(s) Oral daily  atorvastatin 40 milliGRAM(s) Oral at bedtime  carboxymethylcellulose 0.5% (Preservative-Free) Ophthalmic Solution 1 Drop(s) Both EYES two times a day PRN  chlorhexidine 2% Cloths 1 Application(s) Topical daily  dextrose 5%. 1000 milliLiter(s) IV Continuous <Continuous>  dextrose 5%. 1000 milliLiter(s) IV Continuous <Continuous>  dextrose 50% Injectable 12.5 Gram(s) IV Push once  dextrose 50% Injectable 25 Gram(s) IV Push once  dextrose 50% Injectable 25 Gram(s) IV Push once  dextrose Oral Gel 15 Gram(s) Oral once PRN  furosemide   Injectable 40 milliGRAM(s) IV Push two times a day  glucagon  Injectable 1 milliGRAM(s) IntraMuscular once  heparin   Injectable 3000 Unit(s) IV Push every 6 hours PRN  heparin   Injectable 6500 Unit(s) IV Push every 6 hours PRN  heparin  Infusion.  Unit(s)/Hr IV Continuous <Continuous>  insulin lispro (ADMELOG) corrective regimen sliding scale   SubCutaneous three times a day before meals  insulin lispro (ADMELOG) corrective regimen sliding scale   SubCutaneous at bedtime  metoprolol succinate ER 75 milliGRAM(s) Oral two times a day  mometasone 220 MICROgram(s) Inhaler 1 Puff(s) Inhalation daily  omega-3-Acid Ethyl Esters 4 Gram(s) Oral daily  pantoprazole    Tablet 40 milliGRAM(s) Oral before breakfast  tamsulosin 0.4 milliGRAM(s) Oral at bedtime  tiotropium 2.5 MICROgram(s) Inhaler 2 Puff(s) Inhalation daily      Vitals:  T(F): 98.4 (07-06), Max: 98.4 (07-06)  HR: 81 (07-06) (66 - 89)  BP: 112/73 (07-06) (108/72 - 118/76)  RR: 18 (07-06)  SpO2: 95% (07-06)  I&O's Summary    05 Jul 2023 07:01  -  06 Jul 2023 07:00  --------------------------------------------------------  IN: 840 mL / OUT: 800 mL / NET: 40 mL    06 Jul 2023 07:01  -  06 Jul 2023 12:39  --------------------------------------------------------  IN: 0 mL / OUT: 250 mL / NET: -250 mL        Physical Exam:  Appearance: No acute distress; well appearing  Eyes: PERRL, EOMI, pink conjunctiva  HEENT: Normal oral mucosa  Cardiovascular: IIR, S1, S2, I/VI VIOLETA radiating to carotids, no rubs, or gallops; no edema; no JVD  Respiratory: Clear to auscultation bilaterally  Gastrointestinal: soft, non-tender, non-distended with normal bowel sounds  Musculoskeletal: 2+ edema to knees with venous stasis   Neurologic: Non-focal  Lymphatic: No lymphadenopathy  Psychiatry: AAOx3, mood & affect appropriate  Skin: No rashes, ecchymoses, or cyanosis                          12.0   8.09  )-----------( See note    ( 06 Jul 2023 07:56 )             39.3     07-06    144  |  99  |  46<H>  ----------------------------<  103<H>  3.9   |  32<H>  |  1.66<H>    Ca    9.2      06 Jul 2023 07:56      PTT - ( 06 Jul 2023 11:42 )  PTT:32.3 sec  CARDIAC MARKERS ( 03 Jul 2023 19:24 )  27 ng/L / x     / x     / x     / x     / x      CARDIAC MARKERS ( 03 Jul 2023 17:48 )  30 ng/L / x     / x     / x     / x     / x      _______________________________________________________________________________________  CONCLUSIONS:      1. Patient in atrial fibrillation; ejection fraction varies with R-R interval.   2. The left ventricular systolic function is normal with an ejection fraction visually estimated at 60 to 65 %.   3. Mildly enlarged right ventricular cavity size, normal right ventricular wall thickness and mildly reduced right ventricular systolic function.   4. Mild mitral regurgitation.   5. Trileaflet aortic valve with reduced systolic excursion.   6. Severe calcification of the aortic valve leaflets.   7. Severe aortic stenosis.   8. Low flow, low gradient aortic stenosis with preserved EF. Left ventricular stroke volume is 54.2 ml ;left ventricular stroke volume index is 28.5 ml/m².   9. Trace aortic regurgitation.  10. Moderate-large circumferential pericardial effusion. The effusion measures approximately 1.8 cm adjacent to the right atrium, 1.8 cm adjacent to the right atrium, and 2.4 cm lateral to the left ventricle in its largest dimension.  11. Findings were discussed with Dr. Jeffrey Wechsler on 7/5/2023 at 4:50 pm. No prior echocardiogram is available for comparison.    New ECG(s): Personally reviewed    A/P  78M w/ hx of Afib (on Eliquis), severe AS, mod-severe TR, CAD/MI (30 yrs ago, medically managed, no PCI), ?CHF, COPD (not on O2 at home), former heavy smoker, HTN, HLD, DM2, gout, who was sent in for concerning outpatient echocardiogram which demonstrated worsening pericardial effusion.    #Large Pericardial Effusion  - No clinical tamponade patient is hemodynamically stable, with robust heart sounds and no obvious JVD. Outpatient TTE demonstrated LVEF of 55% with large pericardial effusion without tamponade physiology.   - Repeat TTE showing large effusion mainly around L ventricle     > Discussed risk benefit of dx/tx pericardiocentesis, pt amenable. However he took his eliquis today so it cannot happen today. Spoke to IC Dr. Olman Chavez who will review the images to make sure window is safe for cath lab tap. Primary team also spoke to IR who deferred to cardiology. Will plan on stopping eliquis, starting heparin and planning on possible pericardiocentesis tomorrow   Plan:  - NPO after midnight for possible pericardiocentesis tomorrow 7/7  - Please order type and screen  - DC lasix in the setting of SHEYLA and large effusion    #Severe Symptomatic Low Flow Low Gradient AS  - Pt with known sx of sev AS p/w GOFF  - Repeat TTE showing sev AS  - Structural cardiology consulted, pending possible CT TAVR after TTE reviewed   - Hypervolemic on exam, likely multifactorial, judicious with diuretics as above    #AFib  #RBBB  - Persistent per OSH documentation   - On Apixaban 2.5 BID at home - unclear why on dose reduced, per son he previously was on coumadin but had supra-therapeutic   - CHADSVASC 5         Jean Ríos PGY5  Cardiology Fellow    CHAVA ID:[id number] (alt MRN [facility] [id number]) w/ hx of Afib (on Eliquis), severe AS, mod-severe TR, CAD/MI (30 yrs ago, medically managed, no PCI), ?CHF, COPD (not on O2 at home), former heavy smoker, HTN, HLD, DM2, gout, who was sent in for concerning outpatient echocardiogram which demonstrated worsening pericardial effusion    Patient seen and examined at bedside.    Overnight Events: NAEON. Feeling mildly improved today. Spoke to patient about his effusion being larger but without tamponade physiology. Discussed risk benefit of dx/tx pericardiocentesis, pt amenable. However he took his eliquis today so it cannot happen today. Spoke to IC Dr. Olman Chavez who will review the images to make sure window is safe for cath lab tap. Primary team also spoke to IR who deferred to cardiology. Structural heart team consulted for LFLG sev AS with sx (dyspnea), pending further eval. Pt also has an SHEYLA today Cr 1.3 --> 1.7 thus will hold diuresis as pt grossly asymptomatic and want to be careful iso sev AS. If pt needs CT TAVR he will need to have Cr improvement. Pt also noting he only wants TAVR not SAVR    Telemetry:  AF     Current Meds:  acetaminophen     Tablet .. 650 milliGRAM(s) Oral every 6 hours PRN  albuterol    90 MICROgram(s) HFA Inhaler 2 Puff(s) Inhalation every 6 hours PRN  allopurinol 100 milliGRAM(s) Oral daily  atorvastatin 40 milliGRAM(s) Oral at bedtime  carboxymethylcellulose 0.5% (Preservative-Free) Ophthalmic Solution 1 Drop(s) Both EYES two times a day PRN  chlorhexidine 2% Cloths 1 Application(s) Topical daily  dextrose 5%. 1000 milliLiter(s) IV Continuous <Continuous>  dextrose 5%. 1000 milliLiter(s) IV Continuous <Continuous>  dextrose 50% Injectable 12.5 Gram(s) IV Push once  dextrose 50% Injectable 25 Gram(s) IV Push once  dextrose 50% Injectable 25 Gram(s) IV Push once  dextrose Oral Gel 15 Gram(s) Oral once PRN  furosemide   Injectable 40 milliGRAM(s) IV Push two times a day  glucagon  Injectable 1 milliGRAM(s) IntraMuscular once  heparin   Injectable 3000 Unit(s) IV Push every 6 hours PRN  heparin   Injectable 6500 Unit(s) IV Push every 6 hours PRN  heparin  Infusion.  Unit(s)/Hr IV Continuous <Continuous>  insulin lispro (ADMELOG) corrective regimen sliding scale   SubCutaneous three times a day before meals  insulin lispro (ADMELOG) corrective regimen sliding scale   SubCutaneous at bedtime  metoprolol succinate ER 75 milliGRAM(s) Oral two times a day  mometasone 220 MICROgram(s) Inhaler 1 Puff(s) Inhalation daily  omega-3-Acid Ethyl Esters 4 Gram(s) Oral daily  pantoprazole    Tablet 40 milliGRAM(s) Oral before breakfast  tamsulosin 0.4 milliGRAM(s) Oral at bedtime  tiotropium 2.5 MICROgram(s) Inhaler 2 Puff(s) Inhalation daily      Vitals:  T(F): 98.4 (07-06), Max: 98.4 (07-06)  HR: 81 (07-06) (66 - 89)  BP: 112/73 (07-06) (108/72 - 118/76)  RR: 18 (07-06)  SpO2: 95% (07-06)  I&O's Summary    05 Jul 2023 07:01  -  06 Jul 2023 07:00  --------------------------------------------------------  IN: 840 mL / OUT: 800 mL / NET: 40 mL    06 Jul 2023 07:01  -  06 Jul 2023 12:39  --------------------------------------------------------  IN: 0 mL / OUT: 250 mL / NET: -250 mL        Physical Exam:  Appearance: No acute distress; well appearing  Eyes: PERRL, EOMI, pink conjunctiva  HEENT: Normal oral mucosa  Cardiovascular: IIR, S1, S2, I/VI VIOLETA radiating to carotids, no rubs, or gallops; no edema; no JVD  Respiratory: Clear to auscultation bilaterally  Gastrointestinal: soft, non-tender, non-distended with normal bowel sounds  Musculoskeletal: 2+ edema to knees with venous stasis   Neurologic: Non-focal  Lymphatic: No lymphadenopathy  Psychiatry: AAOx3, mood & affect appropriate  Skin: No rashes, ecchymoses, or cyanosis                          12.0   8.09  )-----------( See note    ( 06 Jul 2023 07:56 )             39.3     07-06    144  |  99  |  46<H>  ----------------------------<  103<H>  3.9   |  32<H>  |  1.66<H>    Ca    9.2      06 Jul 2023 07:56      PTT - ( 06 Jul 2023 11:42 )  PTT:32.3 sec  CARDIAC MARKERS ( 03 Jul 2023 19:24 )  27 ng/L / x     / x     / x     / x     / x      CARDIAC MARKERS ( 03 Jul 2023 17:48 )  30 ng/L / x     / x     / x     / x     / x      _______________________________________________________________________________________  CONCLUSIONS:      1. Patient in atrial fibrillation; ejection fraction varies with R-R interval.   2. The left ventricular systolic function is normal with an ejection fraction visually estimated at 60 to 65 %.   3. Mildly enlarged right ventricular cavity size, normal right ventricular wall thickness and mildly reduced right ventricular systolic function.   4. Mild mitral regurgitation.   5. Trileaflet aortic valve with reduced systolic excursion.   6. Severe calcification of the aortic valve leaflets.   7. Severe aortic stenosis.   8. Low flow, low gradient aortic stenosis with preserved EF. Left ventricular stroke volume is 54.2 ml ;left ventricular stroke volume index is 28.5 ml/m².   9. Trace aortic regurgitation.  10. Moderate-large circumferential pericardial effusion. The effusion measures approximately 1.8 cm adjacent to the right atrium, 1.8 cm adjacent to the right atrium, and 2.4 cm lateral to the left ventricle in its largest dimension.  11. Findings were discussed with Dr. Jeffrey Wechsler on 7/5/2023 at 4:50 pm. No prior echocardiogram is available for comparison.    New ECG(s): Personally reviewed    A/P  78M w/ hx of Afib (on Eliquis), severe AS, mod-severe TR, CAD/MI (30 yrs ago, medically managed, no PCI), ?CHF, COPD (not on O2 at home), former heavy smoker, HTN, HLD, DM2, gout, who was sent in for concerning outpatient echocardiogram which demonstrated worsening pericardial effusion.    #Large Pericardial Effusion  - No clinical tamponade patient is hemodynamically stable, with robust heart sounds and no obvious JVD. Outpatient TTE demonstrated LVEF of 55% with large pericardial effusion without tamponade physiology.   - Repeat TTE showing large effusion mainly around L ventricle     > Discussed risk benefit of dx/tx pericardiocentesis, pt amenable. However he took his eliquis today so it cannot happen today. Spoke to IC Dr. Olman Chavez who will review the images to make sure window is safe for cath lab tap. Primary team also spoke to IR who deferred to cardiology. Will plan on stopping eliquis, starting heparin and planning on possible pericardiocentesis tomorrow   Plan:  - NPO after midnight for possible pericardiocentesis tomorrow 7/7  - Please order type and screen  - DC lasix in the setting of SHEYLA and large effusion    #Severe Symptomatic Low Flow Low Gradient AS  - Pt with known sx of sev AS p/w GOFF  - Repeat TTE showing sev AS  - Structural cardiology consulted, pending possible CT TAVR after TTE reviewed   - Hypervolemic on exam, likely multifactorial, judicious with diuretics as above    #AFib  #RBBB  - Persistent per OSH documentation   - On Apixaban 2.5 BID at home - unclear why on dose reduced, per son he previously was on coumadin but had supra-therapeutic   - CHADSVASC 5         Jean Ríos PGY5  Cardiology Fellow    DWIRAIS    This patient was seen and examined personally by me and the plan was discussed with the fellow and/or resident above. Amendments were made as necessary to the above. Agree with the excellent note and plan above. Hold lasix for SHEYLA; Pericardiocentesis and CT TAVR pending.    Ralph Hernández MD, MPhil, FACC  Cardiologist, [facility]  ; [facility] and [facility]  Email: [email or web address] Card[facility] and Cardiovascular Surgery on-service contact/call information, go to [email or web address] and use "[email or web address] to login.  Outpatient Cardiology appointments, call [phone] to arrange with a colleague; I do not have outpatient Cardiology clinic.

## 2023-07-06 NOTE — PROGRESS NOTE ADULT - PROBLEM SELECTOR PLAN 5
Hx of CAD/MI (30 yrs ago, medically managed, no PCI)  - hsTrop 30->27; EKG w/o signs of acute ischemia; low suspicion of ACS  - c/w home statin, and metoprolol  - hold ASA for possible pericardiocentesis

## 2023-07-06 NOTE — PROGRESS NOTE ADULT - SUBJECTIVE AND OBJECTIVE BOX
Patient is a 78y old  Male who presents with a chief complaint of worsening pericardial effusion (06 Jul 2023 10:32)      SUBJECTIVE / OVERNIGHT EVENTS:  Pt seen and examined. No acute events overnight. He denies CP, SOB.    MEDICATIONS  (STANDING):  allopurinol 100 milliGRAM(s) Oral daily  atorvastatin 40 milliGRAM(s) Oral at bedtime  chlorhexidine 2% Cloths 1 Application(s) Topical daily  dextrose 5%. 1000 milliLiter(s) (100 mL/Hr) IV Continuous <Continuous>  dextrose 5%. 1000 milliLiter(s) (50 mL/Hr) IV Continuous <Continuous>  dextrose 50% Injectable 12.5 Gram(s) IV Push once  dextrose 50% Injectable 25 Gram(s) IV Push once  dextrose 50% Injectable 25 Gram(s) IV Push once  furosemide   Injectable 40 milliGRAM(s) IV Push two times a day  glucagon  Injectable 1 milliGRAM(s) IntraMuscular once  heparin  Infusion.  Unit(s)/Hr (15 mL/Hr) IV Continuous <Continuous>  insulin lispro (ADMELOG) corrective regimen sliding scale   SubCutaneous three times a day before meals  insulin lispro (ADMELOG) corrective regimen sliding scale   SubCutaneous at bedtime  metoprolol succinate ER 75 milliGRAM(s) Oral two times a day  mometasone 220 MICROgram(s) Inhaler 1 Puff(s) Inhalation daily  omega-3-Acid Ethyl Esters 4 Gram(s) Oral daily  pantoprazole    Tablet 40 milliGRAM(s) Oral before breakfast  tamsulosin 0.4 milliGRAM(s) Oral at bedtime  tiotropium 2.5 MICROgram(s) Inhaler 2 Puff(s) Inhalation daily    MEDICATIONS  (PRN):  acetaminophen     Tablet .. 650 milliGRAM(s) Oral every 6 hours PRN Temp greater or equal to 38C (100.4F), Mild Pain (1 - 3)  albuterol    90 MICROgram(s) HFA Inhaler 2 Puff(s) Inhalation every 6 hours PRN Shortness of Breath and/or Wheezing  carboxymethylcellulose 0.5% (Preservative-Free) Ophthalmic Solution 1 Drop(s) Both EYES two times a day PRN dry eyes  dextrose Oral Gel 15 Gram(s) Oral once PRN Blood Glucose LESS THAN 70 milliGRAM(s)/deciliter  heparin   Injectable 6500 Unit(s) IV Push every 6 hours PRN For aPTT less than 40  heparin   Injectable 3000 Unit(s) IV Push every 6 hours PRN For aPTT between 40 - 57      Vital Signs Last 24 Hrs  T(C): 36.9 (06 Jul 2023 11:42), Max: 36.9 (06 Jul 2023 11:42)  T(F): 98.4 (06 Jul 2023 11:42), Max: 98.4 (06 Jul 2023 11:42)  HR: 81 (06 Jul 2023 11:42) (66 - 89)  BP: 112/73 (06 Jul 2023 11:42) (108/72 - 128/85)  BP(mean): --  RR: 18 (06 Jul 2023 11:42) (18 - 18)  SpO2: 95% (06 Jul 2023 11:42) (93% - 98%)    Parameters below as of 06 Jul 2023 11:42  Patient On (Oxygen Delivery Method): nasal cannula  O2 Flow (L/min): 2    CAPILLARY BLOOD GLUCOSE      POCT Blood Glucose.: 138 mg/dL (06 Jul 2023 12:04)  POCT Blood Glucose.: 115 mg/dL (06 Jul 2023 08:11)  POCT Blood Glucose.: 122 mg/dL (05 Jul 2023 21:23)  POCT Blood Glucose.: 125 mg/dL (05 Jul 2023 16:51)    I&O's Summary    05 Jul 2023 07:01  -  06 Jul 2023 07:00  --------------------------------------------------------  IN: 840 mL / OUT: 800 mL / NET: 40 mL    06 Jul 2023 07:01  -  06 Jul 2023 12:14  --------------------------------------------------------  IN: 0 mL / OUT: 250 mL / NET: -250 mL        PHYSICAL EXAM:  GENERAL: NAD, well-groomed  HEAD:  Atraumatic, Normocephalic  EYES:  conjunctiva and sclera clear  NECK: Supple, No JVD  CHEST/LUNG: Clear to auscultation bilaterally; No wheeze  HEART: Regular rate and rhythm; No murmurs, rubs, or gallops  ABDOMEN: Soft, Nontender, Nondistended; Bowel sounds present  EXTREMITIES:  2+ Peripheral Pulses, No clubbing, cyanosis, or edema  PSYCH: AAOx3  NEUROLOGY: non-focal  SKIN: No rashes or lesions    LABS:                        12.0   8.09  )-----------( See note    ( 06 Jul 2023 07:56 )             39.3     07-06    144  |  99  |  46<H>  ----------------------------<  103<H>  3.9   |  32<H>  |  1.66<H>    Ca    9.2      06 Jul 2023 07:56            Urinalysis Basic - ( 06 Jul 2023 07:56 )    Color: x / Appearance: x / SG: x / pH: x  Gluc: 103 mg/dL / Ketone: x  / Bili: x / Urobili: x   Blood: x / Protein: x / Nitrite: x   Leuk Esterase: x / RBC: x / WBC x   Sq Epi: x / Non Sq Epi: x / Bacteria: x        RADIOLOGY & ADDITIONAL TESTS:    Imaging Personally Reviewed:  TTE 7/5   1. Patient in atrial fibrillation; ejection fraction varies with R-R interval.   2. The left ventricular systolic function is normal with an ejection fraction visually estimated at 60 to 65 %.   3. Mildly enlarged right ventricular cavity size, normal right ventricular wall thickness and mildly reduced right ventricular systolic function.   4. Mild mitral regurgitation.   5. Trileaflet aortic valve with reduced systolic excursion.   6. Severe calcification of the aortic valve leaflets.   7. Severe aortic stenosis.   8. Low flow, low gradient aortic stenosis with preserved EF. Left ventricular stroke volume is 54.2 ml ;left ventricular stroke volume index is 28.5 ml/m².   9. Trace aortic regurgitation.  10. Moderate-large circumferential pericardial effusion. The effusion measures approximately 1.8 cm adjacent to the right atrium, 1.8 cm adjacent to the right atrium, and 2.4 cm lateral to the left ventricle in its largest dimension.  11. Findings were discussed with Dr. Jeffrey Wechsler on 7/5/2023 at 4:50 pm. No prior echocardiogram is available for comparison.        Consultant(s) Notes Reviewed:  Cardiology    Care Discussed with Consultants/Other Providers: Cardiology

## 2023-07-06 NOTE — PROGRESS NOTE ADULT - PROBLEM SELECTOR PLAN 1
- Found to have "large circumferential pericardial effusion up to 2.6 cm without evidence of tamponade physiology" on outpt TTE (7/3/23).  - Prior TTE (10/3/22) showed "moderate pericardial effusion, measuring about 1.0 cm superior to the RA; otherwise small circumferential pericardial effusion." Unclear underlying etiology of effusion, but possibly in setting of CHF.  - c/w Lasix 40mg IV BID for now  - strict I/Os, monitor UOP  - monitor hemodynamics closely  - TTE w/unchanged mod to large pericardial effusion ; +severe AS  - Will likely need pericardial window/drain ; will obtain Interventional Cardiology consult   - CT surgery consult for severe AS  - hold Eliquis and start Heparin gtt given pending invasive procedure - Found to have "large circumferential pericardial effusion up to 2.6 cm without evidence of tamponade physiology" on outpt TTE (7/3/23).  - Prior TTE (10/3/22) showed "moderate pericardial effusion, measuring about 1.0 cm superior to the RA; otherwise small circumferential pericardial effusion." Unclear underlying etiology of effusion, but possibly in setting of CHF.  - d/c Lasix given SHEYLA  - strict I/Os, monitor UOP  - monitor hemodynamics closely  - TTE w/unchanged mod to large pericardial effusion ; +severe AS  - Will likely need pericardial window/drain ; will obtain Interventional Cardiology consult   - CT surgery consult for severe AS  - hold Eliquis and start Heparin gtt given pending invasive procedure

## 2023-07-06 NOTE — PROGRESS NOTE ADULT - PROBLEM SELECTOR PLAN 9
- DVT ppx: on home Eliquis  - Diet: DASH/CC  - Dispo: pending clinical course - c/w home allopurinol

## 2023-07-07 DIAGNOSIS — N18.9 CHRONIC KIDNEY DISEASE, UNSPECIFIED: ICD-10-CM

## 2023-07-07 LAB
ANION GAP SERPL CALC-SCNC: 14 MMOL/L — SIGNIFICANT CHANGE UP (ref 5–17)
APPEARANCE UR: CLEAR — SIGNIFICANT CHANGE UP
APTT BLD: 140.7 SEC — CRITICAL HIGH (ref 27.5–35.5)
APTT BLD: 142.6 SEC — CRITICAL HIGH (ref 27.5–35.5)
APTT BLD: 98.4 SEC — HIGH (ref 27.5–35.5)
BILIRUB UR-MCNC: NEGATIVE — SIGNIFICANT CHANGE UP
BUN SERPL-MCNC: 43 MG/DL — HIGH (ref 7–23)
CALCIUM SERPL-MCNC: 8.9 MG/DL — SIGNIFICANT CHANGE UP (ref 8.4–10.5)
CHLORIDE SERPL-SCNC: 101 MMOL/L — SIGNIFICANT CHANGE UP (ref 96–108)
CO2 SERPL-SCNC: 28 MMOL/L — SIGNIFICANT CHANGE UP (ref 22–31)
COLOR SPEC: YELLOW — SIGNIFICANT CHANGE UP
CREAT ?TM UR-MCNC: 85 MG/DL — SIGNIFICANT CHANGE UP
CREAT SERPL-MCNC: 1.54 MG/DL — HIGH (ref 0.5–1.3)
DIFF PNL FLD: NEGATIVE — SIGNIFICANT CHANGE UP
EGFR: 46 ML/MIN/1.73M2 — LOW
GLUCOSE BLDC GLUCOMTR-MCNC: 107 MG/DL — HIGH (ref 70–99)
GLUCOSE BLDC GLUCOMTR-MCNC: 115 MG/DL — HIGH (ref 70–99)
GLUCOSE BLDC GLUCOMTR-MCNC: 124 MG/DL — HIGH (ref 70–99)
GLUCOSE BLDC GLUCOMTR-MCNC: 129 MG/DL — HIGH (ref 70–99)
GLUCOSE SERPL-MCNC: 114 MG/DL — HIGH (ref 70–99)
GLUCOSE UR QL: NEGATIVE — SIGNIFICANT CHANGE UP
HCT VFR BLD CALC: 33.5 % — LOW (ref 39–50)
HGB BLD-MCNC: 10.3 G/DL — LOW (ref 13–17)
INR BLD: 1.27 RATIO — HIGH (ref 0.88–1.16)
KETONES UR-MCNC: NEGATIVE — SIGNIFICANT CHANGE UP
LEUKOCYTE ESTERASE UR-ACNC: NEGATIVE — SIGNIFICANT CHANGE UP
MCHC RBC-ENTMCNC: 25.6 PG — LOW (ref 27–34)
MCHC RBC-ENTMCNC: 30.7 GM/DL — LOW (ref 32–36)
MCV RBC AUTO: 83.3 FL — SIGNIFICANT CHANGE UP (ref 80–100)
NITRITE UR-MCNC: NEGATIVE — SIGNIFICANT CHANGE UP
NRBC # BLD: 0 /100 WBCS — SIGNIFICANT CHANGE UP (ref 0–0)
PH UR: 6 — SIGNIFICANT CHANGE UP (ref 5–8)
PLATELET # BLD AUTO: SIGNIFICANT CHANGE UP K/UL (ref 150–400)
POTASSIUM SERPL-MCNC: 3.7 MMOL/L — SIGNIFICANT CHANGE UP (ref 3.5–5.3)
POTASSIUM SERPL-SCNC: 3.7 MMOL/L — SIGNIFICANT CHANGE UP (ref 3.5–5.3)
PROT ?TM UR-MCNC: 12 MG/DL — SIGNIFICANT CHANGE UP (ref 0–12)
PROT UR-MCNC: SIGNIFICANT CHANGE UP
PROT/CREAT UR-RTO: 0.1 RATIO — SIGNIFICANT CHANGE UP (ref 0–0.2)
PROTHROM AB SERPL-ACNC: 14.8 SEC — HIGH (ref 10.5–13.4)
RBC # BLD: 4.02 M/UL — LOW (ref 4.2–5.8)
RBC # FLD: 19 % — HIGH (ref 10.3–14.5)
SODIUM SERPL-SCNC: 143 MMOL/L — SIGNIFICANT CHANGE UP (ref 135–145)
SP GR SPEC: 1.02 — SIGNIFICANT CHANGE UP (ref 1.01–1.02)
UROBILINOGEN FLD QL: NEGATIVE — SIGNIFICANT CHANGE UP
WBC # BLD: 6.29 K/UL — SIGNIFICANT CHANGE UP (ref 3.8–10.5)
WBC # FLD AUTO: 6.29 K/UL — SIGNIFICANT CHANGE UP (ref 3.8–10.5)

## 2023-07-07 PROCEDURE — 99233 SBSQ HOSP IP/OBS HIGH 50: CPT

## 2023-07-07 PROCEDURE — 99232 SBSQ HOSP IP/OBS MODERATE 35: CPT | Mod: GC

## 2023-07-07 PROCEDURE — 94010 BREATHING CAPACITY TEST: CPT | Mod: 26

## 2023-07-07 RX ORDER — SODIUM CHLORIDE 0.65 %
1 AEROSOL, SPRAY (ML) NASAL EVERY 6 HOURS
Refills: 0 | Status: DISCONTINUED | OUTPATIENT
Start: 2023-07-07 | End: 2023-07-26

## 2023-07-07 RX ADMIN — HEPARIN SODIUM 1200 UNIT(S)/HR: 5000 INJECTION INTRAVENOUS; SUBCUTANEOUS at 12:25

## 2023-07-07 RX ADMIN — Medication 1 SPRAY(S): at 21:46

## 2023-07-07 RX ADMIN — HEPARIN SODIUM 900 UNIT(S)/HR: 5000 INJECTION INTRAVENOUS; SUBCUTANEOUS at 20:58

## 2023-07-07 RX ADMIN — HEPARIN SODIUM 0 UNIT(S)/HR: 5000 INJECTION INTRAVENOUS; SUBCUTANEOUS at 19:56

## 2023-07-07 RX ADMIN — Medication 3 MILLIGRAM(S): at 23:40

## 2023-07-07 RX ADMIN — Medication 100 MILLIGRAM(S): at 19:42

## 2023-07-07 RX ADMIN — HEPARIN SODIUM 1200 UNIT(S)/HR: 5000 INJECTION INTRAVENOUS; SUBCUTANEOUS at 04:18

## 2023-07-07 RX ADMIN — BECLOMETHASONE DIPROPIONATE 2 PUFF(S): 40 AEROSOL, METERED RESPIRATORY (INHALATION) at 10:27

## 2023-07-07 RX ADMIN — Medication 75 MILLIGRAM(S): at 06:52

## 2023-07-07 RX ADMIN — BECLOMETHASONE DIPROPIONATE 2 PUFF(S): 40 AEROSOL, METERED RESPIRATORY (INHALATION) at 22:57

## 2023-07-07 RX ADMIN — TAMSULOSIN HYDROCHLORIDE 0.4 MILLIGRAM(S): 0.4 CAPSULE ORAL at 21:47

## 2023-07-07 RX ADMIN — TIOTROPIUM BROMIDE 2 PUFF(S): 18 CAPSULE ORAL; RESPIRATORY (INHALATION) at 13:32

## 2023-07-07 RX ADMIN — Medication 75 MILLIGRAM(S): at 19:40

## 2023-07-07 RX ADMIN — ATORVASTATIN CALCIUM 40 MILLIGRAM(S): 80 TABLET, FILM COATED ORAL at 21:46

## 2023-07-07 RX ADMIN — HEPARIN SODIUM 0 UNIT(S)/HR: 5000 INJECTION INTRAVENOUS; SUBCUTANEOUS at 03:13

## 2023-07-07 RX ADMIN — CHLORHEXIDINE GLUCONATE 1 APPLICATION(S): 213 SOLUTION TOPICAL at 12:27

## 2023-07-07 NOTE — CHART NOTE - NSCHARTNOTEFT_GEN_A_CORE
Brief Chart Note    Spoke to interventional cardiologist Dr. Olman Chavez - pericardial effusion is not easily accessible from subxiphoid approach with risk of liver laceration.   Recommend IR guided pericardiocentesis     Khushbu PGY5  Cardiology Fellow

## 2023-07-07 NOTE — PROGRESS NOTE ADULT - PROBLEM SELECTOR PLAN 1
1.  CKD--w/u as outlined.  Given tight AS volume optimization rto reduced contrast risk challenging although initial CT angiogram risk relatively low and could give LOW pericontrast volume NSS 1hr before and 6 hrs post 1cc/kg/hr.  Will have to observe closely for evidence of HD requirement which does not exist at this time.  Limit contrast volume, frequency, RAASi, other nephrotoxins Patient with CKD in setting of HTN, Tobacco Abuse Hx and Heart Disease. Per Javi/OLGA review, appears he has had a SCr ranging from 1.4-2.0 dating back to 2017. Most recent SCr prior to admission was 1.42 on 10/7/22. SCr on admission of 1.36 and increased to 1.66 but now improved to 1.54 today.     He received IV lasix on 7/3 and 7/4.   Recommend a UA. Check spot urine TP/CR to quantify proteinuria.   Hep C non reactive. Check HBsAg, serum immunofixation, C3 and C4.   Renal US from 7/6 demonstrated "Both kidneys are echogenic, compatible with medical renal disease. No renal mass, hydronephrosis or calculus is visualized sonographically." He does have bilateral renal cysts.     Given tight AS volume optimization to reduce contrast risk is challenging although initial CT angiogram risk relatively low and could give LOW pericontrast volume NSS 1hr before and 6 hrs post 1cc/kg/hr. Will have to observe closely for evidence of HD requirement which does not exist at this time. Monitor SCr, electrolytes, and I/O. Avoid NSAIDS, RCAs, and other nephrotoxins. Renally adjust all medications as per GFR or CrCl.

## 2023-07-07 NOTE — PROGRESS NOTE ADULT - SUBJECTIVE AND OBJECTIVE BOX
ID: [id number] (alt MRN [id number]) w/ hx of Afib (on Eliquis), severe AS, mod-severe TR, CAD/MI (30 yrs ago, medically managed, no PCI), ?CHF, COPD (not on O2 at home), former heavy smoker, HTN, HLD, DM2, gout, who was sent in for concerning outpatient echocardiogram which demonstrated worsening pericardial effusion    Patient seen and examined at bedside.    Overnight Events: NAEON, feeling well today, denies FC NV CP SOB. States his LE edema is unchanged. Pending possible pericardiocentesis with IR today. Will still need CT TAVR and future LHC.     Telemetry:         Current Meds:  acetaminophen     Tablet .. 650 milliGRAM(s) Oral every 6 hours PRN  albuterol    90 MICROgram(s) HFA Inhaler 2 Puff(s) Inhalation every 6 hours PRN  allopurinol 100 milliGRAM(s) Oral daily  atorvastatin 40 milliGRAM(s) Oral at bedtime  beclomethasone  80 MICROgram(s) Inhaler 2 Puff(s) Inhalation two times a day  carboxymethylcellulose 0.5% (Preservative-Free) Ophthalmic Solution 1 Drop(s) Both EYES two times a day PRN  chlorhexidine 2% Cloths 1 Application(s) Topical daily  dextrose 5%. 1000 milliLiter(s) IV Continuous <Continuous>  dextrose 5%. 1000 milliLiter(s) IV Continuous <Continuous>  dextrose 50% Injectable 12.5 Gram(s) IV Push once  dextrose 50% Injectable 25 Gram(s) IV Push once  dextrose 50% Injectable 25 Gram(s) IV Push once  dextrose Oral Gel 15 Gram(s) Oral once PRN  glucagon  Injectable 1 milliGRAM(s) IntraMuscular once  heparin   Injectable 6500 Unit(s) IV Push every 6 hours PRN  heparin   Injectable 3000 Unit(s) IV Push every 6 hours PRN  heparin  Infusion.  Unit(s)/Hr IV Continuous <Continuous>  insulin lispro (ADMELOG) corrective regimen sliding scale   SubCutaneous three times a day before meals  insulin lispro (ADMELOG) corrective regimen sliding scale   SubCutaneous at bedtime  melatonin 3 milliGRAM(s) Oral at bedtime PRN  metoprolol succinate ER 75 milliGRAM(s) Oral two times a day  omega-3-Acid Ethyl Esters 4 Gram(s) Oral daily  pantoprazole    Tablet 40 milliGRAM(s) Oral before breakfast  tamsulosin 0.4 milliGRAM(s) Oral at bedtime  tiotropium 2.5 MICROgram(s) Inhaler 2 Puff(s) Inhalation daily      Vitals:  T(F): 97.5 (07-07), Max: 98.4 (07-06)  HR: 75 (07-07) (75 - 95)  BP: 126/79 (07-07) (112/73 - 126/79)  RR: 18 (07-07)  SpO2: 99% (07-07)  I&O's Summary    06 Jul 2023 07:01  -  07 Jul 2023 07:00  --------------------------------------------------------  IN: 480 mL / OUT: 1025 mL / NET: -545 mL        Physical Exam:  Appearance: No acute distress; well appearing  Eyes: PERRL, EOMI, pink conjunctiva  HEENT: Normal oral mucosa  Cardiovascular: IIR, S1, S2, I/VI VIOLETA radiating to carotids, no rubs, or gallops; no edema; no JVD  Respiratory: Clear to auscultation bilaterally  Gastrointestinal: soft, non-tender, non-distended with normal bowel sounds  Musculoskeletal: 2+ edema to knees with venous stasis   Neurologic: Non-focal  Lymphatic: No lymphadenopathy  Psychiatry: AAOx3, mood & affect appropriate  Skin: No rashes, ecchymoses, or cyanosis                        10.3   6.29  )-----------( Clumped    ( 07 Jul 2023 07:45 )             33.5     07-07    143  |  101  |  43<H>  ----------------------------<  114<H>  3.7   |  28  |  1.54<H>    Ca    8.9      07 Jul 2023 07:45      PTT - ( 07 Jul 2023 01:57 )  PTT:140.7 sec  CARDIAC MARKERS ( 03 Jul 2023 19:24 )  27 ng/L / x     / x     / x     / x     / x      CARDIAC MARKERS ( 03 Jul 2023 17:48 )  30 ng/L / x     / x     / x     / x     / x     New ECG(s): Personally reviewed    ___________________________________________  CONCLUSIONS:      1. Patient in atrial fibrillation; ejection fraction varies with R-R interval.   2. The left ventricular systolic function is normal with an ejection fraction visually estimated at 60 to 65 %.   3. Mildly enlarged right ventricular cavity size, normal right ventricular wall thickness and mildly reduced right ventricular systolic function.   4. Mild mitral regurgitation.   5. Trileaflet aortic valve with reduced systolic excursion.   6. Severe calcification of the aortic valve leaflets.   7. Severe aortic stenosis.   8. Low flow, low gradient aortic stenosis with preserved EF. Left ventricular stroke volume is 54.2 ml ;left ventricular stroke volume index is 28.5 ml/m².   9. Trace aortic regurgitation.  10. Moderate-large circumferential pericardial effusion. The effusion measures approximately 1.8 cm adjacent to the right atrium, 1.8 cm adjacent to the right atrium, and 2.4 cm lateral to the left ventricle in its largest dimension.  11. Findings were discussed with Dr. Jeffrey Wechsler on 7/5/2023 at 4:50 pm. No prior echocardiogram is available for comparison.      A/P  78M w/ hx of Afib (on Eliquis), severe AS, mod-severe TR, CAD/MI (30 yrs ago, medically managed, no PCI), ?CHF, COPD (not on O2 at home), former heavy smoker, HTN, HLD, DM2, gout, who was sent in for concerning outpatient echocardiogram which demonstrated worsening pericardial effusion.    #Large Pericardial Effusion  - No clinical tamponade patient is hemodynamically stable, with robust heart sounds and no obvious JVD. Outpatient TTE demonstrated LVEF of 55% with large pericardial effusion without tamponade physiology.   - Repeat TTE showing large effusion mainly around L ventricle     > Discussed risk benefit of dx/tx pericardiocentesis, pt amenable. However he took his eliquis today so it cannot happen today. Spoke to IC Dr. Olman Chavez who will review the images to make sure window is safe for cath lab tap. Primary team also spoke to IR who deferred to cardiology. Will plan on stopping eliquis, starting heparin and planning on possible pericardiocentesis tomorrow   Plan:  - NPO after midnight for possible pericardiocentesis today with IR (IC cannot access safely)  - Hold lasix iso effusion marilyn procedure    #Severe Symptomatic Low Flow Low Gradient AS  - Pt with known sx of sev AS p/w GOFF  - Repeat TTE showing sev AS  - Structural cardiology consulted, pending CT TAVR and eventual LHC  - Hypervolemic on exam, likely multifactorial, judicious with diuretics as above    #AFib  #RBBB  - Persistent per OSH documentation   - On Apixaban 2.5 BID at home - unclear why on dose reduced, per son he previously was on coumadin but had supra-therapeutic   - CHADSVASC 5       Jean Ríos PGY5  Cardiology Fellow    CHAVA Hernández  ID: [id number] (alt MRN [facility] [id number]) w/ hx of Afib (on Eliquis), severe AS, mod-severe TR, CAD/MI (30 yrs ago, medically managed, no PCI), ?CHF, COPD (not on O2 at home), former heavy smoker, HTN, HLD, DM2, gout, who was sent in for concerning outpatient echocardiogram which demonstrated worsening pericardial effusion    Patient seen and examined at bedside.    Overnight Events: NAEON, feeling well today, denies FC NV CP SOB. States his LE edema is unchanged. Pending possible pericardiocentesis with IR today. Will still need CT TAVR and future LHC.     Telemetry:         Current Meds:  acetaminophen     Tablet .. 650 milliGRAM(s) Oral every 6 hours PRN  albuterol    90 MICROgram(s) HFA Inhaler 2 Puff(s) Inhalation every 6 hours PRN  allopurinol 100 milliGRAM(s) Oral daily  atorvastatin 40 milliGRAM(s) Oral at bedtime  beclomethasone  80 MICROgram(s) Inhaler 2 Puff(s) Inhalation two times a day  carboxymethylcellulose 0.5% (Preservative-Free) Ophthalmic Solution 1 Drop(s) Both EYES two times a day PRN  chlorhexidine 2% Cloths 1 Application(s) Topical daily  dextrose 5%. 1000 milliLiter(s) IV Continuous <Continuous>  dextrose 5%. 1000 milliLiter(s) IV Continuous <Continuous>  dextrose 50% Injectable 12.5 Gram(s) IV Push once  dextrose 50% Injectable 25 Gram(s) IV Push once  dextrose 50% Injectable 25 Gram(s) IV Push once  dextrose Oral Gel 15 Gram(s) Oral once PRN  glucagon  Injectable 1 milliGRAM(s) IntraMuscular once  heparin   Injectable 6500 Unit(s) IV Push every 6 hours PRN  heparin   Injectable 3000 Unit(s) IV Push every 6 hours PRN  heparin  Infusion.  Unit(s)/Hr IV Continuous <Continuous>  insulin lispro (ADMELOG) corrective regimen sliding scale   SubCutaneous three times a day before meals  insulin lispro (ADMELOG) corrective regimen sliding scale   SubCutaneous at bedtime  melatonin 3 milliGRAM(s) Oral at bedtime PRN  metoprolol succinate ER 75 milliGRAM(s) Oral two times a day  omega-3-Acid Ethyl Esters 4 Gram(s) Oral daily  pantoprazole    Tablet 40 milliGRAM(s) Oral before breakfast  tamsulosin 0.4 milliGRAM(s) Oral at bedtime  tiotropium 2.5 MICROgram(s) Inhaler 2 Puff(s) Inhalation daily      Vitals:  T(F): 97.5 (07-07), Max: 98.4 (07-06)  HR: 75 (07-07) (75 - 95)  BP: 126/79 (07-07) (112/73 - 126/79)  RR: 18 (07-07)  SpO2: 99% (07-07)  I&O's Summary    06 Jul 2023 07:01  -  07 Jul 2023 07:00  --------------------------------------------------------  IN: 480 mL / OUT: 1025 mL / NET: -545 mL        Physical Exam:  Appearance: No acute distress; well appearing  Eyes: PERRL, EOMI, pink conjunctiva  HEENT: Normal oral mucosa  Cardiovascular: IIR, S1, S2, I/VI VIOLETA radiating to carotids, no rubs, or gallops; no edema; no JVD  Respiratory: Clear to auscultation bilaterally  Gastrointestinal: soft, non-tender, non-distended with normal bowel sounds  Musculoskeletal: 2+ edema to knees with venous stasis   Neurologic: Non-focal  Lymphatic: No lymphadenopathy  Psychiatry: AAOx3, mood & affect appropriate  Skin: No rashes, ecchymoses, or cyanosis                        10.3   6.29  )-----------( Clumped    ( 07 Jul 2023 07:45 )             33.5     07-07    143  |  101  |  43<H>  ----------------------------<  114<H>  3.7   |  28  |  1.54<H>    Ca    8.9      07 Jul 2023 07:45      PTT - ( 07 Jul 2023 01:57 )  PTT:140.7 sec  CARDIAC MARKERS ( 03 Jul 2023 19:24 )  27 ng/L / x     / x     / x     / x     / x      CARDIAC MARKERS ( 03 Jul 2023 17:48 )  30 ng/L / x     / x     / x     / x     / x     New ECG(s): Personally reviewed    ___________________________________________  CONCLUSIONS:      1. Patient in atrial fibrillation; ejection fraction varies with R-R interval.   2. The left ventricular systolic function is normal with an ejection fraction visually estimated at 60 to 65 %.   3. Mildly enlarged right ventricular cavity size, normal right ventricular wall thickness and mildly reduced right ventricular systolic function.   4. Mild mitral regurgitation.   5. Trileaflet aortic valve with reduced systolic excursion.   6. Severe calcification of the aortic valve leaflets.   7. Severe aortic stenosis.   8. Low flow, low gradient aortic stenosis with preserved EF. Left ventricular stroke volume is 54.2 ml ;left ventricular stroke volume index is 28.5 ml/m².   9. Trace aortic regurgitation.  10. Moderate-large circumferential pericardial effusion. The effusion measures approximately 1.8 cm adjacent to the right atrium, 1.8 cm adjacent to the right atrium, and 2.4 cm lateral to the left ventricle in its largest dimension.  11. Findings were discussed with Dr. Jeffrey Wechsler on 7/5/2023 at 4:50 pm. No prior echocardiogram is available for comparison.      A/P  78M w/ hx of Afib (on Eliquis), severe AS, mod-severe TR, CAD/MI (30 yrs ago, medically managed, no PCI), ?CHF, COPD (not on O2 at home), former heavy smoker, HTN, HLD, DM2, gout, who was sent in for concerning outpatient echocardiogram which demonstrated worsening pericardial effusion.    #Large Pericardial Effusion  - No clinical tamponade patient is hemodynamically stable, with robust heart sounds and no obvious JVD. Outpatient TTE demonstrated LVEF of 55% with large pericardial effusion without tamponade physiology.   - Repeat TTE showing large effusion mainly around L ventricle     > Discussed risk benefit of dx/tx pericardiocentesis, pt amenable. However he took his eliquis today so it cannot happen today. Spoke to IC Dr. Olman Chavez who will review the images to make sure window is safe for cath lab tap. Primary team also spoke to IR who deferred to cardiology. Will plan on stopping eliquis, starting heparin and planning on possible pericardiocentesis tomorrow   Plan:  - NPO after midnight for possible pericardiocentesis today with IR (IC cannot access safely)  - Hold lasix iso effusion marilyn procedure    #Severe Symptomatic Low Flow Low Gradient AS  - Pt with known sx of sev AS p/w GOFF  - Repeat TTE showing sev AS  - Structural cardiology consulted, pending CT TAVR and eventual LHC  - Hypervolemic on exam, likely multifactorial, judicious with diuretics as above    #AFib  #RBBB  - Persistent per OSH documentation   - On Apixaban 2.5 BID at home - unclear why on dose reduced, per son he previously was on coumadin but had supra-therapeutic   - CHADSVASC 5       Jean Ríos PGY5  Cardiology Fellow    CHAVA Hernández     This patient was seen and examined personally by me and the plan was discussed with the fellow and/or resident above. Amendments were made as necessary to the above. Agree with the excellent note and plan above. IR pericardiocentesis.    Ralph Hernández MD, MPhil, [facility]  Cardiologist, [facility]  ; [facility] [place]/[facility]  Email: [email or web address] Cardiology and Cardiovascular Surgery on-service contact/call information, go to [email or web address] and use "[email or web address]" to login.  Outpatient Cardiology appointments, call [phone] to arrange with a colleague; I do not have outpatient Cardiology clinic.

## 2023-07-07 NOTE — PROGRESS NOTE ADULT - PROBLEM SELECTOR PLAN 10
- DVT ppx: on home Eliquis  - Diet: DASH/CC  - Dispo: pending clinical course - DVT ppx: on heparin gtt  - Diet: DASH/CC  - Dispo: pending clinical course

## 2023-07-07 NOTE — PROGRESS NOTE ADULT - NS ATTEND AMEND GEN_ALL_CORE FT
No acute events reported overnight.  The patient denies any cardiopulmonary plaints at rest or upon minimal exertion.  He feels his leg swelling is at his baseline.  Denies any fevers, chills, sweats, light sensation, dizzy or palpitations.  Agree with 14 point review of systems and physical examination as noted above.    78-year-old man with a past medical history significant atrial fibrillation/Eliquis, low-flow low gradient severe AS, moderate tricuspid regurgitation, coronary artery disease status post MI 30 years ago (medically managed), COPD, former heavy smoker, hypertension, hyperlipidemia, type 2 diabetes mellitus and gout.  He was sent in by his outpatient cardiologist due to concern for worsening pericardial effusion seen on TTE.    --Discussed with the patient and his son Uriel who was previously contacted on the phone concerning findings on the echocardiogram study.  There is concern the patient has low-flow low gradient severe aortic valve stenosis.  Discussed what is aortic valve stenosis.  The associated signs and symptoms of severe aortic valve stenosis were reviewed.  The natural pathophysiology of untreated severe aortic valve stenosis was gone over.  The various treatment options were gone over along with their pros/cons and risks/benefits including medical therapy, TAVR and SAVR.  If the patient needs to undergo valve replacement he would prefer to undergo TAVR.  Indications and details of the TAVR procedure reviewed.  Benefits and risks were gone over.  Risks include but not limited to infection, bleeding, arrhythmia, TIA/stroke, hemodynamic instability, vascular injury, need for surgery and death.  The necessary work-up prior to the TAVR procedure were gone over including heart CTA, carotid ultrasound, spirometry and angiogram.  Indications and details for the studies were gone over.  --At this time to confirm that the patient does have severe low-flow low gradient aortic valve stenosis is recommended that a heart CTA be performed.  Indications for the heart CTA were reviewed.  --Timing of CTA is dependent upon clearance by nephrology team due to worsening renal insufficiency.  Diuretics are currently being held.  -- The patient was found to have a large pericardial effusion without tamponade physiology.  Patient has not found to be an appropriate candidate to undergo pericardiocentesis by interventional cardiology.  Primary team has reached out to interventional radiology for assistance.  Unclear etiology of patient's pericardial effusion.  He has been transitioned to heparin drip.   -- Continue heparin drip with close monitoring of PTT and assess for signs and signs of bleeding.  He was previously on Eliquis 2.5 mg twice a day.  -- Continue metoprolol succinate 75 mg twice a day and atorvastatin 40 mg daily.  -- Continue telemetry monitoring.  -- Aim for potassium greater than 4 magnesium greater than 2    All questions and concerns of the patient were reviewed.    Findings and plan were discussed with medicine team, nephrology team and cardiology/Dr. Hernández.     Time-based billing (NON-critical care).   35 minutes spent on total encounter. The necessity of the time spent during the encounter on this date of service was due to: education, assessment and coordination of care.

## 2023-07-07 NOTE — PROGRESS NOTE ADULT - SUBJECTIVE AND OBJECTIVE BOX
[facility] DIVISION OF KIDNEY DISEASES AND HYPERTENSION   FOLLOW UP NOTE    --------------------------------------------------------------------------------  HPI: 78M w/ hx of Afib (on Eliquis), severe AS, mod-severe TR, CAD/MI (30 yrs ago, medically managed, no PCI), HFpEF (TTE 6/3 EF 55%), CKD, COPD (not on O2 at home), former heavy smoker (over 25 years agO), HTN, HLD, DM2, gout, presenting with worsening pericardial effusion. Patient was sent in from his cardiologist's office. Of note, he had a TTE on day of presentation (7/3/23) that showed a "large circumferential pericardial effusion up to 2.6 cm without evidence of tamponade physiology" (and mildly reduced RV systolic function was also noted). His prior TTE on 10/3/22 had shown a "moderate pericardial effusion, measuring about 1.0 cm superior to the RA; otherwise small circumferential pericardial effusion." Etiology of pericardial effusion is unclear. Cardiology is considering pericardiocentesis and CT TVAR, renal was consulted to evaluate the patient for contrast tolerance given CKD.     24 hour events/subjective: Pt. was seen and examined today. Overnight events noted.       PAST HISTORY  --------------------------------------------------------------------------------  No significant changes to PMH, PSH, FHx, SHx, unless otherwise noted    ALLERGIES & MEDICATIONS  --------------------------------------------------------------------------------  Allergies    penicillins (Unknown)    Intolerances      Standing Inpatient Medications  allopurinol 100 milliGRAM(s) Oral daily  atorvastatin 40 milliGRAM(s) Oral at bedtime  beclomethasone  80 MICROgram(s) Inhaler 2 Puff(s) Inhalation two times a day  chlorhexidine 2% Cloths 1 Application(s) Topical daily  dextrose 5%. 1000 milliLiter(s) IV Continuous <Continuous>  dextrose 5%. 1000 milliLiter(s) IV Continuous <Continuous>  dextrose 50% Injectable 12.5 Gram(s) IV Push once  dextrose 50% Injectable 25 Gram(s) IV Push once  dextrose 50% Injectable 25 Gram(s) IV Push once  glucagon  Injectable 1 milliGRAM(s) IntraMuscular once  heparin  Infusion.  Unit(s)/Hr IV Continuous <Continuous>  insulin lispro (ADMELOG) corrective regimen sliding scale   SubCutaneous three times a day before meals  insulin lispro (ADMELOG) corrective regimen sliding scale   SubCutaneous at bedtime  metoprolol succinate ER 75 milliGRAM(s) Oral two times a day  omega-3-Acid Ethyl Esters 4 Gram(s) Oral daily  pantoprazole    Tablet 40 milliGRAM(s) Oral before breakfast  tamsulosin 0.4 milliGRAM(s) Oral at bedtime  tiotropium 2.5 MICROgram(s) Inhaler 2 Puff(s) Inhalation daily    PRN Inpatient Medications  acetaminophen     Tablet .. 650 milliGRAM(s) Oral every 6 hours PRN  albuterol    90 MICROgram(s) HFA Inhaler 2 Puff(s) Inhalation every 6 hours PRN  carboxymethylcellulose 0.5% (Preservative-Free) Ophthalmic Solution 1 Drop(s) Both EYES two times a day PRN  dextrose Oral Gel 15 Gram(s) Oral once PRN  heparin   Injectable 3000 Unit(s) IV Push every 6 hours PRN  heparin   Injectable 6500 Unit(s) IV Push every 6 hours PRN  melatonin 3 milliGRAM(s) Oral at bedtime PRN      REVIEW OF SYSTEMS  --------------------------------------------------------------------------------    All other systems were reviewed and are negative, except as noted.    VITALS/PHYSICAL EXAM  --------------------------------------------------------------------------------  T(C): 36.4 (07-07-23 @ 05:51), Max: 36.9 (07-06-23 @ 11:42)  HR: 75 (07-07-23 @ 05:51) (75 - 95)  BP: 126/79 (07-07-23 @ 05:51) (112/73 - 126/79)  RR: 18 (07-07-23 @ 05:51) (18 - 18)  SpO2: 99% (07-07-23 @ 05:51) (92% - 99%)  Wt(kg): --        07-06-23 @ 07:01  -  07-07-23 @ 07:00  --------------------------------------------------------  IN: 480 mL / OUT: 1025 mL / NET: -545 mL        Physical Exam:  	Gen: NAD  	HEENT: Anicteric  	Pulm: CTA B/L  	CV: S1S2+  	Abd: Soft, +BS         	Ext: + LE edema B/L R>L (hx of leg trauma)  	Neuro: Awake          : non oliguric, utilizes the urinal   	Skin: Warm and dry    LABS/STUDIES  --------------------------------------------------------------------------------              10.3   6.29  >-----------<  Clumped    [07-07-23 @ 07:45]              33.5     143  |  101  |  43  ----------------------------<  114      [07-07-23 @ 07:45]  3.7   |  28  |  1.54        Ca     8.9     [07-07-23 @ 07:45]        PTT: 140.7      [07-07-23 @ 01:57]      Creatinine Trend:  SCr 1.54 [07-07 @ 07:45]  SCr 1.66 [07-06 @ 07:56]  SCr 1.74 [07-05 @ 06:29]  SCr 1.42 [07-04 @ 06:30]  SCr 1.36 [07-03 @ 17:48]    Urinalysis - [07-07-23 @ 07:45]      Color  / Appearance  / SG  / pH       Gluc 114 / Ketone   / Bili  / Urobili        Blood  / Protein  / Leuk Est  / Nitrite       RBC  / WBC  / Hyaline  / Gran  / Sq Epi  / Non Sq Epi  / Bacteria       HCV 0.10, Nonreact      [07-05-23 @ 06:29]     [facility] DIVISION OF KIDNEY DISEASES AND HYPERTENSION   FOLLOW UP NOTE  --------------------------------------------------------------------------------  HPI: 78M w/ hx of Afib (on Eliquis), severe AS, mod-severe TR, CAD/MI (30 yrs ago, medically managed, no PCI), HFpEF (TTE 6/3 EF 55%), CKD, COPD (not on O2 at home), former heavy smoker (over 25 years agO), HTN, HLD, DM2, gout, presenting with worsening pericardial effusion. Patient was sent in from his cardiologist's office. Of note, he had a TTE on day of presentation (7/3/23) that showed a "large circumferential pericardial effusion up to 2.6 cm without evidence of tamponade physiology" (and mildly reduced RV systolic function was also noted). His prior TTE on 10/3/22 had shown a "moderate pericardial effusion, measuring about 1.0 cm superior to the RA; otherwise small circumferential pericardial effusion." Etiology of pericardial effusion is unclear. Cardiology is considering pericardiocentesis and CT TVAR, renal was consulted to evaluate the patient for contrast tolerance given CKD.     24 hour events/subjective: Pt. was seen and examined today. He is "feeling well". He denied overt shortness of breath, chest pain. His leg swelling is "at baseline/about the same". Denied difficulty with urination.     PAST HISTORY  --------------------------------------------------------------------------------  No significant changes to PMH, PSH, FHx, SHx, unless otherwise noted    ALLERGIES & MEDICATIONS  --------------------------------------------------------------------------------  Allergies  penicillins (Unknown)  Intolerances    Standing Inpatient Medications  allopurinol 100 milliGRAM(s) Oral daily  atorvastatin 40 milliGRAM(s) Oral at bedtime  beclomethasone  80 MICROgram(s) Inhaler 2 Puff(s) Inhalation two times a day  chlorhexidine 2% Cloths 1 Application(s) Topical daily  dextrose 5%. 1000 milliLiter(s) IV Continuous <Continuous>  dextrose 5%. 1000 milliLiter(s) IV Continuous <Continuous>  dextrose 50% Injectable 12.5 Gram(s) IV Push once  dextrose 50% Injectable 25 Gram(s) IV Push once  dextrose 50% Injectable 25 Gram(s) IV Push once  glucagon  Injectable 1 milliGRAM(s) IntraMuscular once  heparin  Infusion.  Unit(s)/Hr IV Continuous <Continuous>  insulin lispro (ADMELOG) corrective regimen sliding scale   SubCutaneous three times a day before meals  insulin lispro (ADMELOG) corrective regimen sliding scale   SubCutaneous at bedtime  metoprolol succinate ER 75 milliGRAM(s) Oral two times a day  omega-3-Acid Ethyl Esters 4 Gram(s) Oral daily  pantoprazole    Tablet 40 milliGRAM(s) Oral before breakfast  tamsulosin 0.4 milliGRAM(s) Oral at bedtime  tiotropium 2.5 MICROgram(s) Inhaler 2 Puff(s) Inhalation daily    PRN Inpatient Medications  acetaminophen     Tablet .. 650 milliGRAM(s) Oral every 6 hours PRN  albuterol    90 MICROgram(s) HFA Inhaler 2 Puff(s) Inhalation every 6 hours PRN  carboxymethylcellulose 0.5% (Preservative-Free) Ophthalmic Solution 1 Drop(s) Both EYES two times a day PRN  dextrose Oral Gel 15 Gram(s) Oral once PRN  heparin   Injectable 3000 Unit(s) IV Push every 6 hours PRN  heparin   Injectable 6500 Unit(s) IV Push every 6 hours PRN  melatonin 3 milliGRAM(s) Oral at bedtime PRN    REVIEW OF SYSTEMS  --------------------------------------------------------------------------------  All other systems were reviewed and are negative, except as noted.    VITALS/PHYSICAL EXAM  --------------------------------------------------------------------------------  T(C): 36.4 (07-07-23 @ 05:51), Max: 36.9 (07-06-23 @ 11:42)  HR: 75 (07-07-23 @ 05:51) (75 - 95)  BP: 126/79 (07-07-23 @ 05:51) (112/73 - 126/79)  RR: 18 (07-07-23 @ 05:51) (18 - 18)  SpO2: 99% (07-07-23 @ 05:51) (92% - 99%)  Wt(kg): --    07-06-23 @ 07:01  -  07-07-23 @ 07:00  --------------------------------------------------------  IN: 480 mL / OUT: 1025 mL / NET: -545 mL    Physical Exam:  	Gen: Laying in bed in NAD  	HEENT: Anicteric  	Pulm: CTA B/L  	CV: S1S2+  	Abd: Soft, +BS         	Ext: + LE edema B/L R>L (hx of leg trauma)  	Neuro: Awake  	Skin: Warm and dry    LABS/STUDIES  --------------------------------------------------------------------------------              10.3   6.29  >-----------<  Clumped    [07-07-23 @ 07:45]              33.5     143  |  101  |  43  ----------------------------<  114      [07-07-23 @ 07:45]  3.7   |  28  |  1.54        Ca     8.9     [07-07-23 @ 07:45]    PTT: 140.7      [07-07-23 @ 01:57]    Creatinine Trend:  SCr 1.54 [07-07 @ 07:45]  SCr 1.66 [07-06 @ 07:56]  SCr 1.74 [07-05 @ 06:29]  SCr 1.42 [07-04 @ 06:30]  SCr 1.36 [07-03 @ 17:48]    HCV 0.10, Nonreact      [07-05-23 @ 06:29]

## 2023-07-07 NOTE — PROGRESS NOTE ADULT - SUBJECTIVE AND OBJECTIVE BOX
*****Structural Heart Team*****    Subjective:    Today the patient is resting comfortably in bed, stating he is feeling good.  He is potentially getting a pericardiocentesis today.      PAST MEDICAL & SURGICAL HISTORY:  COPD (chronic obstructive pulmonary disease)    Former smoker    HTN (hypertension)    HLD (hyperlipidemia)    CAD (coronary artery disease)    Chronic atrial fibrillation    Diabetes mellitus, type 2    Gout    Severe aortic stenosis    Pericardial effusion    TR (tricuspid regurgitation)    No significant past surgical history          T(C): 36.4 (07-07-23 @ 05:51), Max: 36.9 (07-06-23 @ 11:42)  HR: 75 (07-07-23 @ 05:51) (75 - 95)  BP: 126/79 (07-07-23 @ 05:51) (112/73 - 126/79)  RR: 18 (07-07-23 @ 05:51) (18 - 18)  SpO2: 99% (07-07-23 @ 05:51) (92% - 99%)  Wt(kg): --  07-06 @ 07:01  -  07-07 @ 07:00  --------------------------------------------------------  IN: 480 mL / OUT: 1025 mL / NET: -545 mL      MEDICATIONS  (STANDING):  allopurinol 100 milliGRAM(s) Oral daily  atorvastatin 40 milliGRAM(s) Oral at bedtime  beclomethasone  80 MICROgram(s) Inhaler 2 Puff(s) Inhalation two times a day  heparin  Infusion.  Unit(s)/Hr (15 mL/Hr) IV Continuous <Continuous>  insulin lispro (ADMELOG) corrective regimen sliding scale   SubCutaneous three times a day before meals  insulin lispro (ADMELOG) corrective regimen sliding scale   SubCutaneous at bedtime  metoprolol succinate ER 75 milliGRAM(s) Oral two times a day  omega-3-Acid Ethyl Esters 4 Gram(s) Oral daily  pantoprazole    Tablet 40 milliGRAM(s) Oral before breakfast  tamsulosin 0.4 milliGRAM(s) Oral at bedtime  tiotropium 2.5 MICROgram(s) Inhaler 2 Puff(s) Inhalation daily    MEDICATIONS  (PRN):  acetaminophen     Tablet .. 650 milliGRAM(s) Oral every 6 hours PRN Temp greater or equal to 38C (100.4F), Mild Pain (1 - 3)  albuterol    90 MICROgram(s) HFA Inhaler 2 Puff(s) Inhalation every 6 hours PRN Shortness of Breath and/or Wheezing  carboxymethylcellulose 0.5% (Preservative-Free) Ophthalmic Solution 1 Drop(s) Both EYES two times a day PRN dry eyes  dextrose Oral Gel 15 Gram(s) Oral once PRN Blood Glucose LESS THAN 70 milliGRAM(s)/deciliter  heparin   Injectable 6500 Unit(s) IV Push every 6 hours PRN For aPTT less than 40  heparin   Injectable 3000 Unit(s) IV Push every 6 hours PRN For aPTT between 40 - 57  melatonin 3 milliGRAM(s) Oral at bedtime PRN Sleep      Review of Symptoms:  Constitutional: Awake, Alert, Follows commands  Respiratory: + GOFF  Cardiac: Denies CP, Denies Palpitations  Gastrointestinal: Denies Pain, Denies N/V, tolerating po intake  Vascular: Negative  Extremities: + Edema, No joint pain or swelling  Neurological: Negative  Endocrine: No heat or cold intolerance, No excessive thirst  Heme/Onc: Negative    Exam:  General: A/Ox3, FERMIN, NAD  HEENT: Supple, No JVD, Trachea midline, no masses  Pulmonary: CTAB, = Chest Excursion, no accessory muscle use, Some mild SOB with Conversation  Cor: Diminished Heart Sound, I/VI VIOLETA, Irregular  ECG: AFib  Gastrointestinal: Soft, NT/ND, + Bowel Sounds  Neuro: = motor and sensory B/L, No focal deficits  Vascular: 1+ Pulses B/L, Trace edema  Extremities:  No joint pain or Swelling  Skin: Warm/Dry/Normal color, Normal turgor, no rashes                          10.3   6.29  )-----------( Clumped    ( 07 Jul 2023 07:45 )             33.5   07-07    143  |  101  |  43<H>  ----------------------------<  114<H>  3.7   |  28  |  1.54<H>    Ca    8.9      07 Jul 2023 07:45    PTT - ( 07 Jul 2023 01:57 )  PTT:140.7 sec    Imaging Reviewed:    Echocardiogram:    Cardiac Catheterization:        Assesment/Plan:  78 Male with Severe LOLG AS, Pericardial Effusion    1.) Severe LOLG AS: Patient is undergoing workup for TAVR. He is ordered for a TAVR CT, which should probably best be done after his Pericardial drainage to ensure best images. He will also need a cardiac catheterization. Once all testing complete, we will evaluate for possible TAVR. It will likely be done as an outpatient, which the patient would also prefer.  2.) Pericardial Effusion: Patient is to possibly get a Pericardial drainage today either in Cath lab or IR. Cardiology team to set up.  3.) ambulate as tolerated.    RANDELL Connelly  [id number]

## 2023-07-07 NOTE — PROGRESS NOTE ADULT - SUBJECTIVE AND OBJECTIVE BOX
Patient is a 78y old  Male who presents with a chief complaint of worsening pericardial effusion (07 Jul 2023 10:44)      SUBJECTIVE / OVERNIGHT EVENTS: Pt seen and examined. No acute events overnight. He denies CP, SOB.      MEDICATIONS  (STANDING):  allopurinol 100 milliGRAM(s) Oral daily  atorvastatin 40 milliGRAM(s) Oral at bedtime  beclomethasone  80 MICROgram(s) Inhaler 2 Puff(s) Inhalation two times a day  chlorhexidine 2% Cloths 1 Application(s) Topical daily  dextrose 5%. 1000 milliLiter(s) (100 mL/Hr) IV Continuous <Continuous>  dextrose 5%. 1000 milliLiter(s) (50 mL/Hr) IV Continuous <Continuous>  dextrose 50% Injectable 12.5 Gram(s) IV Push once  dextrose 50% Injectable 25 Gram(s) IV Push once  dextrose 50% Injectable 25 Gram(s) IV Push once  glucagon  Injectable 1 milliGRAM(s) IntraMuscular once  heparin  Infusion.  Unit(s)/Hr (15 mL/Hr) IV Continuous <Continuous>  insulin lispro (ADMELOG) corrective regimen sliding scale   SubCutaneous three times a day before meals  insulin lispro (ADMELOG) corrective regimen sliding scale   SubCutaneous at bedtime  metoprolol succinate ER 75 milliGRAM(s) Oral two times a day  omega-3-Acid Ethyl Esters 4 Gram(s) Oral daily  pantoprazole    Tablet 40 milliGRAM(s) Oral before breakfast  tamsulosin 0.4 milliGRAM(s) Oral at bedtime  tiotropium 2.5 MICROgram(s) Inhaler 2 Puff(s) Inhalation daily    MEDICATIONS  (PRN):  acetaminophen     Tablet .. 650 milliGRAM(s) Oral every 6 hours PRN Temp greater or equal to 38C (100.4F), Mild Pain (1 - 3)  albuterol    90 MICROgram(s) HFA Inhaler 2 Puff(s) Inhalation every 6 hours PRN Shortness of Breath and/or Wheezing  carboxymethylcellulose 0.5% (Preservative-Free) Ophthalmic Solution 1 Drop(s) Both EYES two times a day PRN dry eyes  dextrose Oral Gel 15 Gram(s) Oral once PRN Blood Glucose LESS THAN 70 milliGRAM(s)/deciliter  heparin   Injectable 3000 Unit(s) IV Push every 6 hours PRN For aPTT between 40 - 57  heparin   Injectable 6500 Unit(s) IV Push every 6 hours PRN For aPTT less than 40  melatonin 3 milliGRAM(s) Oral at bedtime PRN Sleep      Vital Signs Last 24 Hrs  T(C): 36.4 (07 Jul 2023 10:54), Max: 36.7 (06 Jul 2023 21:18)  T(F): 97.5 (07 Jul 2023 10:54), Max: 98.1 (06 Jul 2023 21:18)  HR: 82 (07 Jul 2023 10:54) (75 - 95)  BP: 113/78 (07 Jul 2023 10:54) (113/65 - 126/79)  BP(mean): --  RR: 18 (07 Jul 2023 10:54) (18 - 18)  SpO2: 96% (07 Jul 2023 10:54) (92% - 99%)    Parameters below as of 07 Jul 2023 10:54  Patient On (Oxygen Delivery Method): nasal cannula  O2 Flow (L/min): 2    CAPILLARY BLOOD GLUCOSE      POCT Blood Glucose.: 124 mg/dL (07 Jul 2023 11:45)  POCT Blood Glucose.: 129 mg/dL (07 Jul 2023 08:01)  POCT Blood Glucose.: 118 mg/dL (06 Jul 2023 21:21)  POCT Blood Glucose.: 138 mg/dL (06 Jul 2023 16:25)    I&O's Summary    06 Jul 2023 07:01  -  07 Jul 2023 07:00  --------------------------------------------------------  IN: 480 mL / OUT: 1025 mL / NET: -545 mL    07 Jul 2023 07:01  -  07 Jul 2023 14:50  --------------------------------------------------------  IN: 0 mL / OUT: 275 mL / NET: -275 mL        PHYSICAL EXAM:  GENERAL: NAD, well-groomed  HEAD:  Atraumatic, Normocephalic  EYES:  conjunctiva and sclera clear  NECK: Supple, No JVD  CHEST/LUNG: Clear to auscultation bilaterally; No wheeze  HEART: Regular rate and rhythm; No murmurs, rubs, or gallops  ABDOMEN: Soft, Nontender, Nondistended; Bowel sounds present  EXTREMITIES:  2+ Peripheral Pulses, No clubbing, cyanosis, or edema  PSYCH: AAOx3  NEUROLOGY: non-focal  SKIN: No rashes or lesions    LABS:                        10.3   6.29  )-----------( Clumped    ( 07 Jul 2023 07:45 )             33.5     07-07    143  |  101  |  43<H>  ----------------------------<  114<H>  3.7   |  28  |  1.54<H>    Ca    8.9      07 Jul 2023 07:45      PT/INR - ( 07 Jul 2023 11:02 )   PT: 14.8 sec;   INR: 1.27 ratio         PTT - ( 07 Jul 2023 11:02 )  PTT:98.4 sec      Urinalysis Basic - ( 07 Jul 2023 07:45 )    Color: x / Appearance: x / SG: x / pH: x  Gluc: 114 mg/dL / Ketone: x  / Bili: x / Urobili: x   Blood: x / Protein: x / Nitrite: x   Leuk Esterase: x / RBC: x / WBC x   Sq Epi: x / Non Sq Epi: x / Bacteria: x    RENAL US 7/6  Both kidneys are echogenic, compatible with medical renal disease.    No renal mass, hydronephrosis or calculus is visualized sonographically      Consultant(s) Notes Reviewed:  Cardiology, Structural heart, Nephrology

## 2023-07-07 NOTE — PROGRESS NOTE ADULT - PROBLEM SELECTOR PLAN 1
- Found to have "large circumferential pericardial effusion up to 2.6 cm without evidence of tamponade physiology" on outpt TTE (7/3/23).  - Prior TTE (10/3/22) showed "moderate pericardial effusion, measuring about 1.0 cm superior to the RA; otherwise small circumferential pericardial effusion." Unclear underlying etiology of effusion, but possibly in setting of CHF.  - d/c Lasix given SHEYLA  - strict I/Os, monitor UOP  - monitor hemodynamics closely  - TTE w/unchanged mod to large pericardial effusion ; +severe AS  - for  pericardial window/drain 7/7 per IR    - CT surgery consulted for severe AS ; workup for TAVR in progress ; TAVR CT ordered  - Eliquis on hold ; on Heparin gtt given pending invasive procedure - Found to have "large circumferential pericardial effusion up to 2.6 cm without evidence of tamponade physiology" on outpt TTE (7/3/23).  - Prior TTE (10/3/22) showed "moderate pericardial effusion, measuring about 1.0 cm superior to the RA; otherwise small circumferential pericardial effusion." Unclear underlying etiology of effusion, but possibly in setting of CHF.  - d/essie Lasix given SHEYLA  - strict I/Os, monitor UOP  - monitor hemodynamics closely  - TTE w/unchanged mod to large pericardial effusion ; +severe AS  - for  pericardial window/drain 7/7 per IR    - CT surgery consulted for severe AS ; workup for TAVR in progress ; TAVR CT ordered  - Eliquis on hold ; on Heparin gtt given pending invasive procedure

## 2023-07-07 NOTE — PROGRESS NOTE ADULT - PROBLEM SELECTOR PLAN 2
Unclear hx of ?CHF, possibly has chronic HFpEF.   - d/c diuresis given SHEYLA  - strict I/Os, standing weights daily  - Cardiology following ; reccs appreciated

## 2023-07-07 NOTE — PROGRESS NOTE ADULT - PROBLEM SELECTOR PLAN 8
- Cr uptrend to 1.66 from 1.36 on presentation  - d/c IV Lasix  - Nephrology consulted  - monitor BMP - Cr down to 1.54 from 1.66 (was 1.36 on presentation0  -  IV Lasix d/essie  - Nephrology consulted; reccs appreciated ; f/up pending labs  - Renal US shows both kidneys are echogenic, compatible with medical renal disease. No renal mass, hydronephrosis or calculus  - monitor BMP - Cr down to 1.54 from 1.66 (was 1.36 on presentation0  -  IV Lasix d/essie  - Nephrology consulted; reccs appreciated Hep C non reactive. f/up HBsAg, serum immunofixation, C3 and C4.   - f/up UA ; check spot urine TP/CR to quantify proteinuria.   - Renal US shows both kidneys are echogenic, compatible with medical renal disease. No renal mass, hydronephrosis or calculus  - monitor BMP

## 2023-07-07 NOTE — CHART NOTE - NSCHARTNOTEFT_GEN_A_CORE
Informed by RN, patient did NOT go for Pericardial Drainage in IR today - IR order appears to have been cancelled  Vital Signs Last 24 Hrs  T(C): 36.4 (07 Jul 2023 10:54), Max: 36.7 (06 Jul 2023 21:18)  T(F): 97.5 (07 Jul 2023 10:54), Max: 98.1 (06 Jul 2023 21:18)  HR: 81 (07 Jul 2023 17:31) (75 - 95)  BP: 128/74 (07 Jul 2023 17:31) (113/78 - 128/74)  BP(mean): --  RR: 3 (07 Jul 2023 17:31) (3 - 18)  SpO2: 98% (07 Jul 2023 17:31) (92% - 99%)    Parameters below as of 07 Jul 2023 17:31  Patient On (Oxygen Delivery Method): nasal cannula    Remains on Heparin gtt  Dr Toussaint and Dr Rod (Cardiology covering) informed of NO drainage performed today

## 2023-07-07 NOTE — PROGRESS NOTE ADULT - ASSESSMENT
78M (alt MRN [id number]) w/ hx of Afib (on Eliquis), severe AS, mod-severe TR, CAD/MI (30 yrs ago, medically managed, no PCI), ?CHF, COPD (not on O2 at home), former heavy smoker, HTN, HLD, DM2, gout, prior moderate pericardial effusion, now presenting with large pericardial effusion w/o tamponade physiology on outpt TTE.

## 2023-07-07 NOTE — PROVIDER CONTACT NOTE (OTHER) - ACTION/TREATMENT ORDERED:
provider notified, advised RN to continue heparin gtt. ordered nasal spray for decongestion. pt encouraged not to blow nose

## 2023-07-08 LAB
ANION GAP SERPL CALC-SCNC: 10 MMOL/L — SIGNIFICANT CHANGE UP (ref 5–17)
APTT BLD: 36.7 SEC — HIGH (ref 27.5–35.5)
APTT BLD: 79.2 SEC — HIGH (ref 27.5–35.5)
APTT BLD: >200 SEC — CRITICAL HIGH (ref 27.5–35.5)
BUN SERPL-MCNC: 38 MG/DL — HIGH (ref 7–23)
C3 SERPL-MCNC: 133 MG/DL — SIGNIFICANT CHANGE UP (ref 81–157)
C4 SERPL-MCNC: 32 MG/DL — SIGNIFICANT CHANGE UP (ref 13–39)
CALCIUM SERPL-MCNC: 9.1 MG/DL — SIGNIFICANT CHANGE UP (ref 8.4–10.5)
CHLORIDE SERPL-SCNC: 101 MMOL/L — SIGNIFICANT CHANGE UP (ref 96–108)
CO2 SERPL-SCNC: 31 MMOL/L — SIGNIFICANT CHANGE UP (ref 22–31)
CREAT SERPL-MCNC: 1.33 MG/DL — HIGH (ref 0.5–1.3)
EGFR: 55 ML/MIN/1.73M2 — LOW
GLUCOSE BLDC GLUCOMTR-MCNC: 108 MG/DL — HIGH (ref 70–99)
GLUCOSE BLDC GLUCOMTR-MCNC: 117 MG/DL — HIGH (ref 70–99)
GLUCOSE BLDC GLUCOMTR-MCNC: 122 MG/DL — HIGH (ref 70–99)
GLUCOSE SERPL-MCNC: 93 MG/DL — SIGNIFICANT CHANGE UP (ref 70–99)
HBV SURFACE AG SER-ACNC: SIGNIFICANT CHANGE UP
HCT VFR BLD CALC: 35.8 % — LOW (ref 39–50)
HGB BLD-MCNC: 10.7 G/DL — LOW (ref 13–17)
INR BLD: 1.4 RATIO — HIGH (ref 0.88–1.16)
MAGNESIUM SERPL-MCNC: 2.1 MG/DL — SIGNIFICANT CHANGE UP (ref 1.6–2.6)
MCHC RBC-ENTMCNC: 25.3 PG — LOW (ref 27–34)
MCHC RBC-ENTMCNC: 29.9 GM/DL — LOW (ref 32–36)
MCV RBC AUTO: 84.6 FL — SIGNIFICANT CHANGE UP (ref 80–100)
NRBC # BLD: 0 /100 WBCS — SIGNIFICANT CHANGE UP (ref 0–0)
PLATELET # BLD AUTO: SIGNIFICANT CHANGE UP K/UL (ref 150–400)
POTASSIUM SERPL-MCNC: 4.1 MMOL/L — SIGNIFICANT CHANGE UP (ref 3.5–5.3)
POTASSIUM SERPL-SCNC: 4.1 MMOL/L — SIGNIFICANT CHANGE UP (ref 3.5–5.3)
PROTHROM AB SERPL-ACNC: 16.2 SEC — HIGH (ref 10.5–13.4)
RBC # BLD: 4.23 M/UL — SIGNIFICANT CHANGE UP (ref 4.2–5.8)
RBC # FLD: 19.1 % — HIGH (ref 10.3–14.5)
SODIUM SERPL-SCNC: 142 MMOL/L — SIGNIFICANT CHANGE UP (ref 135–145)
WBC # BLD: 5.51 K/UL — SIGNIFICANT CHANGE UP (ref 3.8–10.5)
WBC # FLD AUTO: 5.51 K/UL — SIGNIFICANT CHANGE UP (ref 3.8–10.5)

## 2023-07-08 PROCEDURE — 99233 SBSQ HOSP IP/OBS HIGH 50: CPT

## 2023-07-08 RX ADMIN — HEPARIN SODIUM 600 UNIT(S)/HR: 5000 INJECTION INTRAVENOUS; SUBCUTANEOUS at 12:56

## 2023-07-08 RX ADMIN — HEPARIN SODIUM 6500 UNIT(S): 5000 INJECTION INTRAVENOUS; SUBCUTANEOUS at 21:04

## 2023-07-08 RX ADMIN — HEPARIN SODIUM 0 UNIT(S)/HR: 5000 INJECTION INTRAVENOUS; SUBCUTANEOUS at 11:37

## 2023-07-08 RX ADMIN — Medication 100 MILLIGRAM(S): at 09:15

## 2023-07-08 RX ADMIN — Medication 75 MILLIGRAM(S): at 17:04

## 2023-07-08 RX ADMIN — HEPARIN SODIUM 900 UNIT(S)/HR: 5000 INJECTION INTRAVENOUS; SUBCUTANEOUS at 03:14

## 2023-07-08 RX ADMIN — TAMSULOSIN HYDROCHLORIDE 0.4 MILLIGRAM(S): 0.4 CAPSULE ORAL at 21:08

## 2023-07-08 RX ADMIN — TIOTROPIUM BROMIDE 2 PUFF(S): 18 CAPSULE ORAL; RESPIRATORY (INHALATION) at 09:13

## 2023-07-08 RX ADMIN — HEPARIN SODIUM 1000 UNIT(S)/HR: 5000 INJECTION INTRAVENOUS; SUBCUTANEOUS at 20:40

## 2023-07-08 RX ADMIN — Medication 4 GRAM(S): at 09:15

## 2023-07-08 RX ADMIN — Medication 75 MILLIGRAM(S): at 05:52

## 2023-07-08 RX ADMIN — PANTOPRAZOLE SODIUM 40 MILLIGRAM(S): 20 TABLET, DELAYED RELEASE ORAL at 05:52

## 2023-07-08 RX ADMIN — CHLORHEXIDINE GLUCONATE 1 APPLICATION(S): 213 SOLUTION TOPICAL at 09:17

## 2023-07-08 RX ADMIN — BECLOMETHASONE DIPROPIONATE 2 PUFF(S): 40 AEROSOL, METERED RESPIRATORY (INHALATION) at 09:14

## 2023-07-08 RX ADMIN — BECLOMETHASONE DIPROPIONATE 2 PUFF(S): 40 AEROSOL, METERED RESPIRATORY (INHALATION) at 21:10

## 2023-07-08 RX ADMIN — ATORVASTATIN CALCIUM 40 MILLIGRAM(S): 80 TABLET, FILM COATED ORAL at 21:08

## 2023-07-08 RX ADMIN — HEPARIN SODIUM 900 UNIT(S)/HR: 5000 INJECTION INTRAVENOUS; SUBCUTANEOUS at 09:18

## 2023-07-08 NOTE — PROGRESS NOTE ADULT - PROBLEM SELECTOR PLAN 1
- Found to have "large circumferential pericardial effusion up to 2.6 cm without evidence of tamponade physiology" on outpt TTE (7/3/23).  - Prior TTE (10/3/22) showed "moderate pericardial effusion, measuring about 1.0 cm superior to the RA; otherwise small circumferential pericardial effusion." Unclear underlying etiology of effusion, but possibly in setting of CHF.  - d/essie Lasix given SHEYLA  - strict I/Os, monitor UOP  - monitor hemodynamics closely  - TTE w/unchanged mod to large pericardial effusion ; +severe AS    >    > Pending further recs from cardiology and IR teams regarding plan for pericardial drainage.   - CT surgery consulted for severe AS ; workup for TAVR in progress ; TAVR CT ordered  - Eliquis on hold ; on Heparin gtt given pending invasive procedure - Found to have "large circumferential pericardial effusion up to 2.6 cm without evidence of tamponade physiology" on outpt TTE (7/3/23).  - Prior TTE (10/3/22) showed "moderate pericardial effusion, measuring about 1.0 cm superior to the RA; otherwise small circumferential pericardial effusion." Unclear underlying etiology of effusion, but possibly in setting of CHF.  - d/essie Lasix given SHEYLA  - strict I/Os, monitor UOP  - monitor hemodynamics closely  - TTE w/unchanged mod to large pericardial effusion ; +severe AS    >    > Pending further recs from cardiology and IR teams regarding plan for pericardiocentesis.   - CT surgery consulted for severe AS ; workup for TAVR in progress ; TAVR CT ordered  - Eliquis on hold ; on Heparin gtt given pending invasive procedure

## 2023-07-08 NOTE — PROGRESS NOTE ADULT - PROBLEM SELECTOR PLAN 8
- Cr down to 1.54 from 1.66 (was 1.36 on presentation0  - IV Lasix d/essie  - Nephrology consulted; reccs appreciated Hep C non reactive. f/up HBsAg, serum immunofixation, C3 and C4.   - f/up UA ; check spot urine TP/CR to quantify proteinuria.   - Renal US shows both kidneys are echogenic, compatible with medical renal disease. No renal mass, hydronephrosis or calculus  - monitor BMP

## 2023-07-08 NOTE — PROGRESS NOTE ADULT - SUBJECTIVE AND OBJECTIVE BOX
[facility] Division of Hospital Medicine  Kaleigh Cerrato MD M-F, 8A-5P: MS Teams, Pager: [phone]  Other Times: Ext: [phone], Pager: [phone]      Patient is a 78y old  Male who presents with a chief complaint of worsening pericardial effusion (07 Jul 2023 10:44)      SUBJECTIVE / OVERNIGHT EVENTS:  Patient was examined this morning. Discussed issues regarding cancellation of pericardial drainage yesterday by IR. Patient denies any headache, fever, chest pain, palpitations, dyspnea. He states he has dizziness only with long distance walks.       ADDITIONAL REVIEW OF SYSTEMS:    MEDICATIONS  (STANDING):  allopurinol 100 milliGRAM(s) Oral daily  atorvastatin 40 milliGRAM(s) Oral at bedtime  beclomethasone  80 MICROgram(s) Inhaler 2 Puff(s) Inhalation two times a day  chlorhexidine 2% Cloths 1 Application(s) Topical daily  dextrose 5%. 1000 milliLiter(s) (50 mL/Hr) IV Continuous <Continuous>  dextrose 5%. 1000 milliLiter(s) (100 mL/Hr) IV Continuous <Continuous>  dextrose 50% Injectable 25 Gram(s) IV Push once  dextrose 50% Injectable 12.5 Gram(s) IV Push once  dextrose 50% Injectable 25 Gram(s) IV Push once  glucagon  Injectable 1 milliGRAM(s) IntraMuscular once  heparin  Infusion.  Unit(s)/Hr (15 mL/Hr) IV Continuous <Continuous>  insulin lispro (ADMELOG) corrective regimen sliding scale   SubCutaneous three times a day before meals  insulin lispro (ADMELOG) corrective regimen sliding scale   SubCutaneous at bedtime  metoprolol succinate ER 75 milliGRAM(s) Oral two times a day  omega-3-Acid Ethyl Esters 4 Gram(s) Oral daily  pantoprazole    Tablet 40 milliGRAM(s) Oral before breakfast  tamsulosin 0.4 milliGRAM(s) Oral at bedtime  tiotropium 2.5 MICROgram(s) Inhaler 2 Puff(s) Inhalation daily    MEDICATIONS  (PRN):  acetaminophen     Tablet .. 650 milliGRAM(s) Oral every 6 hours PRN Temp greater or equal to 38C (100.4F), Mild Pain (1 - 3)  albuterol    90 MICROgram(s) HFA Inhaler 2 Puff(s) Inhalation every 6 hours PRN Shortness of Breath and/or Wheezing  carboxymethylcellulose 0.5% (Preservative-Free) Ophthalmic Solution 1 Drop(s) Both EYES two times a day PRN dry eyes  dextrose Oral Gel 15 Gram(s) Oral once PRN Blood Glucose LESS THAN 70 milliGRAM(s)/deciliter  heparin   Injectable 6500 Unit(s) IV Push every 6 hours PRN For aPTT less than 40  heparin   Injectable 3000 Unit(s) IV Push every 6 hours PRN For aPTT between 40 - 57  melatonin 3 milliGRAM(s) Oral at bedtime PRN Sleep  sodium chloride 0.65% Nasal 1 Spray(s) Both Nostrils every 6 hours PRN Nasal Congestion      CAPILLARY BLOOD GLUCOSE      POCT Blood Glucose.: 108 mg/dL (08 Jul 2023 08:10)  POCT Blood Glucose.: 107 mg/dL (07 Jul 2023 21:00)  POCT Blood Glucose.: 115 mg/dL (07 Jul 2023 16:33)      I&O's Summary    07 Jul 2023 07:01  -  08 Jul 2023 07:00  --------------------------------------------------------  IN: 144 mL / OUT: 925 mL / NET: -781 mL        Daily     Daily     PHYSICAL EXAM:  Vital Signs Last 24 Hrs  T(C): 36.3 (08 Jul 2023 10:51), Max: 36.8 (08 Jul 2023 03:52)  T(F): 97.3 (08 Jul 2023 10:51), Max: 98.2 (08 Jul 2023 03:52)  HR: 89 (08 Jul 2023 10:51) (81 - 93)  BP: 122/82 (08 Jul 2023 10:51) (113/72 - 128/74)  BP(mean): --  RR: 18 (08 Jul 2023 10:51) (18 - 18)  SpO2: 96% (08 Jul 2023 10:51) (96% - 99%)    Parameters below as of 08 Jul 2023 10:51  Patient On (Oxygen Delivery Method): nasal cannula  O2 Flow (L/min): 2    CONSTITUTIONAL: NAD, well-developed  EYES: PERRLA; conjunctiva and sclera clear  ENMT: Moist oral mucosa  RESPIRATORY: Normal respiratory effort; lungs are clear to auscultation bilaterally  CARDIOVASCULAR: Regular rate and rhythm, normal S1 and S2, no murmur/rub/gallop;   RLE edema;  Peripheral pulses are 2+ bilaterally  ABDOMEN: Nontender to palpation, normoactive bowel sounds, no rebound/guarding;   MUSCULOSKELETAL:  no clubbing or cyanosis of digits; no joint swelling or tenderness to palpation, moving all extremities   PSYCH: A+O to person, place, and time; affect appropriate  NEUROLOGY: non focal, patient ambulating without assist  SKIN: RLE venous stasis changes     LABS:                        10.7   5.51  )-----------( Clumped    ( 08 Jul 2023 06:48 )             35.8     07-08    142  |  101  |  38<H>  ----------------------------<  93  4.1   |  31  |  1.33<H>    Ca    9.1      08 Jul 2023 06:47  Mg     2.1     07-08        PT/INR - ( 08 Jul 2023 09:58 )   PT: 16.2 sec;   INR: 1.40 ratio         PTT - ( 08 Jul 2023 09:58 )  PTT:>200.0 sec      Urinalysis Basic - ( 08 Jul 2023 06:47 )    Color: x / Appearance: x / SG: x / pH: x  Gluc: 93 mg/dL / Ketone: x  / Bili: x / Urobili: x   Blood: x / Protein: x / Nitrite: x   Leuk Esterase: x / RBC: x / WBC x   Sq Epi: x / Non Sq Epi: x / Bacteria: x            RADIOLOGY & ADDITIONAL TESTS:  Results Reviewed:   Imaging Personally Reviewed:  Electrocardiogram Personally Reviewed:    COORDINATION OF CARE:  Care Discussed with Consultants/Other Providers [Y/N]:  Prior or Outpatient Records Reviewed [Y/N]:

## 2023-07-08 NOTE — PROGRESS NOTE ADULT - ASSESSMENT
78M (alt MRN [id number]) w/ hx of Afib (on Eliquis), severe AS, mod-severe TR, CAD/MI (30 yrs ago, medically managed, no PCI), ?CHF, COPD (not on O2 at home), former heavy smoker, HTN, HLD, DM2, gout, prior moderate pericardial effusion, now presenting with large pericardial effusion w/o tamponade physiology on outpt TTE.

## 2023-07-09 LAB
ANION GAP SERPL CALC-SCNC: 11 MMOL/L — SIGNIFICANT CHANGE UP (ref 5–17)
APTT BLD: 167 SEC — CRITICAL HIGH (ref 27.5–35.5)
APTT BLD: 49.5 SEC — HIGH (ref 27.5–35.5)
APTT BLD: 69.3 SEC — HIGH (ref 27.5–35.5)
BUN SERPL-MCNC: 37 MG/DL — HIGH (ref 7–23)
CALCIUM SERPL-MCNC: 9.1 MG/DL — SIGNIFICANT CHANGE UP (ref 8.4–10.5)
CHLORIDE SERPL-SCNC: 103 MMOL/L — SIGNIFICANT CHANGE UP (ref 96–108)
CO2 SERPL-SCNC: 30 MMOL/L — SIGNIFICANT CHANGE UP (ref 22–31)
CREAT SERPL-MCNC: 1.46 MG/DL — HIGH (ref 0.5–1.3)
EGFR: 49 ML/MIN/1.73M2 — LOW
GLUCOSE BLDC GLUCOMTR-MCNC: 116 MG/DL — HIGH (ref 70–99)
GLUCOSE BLDC GLUCOMTR-MCNC: 122 MG/DL — HIGH (ref 70–99)
GLUCOSE BLDC GLUCOMTR-MCNC: 130 MG/DL — HIGH (ref 70–99)
GLUCOSE BLDC GLUCOMTR-MCNC: 135 MG/DL — HIGH (ref 70–99)
GLUCOSE SERPL-MCNC: 127 MG/DL — HIGH (ref 70–99)
HCT VFR BLD CALC: 34.4 % — LOW (ref 39–50)
HGB BLD-MCNC: 10.3 G/DL — LOW (ref 13–17)
INR BLD: 1.12 RATIO — SIGNIFICANT CHANGE UP (ref 0.88–1.16)
INTERPRETATION SERPL IFE-IMP: SIGNIFICANT CHANGE UP
MCHC RBC-ENTMCNC: 25.4 PG — LOW (ref 27–34)
MCHC RBC-ENTMCNC: 29.9 GM/DL — LOW (ref 32–36)
MCV RBC AUTO: 84.9 FL — SIGNIFICANT CHANGE UP (ref 80–100)
NRBC # BLD: 0 /100 WBCS — SIGNIFICANT CHANGE UP (ref 0–0)
PLATELET # BLD AUTO: SIGNIFICANT CHANGE UP K/UL (ref 150–400)
POTASSIUM SERPL-MCNC: 4.2 MMOL/L — SIGNIFICANT CHANGE UP (ref 3.5–5.3)
POTASSIUM SERPL-SCNC: 4.2 MMOL/L — SIGNIFICANT CHANGE UP (ref 3.5–5.3)
PROTHROM AB SERPL-ACNC: 13 SEC — SIGNIFICANT CHANGE UP (ref 10.5–13.4)
RBC # BLD: 4.05 M/UL — LOW (ref 4.2–5.8)
RBC # FLD: 19 % — HIGH (ref 10.3–14.5)
SODIUM SERPL-SCNC: 144 MMOL/L — SIGNIFICANT CHANGE UP (ref 135–145)
WBC # BLD: 5.8 K/UL — SIGNIFICANT CHANGE UP (ref 3.8–10.5)
WBC # FLD AUTO: 5.8 K/UL — SIGNIFICANT CHANGE UP (ref 3.8–10.5)

## 2023-07-09 PROCEDURE — 99233 SBSQ HOSP IP/OBS HIGH 50: CPT

## 2023-07-09 RX ADMIN — PANTOPRAZOLE SODIUM 40 MILLIGRAM(S): 20 TABLET, DELAYED RELEASE ORAL at 05:53

## 2023-07-09 RX ADMIN — BECLOMETHASONE DIPROPIONATE 2 PUFF(S): 40 AEROSOL, METERED RESPIRATORY (INHALATION) at 21:28

## 2023-07-09 RX ADMIN — Medication 4 GRAM(S): at 12:03

## 2023-07-09 RX ADMIN — Medication 75 MILLIGRAM(S): at 05:54

## 2023-07-09 RX ADMIN — Medication 100 MILLIGRAM(S): at 12:05

## 2023-07-09 RX ADMIN — TAMSULOSIN HYDROCHLORIDE 0.4 MILLIGRAM(S): 0.4 CAPSULE ORAL at 21:26

## 2023-07-09 RX ADMIN — HEPARIN SODIUM 900 UNIT(S)/HR: 5000 INJECTION INTRAVENOUS; SUBCUTANEOUS at 14:35

## 2023-07-09 RX ADMIN — ATORVASTATIN CALCIUM 40 MILLIGRAM(S): 80 TABLET, FILM COATED ORAL at 21:25

## 2023-07-09 RX ADMIN — Medication 75 MILLIGRAM(S): at 17:35

## 2023-07-09 RX ADMIN — HEPARIN SODIUM 3000 UNIT(S): 5000 INJECTION INTRAVENOUS; SUBCUTANEOUS at 14:39

## 2023-07-09 RX ADMIN — HEPARIN SODIUM 700 UNIT(S)/HR: 5000 INJECTION INTRAVENOUS; SUBCUTANEOUS at 04:49

## 2023-07-09 RX ADMIN — Medication 1 DROP(S): at 16:17

## 2023-07-09 RX ADMIN — TIOTROPIUM BROMIDE 2 PUFF(S): 18 CAPSULE ORAL; RESPIRATORY (INHALATION) at 12:09

## 2023-07-09 RX ADMIN — CHLORHEXIDINE GLUCONATE 1 APPLICATION(S): 213 SOLUTION TOPICAL at 12:11

## 2023-07-09 RX ADMIN — HEPARIN SODIUM 900 UNIT(S)/HR: 5000 INJECTION INTRAVENOUS; SUBCUTANEOUS at 21:41

## 2023-07-09 RX ADMIN — HEPARIN SODIUM 0 UNIT(S)/HR: 5000 INJECTION INTRAVENOUS; SUBCUTANEOUS at 03:47

## 2023-07-09 RX ADMIN — BECLOMETHASONE DIPROPIONATE 2 PUFF(S): 40 AEROSOL, METERED RESPIRATORY (INHALATION) at 09:38

## 2023-07-09 NOTE — PROGRESS NOTE ADULT - SUBJECTIVE AND OBJECTIVE BOX
[facility] Division of Hospital Medicine  Kaleigh Cerrato MD M-F, 8A-5P: MS Teams, Pager: [phone]  Other Times: Ext: [phone], Pager: [phone]      Patient is a 78y old  Male who presents with a chief complaint of worsening pericardial effusion (2023 14:21)      SUBJECTIVE / OVERNIGHT EVENTS:  Patient was examined    ADDITIONAL REVIEW OF SYSTEMS:    MEDICATIONS  (STANDING):  allopurinol 100 milliGRAM(s) Oral daily  atorvastatin 40 milliGRAM(s) Oral at bedtime  beclomethasone  80 MICROgram(s) Inhaler 2 Puff(s) Inhalation two times a day  chlorhexidine 2% Cloths 1 Application(s) Topical daily  dextrose 5%. 1000 milliLiter(s) (50 mL/Hr) IV Continuous <Continuous>  dextrose 5%. 1000 milliLiter(s) (100 mL/Hr) IV Continuous <Continuous>  dextrose 50% Injectable 25 Gram(s) IV Push once  dextrose 50% Injectable 12.5 Gram(s) IV Push once  dextrose 50% Injectable 25 Gram(s) IV Push once  glucagon  Injectable 1 milliGRAM(s) IntraMuscular once  heparin  Infusion.  Unit(s)/Hr (15 mL/Hr) IV Continuous <Continuous>  insulin lispro (ADMELOG) corrective regimen sliding scale   SubCutaneous three times a day before meals  insulin lispro (ADMELOG) corrective regimen sliding scale   SubCutaneous at bedtime  metoprolol succinate ER 75 milliGRAM(s) Oral two times a day  omega-3-Acid Ethyl Esters 4 Gram(s) Oral daily  pantoprazole    Tablet 40 milliGRAM(s) Oral before breakfast  tamsulosin 0.4 milliGRAM(s) Oral at bedtime  tiotropium 2.5 MICROgram(s) Inhaler 2 Puff(s) Inhalation daily    MEDICATIONS  (PRN):  acetaminophen     Tablet .. 650 milliGRAM(s) Oral every 6 hours PRN Temp greater or equal to 38C (100.4F), Mild Pain (1 - 3)  albuterol    90 MICROgram(s) HFA Inhaler 2 Puff(s) Inhalation every 6 hours PRN Shortness of Breath and/or Wheezing  carboxymethylcellulose 0.5% (Preservative-Free) Ophthalmic Solution 1 Drop(s) Both EYES two times a day PRN dry eyes  dextrose Oral Gel 15 Gram(s) Oral once PRN Blood Glucose LESS THAN 70 milliGRAM(s)/deciliter  heparin   Injectable 3000 Unit(s) IV Push every 6 hours PRN For aPTT between 40 - 57  heparin   Injectable 6500 Unit(s) IV Push every 6 hours PRN For aPTT less than 40  melatonin 3 milliGRAM(s) Oral at bedtime PRN Sleep  sodium chloride 0.65% Nasal 1 Spray(s) Both Nostrils every 6 hours PRN Nasal Congestion      CAPILLARY BLOOD GLUCOSE      POCT Blood Glucose.: 122 mg/dL (2023 11:47)  POCT Blood Glucose.: 130 mg/dL (2023 07:56)  POCT Blood Glucose.: 122 mg/dL (2023 21:33)  POCT Blood Glucose.: 117 mg/dL (2023 16:28)      I&O's Summary    2023 07:01  -  2023 07:00  --------------------------------------------------------  IN: 0 mL / OUT: 700 mL / NET: -700 mL        Daily     Daily Weight in k.6 (2023 08:30)    PHYSICAL EXAM:  Vital Signs Last 24 Hrs  T(C): 37.1 (2023 11:02), Max: 37.1 (2023 11:02)  T(F): 98.8 (2023 11:02), Max: 98.8 (2023 11:02)  HR: 93 (2023 11:02) (82 - 101)  BP: 126/79 (2023 11:02) (112/74 - 129/75)  BP(mean): --  RR: 18 (2023 11:02) (18 - 18)  SpO2: 98% (2023 11:02) (96% - 98%)    Parameters below as of 2023 11:02  Patient On (Oxygen Delivery Method): nasal cannula  O2 Flow (L/min): 2    CONSTITUTIONAL: NAD, well-developed, well-groomed  EYES: PERRLA; conjunctiva and sclera clear  ENMT: Moist oral mucosa, no pharyngeal injection or exudates; normal dentition  NECK: Supple, no palpable masses; no thyromegaly  RESPIRATORY: Normal respiratory effort; lungs are clear to auscultation bilaterally  CARDIOVASCULAR: Regular rate and rhythm, normal S1 and S2, no murmur/rub/gallop; No lower extremity edema; Peripheral pulses are 2+ bilaterally  ABDOMEN: Nontender to palpation, normoactive bowel sounds, no rebound/guarding; No hepatosplenomegaly  MUSCULOSKELETAL:  no clubbing or cyanosis of digits; no joint swelling or tenderness to palpation, moving all extremities   PSYCH: A+O to person, place, and time; affect appropriate  NEUROLOGY: CN 2-12 are intact and symmetric; no gross sensory/motor deficits   SKIN: No rashes; no palpable lesions    LABS:                        10.3   5.80  )-----------( Clumped    ( 2023 07:10 )             34.4     07-09    144  |  103  |  37<H>  ----------------------------<  127<H>  4.2   |  30  |  1.46<H>    Ca    9.1      2023 07:07  Mg     2.1     07-08        PT/INR - ( 2023 09:58 )   PT: 16.2 sec;   INR: 1.40 ratio         PTT - ( 2023 02:48 )  PTT:167.0 sec      Urinalysis Basic - ( 2023 07:07 )    Color: x / Appearance: x / SG: x / pH: x  Gluc: 127 mg/dL / Ketone: x  / Bili: x / Urobili: x   Blood: x / Protein: x / Nitrite: x   Leuk Esterase: x / RBC: x / WBC x   Sq Epi: x / Non Sq Epi: x / Bacteria: x            RADIOLOGY & ADDITIONAL TESTS:  Results Reviewed:   Imaging Personally Reviewed:  Electrocardiogram Personally Reviewed:    COORDINATION OF CARE:  Care Discussed with Consultants/Other Providers [Y/N]:  Prior or Outpatient Records Reviewed [Y/N]:   [facility] Division of Hospital Medicine  aKleigh Cerrato MD M-F, 8A-5P: MS Teams, Pager: [phone]  Other Times: Ext: [phone], Pager: [phone]      Patient is a 78y old  Male who presents with a chief complaint of worsening pericardial effusion (2023 14:21)      SUBJECTIVE / OVERNIGHT EVENTS:  Patient was examined this morning. Patient is annoyed about pericardial drain procedure being cancelled. Apologized regarding the issue and tried to explain to patient specialist's recs so far and that we are still waiting on a final plan from cardiology team.     ADDITIONAL REVIEW OF SYSTEMS:    MEDICATIONS  (STANDING):  allopurinol 100 milliGRAM(s) Oral daily  atorvastatin 40 milliGRAM(s) Oral at bedtime  beclomethasone  80 MICROgram(s) Inhaler 2 Puff(s) Inhalation two times a day  chlorhexidine 2% Cloths 1 Application(s) Topical daily  dextrose 5%. 1000 milliLiter(s) (50 mL/Hr) IV Continuous <Continuous>  dextrose 5%. 1000 milliLiter(s) (100 mL/Hr) IV Continuous <Continuous>  dextrose 50% Injectable 25 Gram(s) IV Push once  dextrose 50% Injectable 12.5 Gram(s) IV Push once  dextrose 50% Injectable 25 Gram(s) IV Push once  glucagon  Injectable 1 milliGRAM(s) IntraMuscular once  heparin  Infusion.  Unit(s)/Hr (15 mL/Hr) IV Continuous <Continuous>  insulin lispro (ADMELOG) corrective regimen sliding scale   SubCutaneous three times a day before meals  insulin lispro (ADMELOG) corrective regimen sliding scale   SubCutaneous at bedtime  metoprolol succinate ER 75 milliGRAM(s) Oral two times a day  omega-3-Acid Ethyl Esters 4 Gram(s) Oral daily  pantoprazole    Tablet 40 milliGRAM(s) Oral before breakfast  tamsulosin 0.4 milliGRAM(s) Oral at bedtime  tiotropium 2.5 MICROgram(s) Inhaler 2 Puff(s) Inhalation daily    MEDICATIONS  (PRN):  acetaminophen     Tablet .. 650 milliGRAM(s) Oral every 6 hours PRN Temp greater or equal to 38C (100.4F), Mild Pain (1 - 3)  albuterol    90 MICROgram(s) HFA Inhaler 2 Puff(s) Inhalation every 6 hours PRN Shortness of Breath and/or Wheezing  carboxymethylcellulose 0.5% (Preservative-Free) Ophthalmic Solution 1 Drop(s) Both EYES two times a day PRN dry eyes  dextrose Oral Gel 15 Gram(s) Oral once PRN Blood Glucose LESS THAN 70 milliGRAM(s)/deciliter  heparin   Injectable 3000 Unit(s) IV Push every 6 hours PRN For aPTT between 40 - 57  heparin   Injectable 6500 Unit(s) IV Push every 6 hours PRN For aPTT less than 40  melatonin 3 milliGRAM(s) Oral at bedtime PRN Sleep  sodium chloride 0.65% Nasal 1 Spray(s) Both Nostrils every 6 hours PRN Nasal Congestion      CAPILLARY BLOOD GLUCOSE      POCT Blood Glucose.: 122 mg/dL (2023 11:47)  POCT Blood Glucose.: 130 mg/dL (2023 07:56)  POCT Blood Glucose.: 122 mg/dL (2023 21:33)  POCT Blood Glucose.: 117 mg/dL (2023 16:28)      I&O's Summary    2023 07:01  -  2023 07:00  --------------------------------------------------------  IN: 0 mL / OUT: 700 mL / NET: -700 mL        Daily     Daily Weight in k.6 (2023 08:30)    PHYSICAL EXAM:  Vital Signs Last 24 Hrs  T(C): 37.1 (2023 11:02), Max: 37.1 (2023 11:02)  T(F): 98.8 (2023 11:02), Max: 98.8 (2023 11:02)  HR: 93 (2023 11:02) (82 - 101)  BP: 126/79 (2023 11:02) (112/74 - 129/75)  BP(mean): --  RR: 18 (2023 11:02) (18 - 18)  SpO2: 98% (2023 11:02) (96% - 98%)    Parameters below as of 2023 11:02  Patient On (Oxygen Delivery Method): nasal cannula  O2 Flow (L/min): 2    CONSTITUTIONAL: NAD, well-developed  EYES: PERRLA; conjunctiva and sclera clear  ENMT: Moist oral mucosa  RESPIRATORY: Normal respiratory effort; lungs are clear to auscultation bilaterally  CARDIOVASCULAR: Regular rate and rhythm, normal S1 and S2, no murmur/rub/gallop;   RLE edema;  Peripheral pulses are 2+ bilaterally  ABDOMEN: Nontender to palpation, normoactive bowel sounds, no rebound/guarding;   MUSCULOSKELETAL:  no clubbing or cyanosis of digits; no joint swelling or tenderness to palpation, moving all extremities   PSYCH: A+O to person, place, and time; affect appropriate  NEUROLOGY: non focal, patient ambulating without assist  SKIN: RLE venous stasis changes       LABS:                        10.3   5.80  )-----------( Clumped    ( 2023 07:10 )             34.4     07-09    144  |  103  |  37<H>  ----------------------------<  127<H>  4.2   |  30  |  1.46<H>    Ca    9.1      2023 07:07  Mg     2.1     07-08        PT/INR - ( 2023 09:58 )   PT: 16.2 sec;   INR: 1.40 ratio         PTT - ( 2023 02:48 )  PTT:167.0 sec      Urinalysis Basic - ( 2023 07:07 )    Color: x / Appearance: x / SG: x / pH: x  Gluc: 127 mg/dL / Ketone: x  / Bili: x / Urobili: x   Blood: x / Protein: x / Nitrite: x   Leuk Esterase: x / RBC: x / WBC x   Sq Epi: x / Non Sq Epi: x / Bacteria: x            RADIOLOGY & ADDITIONAL TESTS:  Results Reviewed:   Imaging Personally Reviewed:  Electrocardiogram Personally Reviewed:    COORDINATION OF CARE:  Care Discussed with Consultants/Other Providers [Y/N]:  Prior or Outpatient Records Reviewed [Y/N]:

## 2023-07-09 NOTE — PROGRESS NOTE ADULT - PROBLEM SELECTOR PLAN 4
Hx of Afib (on Eliquis at home). CHADSVASC score of 5-6.  - HOLD Eliquis ; started heparin gtt for possible pericardiocentesis  - c/w home metoprolol succinate 75mg   - monitor on telemetry

## 2023-07-09 NOTE — PROGRESS NOTE ADULT - PROBLEM SELECTOR PLAN 1
- Found to have "large circumferential pericardial effusion up to 2.6 cm without evidence of tamponade physiology" on outpt TTE (7/3/23).  - Prior TTE (10/3/22) showed "moderate pericardial effusion, measuring about 1.0 cm superior to the RA; otherwise small circumferential pericardial effusion." Unclear underlying etiology of effusion, but possibly in setting of CHF.  - d/essie Lasix given SHEYLA  - strict I/Os, monitor UOP  - monitor hemodynamics closely  - TTE w/unchanged mod to large pericardial effusion ; +severe AS    >    > Pending further recs from cardiology and IR teams regarding plan for pericardiocentesis. Reached out to cardiology team again 7/9, awaiting final plan.  - CT surgery consulted for severe AS ; workup for TAVR in progress ; TAVR CT ordered  - Eliquis on hold ; on Heparin gtt given pending invasive procedure

## 2023-07-09 NOTE — PROGRESS NOTE ADULT - PROBLEM SELECTOR PLAN 2
Unclear hx of ?CHF, possibly has chronic HFpEF.   - diuretic on HOLD given SHEYLA  - strict I/Os, standing weights daily  - Cardiology following ; reccs appreciated

## 2023-07-09 NOTE — PROGRESS NOTE ADULT - PROBLEM SELECTOR PLAN 3
Presented with O2 sat down to 88% on RA. Improved on 1-2L O2NC. Suspect in setting of acute on chronic CHF.  - diuretic for CHF/pericardial effusion as above  - monitor respiratory status, wean off supplemental O2 as tolerated

## 2023-07-09 NOTE — PROGRESS NOTE ADULT - ASSESSMENT
78M (alt MRN [id number]) w/ hx of Afib (on Eliquis), severe AS, mod-severe TR, CAD/MI (30 yrs ago, medically managed, no PCI), ?CHF, COPD (not on O2 at home), former heavy smoker, HTN, HLD, DM2, gout, prior moderate pericardial effusion, now presenting with large pericardial effusion w/o tamponade physiology on outpt TTE.

## 2023-07-10 LAB
ANION GAP SERPL CALC-SCNC: 9 MMOL/L — SIGNIFICANT CHANGE UP (ref 5–17)
APTT BLD: 60.1 SEC — HIGH (ref 27.5–35.5)
BUN SERPL-MCNC: 38 MG/DL — HIGH (ref 7–23)
CALCIUM SERPL-MCNC: 8.8 MG/DL — SIGNIFICANT CHANGE UP (ref 8.4–10.5)
CHLORIDE SERPL-SCNC: 103 MMOL/L — SIGNIFICANT CHANGE UP (ref 96–108)
CO2 SERPL-SCNC: 30 MMOL/L — SIGNIFICANT CHANGE UP (ref 22–31)
CREAT SERPL-MCNC: 1.73 MG/DL — HIGH (ref 0.5–1.3)
EGFR: 40 ML/MIN/1.73M2 — LOW
GLUCOSE BLDC GLUCOMTR-MCNC: 110 MG/DL — HIGH (ref 70–99)
GLUCOSE BLDC GLUCOMTR-MCNC: 132 MG/DL — HIGH (ref 70–99)
GLUCOSE BLDC GLUCOMTR-MCNC: 175 MG/DL — HIGH (ref 70–99)
GLUCOSE SERPL-MCNC: 109 MG/DL — HIGH (ref 70–99)
HCT VFR BLD CALC: 32.1 % — LOW (ref 39–50)
HGB BLD-MCNC: 9.7 G/DL — LOW (ref 13–17)
INR BLD: 1.12 RATIO — SIGNIFICANT CHANGE UP (ref 0.88–1.16)
MCHC RBC-ENTMCNC: 25.5 PG — LOW (ref 27–34)
MCHC RBC-ENTMCNC: 30.2 GM/DL — LOW (ref 32–36)
MCV RBC AUTO: 84.5 FL — SIGNIFICANT CHANGE UP (ref 80–100)
NRBC # BLD: 0 /100 WBCS — SIGNIFICANT CHANGE UP (ref 0–0)
PLATELET # BLD AUTO: SIGNIFICANT CHANGE UP K/UL (ref 150–400)
POTASSIUM SERPL-MCNC: 4.3 MMOL/L — SIGNIFICANT CHANGE UP (ref 3.5–5.3)
POTASSIUM SERPL-SCNC: 4.3 MMOL/L — SIGNIFICANT CHANGE UP (ref 3.5–5.3)
PROTHROM AB SERPL-ACNC: 13 SEC — SIGNIFICANT CHANGE UP (ref 10.5–13.4)
RBC # BLD: 3.8 M/UL — LOW (ref 4.2–5.8)
RBC # FLD: 19.2 % — HIGH (ref 10.3–14.5)
SODIUM SERPL-SCNC: 142 MMOL/L — SIGNIFICANT CHANGE UP (ref 135–145)
WBC # BLD: 6.08 K/UL — SIGNIFICANT CHANGE UP (ref 3.8–10.5)
WBC # FLD AUTO: 6.08 K/UL — SIGNIFICANT CHANGE UP (ref 3.8–10.5)

## 2023-07-10 PROCEDURE — 71250 CT THORAX DX C-: CPT | Mod: 26

## 2023-07-10 PROCEDURE — 99231 SBSQ HOSP IP/OBS SF/LOW 25: CPT | Mod: GC

## 2023-07-10 PROCEDURE — 99233 SBSQ HOSP IP/OBS HIGH 50: CPT | Mod: GC

## 2023-07-10 PROCEDURE — 99233 SBSQ HOSP IP/OBS HIGH 50: CPT

## 2023-07-10 RX ADMIN — HEPARIN SODIUM 900 UNIT(S)/HR: 5000 INJECTION INTRAVENOUS; SUBCUTANEOUS at 05:31

## 2023-07-10 RX ADMIN — PANTOPRAZOLE SODIUM 40 MILLIGRAM(S): 20 TABLET, DELAYED RELEASE ORAL at 06:01

## 2023-07-10 RX ADMIN — HEPARIN SODIUM 900 UNIT(S)/HR: 5000 INJECTION INTRAVENOUS; SUBCUTANEOUS at 08:01

## 2023-07-10 RX ADMIN — Medication 100 MILLIGRAM(S): at 19:11

## 2023-07-10 RX ADMIN — BECLOMETHASONE DIPROPIONATE 2 PUFF(S): 40 AEROSOL, METERED RESPIRATORY (INHALATION) at 21:16

## 2023-07-10 RX ADMIN — Medication 75 MILLIGRAM(S): at 19:09

## 2023-07-10 RX ADMIN — BECLOMETHASONE DIPROPIONATE 2 PUFF(S): 40 AEROSOL, METERED RESPIRATORY (INHALATION) at 08:07

## 2023-07-10 RX ADMIN — TAMSULOSIN HYDROCHLORIDE 0.4 MILLIGRAM(S): 0.4 CAPSULE ORAL at 21:16

## 2023-07-10 RX ADMIN — ATORVASTATIN CALCIUM 40 MILLIGRAM(S): 80 TABLET, FILM COATED ORAL at 21:16

## 2023-07-10 RX ADMIN — Medication 4 GRAM(S): at 19:12

## 2023-07-10 RX ADMIN — CHLORHEXIDINE GLUCONATE 1 APPLICATION(S): 213 SOLUTION TOPICAL at 10:10

## 2023-07-10 RX ADMIN — TIOTROPIUM BROMIDE 2 PUFF(S): 18 CAPSULE ORAL; RESPIRATORY (INHALATION) at 19:11

## 2023-07-10 RX ADMIN — Medication 75 MILLIGRAM(S): at 06:01

## 2023-07-10 NOTE — PROGRESS NOTE ADULT - SUBJECTIVE AND OBJECTIVE BOX
[facility] DIVISION OF KIDNEY DISEASES AND HYPERTENSION   FOLLOW UP NOTE  --------------------------------------------------------------------------------  HPI: 78M w/ hx of Afib (on Eliquis), severe AS, mod-severe TR, CAD/MI (30 yrs ago, medically managed, no PCI), HFpEF (TTE 6/3 EF 55%), CKD, COPD (not on O2 at home), former heavy smoker (over 25 years agO), HTN, HLD, DM2, gout, presenting with worsening pericardial effusion. Patient was sent in from his cardiologist's office. Of note, he had a TTE on day of presentation (7/3/23) that showed a "large circumferential pericardial effusion up to 2.6 cm without evidence of tamponade physiology" (and mildly reduced RV systolic function was also noted). His prior TTE on 10/3/22 had shown a "moderate pericardial effusion, measuring about 1.0 cm superior to the RA; otherwise small circumferential pericardial effusion." Etiology of pericardial effusion is unclear. Cardiology is considering pericardiocentesis and CT TVAR, renal was consulted to evaluate the patient for contrast tolerance given CKD.     24 hour events/subjective: Pt. was seen and examined today. He is frustrated by still being in the hospital, otherwise feels well.     PAST HISTORY  --------------------------------------------------------------------------------  No significant changes to PMH, PSH, FHx, SHx, unless otherwise noted    ALLERGIES & MEDICATIONS  --------------------------------------------------------------------------------  Allergies  penicillins (Unknown)  Intolerances    Standing Inpatient Medications  allopurinol 100 milliGRAM(s) Oral daily  atorvastatin 40 milliGRAM(s) Oral at bedtime  beclomethasone  80 MICROgram(s) Inhaler 2 Puff(s) Inhalation two times a day  chlorhexidine 2% Cloths 1 Application(s) Topical daily  dextrose 5%. 1000 milliLiter(s) IV Continuous <Continuous>  dextrose 5%. 1000 milliLiter(s) IV Continuous <Continuous>  dextrose 50% Injectable 12.5 Gram(s) IV Push once  dextrose 50% Injectable 25 Gram(s) IV Push once  dextrose 50% Injectable 25 Gram(s) IV Push once  glucagon  Injectable 1 milliGRAM(s) IntraMuscular once  heparin  Infusion.  Unit(s)/Hr IV Continuous <Continuous>  insulin lispro (ADMELOG) corrective regimen sliding scale   SubCutaneous three times a day before meals  insulin lispro (ADMELOG) corrective regimen sliding scale   SubCutaneous at bedtime  metoprolol succinate ER 75 milliGRAM(s) Oral two times a day  omega-3-Acid Ethyl Esters 4 Gram(s) Oral daily  pantoprazole    Tablet 40 milliGRAM(s) Oral before breakfast  tamsulosin 0.4 milliGRAM(s) Oral at bedtime  tiotropium 2.5 MICROgram(s) Inhaler 2 Puff(s) Inhalation daily    PRN Inpatient Medications  acetaminophen     Tablet .. 650 milliGRAM(s) Oral every 6 hours PRN  albuterol    90 MICROgram(s) HFA Inhaler 2 Puff(s) Inhalation every 6 hours PRN  carboxymethylcellulose 0.5% (Preservative-Free) Ophthalmic Solution 1 Drop(s) Both EYES two times a day PRN  dextrose Oral Gel 15 Gram(s) Oral once PRN  heparin   Injectable 6500 Unit(s) IV Push every 6 hours PRN  heparin   Injectable 3000 Unit(s) IV Push every 6 hours PRN  melatonin 3 milliGRAM(s) Oral at bedtime PRN  sodium chloride 0.65% Nasal 1 Spray(s) Both Nostrils every 6 hours PRN    REVIEW OF SYSTEMS  --------------------------------------------------------------------------------  All other systems were reviewed and are negative, except as noted.    VITALS/PHYSICAL EXAM  --------------------------------------------------------------------------------  T(C): 36.5 (07-10-23 @ 04:19), Max: 37.1 (07-09-23 @ 11:02)  HR: 81 (07-10-23 @ 06:52) (81 - 93)  BP: 118/71 (07-10-23 @ 06:52) (104/73 - 133/81)  RR: 18 (07-10-23 @ 04:19) (18 - 18)  SpO2: 96% (07-10-23 @ 04:19) (96% - 99%)  Wt(kg): --    07-09-23 @ 07:01  -  07-10-23 @ 07:00  --------------------------------------------------------  IN: 84 mL / OUT: 403 mL / NET: -319 mL    07-10-23 @ 07:01  -  07-10-23 @ 08:44  --------------------------------------------------------  IN: 0 mL / OUT: 350 mL / NET: -350 mL    Physical Exam:  	Gen: Sitting up in bed in NAD  	HEENT: Anicteric  	Pulm: CTA B/L  	CV: S1S2+  	Abd: Soft, +BS         	Ext: + LE edema B/L R>L (hx of leg trauma)  	Neuro: Awake  	Skin: Warm and dry    LABS/STUDIES  --------------------------------------------------------------------------------              9.7    6.08  >-----------<  Clumped    [07-10-23 @ 04:45]              32.1     142  |  103  |  38  ----------------------------<  109      [07-10-23 @ 04:45]  4.3   |  30  |  1.73        Ca     8.8     [07-10-23 @ 04:45]      PT/INR: PT 13.0 , INR 1.12       [07-10-23 @ 04:45]  PTT: 60.1       [07-10-23 @ 04:45]    Creatinine Trend:  SCr 1.73 [07-10 @ 04:45]  SCr 1.46 [07-09 @ 07:07]  SCr 1.33 [07-08 @ 06:47]  SCr 1.54 [07-07 @ 07:45]  SCr 1.66 [07-06 @ 07:56]    Urine Creatinine 85      [07-07-23 @ 18:00]  Urine Protein 12      [07-07-23 @ 18:00]    HBsAg Nonreact      [07-08-23 @ 06:48]  HCV 0.10, Nonreact      [07-05-23 @ 06:29]    C3 Complement 133      [07-08-23 @ 06:48]  C4 Complement 32      [07-08-23 @ 06:48]  Immunofixation Serum: Weak IgG Lambda Band Identified    Reference Range: None Detected      [07-08-23 @ 06:48]

## 2023-07-10 NOTE — PROGRESS NOTE ADULT - SUBJECTIVE AND OBJECTIVE BOX
[facility] Division of Hospital Medicine  Kaleigh Cerrato MD M-F, 8A-5P: MS Teams, Pager: [phone]  Other Times: Ext: [phone], Pager: [phone]      Patient is a 78y old  Male who presents with a chief complaint of worsening pericardial effusion (10 Jul 2023 08:44)      SUBJECTIVE / OVERNIGHT EVENTS:  Patient was examined this morning. Patient states he is annoyed about the issue regarding pericardiocentesis. ROS negative: denies headache, dizziness, chest pain, dyspnea, palpitations. Patient wants to walk in the hallway.     ADDITIONAL REVIEW OF SYSTEMS:    MEDICATIONS  (STANDING):  allopurinol 100 milliGRAM(s) Oral daily  atorvastatin 40 milliGRAM(s) Oral at bedtime  beclomethasone  80 MICROgram(s) Inhaler 2 Puff(s) Inhalation two times a day  chlorhexidine 2% Cloths 1 Application(s) Topical daily  dextrose 5%. 1000 milliLiter(s) (100 mL/Hr) IV Continuous <Continuous>  dextrose 5%. 1000 milliLiter(s) (50 mL/Hr) IV Continuous <Continuous>  dextrose 50% Injectable 12.5 Gram(s) IV Push once  dextrose 50% Injectable 25 Gram(s) IV Push once  dextrose 50% Injectable 25 Gram(s) IV Push once  glucagon  Injectable 1 milliGRAM(s) IntraMuscular once  heparin  Infusion.  Unit(s)/Hr (15 mL/Hr) IV Continuous <Continuous>  insulin lispro (ADMELOG) corrective regimen sliding scale   SubCutaneous three times a day before meals  insulin lispro (ADMELOG) corrective regimen sliding scale   SubCutaneous at bedtime  metoprolol succinate ER 75 milliGRAM(s) Oral two times a day  omega-3-Acid Ethyl Esters 4 Gram(s) Oral daily  pantoprazole    Tablet 40 milliGRAM(s) Oral before breakfast  tamsulosin 0.4 milliGRAM(s) Oral at bedtime  tiotropium 2.5 MICROgram(s) Inhaler 2 Puff(s) Inhalation daily    MEDICATIONS  (PRN):  acetaminophen     Tablet .. 650 milliGRAM(s) Oral every 6 hours PRN Temp greater or equal to 38C (100.4F), Mild Pain (1 - 3)  albuterol    90 MICROgram(s) HFA Inhaler 2 Puff(s) Inhalation every 6 hours PRN Shortness of Breath and/or Wheezing  carboxymethylcellulose 0.5% (Preservative-Free) Ophthalmic Solution 1 Drop(s) Both EYES two times a day PRN dry eyes  dextrose Oral Gel 15 Gram(s) Oral once PRN Blood Glucose LESS THAN 70 milliGRAM(s)/deciliter  heparin   Injectable 6500 Unit(s) IV Push every 6 hours PRN For aPTT less than 40  heparin   Injectable 3000 Unit(s) IV Push every 6 hours PRN For aPTT between 40 - 57  melatonin 3 milliGRAM(s) Oral at bedtime PRN Sleep  sodium chloride 0.65% Nasal 1 Spray(s) Both Nostrils every 6 hours PRN Nasal Congestion      CAPILLARY BLOOD GLUCOSE      POCT Blood Glucose.: 110 mg/dL (10 Jul 2023 07:37)  POCT Blood Glucose.: 116 mg/dL (2023 21:04)  POCT Blood Glucose.: 135 mg/dL (2023 16:28)  POCT Blood Glucose.: 122 mg/dL (2023 11:47)      I&O's Summary    2023 07:01  -  10 Jul 2023 07:00  --------------------------------------------------------  IN: 84 mL / OUT: 403 mL / NET: -319 mL    10 Jul 2023 07:01  -  10 Jul 2023 11:27  --------------------------------------------------------  IN: 0 mL / OUT: 350 mL / NET: -350 mL        Daily     Daily Weight in k.2 (10 Jul 2023 08:50)    PHYSICAL EXAM:  Vital Signs Last 24 Hrs  T(C): 36.4 (10 Jul 2023 11:12), Max: 36.7 (2023 20:42)  T(F): 97.5 (10 Jul 2023 11:12), Max: 98.1 (2023 20:42)  HR: 80 (10 Jul 2023 11:12) (80 - 92)  BP: 121/80 (10 Jul 2023 11:12) (104/73 - 133/81)  BP(mean): --  RR: 18 (10 Jul 2023 11:12) (18 - 18)  SpO2: 97% (10 Jul 2023 11:12) (96% - 99%)    Parameters below as of 10 Jul 2023 11:12  Patient On (Oxygen Delivery Method): nasal cannula  O2 Flow (L/min): 3      CONSTITUTIONAL: NAD, well-developed  EYES: PERRLA; conjunctiva and sclera clear  ENMT: Moist oral mucosa  RESPIRATORY: Normal respiratory effort; lungs are clear to auscultation bilaterally  CARDIOVASCULAR: Regular rate and rhythm, normal S1 and S2, no murmur/rub/gallop;   RLE edema;  Peripheral pulses are 2+ bilaterally  ABDOMEN: Nontender to palpation, normoactive bowel sounds, no rebound/guarding;   MUSCULOSKELETAL:  no clubbing or cyanosis of digits; no joint swelling or tenderness to palpation, moving all extremities   PSYCH: A+O to person, place, and time; affect appropriate  NEUROLOGY: non focal, patient ambulating without assist  SKIN: RLE venous stasis changes     LABS:                        9.7    6.08  )-----------( Clumped    ( 10 Jul 2023 04:45 )             32.1     07-10    142  |  103  |  38<H>  ----------------------------<  109<H>  4.3   |  30  |  1.73<H>    Ca    8.8      10 Jul 2023 04:45        PT/INR - ( 10 Jul 2023 04:45 )   PT: 13.0 sec;   INR: 1.12 ratio         PTT - ( 10 Jul 2023 04:45 )  PTT:60.1 sec      Urinalysis Basic - ( 10 Jul 2023 04:45 )    Color: x / Appearance: x / SG: x / pH: x  Gluc: 109 mg/dL / Ketone: x  / Bili: x / Urobili: x   Blood: x / Protein: x / Nitrite: x   Leuk Esterase: x / RBC: x / WBC x   Sq Epi: x / Non Sq Epi: x / Bacteria: x            RADIOLOGY & ADDITIONAL TESTS:  Results Reviewed:   Imaging Personally Reviewed:  Electrocardiogram Personally Reviewed:    COORDINATION OF CARE:  Care Discussed with Consultants/Other Providers [Y/N]:  Prior or Outpatient Records Reviewed [Y/N]:

## 2023-07-10 NOTE — PROGRESS NOTE ADULT - SUBJECTIVE AND OBJECTIVE BOX
ID: 78M (alt MRN [id number]) w/ hx of Afib (on Eliquis), severe AS, mod-severe TR, CAD/MI (30 yrs ago, medically managed, no PCI), ?CHF, COPD (not on O2 at home), former heavy smoker, HTN, HLD, DM2, gout, who was sent in for concerning outpatient echocardiogram which demonstrated worsening pericardial effusion    Patient seen and examined at bedside.    Overnight Events: NAEON. Frustrated as he has been waiting for multiple days to get his effusion tapped. Otherwise feels well today, denies FC NV CP SOB. LE edema mildly improved per him. Has not ambulated yet. Spoke to primary team and IR - will get CT chest non con and plan on possible Dx/Tx IR guided pericardiocentesis today. Cr uptrending.     Telemetry: AF     Current Meds:  acetaminophen     Tablet .. 650 milliGRAM(s) Oral every 6 hours PRN  albuterol    90 MICROgram(s) HFA Inhaler 2 Puff(s) Inhalation every 6 hours PRN  allopurinol 100 milliGRAM(s) Oral daily  atorvastatin 40 milliGRAM(s) Oral at bedtime  beclomethasone  80 MICROgram(s) Inhaler 2 Puff(s) Inhalation two times a day  carboxymethylcellulose 0.5% (Preservative-Free) Ophthalmic Solution 1 Drop(s) Both EYES two times a day PRN  chlorhexidine 2% Cloths 1 Application(s) Topical daily  dextrose 5%. 1000 milliLiter(s) IV Continuous <Continuous>  dextrose 5%. 1000 milliLiter(s) IV Continuous <Continuous>  dextrose 50% Injectable 12.5 Gram(s) IV Push once  dextrose 50% Injectable 25 Gram(s) IV Push once  dextrose 50% Injectable 25 Gram(s) IV Push once  dextrose Oral Gel 15 Gram(s) Oral once PRN  glucagon  Injectable 1 milliGRAM(s) IntraMuscular once  heparin   Injectable 6500 Unit(s) IV Push every 6 hours PRN  heparin   Injectable 3000 Unit(s) IV Push every 6 hours PRN  heparin  Infusion.  Unit(s)/Hr IV Continuous <Continuous>  insulin lispro (ADMELOG) corrective regimen sliding scale   SubCutaneous three times a day before meals  insulin lispro (ADMELOG) corrective regimen sliding scale   SubCutaneous at bedtime  melatonin 3 milliGRAM(s) Oral at bedtime PRN  metoprolol succinate ER 75 milliGRAM(s) Oral two times a day  omega-3-Acid Ethyl Esters 4 Gram(s) Oral daily  pantoprazole    Tablet 40 milliGRAM(s) Oral before breakfast  sodium chloride 0.65% Nasal 1 Spray(s) Both Nostrils every 6 hours PRN  tamsulosin 0.4 milliGRAM(s) Oral at bedtime  tiotropium 2.5 MICROgram(s) Inhaler 2 Puff(s) Inhalation daily      Vitals:  T(F): 97.5 (07-10), Max: 98.1 (07-09)  HR: 80 (07-10) (80 - 92)  BP: 121/80 (07-10) (104/73 - 133/81)  RR: 18 (07-10)  SpO2: 97% (07-10)  I&O's Summary    09 Jul 2023 07:01  -  10 Jul 2023 07:00  --------------------------------------------------------  IN: 84 mL / OUT: 403 mL / NET: -319 mL    10 Jul 2023 07:01  -  10 Jul 2023 12:56  --------------------------------------------------------  IN: 0 mL / OUT: 550 mL / NET: -550 mL    Physical Exam:  Appearance: No acute distress; well appearing  Eyes: PERRL, EOMI, pink conjunctiva  HEENT: Normal oral mucosa  Cardiovascular: IIR, S1, S2, I/VI VIOLETA radiating to carotids, no rubs, or gallops; no edema; no JVD  Respiratory: Clear to auscultation bilaterally  Gastrointestinal: soft, non-tender, non-distended with normal bowel sounds  Musculoskeletal: 2+ edema to knees with venous stasis   Neurologic: Non-focal  Lymphatic: No lymphadenopathy  Psychiatry: AAOx3, mood & affect appropriate  Skin: No rashes, ecchymoses, or cyanosis                        9.7    6.08  )-----------( Clumped    ( 10 Jul 2023 04:45 )             32.1     07-10    142  |  103  |  38<H>  ----------------------------<  109<H>  4.3   |  30  |  1.73<H>    Ca    8.8      10 Jul 2023 04:45      PT/INR - ( 10 Jul 2023 04:45 )   PT: 13.0 sec;   INR: 1.12 ratio         PTT - ( 10 Jul 2023 04:45 )  PTT:60.1 sec  CARDIAC MARKERS ( 03 Jul 2023 19:24 )  27 ng/L / x     / x     / x     / x     / x      CARDIAC MARKERS ( 03 Jul 2023 17:48 )  30 ng/L / x     / x     / x     / x     / x        New ECG(s): Personally reviewed      A/P  78M w/ hx of Afib (on Eliquis), severe AS, mod-severe TR, CAD/MI (30 yrs ago, medically managed, no PCI), ?CHF, COPD (not on O2 at home), former heavy smoker, HTN, HLD, DM2, gout, who was sent in for concerning outpatient echocardiogram which demonstrated worsening pericardial effusion.    #Large Pericardial Effusion  - No clinical tamponade patient is hemodynamically stable, with robust heart sounds and no obvious JVD. Outpatient TTE demonstrated LVEF of 55% with large pericardial effusion without tamponade physiology.   - Repeat TTE showing large effusion mainly around L ventricle     > Discussed risk benefit of dx/tx pericardiocentesis, pt amenable. However he took his eliquis today so it cannot happen today. Spoke to IC Dr. Olman Chavez who will review the images to make sure window is safe for cath lab tap. Primary team also spoke to IR who deferred to cardiology. Spoke to IR today who recommend CT chest  Plan:  - CT chest non con prior to IR tap today  - NPO after for possible pericardiocentesis today with IR (IC cannot access safely)  - Hold lasix iso effusion marilyn procedure    #Severe Symptomatic Low Flow Low Gradient AS  - Pt with known sx of sev AS p/w GOFF  - Repeat TTE showing sev AS  - Structural cardiology consulted, pending CT TAVR and eventual LHC  - Hypervolemic on exam, likely multifactorial, judicious with diuretics as above    #AFib  #RBBB  - Persistent per OSH documentation   - On Apixaban 2.5 BID at home - unclear why on dose reduced, per son he previously was on coumadin but had supra-therapeutic   - CHADSVASC 5   - Cont heparin gtt pending procedure       Jean Ríos PGY5  Cardiology Fellow    CHAVA Hammond  78M (alt MRN [facility] [id number]) w/ hx of Afib (on Eliquis), severe AS, mod-severe TR, CAD/MI (30 yrs ago, medically managed, no PCI), ?CHF, COPD (not on O2 at home), former heavy smoker, HTN, HLD, DM2, gout, who was sent in for concerning outpatient echocardiogram which demonstrated worsening pericardial effusion    Patient seen and examined at bedside.    Overnight Events: NAEON. Frustrated as he has been waiting for multiple days to get his effusion tapped. Otherwise feels well today, denies FC NV CP SOB. LE edema mildly improved per him. Has not ambulated yet. Spoke to primary team and IR - will get CT chest non con and plan on possible Dx/Tx IR guided pericardiocentesis today. Cr uptrending.     Telemetry: AF     Current Meds:  acetaminophen     Tablet .. 650 milliGRAM(s) Oral every 6 hours PRN  albuterol    90 MICROgram(s) HFA Inhaler 2 Puff(s) Inhalation every 6 hours PRN  allopurinol 100 milliGRAM(s) Oral daily  atorvastatin 40 milliGRAM(s) Oral at bedtime  beclomethasone  80 MICROgram(s) Inhaler 2 Puff(s) Inhalation two times a day  carboxymethylcellulose 0.5% (Preservative-Free) Ophthalmic Solution 1 Drop(s) Both EYES two times a day PRN  chlorhexidine 2% Cloths 1 Application(s) Topical daily  dextrose 5%. 1000 milliLiter(s) IV Continuous <Continuous>  dextrose 5%. 1000 milliLiter(s) IV Continuous <Continuous>  dextrose 50% Injectable 12.5 Gram(s) IV Push once  dextrose 50% Injectable 25 Gram(s) IV Push once  dextrose 50% Injectable 25 Gram(s) IV Push once  dextrose Oral Gel 15 Gram(s) Oral once PRN  glucagon  Injectable 1 milliGRAM(s) IntraMuscular once  heparin   Injectable 6500 Unit(s) IV Push every 6 hours PRN  heparin   Injectable 3000 Unit(s) IV Push every 6 hours PRN  heparin  Infusion.  Unit(s)/Hr IV Continuous <Continuous>  insulin lispro (ADMELOG) corrective regimen sliding scale   SubCutaneous three times a day before meals  insulin lispro (ADMELOG) corrective regimen sliding scale   SubCutaneous at bedtime  melatonin 3 milliGRAM(s) Oral at bedtime PRN  metoprolol succinate ER 75 milliGRAM(s) Oral two times a day  omega-3-Acid Ethyl Esters 4 Gram(s) Oral daily  pantoprazole    Tablet 40 milliGRAM(s) Oral before breakfast  sodium chloride 0.65% Nasal 1 Spray(s) Both Nostrils every 6 hours PRN  tamsulosin 0.4 milliGRAM(s) Oral at bedtime  tiotropium 2.5 MICROgram(s) Inhaler 2 Puff(s) Inhalation daily      Vitals:  T(F): 97.5 (07-10), Max: 98.1 (07-09)  HR: 80 (07-10) (80 - 92)  BP: 121/80 (07-10) (104/73 - 133/81)  RR: 18 (07-10)  SpO2: 97% (07-10)    I&O's Summary  09 Jul 2023 07:01  -  10 Jul 2023 07:00  --------------------------------------------------------  IN: 84 mL / OUT: 403 mL / NET: -319 mL    10 Jul 2023 07:01  -  10 Jul 2023 12:56  --------------------------------------------------------  IN: 0 mL / OUT: 550 mL / NET: -550 mL    Physical Exam:  Appearance: No acute distress; well appearing  Eyes: PERRL, EOMI, pink conjunctiva  HEENT: Normal oral mucosa  Cardiovascular: IIR, S1, S2, I/VI VIOLETA radiating to carotids, no rubs, or gallops; no edema; no JVD  Respiratory: Clear to auscultation bilaterally  Gastrointestinal: soft, non-tender, non-distended with normal bowel sounds  Musculoskeletal: 2+ edema to knees with venous stasis   Neurologic: Non-focal  Lymphatic: No lymphadenopathy  Psychiatry: AAOx3, mood & affect appropriate  Skin: No rashes, ecchymoses, or cyanosis               LABS:             9.7    6.08  )-----------( Clumped    ( 10 Jul 2023 04:45 )             32.1     07-10  142  |  103  |  38<H>  ----------------------------<  109<H>  4.3   |  30  |  1.73<H>    Ca    8.8      10 Jul 2023 04:45    PT/INR - ( 10 Jul 2023 04:45 )   PT: 13.0 sec;   INR: 1.12 ratio    PTT - ( 10 Jul 2023 04:45 )  PTT:60.1 sec    CARDIAC MARKERS ( 03 Jul 2023 19:24 ) 27 ng/L / x     / x     / x     / x     / x      CARDIAC MARKERS ( 03 Jul 2023 17:48 ) 30 ng/L / x     / x     / x     / x     / x            A/P:  78 M with hx. of Afib (on Eliquis), severe AS, mod-severe TR, CAD/MI (30 yrs ago, medically managed, no PCI), ?CHF, COPD (not on O2 at home), former heavy smoker, HTN, HLD, DM2, gout.  Was sent in for concern re outpatient echocardiogram which demonstrated worsening pericardial effusion.      #Large Pericardial Effusion  - No clinical tamponade as patient is hemodynamically stable, with robust heart sounds and no obvious JVD. Outpatient TTE demonstrated LVEF of 55% with large pericardial effusion without tamponade physiology.   - Repeat TTE showing large effusion mainly around L ventricle     > Discussed risk benefit of dx/tx pericardiocentesis, pt amenable.  Spoke to IR today who recommend CT chest  Plan:  - CT chest non con prior to IR tap today  - NPO after for possible pericardiocentesis today with IR (interventional cardiology cannot access safely)  - Hold Lasix iso effusion marilyn procedure    #Severe Symptomatic Low Flow Low Gradient AS  - Pt with known sx of sev AS p/w GOFF  - Repeat TTE showing sev AS  - Structural cardiology consulted, pending CT TAVR and eventual LHC  - Hypervolemic on exam, likely multifactorial, judicious with diuretics as above    #AFib  #RBBB  - Persistent per OSH documentation   - On apixaban 2.5 BID at home - unclear why on dose reduced; per son he previously was on coumadin but had supra-therapeutic.  Apixaban on hold at present prior to pericardial drainage  - CHADSVASC 5   - Cont heparin gtt pending procedure       Jean Ríos PGY5  Cardiology Fellow    Plan discussed with cardiology fellow.  Patient seen and examined.  Hx., exam and labs as above.  I agree with the assessment and recommendations, which I have reviewed and edited where appropriate.  Mc Hammond M.D.  Cardiology Attending, Consult Service    For Cardiology consults and questions, all Cardiology service information can be found 24/7 on [email or web address] - use password: [email or web address] to log in.

## 2023-07-10 NOTE — PROGRESS NOTE ADULT - PROBLEM SELECTOR PLAN 1
Patient with CKD in setting of HTN, Tobacco Abuse Hx and Heart Disease. Per Javi/OLGA review, appears he has had a SCr ranging from 1.4-2.0 dating back to 2017. Most recent SCr prior to admission was 1.42 on 10/7/22.     He received IV lasix on 7/3 and 7/4.   Recommend a UA. Check spot urine TP/CR to quantify proteinuria. UA reviewed. UPCR of 0.1  Hep C non reactive, HBsAg non reactive. Normal C3/C4. Weak bands noted.   Renal US from 7/6 demonstrated "Both kidneys are echogenic, compatible with medical renal disease. No renal mass, hydronephrosis or calculus is visualized sonographically." He does have bilateral renal cysts.     SCr elevated slightly at 1.7 but stable. Remains non oliguric. Awaiting further cardiac work up.     Given tight AS volume optimization to reduce contrast risk is challenging although initial CT angiogram risk relatively low and could give LOW pericontrast volume NSS 1hr before and 6 hrs post 1cc/kg/hr. Will have to observe closely for evidence of HD requirement which does not exist at this time. Monitor SCr, electrolytes, and I/O. Avoid NSAIDS, RCAs, and other nephrotoxins. Renally adjust all medications as per GFR or CrCl.

## 2023-07-10 NOTE — PROGRESS NOTE ADULT - PROBLEM SELECTOR PLAN 3
- sCr worsening  - IV Lasix d/essie  - Nephrology consulted; reccs appreciated   >     > for CT with contrast, per nephro recs could give LOW pericontrast volume NSS 1hr before and 6 hrs post 1cc/kg/h. monitor volume status closely.   - Hep C non reactive. f/up HBsAg, serum immunofixation, C3 and C4. checked UA  - Renal US shows both kidneys are echogenic, compatible with medical renal disease. No renal mass, hydronephrosis or calculus  - monitor BMP

## 2023-07-10 NOTE — PROGRESS NOTE ADULT - PROBLEM SELECTOR PLAN 1
- Found to have "large circumferential pericardial effusion up to 2.6 cm without evidence of tamponade physiology" on outpt TTE (7/3/23).  - Prior TTE (10/3/22) showed "moderate pericardial effusion, measuring about 1.0 cm superior to the RA; otherwise small circumferential pericardial effusion." Unclear underlying etiology of effusion, but possibly in setting of CHF.  - d/essei Lasix given SHEYLA  - strict I/Os, monitor UOP  - monitor hemodynamics closely  - TTE w/ unchanged mod to large pericardial effusion ; +severe AS    >    > Pending further recs from cardiology and IR teams regarding plan for pericardiocentesis. Reached out to cardiology team again 7/9, awaiting final plan > check CT chest per cardiology recs. .  - CT surgery consulted for severe AS ; workup for TAVR in progress ; TAVR CT ordered  - Eliquis on hold ; on Heparin gtt given pending invasive procedure

## 2023-07-10 NOTE — PROVIDER CONTACT NOTE (OTHER) - ACTION/TREATMENT ORDERED:
Provider to bedside to assess patient. Provider instructed this RN to apply ice pack and keep elevated.

## 2023-07-10 NOTE — PROGRESS NOTE ADULT - ASSESSMENT
78M (alt MRN [id number]) w/ hx of Afib (on Eliquis), severe AS, mod-severe TR, CAD/MI (30 yrs ago, medically managed, no PCI), ?CHF, COPD (not on O2 at home), former heavy smoker, HTN, HLD, DM2, gout, prior moderate pericardial effusion, now presenting with large pericardial effusion w/o tamponade physiology on outpt TTE.

## 2023-07-11 LAB
ANION GAP SERPL CALC-SCNC: 9 MMOL/L — SIGNIFICANT CHANGE UP (ref 5–17)
APTT BLD: 48 SEC — HIGH (ref 27.5–35.5)
APTT BLD: 80.7 SEC — HIGH (ref 27.5–35.5)
BUN SERPL-MCNC: 30 MG/DL — HIGH (ref 7–23)
CALCIUM SERPL-MCNC: 9.4 MG/DL — SIGNIFICANT CHANGE UP (ref 8.4–10.5)
CHLORIDE SERPL-SCNC: 104 MMOL/L — SIGNIFICANT CHANGE UP (ref 96–108)
CO2 SERPL-SCNC: 29 MMOL/L — SIGNIFICANT CHANGE UP (ref 22–31)
CREAT SERPL-MCNC: 1.38 MG/DL — HIGH (ref 0.5–1.3)
EGFR: 53 ML/MIN/1.73M2 — LOW
GLUCOSE BLDC GLUCOMTR-MCNC: 132 MG/DL — HIGH (ref 70–99)
GLUCOSE BLDC GLUCOMTR-MCNC: 135 MG/DL — HIGH (ref 70–99)
GLUCOSE SERPL-MCNC: 112 MG/DL — HIGH (ref 70–99)
HCT VFR BLD CALC: 31.7 % — LOW (ref 39–50)
HGB BLD-MCNC: 9.6 G/DL — LOW (ref 13–17)
INR BLD: 1.19 RATIO — HIGH (ref 0.88–1.16)
IRON SATN MFR SERPL: 18 % — SIGNIFICANT CHANGE UP (ref 16–55)
IRON SATN MFR SERPL: 55 UG/DL — SIGNIFICANT CHANGE UP (ref 45–165)
MCHC RBC-ENTMCNC: 25.7 PG — LOW (ref 27–34)
MCHC RBC-ENTMCNC: 30.3 GM/DL — LOW (ref 32–36)
MCV RBC AUTO: 85 FL — SIGNIFICANT CHANGE UP (ref 80–100)
MRSA PCR RESULT.: SIGNIFICANT CHANGE UP
NRBC # BLD: 0 /100 WBCS — SIGNIFICANT CHANGE UP (ref 0–0)
PLATELET # BLD AUTO: SIGNIFICANT CHANGE UP K/UL (ref 150–400)
POTASSIUM SERPL-MCNC: 5 MMOL/L — SIGNIFICANT CHANGE UP (ref 3.5–5.3)
POTASSIUM SERPL-SCNC: 5 MMOL/L — SIGNIFICANT CHANGE UP (ref 3.5–5.3)
PROTHROM AB SERPL-ACNC: 13.7 SEC — HIGH (ref 10.5–13.4)
RBC # BLD: 3.73 M/UL — LOW (ref 4.2–5.8)
RBC # FLD: 19.2 % — HIGH (ref 10.3–14.5)
S AUREUS DNA NOSE QL NAA+PROBE: SIGNIFICANT CHANGE UP
SODIUM SERPL-SCNC: 142 MMOL/L — SIGNIFICANT CHANGE UP (ref 135–145)
TIBC SERPL-MCNC: 310 UG/DL — SIGNIFICANT CHANGE UP (ref 220–430)
UIBC SERPL-MCNC: 255 UG/DL — SIGNIFICANT CHANGE UP (ref 110–370)
WBC # BLD: 5.8 K/UL — SIGNIFICANT CHANGE UP (ref 3.8–10.5)
WBC # FLD AUTO: 5.8 K/UL — SIGNIFICANT CHANGE UP (ref 3.8–10.5)

## 2023-07-11 PROCEDURE — 99233 SBSQ HOSP IP/OBS HIGH 50: CPT

## 2023-07-11 PROCEDURE — 99233 SBSQ HOSP IP/OBS HIGH 50: CPT | Mod: GC

## 2023-07-11 RX ADMIN — HEPARIN SODIUM 1100 UNIT(S)/HR: 5000 INJECTION INTRAVENOUS; SUBCUTANEOUS at 21:32

## 2023-07-11 RX ADMIN — Medication 4 GRAM(S): at 13:03

## 2023-07-11 RX ADMIN — Medication 75 MILLIGRAM(S): at 06:16

## 2023-07-11 RX ADMIN — ATORVASTATIN CALCIUM 40 MILLIGRAM(S): 80 TABLET, FILM COATED ORAL at 21:35

## 2023-07-11 RX ADMIN — TIOTROPIUM BROMIDE 2 PUFF(S): 18 CAPSULE ORAL; RESPIRATORY (INHALATION) at 17:44

## 2023-07-11 RX ADMIN — CHLORHEXIDINE GLUCONATE 1 APPLICATION(S): 213 SOLUTION TOPICAL at 13:10

## 2023-07-11 RX ADMIN — HEPARIN SODIUM 1100 UNIT(S)/HR: 5000 INJECTION INTRAVENOUS; SUBCUTANEOUS at 08:46

## 2023-07-11 RX ADMIN — Medication 0: at 21:34

## 2023-07-11 RX ADMIN — Medication 75 MILLIGRAM(S): at 18:12

## 2023-07-11 RX ADMIN — TAMSULOSIN HYDROCHLORIDE 0.4 MILLIGRAM(S): 0.4 CAPSULE ORAL at 21:35

## 2023-07-11 RX ADMIN — PANTOPRAZOLE SODIUM 40 MILLIGRAM(S): 20 TABLET, DELAYED RELEASE ORAL at 06:16

## 2023-07-11 RX ADMIN — Medication 100 MILLIGRAM(S): at 13:05

## 2023-07-11 RX ADMIN — BECLOMETHASONE DIPROPIONATE 2 PUFF(S): 40 AEROSOL, METERED RESPIRATORY (INHALATION) at 08:51

## 2023-07-11 RX ADMIN — HEPARIN SODIUM 1100 UNIT(S)/HR: 5000 INJECTION INTRAVENOUS; SUBCUTANEOUS at 18:15

## 2023-07-11 RX ADMIN — BECLOMETHASONE DIPROPIONATE 2 PUFF(S): 40 AEROSOL, METERED RESPIRATORY (INHALATION) at 21:36

## 2023-07-11 NOTE — PROGRESS NOTE ADULT - PROBLEM SELECTOR PLAN 6
Hx of CAD/MI (30 yrs ago, medically managed, no PCI)  - hsTrop 30->27; EKG w/o signs of acute ischemia; low suspicion of ACS  - c/w home statin, and metoprolol  - ASA on HOLD for possible procedures

## 2023-07-11 NOTE — PROGRESS NOTE ADULT - PROBLEM SELECTOR PLAN 1
- Found to have "large circumferential pericardial effusion up to 2.6 cm without evidence of tamponade physiology" on outpt TTE (7/3/23).  - Prior TTE (10/3/22) showed "moderate pericardial effusion, measuring about 1.0 cm superior to the RA; otherwise small circumferential pericardial effusion." Unclear underlying etiology of effusion, but possibly in setting of CHF.  - d/essie Lasix given SHEYLA  - strict I/Os, monitor UOP  - monitor hemodynamics closely  - TTE w/ unchanged mod to large pericardial effusion ; +severe AS    >    > Pending further recs from cardiology and IR teams: no plan for pericardiocentesis yet from either.  - CT surgery consulted for severe AS ; workup for TAVR in progress ; TAVR CT ordered, F/U plan for LHC   - Eliquis on hold ; on Heparin gtt given pending invasive procedure

## 2023-07-11 NOTE — PROGRESS NOTE ADULT - ASSESSMENT
78M (alt MRN [id number]) w/ hx of Afib (on Eliquis), severe AS, mod-severe TR, CAD/MI (30 yrs ago, medically managed, no PCI), ?CHF, COPD (not on O2 at home), former heavy smoker, HTN, HLD, DM2, gout, prior moderate pericardial effusion, now presenting with large pericardial effusion w/o tamponade physiology on outpt TTE.

## 2023-07-11 NOTE — PROGRESS NOTE ADULT - PROBLEM SELECTOR PLAN 5
Hx of Afib (on Eliquis at home). CHADSVASC score of 5-6.  - HOLD Eliquis ; started heparin gtt pending procedures  - c/w home metoprolol succinate 75mg   - monitor on telemetry

## 2023-07-11 NOTE — PROGRESS NOTE ADULT - SUBJECTIVE AND OBJECTIVE BOX
ID: 78 M with hx. of Afib (on Eliquis), severe AS, mod-severe TR, CAD/MI (30 yrs ago, medically managed, no PCI), ?CHF, COPD (not on O2 at home), former heavy smoker, HTN, HLD, DM2, gout. Was sent in for concern re outpatient echocardiogram which demonstrated worsening pericardial effusion.    Patient seen and examined at bedside.    Overnight Events: NAEON, CT chest completed, IR says pt cannot be tapped safely, spoke to structural heart team, pt still needs LHC and CT TAVR. Will plan on LHC today as Cr improved. Pt aware, denies FC NV CP SOB, thinks his LE edema is improved but still has GOFF.    Telemetry: AF 70-90    Current Meds:  acetaminophen     Tablet .. 650 milliGRAM(s) Oral every 6 hours PRN  albuterol    90 MICROgram(s) HFA Inhaler 2 Puff(s) Inhalation every 6 hours PRN  allopurinol 100 milliGRAM(s) Oral daily  atorvastatin 40 milliGRAM(s) Oral at bedtime  beclomethasone  80 MICROgram(s) Inhaler 2 Puff(s) Inhalation two times a day  carboxymethylcellulose 0.5% (Preservative-Free) Ophthalmic Solution 1 Drop(s) Both EYES two times a day PRN  chlorhexidine 2% Cloths 1 Application(s) Topical daily  dextrose 5%. 1000 milliLiter(s) IV Continuous <Continuous>  dextrose 5%. 1000 milliLiter(s) IV Continuous <Continuous>  dextrose 50% Injectable 12.5 Gram(s) IV Push once  dextrose 50% Injectable 25 Gram(s) IV Push once  dextrose 50% Injectable 25 Gram(s) IV Push once  dextrose Oral Gel 15 Gram(s) Oral once PRN  glucagon  Injectable 1 milliGRAM(s) IntraMuscular once  heparin   Injectable 3000 Unit(s) IV Push every 6 hours PRN  heparin   Injectable 6500 Unit(s) IV Push every 6 hours PRN  heparin  Infusion.  Unit(s)/Hr IV Continuous <Continuous>  insulin lispro (ADMELOG) corrective regimen sliding scale   SubCutaneous three times a day before meals  insulin lispro (ADMELOG) corrective regimen sliding scale   SubCutaneous at bedtime  melatonin 3 milliGRAM(s) Oral at bedtime PRN  metoprolol succinate ER 75 milliGRAM(s) Oral two times a day  omega-3-Acid Ethyl Esters 4 Gram(s) Oral daily  pantoprazole    Tablet 40 milliGRAM(s) Oral before breakfast  sodium chloride 0.65% Nasal 1 Spray(s) Both Nostrils every 6 hours PRN  tamsulosin 0.4 milliGRAM(s) Oral at bedtime  tiotropium 2.5 MICROgram(s) Inhaler 2 Puff(s) Inhalation daily      Vitals:  T(F): 98.3 (07-11), Max: 98.3 (07-11)  HR: 90 (07-11) (90 - 97)  BP: 110/68 (07-11) (110/68 - 130/85)  RR: 18 (07-11)  SpO2: 96% (07-11)  I&O's Summary    10 Jul 2023 07:01  -  11 Jul 2023 07:00  --------------------------------------------------------  IN: 120 mL / OUT: 1051 mL / NET: -931 mL    Physical Exam:  Appearance: No acute distress; well appearing  Eyes: PERRL, EOMI, pink conjunctiva  HEENT: Normal oral mucosa  Cardiovascular: IIR, S1, S2, I/VI VIOLETA radiating to carotids, no rubs, or gallops; no edema; no JVD  Respiratory: Clear to auscultation bilaterally  Gastrointestinal: soft, non-tender, non-distended with normal bowel sounds  Musculoskeletal: 2+ edema to knees with venous stasis   Neurologic: Non-focal  Lymphatic: No lymphadenopathy  Psychiatry: AAOx3, mood & affect appropriate  Skin: No rashes, ecchymoses, or cyanosis                                   9.6    5.80  )-----------( Clumped    ( 11 Jul 2023 06:33 )             31.7     07-11    142  |  104  |  30<H>  ----------------------------<  112<H>  5.0   |  29  |  1.38<H>    Ca    9.4      11 Jul 2023 06:40      PT/INR - ( 11 Jul 2023 06:33 )   PT: 13.7 sec;   INR: 1.19 ratio     PTT - ( 11 Jul 2023 06:33 )  PTT:48.0 sec    New ECG(s): Personally reviewed    A/P  78 M with hx. of Afib (on Eliquis), severe AS, mod-severe TR, CAD/MI (30 yrs ago, medically managed, no PCI), ?CHF, COPD (not on O2 at home), former heavy smoker, HTN, HLD, DM2, gout. Was sent in for concern re outpatient echocardiogram which demonstrated worsening pericardial effusion.      #Large Pericardial Effusion  - No clinical tamponade as patient is hemodynamically stable, with robust heart sounds and no obvious JVD. Outpatient TTE demonstrated LVEF of 55% with large pericardial effusion without tamponade physiology.   - Repeat TTE showing large effusion mainly around L ventricle     > Discussed risk benefit of dx/tx pericardiocentesis, pt amenable. Spoke to IR today who said pericardial effusion cannot be safely tapped  Plan:  - Will CTM effusion and treat medically as needed with diuresis  - Can repeat limited TTE in the outpatient setting     #Severe Symptomatic Low Flow Low Gradient AS  - Pt with known sx of sev AS p/w GOFF  - Repeat TTE showing sev AS  - Structural cardiology consulted, pending CT TAVR and eventual LHC  - Hypervolemic on exam, likely multifactorial, judicious with diuretics as above  Plan:  - F/U LHC today for TAVR work up  - Still needs CT TAVR after LHC     #AFib  #RBBB  - Persistent per OSH documentation   - On apixaban 2.5 BID at home - unclear why on dose reduced; per son he previously was on coumadin but had supra-therapeutic.  Apixaban on hold at present prior to pericardial drainage  - CHADSVASC 5   - Cont heparin gtt pending procedure       Jean Ríos PGY5  Cardiology Fellow    CHAVA Hammond  ID: 78 M with hx. of Afib (on Eliquis), severe AS, mod-severe TR, CAD/MI (30 yrs ago, medically managed, no PCI), ?CHF, COPD (not on O2 at home), former heavy smoker, HTN, HLD, DM2, gout. Was sent in for concern re outpatient echocardiogram which demonstrated worsening pericardial effusion.    Patient seen and examined at bedside.    Overnight Events: NAEON, CT chest completed, IR says pt cannot be tapped safely, spoke to structural heart team, pt still needs LHC and CT TAVR. Will plan on LHC today as Cr improved. Pt aware, denies FC NV CP SOB, thinks his LE edema is improved but still has GOFF.    Telemetry: AF 70-90    Current Meds:  acetaminophen     Tablet .. 650 milliGRAM(s) Oral every 6 hours PRN  albuterol    90 MICROgram(s) HFA Inhaler 2 Puff(s) Inhalation every 6 hours PRN  allopurinol 100 milliGRAM(s) Oral daily  atorvastatin 40 milliGRAM(s) Oral at bedtime  beclomethasone  80 MICROgram(s) Inhaler 2 Puff(s) Inhalation two times a day  carboxymethylcellulose 0.5% (Preservative-Free) Ophthalmic Solution 1 Drop(s) Both EYES two times a day PRN  chlorhexidine 2% Cloths 1 Application(s) Topical daily  dextrose 5%. 1000 milliLiter(s) IV Continuous <Continuous>  dextrose 5%. 1000 milliLiter(s) IV Continuous <Continuous>  dextrose 50% Injectable 12.5 Gram(s) IV Push once  dextrose 50% Injectable 25 Gram(s) IV Push once  dextrose 50% Injectable 25 Gram(s) IV Push once  dextrose Oral Gel 15 Gram(s) Oral once PRN  glucagon  Injectable 1 milliGRAM(s) IntraMuscular once  heparin   Injectable 3000 Unit(s) IV Push every 6 hours PRN  heparin   Injectable 6500 Unit(s) IV Push every 6 hours PRN  heparin  Infusion.  Unit(s)/Hr IV Continuous <Continuous>  insulin lispro (ADMELOG) corrective regimen sliding scale   SubCutaneous three times a day before meals  insulin lispro (ADMELOG) corrective regimen sliding scale   SubCutaneous at bedtime  melatonin 3 milliGRAM(s) Oral at bedtime PRN  metoprolol succinate ER 75 milliGRAM(s) Oral two times a day  omega-3-Acid Ethyl Esters 4 Gram(s) Oral daily  pantoprazole    Tablet 40 milliGRAM(s) Oral before breakfast  sodium chloride 0.65% Nasal 1 Spray(s) Both Nostrils every 6 hours PRN  tamsulosin 0.4 milliGRAM(s) Oral at bedtime  tiotropium 2.5 MICROgram(s) Inhaler 2 Puff(s) Inhalation daily      Vitals:  T(F): 98.3 (07-11), Max: 98.3 (07-11)  HR: 90 (07-11) (90 - 97)  BP: 110/68 (07-11) (110/68 - 130/85)  RR: 18 (07-11)  SpO2: 96% (07-11)    I&O's Summary  10 Jul 2023 07:01  -  11 Jul 2023 07:00  --------------------------------------------------------  IN: 120 mL / OUT: 1051 mL / NET: -931 mL    Physical Exam:  Appearance: No acute distress; well appearing  Eyes: PERRL, EOMI, pink conjunctiva  HEENT: Normal oral mucosa  Cardiovascular: IIR, S1, S2, I/VI VIOLETA radiating to carotids, no rubs, or gallops; no edema; no JVD  Respiratory: Clear to auscultation bilaterally  Gastrointestinal: soft, non-tender, non-distended with normal bowel sounds  Musculoskeletal: 2+ edema to knees with venous stasis   Neurologic: Non-focal  Lymphatic: No lymphadenopathy  Psychiatry: AAOx3, mood & affect appropriate  Skin: No rashes, ecchymoses, or cyanosis                        LABS:             9.6    5.80  )-----------( Clumped    ( 11 Jul 2023 06:33 )             31.7     07-11  142  |  104  |  30<H>  ----------------------------<  112<H>  5.0   |  29  |  1.38<H>    Ca    9.4      11 Jul 2023 06:40    PT/INR - ( 11 Jul 2023 06:33 )   PT: 13.7 sec;   INR: 1.19 ratio     PTT - ( 11 Jul 2023 06:33 )  PTT:48.0 sec          A/P  78 M with hx. of Afib (on Eliquis), severe AS, mod-severe TR, CAD/MI (30 yrs ago, medically managed, no PCI), ?CHF, COPD (not on O2 at home), former heavy smoker, HTN, HLD, DM2, gout. Was sent in for concern re outpatient echocardiogram which demonstrated worsening pericardial effusion.      #Large Pericardial Effusion  - No clinical tamponade as patient is hemodynamically stable, with robust heart sounds and no obvious JVD. Outpatient TTE demonstrated LVEF of 55% with large pericardial effusion without tamponade physiology.   - Repeat TTE showing large effusion mainly around L ventricle     > Discussed risk benefit of dx/tx pericardiocentesis, pt amenable. Spoke to IR today who said pericardial effusion cannot be safely tapped  Plan:  - Will CTM effusion and treat medically as needed with diuresis  - Can repeat limited TTE in the outpatient setting     #Severe Symptomatic Low Flow Low Gradient AS  - Pt with known sx of sev AS p/w GOFF  - Repeat TTE showing sev AS  - Structural cardiology consulted, pending CT TAVR and eventual LHC  - Hypervolemic on exam, likely multifactorial, judicious with diuretics as above  Plan:  - F/U LHC today for TAVR work up  - Still needs CT TAVR after LHC     #AFib  #RBBB  - Persistent per OSH documentation   - On apixaban 2.5 BID at home - unclear why on dose reduced; per son he previously was on coumadin but had supra-therapeutic.  Apixaban on hold at present prior to pericardial drainage  - CHADSVASC 5   - Cont heparin gtt pending procedure       Jean Ríos PGY5  Cardiology Fellow    Plan discussed with cardiology fellow.  Patient seen and examined.  Hx., exam and labs as above.  I agree with the assessment and recommendations, which I have reviewed and edited where appropriate.  Mc Hammond M.D.  Cardiology Attending, Consult Service    For Cardiology consults and questions, all Cardiology service information can be found 24/7 on [email or web address] - use password: [employer] to log in.

## 2023-07-11 NOTE — PROGRESS NOTE ADULT - PROBLEM SELECTOR PLAN 3
- sCr worsening  - IV Lasix d/essie  - Nephrology consulted; reccs appreciated   - Hep C non reactive. f/up HBsAg, serum immunofixation, C3 and C4. checked UA  - Renal US shows both kidneys are echogenic, compatible with medical renal disease. No renal mass, hydronephrosis or calculus  - monitor BMP

## 2023-07-11 NOTE — PROGRESS NOTE ADULT - SUBJECTIVE AND OBJECTIVE BOX
Interventional Radiology Follow-Up Note    78 year old Male with a PMH of Afib (on Eliquis), severe AS, mod-severe TR, CAD/MI (30 yrs ago, medically managed, no PCI), ?CHF, COPD (not on O2 at home), former heavy smoker, HTN, HLD, DM2, gout, who was sent in for concerning outpatient echocardiogram which demonstrated worsening pericardial effusion. Outpatient TTE demonstrated LVEF of 55% with large pericardial effusion without tamponade physiology. Pt is pending possible CT TAVR and eventual LHC. Will need Pericardiocentesis prior though. Per interventional cardiology cannot access effusion safely. IR consulted for Pericardiocentesis.    Medication:  heparin  Infusion.: (07-10)  metoprolol succinate ER: (07-11)    Vitals:  T(F): 97.7, Max: 97.7 (04:08)  HR: 97  BP: 130/85  RR: 18  SpO2: 97%    LABS:  Na: 142 (07-11 @ 06:40), 142 (07-10 @ 04:45), 144 (07-09 @ 07:07)  K: 5.0 (07-11 @ 06:40), 4.3 (07-10 @ 04:45), 4.2 (07-09 @ 07:07)  Cl: 104 (07-11 @ 06:40), 103 (07-10 @ 04:45), 103 (07-09 @ 07:07)  CO2: 29 (07-11 @ 06:40), 30 (07-10 @ 04:45), 30 (07-09 @ 07:07)  BUN: 30 (07-11 @ 06:40), 38 (07-10 @ 04:45), 37 (07-09 @ 07:07)  Cr: 1.38 (07-11 @ 06:40), 1.73 (07-10 @ 04:45), 1.46 (07-09 @ 07:07)  Glu: 112(07-11 @ 06:40), 109(07-10 @ 04:45), 127(07-09 @ 07:07)  Hgb: 9.6 (07-11 @ 06:33), 9.7 (07-10 @ 04:45), 10.3 (07-09 @ 07:10)  Hct: 31.7 (07-11 @ 06:33), 32.1 (07-10 @ 04:45), 34.4 (07-09 @ 07:10)  WBC: 5.80 (07-11 @ 06:33), 6.08 (07-10 @ 04:45), 5.80 (07-09 @ 07:10)  Plt: Clumped (07-10 @ 04:45), Clumped (07-09 @ 07:10)  INR: 1.19 07-11-23 @ 06:33, 1.12 07-10-23 @ 04:45, 1.12 07-09-23 @ 12:51, 1.40 07-08-23 @ 09:58  PTT: 48.0 07-11-23 @ 06:33, 60.1 07-10-23 @ 04:45, 69.3 07-09-23 @ 21:07, 49.5 07-09-23 @ 12:51, 167.0 07-09-23 @ 02:48, 36.7 07-08-23 @ 20:06, >200.0 07-08-23 @ 09:58        Assessment/Plan:  78 year old Male with a PMH of Afib (on Eliquis), severe AS, mod-severe TR, CAD/MI (30 yrs ago, medically managed, no PCI), ?CHF, COPD (not on O2 at home), former heavy smoker, HTN, HLD, DM2, gout, who was sent in for concerning outpatient echocardiogram which demonstrated worsening pericardial effusion. Outpatient TTE demonstrated LVEF of 55% with large pericardial effusion without tamponade physiology. Pt is pending possible CT TAVR and eventual LHC. Will need Pericardiocentesis prior though. Per interventional cardiology cannot access effusion safely. IR consulted for Pericardiocentesis.    - CT scan reviewed with Attending Dr. Swann and Dr. Mcanlly  - No Window noted for pericardiocentesis with CT guidance  - Trend vs/labs  - Continue global management per primary team    Please call IR at [phone] with any questions, concerns, or issues regarding above.      Also available on TEAMS

## 2023-07-11 NOTE — PROGRESS NOTE ADULT - SUBJECTIVE AND OBJECTIVE BOX
[facility] Division of Hospital Medicine  Kaleigh Cerrato MD M-F, 8A-5P: MS Teams, Pager: [phone]  Other Times: Ext: [phone], Pager: [phone]      Patient is a 78y old  Male who presents with a chief complaint of worsening pericardial effusion (2023 08:10)      SUBJECTIVE / OVERNIGHT EVENTS:  Patient was examined    ADDITIONAL REVIEW OF SYSTEMS:    MEDICATIONS  (STANDING):  allopurinol 100 milliGRAM(s) Oral daily  atorvastatin 40 milliGRAM(s) Oral at bedtime  beclomethasone  80 MICROgram(s) Inhaler 2 Puff(s) Inhalation two times a day  chlorhexidine 2% Cloths 1 Application(s) Topical daily  dextrose 5%. 1000 milliLiter(s) (100 mL/Hr) IV Continuous <Continuous>  dextrose 5%. 1000 milliLiter(s) (50 mL/Hr) IV Continuous <Continuous>  dextrose 50% Injectable 12.5 Gram(s) IV Push once  dextrose 50% Injectable 25 Gram(s) IV Push once  dextrose 50% Injectable 25 Gram(s) IV Push once  glucagon  Injectable 1 milliGRAM(s) IntraMuscular once  heparin  Infusion.  Unit(s)/Hr (15 mL/Hr) IV Continuous <Continuous>  insulin lispro (ADMELOG) corrective regimen sliding scale   SubCutaneous three times a day before meals  insulin lispro (ADMELOG) corrective regimen sliding scale   SubCutaneous at bedtime  metoprolol succinate ER 75 milliGRAM(s) Oral two times a day  omega-3-Acid Ethyl Esters 4 Gram(s) Oral daily  pantoprazole    Tablet 40 milliGRAM(s) Oral before breakfast  tamsulosin 0.4 milliGRAM(s) Oral at bedtime  tiotropium 2.5 MICROgram(s) Inhaler 2 Puff(s) Inhalation daily    MEDICATIONS  (PRN):  acetaminophen     Tablet .. 650 milliGRAM(s) Oral every 6 hours PRN Temp greater or equal to 38C (100.4F), Mild Pain (1 - 3)  albuterol    90 MICROgram(s) HFA Inhaler 2 Puff(s) Inhalation every 6 hours PRN Shortness of Breath and/or Wheezing  carboxymethylcellulose 0.5% (Preservative-Free) Ophthalmic Solution 1 Drop(s) Both EYES two times a day PRN dry eyes  dextrose Oral Gel 15 Gram(s) Oral once PRN Blood Glucose LESS THAN 70 milliGRAM(s)/deciliter  heparin   Injectable 3000 Unit(s) IV Push every 6 hours PRN For aPTT between 40 - 57  heparin   Injectable 6500 Unit(s) IV Push every 6 hours PRN For aPTT less than 40  melatonin 3 milliGRAM(s) Oral at bedtime PRN Sleep  sodium chloride 0.65% Nasal 1 Spray(s) Both Nostrils every 6 hours PRN Nasal Congestion      CAPILLARY BLOOD GLUCOSE      POCT Blood Glucose.: 175 mg/dL (10 Jul 2023 21:12)      I&O's Summary    10 Jul 2023 07:01  -  2023 07:00  --------------------------------------------------------  IN: 120 mL / OUT: 1051 mL / NET: -931 mL        Daily     Daily Weight in k.2 (2023 08:10)    PHYSICAL EXAM:  Vital Signs Last 24 Hrs  T(C): 36.8 (2023 11:02), Max: 36.8 (2023 11:02)  T(F): 98.3 (:02), Max: 98.3 (2023 11:02)  HR: 90 (2023 11:02) (90 - 97)  BP: 110/68 (2023 11:02) (110/68 - 130/85)  BP(mean): --  RR: 18 (:) (18 - 18)  SpO2: 96% (2023 11:02) (96% - 98%)    Parameters below as of 2023 11:02  Patient On (Oxygen Delivery Method): nasal cannula  O2 Flow (L/min): 3    CONSTITUTIONAL: NAD, well-developed, well-groomed  EYES: PERRLA; conjunctiva and sclera clear  ENMT: Moist oral mucosa, no pharyngeal injection or exudates; normal dentition  NECK: Supple, no palpable masses; no thyromegaly  RESPIRATORY: Normal respiratory effort; lungs are clear to auscultation bilaterally  CARDIOVASCULAR: Regular rate and rhythm, normal S1 and S2, no murmur/rub/gallop; No lower extremity edema; Peripheral pulses are 2+ bilaterally  ABDOMEN: Nontender to palpation, normoactive bowel sounds, no rebound/guarding; No hepatosplenomegaly  MUSCULOSKELETAL:  no clubbing or cyanosis of digits; no joint swelling or tenderness to palpation, moving all extremities   PSYCH: A+O to person, place, and time; affect appropriate  NEUROLOGY: CN 2-12 are intact and symmetric; no gross sensory/motor deficits   SKIN: No rashes; no palpable lesions    LABS:                        9.6    5.80  )-----------( Clumped    ( 2023 06:33 )             31.7     07-11    142  |  104  |  30<H>  ----------------------------<  112<H>  5.0   |  29  |  1.38<H>    Ca    9.4      2023 06:40        PT/INR - ( 2023 06:33 )   PT: 13.7 sec;   INR: 1.19 ratio         PTT - ( 2023 06:33 )  PTT:48.0 sec      Urinalysis Basic - ( 2023 06:40 )    Color: x / Appearance: x / SG: x / pH: x  Gluc: 112 mg/dL / Ketone: x  / Bili: x / Urobili: x   Blood: x / Protein: x / Nitrite: x   Leuk Esterase: x / RBC: x / WBC x   Sq Epi: x / Non Sq Epi: x / Bacteria: x            RADIOLOGY & ADDITIONAL TESTS:  Results Reviewed:   Imaging Personally Reviewed:  Electrocardiogram Personally Reviewed:    COORDINATION OF CARE:  Care Discussed with Consultants/Other Providers [Y/N]:  Prior or Outpatient Records Reviewed [Y/N]:

## 2023-07-12 DIAGNOSIS — D64.9 ANEMIA, UNSPECIFIED: ICD-10-CM

## 2023-07-12 LAB
ANION GAP SERPL CALC-SCNC: 11 MMOL/L — SIGNIFICANT CHANGE UP (ref 5–17)
APTT BLD: 63.5 SEC — HIGH (ref 27.5–35.5)
BUN SERPL-MCNC: 31 MG/DL — HIGH (ref 7–23)
CALCIUM SERPL-MCNC: 9.2 MG/DL — SIGNIFICANT CHANGE UP (ref 8.4–10.5)
CHLORIDE SERPL-SCNC: 101 MMOL/L — SIGNIFICANT CHANGE UP (ref 96–108)
CO2 SERPL-SCNC: 26 MMOL/L — SIGNIFICANT CHANGE UP (ref 22–31)
CREAT SERPL-MCNC: 1.56 MG/DL — HIGH (ref 0.5–1.3)
EGFR: 45 ML/MIN/1.73M2 — LOW
GLUCOSE BLDC GLUCOMTR-MCNC: 128 MG/DL — HIGH (ref 70–99)
GLUCOSE BLDC GLUCOMTR-MCNC: 143 MG/DL — HIGH (ref 70–99)
GLUCOSE BLDC GLUCOMTR-MCNC: 150 MG/DL — HIGH (ref 70–99)
GLUCOSE BLDC GLUCOMTR-MCNC: 188 MG/DL — HIGH (ref 70–99)
GLUCOSE SERPL-MCNC: 121 MG/DL — HIGH (ref 70–99)
HCT VFR BLD CALC: 29.1 % — LOW (ref 39–50)
HGB BLD-MCNC: 9 G/DL — LOW (ref 13–17)
MCHC RBC-ENTMCNC: 25.9 PG — LOW (ref 27–34)
MCHC RBC-ENTMCNC: 30.9 GM/DL — LOW (ref 32–36)
MCV RBC AUTO: 83.9 FL — SIGNIFICANT CHANGE UP (ref 80–100)
NRBC # BLD: 0 /100 WBCS — SIGNIFICANT CHANGE UP (ref 0–0)
PLATELET # BLD AUTO: SIGNIFICANT CHANGE UP K/UL (ref 150–400)
POTASSIUM SERPL-MCNC: 4.2 MMOL/L — SIGNIFICANT CHANGE UP (ref 3.5–5.3)
POTASSIUM SERPL-SCNC: 4.2 MMOL/L — SIGNIFICANT CHANGE UP (ref 3.5–5.3)
RBC # BLD: 3.47 M/UL — LOW (ref 4.2–5.8)
RBC # FLD: 19.3 % — HIGH (ref 10.3–14.5)
SODIUM SERPL-SCNC: 138 MMOL/L — SIGNIFICANT CHANGE UP (ref 135–145)
WBC # BLD: 5.67 K/UL — SIGNIFICANT CHANGE UP (ref 3.8–10.5)
WBC # FLD AUTO: 5.67 K/UL — SIGNIFICANT CHANGE UP (ref 3.8–10.5)

## 2023-07-12 PROCEDURE — 99233 SBSQ HOSP IP/OBS HIGH 50: CPT | Mod: GC

## 2023-07-12 PROCEDURE — 99233 SBSQ HOSP IP/OBS HIGH 50: CPT

## 2023-07-12 PROCEDURE — 74174 CTA ABD&PLVS W/CONTRAST: CPT | Mod: 26

## 2023-07-12 PROCEDURE — 99232 SBSQ HOSP IP/OBS MODERATE 35: CPT | Mod: GC

## 2023-07-12 PROCEDURE — 75574 CT ANGIO HRT W/3D IMAGE: CPT | Mod: 26

## 2023-07-12 PROCEDURE — 71275 CT ANGIOGRAPHY CHEST: CPT | Mod: 26

## 2023-07-12 RX ORDER — SODIUM CHLORIDE 9 MG/ML
1000 INJECTION INTRAMUSCULAR; INTRAVENOUS; SUBCUTANEOUS
Refills: 0 | Status: DISCONTINUED | OUTPATIENT
Start: 2023-07-12 | End: 2023-07-13

## 2023-07-12 RX ADMIN — TAMSULOSIN HYDROCHLORIDE 0.4 MILLIGRAM(S): 0.4 CAPSULE ORAL at 21:59

## 2023-07-12 RX ADMIN — BECLOMETHASONE DIPROPIONATE 2 PUFF(S): 40 AEROSOL, METERED RESPIRATORY (INHALATION) at 11:52

## 2023-07-12 RX ADMIN — CHLORHEXIDINE GLUCONATE 1 APPLICATION(S): 213 SOLUTION TOPICAL at 12:42

## 2023-07-12 RX ADMIN — Medication 4 GRAM(S): at 11:53

## 2023-07-12 RX ADMIN — Medication 650 MILLIGRAM(S): at 05:00

## 2023-07-12 RX ADMIN — PANTOPRAZOLE SODIUM 40 MILLIGRAM(S): 20 TABLET, DELAYED RELEASE ORAL at 07:00

## 2023-07-12 RX ADMIN — BECLOMETHASONE DIPROPIONATE 2 PUFF(S): 40 AEROSOL, METERED RESPIRATORY (INHALATION) at 21:59

## 2023-07-12 RX ADMIN — Medication 100 MILLIGRAM(S): at 11:52

## 2023-07-12 RX ADMIN — TIOTROPIUM BROMIDE 2 PUFF(S): 18 CAPSULE ORAL; RESPIRATORY (INHALATION) at 11:51

## 2023-07-12 RX ADMIN — HEPARIN SODIUM 1100 UNIT(S)/HR: 5000 INJECTION INTRAVENOUS; SUBCUTANEOUS at 02:35

## 2023-07-12 RX ADMIN — Medication 75 MILLIGRAM(S): at 05:22

## 2023-07-12 RX ADMIN — Medication 0: at 22:00

## 2023-07-12 RX ADMIN — SODIUM CHLORIDE 80 MILLILITER(S): 9 INJECTION INTRAMUSCULAR; INTRAVENOUS; SUBCUTANEOUS at 09:32

## 2023-07-12 RX ADMIN — Medication 650 MILLIGRAM(S): at 05:30

## 2023-07-12 RX ADMIN — ATORVASTATIN CALCIUM 40 MILLIGRAM(S): 80 TABLET, FILM COATED ORAL at 21:59

## 2023-07-12 RX ADMIN — HEPARIN SODIUM 1100 UNIT(S)/HR: 5000 INJECTION INTRAVENOUS; SUBCUTANEOUS at 22:01

## 2023-07-12 RX ADMIN — HEPARIN SODIUM 1100 UNIT(S)/HR: 5000 INJECTION INTRAVENOUS; SUBCUTANEOUS at 07:35

## 2023-07-12 NOTE — PROGRESS NOTE ADULT - SUBJECTIVE AND OBJECTIVE BOX
[facility] DIVISION OF KIDNEY DISEASES AND HYPERTENSION   FOLLOW UP NOTE  --------------------------------------------------------------------------------  HPI: 78M w/ hx of Afib (on Eliquis), severe AS, mod-severe TR, CAD/MI (30 yrs ago, medically managed, no PCI), HFpEF (TTE 6/3 EF 55%), CKD, COPD (not on O2 at home), former heavy smoker (over 25 years agO), HTN, HLD, DM2, gout, presenting with worsening pericardial effusion. Patient was sent in from his cardiologist's office. Of note, he had a TTE on day of presentation (7/3/23) that showed a "large circumferential pericardial effusion up to 2.6 cm without evidence of tamponade physiology" (and mildly reduced RV systolic function was also noted). His prior TTE on 10/3/22 had shown a "moderate pericardial effusion, measuring about 1.0 cm superior to the RA; otherwise small circumferential pericardial effusion." Etiology of pericardial effusion is unclear. Cardiology is considering pericardiocentesis and CT TVAR, renal was consulted to evaluate the patient for contrast tolerance given CKD.     24 hour events/subjective: Pt. was seen and examined today. He underwent LHC on 7/11, report pending. Denied shortness of breath, chest pain, fevers, chills, nausea, vomiting, difficulty with urination.     PAST HISTORY  --------------------------------------------------------------------------------  No significant changes to PMH, PSH, FHx, SHx, unless otherwise noted    ALLERGIES & MEDICATIONS  --------------------------------------------------------------------------------  Allergies  penicillins (Unknown)    Intolerances    Standing Inpatient Medications  allopurinol 100 milliGRAM(s) Oral daily  atorvastatin 40 milliGRAM(s) Oral at bedtime  beclomethasone  80 MICROgram(s) Inhaler 2 Puff(s) Inhalation two times a day  chlorhexidine 2% Cloths 1 Application(s) Topical daily  dextrose 5%. 1000 milliLiter(s) IV Continuous <Continuous>  dextrose 5%. 1000 milliLiter(s) IV Continuous <Continuous>  dextrose 50% Injectable 12.5 Gram(s) IV Push once  dextrose 50% Injectable 25 Gram(s) IV Push once  dextrose 50% Injectable 25 Gram(s) IV Push once  glucagon  Injectable 1 milliGRAM(s) IntraMuscular once  heparin  Infusion.  Unit(s)/Hr IV Continuous <Continuous>  insulin lispro (ADMELOG) corrective regimen sliding scale   SubCutaneous three times a day before meals  insulin lispro (ADMELOG) corrective regimen sliding scale   SubCutaneous at bedtime  metoprolol succinate ER 75 milliGRAM(s) Oral two times a day  omega-3-Acid Ethyl Esters 4 Gram(s) Oral daily  pantoprazole    Tablet 40 milliGRAM(s) Oral before breakfast  tamsulosin 0.4 milliGRAM(s) Oral at bedtime  tiotropium 2.5 MICROgram(s) Inhaler 2 Puff(s) Inhalation daily    PRN Inpatient Medications  acetaminophen     Tablet .. 650 milliGRAM(s) Oral every 6 hours PRN  albuterol    90 MICROgram(s) HFA Inhaler 2 Puff(s) Inhalation every 6 hours PRN  carboxymethylcellulose 0.5% (Preservative-Free) Ophthalmic Solution 1 Drop(s) Both EYES two times a day PRN  dextrose Oral Gel 15 Gram(s) Oral once PRN  heparin   Injectable 3000 Unit(s) IV Push every 6 hours PRN  heparin   Injectable 6500 Unit(s) IV Push every 6 hours PRN  melatonin 3 milliGRAM(s) Oral at bedtime PRN  sodium chloride 0.65% Nasal 1 Spray(s) Both Nostrils every 6 hours PRN    REVIEW OF SYSTEMS  --------------------------------------------------------------------------------  All other systems were reviewed and are negative, except as noted.    VITALS/PHYSICAL EXAM  --------------------------------------------------------------------------------  T(C): 36.7 (07-12-23 @ 05:16), Max: 36.9 (07-11-23 @ 21:14)  HR: 91 (07-12-23 @ 05:16) (90 - 98)  BP: 102/68 (07-12-23 @ 05:16) (99/64 - 120/77)  RR: 16 (07-12-23 @ 05:16) (16 - 18)  SpO2: 94% (07-12-23 @ 05:16) (94% - 97%)  Wt(kg): --    Physical Exam:  	Gen: Laying in bed in NAD  	HEENT: Anicteric  	Pulm: CTA B/L  	CV: S1S2+  	Abd: Soft, +BS         	Ext: + LE edema B/L R>L (hx of leg trauma).   	Neuro: Awake  	Skin: Warm and dry    LABS/STUDIES  --------------------------------------------------------------------------------              9.6    5.80  >-----------<  Clumped    [07-11-23 @ 06:33]              31.7     138  |  101  |  31  ----------------------------<  121      [07-12-23 @ 07:38]  4.2   |  26  |  1.56        Ca     9.2     [07-12-23 @ 07:38]      PT/INR: PT 13.7 , INR 1.19       [07-11-23 @ 06:33]  PTT: 63.5       [07-12-23 @ 00:59]    Creatinine Trend:  SCr 1.56 [07-12 @ 07:38]  SCr 1.38 [07-11 @ 06:40]  SCr 1.73 [07-10 @ 04:45]  SCr 1.46 [07-09 @ 07:07]  SCr 1.33 [07-08 @ 06:47]    Urinalysis - [07-12-23 @ 07:38]      Color  / Appearance  / SG  / pH       Gluc 121 / Ketone   / Bili  / Urobili        Blood  / Protein  / Leuk Est  / Nitrite       RBC  / WBC  / Hyaline  / Gran  / Sq Epi  / Non Sq Epi  / Bacteria     Urine Creatinine 85      [07-07-23 @ 18:00]  Urine Protein 12      [07-07-23 @ 18:00]    HBsAg Nonreact      [07-08-23 @ 06:48]  HCV 0.10, Nonreact      [07-05-23 @ 06:29]    C3 Complement 133      [07-08-23 @ 06:48]  C4 Complement 32      [07-08-23 @ 06:48]  Immunofixation Serum: Weak IgG Lambda Band Identified    Reference Range: None Detected      [07-08-23 @ 06:48] [facility] DIVISION OF KIDNEY DISEASES AND HYPERTENSION   FOLLOW UP NOTE  --------------------------------------------------------------------------------  HPI: 78M w/ hx of Afib (on Eliquis), severe AS, mod-severe TR, CAD/MI (30 yrs ago, medically managed, no PCI), HFpEF (TTE 6/3 EF 55%), CKD, COPD (not on O2 at home), former heavy smoker (over 25 years agO), HTN, HLD, DM2, gout, presenting with worsening pericardial effusion. Patient was sent in from his cardiologist's office. Of note, he had a TTE on day of presentation (7/3/23) that showed a "large circumferential pericardial effusion up to 2.6 cm without evidence of tamponade physiology" (and mildly reduced RV systolic function was also noted). His prior TTE on 10/3/22 had shown a "moderate pericardial effusion, measuring about 1.0 cm superior to the RA; otherwise small circumferential pericardial effusion." Etiology of pericardial effusion is unclear. Cardiology is considering pericardiocentesis and CT TVAR, renal was consulted to evaluate the patient for contrast tolerance given CKD.     24 hour events/subjective: Pt. was seen and examined today. Awaiting cardiac eval. Denied shortness of breath, chest pain, fevers, chills, nausea, vomiting, difficulty with urination.     PAST HISTORY  --------------------------------------------------------------------------------  No significant changes to PMH, PSH, FHx, SHx, unless otherwise noted    ALLERGIES & MEDICATIONS  --------------------------------------------------------------------------------  Allergies  penicillins (Unknown)    Intolerances    Standing Inpatient Medications  allopurinol 100 milliGRAM(s) Oral daily  atorvastatin 40 milliGRAM(s) Oral at bedtime  beclomethasone  80 MICROgram(s) Inhaler 2 Puff(s) Inhalation two times a day  chlorhexidine 2% Cloths 1 Application(s) Topical daily  dextrose 5%. 1000 milliLiter(s) IV Continuous <Continuous>  dextrose 5%. 1000 milliLiter(s) IV Continuous <Continuous>  dextrose 50% Injectable 12.5 Gram(s) IV Push once  dextrose 50% Injectable 25 Gram(s) IV Push once  dextrose 50% Injectable 25 Gram(s) IV Push once  glucagon  Injectable 1 milliGRAM(s) IntraMuscular once  heparin  Infusion.  Unit(s)/Hr IV Continuous <Continuous>  insulin lispro (ADMELOG) corrective regimen sliding scale   SubCutaneous three times a day before meals  insulin lispro (ADMELOG) corrective regimen sliding scale   SubCutaneous at bedtime  metoprolol succinate ER 75 milliGRAM(s) Oral two times a day  omega-3-Acid Ethyl Esters 4 Gram(s) Oral daily  pantoprazole    Tablet 40 milliGRAM(s) Oral before breakfast  tamsulosin 0.4 milliGRAM(s) Oral at bedtime  tiotropium 2.5 MICROgram(s) Inhaler 2 Puff(s) Inhalation daily    PRN Inpatient Medications  acetaminophen     Tablet .. 650 milliGRAM(s) Oral every 6 hours PRN  albuterol    90 MICROgram(s) HFA Inhaler 2 Puff(s) Inhalation every 6 hours PRN  carboxymethylcellulose 0.5% (Preservative-Free) Ophthalmic Solution 1 Drop(s) Both EYES two times a day PRN  dextrose Oral Gel 15 Gram(s) Oral once PRN  heparin   Injectable 3000 Unit(s) IV Push every 6 hours PRN  heparin   Injectable 6500 Unit(s) IV Push every 6 hours PRN  melatonin 3 milliGRAM(s) Oral at bedtime PRN  sodium chloride 0.65% Nasal 1 Spray(s) Both Nostrils every 6 hours PRN    REVIEW OF SYSTEMS  --------------------------------------------------------------------------------  All other systems were reviewed and are negative, except as noted.    VITALS/PHYSICAL EXAM  --------------------------------------------------------------------------------  T(C): 36.7 (07-12-23 @ 05:16), Max: 36.9 (07-11-23 @ 21:14)  HR: 91 (07-12-23 @ 05:16) (90 - 98)  BP: 102/68 (07-12-23 @ 05:16) (99/64 - 120/77)  RR: 16 (07-12-23 @ 05:16) (16 - 18)  SpO2: 94% (07-12-23 @ 05:16) (94% - 97%)  Wt(kg): --    Physical Exam:  	Gen: Laying in bed in NAD  	HEENT: Anicteric  	Pulm: CTA B/L  	CV: S1S2+  	Abd: Soft, +BS         	Ext: + LE edema B/L R>L (hx of leg trauma).   	Neuro: Awake  	Skin: Warm and dry    LABS/STUDIES  --------------------------------------------------------------------------------              9.6    5.80  >-----------<  Clumped    [07-11-23 @ 06:33]              31.7     138  |  101  |  31  ----------------------------<  121      [07-12-23 @ 07:38]  4.2   |  26  |  1.56        Ca     9.2     [07-12-23 @ 07:38]      PT/INR: PT 13.7 , INR 1.19       [07-11-23 @ 06:33]  PTT: 63.5       [07-12-23 @ 00:59]    Creatinine Trend:  SCr 1.56 [07-12 @ 07:38]  SCr 1.38 [07-11 @ 06:40]  SCr 1.73 [07-10 @ 04:45]  SCr 1.46 [07-09 @ 07:07]  SCr 1.33 [07-08 @ 06:47]    Urinalysis - [07-12-23 @ 07:38]      Color  / Appearance  / SG  / pH       Gluc 121 / Ketone   / Bili  / Urobili        Blood  / Protein  / Leuk Est  / Nitrite       RBC  / WBC  / Hyaline  / Gran  / Sq Epi  / Non Sq Epi  / Bacteria     Urine Creatinine 85      [07-07-23 @ 18:00]  Urine Protein 12      [07-07-23 @ 18:00]    HBsAg Nonreact      [07-08-23 @ 06:48]  HCV 0.10, Nonreact      [07-05-23 @ 06:29]    C3 Complement 133      [07-08-23 @ 06:48]  C4 Complement 32      [07-08-23 @ 06:48]  Immunofixation Serum: Weak IgG Lambda Band Identified    Reference Range: None Detected      [07-08-23 @ 06:48]

## 2023-07-12 NOTE — PROGRESS NOTE ADULT - ASSESSMENT
78M (alt MRN [id number]) w/ hx of Afib (on Eliquis), severe AS, mod-severe TR, CAD/MI (30 yrs ago, medically managed, no PCI), ?CHF, COPD (not on O2 at home), former heavy smoker, HTN, HLD, DM2, gout, prior moderate pericardial effusion, now presenting with large pericardial effusion w/o tamponade physiology on outpt TTE.

## 2023-07-12 NOTE — PROGRESS NOTE ADULT - PROBLEM SELECTOR PLAN 1
- Found to have "large circumferential pericardial effusion up to 2.6 cm without evidence of tamponade physiology" on outpt TTE (7/3/23).  - Prior TTE (10/3/22) showed "moderate pericardial effusion, measuring about 1.0 cm superior to the RA; otherwise small circumferential pericardial effusion." Unclear underlying etiology of effusion, but possibly in setting of CHF.  - d/essie Lasix given SHEYLA  - strict I/Os, monitor UOP  - monitor hemodynamics closely  - TTE w/ unchanged mod to large pericardial effusion ; +severe AS    >    > Per recs from cardiology and IR teams: no plan for pericardiocentesis now.  - CT surgery consulted for severe AS ; workup for TAVR in progress ; TAVR CT ordered, F/U plan for LHC - still pending   - Eliquis on hold ; on Heparin gtt given pending invasive procedure

## 2023-07-12 NOTE — PROGRESS NOTE ADULT - SUBJECTIVE AND OBJECTIVE BOX
ID: 78 M with hx. of Afib (on Eliquis), severe AS, mod-severe TR, CAD/MI (30 yrs ago, medically managed, no PCI), ?CHF, COPD (not on O2 at home), former heavy smoker, HTN, HLD, DM2, gout. Was sent in for concern re outpatient echocardiogram which demonstrated worsening pericardial effusion. Effusion not safely accessible, now pending TAVR work up    Patient seen and examined at bedside.    Overnight Events: NAEON, feeling ok today, frustrated by tests getting cancelled. Denies FC NV CP SOB. Spoke to structural team - opt for CT TAVR protocol today with plan for [facility] later this week to help expedite work up. 20G and 18G IVs placed in LUE and RUE respectively for CT.     Telemetry: AF          Current Meds:  acetaminophen     Tablet .. 650 milliGRAM(s) Oral every 6 hours PRN  albuterol    90 MICROgram(s) HFA Inhaler 2 Puff(s) Inhalation every 6 hours PRN  allopurinol 100 milliGRAM(s) Oral daily  atorvastatin 40 milliGRAM(s) Oral at bedtime  beclomethasone  80 MICROgram(s) Inhaler 2 Puff(s) Inhalation two times a day  carboxymethylcellulose 0.5% (Preservative-Free) Ophthalmic Solution 1 Drop(s) Both EYES two times a day PRN  chlorhexidine 2% Cloths 1 Application(s) Topical daily  dextrose 5%. 1000 milliLiter(s) IV Continuous <Continuous>  dextrose 5%. 1000 milliLiter(s) IV Continuous <Continuous>  dextrose 50% Injectable 25 Gram(s) IV Push once  dextrose 50% Injectable 12.5 Gram(s) IV Push once  dextrose 50% Injectable 25 Gram(s) IV Push once  dextrose Oral Gel 15 Gram(s) Oral once PRN  glucagon  Injectable 1 milliGRAM(s) IntraMuscular once  heparin   Injectable 3000 Unit(s) IV Push every 6 hours PRN  heparin   Injectable 6500 Unit(s) IV Push every 6 hours PRN  heparin  Infusion.  Unit(s)/Hr IV Continuous <Continuous>  insulin lispro (ADMELOG) corrective regimen sliding scale   SubCutaneous three times a day before meals  insulin lispro (ADMELOG) corrective regimen sliding scale   SubCutaneous at bedtime  melatonin 3 milliGRAM(s) Oral at bedtime PRN  metoprolol succinate ER 75 milliGRAM(s) Oral two times a day  omega-3-Acid Ethyl Esters 4 Gram(s) Oral daily  pantoprazole    Tablet 40 milliGRAM(s) Oral before breakfast  sodium chloride 0.65% Nasal 1 Spray(s) Both Nostrils every 6 hours PRN  sodium chloride 0.9%. 1000 milliLiter(s) IV Continuous <Continuous>  tamsulosin 0.4 milliGRAM(s) Oral at bedtime  tiotropium 2.5 MICROgram(s) Inhaler 2 Puff(s) Inhalation daily      Vitals:  T(F): 98 (07-12), Max: 98.5 (07-11)  HR: 91 (07-12) (90 - 98)  BP: 102/68 (07-12) (99/64 - 120/77)  RR: 16 (07-12)  SpO2: 94% (07-12)  I&O's Summary      Physical Exam:  Appearance: No acute distress; well appearing  Eyes: PERRL, EOMI, pink conjunctiva  HEENT: Normal oral mucosa  Cardiovascular: IIR, S1, S2, I/VI VIOLETA radiating to carotids, no rubs, or gallops; no edema; no JVD  Respiratory: Clear to auscultation bilaterally  Gastrointestinal: soft, non-tender, non-distended with normal bowel sounds  Musculoskeletal: 2+ edema to knees with venous stasis   Neurologic: Non-focal  Lymphatic: No lymphadenopathy  Psychiatry: AAOx3, mood & affect appropriate  Skin: No rashes, ecchymoses, or cyanosis                        9.0    5.67  )-----------( Clumped    ( 12 Jul 2023 07:28 )             29.1     07-12    138  |  101  |  31<H>  ----------------------------<  121<H>  4.2   |  26  |  1.56<H>    Ca    9.2      12 Jul 2023 07:38      PT/INR - ( 11 Jul 2023 06:33 )   PT: 13.7 sec;   INR: 1.19 ratio     PTT - ( 12 Jul 2023 00:59 )  PTT:63.5 sec    New ECG(s): Personally reviewed    A/P  ID: 78 M with hx. of Afib (on Eliquis), severe AS, mod-severe TR, CAD/MI (30 yrs ago, medically managed, no PCI), ?CHF, COPD (not on O2 at home), former heavy smoker, HTN, HLD, DM2, gout. Was sent in for concern re outpatient echocardiogram which demonstrated worsening pericardial effusion. Effusion not safely accessible, now pending TAVR work up    #Large Pericardial Effusion  - No clinical tamponade as patient is hemodynamically stable, with robust heart sounds and no obvious JVD. Outpatient TTE demonstrated LVEF of 55% with large pericardial effusion without tamponade physiology.   - Repeat TTE showing large effusion mainly around L ventricle     > Discussed risk benefit of dx/tx pericardiocentesis, pt amenable. Spoke to IR today who said pericardial effusion cannot be safely tapped  Plan:  - Will CTM effusion and treat medically as needed with diuresis  - Can repeat limited TTE in the outpatient setting     #Severe Symptomatic Low Flow Low Gradient AS  - Pt with known sx of sev AS p/w GOFF  - Repeat TTE showing sev AS  - Structural cardiology consulted, pending CT TAVR and eventual LHC  - Hypervolemic on exam, likely multifactorial, judicious with diuretics as above  Plan:  - F/U CT TAVR today  - LHC later this week   - Structural team following     #AFib  #RBBB  - Persistent per OSH documentation   - On apixaban 2.5 BID at home - unclear why on dose reduced; per son he previously was on coumadin but had supra-therapeutic.  Apixaban on hold at present prior to pericardial drainage  - CHADSVASC 5   - Cont heparin gtt pending procedure       Jean Ríos PGY5  Cardiology Fellow    CHAVA Hammond  78 M with hx. of Afib (on Eliquis), severe AS, mod-severe TR, CAD/MI (30 yrs ago, medically managed, no PCI), ?CHF, COPD (not on O2 at home), former heavy smoker, HTN, HLD, DM2, gout.   He sent as outpatient echocardiogram demonstrated worsening pericardial effusion. Effusion not safely accessible via interventional cardiology or IR.  Remains stable without signs of tamponade  Presently pursuing TAVR work up    Patient seen and examined at bedside.    Overnight Events: NAEON, feeling ok today, frustrated by tests getting cancelled. Denies FC, NV, CP, SOB.   Spoke to structural team - opt for CT TAVR protocol today, with plan for [facility] later this week to help expedite work up.   20G and 18G IVs placed in LUE and RUE respectively for CT.     Telemetry: AF          Current Meds:  acetaminophen     Tablet .. 650 milliGRAM(s) Oral every 6 hours PRN  albuterol    90 MICROgram(s) HFA Inhaler 2 Puff(s) Inhalation every 6 hours PRN  allopurinol 100 milliGRAM(s) Oral daily  atorvastatin 40 milliGRAM(s) Oral at bedtime  beclomethasone  80 MICROgram(s) Inhaler 2 Puff(s) Inhalation two times a day  carboxymethylcellulose 0.5% (Preservative-Free) Ophthalmic Solution 1 Drop(s) Both EYES two times a day PRN  chlorhexidine 2% Cloths 1 Application(s) Topical daily  dextrose 5%. 1000 milliLiter(s) IV Continuous <Continuous>  dextrose 5%. 1000 milliLiter(s) IV Continuous <Continuous>  dextrose 50% Injectable 25 Gram(s) IV Push once  dextrose 50% Injectable 12.5 Gram(s) IV Push once  dextrose 50% Injectable 25 Gram(s) IV Push once  dextrose Oral Gel 15 Gram(s) Oral once PRN  glucagon  Injectable 1 milliGRAM(s) IntraMuscular once  heparin   Injectable 3000 Unit(s) IV Push every 6 hours PRN  heparin   Injectable 6500 Unit(s) IV Push every 6 hours PRN  heparin  Infusion.  Unit(s)/Hr IV Continuous <Continuous>  insulin lispro (ADMELOG) corrective regimen sliding scale   SubCutaneous three times a day before meals  insulin lispro (ADMELOG) corrective regimen sliding scale   SubCutaneous at bedtime  melatonin 3 milliGRAM(s) Oral at bedtime PRN  metoprolol succinate ER 75 milliGRAM(s) Oral two times a day  omega-3-Acid Ethyl Esters 4 Gram(s) Oral daily  pantoprazole    Tablet 40 milliGRAM(s) Oral before breakfast  sodium chloride 0.65% Nasal 1 Spray(s) Both Nostrils every 6 hours PRN  sodium chloride 0.9%. 1000 milliLiter(s) IV Continuous <Continuous>  tamsulosin 0.4 milliGRAM(s) Oral at bedtime  tiotropium 2.5 MICROgram(s) Inhaler 2 Puff(s) Inhalation daily      Vitals:  T(F): 98 (07-12), Max: 98.5 (07-11)  HR: 91 (07-12) (90 - 98)  BP: 102/68 (07-12) (99/64 - 120/77)  RR: 16 (07-12)  SpO2: 94% (07-12)      Physical Exam:  Appearance: No acute distress; well appearing  Eyes: PERRL, EOMI, pink conjunctiva  HEENT: Normal oral mucosa  Cardiovascular: IIR, S1, S2, I/VI VIOLETA radiating to carotids, no rubs, or gallops; no edema; no JVD  Respiratory: Clear to auscultation bilaterally  Gastrointestinal: soft, non-tender, non-distended with normal bowel sounds  Musculoskeletal: 2+ edema to knees with venous stasis   Neurologic: Non-focal  Lymphatic: No lymphadenopathy  Psychiatry: AAOx3, mood & affect appropriate  Skin: No rashes, ecchymoses, or cyanosis               LABS:             9.0    5.67  )-----------( Clumped    ( 12 Jul 2023 07:28 )             29.1     07-12  138  |  101  |  31<H>  ----------------------------<  121<H>  4.2   |  26  |  1.56<H>    Ca    9.2      12 Jul 2023 07:38    PT/INR - ( 11 Jul 2023 06:33 )   PT: 13.7 sec;   INR: 1.19 ratio     PTT - ( 12 Jul 2023 00:59 )  PTT:63.5 sec            A/P  78 M with hx. of Afib (on Eliquis), severe AS, mod-severe TR, CAD/MI (30 yrs ago, medically managed, no PCI), ?CHF, COPD (not on O2 at home), former heavy smoker, HTN, HLD, DM2, gout.   Was sent in for concern re outpatient echocardiogram which demonstrated worsening pericardial effusion. Effusion not safely accessible, no evidence of tamponade.  A.S., now pending TAVR work up      #Large Pericardial Effusion  - No clinical tamponade as patient is hemodynamically stable, with robust heart sounds and no obvious JVD. Outpatient TTE demonstrated LVEF of 55% with large pericardial effusion without tamponade physiology.   - Repeat TTE showing large effusion mainly around L ventricle; pericardial effusion cannot be safely tapped  Plan:  - Will CTM effusion and treat medically as needed with diuresis; continue TAVR workup in meantime  - Can repeat limited TTE in the outpatient setting     #Severe Symptomatic Low Flow Low Gradient AS  - Pt with known sx of sev AS p/w GOFF  - Repeat TTE showing sev AS  - Structural cardiology consulted, pending CT TAVR and eventual LHC  - Hypervolemic on exam, likely multifactorial, judicious with diuretics as above  Plan:  - F/U CT TAVR today  - LHC later this week   - Structural team following     #AFib  #RBBB  - Persistent per OSH documentation   - On apixaban 2.5 BID at home - unclear why on dose reduced; per son he previously was on Coumadin but had supra-therapeutic.  Apixaban on hold at present given effusion, need for LHC  - CHADSVASC 5   - Cont heparin gtt pending procedure       Jean Ríos PGY5  Cardiology Fellow    Plan discussed with cardiology fellow.  Patient seen and examined.  Hx., exam and labs as above.  I agree with the assessment and recommendations, which I have reviewed and edited where appropriate.  Mc Hammond M.D.  Cardiology Attending, Consult Service    For Cardiology consults and questions, all Cardiology service information can be found 24/7 on [email or web address] - use password: [email or web address] to log in.

## 2023-07-12 NOTE — PROGRESS NOTE ADULT - SUBJECTIVE AND OBJECTIVE BOX
[facility] Division of Hospital Medicine  Kaleigh Cerrato MD M-F, 8A-5P: MS Teams, Pager: [phone]  Other Times: Ext: [phone], Pager: [phone]      Patient is a 78y old  Male who presents with a chief complaint of worsening pericardial effusion (2023 10:44)      SUBJECTIVE / OVERNIGHT EVENTS:  Patient was examined this morning. He was sleeping comfortably after arriving back from CT. Discussed issue regarding IV access with RN, they will attempt to get another IV access.     ADDITIONAL REVIEW OF SYSTEMS:    MEDICATIONS  (STANDING):  allopurinol 100 milliGRAM(s) Oral daily  atorvastatin 40 milliGRAM(s) Oral at bedtime  beclomethasone  80 MICROgram(s) Inhaler 2 Puff(s) Inhalation two times a day  chlorhexidine 2% Cloths 1 Application(s) Topical daily  dextrose 5%. 1000 milliLiter(s) (50 mL/Hr) IV Continuous <Continuous>  dextrose 5%. 1000 milliLiter(s) (100 mL/Hr) IV Continuous <Continuous>  dextrose 50% Injectable 12.5 Gram(s) IV Push once  dextrose 50% Injectable 25 Gram(s) IV Push once  dextrose 50% Injectable 25 Gram(s) IV Push once  glucagon  Injectable 1 milliGRAM(s) IntraMuscular once  heparin  Infusion.  Unit(s)/Hr (15 mL/Hr) IV Continuous <Continuous>  insulin lispro (ADMELOG) corrective regimen sliding scale   SubCutaneous three times a day before meals  insulin lispro (ADMELOG) corrective regimen sliding scale   SubCutaneous at bedtime  metoprolol succinate ER 75 milliGRAM(s) Oral two times a day  omega-3-Acid Ethyl Esters 4 Gram(s) Oral daily  pantoprazole    Tablet 40 milliGRAM(s) Oral before breakfast  sodium chloride 0.9%. 1000 milliLiter(s) (80 mL/Hr) IV Continuous <Continuous>  tamsulosin 0.4 milliGRAM(s) Oral at bedtime  tiotropium 2.5 MICROgram(s) Inhaler 2 Puff(s) Inhalation daily    MEDICATIONS  (PRN):  acetaminophen     Tablet .. 650 milliGRAM(s) Oral every 6 hours PRN Temp greater or equal to 38C (100.4F), Mild Pain (1 - 3)  albuterol    90 MICROgram(s) HFA Inhaler 2 Puff(s) Inhalation every 6 hours PRN Shortness of Breath and/or Wheezing  carboxymethylcellulose 0.5% (Preservative-Free) Ophthalmic Solution 1 Drop(s) Both EYES two times a day PRN dry eyes  dextrose Oral Gel 15 Gram(s) Oral once PRN Blood Glucose LESS THAN 70 milliGRAM(s)/deciliter  heparin   Injectable 6500 Unit(s) IV Push every 6 hours PRN For aPTT less than 40  heparin   Injectable 3000 Unit(s) IV Push every 6 hours PRN For aPTT between 40 - 57  melatonin 3 milliGRAM(s) Oral at bedtime PRN Sleep  sodium chloride 0.65% Nasal 1 Spray(s) Both Nostrils every 6 hours PRN Nasal Congestion      CAPILLARY BLOOD GLUCOSE      POCT Blood Glucose.: 188 mg/dL (2023 11:39)  POCT Blood Glucose.: 128 mg/dL (2023 08:09)  POCT Blood Glucose.: 135 mg/dL (2023 21:16)  POCT Blood Glucose.: 132 mg/dL (2023 16:33)      I&O's Summary    2023 07:01  -  2023 12:24  --------------------------------------------------------  IN: 120 mL / OUT: 0 mL / NET: 120 mL        Daily     Daily Weight in k.7 (2023 09:13)    PHYSICAL EXAM:  Vital Signs Last 24 Hrs  T(C): 36.3 (2023 11:51), Max: 36.9 (2023 21:14)  T(F): 97.4 (2023 11:51), Max: 98.5 (2023 21:14)  HR: 86 (2023 11:51) (86 - 98)  BP: 113/71 (2023 11:51) (99/64 - 120/77)  BP(mean): --  RR: 18 (2023 11:51) (16 - 18)  SpO2: 97% (2023 11:51) (94% - 97%)    Parameters below as of 2023 11:51  Patient On (Oxygen Delivery Method): nasal cannula  O2 Flow (L/min): 2      CONSTITUTIONAL: NAD, well-developed  EYES: PERRLA; conjunctiva and sclera clear  ENMT: Moist oral mucosa  RESPIRATORY: Normal respiratory effort; lungs are clear to auscultation bilaterally  CARDIOVASCULAR: Regular rate and rhythm, normal S1 and S2, no murmur/rub/gallop;   RLE edema;  Peripheral pulses are 2+ bilaterally  ABDOMEN: Nontender to palpation, normoactive bowel sounds, no rebound/guarding;   MUSCULOSKELETAL:  no clubbing or cyanosis of digits; no joint swelling or tenderness to palpation, moving all extremities   PSYCH: A+O to person, place, and time; affect appropriate  NEUROLOGY: non focal, patient ambulating without assist  SKIN: RLE venous stasis changes       LABS:                        9.0    5.67  )-----------( Clumped    ( 2023 07:28 )             29.1     07-12    138  |  101  |  31<H>  ----------------------------<  121<H>  4.2   |  26  |  1.56<H>    Ca    9.2      2023 07:38        PT/INR - ( 2023 06:33 )   PT: 13.7 sec;   INR: 1.19 ratio         PTT - ( 2023 00:59 )  PTT:63.5 sec      Urinalysis Basic - ( 2023 07:38 )    Color: x / Appearance: x / SG: x / pH: x  Gluc: 121 mg/dL / Ketone: x  / Bili: x / Urobili: x   Blood: x / Protein: x / Nitrite: x   Leuk Esterase: x / RBC: x / WBC x   Sq Epi: x / Non Sq Epi: x / Bacteria: x            RADIOLOGY & ADDITIONAL TESTS:  Results Reviewed:   Imaging Personally Reviewed:  Electrocardiogram Personally Reviewed:    COORDINATION OF CARE:  Care Discussed with Consultants/Other Providers [Y/N]:  Prior or Outpatient Records Reviewed [Y/N]:

## 2023-07-12 NOTE — PROGRESS NOTE ADULT - PROBLEM SELECTOR PLAN 1
Patient with CKD in setting of HTN, Tobacco Abuse Hx and Heart Disease. Per Javi/OLGA review, appears he has had a SCr ranging from 1.4-2.0 dating back to 2017. Most recent SCr prior to admission was 1.42 on 10/7/22.     He received IV lasix on 7/3 and 7/4.   UA reviewed. UPCR of 0.1  Hep C non reactive, HBsAg non reactive. Normal C3/C4. Weak bands noted.   Renal US from 7/6 demonstrated "Both kidneys are echogenic, compatible with medical renal disease. No renal mass, hydronephrosis or calculus is visualized sonographically." He does have bilateral renal cysts.     SCr stable at 1.5. Underwent LHC on 7/11, report pending. Per notes, still needs CTA imaging.     Given tight AS volume optimization to reduce contrast risk is challenging although initial CT angiogram risk relatively low and could give LOW pericontrast volume NSS 1hr before and 6 hrs post 1cc/kg/hr. Will have to observe closely for evidence of HD requirement which does not exist at this time. Monitor SCr, electrolytes, and I/O. Avoid NSAIDS, RCAs, and other nephrotoxins. Renally adjust all medications as per GFR or CrCl. Patient with CKD in setting of HTN, Tobacco Abuse Hx and Heart Disease. Per Javi/OLGA review, appears he has had a SCr ranging from 1.4-2.0 dating back to 2017. Most recent SCr prior to admission was 1.42 on 10/7/22.     He received IV lasix on 7/3 and 7/4.   UA reviewed. UPCR of 0.1  Hep C non reactive, HBsAg non reactive. Normal C3/C4. Weak bands noted.   Renal US from 7/6 demonstrated "Both kidneys are echogenic, compatible with medical renal disease. No renal mass, hydronephrosis or calculus is visualized sonographically." He does have bilateral renal cysts.     SCr stable at 1.5. Awaiting cardiac eval. Per notes, still needs LHC and CTA imaging.     Given tight AS volume optimization to reduce contrast risk is challenging although initial CT angiogram risk relatively low and could give LOW pericontrast volume NSS 1hr before and 6 hrs post 1cc/kg/hr. Will have to observe closely for evidence of HD requirement which does not exist at this time. Monitor SCr, electrolytes, and I/O. Avoid NSAIDS, RCAs, and other nephrotoxins. Renally adjust all medications as per GFR or CrCl.

## 2023-07-12 NOTE — PROGRESS NOTE ADULT - PROBLEM SELECTOR PLAN 9
- c/w home allopurinol Hx of DM2. At home, on Metformin 500mg BID  - monitor FS qAC and qHS  - low ISS for now  - HbA1c 6.1

## 2023-07-12 NOTE — PROGRESS NOTE ADULT - PROBLEM SELECTOR PLAN 8
Hx of DM2. At home, on Metformin 500mg BID  - monitor FS qAC and qHS  - low ISS for now  - HbA1c 6.1 Hx of COPD (not on O2 at home), former heavy smoker (up to 4.5 pks/day for ~20 yrs, quit >30 yrs ago).  - clinically does not appear to be in COPD exacerbation  - c/w home Spiriva and Qvar equivalent   - c/w albuterol HFA q6h PRN  - monitor respiratory status

## 2023-07-12 NOTE — PROGRESS NOTE ADULT - PROBLEM SELECTOR PLAN 7
Hx of COPD (not on O2 at home), former heavy smoker (up to 4.5 pks/day for ~20 yrs, quit >30 yrs ago).  - clinically does not appear to be in COPD exacerbation  - c/w home Spiriva and Qvar equivalent   - c/w albuterol HFA q6h PRN  - monitor respiratory status Patient states he gets weekly iron infusions outpatient.   Will need patient's outpatient hematologist information to get more collateral information.     Hgb today 9  Monitor HH. Keep active T&S. Transfuse prbc prn.   Checked iron studies.

## 2023-07-13 LAB
ANION GAP SERPL CALC-SCNC: 12 MMOL/L — SIGNIFICANT CHANGE UP (ref 5–17)
APTT BLD: 57.6 SEC — HIGH (ref 27.5–35.5)
BUN SERPL-MCNC: 30 MG/DL — HIGH (ref 7–23)
CALCIUM SERPL-MCNC: 9.2 MG/DL — SIGNIFICANT CHANGE UP (ref 8.4–10.5)
CHLORIDE SERPL-SCNC: 103 MMOL/L — SIGNIFICANT CHANGE UP (ref 96–108)
CLOSURE TME COLL+EPINEP BLD: 76 K/UL — LOW (ref 150–400)
CO2 SERPL-SCNC: 22 MMOL/L — SIGNIFICANT CHANGE UP (ref 22–31)
CREAT SERPL-MCNC: 1.56 MG/DL — HIGH (ref 0.5–1.3)
EGFR: 45 ML/MIN/1.73M2 — LOW
GLUCOSE BLDC GLUCOMTR-MCNC: 168 MG/DL — HIGH (ref 70–99)
GLUCOSE BLDC GLUCOMTR-MCNC: 172 MG/DL — HIGH (ref 70–99)
GLUCOSE BLDC GLUCOMTR-MCNC: 173 MG/DL — HIGH (ref 70–99)
GLUCOSE SERPL-MCNC: 138 MG/DL — HIGH (ref 70–99)
HCT VFR BLD CALC: 26 % — LOW (ref 39–50)
HGB BLD-MCNC: 8 G/DL — LOW (ref 13–17)
MCHC RBC-ENTMCNC: 26.1 PG — LOW (ref 27–34)
MCHC RBC-ENTMCNC: 30.8 GM/DL — LOW (ref 32–36)
MCV RBC AUTO: 84.7 FL — SIGNIFICANT CHANGE UP (ref 80–100)
NRBC # BLD: 0 /100 WBCS — SIGNIFICANT CHANGE UP (ref 0–0)
PLATELET # BLD AUTO: SIGNIFICANT CHANGE UP K/UL (ref 150–400)
POTASSIUM SERPL-MCNC: 4.5 MMOL/L — SIGNIFICANT CHANGE UP (ref 3.5–5.3)
POTASSIUM SERPL-SCNC: 4.5 MMOL/L — SIGNIFICANT CHANGE UP (ref 3.5–5.3)
RBC # BLD: 3.07 M/UL — LOW (ref 4.2–5.8)
RBC # FLD: 19.8 % — HIGH (ref 10.3–14.5)
SODIUM SERPL-SCNC: 137 MMOL/L — SIGNIFICANT CHANGE UP (ref 135–145)
WBC # BLD: 6.19 K/UL — SIGNIFICANT CHANGE UP (ref 3.8–10.5)
WBC # FLD AUTO: 6.19 K/UL — SIGNIFICANT CHANGE UP (ref 3.8–10.5)

## 2023-07-13 PROCEDURE — 99232 SBSQ HOSP IP/OBS MODERATE 35: CPT | Mod: GC

## 2023-07-13 PROCEDURE — 99233 SBSQ HOSP IP/OBS HIGH 50: CPT

## 2023-07-13 PROCEDURE — 93971 EXTREMITY STUDY: CPT | Mod: 26,RT

## 2023-07-13 RX ORDER — ACETAMINOPHEN 500 MG
1000 TABLET ORAL ONCE
Refills: 0 | Status: COMPLETED | OUTPATIENT
Start: 2023-07-13 | End: 2023-07-14

## 2023-07-13 RX ORDER — OXYCODONE HYDROCHLORIDE 5 MG/1
2.5 TABLET ORAL ONCE
Refills: 0 | Status: DISCONTINUED | OUTPATIENT
Start: 2023-07-13 | End: 2023-07-13

## 2023-07-13 RX ADMIN — Medication 75 MILLIGRAM(S): at 18:30

## 2023-07-13 RX ADMIN — BECLOMETHASONE DIPROPIONATE 2 PUFF(S): 40 AEROSOL, METERED RESPIRATORY (INHALATION) at 12:58

## 2023-07-13 RX ADMIN — Medication 650 MILLIGRAM(S): at 20:41

## 2023-07-13 RX ADMIN — OXYCODONE HYDROCHLORIDE 2.5 MILLIGRAM(S): 5 TABLET ORAL at 04:34

## 2023-07-13 RX ADMIN — OXYCODONE HYDROCHLORIDE 2.5 MILLIGRAM(S): 5 TABLET ORAL at 15:35

## 2023-07-13 RX ADMIN — Medication 100 MILLIGRAM(S): at 13:01

## 2023-07-13 RX ADMIN — OXYCODONE HYDROCHLORIDE 2.5 MILLIGRAM(S): 5 TABLET ORAL at 18:48

## 2023-07-13 RX ADMIN — OXYCODONE HYDROCHLORIDE 2.5 MILLIGRAM(S): 5 TABLET ORAL at 07:22

## 2023-07-13 RX ADMIN — Medication 650 MILLIGRAM(S): at 22:40

## 2023-07-13 RX ADMIN — CHLORHEXIDINE GLUCONATE 1 APPLICATION(S): 213 SOLUTION TOPICAL at 13:03

## 2023-07-13 RX ADMIN — HEPARIN SODIUM 1100 UNIT(S)/HR: 5000 INJECTION INTRAVENOUS; SUBCUTANEOUS at 10:10

## 2023-07-13 RX ADMIN — BECLOMETHASONE DIPROPIONATE 2 PUFF(S): 40 AEROSOL, METERED RESPIRATORY (INHALATION) at 21:53

## 2023-07-13 RX ADMIN — HEPARIN SODIUM 1100 UNIT(S)/HR: 5000 INJECTION INTRAVENOUS; SUBCUTANEOUS at 21:52

## 2023-07-13 RX ADMIN — PANTOPRAZOLE SODIUM 40 MILLIGRAM(S): 20 TABLET, DELAYED RELEASE ORAL at 07:22

## 2023-07-13 RX ADMIN — Medication 4 GRAM(S): at 12:59

## 2023-07-13 RX ADMIN — TIOTROPIUM BROMIDE 2 PUFF(S): 18 CAPSULE ORAL; RESPIRATORY (INHALATION) at 13:02

## 2023-07-13 RX ADMIN — TAMSULOSIN HYDROCHLORIDE 0.4 MILLIGRAM(S): 0.4 CAPSULE ORAL at 21:52

## 2023-07-13 RX ADMIN — ATORVASTATIN CALCIUM 40 MILLIGRAM(S): 80 TABLET, FILM COATED ORAL at 21:52

## 2023-07-13 RX ADMIN — Medication 75 MILLIGRAM(S): at 05:56

## 2023-07-13 NOTE — PROGRESS NOTE ADULT - PROBLEM SELECTOR PLAN 5
Hx of Afib (on Eliquis at home). CHADSVASC score of 5-6.  - HOLD Eliquis ; started heparin gtt pending procedures  - c/w home metoprolol succinate 75mg   - monitor on telemetry Presented with O2 sat down to 88% on RA. Improved on 1-2L O2NC. Suspect in setting of acute on chronic CHF.  - diuretic for CHF/pericardial effusion as above  - monitor respiratory status, wean off supplemental O2 as tolerated

## 2023-07-13 NOTE — PROGRESS NOTE ADULT - PROBLEM SELECTOR PLAN 1
- Found to have "large circumferential pericardial effusion up to 2.6 cm without evidence of tamponade physiology" on outpt TTE (7/3/23).  - Prior TTE (10/3/22) showed "moderate pericardial effusion, measuring about 1.0 cm superior to the RA; otherwise small circumferential pericardial effusion." Unclear underlying etiology of effusion, but possibly in setting of CHF.  - d/essie Lasix given SHEYLA  - strict I/Os, monitor UOP  - monitor hemodynamics closely  - TTE w/ unchanged mod to large pericardial effusion ; +severe AS    >    > Per recs from cardiology and IR teams: no plan for pericardiocentesis now.  - CT surgery consulted for severe AS ; workup for TAVR in progress ; TAVR CT ordered, F/U plan for LHC - still pending   - Eliquis on hold ; on Heparin gtt given pending invasive procedure - Found to have "large circumferential pericardial effusion up to 2.6 cm without evidence of tamponade physiology" on outpt TTE (7/3/23).  - Prior TTE (10/3/22) showed "moderate pericardial effusion, measuring about 1.0 cm superior to the RA; otherwise small circumferential pericardial effusion." Unclear underlying etiology of effusion, but possibly in setting of CHF.  - d/essie Lasix given SHEYLA  - strict I/Os, monitor UOP  - monitor hemodynamics closely  - TTE w/ unchanged mod to large pericardial effusion ; +severe AS    >    > Per recs from cardiology and IR teams: no plan for pericardiocentesis now.  - CT surgery consulted for severe AS ; workup for TAVR in progress ; TAVR CT ordered, F/U plan for LHC - pending  - Eliquis on hold ; on Heparin gtt given pending invasive procedure

## 2023-07-13 NOTE — PROGRESS NOTE ADULT - SUBJECTIVE AND OBJECTIVE BOX
[facility] Division of Hospital Medicine  Kaleigh Cerrato MD M-F, 8A-5P: MS Teams, Pager: [phone]  Other Times: Ext: [phone], Pager: [phone]      Patient is a 78y old  Male who presents with a chief complaint of worsening pericardial effusion (2023 11:15)      SUBJECTIVE / OVERNIGHT EVENTS:  Patient was examined    ADDITIONAL REVIEW OF SYSTEMS:    MEDICATIONS  (STANDING):  allopurinol 100 milliGRAM(s) Oral daily  atorvastatin 40 milliGRAM(s) Oral at bedtime  beclomethasone  80 MICROgram(s) Inhaler 2 Puff(s) Inhalation two times a day  chlorhexidine 2% Cloths 1 Application(s) Topical daily  dextrose 5%. 1000 milliLiter(s) (50 mL/Hr) IV Continuous <Continuous>  dextrose 5%. 1000 milliLiter(s) (100 mL/Hr) IV Continuous <Continuous>  dextrose 50% Injectable 25 Gram(s) IV Push once  dextrose 50% Injectable 12.5 Gram(s) IV Push once  dextrose 50% Injectable 25 Gram(s) IV Push once  glucagon  Injectable 1 milliGRAM(s) IntraMuscular once  heparin  Infusion.  Unit(s)/Hr (15 mL/Hr) IV Continuous <Continuous>  insulin lispro (ADMELOG) corrective regimen sliding scale   SubCutaneous three times a day before meals  insulin lispro (ADMELOG) corrective regimen sliding scale   SubCutaneous at bedtime  metoprolol succinate ER 75 milliGRAM(s) Oral two times a day  omega-3-Acid Ethyl Esters 4 Gram(s) Oral daily  pantoprazole    Tablet 40 milliGRAM(s) Oral before breakfast  sodium chloride 0.9%. 1000 milliLiter(s) (80 mL/Hr) IV Continuous <Continuous>  tamsulosin 0.4 milliGRAM(s) Oral at bedtime  tiotropium 2.5 MICROgram(s) Inhaler 2 Puff(s) Inhalation daily    MEDICATIONS  (PRN):  acetaminophen     Tablet .. 650 milliGRAM(s) Oral every 6 hours PRN Temp greater or equal to 38C (100.4F), Mild Pain (1 - 3)  albuterol    90 MICROgram(s) HFA Inhaler 2 Puff(s) Inhalation every 6 hours PRN Shortness of Breath and/or Wheezing  carboxymethylcellulose 0.5% (Preservative-Free) Ophthalmic Solution 1 Drop(s) Both EYES two times a day PRN dry eyes  dextrose Oral Gel 15 Gram(s) Oral once PRN Blood Glucose LESS THAN 70 milliGRAM(s)/deciliter  heparin   Injectable 6500 Unit(s) IV Push every 6 hours PRN For aPTT less than 40  heparin   Injectable 3000 Unit(s) IV Push every 6 hours PRN For aPTT between 40 - 57  melatonin 3 milliGRAM(s) Oral at bedtime PRN Sleep  sodium chloride 0.65% Nasal 1 Spray(s) Both Nostrils every 6 hours PRN Nasal Congestion      CAPILLARY BLOOD GLUCOSE      POCT Blood Glucose.: 172 mg/dL (2023 08:14)  POCT Blood Glucose.: 150 mg/dL (2023 21:21)      I&O's Summary    2023 07:01  -  2023 07:00  --------------------------------------------------------  IN: 360 mL / OUT: 0 mL / NET: 360 mL        Daily     Daily Weight in k (2023 08:24)    PHYSICAL EXAM:  Vital Signs Last 24 Hrs  T(C): 36.6 (2023 12:00), Max: 36.6 (2023 12:00)  T(F): 97.9 (2023 12:00), Max: 97.9 (2023 12:00)  HR: 118 (2023 12:00) (99 - 122)  BP: 107/22 (2023 12:00) (92/61 - 144/88)  BP(mean): --  RR: 18 (2023 12:00) (18 - 18)  SpO2: 100% (2023 12:00) (95% - 100%)    Parameters below as of 2023 12:00  Patient On (Oxygen Delivery Method): nasal cannula        CONSTITUTIONAL: NAD, well-developed  EYES: PERRLA; conjunctiva and sclera clear  ENMT: Moist oral mucosa  RESPIRATORY: Normal respiratory effort; lungs are clear to auscultation bilaterally  CARDIOVASCULAR: Regular rate and rhythm, normal S1 and S2, no murmur/rub/gallop;   RLE edema;  Peripheral pulses are 2+ bilaterally  ABDOMEN: Nontender to palpation, normoactive bowel sounds, no rebound/guarding;   MUSCULOSKELETAL:  no clubbing or cyanosis of digits; no joint swelling or tenderness to palpation, moving all extremities   PSYCH: A+O to person, place, and time; affect appropriate  NEUROLOGY: non focal, patient ambulating without assist  SKIN: RLE venous stasis changes     LABS:                        8.0    6.19  )-----------( Clumped    ( 2023 06:01 )             26.0     07-    137  |  103  |  30<H>  ----------------------------<  138<H>  4.5   |  22  |  1.56<H>    Ca    9.2      2023 06:02        PTT - ( 2023 06:02 )  PTT:57.6 sec      Urinalysis Basic - ( 2023 06:02 )    Color: x / Appearance: x / SG: x / pH: x  Gluc: 138 mg/dL / Ketone: x  / Bili: x / Urobili: x   Blood: x / Protein: x / Nitrite: x   Leuk Esterase: x / RBC: x / WBC x   Sq Epi: x / Non Sq Epi: x / Bacteria: x            RADIOLOGY & ADDITIONAL TESTS:  Results Reviewed:   Imaging Personally Reviewed:  Electrocardiogram Personally Reviewed:    COORDINATION OF CARE:  Care Discussed with Consultants/Other Providers [Y/N]:  Prior or Outpatient Records Reviewed [Y/N]:   [facility] Division of Hospital Medicine  Kaleigh Cerrato MD M-F, 8A-5P: MS Teams, Pager: [phone]  Other Times: Ext: [phone], Pager: [phone]      Patient is a 78y old  Male who presents with a chief complaint of worsening pericardial effusion (2023 11:15)      SUBJECTIVE / OVERNIGHT EVENTS:  Patient was examined today. Patient complained of right thigh pain earlier. Discussed concern regarding anemia as well.    ADDITIONAL REVIEW OF SYSTEMS:    MEDICATIONS  (STANDING):  allopurinol 100 milliGRAM(s) Oral daily  atorvastatin 40 milliGRAM(s) Oral at bedtime  beclomethasone  80 MICROgram(s) Inhaler 2 Puff(s) Inhalation two times a day  chlorhexidine 2% Cloths 1 Application(s) Topical daily  dextrose 5%. 1000 milliLiter(s) (50 mL/Hr) IV Continuous <Continuous>  dextrose 5%. 1000 milliLiter(s) (100 mL/Hr) IV Continuous <Continuous>  dextrose 50% Injectable 25 Gram(s) IV Push once  dextrose 50% Injectable 12.5 Gram(s) IV Push once  dextrose 50% Injectable 25 Gram(s) IV Push once  glucagon  Injectable 1 milliGRAM(s) IntraMuscular once  heparin  Infusion.  Unit(s)/Hr (15 mL/Hr) IV Continuous <Continuous>  insulin lispro (ADMELOG) corrective regimen sliding scale   SubCutaneous three times a day before meals  insulin lispro (ADMELOG) corrective regimen sliding scale   SubCutaneous at bedtime  metoprolol succinate ER 75 milliGRAM(s) Oral two times a day  omega-3-Acid Ethyl Esters 4 Gram(s) Oral daily  pantoprazole    Tablet 40 milliGRAM(s) Oral before breakfast  sodium chloride 0.9%. 1000 milliLiter(s) (80 mL/Hr) IV Continuous <Continuous>  tamsulosin 0.4 milliGRAM(s) Oral at bedtime  tiotropium 2.5 MICROgram(s) Inhaler 2 Puff(s) Inhalation daily    MEDICATIONS  (PRN):  acetaminophen     Tablet .. 650 milliGRAM(s) Oral every 6 hours PRN Temp greater or equal to 38C (100.4F), Mild Pain (1 - 3)  albuterol    90 MICROgram(s) HFA Inhaler 2 Puff(s) Inhalation every 6 hours PRN Shortness of Breath and/or Wheezing  carboxymethylcellulose 0.5% (Preservative-Free) Ophthalmic Solution 1 Drop(s) Both EYES two times a day PRN dry eyes  dextrose Oral Gel 15 Gram(s) Oral once PRN Blood Glucose LESS THAN 70 milliGRAM(s)/deciliter  heparin   Injectable 6500 Unit(s) IV Push every 6 hours PRN For aPTT less than 40  heparin   Injectable 3000 Unit(s) IV Push every 6 hours PRN For aPTT between 40 - 57  melatonin 3 milliGRAM(s) Oral at bedtime PRN Sleep  sodium chloride 0.65% Nasal 1 Spray(s) Both Nostrils every 6 hours PRN Nasal Congestion      CAPILLARY BLOOD GLUCOSE      POCT Blood Glucose.: 172 mg/dL (2023 08:14)  POCT Blood Glucose.: 150 mg/dL (2023 21:21)      I&O's Summary    2023 07:01  -  2023 07:00  --------------------------------------------------------  IN: 360 mL / OUT: 0 mL / NET: 360 mL        Daily     Daily Weight in k (2023 08:24)    PHYSICAL EXAM:  Vital Signs Last 24 Hrs  T(C): 36.6 (2023 12:00), Max: 36.6 (2023 12:00)  T(F): 97.9 (2023 12:00), Max: 97.9 (2023 12:00)  HR: 118 (2023 12:00) (99 - 122)  BP: 107/22 (2023 12:00) (92/61 - 144/88)  BP(mean): --  RR: 18 (2023 12:00) (18 - 18)  SpO2: 100% (2023 12:00) (95% - 100%)    Parameters below as of 2023 12:00  Patient On (Oxygen Delivery Method): nasal cannula        CONSTITUTIONAL: NAD, well-developed  EYES: PERRLA; conjunctiva and sclera clear  ENMT: Moist oral mucosa  RESPIRATORY: Normal respiratory effort; lungs are clear to auscultation bilaterally  CARDIOVASCULAR: Regular rate and rhythm, normal S1 and S2, no murmur/rub/gallop;   RLE edema;  Peripheral pulses are 2+ bilaterally  ABDOMEN: Nontender to palpation, normoactive bowel sounds, no rebound/guarding;   MUSCULOSKELETAL:  no clubbing or cyanosis of digits; no joint swelling or tenderness to palpation, moving all extremities   PSYCH: A+O to person, place, and time; affect appropriate  NEUROLOGY: non focal, patient ambulating without assist  SKIN: RLE venous stasis changes, several areas of ecchymosis on UEs (R>L) with some swelling     LABS:                        8.0    6.19  )-----------( Clumped    ( 2023 06:01 )             26.0     07-    137  |  103  |  30<H>  ----------------------------<  138<H>  4.5   |  22  |  1.56<H>    Ca    9.2      2023 06:02        PTT - ( 2023 06:02 )  PTT:57.6 sec      Urinalysis Basic - ( 2023 06:02 )    Color: x / Appearance: x / SG: x / pH: x  Gluc: 138 mg/dL / Ketone: x  / Bili: x / Urobili: x   Blood: x / Protein: x / Nitrite: x   Leuk Esterase: x / RBC: x / WBC x   Sq Epi: x / Non Sq Epi: x / Bacteria: x            RADIOLOGY & ADDITIONAL TESTS:  Results Reviewed:   Imaging Personally Reviewed:  Electrocardiogram Personally Reviewed:    COORDINATION OF CARE:  Care Discussed with Consultants/Other Providers [Y/N]:  Prior or Outpatient Records Reviewed [Y/N]:

## 2023-07-13 NOTE — PROGRESS NOTE ADULT - SUBJECTIVE AND OBJECTIVE BOX
ID: 78 M with hx. of Afib (on Eliquis), severe AS, mod-severe TR, CAD/MI (30 yrs ago, medically managed, no PCI), ?CHF, COPD (not on O2 at home), former heavy smoker, HTN, HLD, DM2, gout. He was sent as outpatient echocardiogram demonstrated worsening pericardial effusion. Effusion not safely accessible via interventional cardiology or IR. Remains stable without signs of tamponade Presently pursuing TAVR work up    Patient seen and examined at bedside.    Overnight Events: CT TAVR completed, c/o R anterolateral thigh pain, has not ambulated bc of it. Denies FC NV CP SOB. Will plan on [facility] tomorrow as long as Cr is ok    Telemetry: -120s    Current Meds:  acetaminophen     Tablet .. 650 milliGRAM(s) Oral every 6 hours PRN  albuterol    90 MICROgram(s) HFA Inhaler 2 Puff(s) Inhalation every 6 hours PRN  allopurinol 100 milliGRAM(s) Oral daily  atorvastatin 40 milliGRAM(s) Oral at bedtime  beclomethasone  80 MICROgram(s) Inhaler 2 Puff(s) Inhalation two times a day  carboxymethylcellulose 0.5% (Preservative-Free) Ophthalmic Solution 1 Drop(s) Both EYES two times a day PRN  chlorhexidine 2% Cloths 1 Application(s) Topical daily  dextrose 5%. 1000 milliLiter(s) IV Continuous <Continuous>  dextrose 5%. 1000 milliLiter(s) IV Continuous <Continuous>  dextrose 50% Injectable 12.5 Gram(s) IV Push once  dextrose 50% Injectable 25 Gram(s) IV Push once  dextrose 50% Injectable 25 Gram(s) IV Push once  dextrose Oral Gel 15 Gram(s) Oral once PRN  glucagon  Injectable 1 milliGRAM(s) IntraMuscular once  heparin   Injectable 3000 Unit(s) IV Push every 6 hours PRN  heparin   Injectable 6500 Unit(s) IV Push every 6 hours PRN  heparin  Infusion.  Unit(s)/Hr IV Continuous <Continuous>  insulin lispro (ADMELOG) corrective regimen sliding scale   SubCutaneous three times a day before meals  insulin lispro (ADMELOG) corrective regimen sliding scale   SubCutaneous at bedtime  melatonin 3 milliGRAM(s) Oral at bedtime PRN  metoprolol succinate ER 75 milliGRAM(s) Oral two times a day  omega-3-Acid Ethyl Esters 4 Gram(s) Oral daily  pantoprazole    Tablet 40 milliGRAM(s) Oral before breakfast  sodium chloride 0.65% Nasal 1 Spray(s) Both Nostrils every 6 hours PRN  sodium chloride 0.9%. 1000 milliLiter(s) IV Continuous <Continuous>  tamsulosin 0.4 milliGRAM(s) Oral at bedtime  tiotropium 2.5 MICROgram(s) Inhaler 2 Puff(s) Inhalation daily      Vitals:  T(F): 97.6 (07-13), Max: 97.6 (07-12)  HR: 117 (07-13) (86 - 122)  BP: 92/61 (07-13) (92/61 - 144/88)  RR: 18 (07-13)  SpO2: 95% (07-13)  I&O's Summary    12 Jul 2023 07:01  -  13 Jul 2023 07:00  --------------------------------------------------------  IN: 360 mL / OUT: 0 mL / NET: 360 mL      Physical Exam:  Appearance: No acute distress; well appearing  Eyes: PERRL, EOMI, pink conjunctiva  HEENT: Normal oral mucosa  Cardiovascular: IIR, S1, S2, I/VI VIOLETA radiating to carotids, no rubs, or gallops; no edema; no JVD  Respiratory: Clear to auscultation bilaterally  Gastrointestinal: soft, non-tender, non-distended with normal bowel sounds  Musculoskeletal: 2+ edema to knees with venous stasis   Neurologic: Non-focal  Lymphatic: No lymphadenopathy  Psychiatry: AAOx3, mood & affect appropriate  Skin: No rashes, ecchymoses, or cyanosis                          8.0    6.19  )-----------( Clumped    ( 13 Jul 2023 06:01 )             26.0     07-13    137  |  103  |  30<H>  ----------------------------<  138<H>  4.5   |  22  |  1.56<H>    Ca    9.2      13 Jul 2023 06:02    PTT - ( 13 Jul 2023 06:02 )  PTT:57.6 sec    New ECG(s): Personally reviewed      A/P  78 M with hx. of Afib (on Eliquis), severe AS, mod-severe TR, CAD/MI (30 yrs ago, medically managed, no PCI), ?CHF, COPD (not on O2 at home), former heavy smoker, HTN, HLD, DM2, gout.   Was sent in for concern re outpatient echocardiogram which demonstrated worsening pericardial effusion. Effusion not safely accessible, no evidence of tamponade.  A.S., now pending TAVR work up      #Large Pericardial Effusion  - No clinical tamponade as patient is hemodynamically stable, with robust heart sounds and no obvious JVD. Outpatient TTE demonstrated LVEF of 55% with large pericardial effusion without tamponade physiology.   - Repeat TTE showing large effusion mainly around L ventricle; pericardial effusion cannot be safely tapped  Plan:  - Will CTM effusion and treat medically as needed with diuresis; continue TAVR workup in meantime  - Can repeat limited TTE in the outpatient setting     #Severe Symptomatic Low Flow Low Gradient AS  - Pt with known sx of sev AS p/w GOFF  - Repeat TTE showing sev AS  - Structural cardiology consulted, pending CT TAVR and eventual LHC  - Hypervolemic on exam, likely multifactorial, judicious with diuretics as above  Plan:  - F/U CT TAVR read  - LHC scheduled for 7/14 with Dr. Frazier  - Structural team following     #AFib  #RBBB  - Persistent per OSH documentation   - On apixaban 2.5 BID at home - unclear why on dose reduced; per son he previously was on Coumadin but had supra-therapeutic.  Apixaban on hold at present given effusion, need for LHC  - CHAVAS 5   - Cont heparin gtt pending procedure         Jean Ríos PGY5  Cardiology Fellow    CHAVA Hammond  78 M with hx. of Afib (on Eliquis), severe AS, mod-severe TR, CAD/MI (30 yrs ago, medically managed, no PCI), ?CHF, COPD (not on O2 at home), former heavy smoker, HTN, HLD, DM2, gout. He was sent as outpatient echocardiogram demonstrated worsening pericardial effusion. Effusion not safely accessible via interventional cardiology or IR. Remains stable without signs of tamponade.  Presently pursuing TAVR work up.    Patient seen and examined at bedside.    Overnight Events: CT TAVR completed.  C/O R anterolateral thigh pain, has not ambulated bc of it.   Denies FC, NV, CP or  SOB.   Will plan on [facility] tomorrow as long as Cr is ok    Telemetry: -120s    Current Meds:  acetaminophen     Tablet .. 650 milliGRAM(s) Oral every 6 hours PRN  albuterol    90 MICROgram(s) HFA Inhaler 2 Puff(s) Inhalation every 6 hours PRN  allopurinol 100 milliGRAM(s) Oral daily  atorvastatin 40 milliGRAM(s) Oral at bedtime  beclomethasone  80 MICROgram(s) Inhaler 2 Puff(s) Inhalation two times a day  carboxymethylcellulose 0.5% (Preservative-Free) Ophthalmic Solution 1 Drop(s) Both EYES two times a day PRN  chlorhexidine 2% Cloths 1 Application(s) Topical daily  dextrose 5%. 1000 milliLiter(s) IV Continuous <Continuous>  dextrose 5%. 1000 milliLiter(s) IV Continuous <Continuous>  dextrose 50% Injectable 12.5 Gram(s) IV Push once  dextrose 50% Injectable 25 Gram(s) IV Push once  dextrose 50% Injectable 25 Gram(s) IV Push once  dextrose Oral Gel 15 Gram(s) Oral once PRN  glucagon  Injectable 1 milliGRAM(s) IntraMuscular once  heparin   Injectable 3000 Unit(s) IV Push every 6 hours PRN  heparin   Injectable 6500 Unit(s) IV Push every 6 hours PRN  heparin  Infusion.  Unit(s)/Hr IV Continuous <Continuous>  insulin lispro (ADMELOG) corrective regimen sliding scale   SubCutaneous three times a day before meals  insulin lispro (ADMELOG) corrective regimen sliding scale   SubCutaneous at bedtime  melatonin 3 milliGRAM(s) Oral at bedtime PRN  metoprolol succinate ER 75 milliGRAM(s) Oral two times a day  omega-3-Acid Ethyl Esters 4 Gram(s) Oral daily  pantoprazole    Tablet 40 milliGRAM(s) Oral before breakfast  sodium chloride 0.65% Nasal 1 Spray(s) Both Nostrils every 6 hours PRN  sodium chloride 0.9%. 1000 milliLiter(s) IV Continuous <Continuous>  tamsulosin 0.4 milliGRAM(s) Oral at bedtime  tiotropium 2.5 MICROgram(s) Inhaler 2 Puff(s) Inhalation daily      Vitals:  T(F): 97.6 (07-13), Max: 97.6 (07-12)  HR: 117 (07-13) (86 - 122)  BP: 92/61 (07-13) (92/61 - 144/88)  RR: 18 (07-13)  SpO2: 95% (07-13)    I&O's Summary  12 Jul 2023 07:01  -  13 Jul 2023 07:00  --------------------------------------------------------  IN: 360 mL / OUT: 0 mL / NET: 360 mL      Physical Exam:  Appearance: No acute distress; well appearing  Eyes: PERRL, EOMI, pink conjunctiva  HEENT: Normal oral mucosa  Cardiovascular: IIR, S1, S2, I/VI VIOLETA radiating to carotids, no rubs, or gallops; no edema; no JVD  Respiratory: Clear to auscultation bilaterally  Gastrointestinal: soft, non-tender, non-distended with normal bowel sounds  Musculoskeletal: 2+ edema to knees with venous stasis   Neurologic: Non-focal  Lymphatic: No lymphadenopathy  Psychiatry: AAOx3, mood & affect appropriate  Skin: No rashes, ecchymoses, or cyanosis        LABS:                      8.0    6.19  )-----------( Clumped    ( 13 Jul 2023 06:01 )             26.0     07-13  137  |  103  |  30<H>  ----------------------------<  138<H>  4.5   |  22  |  1.56<H>    Ca    9.2      13 Jul 2023 06:02    PTT - ( 13 Jul 2023 06:02 )  PTT:57.6 sec          A/P  78 M with hx. of Afib (on Eliquis), severe AS, mod-severe TR, CAD/MI (30 yrs ago, medically managed, no PCI), ?CHF, COPD (not on O2 at home), former heavy smoker, HTN, HLD, DM2, gout.   Was sent in for concern re outpatient echocardiogram which demonstrated worsening pericardial effusion. Effusion not safely accessible, no evidence of tamponade.  A.S., now pending TAVR work up      #Large Pericardial Effusion  - No clinical tamponade as patient is hemodynamically stable, with robust heart sounds and no obvious JVD. Outpatient TTE demonstrated LVEF of 55% with large pericardial effusion without tamponade physiology.   - Repeat TTE showed large effusion mainly around L ventricle; pericardial effusion cannot be safely tapped  Plan:  - Will continue to monitor effusion; continue TAVR workup in meantime  - Can repeat limited TTE in the outpatient setting     #Severe Symptomatic Low Flow Low Gradient AS  - Pt with known sx of sev AS p/w GOFF  - Repeat TTE showing sev AS  - Structural cardiology consulted, pending CT TAVR rand eventual LHC  - Hypervolemic on exam, likely multifactorial, judicious with diuretics as above  Plan:  - F/U CT TAVR read  - LHC scheduled for 7/14 with Dr. Frazier  - Structural team following     #AFib  #RBBB  - Persistent per OSH documentation   - On apixaban 2.5 BID at home - unclear why on dose reduced; per son he previously was on Coumadin but had supra-therapeutic.  Apixaban on hold at present given effusion and need for LHC.  - CHADSVASC 5   - Cont heparin gtt pending procedure         Jean Ríos PGY5  Cardiology Fellow    Plan discussed with cardiology fellow.  Patient seen and examined.  Hx., exam and labs as above.  I agree with the assessment and recommendations, which I have reviewed and edited where appropriate.  Mc Hammond M.D.  Cardiology Attending, Consult Service    For Cardiology consults and questions, all Cardiology service information can be found 24/7 on [email or web address] - use password: [email or web address] to log in.

## 2023-07-13 NOTE — PROGRESS NOTE ADULT - PROBLEM SELECTOR PLAN 4
Presented with O2 sat down to 88% on RA. Improved on 1-2L O2NC. Suspect in setting of acute on chronic CHF.  - diuretic for CHF/pericardial effusion as above  - monitor respiratory status, wean off supplemental O2 as tolerated - sCr worsening  - IV Lasix d/essie  - Nephrology consulted; reccs appreciated   - Hep C non reactive. f/up HBsAg, serum immunofixation, C3 and C4. checked UA  - Renal US shows both kidneys are echogenic, compatible with medical renal disease. No renal mass, hydronephrosis or calculus  - monitor BMP

## 2023-07-13 NOTE — PROGRESS NOTE ADULT - PROBLEM SELECTOR PLAN 6
Hx of CAD/MI (30 yrs ago, medically managed, no PCI)  - hsTrop 30->27; EKG w/o signs of acute ischemia; low suspicion of ACS  - c/w home statin, and metoprolol  - ASA on HOLD for possible procedures Hx of Afib (on Eliquis at home). CHADSVASC score of 5-6.  - HOLD Eliquis ; started heparin gtt pending procedures  - c/w home metoprolol succinate 75mg   - monitor on telemetry

## 2023-07-13 NOTE — PROGRESS NOTE ADULT - ASSESSMENT
78M (alt MRN [id number]) w/ hx of Afib (on Eliquis), severe AS, mod-severe TR, CAD/MI (30 yrs ago, medically managed, no PCI), ?CHF, COPD (not on O2 at home), former heavy smoker, HTN, HLD, DM2, gout, prior moderate pericardial effusion, now presenting with large pericardial effusion w/o tamponade physiology on outpt TTE.

## 2023-07-13 NOTE — PROGRESS NOTE ADULT - PROBLEM SELECTOR PLAN 7
Patient states he gets weekly iron infusions outpatient.   Will need patient's outpatient hematologist information to get more collateral information.     Hgb today 9  Monitor HH. Keep active T&S. Transfuse prbc prn.   Checked iron studies. Hx of CAD/MI (30 yrs ago, medically managed, no PCI)  - hsTrop 30->27; EKG w/o signs of acute ischemia; low suspicion of ACS  - c/w home statin, and metoprolol  - ASA on HOLD for possible procedures

## 2023-07-13 NOTE — PROGRESS NOTE ADULT - PROBLEM SELECTOR PLAN 3
- sCr worsening  - IV Lasix d/essie  - Nephrology consulted; reccs appreciated   - Hep C non reactive. f/up HBsAg, serum immunofixation, C3 and C4. checked UA  - Renal US shows both kidneys are echogenic, compatible with medical renal disease. No renal mass, hydronephrosis or calculus  - monitor BMP Patient states he gets weekly iron infusions outpatient.   Patient states his outpatient hematologist is Dr. Sylvester (), no hematology notes found in HIE/[employer], house hematology team not aware of this hematologist.    -Hgb 9 >8  -Check CT RLE urgently  -called patient's outpatient hematologist multiple times and number was not answered, left VM, awaiting call back.  -Monitor HH. Keep active T&S. Transfuse prbc prn.   -Checked iron studies.

## 2023-07-14 LAB
ANION GAP SERPL CALC-SCNC: 11 MMOL/L — SIGNIFICANT CHANGE UP (ref 5–17)
APTT BLD: >200 SEC — CRITICAL HIGH (ref 27.5–35.5)
BUN SERPL-MCNC: 34 MG/DL — HIGH (ref 7–23)
CALCIUM SERPL-MCNC: 8.8 MG/DL — SIGNIFICANT CHANGE UP (ref 8.4–10.5)
CHLORIDE SERPL-SCNC: 101 MMOL/L — SIGNIFICANT CHANGE UP (ref 96–108)
CLOSURE TME COLL+EPINEP BLD: 84 K/UL — LOW (ref 150–400)
CO2 SERPL-SCNC: 24 MMOL/L — SIGNIFICANT CHANGE UP (ref 22–31)
CREAT SERPL-MCNC: 1.48 MG/DL — HIGH (ref 0.5–1.3)
EGFR: 48 ML/MIN/1.73M2 — LOW
GLUCOSE BLDC GLUCOMTR-MCNC: 176 MG/DL — HIGH (ref 70–99)
GLUCOSE BLDC GLUCOMTR-MCNC: 191 MG/DL — HIGH (ref 70–99)
GLUCOSE BLDC GLUCOMTR-MCNC: 194 MG/DL — HIGH (ref 70–99)
GLUCOSE SERPL-MCNC: 187 MG/DL — HIGH (ref 70–99)
HCT VFR BLD CALC: 22 % — LOW (ref 39–50)
HEPARIN-PF4 AB RESULT: <0.6 U/ML — SIGNIFICANT CHANGE UP (ref 0–0.9)
HGB BLD-MCNC: 6.7 G/DL — CRITICAL LOW (ref 13–17)
INR BLD: 1.2 RATIO — HIGH (ref 0.88–1.16)
MCHC RBC-ENTMCNC: 25.8 PG — LOW (ref 27–34)
MCHC RBC-ENTMCNC: 30.5 GM/DL — LOW (ref 32–36)
MCV RBC AUTO: 84.6 FL — SIGNIFICANT CHANGE UP (ref 80–100)
NRBC # BLD: 0 /100 WBCS — SIGNIFICANT CHANGE UP (ref 0–0)
PF4 HEPARIN CMPLX AB SER-ACNC: NEGATIVE — SIGNIFICANT CHANGE UP
PLATELET # BLD AUTO: 111 K/UL — LOW (ref 150–400)
POTASSIUM SERPL-MCNC: 4.7 MMOL/L — SIGNIFICANT CHANGE UP (ref 3.5–5.3)
POTASSIUM SERPL-SCNC: 4.7 MMOL/L — SIGNIFICANT CHANGE UP (ref 3.5–5.3)
PROTHROM AB SERPL-ACNC: 13.9 SEC — HIGH (ref 10.5–13.4)
RBC # BLD: 2.6 M/UL — LOW (ref 4.2–5.8)
RBC # FLD: 19.9 % — HIGH (ref 10.3–14.5)
SODIUM SERPL-SCNC: 136 MMOL/L — SIGNIFICANT CHANGE UP (ref 135–145)
WBC # BLD: 6.64 K/UL — SIGNIFICANT CHANGE UP (ref 3.8–10.5)
WBC # FLD AUTO: 6.64 K/UL — SIGNIFICANT CHANGE UP (ref 3.8–10.5)

## 2023-07-14 PROCEDURE — 99221 1ST HOSP IP/OBS SF/LOW 40: CPT

## 2023-07-14 PROCEDURE — 99233 SBSQ HOSP IP/OBS HIGH 50: CPT | Mod: GC

## 2023-07-14 PROCEDURE — 93971 EXTREMITY STUDY: CPT | Mod: 26,LT

## 2023-07-14 PROCEDURE — 99232 SBSQ HOSP IP/OBS MODERATE 35: CPT | Mod: GC

## 2023-07-14 PROCEDURE — 73700 CT LOWER EXTREMITY W/O DYE: CPT | Mod: 26,RT

## 2023-07-14 PROCEDURE — 99233 SBSQ HOSP IP/OBS HIGH 50: CPT

## 2023-07-14 RX ADMIN — ATORVASTATIN CALCIUM 40 MILLIGRAM(S): 80 TABLET, FILM COATED ORAL at 21:14

## 2023-07-14 RX ADMIN — Medication 650 MILLIGRAM(S): at 16:30

## 2023-07-14 RX ADMIN — PANTOPRAZOLE SODIUM 40 MILLIGRAM(S): 20 TABLET, DELAYED RELEASE ORAL at 06:57

## 2023-07-14 RX ADMIN — Medication 1000 MILLIGRAM(S): at 07:27

## 2023-07-14 RX ADMIN — BECLOMETHASONE DIPROPIONATE 2 PUFF(S): 40 AEROSOL, METERED RESPIRATORY (INHALATION) at 12:19

## 2023-07-14 RX ADMIN — Medication 400 MILLIGRAM(S): at 06:57

## 2023-07-14 RX ADMIN — TAMSULOSIN HYDROCHLORIDE 0.4 MILLIGRAM(S): 0.4 CAPSULE ORAL at 21:15

## 2023-07-14 RX ADMIN — Medication 3 MILLIGRAM(S): at 01:37

## 2023-07-14 RX ADMIN — BECLOMETHASONE DIPROPIONATE 2 PUFF(S): 40 AEROSOL, METERED RESPIRATORY (INHALATION) at 21:51

## 2023-07-14 RX ADMIN — Medication 650 MILLIGRAM(S): at 16:13

## 2023-07-14 RX ADMIN — TIOTROPIUM BROMIDE 2 PUFF(S): 18 CAPSULE ORAL; RESPIRATORY (INHALATION) at 14:16

## 2023-07-14 RX ADMIN — Medication 4 GRAM(S): at 14:12

## 2023-07-14 RX ADMIN — CHLORHEXIDINE GLUCONATE 1 APPLICATION(S): 213 SOLUTION TOPICAL at 13:38

## 2023-07-14 RX ADMIN — Medication 100 MILLIGRAM(S): at 14:11

## 2023-07-14 RX ADMIN — Medication 75 MILLIGRAM(S): at 05:30

## 2023-07-14 RX ADMIN — Medication 75 MILLIGRAM(S): at 17:53

## 2023-07-14 NOTE — PROGRESS NOTE ADULT - SUBJECTIVE AND OBJECTIVE BOX
[facility] Division of Hospital Medicine  Kaleigh Cerrato MD M-F, 8A-5P: MS Teams, Pager: [phone]  Other Times: Ext: [phone], Pager: [phone]      Patient is a 78y old  Male who presents with a chief complaint of worsening pericardial effusion (2023 12:19)      SUBJECTIVE / OVERNIGHT EVENTS:  Patient was examined this morning. He still has pain in his right upper thigh/groin area. Discussed that we are awaiting results for CT scan, discussed that we were unable to reach his outpatient hematologist and given anemia, thrombocytopenia we will be doing further workup and he may need transfusion.     ADDITIONAL REVIEW OF SYSTEMS:    MEDICATIONS  (STANDING):  allopurinol 100 milliGRAM(s) Oral daily  atorvastatin 40 milliGRAM(s) Oral at bedtime  beclomethasone  80 MICROgram(s) Inhaler 2 Puff(s) Inhalation two times a day  chlorhexidine 2% Cloths 1 Application(s) Topical daily  dextrose 5%. 1000 milliLiter(s) (100 mL/Hr) IV Continuous <Continuous>  dextrose 5%. 1000 milliLiter(s) (50 mL/Hr) IV Continuous <Continuous>  dextrose 50% Injectable 12.5 Gram(s) IV Push once  dextrose 50% Injectable 25 Gram(s) IV Push once  dextrose 50% Injectable 25 Gram(s) IV Push once  glucagon  Injectable 1 milliGRAM(s) IntraMuscular once  insulin lispro (ADMELOG) corrective regimen sliding scale   SubCutaneous three times a day before meals  insulin lispro (ADMELOG) corrective regimen sliding scale   SubCutaneous at bedtime  metoprolol succinate ER 75 milliGRAM(s) Oral two times a day  omega-3-Acid Ethyl Esters 4 Gram(s) Oral daily  pantoprazole    Tablet 40 milliGRAM(s) Oral before breakfast  tamsulosin 0.4 milliGRAM(s) Oral at bedtime  tiotropium 2.5 MICROgram(s) Inhaler 2 Puff(s) Inhalation daily    MEDICATIONS  (PRN):  acetaminophen     Tablet .. 650 milliGRAM(s) Oral every 6 hours PRN Temp greater or equal to 38C (100.4F), Mild Pain (1 - 3)  albuterol    90 MICROgram(s) HFA Inhaler 2 Puff(s) Inhalation every 6 hours PRN Shortness of Breath and/or Wheezing  carboxymethylcellulose 0.5% (Preservative-Free) Ophthalmic Solution 1 Drop(s) Both EYES two times a day PRN dry eyes  dextrose Oral Gel 15 Gram(s) Oral once PRN Blood Glucose LESS THAN 70 milliGRAM(s)/deciliter  melatonin 3 milliGRAM(s) Oral at bedtime PRN Sleep  sodium chloride 0.65% Nasal 1 Spray(s) Both Nostrils every 6 hours PRN Nasal Congestion      CAPILLARY BLOOD GLUCOSE      POCT Blood Glucose.: 176 mg/dL (2023 12:09)  POCT Blood Glucose.: 194 mg/dL (2023 08:13)  POCT Blood Glucose.: 168 mg/dL (2023 21:15)  POCT Blood Glucose.: 173 mg/dL (2023 17:09)      I&O's Summary    2023 07:01  -  2023 07:00  --------------------------------------------------------  IN: 0 mL / OUT: 200 mL / NET: -200 mL        Daily Height in cm: 167.6 (2023 08:34)    Daily Weight in k (2023 09:11)    PHYSICAL EXAM:  Vital Signs Last 24 Hrs  T(C): 36.5 (2023 09:10), Max: 36.8 (2023 20:19)  T(F): 97.7 (2023 09:10), Max: 98.2 (2023 20:19)  HR: 108 (2023 09:10) (103 - 134)  BP: 131/74 (2023 09:10) (117/71 - 142/90)  BP(mean): --  RR: 16 (2023 09:10) (16 - 20)  SpO2: 100% (2023 09:10) (99% - 100%)    Parameters below as of 2023 09:10  Patient On (Oxygen Delivery Method): nasal cannula  O2 Flow (L/min): 3    CONSTITUTIONAL: NAD, well-developed  EYES: PERRLA; conjunctiva and sclera clear  ENMT: Moist oral mucosa  RESPIRATORY: Normal respiratory effort; lungs are clear to auscultation bilaterally  CARDIOVASCULAR: Regular rate and rhythm, normal S1 and S2, no murmur/rub/gallop;   + RLE edema;  Peripheral pulses are 2+ bilaterally  ABDOMEN: Nontender to palpation, normoactive bowel sounds, no rebound/guarding;   MUSCULOSKELETAL:  no clubbing or cyanosis of digits; no joint swelling or tenderness to palpation, moving all extremities   PSYCH: A+O to person, place, and time; affect appropriate  NEUROLOGY: non focal, patient ambulating without assist  SKIN: RLE venous stasis changes, several areas of ecchymosis on UEs (R>L) with some swelling     LABS:                        8.0    6.19  )-----------( Clumped    ( 2023 06:01 )             26.0     07-14    136  |  101  |  34<H>  ----------------------------<  187<H>  4.7   |  24  |  1.48<H>    Ca    8.8      2023 07:20        PT/INR - ( 2023 07:19 )   PT: 13.9 sec;   INR: 1.20 ratio         PTT - ( 2023 07:19 )  PTT:>200.0 sec      Urinalysis Basic - ( 2023 07:20 )    Color: x / Appearance: x / SG: x / pH: x  Gluc: 187 mg/dL / Ketone: x  / Bili: x / Urobili: x   Blood: x / Protein: x / Nitrite: x   Leuk Esterase: x / RBC: x / WBC x   Sq Epi: x / Non Sq Epi: x / Bacteria: x            RADIOLOGY & ADDITIONAL TESTS:  Results Reviewed:   Imaging Personally Reviewed:  Electrocardiogram Personally Reviewed:    COORDINATION OF CARE:  Care Discussed with Consultants/Other Providers [Y/N]:  Prior or Outpatient Records Reviewed [Y/N]:

## 2023-07-14 NOTE — PROGRESS NOTE ADULT - SUBJECTIVE AND OBJECTIVE BOX
[facility] DIVISION OF KIDNEY DISEASES AND HYPERTENSION   FOLLOW UP NOTE  --------------------------------------------------------------------------------  HPI: 78M w/ hx of Afib (on Eliquis), severe AS, mod-severe TR, CAD/MI (30 yrs ago, medically managed, no PCI), HFpEF (TTE 6/3 EF 55%), CKD, COPD (not on O2 at home), former heavy smoker (over 25 years agO), HTN, HLD, DM2, gout, presenting with worsening pericardial effusion. Patient was sent in from his cardiologist's office. Of note, he had a TTE on day of presentation (7/3/23) that showed a "large circumferential pericardial effusion up to 2.6 cm without evidence of tamponade physiology" (and mildly reduced RV systolic function was also noted). His prior TTE on 10/3/22 had shown a "moderate pericardial effusion, measuring about 1.0 cm superior to the RA; otherwise small circumferential pericardial effusion." Etiology of pericardial effusion is unclear. Cardiology is considering pericardiocentesis and CT TVAR, renal was consulted to evaluate the patient for contrast tolerance given CKD.     24 hour events/subjective: Pt. was seen and examined today. Resting comfortably. Anticipating [facility] today.    PAST HISTORY  --------------------------------------------------------------------------------  No significant changes to PMH, PSH, FHx, SHx, unless otherwise noted    ALLERGIES & MEDICATIONS  --------------------------------------------------------------------------------  Allergies  penicillins (Unknown)    Intolerances    Standing Inpatient Medications  allopurinol 100 milliGRAM(s) Oral daily  atorvastatin 40 milliGRAM(s) Oral at bedtime  beclomethasone  80 MICROgram(s) Inhaler 2 Puff(s) Inhalation two times a day  chlorhexidine 2% Cloths 1 Application(s) Topical daily  dextrose 5%. 1000 milliLiter(s) IV Continuous <Continuous>  dextrose 5%. 1000 milliLiter(s) IV Continuous <Continuous>  dextrose 50% Injectable 12.5 Gram(s) IV Push once  dextrose 50% Injectable 25 Gram(s) IV Push once  dextrose 50% Injectable 25 Gram(s) IV Push once  glucagon  Injectable 1 milliGRAM(s) IntraMuscular once  heparin  Infusion.  Unit(s)/Hr IV Continuous <Continuous>  insulin lispro (ADMELOG) corrective regimen sliding scale   SubCutaneous three times a day before meals  insulin lispro (ADMELOG) corrective regimen sliding scale   SubCutaneous at bedtime  metoprolol succinate ER 75 milliGRAM(s) Oral two times a day  omega-3-Acid Ethyl Esters 4 Gram(s) Oral daily  pantoprazole    Tablet 40 milliGRAM(s) Oral before breakfast  tamsulosin 0.4 milliGRAM(s) Oral at bedtime  tiotropium 2.5 MICROgram(s) Inhaler 2 Puff(s) Inhalation daily    PRN Inpatient Medications  acetaminophen     Tablet .. 650 milliGRAM(s) Oral every 6 hours PRN  albuterol    90 MICROgram(s) HFA Inhaler 2 Puff(s) Inhalation every 6 hours PRN  carboxymethylcellulose 0.5% (Preservative-Free) Ophthalmic Solution 1 Drop(s) Both EYES two times a day PRN  dextrose Oral Gel 15 Gram(s) Oral once PRN  heparin   Injectable 3000 Unit(s) IV Push every 6 hours PRN  heparin   Injectable 6500 Unit(s) IV Push every 6 hours PRN  melatonin 3 milliGRAM(s) Oral at bedtime PRN  sodium chloride 0.65% Nasal 1 Spray(s) Both Nostrils every 6 hours PRN    REVIEW OF SYSTEMS  --------------------------------------------------------------------------------  All other systems were reviewed and are negative, except as noted.    VITALS/PHYSICAL EXAM  --------------------------------------------------------------------------------  T(C): 36.7 (07-14-23 @ 05:02), Max: 36.8 (07-13-23 @ 20:19)  HR: 134 (07-14-23 @ 05:02) (103 - 134)  BP: 117/71 (07-14-23 @ 05:02) (107/69 - 142/90)  RR: 18 (07-14-23 @ 05:02) (17 - 18)  SpO2: 100% (07-14-23 @ 05:02) (99% - 100%)  Wt(kg): --    07-13-23 @ 07:01  -  07-14-23 @ 07:00  --------------------------------------------------------  IN: 0 mL / OUT: 200 mL / NET: -200 mL    Physical Exam:  	Gen: Laying in bed in NAD  	HEENT: Anicteric  	Pulm: CTA B/L  	CV: S1S2+  	Abd: Soft, +BS         	Ext: + LE edema B/L R>L (hx of leg trauma).   	Neuro: Awake  	Skin: Warm and dry    LABS/STUDIES  --------------------------------------------------------------------------------              8.0    6.19  >-----------<  Clumped    [07-13-23 @ 06:01]              26.0     136  |  101  |  34  ----------------------------<  187      [07-14-23 @ 07:20]  4.7   |  24  |  1.48        Ca     8.8     [07-14-23 @ 07:20]    PTT: 57.6       [07-13-23 @ 06:02]    Creatinine Trend:  SCr 1.48 [07-14 @ 07:20]  SCr 1.56 [07-13 @ 06:02]  SCr 1.56 [07-12 @ 07:38]  SCr 1.38 [07-11 @ 06:40]  SCr 1.73 [07-10 @ 04:45] [facility] DIVISION OF KIDNEY DISEASES AND HYPERTENSION   FOLLOW UP NOTE  --------------------------------------------------------------------------------  HPI: 78M w/ hx of Afib (on Eliquis), severe AS, mod-severe TR, CAD/MI (30 yrs ago, medically managed, no PCI), HFpEF (TTE 6/3 EF 55%), CKD, COPD (not on O2 at home), former heavy smoker (over 25 years agO), HTN, HLD, DM2, gout, presenting with worsening pericardial effusion. Patient was sent in from his cardiologist's office. Of note, he had a TTE on day of presentation (7/3/23) that showed a "large circumferential pericardial effusion up to 2.6 cm without evidence of tamponade physiology" (and mildly reduced RV systolic function was also noted). His prior TTE on 10/3/22 had shown a "moderate pericardial effusion, measuring about 1.0 cm superior to the RA; otherwise small circumferential pericardial effusion." Etiology of pericardial effusion is unclear. Cardiology is considering pericardiocentesis and CT TVAR, renal was consulted to evaluate the patient for contrast tolerance given CKD.     24 hour events/subjective: Pt. was seen and examined today. Resting comfortably but continues to have leg swelling. Anticipating [facility] today.    PAST HISTORY  --------------------------------------------------------------------------------  No significant changes to PMH, PSH, FHx, SHx, unless otherwise noted    ALLERGIES & MEDICATIONS  --------------------------------------------------------------------------------  Allergies  penicillins (Unknown)    Intolerances    Standing Inpatient Medications  allopurinol 100 milliGRAM(s) Oral daily  atorvastatin 40 milliGRAM(s) Oral at bedtime  beclomethasone  80 MICROgram(s) Inhaler 2 Puff(s) Inhalation two times a day  chlorhexidine 2% Cloths 1 Application(s) Topical daily  dextrose 5%. 1000 milliLiter(s) IV Continuous <Continuous>  dextrose 5%. 1000 milliLiter(s) IV Continuous <Continuous>  dextrose 50% Injectable 12.5 Gram(s) IV Push once  dextrose 50% Injectable 25 Gram(s) IV Push once  dextrose 50% Injectable 25 Gram(s) IV Push once  glucagon  Injectable 1 milliGRAM(s) IntraMuscular once  heparin  Infusion.  Unit(s)/Hr IV Continuous <Continuous>  insulin lispro (ADMELOG) corrective regimen sliding scale   SubCutaneous three times a day before meals  insulin lispro (ADMELOG) corrective regimen sliding scale   SubCutaneous at bedtime  metoprolol succinate ER 75 milliGRAM(s) Oral two times a day  omega-3-Acid Ethyl Esters 4 Gram(s) Oral daily  pantoprazole    Tablet 40 milliGRAM(s) Oral before breakfast  tamsulosin 0.4 milliGRAM(s) Oral at bedtime  tiotropium 2.5 MICROgram(s) Inhaler 2 Puff(s) Inhalation daily    PRN Inpatient Medications  acetaminophen     Tablet .. 650 milliGRAM(s) Oral every 6 hours PRN  albuterol    90 MICROgram(s) HFA Inhaler 2 Puff(s) Inhalation every 6 hours PRN  carboxymethylcellulose 0.5% (Preservative-Free) Ophthalmic Solution 1 Drop(s) Both EYES two times a day PRN  dextrose Oral Gel 15 Gram(s) Oral once PRN  heparin   Injectable 3000 Unit(s) IV Push every 6 hours PRN  heparin   Injectable 6500 Unit(s) IV Push every 6 hours PRN  melatonin 3 milliGRAM(s) Oral at bedtime PRN  sodium chloride 0.65% Nasal 1 Spray(s) Both Nostrils every 6 hours PRN    REVIEW OF SYSTEMS  --------------------------------------------------------------------------------  All other systems were reviewed and are negative, except as noted.    VITALS/PHYSICAL EXAM  --------------------------------------------------------------------------------  T(C): 36.7 (07-14-23 @ 05:02), Max: 36.8 (07-13-23 @ 20:19)  HR: 134 (07-14-23 @ 05:02) (103 - 134)  BP: 117/71 (07-14-23 @ 05:02) (107/69 - 142/90)  RR: 18 (07-14-23 @ 05:02) (17 - 18)  SpO2: 100% (07-14-23 @ 05:02) (99% - 100%)  Wt(kg): --    07-13-23 @ 07:01  -  07-14-23 @ 07:00  --------------------------------------------------------  IN: 0 mL / OUT: 200 mL / NET: -200 mL    Physical Exam:  	Gen: Laying in bed in NAD  	HEENT: Anicteric  	Pulm: CTA B/L  	CV: S1S2+  	Abd: Soft, +BS         	Ext: + LE edema B/L R>L (hx of leg trauma).   	Neuro: Awake  	Skin: Warm and dry    LABS/STUDIES  --------------------------------------------------------------------------------              8.0    6.19  >-----------<  Clumped    [07-13-23 @ 06:01]              26.0     136  |  101  |  34  ----------------------------<  187      [07-14-23 @ 07:20]  4.7   |  24  |  1.48        Ca     8.8     [07-14-23 @ 07:20]    PTT: 57.6       [07-13-23 @ 06:02]    Creatinine Trend:  SCr 1.48 [07-14 @ 07:20]  SCr 1.56 [07-13 @ 06:02]  SCr 1.56 [07-12 @ 07:38]  SCr 1.38 [07-11 @ 06:40]  SCr 1.73 [07-10 @ 04:45]

## 2023-07-14 NOTE — PROGRESS NOTE ADULT - PROBLEM SELECTOR PLAN 4
- IV Lasix d/essie  - Nephrology consulted; reccs appreciated   - Hep C non reactive. f/up HBsAg, serum immunofixation, C3 and C4. checked UA  - Renal US shows both kidneys are echogenic, compatible with medical renal disease. No renal mass, hydronephrosis or calculus  - monitor BMP, Is/Os

## 2023-07-14 NOTE — PROGRESS NOTE ADULT - PROBLEM SELECTOR PLAN 1
- Found to have "large circumferential pericardial effusion up to 2.6 cm without evidence of tamponade physiology" on outpt TTE (7/3/23).  - Prior TTE (10/3/22) showed "moderate pericardial effusion, measuring about 1.0 cm superior to the RA; otherwise small circumferential pericardial effusion." Unclear underlying etiology of effusion, but possibly in setting of CHF.  - d/essie Lasix given SHEYLA  - strict I/Os, monitor UOP  - monitor hemodynamics closely  - TTE w/ unchanged mod to large pericardial effusion ; +severe AS    >    > Per recs from cardiology and IR teams: no plan for pericardiocentesis now.  - CT surgery consulted for severe AS ; workup for TAVR in progress ; TAVR CT ordered, F/U plan for LHC -now on HOLD due to thrombocytopenia.   - HOLD heparin gtt given concern for worsening anemia, thrombocytopenia, hematoma - Found to have "large circumferential pericardial effusion up to 2.6 cm without evidence of tamponade physiology" on outpt TTE (7/3/23).  - Prior TTE (10/3/22) showed "moderate pericardial effusion, measuring about 1.0 cm superior to the RA; otherwise small circumferential pericardial effusion." Unclear underlying etiology of effusion, but possibly in setting of CHF.  - d/essie Lasix given SHEYLA  - strict I/Os, monitor UOP  - monitor hemodynamics closely  - TTE w/ unchanged mod to large pericardial effusion ; +severe AS    >    > Per recs from cardiology and IR teams: no plan for pericardiocentesis now.  - CT surgery consulted for severe AS ; workup for TAVR in progress ; TAVR CT ordered, F/U plan for LHC -postponed and now cancelled due to thrombocytopenia.   - HOLD heparin gtt given concern for worsening anemia, thrombocytopenia, hematoma

## 2023-07-14 NOTE — PROGRESS NOTE ADULT - SUBJECTIVE AND OBJECTIVE BOX
ID: 78 M with hx. of Afib (on Eliquis), severe AS, mod-severe TR, CAD/MI (30 yrs ago, medically managed, no PCI), ?CHF, COPD (not on O2 at home), former heavy smoker, HTN, HLD, DM2, gout. He was sent as outpatient echocardiogram demonstrated worsening pericardial effusion. Effusion not safely accessible via interventional cardiology or IR. Remains stable without signs of tamponade.  Presently pursuing TAVR work up.    Patient seen and examined at bedside.    Overnight Events: Worsening RLE pain overnight and cannot stand on R leg, now with mild induration and swelling of the R thigh. Initially planned for LHC but Hb dropping, platelets low, and leg pain worse. Findings c/f active bleed into the thigh. LHC canceled. CT scan notes findings c/f large expanding hematoma (cannot r/o active extrav as non con). Primary team to consult gen surg consulted for possible compartment syndrome     Telemetry: [id number]         Current Meds:  acetaminophen     Tablet .. 650 milliGRAM(s) Oral every 6 hours PRN  albuterol    90 MICROgram(s) HFA Inhaler 2 Puff(s) Inhalation every 6 hours PRN  allopurinol 100 milliGRAM(s) Oral daily  atorvastatin 40 milliGRAM(s) Oral at bedtime  beclomethasone  80 MICROgram(s) Inhaler 2 Puff(s) Inhalation two times a day  carboxymethylcellulose 0.5% (Preservative-Free) Ophthalmic Solution 1 Drop(s) Both EYES two times a day PRN  chlorhexidine 2% Cloths 1 Application(s) Topical daily  dextrose 5%. 1000 milliLiter(s) IV Continuous <Continuous>  dextrose 5%. 1000 milliLiter(s) IV Continuous <Continuous>  dextrose 50% Injectable 12.5 Gram(s) IV Push once  dextrose 50% Injectable 25 Gram(s) IV Push once  dextrose 50% Injectable 25 Gram(s) IV Push once  dextrose Oral Gel 15 Gram(s) Oral once PRN  glucagon  Injectable 1 milliGRAM(s) IntraMuscular once  insulin lispro (ADMELOG) corrective regimen sliding scale   SubCutaneous three times a day before meals  insulin lispro (ADMELOG) corrective regimen sliding scale   SubCutaneous at bedtime  melatonin 3 milliGRAM(s) Oral at bedtime PRN  metoprolol succinate ER 75 milliGRAM(s) Oral two times a day  omega-3-Acid Ethyl Esters 4 Gram(s) Oral daily  pantoprazole    Tablet 40 milliGRAM(s) Oral before breakfast  sodium chloride 0.65% Nasal 1 Spray(s) Both Nostrils every 6 hours PRN  tamsulosin 0.4 milliGRAM(s) Oral at bedtime  tiotropium 2.5 MICROgram(s) Inhaler 2 Puff(s) Inhalation daily      Vitals:  T(F): 97.7 (07-14), Max: 98.2 (07-13)  HR: 108 (07-14) (103 - 134)  BP: 131/74 (07-14) (117/71 - 142/90)  RR: 16 (07-14)  SpO2: 100% (07-14)  I&O's Summary    13 Jul 2023 07:01  -  14 Jul 2023 07:00  --------------------------------------------------------  IN: 0 mL / OUT: 200 mL / NET: -200 mL        Physical Exam:  Appearance: No acute distress; well appearing  Eyes: PERRL, EOMI, pink conjunctiva  HEENT: Normal oral mucosa  Cardiovascular: IIR, S1, S2, I/VI VIOLETA radiating to carotids, no rubs, or gallops; no edema; no JVD  Respiratory: Clear to auscultation bilaterally  Gastrointestinal: soft, non-tender, non-distended with normal bowel sounds  Musculoskeletal: 1+ edema to knees with venous stasis; R thigh indurated with TTP on anterolateral region, no ecchymoses noted  Neurologic: Non-focal  Lymphatic: No lymphadenopathy  Psychiatry: AAOx3, mood & affect appropriate  Skin: No rashes, ecchymoses, or cyanosis                          8.0    6.19  )-----------( Clumped    ( 13 Jul 2023 06:01 )             26.0     07-14    136  |  101  |  34<H>  ----------------------------<  187<H>  4.7   |  24  |  1.48<H>    Ca    8.8      14 Jul 2023 07:20      PT/INR - ( 14 Jul 2023 07:19 )   PT: 13.9 sec;   INR: 1.20 ratio    PTT - ( 14 Jul 2023 07:19 )  PTT:>200.0 sec    New ECG(s): Personally reviewed    A/P  78 M with hx. of Afib (on Eliquis), severe AS, mod-severe TR, CAD/MI (30 yrs ago, medically managed, no PCI), ?CHF, COPD (not on O2 at home), former heavy smoker, HTN, HLD, DM2, gout.   Was sent in for concern re outpatient echocardiogram which demonstrated worsening pericardial effusion. Effusion not safely accessible, no evidence of tamponade.  A.S., now pending TAVR work up    #R Thigh Hematoma  - Noted on CT non con of the RLE  - Hb drop 2 pts, platelets downtrending   - LHC canceled  Plan:  - DC heparin asap  - Surgery urgently consulted by primary team  - Would order T&S in case of transfusion    #Severe Symptomatic Low Flow Low Gradient AS  - Pt with known sx of sev AS p/w GOFF  - Repeat TTE showing sev AS  - Structural cardiology consulted, pending CT TAVR rand eventual LHC  - Hypervolemic on exam, likely multifactorial, judicious with diuretics as above  - S/p CT TAVR pending LHC  Plan:  - Pending LHC, aborted iso R thigh hematoma   - Can readdress when bleed ruled out    #AFib RVR  #RBBB  - Persistent per OSH documentation   - On apixaban 2.5 BID at home - unclear why on dose reduced; per son he previously was on Coumadin but had supra-therapeutic.  Apixaban on hold at present given effusion and need for LHC.  - CHADSVASC 5   - DC heparin gtt as above  - AF RVR likely compensatory iso blood loss, no need to tx unless hemodynamically unstable in which case would recommend blood     #Large Pericardial Effusion  - No clinical tamponade as patient is hemodynamically stable, with robust heart sounds and no obvious JVD. Outpatient TTE demonstrated LVEF of 55% with large pericardial effusion without tamponade physiology.   - Repeat TTE showed large effusion mainly around L ventricle; pericardial effusion cannot be safely tapped  Plan:  - Will continue to monitor effusion; continue TAVR workup in meantime  - Can repeat limited TTE in the outpatient setting     Jean Ríos PGY5  Cardiology Fellow    CHAVA Hammond  Patient seen and examined at bedside.    Overnight Events: Worsening RLE pain overnight and cannot stand on R leg, now with mild induration and swelling of the R thigh. Initially planned for LHC, but Hb dropping, platelets low, and leg pain worse. Findings c/f active bleed into the thigh. LHC canceled. CT scan notes findings c/f large expanding hematoma (cannot r/o active extrav as non con). Primary team to consult gen surg consulted for possible compartment syndrome       Telemetry: [id number]           Current Meds:  acetaminophen     Tablet .. 650 milliGRAM(s) Oral every 6 hours PRN  albuterol    90 MICROgram(s) HFA Inhaler 2 Puff(s) Inhalation every 6 hours PRN  allopurinol 100 milliGRAM(s) Oral daily  atorvastatin 40 milliGRAM(s) Oral at bedtime  beclomethasone  80 MICROgram(s) Inhaler 2 Puff(s) Inhalation two times a day  carboxymethylcellulose 0.5% (Preservative-Free) Ophthalmic Solution 1 Drop(s) Both EYES two times a day PRN  chlorhexidine 2% Cloths 1 Application(s) Topical daily  dextrose 5%. 1000 milliLiter(s) IV Continuous <Continuous>  dextrose 5%. 1000 milliLiter(s) IV Continuous <Continuous>  dextrose 50% Injectable 12.5 Gram(s) IV Push once  dextrose 50% Injectable 25 Gram(s) IV Push once  dextrose 50% Injectable 25 Gram(s) IV Push once  dextrose Oral Gel 15 Gram(s) Oral once PRN  glucagon  Injectable 1 milliGRAM(s) IntraMuscular once  insulin lispro (ADMELOG) corrective regimen sliding scale   SubCutaneous three times a day before meals  insulin lispro (ADMELOG) corrective regimen sliding scale   SubCutaneous at bedtime  melatonin 3 milliGRAM(s) Oral at bedtime PRN  metoprolol succinate ER 75 milliGRAM(s) Oral two times a day  omega-3-Acid Ethyl Esters 4 Gram(s) Oral daily  pantoprazole    Tablet 40 milliGRAM(s) Oral before breakfast  sodium chloride 0.65% Nasal 1 Spray(s) Both Nostrils every 6 hours PRN  tamsulosin 0.4 milliGRAM(s) Oral at bedtime  tiotropium 2.5 MICROgram(s) Inhaler 2 Puff(s) Inhalation daily      Vitals:  T(F): 97.7 (07-14), Max: 98.2 (07-13)  HR: 108 (07-14) (103 - 134)  BP: 131/74 (07-14) (117/71 - 142/90)  RR: 16 (07-14)  SpO2: 100% (07-14)    I&O's Summary  13 Jul 2023 07:01  -  14 Jul 2023 07:00  --------------------------------------------------------  IN: 0 mL / OUT: 200 mL / NET: -200 mL        Physical Exam:  Appearance: No acute distress; well appearing  Eyes: PERRL, EOMI, pink conjunctiva  HEENT: Normal oral mucosa  Cardiovascular: IIR, S1, S2, I/VI VIOLETA radiating to carotids, no rubs, or gallops; no edema; no JVD  Respiratory: Clear to auscultation bilaterally  Gastrointestinal: soft, non-tender, non-distended with normal bowel sounds  Musculoskeletal: 1+ edema to knees with venous stasis; R thigh indurated with TTP on anterolateral region, no ecchymoses noted  Neurologic: Non-focal  Lymphatic: No lymphadenopathy  Psychiatry: AAOx3, mood & affect appropriate  Skin: No rashes, ecchymoses, or cyanosis      LABS:                      8.0    6.19  )-----------( Clumped    ( 13 Jul 2023 06:01 )             26.0     07-14  136  |  101  |  34<H>  ----------------------------<  187<H>  4.7   |  24  |  1.48<H>    Ca    8.8      14 Jul 2023 07:20      PT/INR - ( 14 Jul 2023 07:19 )   PT: 13.9 sec;   INR: 1.20 ratio    PTT - ( 14 Jul 2023 07:19 )  PTT:>200.0 sec          A/P  78 M with hx. of Afib (on Eliquis), severe AS, mod-severe TR, CAD/MI (30 yrs ago, medically managed, no PCI), ?CHF, COPD (not on O2 at home), former heavy smoker, HTN, HLD, DM2, gout.   Was sent in for concern re outpatient echocardiogram which demonstrated worsening pericardial effusion. Effusion not safely accessible, no evidence of tamponade.  A.S., now pending TAVR work up  Now with leg pain and R thigh hematoma      #R Thigh Hematoma  - Noted on CT non con of the RLE  - Hb drop 2 pts, platelets downtrending   - LHC canceled  Plan:  - D/C heparin asap  - Surgery urgently consulted by primary team to assess for concern of ?compartment syndrome  - Would order T&S in case transfusion needed    #Severe Symptomatic Low Flow Low Gradient AS  - Pt with known sx of sev AS p/w GOFF  - Repeat TTE showing sev AS  - Structural cardiology consulted, pending eventual CT TAVR & LHC  - Hypervolemic on exam, likely multifactorial, judicious with diuretics as above  Plan:  - Pending LHC, aborted iso R thigh hematoma   - Can readdress when bleed ruled out    #AFib RVR  #RBBB  - Persistent per OSH documentation   - On apixaban 2.5 BID at home - unclear why on dose reduced; per son he previously was on Coumadin but had supra-therapeutic.  Apixaban on hold at present given effusion and need for LHC.  - CHADSVASC 5   - DC heparin gtt as above  - AF RVR likely compensatory iso blood loss, no need to tx unless hemodynamically unstable in which case would recommend blood   - will ask hematology to see, given thigh hematoma and platelet clumping described on CBC.    #Large Pericardial Effusion  - No clinical tamponade as patient is hemodynamically stable, with robust heart sounds and no obvious JVD. Outpatient TTE demonstrated LVEF of 55% with large pericardial effusion without tamponade physiology.   - Repeat TTE showed large effusion mainly around L ventricle; pericardial effusion cannot be safely tapped  Plan:  - Will continue to monitor effusion; continue TAVR workup in meantime  - Can repeat limited TTE in the outpatient setting       Discussed with Dr. Dan and with medical WALDEMAR Ríos PGY5  Cardiology Fellow    Plan discussed with cardiology fellow.  Patient seen and examined.  Hx., exam and labs as above.  I agree with the assessment and recommendations, which I have reviewed and edited where appropriate.  Mc Hammond M.D.  Cardiology Attending, Consult Service    For Cardiology consults and questions, all Cardiology service information can be found 24/7 on [email or web address] - use password: [email or web address] to log in.       Addendum:  Discussed with surgical resident - no evidence of compartment syndrome; he recommends conservative management at this time.

## 2023-07-14 NOTE — CHART NOTE - NSCHARTNOTEFT_GEN_A_CORE
Contacted by Dr. Dan, in regards to concern of findings of CT RT LE : Findings are consistent with a large quadriceps intramuscular hematoma.   Evaluation for active hemorrhage is limited without IV contrast.  Recommended to have Sx c/s to r/o compartment syndrome and hematology c/s to eval for plt clumping. Surgery called and no surgical intervention at this time, continue to monitor. Hematology c/s to be called by Hospitalist. Hgb noted to drop to 6.7 -> 1 unit PRBC ordered split 2/2 cardiac hx. Of note, pt LUE noted with swelling, ecchymosis. US of LT UR w/o dvt noted, and pt denies any discomfort. Will continue to monitor and follow up with repeat cbc post transfusion. VSS. Primary night team to follow up. Discussed above also with Dr. Daigle who agrees with plan.  ALDAIR Obregon NP Contacted by Dr. Dan, in regards to concern of findings of CT RT LE : Findings are consistent with a large quadriceps intramuscular hematoma.   Evaluation for active hemorrhage is limited without IV contrast.  Recommended to have Sx c/s to r/o compartment syndrome and hematology c/s to eval for plt clumping. Surgery called and no surgical intervention at this time, continue to monitor. A/C stopped. Cath cancelled for today.  Hematology c/s to be called by Hospitalist. Hgb noted to drop to 6.7 -> 1 unit PRBC ordered split 2/2 cardiac hx. Of note, pt LUE noted with swelling, ecchymosis. US of LT UR w/o dvt noted, and pt denies any discomfort. Will continue to monitor and follow up with repeat cbc post transfusion. VSS. Primary night team to follow up. Discussed above also with Dr. Daigle who agrees with plan.  ALDAIR Obregon NP

## 2023-07-14 NOTE — PROGRESS NOTE ADULT - PROBLEM SELECTOR PLAN 3
Patient states he gets weekly iron infusions outpatient.   Patient states his outpatient hematologist is Dr. Sylvester (), [facility] team not aware of this hematologist.    -Hgb 9 >8 > CBC cancelled by lab this am, reordered stat cbc still pending.   -Blue top PLTc 76> 84. HOLD heparin gtt. Send HIT panel stat.   -CT RLE done this am >Findings are consistent with a large quadriceps intramuscular hematoma. Evaluation for active hemorrhage is limited without IV contrast >>> Consulted surgery team urgently, pending eval.   -consulted house hematology team for further eval.   -called patient's outpatient hematologist multiple times 7/13, 7/14 and not answered, left s, awaiting call back.  -Monitor HH. Keep active T&S. Transfuse prbc prn.   -Checked iron studies. Patient states he gets weekly iron infusions outpatient.   Patient states his outpatient hematologist is Dr. Sylvester (), [facility] not aware of this hematologist.    -Hgb 9 >8 > CBC cancelled by lab this am, reordered stat cbc still pending.   -Blue top PLTc 76> 84. HOLD heparin gtt. Send HIT panel stat.   -CT RLE done this am >Findings are consistent with a large quadriceps intramuscular hematoma. Evaluation for active hemorrhage is limited without IV contrast >>> Consulted surgery team urgently, pending eval.   -consulted house hematology team for further eval.   -called patient's outpatient hematologist multiple times 7/13, 7/14 and not answered, left [facility], awaiting call back.  -Monitor HH. Keep active T&S. Transfuse prbc prn. Patient states he gets weekly iron infusions outpatient.   Patient states his outpatient hematologist is Dr. Sylvester (), [facility] hematology team not aware of this hematologist.    -Hgb 9 >8 > CBC cancelled by lab this am, reordered stat cbc still pending.   -Blue top PLTc 76> 84. HOLD heparin gtt. Send HIT panel stat.   -CT RLE done this am >Findings are consistent with a large quadriceps intramuscular hematoma. Evaluation for active hemorrhage is limited without IV contrast >>> Consulted surgery team urgently, pending eval.   -consulted house hematology team for further eval but they declined consult as patient is followed by private, requested help finding info for outpatient private hematologist  -called patient's outpatient hematologist multiple times (over x10) 7/13, 7/14 and not answered, left VMs, awaiting call back.  -per patient's son, patient is on reduced eliquis dose at home due to history of anemia/bleed.   -Monitor HH. Keep active T&S. Transfuse prbc prn.

## 2023-07-14 NOTE — CONSULT NOTE ADULT - ASSESSMENT
A: 78M (alt MRN [id number]) w/ hx of Afib (on Eliquis), severe AS, mod-severe TR, CAD/MI (30 yrs ago, medically managed, no PCI), ?CHF, COPD (not on O2 at home), former heavy smoker, HTN, HLD, DM2, gout, presenting with worsening pericardial effusion. General surgery was consulted for CT finding of intramuscular hematoma. Patient currently without pain.     Plan:  -No acute surgical intervention  -Transfuse as needed for Hgb >7  -Will continue to monitor for skin changes or worsening     Patient and plan discussed with Dr. Dulce Montanez Ragland  ACS  [id number] A: 78M (alt MRN [id number]) w/ hx of Afib (on Eliquis), severe AS, mod-severe TR, CAD/MI (30 yrs ago, medically managed, no PCI), ?CHF, COPD (not on O2 at home), former heavy smoker, HTN, HLD, DM2, gout, presenting with worsening pericardial effusion. General surgery was consulted for CT finding of intramuscular hematoma. Patient currently without pain.     Plan:  -No acute surgical intervention  -Transfuse as needed for Hgb >7  -Will continue to monitor for skin changes or worsening   -Hold AC if possible, but per primary team    Patient and plan discussed with Dr. Dulce Montanez W[place]  ACS  [id number]

## 2023-07-14 NOTE — PROGRESS NOTE ADULT - PROBLEM SELECTOR PLAN 7
Hx of CAD/MI (30 yrs ago, medically managed, no PCI)  - hsTrop 30->27; EKG w/o signs of acute ischemia; low suspicion of ACS  - c/w home statin, and metoprolol  - ASA on HOLD pending invasive procedures

## 2023-07-14 NOTE — PROGRESS NOTE ADULT - PROBLEM SELECTOR PLAN 1
Patient with CKD in setting of HTN, Tobacco Abuse Hx and Heart Disease. Per Javi/OLGA review, appears he has had a SCr ranging from 1.4-2.0 dating back to 2017. Most recent SCr prior to admission was 1.42 on 10/7/22.     He received IV lasix on 7/3 and 7/4.   UA reviewed. UPCR of 0.1  Hep C non reactive, HBsAg non reactive. Normal C3/C4. Weak bands noted.   Renal US from 7/6 demonstrated "Both kidneys are echogenic, compatible with medical renal disease. No renal mass, hydronephrosis or calculus is visualized sonographically." He does have bilateral renal cysts.     SCr stable at 1.48. Awaiting cardiac eval. Anticipate C today.     Given tight AS volume optimization to reduce contrast risk is challenging although initial CT angiogram risk relatively low and could give LOW pericontrast volume NSS 1hr before and 6 hrs post 1cc/kg/hr. Will have to observe closely for evidence of HD requirement which does not exist at this time. Monitor SCr, electrolytes, and I/O. Avoid NSAIDS, RCAs, and other nephrotoxins. Renally adjust all medications as per GFR or CrCl.

## 2023-07-14 NOTE — CONSULT NOTE ADULT - ATTENDING COMMENTS
79 yo m, with extensive cardiovascular history, now presenting with worsening pericardial effusion. Consulted for right quadriceps intramuscular hematoma.  - On physical exam, large mass in quadriceps, soft compartments, no tenderness to palpation, good distal perfusion, palpable distal pulses. Currently no concern for compartment syndrome, continue to monitor physical exam.  - No acute surgical intervention for now.  - Hold AC if possible.    I have personally seen and examined this patient. I have fully participated in the care of this patient. I have made amendments to the documentation where necessary and agree with the history, physical, and plan as documented by the resident.

## 2023-07-14 NOTE — PROGRESS NOTE ADULT - PROBLEM SELECTOR PLAN 6
Hx of Afib (on Eliquis at home). CHADSVASC score of 5-6.  - Eliquis on hold pending procedures; HOLD heparin gtt now given concern for worsening anemia, thrombocytopenia, hematoma   - c/w home metoprolol succinate 75mg   - monitor on telemetry Hx of Afib (on Eliquis at home). CHADSVASC score of 5-6.  - Eliquis on hold pending procedures; HOLD heparin gtt now given concern for worsening anemia, thrombocytopenia, hematoma.   >    > Will need risk/benefit discussion upon resumption of AC once patient stable.    - c/w home metoprolol succinate 75mg   - monitor on telemetry

## 2023-07-14 NOTE — PROGRESS NOTE ADULT - ASSESSMENT
78M (alt MRN [id number]) w/ hx of Afib (on Eliquis), severe AS, mod-severe TR, CAD/MI (30 yrs ago, medically managed, no PCI), ?CHF, COPD (not on O2 at home), former heavy smoker, HTN, HLD, DM2, gout, prior moderate pericardial effusion, now presenting with large pericardial effusion w/o tamponade physiology on outpt TTE.

## 2023-07-14 NOTE — PROGRESS NOTE ADULT - PROBLEM SELECTOR PLAN 11
- DVT ppx: on heparin gtt  - Diet: DASH/CC  - Dispo: pending clinical course - DVT ppx: on heparin gtt  - Diet: DASH/CC  - Dispo: pending clinical course    - 7/14: patient's son updated on the phone.

## 2023-07-15 LAB
ANION GAP SERPL CALC-SCNC: 12 MMOL/L — SIGNIFICANT CHANGE UP (ref 5–17)
BUN SERPL-MCNC: 38 MG/DL — HIGH (ref 7–23)
CALCIUM SERPL-MCNC: 8.9 MG/DL — SIGNIFICANT CHANGE UP (ref 8.4–10.5)
CHLORIDE SERPL-SCNC: 102 MMOL/L — SIGNIFICANT CHANGE UP (ref 96–108)
CO2 SERPL-SCNC: 24 MMOL/L — SIGNIFICANT CHANGE UP (ref 22–31)
CREAT SERPL-MCNC: 1.65 MG/DL — HIGH (ref 0.5–1.3)
EGFR: 42 ML/MIN/1.73M2 — LOW
GLUCOSE BLDC GLUCOMTR-MCNC: 143 MG/DL — HIGH (ref 70–99)
GLUCOSE BLDC GLUCOMTR-MCNC: 158 MG/DL — HIGH (ref 70–99)
GLUCOSE BLDC GLUCOMTR-MCNC: 164 MG/DL — HIGH (ref 70–99)
GLUCOSE BLDC GLUCOMTR-MCNC: 177 MG/DL — HIGH (ref 70–99)
GLUCOSE SERPL-MCNC: 129 MG/DL — HIGH (ref 70–99)
HCT VFR BLD CALC: 24.6 % — LOW (ref 39–50)
HGB BLD-MCNC: 7.7 G/DL — LOW (ref 13–17)
MCHC RBC-ENTMCNC: 26.8 PG — LOW (ref 27–34)
MCHC RBC-ENTMCNC: 31.3 GM/DL — LOW (ref 32–36)
MCV RBC AUTO: 85.7 FL — SIGNIFICANT CHANGE UP (ref 80–100)
NRBC # BLD: 0 /100 WBCS — SIGNIFICANT CHANGE UP (ref 0–0)
PLATELET # BLD AUTO: 125 K/UL — LOW (ref 150–400)
POTASSIUM SERPL-MCNC: 4.7 MMOL/L — SIGNIFICANT CHANGE UP (ref 3.5–5.3)
POTASSIUM SERPL-SCNC: 4.7 MMOL/L — SIGNIFICANT CHANGE UP (ref 3.5–5.3)
RBC # BLD: 2.87 M/UL — LOW (ref 4.2–5.8)
RBC # FLD: 19.5 % — HIGH (ref 10.3–14.5)
SODIUM SERPL-SCNC: 138 MMOL/L — SIGNIFICANT CHANGE UP (ref 135–145)
WBC # BLD: 8.92 K/UL — SIGNIFICANT CHANGE UP (ref 3.8–10.5)
WBC # FLD AUTO: 8.92 K/UL — SIGNIFICANT CHANGE UP (ref 3.8–10.5)

## 2023-07-15 PROCEDURE — 99233 SBSQ HOSP IP/OBS HIGH 50: CPT | Mod: GC

## 2023-07-15 PROCEDURE — 99232 SBSQ HOSP IP/OBS MODERATE 35: CPT

## 2023-07-15 RX ORDER — ACETAMINOPHEN 500 MG
1000 TABLET ORAL ONCE
Refills: 0 | Status: COMPLETED | OUTPATIENT
Start: 2023-07-15 | End: 2023-07-15

## 2023-07-15 RX ADMIN — ATORVASTATIN CALCIUM 40 MILLIGRAM(S): 80 TABLET, FILM COATED ORAL at 21:18

## 2023-07-15 RX ADMIN — BECLOMETHASONE DIPROPIONATE 2 PUFF(S): 40 AEROSOL, METERED RESPIRATORY (INHALATION) at 20:27

## 2023-07-15 RX ADMIN — Medication 4 GRAM(S): at 11:23

## 2023-07-15 RX ADMIN — Medication 400 MILLIGRAM(S): at 00:11

## 2023-07-15 RX ADMIN — Medication 75 MILLIGRAM(S): at 17:21

## 2023-07-15 RX ADMIN — Medication 1000 MILLIGRAM(S): at 00:42

## 2023-07-15 RX ADMIN — PANTOPRAZOLE SODIUM 40 MILLIGRAM(S): 20 TABLET, DELAYED RELEASE ORAL at 05:41

## 2023-07-15 RX ADMIN — CHLORHEXIDINE GLUCONATE 1 APPLICATION(S): 213 SOLUTION TOPICAL at 11:28

## 2023-07-15 RX ADMIN — TAMSULOSIN HYDROCHLORIDE 0.4 MILLIGRAM(S): 0.4 CAPSULE ORAL at 21:17

## 2023-07-15 RX ADMIN — Medication 100 MILLIGRAM(S): at 11:24

## 2023-07-15 RX ADMIN — TIOTROPIUM BROMIDE 2 PUFF(S): 18 CAPSULE ORAL; RESPIRATORY (INHALATION) at 11:25

## 2023-07-15 RX ADMIN — BECLOMETHASONE DIPROPIONATE 2 PUFF(S): 40 AEROSOL, METERED RESPIRATORY (INHALATION) at 09:33

## 2023-07-15 NOTE — PROGRESS NOTE ADULT - PROBLEM SELECTOR PLAN 1
- Found to have "large circumferential pericardial effusion up to 2.6 cm without evidence of tamponade physiology" on outpt TTE (7/3/23).- Asymptomatic.   - Prior TTE (10/3/22) showed "moderate pericardial effusion, measuring about 1.0 cm superior to the RA; otherwise small circumferential pericardial effusion." Unclear underlying etiology of effusion, but possibly in setting of CHF.  - d/essie Lasix given SHEYLA  - strict I/Os, monitor UOP  - TTE w/ unchanged mod to large pericardial effusion ; +severe AS  - Per recs from cardiology and IR teams: no plan for pericardiocentesis at this time.   - CT surgery consulted for severe AS ; workup for TAVR in progress ; TAVR CT ordered, F/U plan for LHC -postponed and now cancelled due to thrombocytopenia and RLE hematoma.  - HOLD heparin gtt given concern for worsening anemia, thrombocytopenia, hematoma

## 2023-07-15 NOTE — PROGRESS NOTE ADULT - PROBLEM SELECTOR PLAN 2
Unclear hx of ?CHF, possibly has chronic HFpEF with severe AS   - diuretic on HOLD given SHEYLA - Cr improving.   - strict I/Os, standing weights daily  - Cardiology following ; reccs appreciated

## 2023-07-15 NOTE — PROGRESS NOTE ADULT - PROBLEM SELECTOR PLAN 6
Hx of Afib (on Eliquis at home). CHADSVASC score of 5-6.  - All AC Eliquis/hep gtt now HELD given concern for worsening anemia, thrombocytopenia, hematoma.   >    > Will need risk/benefit discussion upon resumption of AC once patient stable.    - c/w home metoprolol succinate 75mg   - monitor on telemetry

## 2023-07-15 NOTE — PROGRESS NOTE ADULT - PROBLEM SELECTOR PLAN 11
- DVT ppx: on heparin gtt  - Diet: DASH/CC  - Dispo: pending clinical course    - 7/14: patient's son updated on the phone.  7/15 attempted to call answering machine. - DVT ppx: off AC due to bleeding.   - Diet: DASH/CC  - Dispo: pending clinical course    - 7/14: patient's son updated on the phone.  7/15 attempted to call answering machine.

## 2023-07-15 NOTE — PROGRESS NOTE ADULT - PROBLEM SELECTOR PLAN 3
Patient states he gets weekly iron infusions outpatient.   Patient states his outpatient hematologist is Dr. Sylvester (), - NOT affilated with [facility] affiliation.     -Hgb downtrended to 6.7 on 7/14 s/p 1 unit now 7.7 appropriate response.   -HOLD heparin gtt now given hematoma .  HIT panel neg  -CT RLE done this am >Findings are consistent with a large quadriceps intramuscular hematoma.   - Surg no acute intervention. will monitor . holding AC and transfuse as needed. no evidence of vasc compromise distally   -Will request inpt house heme eval. outpt heme dose not come to this [facility] or affiliated.   -per patient's son, patient is on reduced eliquis dose at home due to history of anemia/bleed.   -Monitor HH. Keep active T&S. Transfuse prbc prn.

## 2023-07-15 NOTE — PROVIDER CONTACT NOTE (OTHER) - ASSESSMENT
Pt a&ox4. VSS. Fingerstick 177. Pt is refusing 1unit admelog, stating "I don't want another needle. I don't want another medication". Pt takes metformin at home.
hep gtt currently running at 12ml/hr, pt states blood blots mixed with mucus comes out when he blows his nose. pt currently on 2 L NC with humidification. no active bleeding noted elsewhere, blood does not drip from nose, only comes out when blown
Pt a&ox4. VSS. Pt noted to have new L hand tremor this morning, noted while eating breakfast. Minimal tremors in b/l LE. Pt R thigh hematoma wrapped in ACE compression this am, now noted to have +3 R foot edema compared to +1 edema in L foot. Denies pain.
Pt. A&O x4, VSS. Pt. does not complain of pain in right hand. Pt. has no bleeding.

## 2023-07-15 NOTE — PROVIDER CONTACT NOTE (OTHER) - SITUATION
Pt fingerstick 177, pt due for 1 unit of admelog as per sliding scale before lunch. Pt is refusing admelog administration.

## 2023-07-15 NOTE — CHART NOTE - NSCHARTNOTEFT_GEN_A_CORE
Provider alerted by RN to new left hand tremor in AM. Provider went to bedside to examine patient. States tremor is now gone. He noticed it while eating breakfast and it went away after his nap. Provider told patient to alert RN, ACP, or MD if tremor returns. Dr. Darius lyons. Provider alerted by RN to new left hand tremor in AM. Provider went to bedside to examine patient. States tremor is now gone. He noticed it while eating breakfast and it went away after his nap. No tremor appreciated at bedisde. Provider told patient to alert RN, ACP, or MD if tremor returns. Dr. Darius lyons.

## 2023-07-15 NOTE — PROVIDER CONTACT NOTE (OTHER) - BACKGROUND
Pt admitted for acute pericarditis. PMH AFib, severe AS, CAD/MI, CHF, COPD, HTN, HLD, DM, gout.
Pt admitted for acute pericarditis. PMH DM2, AFib, severe AS, CAD/MI, CHF, COPD, HTN, HLD, gout.
pt admitted for acute pericarditis, a&ox4, afib on tele , on hep gtt
Pt. admitted with large pericardial effusion. Pt. on heparin drip

## 2023-07-15 NOTE — PROVIDER CONTACT NOTE (OTHER) - RECOMMENDATIONS
notify provider
Notify provider
Notify provider.
Notify provider. Pt educated about importance of pre-meal insulin to prevent hypergylcemia.

## 2023-07-15 NOTE — PROGRESS NOTE ADULT - SUBJECTIVE AND OBJECTIVE BOX
SURGERY    Pt seen and examined. Pt denies any overnight events.     ICU Vital Signs Last 24 Hrs  T(C): 37.1 (15 Jul 2023 13:22), Max: 37.1 (15 Jul 2023 04:54)  T(F): 98.8 (15 Jul 2023 13:22), Max: 98.8 (15 Jul 2023 13:22)  HR: 102 (15 Jul 2023 13:22) (88 - 109)  BP: 108/72 (15 Jul 2023 13:22) (96/65 - 123/82)  BP(mean): --  ABP: --  ABP(mean): --  RR: 18 (15 Jul 2023 13:22) (18 - 20)  SpO2: 100% (15 Jul 2023 13:22) (94% - 100%)      I&O's Detail    14 Jul 2023 07:01  -  15 Jul 2023 07:00  --------------------------------------------------------  IN:  Total IN: 0 mL    OUT:    Stool (mL): 1 mL    Voided (mL): 550 mL  Total OUT: 551 mL    Total NET: -551 mL      15 Jul 2023 07:01  -  15 Jul 2023 17:09  --------------------------------------------------------  IN:    Oral Fluid: 120 mL    Oral Fluid: 120 mL  Total IN: 240 mL    OUT:  Total OUT: 0 mL    Total NET: 240 mL      Physical Exam:   General: In NAD, laying in bed  Resp: Bilateral chest rise on RA  Extremities:   NTTP on R leg, sensation intact, some ecchymosis in upper thigh. Palpable femoral, popliteal, and DP pulses on RLE.       LABS:                        7.7    8.92  )-----------( 125      ( 15 Jul 2023 09:53 )             24.6     07-15    138  |  102  |  38<H>  ----------------------------<  129<H>  4.7   |  24  |  1.65<H>    Ca    8.9      15 Jul 2023 09:53      PT/INR - ( 14 Jul 2023 07:19 )   PT: 13.9 sec;   INR: 1.20 ratio         PTT - ( 14 Jul 2023 07:19 )  PTT:>200.0 sec  Urinalysis Basic - ( 15 Jul 2023 09:53 )    Color: x / Appearance: x / SG: x / pH: x  Gluc: 129 mg/dL / Ketone: x  / Bili: x / Urobili: x   Blood: x / Protein: x / Nitrite: x   Leuk Esterase: x / RBC: x / WBC x   Sq Epi: x / Non Sq Epi: x / Bacteria: x    Assessment/Plan:   78M (alt MRN [id number]) w/ hx of Afib (on Eliquis), severe AS, mod-severe TR, CAD/MI (30 yrs ago, medically managed, no PCI), ?CHF, COPD (not on O2 at home), former heavy smoker, HTN, HLD, DM2, gout, presenting with worsening pericardial effusion. General surgery was consulted for CT finding of intramuscular hematoma. Patient currently without pain. No surgical intervention at this time.     Plan:  -No acute surgical intervention  -Transfuse as needed for Hgb >7  -Wrapped with ACE bandage to reduce hematoma size  - Use ACE as needed for comfort  - Rec re-imaging with MRI or CT with contrast in 6 weeks  - Hold AC if possible, but per primary team  - surgery to sign off, reconsult as needed    ACS  [id number]

## 2023-07-15 NOTE — PROVIDER CONTACT NOTE (OTHER) - REASON
Right Hand Vein blew/hand swelling
New onset L hand tremor & small tremor b/l lower extremities.
Pt refusing insulin
pt has nose bleed

## 2023-07-15 NOTE — PROVIDER CONTACT NOTE (OTHER) - SITUATION
New onset L hand tremor & minimal tremor b/l lower extremities. Pt noted to have +3 edema in R foot compared to +1 edema L foot.

## 2023-07-15 NOTE — PROVIDER CONTACT NOTE (OTHER) - ACTION/TREATMENT ORDERED:
PA Yessica Thakkar notified and aware. As per PA, keep ACE wrap on for hematoma management, but loosen wrap to reduce R foot edema. PA to assess pt's tremor at bedside. RANDELL Thakkar notified and aware. As per PA, keep ACE wrap on for hematoma management, but loosen wrap to reduce R foot edema; feet elevated. PA to assess pt's tremor at bedside.

## 2023-07-15 NOTE — PROGRESS NOTE ADULT - ASSESSMENT
78 M with hx. of Afib (on Eliquis), severe AS, mod-severe TR, CAD/MI (30 yrs ago, medically managed, no PCI), ?CHF, COPD (not on O2 at home), former heavy smoker, HTN, HLD, DM2, gout.   Was sent in for concern re outpatient echocardiogram which demonstrated worsening pericardial effusion. Effusion not safely accessible, no evidence of tamponade.  A.S., now pending TAVR work up  Now with leg pain and R thigh hematoma    1. R Thigh Hematoma/Intramuscular Hematoma - no IV contrast administered so unable to assess active extravasation   2. Severe Symptomatic Low Flow Low Gradient AS  3. Afib w/ RVR  4. Large Pericardial Effusion - TTE 7/5 no obvious echo tamponade     RECOMMENDATIONS:  - s/p 1/2U pRBC x 2 follow-up CBC this AM; surgery following  - Heparin gtt/AC on hold given bleed   - If concern for hypotension/tachycardia would obtain repeat limited TTE for effusion   - LHC/Structural Evaluation on hold given acute blood loss anemia      Note not final until signed by attending       Michael Ivan MD  Cardiology Fellow PGY-6  Phone: [phone]    For all New Consults  [email or web address]   Login: [employer]   78 M with hx. of Afib (on Eliquis), severe AS, mod-severe TR, CAD/MI (30 yrs ago, medically managed, no PCI), ?CHF, COPD (not on O2 at home), former heavy smoker, HTN, HLD, DM2, gout.   Was sent in for concern re outpatient echocardiogram which demonstrated worsening pericardial effusion. Effusion not safely accessible, no evidence of tamponade.  A.S., now pending TAVR work up  Now with leg pain and R thigh hematoma    1. R Thigh Hematoma/Intramuscular Hematoma - no IV contrast administered so unable to assess active extravasation   2. Severe Symptomatic Low Flow Low Gradient AS  3. Afib w/ RVR  4. Large Pericardial Effusion - TTE 7/5 no obvious echo tamponade     RECOMMENDATIONS:  - s/p 1/2U pRBC x 2 follow-up CBC this AM; surgery following  - Heparin gtt/AC on hold given bleed   - If concern for hypotension/tachycardia would obtain repeat limited TTE for effusion   - LHC/Structural Evaluation on hold given acute blood loss anemia      Michael Ivan MD  Cardiology Fellow PGY-6  Phone: [phone]    For all New Consults  [email or web address]   Login: [email or web address]

## 2023-07-15 NOTE — PROGRESS NOTE ADULT - SUBJECTIVE AND OBJECTIVE BOX
Patient seen and examined at bedside.    Overnight Events:   - Received 1/2U PRBC x 2 overnight  - Has had some shortness of breath which self-resolved     REVIEW OF SYSTEMS:  General: no fatigue/malaise, weight loss/gain.  Skin: no rashes.  Ophthalmologic: no blurred vision, no loss of vision. 	  ENT: no sore throat, rhinorrhea, sinus congestion.  Respiratory: no cough or wheeze.  CV: No chest pain. No palpitations.   Gastrointestinal:  no N/V/D, no melena/hematemesis/hematochezia.  Genitourinary: no dysuria/hesitancy or hematuria.  Musculoskeletal: no myalgias or arthralgias.  Neurological: no changes in vision or hearing, no lightheadedness/dizziness, no syncope/near syncope	  Psychiatric: no unusual stress/anxiety.   Hematology/Lymphatics: no unusual bleeding, bruising and no lymphadenopathy.  Endocrine: no unusual thirst.           Current Meds:  acetaminophen     Tablet .. 650 milliGRAM(s) Oral every 6 hours PRN  albuterol    90 MICROgram(s) HFA Inhaler 2 Puff(s) Inhalation every 6 hours PRN  allopurinol 100 milliGRAM(s) Oral daily  atorvastatin 40 milliGRAM(s) Oral at bedtime  beclomethasone  80 MICROgram(s) Inhaler 2 Puff(s) Inhalation two times a day  carboxymethylcellulose 0.5% (Preservative-Free) Ophthalmic Solution 1 Drop(s) Both EYES two times a day PRN  chlorhexidine 2% Cloths 1 Application(s) Topical daily  dextrose 5%. 1000 milliLiter(s) IV Continuous <Continuous>  dextrose 5%. 1000 milliLiter(s) IV Continuous <Continuous>  dextrose 50% Injectable 12.5 Gram(s) IV Push once  dextrose 50% Injectable 25 Gram(s) IV Push once  dextrose 50% Injectable 25 Gram(s) IV Push once  dextrose Oral Gel 15 Gram(s) Oral once PRN  glucagon  Injectable 1 milliGRAM(s) IntraMuscular once  insulin lispro (ADMELOG) corrective regimen sliding scale   SubCutaneous three times a day before meals  insulin lispro (ADMELOG) corrective regimen sliding scale   SubCutaneous at bedtime  melatonin 3 milliGRAM(s) Oral at bedtime PRN  metoprolol succinate ER 75 milliGRAM(s) Oral two times a day  omega-3-Acid Ethyl Esters 4 Gram(s) Oral daily  pantoprazole    Tablet 40 milliGRAM(s) Oral before breakfast  sodium chloride 0.65% Nasal 1 Spray(s) Both Nostrils every 6 hours PRN  tamsulosin 0.4 milliGRAM(s) Oral at bedtime  tiotropium 2.5 MICROgram(s) Inhaler 2 Puff(s) Inhalation daily      Vitals:  T(F): 98.7 (07-15), Max: 99.1 (07-14)  HR: 100 (07-15) (90 - 110)  BP: 96/65 (07-15) (96/65 - 131/74)  RR: 18 (07-15)  SpO2: 95% (07-15)  I&O's Summary    14 Jul 2023 07:01  -  15 Jul 2023 07:00  --------------------------------------------------------  IN: 0 mL / OUT: 551 mL / NET: -551 mL        Physical Exam:  Appearance: No acute distress; well appearing on 3L NC  Eyes: PERRL, EOMI, pink conjunctiva  HEENT: Normal oral mucosa  Cardiovascular: IIR, S1, S2, I/VI VIOLETA radiating to carotids, no rubs, or gallops; no edema; no JVD  Respiratory: Clear to auscultation bilaterally  Gastrointestinal: soft, non-tender, non-distended with normal bowel sounds  Musculoskeletal: 1+ edema to knees with venous stasis; R thigh indurated with TTP on anterolateral region, no ecchymoses noted  Neurologic: Non-focal  Lymphatic: No lymphadenopathy  Psychiatry: AAOx3, mood & affect appropriate  Skin: No rashes, ecchymoses, or cyanosis                          6.7    6.64  )-----------( 111      ( 14 Jul 2023 12:48 )             22.0     07-14    136  |  101  |  34<H>  ----------------------------<  187<H>  4.7   |  24  |  1.48<H>    Ca    8.8      14 Jul 2023 07:20      PT/INR - ( 14 Jul 2023 07:19 )   PT: 13.9 sec;   INR: 1.20 ratio         PTT - ( 14 Jul 2023 07:19 )  PTT:>200.0 sec              New ECG(s): Personally reviewed    Echo:    TRANSTHORACIC ECHOCARDIOGRAM REPORT  ________________________________________________________________________________                                      _______       Pt. Name:       APPLE CROW Study Date:    7/5/2023  MRN:            [id number]    YOB: 1945  Accession #:    [id number]     Age:           78 years  Account#:       [id number]  Gender:        M  Heart Rate:                   Height:        65.00 in (165.10 cm)  Rhythm:                       Weight:        182.00 lb (82.55 kg)  Blood Pressure: 120/76 mmHg   BSA/BMI:       1.90 m² / 30.29 kg/m²  ________________________________________________________________________________________  Referring Physician:    [id number] Beni Toth  Interpreting Physician: Tri Nunn MD  Primary Sonographer:    Tiffanie Moreno    CPT:               ECHO TTE WO CON COMP W DOPP - 01320.m  Indication(s):     Other pericardial effusion (noninflammatory) - I31.39  Procedure:         Transthoracic echocardiogram with 2-D, M-mode and complete                     spectral and color flow Doppler.  Ordering Location: [facility]  Admission Status:  Inpatient  Study Information: Image quality for this study is good.    _______________________________________________________________________________________  CONCLUSIONS:      1. Patient in atrial fibrillation; ejection fraction varies with R-R interval.   2. The left ventricular systolic function is normal with an ejection fraction visually estimated at 60 to 65 %.   3. Mildly enlarged right ventricular cavity size, normal right ventricular wall thickness and mildly reduced right ventricular systolic function.   4. Mild mitral regurgitation.   5. Trileaflet aortic valve with reduced systolic excursion.   6. Severe calcification of the aortic valve leaflets.   7. Severe aortic stenosis.   8. Low flow, low gradient aortic stenosis with preserved EF. Left ventricular stroke volume is 54.2 ml ;left ventricular stroke volume index is 28.5 ml/m².   9. Trace aortic regurgitation.  10. Moderate-large circumferential pericardial effusion. The effusion measures approximately 1.8 cm adjacent to the right atrium, 1.8 cm adjacent to the right atrium, and 2.4 cm lateral to the left ventricle in its largest dimension.  11. Findings were discussed with Dr. Jeffrey Wechsler on 7/5/2023 at 4:50 pm. No prior echocardiogram is available for comparison.    ________________________________________________________________________________________

## 2023-07-15 NOTE — CHART NOTE - NSCHARTNOTEFT_GEN_A_CORE
78M PMH Afib (on Eliquis), severe AS, mod-severe TR, CAD/MI (30 yrs ago, medically managed, no PCI), ?CHF, COPD (not on O2 at home), former heavy smoker, HTN, HLD, DM2, gout, prior moderate pericardial effusion, now presenting with large pericardial effusion w/o tamponade physiology on outpt TTE. Hematology called for thrombocytopenia with platelet clumping and anemia.    #Anemia  #Thrombocytopenia  Normocytic anemia to Hgb 7.7 MCV 85.7. Hgb baseline appears to be ~10-12.   Is normally on eliquis for AFib, was placed on heparin gtt this admission then held i/s/o worsening anemia, thrombocytopenia, and hematoma.   Platelets 70s-90s on blue top platelet test this admission, 125 on normal CBC today.  Bili 1.0. LFTs WNL.  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 May have some thrombocytopenia 2/2 severe aortic stenosis or less likely HIT. Suspect anemia of chronic disease.  Recommend:  - send LDH, haptoglobin, and continue to trend coags (PT, INR, PTT)  - please send PF4 antibodies with reflex to serotonin assay to rule out HIT  - continue holding AC if okay per cardiology, if needs AC would favor argatrobran gtt until HIT ruled out, though pt may be candidate for watchman device given high risk of bleeding  - lab called to prepare smear, will review  - send vWF activity and antigen   - can workup anemia further per outpatient hematologist Dr. Sylvester (), reportedly receives IV iron infusions as outpatient, may have underlying AVMs    Full consult to follow.   Recommendations prelim until attending attestation. 78M PMH Afib (on Eliquis), severe AS, mod-severe TR, CAD/MI (30 yrs ago, medically managed, no PCI), ?CHF, COPD (not on O2 at home), former heavy smoker, HTN, HLD, DM2, gout, prior moderate pericardial effusion, now presenting with large pericardial effusion w/o tamponade physiology on outpt TTE. Hematology called for thrombocytopenia with platelet clumping and anemia.    #Anemia  #Thrombocytopenia  Normocytic anemia to Hgb 7.7 MCV 85.7. Hgb baseline appears to be ~10-12.   Is normally on eliquis for AFib, was placed on heparin gtt this admission then held i/s/o worsening anemia, thrombocytopenia, and hematoma.   Platelets 70s-90s on blue top platelet test this admission, 125 on normal CBC today.  Bili 1.0. LFTs WNL.  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 May have some thrombocytopenia 2/2 severe aortic stenosis or less likely HIT. Suspect anemia of chronic disease.  Recommend:  - send LDH, haptoglobin, and continue to trend coags (PT, INR, PTT)  - please send PF4 antibodies with reflex to serotonin assay to rule out HIT  - continue holding AC if okay per cardiology, if needs AC would favor argatrobran gtt until HIT ruled out, though pt may be candidate for watchman device given high risk of bleeding  - lab called to prepare smear, will review - prelim look showing schistocytes, would also send [id number] and reticulocyte count.  - send vWF activity and antigen   - can workup anemia further per outpatient hematologist Dr. Sylvester (), reportedly receives IV iron infusions as outpatient, may have underlying AVMs    Full consult to follow.   Recommendations prelim until attending attestation. 78M PMH Afib (on Eliquis), severe AS, mod-severe TR, CAD/MI (30 yrs ago, medically managed, no PCI), ?CHF, COPD (not on O2 at home), former heavy smoker, HTN, HLD, DM2, gout, prior moderate pericardial effusion, now presenting with large pericardial effusion w/o tamponade physiology on outpt TTE. Hematology called for thrombocytopenia with platelet clumping and anemia.    #Anemia  #Thrombocytopenia  Normocytic anemia to Hgb 7.7 MCV 85.7. Hgb baseline appears to be ~10-12.   Is normally on eliquis for AFib, was placed on heparin gtt this admission then held i/s/o worsening anemia, thrombocytopenia, and hematoma.   Platelets 70s-90s on blue top platelet test this admission, 125 on normal CBC today.  Bili 1.0. LFTs WNL.  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 May have some thrombocytopenia 2/2 severe aortic stenosis or less likely HIT. Suspect anemia of chronic disease.  Recommend:  - send LDH, haptoglobin, and continue to trend coags (PT, INR, PTT), and complete metabolic panel  - please send PF4 antibodies with reflex to serotonin assay to rule out HIT  - continue holding AC if okay per cardiology, if needs AC would favor argatrobran gtt until HIT ruled out, though pt may be candidate for watchman device given high risk of bleeding  - lab called to prepare smear, will review - prelim look showing schistocytes, would also send [id number] and reticulocyte count.  - send vWF activity and antigen   - can workup anemia further per outpatient hematologist Dr. Sylvester (), reportedly receives IV iron infusions as outpatient, may have underlying AVMs    Full consult to follow.   Recommendations prelim until attending attestation.

## 2023-07-15 NOTE — CHART NOTE - NSCHARTNOTEFT_GEN_A_CORE
Provider consulted house hematology as pt's outpatient hematologist Dr. Sylvester does not have privileges at [facility]. Discussed case with heme. Patient unlikely has HIIT; continue to hold heparin gtt and AC at this time i/s/o thrombocytopenia and right lower extremity hematoma. [facility] will be able to see patient Monday or Tuesday.

## 2023-07-15 NOTE — PROVIDER CONTACT NOTE (OTHER) - ACTION/TREATMENT ORDERED:
PA notified and aware. As per PA, allow pt to refuse at this time given fingerstick value. Pt educated on importance of insulin administration and potential risks. PA aware. As per PA, allow pt to refuse at this time given fingerstick value. Pt educated on importance of insulin administration and potential risks. Will recheck fingerstick before dinner.

## 2023-07-15 NOTE — PROGRESS NOTE ADULT - SUBJECTIVE AND OBJECTIVE BOX
[facility] Division of Hospital Medicine  Fina Mccoy MD  Pager (M-F, 0E-5P): [phone]  Other Times:  [phone]    Patient is a 78y old  Male who presents with a chief complaint of worsening pericardial effusion (15 Jul 2023 07:49)      SUBJECTIVE / OVERNIGHT EVENTS:  feelin ok; no changes in SOB dizziness CP. RLE swelling stable. feeling somewhat improved.   afebrile .     ADDITIONAL REVIEW OF SYSTEMS: otherwise neg    MEDICATIONS  (STANDING):  allopurinol 100 milliGRAM(s) Oral daily  atorvastatin 40 milliGRAM(s) Oral at bedtime  beclomethasone  80 MICROgram(s) Inhaler 2 Puff(s) Inhalation two times a day  chlorhexidine 2% Cloths 1 Application(s) Topical daily  dextrose 5%. 1000 milliLiter(s) (100 mL/Hr) IV Continuous <Continuous>  dextrose 5%. 1000 milliLiter(s) (50 mL/Hr) IV Continuous <Continuous>  dextrose 50% Injectable 12.5 Gram(s) IV Push once  dextrose 50% Injectable 25 Gram(s) IV Push once  dextrose 50% Injectable 25 Gram(s) IV Push once  glucagon  Injectable 1 milliGRAM(s) IntraMuscular once  insulin lispro (ADMELOG) corrective regimen sliding scale   SubCutaneous three times a day before meals  insulin lispro (ADMELOG) corrective regimen sliding scale   SubCutaneous at bedtime  metoprolol succinate ER 75 milliGRAM(s) Oral two times a day  omega-3-Acid Ethyl Esters 4 Gram(s) Oral daily  pantoprazole    Tablet 40 milliGRAM(s) Oral before breakfast  tamsulosin 0.4 milliGRAM(s) Oral at bedtime  tiotropium 2.5 MICROgram(s) Inhaler 2 Puff(s) Inhalation daily    MEDICATIONS  (PRN):  acetaminophen     Tablet .. 650 milliGRAM(s) Oral every 6 hours PRN Temp greater or equal to 38C (100.4F), Mild Pain (1 - 3)  albuterol    90 MICROgram(s) HFA Inhaler 2 Puff(s) Inhalation every 6 hours PRN Shortness of Breath and/or Wheezing  carboxymethylcellulose 0.5% (Preservative-Free) Ophthalmic Solution 1 Drop(s) Both EYES two times a day PRN dry eyes  dextrose Oral Gel 15 Gram(s) Oral once PRN Blood Glucose LESS THAN 70 milliGRAM(s)/deciliter  melatonin 3 milliGRAM(s) Oral at bedtime PRN Sleep  sodium chloride 0.65% Nasal 1 Spray(s) Both Nostrils every 6 hours PRN Nasal Congestion      CAPILLARY BLOOD GLUCOSE      POCT Blood Glucose.: 177 mg/dL (15 Jul 2023 11:48)  POCT Blood Glucose.: 143 mg/dL (15 Jul 2023 09:09)  POCT Blood Glucose.: 176 mg/dL (14 Jul 2023 21:21)  POCT Blood Glucose.: 191 mg/dL (14 Jul 2023 16:49)    I&O's Summary    14 Jul 2023 07:01  -  15 Jul 2023 07:00  --------------------------------------------------------  IN: 0 mL / OUT: 551 mL / NET: -551 mL    15 Jul 2023 07:01  -  15 Jul 2023 14:08  --------------------------------------------------------  IN: 240 mL / OUT: 0 mL / NET: 240 mL        PHYSICAL EXAM:  Vital Signs Last 24 Hrs  T(C): 37.1 (15 Jul 2023 13:22), Max: 37.1 (15 Jul 2023 04:54)  T(F): 98.8 (15 Jul 2023 13:22), Max: 98.8 (15 Jul 2023 13:22)  HR: 102 (15 Jul 2023 13:22) (88 - 109)  BP: 108/72 (15 Jul 2023 13:22) (96/65 - 123/82)  BP(mean): --  RR: 18 (15 Jul 2023 13:22) (18 - 20)  SpO2: 100% (15 Jul 2023 13:22) (94% - 100%)    Parameters below as of 15 Jul 2023 13:22  Patient On (Oxygen Delivery Method): nasal cannula  O2 Flow (L/min): 3      CONSTITUTIONAL: NAD, well-developed  EYES: PERRLA; conjunctiva and sclera clear  ENMT: Moist oral mucosa  RESPIRATORY: Normal respiratory effort; lungs are clear to auscultation bilaterally  CARDIOVASCULAR: irregualr , systolic murmur + RLE edema;  Peripheral pulses are 2+ bilaterally  ABDOMEN: Nontender to palpation, normoactive bowel sounds, no rebound/guarding;   MUSCULOSKELETAL:  no clubbing or cyanosis of digits; no joint swelling or tenderness to palpation, moving all extremities   PSYCH: A+O to person, place, and time; affect appropriate  NEUROLOGY: non focal, patient ambulating without assist  SKIN: RLE venous stasis changes, several areas of ecchymosis on UEs (R>L) with some swelling     LABS:                        7.7    8.92  )-----------( 125      ( 15 Jul 2023 09:53 )             24.6     07-15    138  |  102  |  38<H>  ----------------------------<  129<H>  4.7   |  24  |  1.65<H>    Ca    8.9      15 Jul 2023 09:53      PT/INR - ( 14 Jul 2023 07:19 )   PT: 13.9 sec;   INR: 1.20 ratio         PTT - ( 14 Jul 2023 07:19 )  PTT:>200.0 sec      Urinalysis Basic - ( 15 Jul 2023 09:53 )    Color: x / Appearance: x / SG: x / pH: x  Gluc: 129 mg/dL / Ketone: x  / Bili: x / Urobili: x   Blood: x / Protein: x / Nitrite: x   Leuk Esterase: x / RBC: x / WBC x   Sq Epi: x / Non Sq Epi: x / Bacteria: x          RADIOLOGY & ADDITIONAL TESTS:  Results Reviewed:   Imaging Personally Reviewed:  Electrocardiogram Personally Reviewed:    COORDINATION OF CARE:  Care Discussed with Consultants/Other Providers [Y/N]:  Prior or Outpatient Records Reviewed [Y/N]:

## 2023-07-15 NOTE — PROGRESS NOTE ADULT - ASSESSMENT
78M (alt MRN [id number]) w/ hx of Afib (on Eliquis), severe AS, mod-severe TR, CAD/MI (30 yrs ago, medically managed, no PCI), ?CHF, COPD (not on O2 at home), former heavy smoker, HTN, HLD, DM2, gout, prior moderate pericardial effusion, now presenting with large pericardial effusion w/o tamponade physiology on outpt TTE.

## 2023-07-16 LAB
APPEARANCE UR: CLEAR — SIGNIFICANT CHANGE UP
APTT BLD: 25.5 SEC — LOW (ref 27.5–35.5)
BILIRUB UR-MCNC: NEGATIVE — SIGNIFICANT CHANGE UP
CLOSURE TME COLL+EPINEP BLD: SIGNIFICANT CHANGE UP (ref 150–400)
COLOR SPEC: YELLOW — SIGNIFICANT CHANGE UP
DIFF PNL FLD: NEGATIVE — SIGNIFICANT CHANGE UP
FERRITIN SERPL-MCNC: 38 NG/ML — SIGNIFICANT CHANGE UP (ref 30–400)
FIBRINOGEN PPP-MCNC: 549 MG/DL — HIGH (ref 200–445)
GLUCOSE BLDC GLUCOMTR-MCNC: 150 MG/DL — HIGH (ref 70–99)
GLUCOSE BLDC GLUCOMTR-MCNC: 165 MG/DL — HIGH (ref 70–99)
GLUCOSE BLDC GLUCOMTR-MCNC: 183 MG/DL — HIGH (ref 70–99)
GLUCOSE UR QL: NEGATIVE — SIGNIFICANT CHANGE UP
HAPTOGLOB SERPL-MCNC: 122 MG/DL — SIGNIFICANT CHANGE UP (ref 34–200)
HCT VFR BLD CALC: 20.9 % — CRITICAL LOW (ref 39–50)
HCT VFR BLD CALC: 22.8 % — LOW (ref 39–50)
HCT VFR BLD CALC: 24.9 % — LOW (ref 39–50)
HGB BLD-MCNC: 6.5 G/DL — CRITICAL LOW (ref 13–17)
HGB BLD-MCNC: 7 G/DL — CRITICAL LOW (ref 13–17)
HGB BLD-MCNC: 7.9 G/DL — LOW (ref 13–17)
INR BLD: 1.09 RATIO — SIGNIFICANT CHANGE UP (ref 0.88–1.16)
KETONES UR-MCNC: NEGATIVE — SIGNIFICANT CHANGE UP
LDH SERPL L TO P-CCNC: 176 U/L — SIGNIFICANT CHANGE UP (ref 50–242)
LEUKOCYTE ESTERASE UR-ACNC: NEGATIVE — SIGNIFICANT CHANGE UP
MCHC RBC-ENTMCNC: 26.6 PG — LOW (ref 27–34)
MCHC RBC-ENTMCNC: 26.9 PG — LOW (ref 27–34)
MCHC RBC-ENTMCNC: 27.1 PG — SIGNIFICANT CHANGE UP (ref 27–34)
MCHC RBC-ENTMCNC: 30.7 GM/DL — LOW (ref 32–36)
MCHC RBC-ENTMCNC: 31.1 GM/DL — LOW (ref 32–36)
MCHC RBC-ENTMCNC: 31.7 GM/DL — LOW (ref 32–36)
MCV RBC AUTO: 85.3 FL — SIGNIFICANT CHANGE UP (ref 80–100)
MCV RBC AUTO: 86.4 FL — SIGNIFICANT CHANGE UP (ref 80–100)
MCV RBC AUTO: 86.7 FL — SIGNIFICANT CHANGE UP (ref 80–100)
NITRITE UR-MCNC: NEGATIVE — SIGNIFICANT CHANGE UP
NRBC # BLD: 0 /100 WBCS — SIGNIFICANT CHANGE UP (ref 0–0)
PH UR: 6 — SIGNIFICANT CHANGE UP (ref 5–8)
PLATELET # BLD AUTO: 117 K/UL — LOW (ref 150–400)
PLATELET # BLD AUTO: SIGNIFICANT CHANGE UP K/UL (ref 150–400)
PROT UR-MCNC: SIGNIFICANT CHANGE UP
PROTHROM AB SERPL-ACNC: 12.6 SEC — SIGNIFICANT CHANGE UP (ref 10.5–13.4)
RBC # BLD: 2.42 M/UL — LOW (ref 4.2–5.8)
RBC # BLD: 2.63 M/UL — LOW (ref 4.2–5.8)
RBC # BLD: 2.92 M/UL — LOW (ref 4.2–5.8)
RBC # FLD: 18.8 % — HIGH (ref 10.3–14.5)
RBC # FLD: 19.8 % — HIGH (ref 10.3–14.5)
RBC # FLD: 19.9 % — HIGH (ref 10.3–14.5)
SP GR SPEC: 1.02 — SIGNIFICANT CHANGE UP (ref 1.01–1.02)
UROBILINOGEN FLD QL: ABNORMAL
WBC # BLD: 6.57 K/UL — SIGNIFICANT CHANGE UP (ref 3.8–10.5)
WBC # BLD: 7.04 K/UL — SIGNIFICANT CHANGE UP (ref 3.8–10.5)
WBC # BLD: 8 K/UL — SIGNIFICANT CHANGE UP (ref 3.8–10.5)
WBC # FLD AUTO: 6.57 K/UL — SIGNIFICANT CHANGE UP (ref 3.8–10.5)
WBC # FLD AUTO: 7.04 K/UL — SIGNIFICANT CHANGE UP (ref 3.8–10.5)
WBC # FLD AUTO: 8 K/UL — SIGNIFICANT CHANGE UP (ref 3.8–10.5)

## 2023-07-16 PROCEDURE — 99232 SBSQ HOSP IP/OBS MODERATE 35: CPT

## 2023-07-16 PROCEDURE — 99233 SBSQ HOSP IP/OBS HIGH 50: CPT | Mod: GC

## 2023-07-16 PROCEDURE — 99223 1ST HOSP IP/OBS HIGH 75: CPT

## 2023-07-16 RX ADMIN — Medication 75 MILLIGRAM(S): at 05:39

## 2023-07-16 RX ADMIN — CHLORHEXIDINE GLUCONATE 1 APPLICATION(S): 213 SOLUTION TOPICAL at 11:22

## 2023-07-16 RX ADMIN — TIOTROPIUM BROMIDE 2 PUFF(S): 18 CAPSULE ORAL; RESPIRATORY (INHALATION) at 11:22

## 2023-07-16 RX ADMIN — PANTOPRAZOLE SODIUM 40 MILLIGRAM(S): 20 TABLET, DELAYED RELEASE ORAL at 05:40

## 2023-07-16 RX ADMIN — BECLOMETHASONE DIPROPIONATE 2 PUFF(S): 40 AEROSOL, METERED RESPIRATORY (INHALATION) at 09:26

## 2023-07-16 RX ADMIN — TAMSULOSIN HYDROCHLORIDE 0.4 MILLIGRAM(S): 0.4 CAPSULE ORAL at 21:16

## 2023-07-16 RX ADMIN — Medication 4 GRAM(S): at 11:20

## 2023-07-16 RX ADMIN — Medication 100 MILLIGRAM(S): at 11:20

## 2023-07-16 RX ADMIN — Medication 75 MILLIGRAM(S): at 17:01

## 2023-07-16 RX ADMIN — BECLOMETHASONE DIPROPIONATE 2 PUFF(S): 40 AEROSOL, METERED RESPIRATORY (INHALATION) at 21:17

## 2023-07-16 RX ADMIN — ATORVASTATIN CALCIUM 40 MILLIGRAM(S): 80 TABLET, FILM COATED ORAL at 21:16

## 2023-07-16 NOTE — PROGRESS NOTE ADULT - ASSESSMENT
78M (alt MRN [id number]) w/ hx of Afib (on Eliquis), severe AS, mod-severe TR, CAD/MI (30 yrs ago, medically managed, no PCI), ?CHF, COPD (not on O2 at home), former heavy smoker, HTN, HLD, DM2, gout, prior moderate pericardial effusion, now presenting with large pericardial effusion w/o tamponade physiology on outpt TTE.

## 2023-07-16 NOTE — CONSULT NOTE ADULT - CONSULT REQUESTED DATE/TIME
16-Jul-2023 14:36
10-Jul-2023 14:57
05-Jul-2023 06:18
14-Jul-2023 15:23
06-Jul-2023 10:33
06-Jul-2023 16:40

## 2023-07-16 NOTE — CONSULT NOTE ADULT - SUBJECTIVE AND OBJECTIVE BOX
CARDIOLOGY FELLOW CONSULT NOTE    HPI:  78M (alt MRN [id number]) w/ hx of Afib (on Eliquis), severe AS, mod-severe TR, CAD/MI (30 yrs ago, medically managed, no PCI), ?CHF, COPD (not on O2 at home), former heavy smoker, HTN, HLD, DM2, gout, who was sent in for concerning outpatient echocardiogram which demonstrated worsening pericardial effusion. Patient had the study done to evaluate his progressive dyspnea and reduced exercise tolerance that had decreased to 50ft with some lower extremity edema. he denied any associated chest pain, orthopnea, PND, lightheadedness or syncope. He was advised by his cardiologist that he may need to have a pericardiocentesis.    PMHx:   COPD (chronic obstructive pulmonary disease)  Former smoker  HTN (hypertension)  HLD (hyperlipidemia)  CAD (coronary artery disease)  Chronic atrial fibrillation  Diabetes mellitus, type 2  Gout  Severe aortic stenosis  Mild tricuspid regurgitation  Pericardial effusion  TR (tricuspid regurgitation)      PSHx:   No significant past surgical history    Allergies:  penicillins (Unknown)    Home Meds:    Current Medications:   acetaminophen     Tablet .. 650 milliGRAM(s) Oral every 6 hours PRN  albuterol    90 MICROgram(s) HFA Inhaler 2 Puff(s) Inhalation every 6 hours PRN  allopurinol 100 milliGRAM(s) Oral daily  apixaban 2.5 milliGRAM(s) Oral every 12 hours  aspirin enteric coated 81 milliGRAM(s) Oral daily  atorvastatin 40 milliGRAM(s) Oral at bedtime  carboxymethylcellulose 0.5% (Preservative-Free) Ophthalmic Solution 1 Drop(s) Both EYES two times a day PRN  chlorhexidine 2% Cloths 1 Application(s) Topical daily  dextrose 5%. 1000 milliLiter(s) IV Continuous <Continuous>  dextrose 5%. 1000 milliLiter(s) IV Continuous <Continuous>  dextrose 50% Injectable 12.5 Gram(s) IV Push once  dextrose 50% Injectable 25 Gram(s) IV Push once  dextrose 50% Injectable 25 Gram(s) IV Push once  dextrose Oral Gel 15 Gram(s) Oral once PRN  furosemide   Injectable 40 milliGRAM(s) IV Push two times a day  glucagon  Injectable 1 milliGRAM(s) IntraMuscular once  insulin lispro (ADMELOG) corrective regimen sliding scale   SubCutaneous three times a day before meals  insulin lispro (ADMELOG) corrective regimen sliding scale   SubCutaneous at bedtime  metoprolol succinate ER 75 milliGRAM(s) Oral two times a day  mometasone 220 MICROgram(s) Inhaler 1 Puff(s) Inhalation daily  omega-3-Acid Ethyl Esters 4 Gram(s) Oral daily  pantoprazole    Tablet 40 milliGRAM(s) Oral before breakfast  tamsulosin 0.4 milliGRAM(s) Oral at bedtime  tiotropium 2.5 MICROgram(s) Inhaler 2 Puff(s) Inhalation daily      FAMILY HISTORY:  FH: breast cancer (Mother)      ROS:  CV: chest pain (-), palpitation (-), orthopnea (-), PND (-), edema (-)  PULM: SOB (+), cough (-), wheezing (-), hemoptysis (-).   CONST: fever (-), chills (-) or fatigue (-)  GI: abdominal distension (-), abdominal pain (-) , nausea/vomiting (-), hematemesis, (-), melena (-), hematochezia (-)  : dysuria (-), frequency (-), hematuria (-).   NEURO: numbness (-), weakness (-), dizziness (-)  SKIN: itching (-), rash (-)  HEENT:  visual changes (-); vertigo or throat pain (-);  neck stiffness (-)     Physical Exam:  T(F): 99.1 (07-05), Max: 99.1 (07-05)  HR: 69 (07-05) (68 - 80)  BP: 120/76 (07-05) (118/76 - 121/74)  RR: 18 (07-05)  SpO2: 96% (07-05)    GENERAL: NAD  HEAD:  Atraumatic, Normocephalic.  EYES: EOMI, PERRLA, conjunctiva and sclera clear.  NECK: Supple, No JVD.  CHEST/LUNG: Clear to auscultation bilaterally; No rales, rhonchi, wheezing, or rubs. Speaking in full sentences  HEART: Regular rate and rhythm; systolic murmur appreciated  ABDOMEN: Bowel sounds present; Soft, Nontender, Nondistended.   EXTREMITIES: Bilateral 2+ lower edema  SKIN: No rashes or lesions.    ECG: Personally reviewed    TTE  CONCLUSIONS:  1.  Normal left ventricular cavity size. The left ventricular systolic function is normal with an ejection   fraction visually estimated at 55 %.  2.  There is paradoxical septal motion.  3.  Mildly enlarged right ventricular cavity size and mildly reduced right ventricular systolic function.  4.  The right atrium is severely dilated.  5.  The left atrium is severely dilated.  6.  The aortic valve is severely thickened, calcified and restricted.  Peak and mean gradients are 46 and   26  mm Hg.  The dimensionless index is less than 0.25.  The calculated aortic valve area is 0.9 cm2.  7.  Severe aortic stenosis.  8.  Mild aortic regurgitation.  9.  Mild mitral regurgitation.  10.  Moderate  -  severe tricuspid regurgitation. There is mild to moderate pulmonary hypertension with   estimated PA pressure of 50 mm Hg.  11.  The aortic root appears normal in size.   12.  There is a large circumferential pericardial effusion up to 2.6 cm without evidence of tamponade   physiology.  13.  Compared to the study from 6/22, the effusion has increased in size.  ________________________________________________________________________________________      CXR: Personally reviewed    Labs: Personally reviewed                        10.3   7.36  )-----------( Clumped    ( 04 Jul 2023 06:30 )             33.9     07-04    144  |  103  |  40<H>  ----------------------------<  112<H>  3.6   |  31  |  1.42<H>    Ca    9.1      04 Jul 2023 06:30  Phos  4.1     07-04  Mg     1.8     07-04    TPro  6.1  /  Alb  3.8  /  TBili  1.0  /  DBili  x   /  AST  15  /  ALT  13  /  AlkPhos  89  07-04    CARDIAC MARKERS ( 03 Jul 2023 19:24 )  27 ng/L / x     / x     / x     / x     / x      CARDIAC MARKERS ( 03 Jul 2023 17:48 )  30 ng/L / x     / x     / x     / x     / x    
SURGERY CONSULT NOTE    Patient is a 78y old  Male who presents with a chief complaint of worsening pericardial effusion (2023 12:42)      HPI:  78M (alt MRN [id number]) w/ hx of Afib (on Eliquis), severe AS, mod-severe TR, CAD/MI (30 yrs ago, medically managed, no PCI), ?CHF, COPD (not on O2 at home), former heavy smoker, HTN, HLD, DM2, gout, presenting with worsening pericardial effusion. Sent in from his cardiologist's office. He had a TTE on day of presentation (7/3/23) that showed a "large circumferential pericardial effusion up to 2.6 cm without evidence of tamponade physiology" (and mildly reduced RV systolic function was also noted). His prior TTE on 10/3/22 had shown a "moderate pericardial effusion, measuring about 1.0 cm superior to the RA; otherwise small circumferential pericardial effusion." He is unclear what is the cause of pericardial effusion and he is unsure if he has a hx of CHF. Reported that at baseline he has shortness of breath after walking 50 ft and swelling in both legs. Noted that the swelling in his legs today appear to be around his average size. Denied f/c/n/v, CP, SOB at rest, abdominal pain, dysuria, hematuria, hematochezia, melena, diarrhea, constipation.    In ED: Afebrile, HR 80s-90s, SBP 140s-150s, RR 16-20, 88% on RA, % on 1-2L O2NC. Labs notable for hsTrop 30->27, SCr 1.36, proBNP 2.3k; VBG pH 7.34, pCO2 61, HCO3 33, lactate 0.8. Given Lasix 40mg IV x1. Admitted to Medicine for further management. (2023 00:24)    General Surgery consults for CT finding of R intramuscular hematoma. Patient was on hep ggt for a. fib while getting in patient workup. Hep ggt was found to be supratherapeutic on PTT labs. Patient reports he had pain yesterday, but feels better today. Patient currently has no pain.       PAST MEDICAL & SURGICAL HISTORY:  COPD (chronic obstructive pulmonary disease)    Former smoker      HTN (hypertension)      HLD (hyperlipidemia)      CAD (coronary artery disease)      Chronic atrial fibrillation      Diabetes mellitus, type 2      Gout      Severe aortic stenosis      Pericardial effusion      TR (tricuspid regurgitation)      No significant past surgical history        [  ] No significant past history as reviewed with the patient and family    FAMILY HISTORY:  FH: breast cancer (Mother)      [  ] Family history not pertinent as reviewed with the patient and family    SOCIAL HISTORY:    MEDICATIONS  (STANDING):  allopurinol 100 milliGRAM(s) Oral daily  atorvastatin 40 milliGRAM(s) Oral at bedtime  beclomethasone  80 MICROgram(s) Inhaler 2 Puff(s) Inhalation two times a day  chlorhexidine 2% Cloths 1 Application(s) Topical daily  dextrose 5%. 1000 milliLiter(s) (100 mL/Hr) IV Continuous <Continuous>  dextrose 5%. 1000 milliLiter(s) (50 mL/Hr) IV Continuous <Continuous>  dextrose 50% Injectable 12.5 Gram(s) IV Push once  dextrose 50% Injectable 25 Gram(s) IV Push once  dextrose 50% Injectable 25 Gram(s) IV Push once  glucagon  Injectable 1 milliGRAM(s) IntraMuscular once  insulin lispro (ADMELOG) corrective regimen sliding scale   SubCutaneous three times a day before meals  insulin lispro (ADMELOG) corrective regimen sliding scale   SubCutaneous at bedtime  metoprolol succinate ER 75 milliGRAM(s) Oral two times a day  omega-3-Acid Ethyl Esters 4 Gram(s) Oral daily  pantoprazole    Tablet 40 milliGRAM(s) Oral before breakfast  tamsulosin 0.4 milliGRAM(s) Oral at bedtime  tiotropium 2.5 MICROgram(s) Inhaler 2 Puff(s) Inhalation daily    MEDICATIONS  (PRN):  acetaminophen     Tablet .. 650 milliGRAM(s) Oral every 6 hours PRN Temp greater or equal to 38C (100.4F), Mild Pain (1 - 3)  albuterol    90 MICROgram(s) HFA Inhaler 2 Puff(s) Inhalation every 6 hours PRN Shortness of Breath and/or Wheezing  carboxymethylcellulose 0.5% (Preservative-Free) Ophthalmic Solution 1 Drop(s) Both EYES two times a day PRN dry eyes  dextrose Oral Gel 15 Gram(s) Oral once PRN Blood Glucose LESS THAN 70 milliGRAM(s)/deciliter  melatonin 3 milliGRAM(s) Oral at bedtime PRN Sleep  sodium chloride 0.65% Nasal 1 Spray(s) Both Nostrils every 6 hours PRN Nasal Congestion    Allergies    penicillins (Unknown)    Intolerances        Vital Signs Last 24 Hrs  T(C): 37.3 (2023 12:18), Max: 37.3 (2023 12:18)  T(F): 99.1 (2023 12:18), Max: 99.1 (2023 12:18)  HR: 109 (2023 12:18) (103 - 134)  BP: 127/75 (2023 12:18) (117/71 - 142/90)  BP(mean): --  RR: 18 (2023 12:18) (16 - 20)  SpO2: 99% (2023 12:18) (99% - 100%)    Parameters below as of 2023 12:18  Patient On (Oxygen Delivery Method): nasal cannula  O2 Flow (L/min): 3    Daily Height in cm: 167.6 (2023 08:34)    Daily Weight in k (2023 09:11)    Physical Exam:   General: In NAD, laying in bed  Resp: Bilateral chest rise on RA  Extremities:   NTTP on R leg, sensation intact, some ecchymosis in upper thigh. Palpable femoral, popliteal, and DP pulses on RLE.     Labs:                        6.7    6.64  )-----------( 111      ( 2023 12:48 )             22.0     07-14    136  |  101  |  34<H>  ----------------------------<  187<H>  4.7   |  24  |  1.48<H>    Ca    8.8      2023 07:20      PT/INR - ( 2023 07:19 )   PT: 13.9 sec;   INR: 1.20 ratio         PTT - ( 2023 07:19 )  PTT:>200.0 sec  Urinalysis Basic - ( 2023 07:20 )    Color: x / Appearance: x / SG: x / pH: x  Gluc: 187 mg/dL / Ketone: x  / Bili: x / Urobili: x   Blood: x / Protein: x / Nitrite: x   Leuk Esterase: x / RBC: x / WBC x   Sq Epi: x / Non Sq Epi: x / Bacteria: x        IMAGING STUDIES:      < from: CT Lower Extremity No Cont, Right (23 @ 11:38) >    INTERPRETATION:  EXAM: CT of the right lower extremity.    CLINICAL INFORMATION: 78-year-old male with history of A. fib on Eliquis   with complaints of right leg pain and swelling.    COMPARISON: None    TECHNIQUE: Axial CT images were obtained of the right pelvis through the   knee with coronal and sagittal reconstructions. No contrast was   administered. Three dimensional reconstructions were obtained on an   independent workstation.    FINDINGS:    BONES: No acute fracture.    SOFT TISSUES: There is significant asymmetric expansion of the anterior   compartment quadriceps muscles of the right thigh with an intramuscular   heterogeneously hypodense collection spanning the length of the muscle   belly from proximal to distal myotendinous junctions. There is   surrounding fatty infiltration most prominent along the lower anterior   subcutaneous tissues of the right thigh.    VESSELS: Advanced atherosclerotic changes.    IMPRESSION:  Findings are consistent with a large quadriceps intramuscular hematoma.   Evaluation for active hemorrhage is limited without IV contrast.    Dr. Combs discussed these findings with Dr. Jimmie Dan on 2023   11:35 AM with read back.    --- End of Report ---    < end of copied text >        
Structural Heart Team    HPI:  78M (alt MRN [id number]) w/ hx of Afib (on Eliquis), severe AS, mod-severe TR, CAD/MI (30 yrs ago, medically managed, no PCI), ?CHF, COPD (not on O2 at home), former heavy smoker, HTN, HLD, DM2, gout, presenting with worsening pericardial effusion. Sent in from his cardiologist's office. He had a TTE on day of presentation (7/3/23) that showed a "large circumferential pericardial effusion up to 2.6 cm without evidence of tamponade physiology" (and mildly reduced RV systolic function was also noted). His prior TTE on 10/3/22 had shown a "moderate pericardial effusion, measuring about 1.0 cm superior to the RA; otherwise small circumferential pericardial effusion." He is unclear what is the cause of pericardial effusion and he is unsure if he has a hx of CHF. Reported that at baseline he has shortness of breath after walking 50 ft and swelling in both legs. Noted that the swelling in his legs today appear to be around his average size. Denied f/c/n/v, CP, SOB at rest, abdominal pain, dysuria, hematuria, hematochezia, melena, diarrhea, constipation.      Today he is resting comfortably in bed, offering no complaints. He reports that this past Monday, he went to his outpatient Cardiologist (Dr. Galaviz) for a routine check up, and on his TTE, he was found to have a significant Pericardial effusion, for which he was told to come in to the hospital. He states that he can walk about 50 feet before he has to stop to rest and catch his breath. He has chronic pedal edema, for which the R >L (secondary to old injury). He denies any CP or dizziness. He is a former smoker, who quit 30 years ago (smoked for a little over 20 years, and for the last 10, he smoked 4-4 1/2 PPD). He sleeps with one pillow at night, and has not had to wake up due to SOB. He lives at home with his wife, and is independent in his ADL's. He has had no prior hospitalizations in the last  year for Heart Failure. He states that since his USP, he has not been very active. He has known about his AS, and would prefer TAVR.    STS 30 Day Risk 3.3%  NYHA Class III            07-05 @ 07:01  -  07-06 @ 07:00  --------------------------------------------------------  IN: 840 mL / OUT: 800 mL / NET: 40 mL    07-06 @ 07:01  -  07-06 @ 10:34  --------------------------------------------------------  IN: 0 mL / OUT: 250 mL / NET: -250 mL        PAST MEDICAL & SURGICAL HISTORY:    COPD (chronic obstructive pulmonary disease)  Former smoker  HTN (hypertension)  HLD (hyperlipidemia)  CAD (coronary artery disease)  Chronic atrial fibrillation  Diabetes mellitus, type 2  Gout  Severe aortic stenosis  Pericardial effusion  TR (tricuspid regurgitation)    No significant past surgical history          FAMILY HISTORY:  FH: breast cancer (Mother)    Allergies:  penicillins (Unknown)    Medications:  allopurinol 100 milliGRAM(s) Oral daily  atorvastatin 40 milliGRAM(s) Oral at bedtime  furosemide   Injectable 40 milliGRAM(s) IV Push two times a day  heparin  Infusion.  Unit(s)/Hr IV Continuous <Continuous>  insulin lispro (ADMELOG) corrective regimen sliding scale   SubCutaneous three times a day before meals  insulin lispro (ADMELOG) corrective regimen sliding scale   SubCutaneous at bedtime  metoprolol succinate ER 75 milliGRAM(s) Oral two times a day  mometasone 220 MICROgram(s) Inhaler 1 Puff(s) Inhalation daily  omega-3-Acid Ethyl Esters 4 Gram(s) Oral daily  pantoprazole    Tablet 40 milliGRAM(s) Oral before breakfast  tamsulosin 0.4 milliGRAM(s) Oral at bedtime  tiotropium 2.5 MICROgram(s) Inhaler 2 Puff(s) Inhalation daily      T(C): 36.3 (07-06-23 @ 05:04), Max: 36.5 (07-05-23 @ 21:03)  HR: 66 (07-06-23 @ 05:04) (66 - 89)  BP: 108/72 (07-06-23 @ 05:04) (108/72 - 128/85)  RR: 18 (07-06-23 @ 05:04) (18 - 18)  SpO2: 93% (07-06-23 @ 05:04) (93% - 98%)  Wt(kg): --      Review of Symptoms:  General: Alert, Follows commands  Respiratory:  + SOB, + GOFF, + Cough  Cardiac: Denies CP, Denies Palpitations  Gastrointestinal: Denies Pain, Denies N/V  Extremities: + Pedal Edema, No Joint pain  Vascular: Negative  Neurological: Negative  Endocrine: negative      Physical Exam:  Gen: A/Ox3, NAD, Appears slightly SOB with Conversation  HEENT: Mild JVD, Neck Supple, Trachea midline, No masses  Pulmonary: Fine basilar crackles,  No accessory muscle use for respiration  Cardiac: S1S2, Irregular, Diminished Sounds, but VIOLETA present   No Gallops/Rubs  ECG: AFib  Gastrointestinal: Soft, NT/ND, + Bowel Sounds  Extremities: 2+ Edema to Pretibial Region,  No joint pain or swelling +PMSx4  Vascular: 1+ pulses B/L, No Bruits  Neurological: Non Focal, = motor and sensory      Laboratory:                        12.0   8.09  )-----------( See note    ( 06 Jul 2023 07:56 )             39.3    07-06    144  |  99  |  46<H>  ----------------------------<  103<H>  3.9   |  32<H>  |  1.66<H>    Ca    9.2      06 Jul 2023 07:56         Pro-BNP:  Pro-Brain Natriuretic Peptide (07.03.23 @ 17:48)    Pro-Brain Natriuretic Peptide: 2312 pg/mL      Transthoracic Echo:    < from: TTE Limited W or WO Ultrasound Enhancing Agent (07.05.23 @ 09:44) >   1. Patient in atrial fibrillation; ejection fraction varies with R-R interval.   2. The left ventricular systolic function is normal with an ejection fraction visually estimated at 60 to 65 %.   3. Mildly enlarged right ventricular cavity size, normal right ventricular wall thickness and mildly reduced right ventricular systolic function.   4. Mild mitral regurgitation.   5. Trileaflet aortic valve with reduced systolic excursion.   6. Severe calcification of the aortic valve leaflets.   7. Severe aortic stenosis.   8. Low flow, low gradient aortic stenosis with preserved EF. Left ventricular stroke volume is 54.2 ml ;left ventricular stroke volume index is 28.5 ml/m².   9. Trace aortic regurgitation.  10. Moderate-large circumferential pericardial effusion. The effusion measures approximately 1.8 cm adjacent to the right atrium, 1.8 cm adjacent to the right atrium, and 2.4 cm lateral to the left ventricle in its largest dimension.  11. Findings were discussed with Dr. Jeffrey Wechsler on 7/5/2023 at 4:50 pm. No prior echocardiogram is available for comparison.    ________________________________________________________________________________________  FINDINGS:     Left Ventricle:  Small left ventricular cavity size. The left ventricular systolic function is normal with an ejection fraction visually estimated at 60 to 65%. There are no regional wall motion abnormalities seen. The left ventricular diastolic function is indeterminate. No leftventricular hypertrophy. There is normal LV mass and concentric remodeling. Patient in atrial fibrillation; ejection fraction varies with R-R interval.     Right Ventricle:  Mildly enlarged right ventricular cavity size, normal wall thickness and mildly reduced right ventricular systolic function.     Left Atrium:  The left atrium is severely dilated with an indexed volume of 58.98 ml/m².     Right Atrium:  The right atrium is severely dilated with an indexed volume of 49.46 ml/m² and an indexed area of 13.44 cm²/m².     Aortic Valve:  The aortic valve appears trileaflet with reduced systolic excursion. There is severe calcification of the aortic valve leaflets. There is severe aortic stenosis. There is low flow, low gradient aortic stenosis with preserved EF. Left ventricular stroke volume is 54.2 ml ;left ventricular stroke volume index is 28.5 ml/m². The peak transaortic velocity is 3.17 m/s, peak transaortic gradient is 40.1 mmHg and mean transaortic gradient is 25.9 mmHg with an LVOT/aortic valve VTI ratio of 0.22. The aortic valve area is estimated at 0.78 cm² by the continuity equation. There is trace aortic regurgitation.     Mitral Valve:  Structurally normal mitral valve with normal leaflet excursion. There is moderate calcification of the mitral valve annulus. There is mild mitral regurgitation.     Tricuspid Valve:  Structurally normal tricuspid valve with normal leaflet excursion. There is moderate tricuspid regurgitation. Estimated pulmonary artery systolic pressure is 38 mmHg.     Pulmonic Valve:  Structurally normal pulmonic valve with normal leaflet excursion. There is mild pulmonic regurgitation.     Pericardium:  Moderate-large circumferential pericardial effusion. The effusion measures approximately 1.8 cm adjacent to the right atrium, 1.8 cm adjacent to the right atrium, and 2.4 cm lateral to the left ventricle in its largest dimension.     Systemic Veins:  The inferior vena cava is dilated measuring 2.50 cm in diameter, (dilated >2.1cm) with normal inspiratory collapse (normal >50%) consistent with mildly elevated right atrial pressure (~8, range 5-10mmHg).  ____________________________________________________________________  Quantitative Data:  Left Ventricle Measurements: (Indexed to BSA)     IVSd (2D):   1.2 cm  LVPWd (2D):  1.1 cm  LVIDd (2D):  3.6 cm  LVIDs (2D):  2.3 cm  LV Mass:     136 g  71.7 g/m²  Visualized LV EF%: 60 to 65%    Aorta Measurements:     Ao Sinus: 3.3 cm  Ao Root:  3.3 cm  Ao Asc:   3.5 cm       Left Atrium Measurements: (Indexed to BSA)  LA Diam 2D: 4.61 cm    Right Atrial Measurements:     RA Vol:       94.00 ml  RA Vol Index: 49.46 ml/m²       LVOT / RVOT/ Qp/Qs Data: (Indexed to BSA)  LVOT Diameter: 2.13 cm  LVOT Vmax:     0.67 m/s  LVOT VTI:      16.52 cm  LVOT SV:       58.7 ml  30.86 ml/m²    Aortic Valve Measurements:  AV Vmax:           316.6 cm/s  AV Peak Gradient:  40.1 mmHg  AV Mean Gradient:  25.9 mmHg  AV VTI:            75.5 cm  AV VTI Ratio:      0.22  AoV EOA, Contin:   0.78 cm²  AoV EOA, Contin i: 0.41 cm²/m²       Tricuspid Valve Measurements:     TR Vmax:          2.7 m/s  TR Peak Gradient: 30.0 mmHg  RA Pressure:      8 mmHg  PASP:             38 mmHg    < end of copied text >  
HPI:  78M (alt MRN [id number]) w/ hx of Afib (on Eliquis), severe AS, mod-severe TR, CAD/MI (30 yrs ago, medically managed, no PCI), ?CHF, COPD (not on O2 at home), former heavy smoker, HTN, HLD, DM2, gout, presenting with worsening pericardial effusion. Sent in from his cardiologist's office. He had a TTE on day of presentation (7/3/23) that showed a "large circumferential pericardial effusion up to 2.6 cm without evidence of tamponade physiology" (and mildly reduced RV systolic function was also noted). His prior TTE on 10/3/22 had shown a "moderate pericardial effusion, measuring about 1.0 cm superior to the RA; otherwise small circumferential pericardial effusion." He is unclear what is the cause of pericardial effusion and he is unsure if he has a hx of CHF. Reported that at baseline he has shortness of breath after walking 50 ft and swelling in both legs. Noted that the swelling in his legs today appear to be around his average size. Denied f/c/n/v, CP, SOB at rest, abdominal pain, dysuria, hematuria, hematochezia, melena, diarrhea, constipation.    In ED: Afebrile, HR 80s-90s, SBP 140s-150s, RR 16-20, 88% on RA, % on 1-2L O2NC. Labs notable for hsTrop 30->27, SCr 1.36, proBNP 2.3k; VBG pH 7.34, pCO2 61, HCO3 33, lactate 0.8. Given Lasix 40mg IV x1. Admitted to Medicine for further management. (04 Jul 2023 00:24)      14 point ROS otherwise negative    PAST MEDICAL & SURGICAL HISTORY:  COPD (chronic obstructive pulmonary disease)      Former smoker      HTN (hypertension)      HLD (hyperlipidemia)      CAD (coronary artery disease)      Chronic atrial fibrillation      Diabetes mellitus, type 2      Gout      Severe aortic stenosis      Pericardial effusion      TR (tricuspid regurgitation)      No significant past surgical history          Allergies    penicillins (Unknown)    Intolerances        MEDICATIONS  (STANDING):  allopurinol 100 milliGRAM(s) Oral daily  atorvastatin 40 milliGRAM(s) Oral at bedtime  beclomethasone  80 MICROgram(s) Inhaler 2 Puff(s) Inhalation two times a day  chlorhexidine 2% Cloths 1 Application(s) Topical daily  dextrose 5%. 1000 milliLiter(s) (50 mL/Hr) IV Continuous <Continuous>  dextrose 5%. 1000 milliLiter(s) (100 mL/Hr) IV Continuous <Continuous>  dextrose 50% Injectable 12.5 Gram(s) IV Push once  dextrose 50% Injectable 25 Gram(s) IV Push once  dextrose 50% Injectable 25 Gram(s) IV Push once  glucagon  Injectable 1 milliGRAM(s) IntraMuscular once  insulin lispro (ADMELOG) corrective regimen sliding scale   SubCutaneous three times a day before meals  insulin lispro (ADMELOG) corrective regimen sliding scale   SubCutaneous at bedtime  metoprolol succinate ER 75 milliGRAM(s) Oral two times a day  omega-3-Acid Ethyl Esters 4 Gram(s) Oral daily  pantoprazole    Tablet 40 milliGRAM(s) Oral before breakfast  tamsulosin 0.4 milliGRAM(s) Oral at bedtime  tiotropium 2.5 MICROgram(s) Inhaler 2 Puff(s) Inhalation daily    MEDICATIONS  (PRN):  acetaminophen     Tablet .. 650 milliGRAM(s) Oral every 6 hours PRN Temp greater or equal to 38C (100.4F), Mild Pain (1 - 3)  albuterol    90 MICROgram(s) HFA Inhaler 2 Puff(s) Inhalation every 6 hours PRN Shortness of Breath and/or Wheezing  carboxymethylcellulose 0.5% (Preservative-Free) Ophthalmic Solution 1 Drop(s) Both EYES two times a day PRN dry eyes  dextrose Oral Gel 15 Gram(s) Oral once PRN Blood Glucose LESS THAN 70 milliGRAM(s)/deciliter  melatonin 3 milliGRAM(s) Oral at bedtime PRN Sleep  sodium chloride 0.65% Nasal 1 Spray(s) Both Nostrils every 6 hours PRN Nasal Congestion      FAMILY HISTORY:  FH: breast cancer (Mother)        SOCIAL HISTORY: No EtOH, no tobacco        VITALS:   T(F): 98.7 (07-16-23 @ 11:15), Max: 98.7 (07-16-23 @ 11:15)  HR: 103 (07-16-23 @ 11:15)  BP: 100/67 (07-16-23 @ 11:15)  RR: 18 (07-16-23 @ 11:15)  SpO2: 100% (07-16-23 @ 11:15)  Wt(kg): --    PHYSICAL EXAM    GENERAL: NAD, well-developed, on nasal cannula   HEAD:  Atraumatic, Normocephalic  EYES: EOMI, PERRLA, conjunctiva and sclera clear  NECK: Supple, No JVD  CHEST/LUNG: Clear to auscultation bilaterally; No wheeze  HEART: Regular rate and rhythm; No murmurs, rubs, or gallops  ABDOMEN: Soft, Nontender, Nondistended; Bowel sounds present  EXTREMITIES:  2+ Peripheral Pulses, No clubbing, cyanosis, or edema  NEUROLOGY: non-focal  SKIN: No rashes or lesions    LABS:                         7.0    7.04  )-----------( Clumped    ( 16 Jul 2023 09:41 )             22.8     07-15    138  |  102  |  38<H>  ----------------------------<  129<H>  4.7   |  24  |  1.65<H>    Ca    8.9      15 Jul 2023 09:53      Lactate Dehydrogenase, Serum: 176 U/L (07-16 @ 07:21)    PT/INR - ( 16 Jul 2023 07:25 )   PT: 12.6 sec;   INR: 1.09 ratio         PTT - ( 16 Jul 2023 07:25 )  PTT:25.5 sec      IMAGING:
Interventional Radiology    Evaluate for Procedure: Pericardiocentesis    HPI: 78 year old Male with a PMH of Afib (on Eliquis), severe AS, mod-severe TR, CAD/MI (30 yrs ago, medically managed, no PCI), ?CHF, COPD (not on O2 at home), former heavy smoker, HTN, HLD, DM2, gout, who was sent in for concerning outpatient echocardiogram which demonstrated worsening pericardial effusion. Outpatient TTE demonstrated LVEF of 55% with large pericardial effusion without tamponade physiology. Pt is pending possible CT TAVR and eventual LHC. Will need Pericardiocentesis prior though. Per interventional cardiology cannot access effusion safely. IR consulted for Pericardiocentesis.    Allergies: penicillins (Unknown)    Medications (Abx/Cardiac/Anticoagulation/Blood Products)  heparin  Infusion.: 900 Unit(s)/Hr IV Continuous (07-10 @ 05:32)  metoprolol succinate ER: 75 milliGRAM(s) Oral (07-10 @ 06:01)    Data: T(C): 36.4  HR: 80  BP: 121/80  RR: 18  SpO2: 97%    -WBC 6.08 / HgB 9.7 / Hct 32.1 / Plt Clumped  -Na 142 / Cl 103 / BUN 38 / Glucose 109  -K 4.3 / CO2 30 / Cr 1.73  -ALT -- / Alk Phos -- / T.Bili --  -INR 1.12 / PTT 60.1      Assessment/Plan: 78 year old Male with a PMH of Afib (on Eliquis), severe AS, mod-severe TR, CAD/MI (30 yrs ago, medically managed, no PCI), ?CHF, COPD (not on O2 at home), former heavy smoker, HTN, HLD, DM2, gout, who was sent in for concerning outpatient echocardiogram which demonstrated worsening pericardial effusion. Outpatient TTE demonstrated LVEF of 55% with large pericardial effusion without tamponade physiology. Pt is pending possible CT TAVR and eventual LHC. Will need Pericardiocentesis prior though. Per interventional cardiology cannot access effusion safely. IR consulted for Pericardiocentesis.    - Will need CT chest to evaluate window for possible drainage of effusion  - Obtain CT scan, will evaluate for drainage after  - Case discussed with Dr. Cope  
[facility] DIVISION OF KIDNEY DISEASES AND HYPERTENSION -- [phone]  -- INITIAL CONSULT NOTE  --------------------------------------------------------------------------------  HPI: 78M w/ hx of Afib (on Eliquis), severe AS, mod-severe TR, CAD/MI (30 yrs ago, medically managed, no PCI), HFpEF (TTE 6/3 EF 55%), CKD, COPD (not on O2 at home), former heavy smoker (over 25 years agO), HTN, HLD, DM2, gout, presenting with worsening pericardial effusion. Patient was sent in from his cardiologist's office. Of note, he had a TTE on day of presentation (7/3/23) that showed a "large circumferential pericardial effusion up to 2.6 cm without evidence of tamponade physiology" (and mildly reduced RV systolic function was also noted). His prior TTE on 10/3/22 had shown a "moderate pericardial effusion, measuring about 1.0 cm superior to the RA; otherwise small circumferential pericardial effusion." Etiology of pericardial effusion is unclear. Cardiology is considering pericardiocentesis and CT TVAR, renal was consulted to evaluate the patient for contrast tolerance given CKD.     Per [facility] review, appears he has had a SCr ranging from 1.4-2.0 dating back to 2017. Most recent SCr prior to admission was 1.42 on 10/7/22.    Patient seen and examined at bedside, family present. Patient denies f/c/n/v, CP abdominal pain, dysuria, hematuria, hematochezia, melena, diarrhea, constipation. His dyspnea is primarily on exertion and is currently on 2L NC supplemental oxygen. He denied a prior history of renal disease in the past.     PAST HISTORY  --------------------------------------------------------------------------------  PAST MEDICAL & SURGICAL HISTORY:  COPD (chronic obstructive pulmonary disease)  Former smoker  HTN (hypertension)  HLD (hyperlipidemia)  CAD (coronary artery disease)  Chronic atrial fibrillation  Diabetes mellitus, type 2  Gout  Severe aortic stenosis  Pericardial effusion  TR (tricuspid regurgitation)  No significant past surgical history    FAMILY HISTORY:  FH: breast cancer (Mother)    PAST SOCIAL HISTORY: Former smoker, denied ETOH or illicit drugs. Retired banker.     ALLERGIES & MEDICATIONS  --------------------------------------------------------------------------------  Allergies  penicillins (Unknown)    Intolerances    Standing Inpatient Medications  allopurinol 100 milliGRAM(s) Oral daily  atorvastatin 40 milliGRAM(s) Oral at bedtime  chlorhexidine 2% Cloths 1 Application(s) Topical daily  dextrose 5%. 1000 milliLiter(s) IV Continuous <Continuous>  dextrose 5%. 1000 milliLiter(s) IV Continuous <Continuous>  dextrose 50% Injectable 12.5 Gram(s) IV Push once  dextrose 50% Injectable 25 Gram(s) IV Push once  dextrose 50% Injectable 25 Gram(s) IV Push once  glucagon  Injectable 1 milliGRAM(s) IntraMuscular once  heparin  Infusion.  Unit(s)/Hr IV Continuous <Continuous>  insulin lispro (ADMELOG) corrective regimen sliding scale   SubCutaneous three times a day before meals  insulin lispro (ADMELOG) corrective regimen sliding scale   SubCutaneous at bedtime  metoprolol succinate ER 75 milliGRAM(s) Oral two times a day  mometasone 220 MICROgram(s) Inhaler 1 Puff(s) Inhalation daily  omega-3-Acid Ethyl Esters 4 Gram(s) Oral daily  pantoprazole    Tablet 40 milliGRAM(s) Oral before breakfast  tamsulosin 0.4 milliGRAM(s) Oral at bedtime  tiotropium 2.5 MICROgram(s) Inhaler 2 Puff(s) Inhalation daily    PRN Inpatient Medications  acetaminophen     Tablet .. 650 milliGRAM(s) Oral every 6 hours PRN  albuterol    90 MICROgram(s) HFA Inhaler 2 Puff(s) Inhalation every 6 hours PRN  carboxymethylcellulose 0.5% (Preservative-Free) Ophthalmic Solution 1 Drop(s) Both EYES two times a day PRN  dextrose Oral Gel 15 Gram(s) Oral once PRN  heparin   Injectable 3000 Unit(s) IV Push every 6 hours PRN  heparin   Injectable 6500 Unit(s) IV Push every 6 hours PRN    REVIEW OF SYSTEMS  --------------------------------------------------------------------------------  Gen: No fevers/chills  Head/Eyes/Ears: No HA  Respiratory: per HPI  CV: No chest pain  GI: No abdominal pain, diarrhea  : No dysuria, hematuria  MSK: +Chronic B/L LE edema   Skin: No rashes  Heme: No easy bruising or bleeding    All other systems were reviewed and are negative, except as noted.    VITALS/PHYSICAL EXAM  --------------------------------------------------------------------------------  T(C): 36.9 (07-06-23 @ 11:42), Max: 36.9 (07-06-23 @ 11:42)  HR: 81 (07-06-23 @ 11:42) (66 - 83)  BP: 112/73 (07-06-23 @ 11:42) (108/72 - 118/76)  RR: 18 (07-06-23 @ 11:42) (18 - 18)  SpO2: 95% (07-06-23 @ 11:42) (93% - 98%)  Wt(kg): --    07-05-23 @ 07:01  -  07-06-23 @ 07:00  --------------------------------------------------------  IN: 840 mL / OUT: 800 mL / NET: 40 mL    07-06-23 @ 07:01  -  07-06-23 @ 16:40  --------------------------------------------------------  IN: 480 mL / OUT: 675 mL / NET: -195 mL    Physical Exam:  	Gen: Sitting up in bed in NAD  	HEENT: Anicteric  	Pulm: CTA B/L.   	CV: S1S2+. +Murmur   	Abd: Soft, +BS    	Ext: + LE edema B/L  	Neuro: Awake  	Skin: Warm and dry    LABS/STUDIES  --------------------------------------------------------------------------------              12.0   8.09  >-----------<  See note    [07-06-23 @ 07:56]              39.3     144  |  99  |  46  ----------------------------<  103      [07-06-23 @ 07:56]  3.9   |  32  |  1.66        Ca     9.2     [07-06-23 @ 07:56]    PTT: 32.3       [07-06-23 @ 11:42]    Creatinine Trend:  SCr 1.66 [07-06 @ 07:56]  SCr 1.74 [07-05 @ 06:29]  SCr 1.42 [07-04 @ 06:30]  SCr 1.36 [07-03 @ 17:48]    Urinalysis - [07-06-23 @ 07:56]      Color  / Appearance  / SG  / pH       Gluc 103 / Ketone   / Bili  / Urobili        Blood  / Protein  / Leuk Est  / Nitrite       RBC  / WBC  / Hyaline  / Gran  / Sq Epi  / Non Sq Epi  / Bacteria     HCV 0.10, Nonreact      [07-05-23 @ 06:29]

## 2023-07-16 NOTE — CONSULT NOTE ADULT - CONSULT REASON
Pericardiocentesis
CKD
anemia and thrombocytopenia
Worsening pericardial effusion
R intramuscular hematoma
Aortic Stenosis

## 2023-07-16 NOTE — PROGRESS NOTE ADULT - PROBLEM SELECTOR PLAN 1
Patient states he gets weekly iron infusions outpatient. outpatient hematologist is Dr. Sylvester (), - NOT affilated with [facility] affiliation. Now seen by house heme svc on 7/15  DDx includes underlying hematologic dz , medications, mechanical sheering from AS    -Hgb downtrended to 6.7 on 7/14 s/p 1 unit improved then downtrended again today.   -HOLD heparin gtt now given hematoma .  HIT panel neg awaiting serotonin assay, PF4  - Additional w/u for hemolysis, perip smear etc as per franco rec.   -CT RLE   >Findings are consistent with a large quadriceps intramuscular hematoma-no clinical change noted.   - Surg no acute intervention. will monitor . holding AC and transfuse as needed. no evidence of vasc compromise distally   -per patient's son, patient is on reduced eliquis dose at home due to history of anemia/bleed.   -Monitor HH. Keep active T&S. Transfuse prbc prn.

## 2023-07-16 NOTE — CONSULT NOTE ADULT - ASSESSMENT
78M PMH Afib (on Eliquis), severe AS, mod-severe TR, CAD/MI (30 yrs ago, medically managed, no PCI), ?CHF, COPD (not on O2 at home), former heavy smoker, HTN, HLD, DM2, gout, prior moderate pericardial effusion, now presenting with large pericardial effusion w/o tamponade physiology on outpt TTE. Hematology called for thrombocytopenia with platelet clumping and anemia.    Patient has been getting iron transfusions for 2 years. He has had an endoscopy and colonoscopy with no determination of the cause. Family history of anemia. 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 Will rule out other causes.     #Anemia  #Thrombocytopenia  Normocytic anemia to Hgb 7.7 MCV 85.7. Hgb baseline appears to be ~10-12.   Is normally on eliquis for AFib, was placed on heparin gtt this admission then held i/s/o worsening anemia, thrombocytopenia, and hematoma.   Platelets 70s-90s on blue top platelet test this admission, 125 on normal CBC today.  Bili 1.0. LFTs WNL.  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 May have some thrombocytopenia 2/2 severe aortic stenosis or less likely HIT. Suspect anemia of chronic disease.  Recommend:  - send LDH, haptoglobin, and continue to trend coags (PT, INR, PTT), and complete metabolic panel  - please send PF4 antibodies with reflex to serotonin assay to rule out HIT  - continue holding AC if okay per cardiology, if needs AC would favor argatrobran gtt until HIT ruled out, though pt may be candidate for watchman device given high risk of bleeding  - lab called to prepare smear, will review - prelim look showing schistocytes, would also send [id number] and reticulocyte count.  - send vWF activity and antigen   - can workup anemia further per outpatient hematologist Dr. Sylvester (), reportedly receives IV iron infusions as outpatient, may have underlying AVMs    ***************************************************************  Ana Ruano, PGY4  Fellow Hematology/Oncology  pager: [phone]   ***************************************************************     78M PMH Afib (on Eliquis), severe AS, mod-severe TR, CAD/MI (30 yrs ago, medically managed, no PCI), ?CHF, COPD (not on O2 at home), former heavy smoker, HTN, HLD, DM2, gout, prior moderate pericardial effusion, now presenting with large pericardial effusion w/o tamponade physiology on outpt TTE. Hematology called for thrombocytopenia with platelet clumping and anemia.    Patient has been getting iron transfusions for 2 years, unknown etiology. He has had an endoscopy and colonoscopy with no determination of the cause. Family history of anemia. 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 Will rule out other causes. PF4 negative.    #Anemia  #Thrombocytopenia  Normocytic anemia to Hgb 7.7 MCV 85.7. Hgb baseline appears to be ~10-12. Peripheral smear (7/16) showing hypersegmented neutrophils, broken and fragmented RBCs, clumped platelets, pencil cells. Ferritin 37, iron 55, consistent with TAYLER.  Is normally on eliquis for AFib, was placed on heparin gtt this admission then held i/s/o worsening anemia, thrombocytopenia, and hematoma.   Platelets 70s-90s on blue top platelet test this admission, 125 on normal CBC (7/15). Normal LDH, haptoglobin. Bili 1.0. LFTs WNL. No evidence of hemolysis.  Recommend:  - please send G6PD, osmotic fragility text, ferritin, B12, folate, methymalonic acid  - sent vWF activity and antigen   - please obtain u/s abdomen to assess for splenomegaly  - please obtain a urinalysis   - recommend GI consult for inpatient endoscopy/colonoscopy for TAYLER (low iron and low ferritin)  - can workup anemia further per outpatient hematologist Dr. Sylvester (), reportedly receives IV iron infusions as outpatient, may have underlying AVMs    ***************************************************************  Ana Ruano, PGY4  Fellow Hematology/Oncology  pager: [phone]   ***************************************************************

## 2023-07-16 NOTE — PROGRESS NOTE ADULT - SUBJECTIVE AND OBJECTIVE BOX
Patient seen and examined at bedside.    Overnight Events:  NAEON   Tele: AF 's, short spikes to 140 but no sustained.   No concerns or complaints this am.     Review of Systems:  REVIEW OF SYSTEMS:  CONSTITUTIONAL: No weakness, fevers or chills  EYES/ENT: No visual changes;  No dysphagia  NECK: No pain or stiffness  RESPIRATORY: No cough, wheezing, hemoptysis; No shortness of breath  CARDIOVASCULAR: No chest pain or palpitations; No lower extremity edema  GASTROINTESTINAL: No abdominal or epigastric pain. No nausea, vomiting  BACK: No back pain  GENITOURINARY: No dysuria, frequency or hematuria  NEUROLOGICAL: No numbness or weakness  SKIN: No itching, burning, rashes, or lesions   All other review of systems is negative unless indicated above.        Current Meds:  acetaminophen     Tablet .. 650 milliGRAM(s) Oral every 6 hours PRN  albuterol    90 MICROgram(s) HFA Inhaler 2 Puff(s) Inhalation every 6 hours PRN  allopurinol 100 milliGRAM(s) Oral daily  atorvastatin 40 milliGRAM(s) Oral at bedtime  beclomethasone  80 MICROgram(s) Inhaler 2 Puff(s) Inhalation two times a day  carboxymethylcellulose 0.5% (Preservative-Free) Ophthalmic Solution 1 Drop(s) Both EYES two times a day PRN  chlorhexidine 2% Cloths 1 Application(s) Topical daily  dextrose 5%. 1000 milliLiter(s) IV Continuous <Continuous>  dextrose 5%. 1000 milliLiter(s) IV Continuous <Continuous>  dextrose 50% Injectable 12.5 Gram(s) IV Push once  dextrose 50% Injectable 25 Gram(s) IV Push once  dextrose 50% Injectable 25 Gram(s) IV Push once  dextrose Oral Gel 15 Gram(s) Oral once PRN  glucagon  Injectable 1 milliGRAM(s) IntraMuscular once  insulin lispro (ADMELOG) corrective regimen sliding scale   SubCutaneous three times a day before meals  insulin lispro (ADMELOG) corrective regimen sliding scale   SubCutaneous at bedtime  melatonin 3 milliGRAM(s) Oral at bedtime PRN  metoprolol succinate ER 75 milliGRAM(s) Oral two times a day  omega-3-Acid Ethyl Esters 4 Gram(s) Oral daily  pantoprazole    Tablet 40 milliGRAM(s) Oral before breakfast  sodium chloride 0.65% Nasal 1 Spray(s) Both Nostrils every 6 hours PRN  tamsulosin 0.4 milliGRAM(s) Oral at bedtime  tiotropium 2.5 MICROgram(s) Inhaler 2 Puff(s) Inhalation daily      PAST MEDICAL & SURGICAL HISTORY:  COPD (chronic obstructive pulmonary disease)      Former smoker      HTN (hypertension)      HLD (hyperlipidemia)      CAD (coronary artery disease)      Chronic atrial fibrillation      Diabetes mellitus, type 2      Gout      Severe aortic stenosis      Pericardial effusion      TR (tricuspid regurgitation)      No significant past surgical history          Vitals:  T(F): 98.6 (07-16), Max: 98.8 (07-15)  HR: 102 (07-16) (88 - 120)  BP: 100/63 (07-16) (100/63 - 119/83)  RR: 18 (07-16)  SpO2: 100% (07-16)  I&O's Summary    15 Jul 2023 07:01  -  16 Jul 2023 07:00  --------------------------------------------------------  IN: 240 mL / OUT: 650 mL / NET: -410 mL    16 Jul 2023 07:01  -  16 Jul 2023 09:20  --------------------------------------------------------  IN: 120 mL / OUT: 200 mL / NET: -80 mL        Physical Exam:  Appearance: No acute distress; well appearing  Eyes: PERRL, EOMI, pink conjunctiva  HENT: Normal oral mucosa  Cardiovascular: RRR, S1, S2, VIOLETA no edema; no JVD  Respiratory: Clear to auscultation bilaterally  Gastrointestinal: soft, non-tender, non-distended with normal bowel sounds  Musculoskeletal: No clubbing; no joint deformity   Neurologic: Non-focal  Lymphatic: No lymphadenopathy  Psychiatry: AAOx3, mood & affect appropriate  Skin: No rashes, ecchymoses, or cyanosis                          6.5    6.57  )-----------( Clumped    ( 16 Jul 2023 07:20 )             20.9     07-15    138  |  102  |  38<H>  ----------------------------<  129<H>  4.7   |  24  |  1.65<H>    Ca    8.9      15 Jul 2023 09:53      PT/INR - ( 16 Jul 2023 07:25 )   PT: 12.6 sec;   INR: 1.09 ratio         PTT - ( 16 Jul 2023 07:25 )  PTT:25.5 sec              TRANSTHORACIC ECHOCARDIOGRAM REPORT  ________________________________________________________________________________                                      _______       Pt. Name:       APPLE CROW Study Date:    7/5/2023  MRN:            [id number]    YOB: 1945  Accession #:    [id number]     Age:           78 years  Account#:       [id number]  Gender:        M  Heart Rate:                   Height:        65.00 in (165.10 cm)  Rhythm:                       Weight:        182.00 lb (82.55 kg)  Blood Pressure: 120/76 mmHg   BSA/BMI:       1.90 m² / 30.29 kg/m²  ________________________________________________________________________________________  Referring Physician:    [id number] Beni Toth  Interpreting Physician: Tri Nunn MD  Primary Sonographer:    Tiffanie Moreno    CPT:               ECHO TTE WO CON COMP W DOPP - 95826.m  Indication(s):     Other pericardial effusion (noninflammatory) - I31.39  Procedure:         Transthoracic echocardiogram with 2-D, M-mode and complete                     spectral and color flow Doppler.  Ordering Location: [facility]  Admission Status:  Inpatient  Study Information: Image quality for this study is good.    _______________________________________________________________________________________  CONCLUSIONS:      1. Patient in atrial fibrillation; ejection fraction varies with R-R interval.   2. The left ventricular systolic function is normal with an ejection fraction visually estimated at 60 to 65 %.   3. Mildly enlarged right ventricular cavity size, normal right ventricular wall thickness and mildly reduced right ventricular systolic function.   4. Mild mitral regurgitation.   5. Trileaflet aortic valve with reduced systolic excursion.   6. Severe calcification of the aortic valve leaflets.   7. Severe aortic stenosis.   8. Low flow, low gradient aortic stenosis with preserved EF. Left ventricular stroke volume is 54.2 ml ;left ventricular stroke volume index is 28.5 ml/m².   9. Trace aortic regurgitation.  10. Moderate-large circumferential pericardial effusion. The effusion measures approximately 1.8 cm adjacent to the right atrium, 1.8 cm adjacent to the right atrium, and 2.4 cm lateral to the left ventricle in its largest dimension.  11. Findings were discussed with Dr. Jeffrey Wechsler on 7/5/2023 at 4:50 pm. No prior echocardiogram is available for comparison.    ________________________________________________________________________________________         Patient seen and examined at bedside.    Overnight Events:  NAEON   Tele: AF 's, short spikes to 140 but no sustained.   No concerns or complaints this am.     Review of Systems:  REVIEW OF SYSTEMS:  CONSTITUTIONAL: No weakness, fevers or chills  EYES/ENT: No visual changes;  No dysphagia  NECK: No pain or stiffness  RESPIRATORY: No cough, wheezing, hemoptysis; No shortness of breath  CARDIOVASCULAR: No chest pain or palpitations; No lower extremity edema  GASTROINTESTINAL: No abdominal or epigastric pain. No nausea, vomiting  BACK: No back pain  GENITOURINARY: No dysuria, frequency or hematuria  NEUROLOGICAL: No numbness or weakness  SKIN: No itching, burning, rashes, or lesions   All other review of systems is negative unless indicated above.        Current Meds:  acetaminophen     Tablet .. 650 milliGRAM(s) Oral every 6 hours PRN  albuterol    90 MICROgram(s) HFA Inhaler 2 Puff(s) Inhalation every 6 hours PRN  allopurinol 100 milliGRAM(s) Oral daily  atorvastatin 40 milliGRAM(s) Oral at bedtime  beclomethasone  80 MICROgram(s) Inhaler 2 Puff(s) Inhalation two times a day  carboxymethylcellulose 0.5% (Preservative-Free) Ophthalmic Solution 1 Drop(s) Both EYES two times a day PRN  chlorhexidine 2% Cloths 1 Application(s) Topical daily  dextrose 5%. 1000 milliLiter(s) IV Continuous <Continuous>  dextrose 5%. 1000 milliLiter(s) IV Continuous <Continuous>  dextrose 50% Injectable 12.5 Gram(s) IV Push once  dextrose 50% Injectable 25 Gram(s) IV Push once  dextrose 50% Injectable 25 Gram(s) IV Push once  dextrose Oral Gel 15 Gram(s) Oral once PRN  glucagon  Injectable 1 milliGRAM(s) IntraMuscular once  insulin lispro (ADMELOG) corrective regimen sliding scale   SubCutaneous three times a day before meals  insulin lispro (ADMELOG) corrective regimen sliding scale   SubCutaneous at bedtime  melatonin 3 milliGRAM(s) Oral at bedtime PRN  metoprolol succinate ER 75 milliGRAM(s) Oral two times a day  omega-3-Acid Ethyl Esters 4 Gram(s) Oral daily  pantoprazole    Tablet 40 milliGRAM(s) Oral before breakfast  sodium chloride 0.65% Nasal 1 Spray(s) Both Nostrils every 6 hours PRN  tamsulosin 0.4 milliGRAM(s) Oral at bedtime  tiotropium 2.5 MICROgram(s) Inhaler 2 Puff(s) Inhalation daily      PAST MEDICAL & SURGICAL HISTORY:  COPD (chronic obstructive pulmonary disease)  Former smoker  HTN (hypertension)  HLD (hyperlipidemia)  CAD (coronary artery disease)  Chronic atrial fibrillation  Diabetes mellitus, type 2  Gout  Severe aortic stenosis  Pericardial effusion  TR (tricuspid regurgitation)    No significant past surgical history    Vitals:  T(F): 98.6 (07-16), Max: 98.8 (07-15)  HR: 102 (07-16) (88 - 120)  BP: 100/63 (07-16) (100/63 - 119/83)  RR: 18 (07-16)  SpO2: 100% (07-16)  I&O's Summary    15 Jul 2023 07:01  -  16 Jul 2023 07:00  --------------------------------------------------------  IN: 240 mL / OUT: 650 mL / NET: -410 mL    16 Jul 2023 07:01  -  16 Jul 2023 09:20  --------------------------------------------------------  IN: 120 mL / OUT: 200 mL / NET: -80 mL        Physical Exam:  Appearance: No acute distress; well appearing  Eyes: PERRL, EOMI, pink conjunctiva  HENT: Normal oral mucosa  Cardiovascular: RRR, S1, S2, VIOLETA no edema; no JVD  Respiratory: Clear to auscultation bilaterally  Gastrointestinal: soft, non-tender, non-distended with normal bowel sounds  Musculoskeletal: No clubbing; no joint deformity   Neurologic: Non-focal  Lymphatic: No lymphadenopathy  Psychiatry: AAOx3, mood & affect appropriate  Skin: No rashes, ecchymoses, or cyanosis                          6.5    6.57  )-----------( Clumped    ( 16 Jul 2023 07:20 )             20.9     07-15    138  |  102  |  38<H>  ----------------------------<  129<H>  4.7   |  24  |  1.65<H>    Ca    8.9      15 Jul 2023 09:53      PT/INR - ( 16 Jul 2023 07:25 )   PT: 12.6 sec;   INR: 1.09 ratio         PTT - ( 16 Jul 2023 07:25 )  PTT:25.5 sec              TRANSTHORACIC ECHOCARDIOGRAM REPORT  ________________________________________________________________________________                                      _______       Pt. Name:       APPLE CROW Study Date:    7/5/2023  MRN:            [id number]    YOB: 1945  Accession #:    [id number]     Age:           78 years  Account#:       [id number]  Gender:        M  Heart Rate:                   Height:        65.00 in (165.10 cm)  Rhythm:                       Weight:        182.00 lb (82.55 kg)  Blood Pressure: 120/76 mmHg   BSA/BMI:       1.90 m² / 30.29 kg/m²  ________________________________________________________________________________________  Referring Physician:    [id number] Beni Toth  Interpreting Physician: Tri Nunn MD  Primary Sonographer:    Tiffanie Moreno    CPT:               ECHO TTE WO CON COMP W DOPP - 19068.m  Indication(s):     Other pericardial effusion (noninflammatory) - I31.39  Procedure:         Transthoracic echocardiogram with 2-D, M-mode and complete                     spectral and color flow Doppler.  Ordering Location: [facility]  Admission Status:  Inpatient  Study Information: Image quality for this study is good.    _______________________________________________________________________________________  CONCLUSIONS:      1. Patient in atrial fibrillation; ejection fraction varies with R-R interval.   2. The left ventricular systolic function is normal with an ejection fraction visually estimated at 60 to 65 %.   3. Mildly enlarged right ventricular cavity size, normal right ventricular wall thickness and mildly reduced right ventricular systolic function.   4. Mild mitral regurgitation.   5. Trileaflet aortic valve with reduced systolic excursion.   6. Severe calcification of the aortic valve leaflets.   7. Severe aortic stenosis.   8. Low flow, low gradient aortic stenosis with preserved EF. Left ventricular stroke volume is 54.2 ml ;left ventricular stroke volume index is 28.5 ml/m².   9. Trace aortic regurgitation.  10. Moderate-large circumferential pericardial effusion. The effusion measures approximately 1.8 cm adjacent to the right atrium, 1.8 cm adjacent to the right atrium, and 2.4 cm lateral to the left ventricle in its largest dimension.  11. Findings were discussed with Dr. Jeffrey Wechsler on 7/5/2023 at 4:50 pm. No prior echocardiogram is available for comparison.    ________________________________________________________________________________________

## 2023-07-16 NOTE — PROGRESS NOTE ADULT - SUBJECTIVE AND OBJECTIVE BOX
[facility] Division of Hospital Medicine  Fina Mccoy MD  Pager (M-F, 7M-8C): [phone]  Other Times:  [phone]    Patient is a 78y old  Male who presents with a chief complaint of worsening pericardial effusion (16 Jul 2023 09:19)      SUBJECTIVE / OVERNIGHT EVENTS:  feeling about same. denies any new SOB or CP. right LE feels same. no inc swelling or pain.   no acute overnight issues     ADDITIONAL REVIEW OF SYSTEMS: otherwise neg    MEDICATIONS  (STANDING):  allopurinol 100 milliGRAM(s) Oral daily  atorvastatin 40 milliGRAM(s) Oral at bedtime  beclomethasone  80 MICROgram(s) Inhaler 2 Puff(s) Inhalation two times a day  chlorhexidine 2% Cloths 1 Application(s) Topical daily  dextrose 5%. 1000 milliLiter(s) (100 mL/Hr) IV Continuous <Continuous>  dextrose 5%. 1000 milliLiter(s) (50 mL/Hr) IV Continuous <Continuous>  dextrose 50% Injectable 12.5 Gram(s) IV Push once  dextrose 50% Injectable 25 Gram(s) IV Push once  dextrose 50% Injectable 25 Gram(s) IV Push once  glucagon  Injectable 1 milliGRAM(s) IntraMuscular once  insulin lispro (ADMELOG) corrective regimen sliding scale   SubCutaneous at bedtime  insulin lispro (ADMELOG) corrective regimen sliding scale   SubCutaneous three times a day before meals  metoprolol succinate ER 75 milliGRAM(s) Oral two times a day  omega-3-Acid Ethyl Esters 4 Gram(s) Oral daily  pantoprazole    Tablet 40 milliGRAM(s) Oral before breakfast  tamsulosin 0.4 milliGRAM(s) Oral at bedtime  tiotropium 2.5 MICROgram(s) Inhaler 2 Puff(s) Inhalation daily    MEDICATIONS  (PRN):  acetaminophen     Tablet .. 650 milliGRAM(s) Oral every 6 hours PRN Temp greater or equal to 38C (100.4F), Mild Pain (1 - 3)  albuterol    90 MICROgram(s) HFA Inhaler 2 Puff(s) Inhalation every 6 hours PRN Shortness of Breath and/or Wheezing  carboxymethylcellulose 0.5% (Preservative-Free) Ophthalmic Solution 1 Drop(s) Both EYES two times a day PRN dry eyes  dextrose Oral Gel 15 Gram(s) Oral once PRN Blood Glucose LESS THAN 70 milliGRAM(s)/deciliter  melatonin 3 milliGRAM(s) Oral at bedtime PRN Sleep  sodium chloride 0.65% Nasal 1 Spray(s) Both Nostrils every 6 hours PRN Nasal Congestion      CAPILLARY BLOOD GLUCOSE      POCT Blood Glucose.: 165 mg/dL (16 Jul 2023 12:12)  POCT Blood Glucose.: 150 mg/dL (16 Jul 2023 07:42)  POCT Blood Glucose.: 164 mg/dL (15 Jul 2023 21:15)  POCT Blood Glucose.: 158 mg/dL (15 Jul 2023 16:29)    I&O's Summary    15 Jul 2023 07:01  -  16 Jul 2023 07:00  --------------------------------------------------------  IN: 240 mL / OUT: 650 mL / NET: -410 mL    16 Jul 2023 07:01  -  16 Jul 2023 13:12  --------------------------------------------------------  IN: 120 mL / OUT: 200 mL / NET: -80 mL        PHYSICAL EXAM:  Vital Signs Last 24 Hrs  T(C): 37.1 (16 Jul 2023 11:15), Max: 37.1 (15 Jul 2023 13:22)  T(F): 98.7 (16 Jul 2023 11:15), Max: 98.8 (15 Jul 2023 13:22)  HR: 103 (16 Jul 2023 11:15) (100 - 120)  BP: 100/67 (16 Jul 2023 11:15) (100/63 - 119/83)  BP(mean): --  RR: 18 (16 Jul 2023 11:15) (16 - 18)  SpO2: 100% (16 Jul 2023 11:15) (100% - 100%)    Parameters below as of 16 Jul 2023 11:15  Patient On (Oxygen Delivery Method): nasal cannula  O2 Flow (L/min): 3    CONSTITUTIONAL: NAD, well-developed  EYES: PERRLA; conjunctiva and sclera clear  ENMT: Moist oral mucosa  RESPIRATORY: Normal respiratory effort; lungs are clear to auscultation bilaterally  CARDIOVASCULAR: irregualr , systolic murmur + RLE edema;  Peripheral pulses are 2+ bilaterally  ABDOMEN: Nontender to palpation, normoactive bowel sounds, no rebound/guarding;   MUSCULOSKELETAL:  no clubbing or cyanosis of digits; no joint swelling or tenderness to palpation, moving all extremities   PSYCH: A+O to person, place, and time; affect appropriate  NEUROLOGY: non focal, patient ambulating without assist  SKIN: RLE venous stasis changes, several areas of ecchymosis on UEs (R>L) with some swelling     LABS:                        7.0    7.04  )-----------( Clumped    ( 16 Jul 2023 09:41 )             22.8     07-15    138  |  102  |  38<H>  ----------------------------<  129<H>  4.7   |  24  |  1.65<H>    Ca    8.9      15 Jul 2023 09:53      PT/INR - ( 16 Jul 2023 07:25 )   PT: 12.6 sec;   INR: 1.09 ratio         PTT - ( 16 Jul 2023 07:25 )  PTT:25.5 sec      Urinalysis Basic - ( 15 Jul 2023 09:53 )    Color: x / Appearance: x / SG: x / pH: x  Gluc: 129 mg/dL / Ketone: x  / Bili: x / Urobili: x   Blood: x / Protein: x / Nitrite: x   Leuk Esterase: x / RBC: x / WBC x   Sq Epi: x / Non Sq Epi: x / Bacteria: x          RADIOLOGY & ADDITIONAL TESTS:  Results Reviewed:   Imaging Personally Reviewed:  Electrocardiogram Personally Reviewed:    COORDINATION OF CARE:  Care Discussed with Consultants/Other Providers [Y/N]:  Prior or Outpatient Records Reviewed [Y/N]:

## 2023-07-16 NOTE — CONSULT NOTE ADULT - ATTENDING COMMENTS
78-yr-old man with severe aortic stenosis in addition to other cardiopulmonary comorbidities seen in consult for anemia and thrombocytopenia. Patient endorses that he has been on weekly iron infusion for over two years. Complete upper and lower GI workup including capsule endoscopy reportedly have failed to find any bleeding source. Patient states that his mother and sister also needed IV iron routinely. Chemistry labs are somewhat suggestive of iron deficiency. Peripheral smear shows numerous small platelet clumps attesting pseudothrombocytopenia; red cell morphology suspicious for fragmentation hemolysis and B12/Folate deficiency. 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 Please send the w/u as suggested in fellow's note. Patient will benefit from a dose of iron infusion. He must have a repeat in-house GI w/u. Will follow up. 78-yr-old man with severe aortic stenosis in addition to other cardiopulmonary comorbidities seen in consult for anemia and thrombocytopenia. Patient endorses that he has been on weekly iron infusion for over two years. Complete upper and lower GI workup including capsule endoscopy reportedly have failed to find any bleeding source. Patient states that his mother and sister also needed IV iron routinely. Chemistry labs are somewhat suggestive of iron deficiency. Peripheral smear shows numerous small platelet clumps attesting pseudothrombocytopenia; red cell morphology suspicious for fragmentation hemolysis and B12/Folate deficiency. 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 Please send the w/u as suggested in fellow's note; please add an U/A. Patient will benefit from a dose of iron infusion. He must have a repeat in-house GI w/u. Will follow up.

## 2023-07-16 NOTE — PROGRESS NOTE ADULT - PROBLEM SELECTOR PLAN 2
- Found to have "large circumferential pericardial effusion up to 2.6 cm without evidence of tamponade physiology" on outpt TTE (7/3/23).- Remains asymptomatic.   - Prior TTE (10/3/22) showed "moderate pericardial effusion, measuring about 1.0 cm superior to the RA; otherwise small circumferential pericardial effusion." Unclear underlying etiology of effusion, but possibly in setting of CHF.  - off Lasix given SHEYLA.  strict I/Os, monitor UOP  - repeat echo as per card 7/16 - agree   - Per recs from cardiology and IR teams: no plan for pericardiocentesis at this time.

## 2023-07-16 NOTE — CONSULT NOTE ADULT - REASON FOR ADMISSION
worsening pericardial effusion

## 2023-07-16 NOTE — PROGRESS NOTE ADULT - PROBLEM SELECTOR PLAN 11
- DVT ppx: off AC due to bleeding.   - Diet: DASH/CC  - Dispo: pending clinical course    - 7/14: patient's son updated on the phone.  7/15 7/16 attempted to call answering machine -left message [phone] Uriel

## 2023-07-16 NOTE — PROGRESS NOTE ADULT - PROBLEM SELECTOR PLAN 3
Unclear hx of ?CHF, possibly has chronic HFpEF with severe AS -- TTE w/ unchanged mod to large pericardial effusion ; +severe AS  - CT surgery consulted for severe AS ; workup for TAVR in progress ; TAVR CT ordered, F/U plan for LHC -postponed and cancelled due to thrombocytopenia and RLE hematoma.  - diuretic on HOLD given SHEYLA - Cr improving.   - strict I/Os, standing weights daily  - Cardiology following ; reccs appreciated

## 2023-07-16 NOTE — PROGRESS NOTE ADULT - ASSESSMENT
78 M with hx. of Afib (on Eliquis), severe AS, mod-severe TR, CAD/MI (30 yrs ago, medically managed, no PCI), ?CHF, COPD (not on O2 at home), former heavy smoker, HTN, HLD, DM2, gout.   Was sent in for concern re outpatient echocardiogram which demonstrated worsening pericardial effusion. Effusion not safely accessible, no evidence of tamponade.  A.S., now pending TAVR work up  Now with leg pain and R thigh hematoma    1. R Thigh Hematoma/Intramuscular Hematoma - no IV contrast administered so unable to assess active extravasation   2. Severe Symptomatic Low Flow Low Gradient AS  3. Afib w/ RVR  4. Large Pericardial Effusion - TTE 7/5 no obvious echo tamponade     RECOMMENDATIONS:  - Heparin gtt/AC on hold given c/f bleed   - If concern for hypotension/tachycardia would obtain repeat limited TTE for effusion   - LHC/Structural Evaluation on hold given acute blood loss anemia       78 M with hx. of Afib (on Eliquis), severe AS, mod-severe TR, CAD/MI (30 yrs ago, medically managed, no PCI), ?CHF, COPD (not on O2 at home), former heavy smoker, HTN, HLD, DM2, gout.   Was sent in for concern re outpatient echocardiogram which demonstrated worsening pericardial effusion. Effusion not safely accessible, no evidence of tamponade.  A.S., now pending TAVR work up  Now with leg pain and R thigh hematoma    1. R Thigh Hematoma/Intramuscular Hematoma - no IV contrast administered so unable to assess active extravasation   2. Severe Symptomatic Low Flow Low Gradient AS  3. Afib w/ RVR  4. Large Pericardial Effusion - TTE 7/5 no obvious echo tamponade     RECOMMENDATIONS:  - Heparin gtt/AC on hold given c/f bleed   -Repeat TTE  -LHC/Structural Evaluation on hold given acute blood loss anemia

## 2023-07-17 LAB
ALBUMIN SERPL ELPH-MCNC: 3.3 G/DL — SIGNIFICANT CHANGE UP (ref 3.3–5)
ALP SERPL-CCNC: 92 U/L — SIGNIFICANT CHANGE UP (ref 40–120)
ALT FLD-CCNC: 21 U/L — SIGNIFICANT CHANGE UP (ref 10–45)
ANION GAP SERPL CALC-SCNC: 11 MMOL/L — SIGNIFICANT CHANGE UP (ref 5–17)
AST SERPL-CCNC: 29 U/L — SIGNIFICANT CHANGE UP (ref 10–40)
BILIRUB SERPL-MCNC: 1.4 MG/DL — HIGH (ref 0.2–1.2)
BUN SERPL-MCNC: 42 MG/DL — HIGH (ref 7–23)
CALCIUM SERPL-MCNC: 8.8 MG/DL — SIGNIFICANT CHANGE UP (ref 8.4–10.5)
CHLORIDE SERPL-SCNC: 105 MMOL/L — SIGNIFICANT CHANGE UP (ref 96–108)
CO2 SERPL-SCNC: 23 MMOL/L — SIGNIFICANT CHANGE UP (ref 22–31)
CREAT SERPL-MCNC: 1.69 MG/DL — HIGH (ref 0.5–1.3)
EGFR: 41 ML/MIN/1.73M2 — LOW
FERRITIN SERPL-MCNC: 43 NG/ML — SIGNIFICANT CHANGE UP (ref 30–400)
FOLATE SERPL-MCNC: 7 NG/ML — SIGNIFICANT CHANGE UP
GLUCOSE BLDC GLUCOMTR-MCNC: 134 MG/DL — HIGH (ref 70–99)
GLUCOSE BLDC GLUCOMTR-MCNC: 139 MG/DL — HIGH (ref 70–99)
GLUCOSE BLDC GLUCOMTR-MCNC: 152 MG/DL — HIGH (ref 70–99)
GLUCOSE BLDC GLUCOMTR-MCNC: 161 MG/DL — HIGH (ref 70–99)
GLUCOSE SERPL-MCNC: 120 MG/DL — HIGH (ref 70–99)
HCT VFR BLD CALC: 24.4 % — LOW (ref 39–50)
HGB BLD-MCNC: 7.4 G/DL — LOW (ref 13–17)
MCHC RBC-ENTMCNC: 26.1 PG — LOW (ref 27–34)
MCHC RBC-ENTMCNC: 30.3 GM/DL — LOW (ref 32–36)
MCV RBC AUTO: 86.2 FL — SIGNIFICANT CHANGE UP (ref 80–100)
NRBC # BLD: 0 /100 WBCS — SIGNIFICANT CHANGE UP (ref 0–0)
PLATELET # BLD AUTO: SIGNIFICANT CHANGE UP K/UL (ref 150–400)
POTASSIUM SERPL-MCNC: 4.9 MMOL/L — SIGNIFICANT CHANGE UP (ref 3.5–5.3)
POTASSIUM SERPL-SCNC: 4.9 MMOL/L — SIGNIFICANT CHANGE UP (ref 3.5–5.3)
PROT SERPL-MCNC: 5 G/DL — LOW (ref 6–8.3)
PROT SERPL-MCNC: 5.6 G/DL — LOW (ref 6–8.3)
RBC # BLD: 2.83 M/UL — LOW (ref 4.2–5.8)
RBC # FLD: 19.1 % — HIGH (ref 10.3–14.5)
SODIUM SERPL-SCNC: 139 MMOL/L — SIGNIFICANT CHANGE UP (ref 135–145)
VIT B12 SERPL-MCNC: 458 PG/ML — SIGNIFICANT CHANGE UP (ref 232–1245)
VWF AG ACT/NOR PPP IA: 231 % — HIGH (ref 63–170)
VWF:RCO ACT/NOR PPP PL AGG: 305 % — HIGH (ref 45–133)
WBC # BLD: 7.15 K/UL — SIGNIFICANT CHANGE UP (ref 3.8–10.5)
WBC # FLD AUTO: 7.15 K/UL — SIGNIFICANT CHANGE UP (ref 3.8–10.5)

## 2023-07-17 PROCEDURE — 93308 TTE F-UP OR LMTD: CPT | Mod: 26

## 2023-07-17 PROCEDURE — 83020 HEMOGLOBIN ELECTROPHORESIS: CPT | Mod: 26

## 2023-07-17 PROCEDURE — 99233 SBSQ HOSP IP/OBS HIGH 50: CPT | Mod: NC

## 2023-07-17 PROCEDURE — 99232 SBSQ HOSP IP/OBS MODERATE 35: CPT | Mod: GC

## 2023-07-17 PROCEDURE — 76705 ECHO EXAM OF ABDOMEN: CPT | Mod: 26

## 2023-07-17 PROCEDURE — 93971 EXTREMITY STUDY: CPT | Mod: 26,RT

## 2023-07-17 PROCEDURE — 99232 SBSQ HOSP IP/OBS MODERATE 35: CPT

## 2023-07-17 PROCEDURE — 99233 SBSQ HOSP IP/OBS HIGH 50: CPT

## 2023-07-17 RX ORDER — IRON SUCROSE 20 MG/ML
200 INJECTION, SOLUTION INTRAVENOUS EVERY 24 HOURS
Refills: 0 | Status: COMPLETED | OUTPATIENT
Start: 2023-07-17 | End: 2023-07-21

## 2023-07-17 RX ORDER — PETROLATUM,WHITE
1 JELLY (GRAM) TOPICAL
Refills: 0 | Status: DISCONTINUED | OUTPATIENT
Start: 2023-07-17 | End: 2023-07-26

## 2023-07-17 RX ADMIN — BECLOMETHASONE DIPROPIONATE 2 PUFF(S): 40 AEROSOL, METERED RESPIRATORY (INHALATION) at 22:11

## 2023-07-17 RX ADMIN — Medication 100 MILLIGRAM(S): at 12:04

## 2023-07-17 RX ADMIN — Medication 4 GRAM(S): at 12:03

## 2023-07-17 RX ADMIN — CHLORHEXIDINE GLUCONATE 1 APPLICATION(S): 213 SOLUTION TOPICAL at 12:07

## 2023-07-17 RX ADMIN — TAMSULOSIN HYDROCHLORIDE 0.4 MILLIGRAM(S): 0.4 CAPSULE ORAL at 22:11

## 2023-07-17 RX ADMIN — PANTOPRAZOLE SODIUM 40 MILLIGRAM(S): 20 TABLET, DELAYED RELEASE ORAL at 06:16

## 2023-07-17 RX ADMIN — Medication 1 APPLICATION(S): at 22:11

## 2023-07-17 RX ADMIN — ATORVASTATIN CALCIUM 40 MILLIGRAM(S): 80 TABLET, FILM COATED ORAL at 22:11

## 2023-07-17 RX ADMIN — BECLOMETHASONE DIPROPIONATE 2 PUFF(S): 40 AEROSOL, METERED RESPIRATORY (INHALATION) at 10:09

## 2023-07-17 RX ADMIN — IRON SUCROSE 110 MILLIGRAM(S): 20 INJECTION, SOLUTION INTRAVENOUS at 18:31

## 2023-07-17 RX ADMIN — TIOTROPIUM BROMIDE 2 PUFF(S): 18 CAPSULE ORAL; RESPIRATORY (INHALATION) at 12:04

## 2023-07-17 RX ADMIN — Medication 75 MILLIGRAM(S): at 18:28

## 2023-07-17 NOTE — PROGRESS NOTE ADULT - PROBLEM SELECTOR PLAN 7
Hx of CAD/MI (30 yrs ago, medically managed, no PCI)  - hsTrop 30->27; EKG w/o signs of acute ischemia; low suspicion of ACS  - c/w home statin, and metoprolol  - ASA on HOLD

## 2023-07-17 NOTE — PROGRESS NOTE ADULT - ASSESSMENT
78M (alt MRN [id number]) w/ hx of Afib (on Eliquis), severe AS, mod-severe TR, CAD/MI (30 yrs ago, medically managed, no PCI), ?CHF, COPD (not on O2 at home), former heavy smoker, HTN, HLD, DM2, gout, prior moderate pericardial effusion, now presenting with large pericardial effusion w/o tamponade physiology on outpt TTE.

## 2023-07-17 NOTE — PROGRESS NOTE ADULT - SUBJECTIVE AND OBJECTIVE BOX
[facility]  Fina Mccoy MD  Pager (M-F, 6H-5P): [phone]  Other Times:  [phone]    Patient is a 78y old  Male who presents with a chief complaint of worsening pericardial effusion (16 Jul 2023 14:36)      SUBJECTIVE / OVERNIGHT EVENTS:  feeling about same. afebrile.  no acute overnight issues.  Right LE swelling feels about same.     ADDITIONAL REVIEW OF SYSTEMS: otherwise neg    MEDICATIONS  (STANDING):  allopurinol 100 milliGRAM(s) Oral daily  atorvastatin 40 milliGRAM(s) Oral at bedtime  beclomethasone  80 MICROgram(s) Inhaler 2 Puff(s) Inhalation two times a day  chlorhexidine 2% Cloths 1 Application(s) Topical daily  dextrose 5%. 1000 milliLiter(s) (100 mL/Hr) IV Continuous <Continuous>  dextrose 5%. 1000 milliLiter(s) (50 mL/Hr) IV Continuous <Continuous>  dextrose 50% Injectable 12.5 Gram(s) IV Push once  dextrose 50% Injectable 25 Gram(s) IV Push once  dextrose 50% Injectable 25 Gram(s) IV Push once  glucagon  Injectable 1 milliGRAM(s) IntraMuscular once  insulin lispro (ADMELOG) corrective regimen sliding scale   SubCutaneous three times a day before meals  insulin lispro (ADMELOG) corrective regimen sliding scale   SubCutaneous at bedtime  metoprolol succinate ER 75 milliGRAM(s) Oral two times a day  omega-3-Acid Ethyl Esters 4 Gram(s) Oral daily  pantoprazole    Tablet 40 milliGRAM(s) Oral before breakfast  tamsulosin 0.4 milliGRAM(s) Oral at bedtime  tiotropium 2.5 MICROgram(s) Inhaler 2 Puff(s) Inhalation daily    MEDICATIONS  (PRN):  acetaminophen     Tablet .. 650 milliGRAM(s) Oral every 6 hours PRN Temp greater or equal to 38C (100.4F), Mild Pain (1 - 3)  albuterol    90 MICROgram(s) HFA Inhaler 2 Puff(s) Inhalation every 6 hours PRN Shortness of Breath and/or Wheezing  carboxymethylcellulose 0.5% (Preservative-Free) Ophthalmic Solution 1 Drop(s) Both EYES two times a day PRN dry eyes  dextrose Oral Gel 15 Gram(s) Oral once PRN Blood Glucose LESS THAN 70 milliGRAM(s)/deciliter  melatonin 3 milliGRAM(s) Oral at bedtime PRN Sleep  sodium chloride 0.65% Nasal 1 Spray(s) Both Nostrils every 6 hours PRN Nasal Congestion      CAPILLARY BLOOD GLUCOSE      POCT Blood Glucose.: 139 mg/dL (17 Jul 2023 11:41)  POCT Blood Glucose.: 134 mg/dL (17 Jul 2023 07:48)  POCT Blood Glucose.: 183 mg/dL (16 Jul 2023 21:13)  POCT Blood Glucose.: 150 mg/dL (16 Jul 2023 16:39)    I&O's Summary    16 Jul 2023 07:01  -  17 Jul 2023 07:00  --------------------------------------------------------  IN: 600 mL / OUT: 1375 mL / NET: -775 mL    17 Jul 2023 07:01  -  17 Jul 2023 12:14  --------------------------------------------------------  IN: 0 mL / OUT: 525 mL / NET: -525 mL        PHYSICAL EXAM:  Vital Signs Last 24 Hrs  T(C): 36.9 (17 Jul 2023 11:22), Max: 36.9 (16 Jul 2023 20:21)  T(F): 98.5 (17 Jul 2023 11:22), Max: 98.5 (16 Jul 2023 20:21)  HR: 95 (17 Jul 2023 11:22) (91 - 106)  BP: 114/77 (17 Jul 2023 11:22) (98/66 - 115/75)  BP(mean): --  RR: 18 (17 Jul 2023 11:22) (18 - 18)  SpO2: 99% (17 Jul 2023 11:22) (99% - 100%)    Parameters below as of 17 Jul 2023 11:22  Patient On (Oxygen Delivery Method): nasal cannula  O2 Flow (L/min): 3    CONSTITUTIONAL: NAD  EYES: PERRLA; conjunctiva and sclera clear  ENMT: Moist oral mucosa  RESPIRATORY: Normal respiratory effort; lungs are clear to auscultation bilaterally  CARDIOVASCULAR: irregular, systolic murmur + RLE edema;  Peripheral pulses are 2+ bilaterally  ABDOMEN: Nontender to palpation, normoactive bowel sounds, no rebound/guarding;   MUSCULOSKELETAL:  no clubbing or cyanosis of digits; no joint swelling or tenderness to palpation, moving all extremities   PSYCH: A+O to person, place, and time; affect appropriate  NEUROLOGY: non focal, patient ambulating without assist  SKIN: RLE venous stasis changes, several areas of ecchymosis on UEs (R>L) with some swelling     LABS:                        7.4    7.15  )-----------( Clumped    ( 17 Jul 2023 07:28 )             24.4     07-17    139  |  105  |  42<H>  ----------------------------<  120<H>  4.9   |  23  |  1.69<H>    Ca    8.8      17 Jul 2023 07:28    TPro  5.6<L>  /  Alb  3.3  /  TBili  1.4<H>  /  DBili  x   /  AST  29  /  ALT  21  /  AlkPhos  92  07-17    PT/INR - ( 16 Jul 2023 07:25 )   PT: 12.6 sec;   INR: 1.09 ratio         PTT - ( 16 Jul 2023 07:25 )  PTT:25.5 sec      Urinalysis Basic - ( 17 Jul 2023 07:28 )    Color: x / Appearance: x / SG: x / pH: x  Gluc: 120 mg/dL / Ketone: x  / Bili: x / Urobili: x   Blood: x / Protein: x / Nitrite: x   Leuk Esterase: x / RBC: x / WBC x   Sq Epi: x / Non Sq Epi: x / Bacteria: x          RADIOLOGY & ADDITIONAL TESTS:  Results Reviewed:   Imaging Personally Reviewed:  Electrocardiogram Personally Reviewed:    COORDINATION OF CARE:  Care Discussed with Consultants/Other Providers [Y/N]:  Prior or Outpatient Records Reviewed [Y/N]:

## 2023-07-17 NOTE — PROGRESS NOTE ADULT - SUBJECTIVE AND OBJECTIVE BOX
[facility] DIVISION OF KIDNEY DISEASES AND HYPERTENSION -- FOLLOW UP NOTE  --------------------------------------------------------------------------------  Chief Complaint:    24 hour events/subjective:        PAST HISTORY  --------------------------------------------------------------------------------  No significant changes to PMH, PSH, FHx, SHx, unless otherwise noted    ALLERGIES & MEDICATIONS  --------------------------------------------------------------------------------  Allergies    penicillins (Unknown)    Intolerances      Standing Inpatient Medications  allopurinol 100 milliGRAM(s) Oral daily  atorvastatin 40 milliGRAM(s) Oral at bedtime  beclomethasone  80 MICROgram(s) Inhaler 2 Puff(s) Inhalation two times a day  chlorhexidine 2% Cloths 1 Application(s) Topical daily  dextrose 5%. 1000 milliLiter(s) IV Continuous <Continuous>  dextrose 5%. 1000 milliLiter(s) IV Continuous <Continuous>  dextrose 50% Injectable 12.5 Gram(s) IV Push once  dextrose 50% Injectable 25 Gram(s) IV Push once  dextrose 50% Injectable 25 Gram(s) IV Push once  glucagon  Injectable 1 milliGRAM(s) IntraMuscular once  insulin lispro (ADMELOG) corrective regimen sliding scale   SubCutaneous three times a day before meals  insulin lispro (ADMELOG) corrective regimen sliding scale   SubCutaneous at bedtime  metoprolol succinate ER 75 milliGRAM(s) Oral two times a day  omega-3-Acid Ethyl Esters 4 Gram(s) Oral daily  pantoprazole    Tablet 40 milliGRAM(s) Oral before breakfast  tamsulosin 0.4 milliGRAM(s) Oral at bedtime  tiotropium 2.5 MICROgram(s) Inhaler 2 Puff(s) Inhalation daily    PRN Inpatient Medications  acetaminophen     Tablet .. 650 milliGRAM(s) Oral every 6 hours PRN  albuterol    90 MICROgram(s) HFA Inhaler 2 Puff(s) Inhalation every 6 hours PRN  carboxymethylcellulose 0.5% (Preservative-Free) Ophthalmic Solution 1 Drop(s) Both EYES two times a day PRN  dextrose Oral Gel 15 Gram(s) Oral once PRN  melatonin 3 milliGRAM(s) Oral at bedtime PRN  sodium chloride 0.65% Nasal 1 Spray(s) Both Nostrils every 6 hours PRN      REVIEW OF SYSTEMS  --------------------------------------------------------------------------------  Gen: No weight changes, fatigue, fevers/chills, weakness  Skin: No rashes  Head/Eyes/Ears/Mouth: No headache; Normal hearing; Normal vision w/o blurriness; No sinus pain/discomfort, sore throat  Respiratory: No dyspnea, cough, wheezing, hemoptysis  CV: No chest pain, PND, orthopnea  GI: No abdominal pain, diarrhea, constipation, nausea, vomiting, melena, hematochezia  : No increased frequency, dysuria, hematuria, nocturia  MSK: No joint pain/swelling; no back pain; no edema  Neuro: No dizziness/lightheadedness, weakness, seizures, numbness, tingling  Heme: No easy bruising or bleeding  Endo: No heat/cold intolerance  Psych: No significant nervousness, anxiety, stress, depression    All other systems were reviewed and are negative, except as noted.    VITALS/PHYSICAL EXAM  --------------------------------------------------------------------------------  T(C): 36.9 (07-17-23 @ 11:22), Max: 36.9 (07-16-23 @ 20:21)  HR: 95 (07-17-23 @ 11:22) (91 - 106)  BP: 114/77 (07-17-23 @ 11:22) (98/66 - 115/75)  RR: 18 (07-17-23 @ 11:22) (18 - 18)  SpO2: 99% (07-17-23 @ 11:22) (99% - 100%)  Wt(kg): --        07-16-23 @ 07:01  -  07-17-23 @ 07:00  --------------------------------------------------------  IN: 600 mL / OUT: 1375 mL / NET: -775 mL    07-17-23 @ 07:01  -  07-17-23 @ 14:55  --------------------------------------------------------  IN: 0 mL / OUT: 525 mL / NET: -525 mL      Physical Exam:  	Gen: NAD, well-appearing  	HEENT: PERRL, supple neck, clear oropharynx  	Pulm: CTA B/L  	CV: RRR, S1S2; no rub  	Back: No spinal or CVA tenderness; no sacral edema  	Abd: +BS, soft, nontender/nondistended  	: No suprapubic tenderness  	UE: Warm, FROM, no clubbing, intact strength; no edema; no asterixis  	LE: Warm, FROM, no clubbing, intact strength; no edema  	Neuro: No focal deficits, intact gait  	Psych: Normal affect and mood  	Skin: Warm, without rashes  	Vascular access:    LABS/STUDIES  --------------------------------------------------------------------------------              7.4    7.15  >-----------<  Clumped    [07-17-23 @ 07:28]              24.4     139  |  105  |  42  ----------------------------<  120      [07-17-23 @ 07:28]  4.9   |  23  |  1.69        Ca     8.8     [07-17-23 @ 07:28]    TPro  5.6  /  Alb  3.3  /  TBili  1.4  /  DBili  x   /  AST  29  /  ALT  21  /  AlkPhos  92  [07-17-23 @ 07:28]    PT/INR: PT 12.6 , INR 1.09       [07-16-23 @ 07:25]  PTT: 25.5       [07-16-23 @ 07:25]          [07-16-23 @ 07:21]    Creatinine Trend:  SCr 1.69 [07-17 @ 07:28]  SCr 1.65 [07-15 @ 09:53]  SCr 1.48 [07-14 @ 07:20]  SCr 1.56 [07-13 @ 06:02]  SCr 1.56 [07-12 @ 07:38]    Urinalysis - [07-17-23 @ 07:28]      Color  / Appearance  / SG  / pH       Gluc 120 / Ketone   / Bili  / Urobili        Blood  / Protein  / Leuk Est  / Nitrite       RBC  / WBC  / Hyaline  / Gran  / Sq Epi  / Non Sq Epi  / Bacteria       Iron 55, TIBC 310, %sat 18      [07-11-23 @ 06:40]  Ferritin 43      [07-17-23 @ 07:28]    HBsAg Nonreact      [07-08-23 @ 06:48]  HCV 0.10, Nonreact      [07-05-23 @ 06:29]    C3 Complement 133      [07-08-23 @ 06:48]  C4 Complement 32      [07-08-23 @ 06:48]  Immunofixation Serum:   Weak IgG Lambda Band Identified    Reference Range: None Detected      [07-08-23 @ 06:48]

## 2023-07-17 NOTE — PROGRESS NOTE ADULT - PROBLEM SELECTOR PLAN 4
- IV Lasix d/essie  - Nephrology consulted; reccs appreciated   - Hep C non reactive. f/up HBsAg, serum immunofixation, C3 and C4.   - Renal US shows both kidneys are echogenic, compatible with medical renal disease. No renal mass, hydronephrosis or calculus  - monitor BMP, Is/Os

## 2023-07-17 NOTE — PROGRESS NOTE ADULT - SUBJECTIVE AND OBJECTIVE BOX
Subjective  No acute events reported overnight.  Patient has needed 1 unit packed red blood cell on 7/15/2023 and 7/17/2023.  Denies any blood in his stool or urine.  Denies any change in the color or caliber of his stool.  He has an ACE wrapped bandage that has been placed tightly around his right thigh.  He notes that he has largely been in the bed since Friday.  Has not bared weight on his right leg.  Reports increase in swelling in right lower leg.  His right leg feels weak but less weak than it had on prior days.  Denies any fevers, chills, sweats, lighted sensation, dizziness or palpitations.    Review of systems  14 point review of systems is otherwise unremarkable except what is described above in the history of present illness    Physical examination  No apparent distress, alert and oriented x3, appropriate affect  JVD is not elevated, supple, no lymphadenopathy  Clear to auscultation bilateral with no wheezing rhonchi crackles  Regular rate and rhythm with 3 out of 6 crescendo decrescendo murmur heard loudest at the right upper sternal border rating to the carotids   Positive bowel sound, soft, nontender, nondistended, no mass and guarding or rebound tenderness  No clubbing or cyanosis  ACE wrapped tightly around right thigh  1+ DP/PT pulses bilaterally  2+ edema below right knee on right lower leg    Assessment/plan  Severe low-flow low gradient aortic valve stenosis  Pericardial effusion    --Discussed with the patient, his wife and son who are at his bedside concerning events that have transpired to date and findings on his echocardiogram study.    -- The patient has longstanding history of anemia requiring iron transfusions for which she is followed by outpatient hematologist/Dr. Sylvester.  Due to history of low iron/ferritin he has been evaluated extensively by GI as an outpatient.  Per report endoscopy/colonoscopy/capsule study was unremarkable in nature.  Due to concern for continued blood loss that is why he was placed on the lower dose of Eliquis 2.5 mg twice a day.  --Prior to proceeding with any further work-up for TAVR the patient will need to be cleared by hematology and gastroenterology.  --If the patient is to proceed with cardiac catheterization/TAVR he may need to be administered high doses of heparin.  If there is severe obstructive coronary artery disease the patient would be placed on DAPT therapy.  Safety to receive blood thinners needs to be determined prior to proceeding.  --There is concern that there may be clot in his left atrial appendage.  In the past the patient was on Eliquis 2.5 mg twice a day.  It appears that he was being underdosed.  -- For significant swelling in his right lower leg that has been bandaged with ACE wraps it is recommended that he be ruled out for DVT.  --Patient with fluctuating H&H.  Further assessment as per hematology/GI/medicine teams.  It is not clear if the patient has been ruled out for compartment syndrome due to intramuscular hematoma.  --Based upon TTE the patient and his family aware that there is concern he has low-flow low gradient severe aortic valve stenosis.  Discussed what is aortic valve stenosis.  The associated signs and symptoms of severe aortic valve stenosis were reviewed.  The natural pathophysiology of untreated severe aortic valve stenosis was gone over.  The various treatment options were gone over along with their pros/cons and risks/benefits including medical therapy, TAVR and SAVR.  If the patient needs to undergo valve replacement he would prefer to undergo TAVR.  Indications and details of the TAVR procedure reviewed.  Benefits and risks were gone over.  Risks include but not limited to infection, bleeding, arrhythmia, TIA/stroke, hemodynamic instability, vascular injury, need for surgery and death.    -- The patient was found to have a pericardial effusion.  Further assessment/work-up as per primary cardiology team.  -- Continue telemetry monitoring.    All questions and concerns of the patient and his family were addressed.    Findings and plan were discussed with cardiology/Dr. Ríos and medicine/Dr. Mccoy.    Greater than 35 minutes were spent on total encounter.  The necessity of the time spent during the encounter on this date of service was due to education, assessment and coordination of care.

## 2023-07-17 NOTE — PROGRESS NOTE ADULT - PROBLEM SELECTOR PLAN 2
- Found to have "large circumferential pericardial effusion up to 2.6 cm without evidence of tamponade physiology" on outpt TTE (7/3/23).- Remains asymptomatic. repeat again 7/17 remains unchanged   - Prior TTE (10/3/22) showed "moderate pericardial effusion, measuring about 1.0 cm superior to the RA; otherwise small circumferential pericardial effusion." Unclear underlying etiology of effusion, but possibly in setting of CHF.  - off Lasix given SHEYLA.  strict I/Os, monitor UOP  - Per recs from cardiology and IR teams: no plan for pericardiocentesis at this time.

## 2023-07-17 NOTE — CHART NOTE - NSCHARTNOTEFT_GEN_A_CORE
d/w Dr. Dan and card team.   Will postpone LHC for TAVR w/u for now until better assessement and eval of current anemia (s/p transfusions over the weekend) . RLE hematoma looks about same. will remove ACE wrap .  d/w renal about CTA of RLE - ok to proceed. ordered. also check [facility] duplex r/o DVT as well.

## 2023-07-17 NOTE — PROGRESS NOTE ADULT - PROBLEM SELECTOR PLAN 5
Presented with O2 sat down to 88% on RA. Improved on 1-2L O2NC. Suspect in setting of acute on chronic CHF.  - diuretic held for now/. CHF/pericardial effusion as above  - monitor respiratory status, wean off supplemental O2 as tolerated

## 2023-07-17 NOTE — PROGRESS NOTE ADULT - PROBLEM SELECTOR PLAN 1
Patient with CKD in setting of HTN, Tobacco Abuse Hx and Heart Disease. Per Javi/OLGA review, appears he has had a SCr ranging from 1.4-2.0 dating back to 2017. Most recent SCr prior to admission was 1.42 on 10/7/22.  LAst few days this admission 1/5-1.7 range. Ongoing anemia and cardiac issues with pericardial eff and unclear anemia.  IgG lambda band noted    UA reviewed. UPCR of 0.1  Renal US from 7/6 demonstrated "Both kidneys are echogenic, compatible with medical renal disease. No renal mass, hydronephrosis or calculus is visualized sonographically." He does have bilateral renal cysts.     Ok to proceed with contrast study as crt has been stable for vascular LE imaging  TAVR workup on hold due to anemia at this point    Gloria Hilton MD  Office   Contact me directly via [employer] Teams     (After 5 pm or on weekends please page the on-call fellow/attending, can check [email or web address] for schedule. Login is jean-pierre ballard, schedule under [facility] medicine, psych, derm)

## 2023-07-17 NOTE — PROGRESS NOTE ADULT - PROBLEM SELECTOR PROBLEM 5
5/24/2022    [facility]    [facility] [address]     saw Dr Jesse Wise at [address] [address] [place] [address] Road  Ephraim Solares MD at [facility] 10/14/2019    EGD BIOPSY performed by Ephraim Solares MD at [address] [facility] 3/4/2020    EGD ESOPHAGOGASTRODUODENOSCOPY performed by Ephraim Solares MD at [facility]  transcription, some errors in transcription may have occurred.     Electronically signed by Eveline Gomez PA-C on 5/24/2022 Acute hypoxemic respiratory failure

## 2023-07-17 NOTE — PROGRESS NOTE ADULT - ASSESSMENT
78M PMH Afib (on Eliquis), severe AS, mod-severe TR, CAD/MI (30 yrs ago, medically managed, no PCI), ?CHF, COPD (not on O2 at home), former heavy smoker, HTN, HLD, DM2, gout, prior moderate pericardial effusion, now presenting with large pericardial effusion w/o tamponade physiology on outpt TTE. Hematology called for thrombocytopenia with platelet clumping and anemia.    Patient has been getting iron transfusions for 2 years, unknown etiology. He has had an endoscopy and colonoscopy with no determination of the cause. Family history of anemia. 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 Will rule out other causes. PF4 negative.    #Anemia  #Thrombocytopenia  - Normocytic anemia to Hgb 7.4 MCV 86.2. Hgb baseline appears to be ~10-12. Peripheral smear (7/16) showing hypersegmented neutrophils, broken and fragmented RBCs, clumped platelets, pencil cells. Ferritin 37, iron 55, consistent with TAYLER.  - Is normally on Eliquis for AFib, was placed on heparin gtt this admission then held i/s/o worsening anemia, thrombocytopenia, and hematoma.   Platelets 70s-90s on blue top platelet test this admission, 125 on normal CBC (7/15). Normal LDH, haptoglobin. Bili 1.0. LFTs WNL. No evidence of hemolysis.  - Pending G6PD, osmotic fragility text, & methylmalonic acid  - Sent vWF activity and antigen, not deficient   - US Abdomen with splenomegaly to ~16cm.  - Recommend GI consult for inpatient endoscopy/colonoscopy for TAYLER (low iron and low ferritin). Pt agreeable.   - Can workup anemia further per outpatient hematologist Dr. Sylvester (), reportedly receives IV iron infusions as outpatient, may have underlying AVMs    Kory Pinedo MD, PGY-5  Hematology/Medical Oncology Fellow  Pager: [phone]  Available on [employer] Teams  After 5pm or on weekends please contact  to page on-call fellow

## 2023-07-17 NOTE — PROGRESS NOTE ADULT - SUBJECTIVE AND OBJECTIVE BOX
ID: 78 M with hx. of Afib (on Eliquis), severe AS, mod-severe TR, CAD/MI (30 yrs ago, medically managed, no PCI), ?CHF, COPD (not on O2 at home), former heavy smoker, HTN, HLD, DM2, gout.   Was sent in for concern re outpatient echocardiogram which demonstrated worsening pericardial effusion. Effusion not safely accessible, no evidence of tamponade. A.S., now pending TAVR work up  Course c.b R thigh hematoma requiring PRBC 2U.    Patient seen and examined at bedside.    Overnight Events: NAEON, feeling okay today, still has mild R thigh pain, has not tried ambulating. Initially planned on [facility] tomorrow in preparation for TAVR however discussion with primary team and structural team yielded plan to reassess leg/bleeding risk (CTA, vascular consult, etc.). Then [facility] once felt stable from a bleeding standpoint    Telemetry:         Current Meds:  acetaminophen     Tablet .. 650 milliGRAM(s) Oral every 6 hours PRN  albuterol    90 MICROgram(s) HFA Inhaler 2 Puff(s) Inhalation every 6 hours PRN  allopurinol 100 milliGRAM(s) Oral daily  atorvastatin 40 milliGRAM(s) Oral at bedtime  beclomethasone  80 MICROgram(s) Inhaler 2 Puff(s) Inhalation two times a day  carboxymethylcellulose 0.5% (Preservative-Free) Ophthalmic Solution 1 Drop(s) Both EYES two times a day PRN  chlorhexidine 2% Cloths 1 Application(s) Topical daily  dextrose 5%. 1000 milliLiter(s) IV Continuous <Continuous>  dextrose 5%. 1000 milliLiter(s) IV Continuous <Continuous>  dextrose 50% Injectable 12.5 Gram(s) IV Push once  dextrose 50% Injectable 25 Gram(s) IV Push once  dextrose 50% Injectable 25 Gram(s) IV Push once  dextrose Oral Gel 15 Gram(s) Oral once PRN  glucagon  Injectable 1 milliGRAM(s) IntraMuscular once  insulin lispro (ADMELOG) corrective regimen sliding scale   SubCutaneous three times a day before meals  insulin lispro (ADMELOG) corrective regimen sliding scale   SubCutaneous at bedtime  iron sucrose IVPB 200 milliGRAM(s) IV Intermittent every 24 hours  melatonin 3 milliGRAM(s) Oral at bedtime PRN  metoprolol succinate ER 75 milliGRAM(s) Oral two times a day  omega-3-Acid Ethyl Esters 4 Gram(s) Oral daily  pantoprazole    Tablet 40 milliGRAM(s) Oral before breakfast  sodium chloride 0.65% Nasal 1 Spray(s) Both Nostrils every 6 hours PRN  tamsulosin 0.4 milliGRAM(s) Oral at bedtime  tiotropium 2.5 MICROgram(s) Inhaler 2 Puff(s) Inhalation daily      Vitals:  T(F): 98.5 (07-17), Max: 98.5 (07-16)  HR: 95 (07-17) (91 - 106)  BP: 114/77 (07-17) (98/66 - 115/75)  RR: 18 (07-17)  SpO2: 99% (07-17)  I&O's Summary    16 Jul 2023 07:01  -  17 Jul 2023 07:00  --------------------------------------------------------  IN: 600 mL / OUT: 1375 mL / NET: -775 mL    17 Jul 2023 07:01  -  17 Jul 2023 15:34  --------------------------------------------------------  IN: 0 mL / OUT: 525 mL / NET: -525 mL        Physical Exam:  Appearance: No acute distress; well appearing  Eyes: PERRL, EOMI, pink conjunctiva  HEENT: Normal oral mucosa  Cardiovascular: RRR, S1, S2, no murmurs, rubs, or gallops; no edema; no JVD  Respiratory: Clear to auscultation bilaterally  Gastrointestinal: soft, non-tender, non-distended with normal bowel sounds  Musculoskeletal: No clubbing; no joint deformity   Neurologic: Non-focal  Lymphatic: No lymphadenopathy  Psychiatry: AAOx3, mood & affect appropriate  Skin: No rashes, ecchymoses, or cyanosis                          7.4    7.15  )-----------( Clumped    ( 17 Jul 2023 07:28 )             24.4     07-17    139  |  105  |  42<H>  ----------------------------<  120<H>  4.9   |  23  |  1.69<H>    Ca    8.8      17 Jul 2023 07:28    TPro  5.6<L>  /  Alb  3.3  /  TBili  1.4<H>  /  DBili  x   /  AST  29  /  ALT  21  /  AlkPhos  92  07-17    PT/INR - ( 16 Jul 2023 07:25 )   PT: 12.6 sec;   INR: 1.09 ratio         PTT - ( 16 Jul 2023 07:25 )  PTT:25.5 sec              New ECG(s): Personally reviewed        A/P      Jean Ríos PGY5  Cardiology Fellow    CHAVA ID: [id number] M with hx. of Afib (on Eliquis), severe AS, mod-severe TR, CAD/MI (30 yrs ago, medically managed, no PCI), ?CHF, COPD (not on O2 at home), former heavy smoker, HTN, HLD, DM2, gout.   Was sent in for concern re outpatient echocardiogram which demonstrated worsening pericardial effusion. Effusion not safely accessible, no evidence of tamponade. A.S., now pending TAVR work up  Course c.b R thigh hematoma requiring PRBC 2U.    Patient seen and examined at bedside.    Overnight Events: NAEON, feeling okay today, still has mild R thigh pain, has not tried ambulating. Initially planned on [facility] tomorrow in preparation for TAVR however discussion with primary team and structural team yielded plan to reassess leg/bleeding risk (CTA, vascular consult, etc.). Then C once felt stable from a bleeding standpoint. Limited TTE shows minimal changes from priorl, reviewed with Dr. Dan, still not likely accessible for pericardiocentesis.    Telemetry: AF     Current Meds:  acetaminophen     Tablet .. 650 milliGRAM(s) Oral every 6 hours PRN  albuterol    90 MICROgram(s) HFA Inhaler 2 Puff(s) Inhalation every 6 hours PRN  allopurinol 100 milliGRAM(s) Oral daily  atorvastatin 40 milliGRAM(s) Oral at bedtime  beclomethasone  80 MICROgram(s) Inhaler 2 Puff(s) Inhalation two times a day  carboxymethylcellulose 0.5% (Preservative-Free) Ophthalmic Solution 1 Drop(s) Both EYES two times a day PRN  chlorhexidine 2% Cloths 1 Application(s) Topical daily  dextrose 5%. 1000 milliLiter(s) IV Continuous <Continuous>  dextrose 5%. 1000 milliLiter(s) IV Continuous <Continuous>  dextrose 50% Injectable 12.5 Gram(s) IV Push once  dextrose 50% Injectable 25 Gram(s) IV Push once  dextrose 50% Injectable 25 Gram(s) IV Push once  dextrose Oral Gel 15 Gram(s) Oral once PRN  glucagon  Injectable 1 milliGRAM(s) IntraMuscular once  insulin lispro (ADMELOG) corrective regimen sliding scale   SubCutaneous three times a day before meals  insulin lispro (ADMELOG) corrective regimen sliding scale   SubCutaneous at bedtime  iron sucrose IVPB 200 milliGRAM(s) IV Intermittent every 24 hours  melatonin 3 milliGRAM(s) Oral at bedtime PRN  metoprolol succinate ER 75 milliGRAM(s) Oral two times a day  omega-3-Acid Ethyl Esters 4 Gram(s) Oral daily  pantoprazole    Tablet 40 milliGRAM(s) Oral before breakfast  sodium chloride 0.65% Nasal 1 Spray(s) Both Nostrils every 6 hours PRN  tamsulosin 0.4 milliGRAM(s) Oral at bedtime  tiotropium 2.5 MICROgram(s) Inhaler 2 Puff(s) Inhalation daily      Vitals:  T(F): 98.5 (07-17), Max: 98.5 (07-16)  HR: 95 (07-17) (91 - 106)  BP: 114/77 (07-17) (98/66 - 115/75)  RR: 18 (07-17)  SpO2: 99% (07-17)  I&O's Summary    16 Jul 2023 07:01  -  17 Jul 2023 07:00  --------------------------------------------------------  IN: 600 mL / OUT: 1375 mL / NET: -775 mL    17 Jul 2023 07:01  -  17 Jul 2023 15:34  --------------------------------------------------------  IN: 0 mL / OUT: 525 mL / NET: -525 mL        Physical Exam:  Appearance: No acute distress; well appearing  Eyes: PERRL, EOMI, pink conjunctiva  HEENT: Normal oral mucosa  Cardiovascular: IIR, S1, S2, III/VI VIOLETA radiating to carotids, no rubs, or gallops; no edema; no JVD  Respiratory: Clear to auscultation bilaterally  Gastrointestinal: soft, non-tender, non-distended with normal bowel sounds  Musculoskeletal: R thigh with ace wrap, 2+ RLE edema   Neurologic: Non-focal  Lymphatic: No lymphadenopathy  Psychiatry: AAOx3, mood & affect appropriate  Skin: No rashes, ecchymoses, or cyanosis                          7.4    7.15  )-----------( Clumped    ( 17 Jul 2023 07:28 )             24.4     07-17    139  |  105  |  42<H>  ----------------------------<  120<H>  4.9   |  23  |  1.69<H>    Ca    8.8      17 Jul 2023 07:28    TPro  5.6<L>  /  Alb  3.3  /  TBili  1.4<H>  /  DBili  x   /  AST  29  /  ALT  21  /  AlkPhos  92  07-17    PT/INR - ( 16 Jul 2023 07:25 )   PT: 12.6 sec;   INR: 1.09 ratio         PTT - ( 16 Jul 2023 07:25 )  PTT:25.5 sec    New ECG(s): Personally reviewed    A/P  78 M with hx. of Afib (on Eliquis), severe AS, mod-severe TR, CAD/MI (30 yrs ago, medically managed, no PCI), ?CHF, COPD (not on O2 at home), former heavy smoker, HTN, HLD, DM2, gout.   Was sent in for concern re outpatient echocardiogram which demonstrated worsening pericardial effusion. Effusion not safely accessible, no evidence of tamponade. A.S., now pending TAVR work up  Course c.b R thigh hematoma requiring PRBC 2U.      #R Thigh Hematoma  - Noted on CT non con of the RLE  - Hb drop 2 pts, platelets downtrending   - LHC canceled  - Ok for heparin per heme 7/17  Plan:  - F/U RLE CTA  - DC Ace wrap  - F/U Vascular recs after CTA  - Hb goal >7    #Severe Symptomatic Low Flow Low Gradient AS  - Pt with known sx of sev AS p/w GOFF  - Repeat TTE showing sev AS  - Structural cardiology consulted, pending eventual CT TAVR & LHC  - Hypervolemic on exam, likely multifactorial, judicious with diuretics as above  Plan:  - Pending LHC, aborted iso R thigh hematoma   - Can readdress when bleed ruled out  - F/U Structural recs     #AFib RVR  #RBBB  - Persistent per OSH documentation   - On apixaban 2.5 BID at home - unclear why on dose reduced; per son he previously was on Coumadin but had supra-therapeutic.  Apixaban on hold at present given effusion and need for LHC.  - CHADSVASC 5   - DC heparin gtt as above  - AF RVR likely compensatory iso blood loss, no need to tx unless hemodynamically unstable in which case would recommend blood   - Cont MTP Succ 75 BID    #Large Pericardial Effusion  - No clinical tamponade as patient is hemodynamically stable, with robust heart sounds and no obvious JVD. Outpatient TTE demonstrated LVEF of 55% with large pericardial effusion without tamponade physiology.   - Repeat TTE showed large effusion mainly around L ventricle; pericardial effusion cannot be safely tapped  Plan:  - Will continue to monitor effusion; continue TAVR workup in meantime  - Can repeat limited TTE in the outpatient setting     Jean Ríos PGY5  Cardiology Fellow    CHAVA Enriquez ID: 78 M with hx. of Afib (on Eliquis), severe AS, mod-severe TR, CAD/MI (30 yrs ago, medically managed, no PCI), ?CHF, COPD (not on O2 at home), former heavy smoker, HTN, HLD, DM2, gout.   Was sent in for concern re outpatient echocardiogram which demonstrated worsening pericardial effusion. Effusion not safely accessible, no evidence of tamponade. A.S., now pending TAVR work up  Course c.b R thigh hematoma requiring PRBC 2U.    Patient seen and examined at bedside.    Overnight Events: NAEON, feeling okay today, still has mild R thigh pain, has not tried ambulating. Initially planned on [facility] tomorrow in preparation for TAVR however discussion with primary team and structural team yielded plan to reassess leg/bleeding risk (CTA, vascular consult, etc.). Then C once felt stable from a bleeding standpoint. Limited TTE shows minimal changes from priorl, reviewed with Dr. Dan, still not likely accessible for pericardiocentesis.    Telemetry: AF     Current Meds:  acetaminophen     Tablet .. 650 milliGRAM(s) Oral every 6 hours PRN  albuterol    90 MICROgram(s) HFA Inhaler 2 Puff(s) Inhalation every 6 hours PRN  allopurinol 100 milliGRAM(s) Oral daily  atorvastatin 40 milliGRAM(s) Oral at bedtime  beclomethasone  80 MICROgram(s) Inhaler 2 Puff(s) Inhalation two times a day  carboxymethylcellulose 0.5% (Preservative-Free) Ophthalmic Solution 1 Drop(s) Both EYES two times a day PRN  chlorhexidine 2% Cloths 1 Application(s) Topical daily  dextrose 5%. 1000 milliLiter(s) IV Continuous <Continuous>  dextrose 5%. 1000 milliLiter(s) IV Continuous <Continuous>  dextrose 50% Injectable 12.5 Gram(s) IV Push once  dextrose 50% Injectable 25 Gram(s) IV Push once  dextrose 50% Injectable 25 Gram(s) IV Push once  dextrose Oral Gel 15 Gram(s) Oral once PRN  glucagon  Injectable 1 milliGRAM(s) IntraMuscular once  insulin lispro (ADMELOG) corrective regimen sliding scale   SubCutaneous three times a day before meals  insulin lispro (ADMELOG) corrective regimen sliding scale   SubCutaneous at bedtime  iron sucrose IVPB 200 milliGRAM(s) IV Intermittent every 24 hours  melatonin 3 milliGRAM(s) Oral at bedtime PRN  metoprolol succinate ER 75 milliGRAM(s) Oral two times a day  omega-3-Acid Ethyl Esters 4 Gram(s) Oral daily  pantoprazole    Tablet 40 milliGRAM(s) Oral before breakfast  sodium chloride 0.65% Nasal 1 Spray(s) Both Nostrils every 6 hours PRN  tamsulosin 0.4 milliGRAM(s) Oral at bedtime  tiotropium 2.5 MICROgram(s) Inhaler 2 Puff(s) Inhalation daily      Vitals:  T(F): 98.5 (07-17), Max: 98.5 (07-16)  HR: 95 (07-17) (91 - 106)  BP: 114/77 (07-17) (98/66 - 115/75)  RR: 18 (07-17)  SpO2: 99% (07-17)  I&O's Summary    16 Jul 2023 07:01  -  17 Jul 2023 07:00  --------------------------------------------------------  IN: 600 mL / OUT: 1375 mL / NET: -775 mL    17 Jul 2023 07:01  -  17 Jul 2023 15:34  --------------------------------------------------------  IN: 0 mL / OUT: 525 mL / NET: -525 mL        Physical Exam:  Appearance: No acute distress; well appearing  Eyes: PERRL, EOMI, pink conjunctiva  HEENT: Normal oral mucosa  Cardiovascular: IIR, S1, S2, III/VI VIOLETA radiating to carotids, no rubs, or gallops; no edema; no JVD  Respiratory: Clear to auscultation bilaterally  Gastrointestinal: soft, non-tender, non-distended with normal bowel sounds  Musculoskeletal: R thigh with ace wrap, 2+ RLE edema   Neurologic: Non-focal  Lymphatic: No lymphadenopathy  Psychiatry: AAOx3, mood & affect appropriate  Skin: No rashes, ecchymoses, or cyanosis                          7.4    7.15  )-----------( Clumped    ( 17 Jul 2023 07:28 )             24.4     07-17    139  |  105  |  42<H>  ----------------------------<  120<H>  4.9   |  23  |  1.69<H>    Ca    8.8      17 Jul 2023 07:28    TPro  5.6<L>  /  Alb  3.3  /  TBili  1.4<H>  /  DBili  x   /  AST  29  /  ALT  21  /  AlkPhos  92  07-17    PT/INR - ( 16 Jul 2023 07:25 )   PT: 12.6 sec;   INR: 1.09 ratio         PTT - ( 16 Jul 2023 07:25 )  PTT:25.5 sec    New ECG(s): Personally reviewed    A/P  78 M with hx. of Afib (on Eliquis), severe AS, mod-severe TR, CAD/MI (30 yrs ago, medically managed, no PCI), ?CHF, COPD (not on O2 at home), former heavy smoker, HTN, HLD, DM2, gout.   Was sent in for concern re outpatient echocardiogram which demonstrated worsening pericardial effusion. Effusion not safely accessible, no evidence of tamponade. A.S., now pending TAVR work up  Course c.b R thigh hematoma requiring PRBC 2U.      #R Thigh Hematoma  - Noted on CT non con of the RLE  - Hb drop 2 pts, platelets downtrending   - LHC canceled  - Ok for heparin per heme 7/17  Plan:  - F/U RLE CTA  - DC Ace wrap  - F/U Vascular recs after CTA  - Hb goal >7    #Severe Symptomatic Low Flow Low Gradient AS  - Pt with known sx of sev AS p/w GOFF  - Repeat TTE showing sev AS  - Structural cardiology consulted, pending eventual CT TAVR & LHC  - Hypervolemic on exam, likely multifactorial, judicious with diuretics as above  Plan:  - Pending LHC, aborted iso R thigh hematoma   - Can readdress when bleed ruled out  - F/U Structural recs     #AFib RVR  #RBBB  - Persistent per OSH documentation   - On apixaban 2.5 BID at home - unclear why on dose reduced; per son he previously was on Coumadin but had supra-therapeutic.  Apixaban on hold at present given effusion and need for LHC.  - CHADSVASC 5   - DC heparin gtt as above  - AF RVR likely compensatory iso blood loss, no need to tx unless hemodynamically unstable in which case would recommend blood   - Cont MTP Succ 75 BID    #Large Pericardial Effusion  - No clinical tamponade as patient is hemodynamically stable, with robust heart sounds and no obvious JVD. Outpatient TTE demonstrated LVEF of 55% with large pericardial effusion without tamponade physiology.   - Repeat TTE showed large effusion mainly around L ventricle; pericardial effusion cannot be safely tapped  Plan:  - Will continue to monitor effusion; continue TAVR workup in meantime  - Can repeat limited TTE in the outpatient setting     Jean Ríos PGY5  Cardiology Fellow    CHAVA Enriquez

## 2023-07-17 NOTE — PROGRESS NOTE ADULT - SUBJECTIVE AND OBJECTIVE BOX
APPLE CROW 78y Male      Patient is a 78y old  Male who presents with a chief complaint of worsening pericardial effusion (17 Jul 2023 15:04)        INTERVAL HPI/OVERNIGHT EVENTS: No acute events overnight. Patient was seen and evaluated at the bedside. The patient denies pain. Vitals stable. Patient denies fever/chills, chest pain, shortness of breath, abdominal pain, headaches, nausea/vomiting, and diarrhea/constipation.      PHYSICAL EXAM:  GENERAL: NAD  HEAD:  Normocephalic  EYES:  conjunctiva and sclera clear  ENMT: Moist mucous membranes  NECK: Supple  NERVOUS SYSTEM:  Alert, awake  CHEST/LUNG: Good air exchange bilaterally, no wheeze  HEART: Regular rate and rhythm        Vital Signs Last 24 Hrs  T(C): 36.9 (17 Jul 2023 11:22), Max: 36.9 (16 Jul 2023 20:21)  T(F): 98.5 (17 Jul 2023 11:22), Max: 98.5 (16 Jul 2023 20:21)  HR: 95 (17 Jul 2023 11:22) (91 - 106)  BP: 114/77 (17 Jul 2023 11:22) (98/66 - 115/75)  BP(mean): --  RR: 18 (17 Jul 2023 11:22) (18 - 18)  SpO2: 99% (17 Jul 2023 11:22) (99% - 100%)    Parameters below as of 17 Jul 2023 11:22  Patient On (Oxygen Delivery Method): nasal cannula  O2 Flow (L/min): 3        MEDICATIONS  (STANDING):  allopurinol 100 milliGRAM(s) Oral daily  atorvastatin 40 milliGRAM(s) Oral at bedtime  beclomethasone  80 MICROgram(s) Inhaler 2 Puff(s) Inhalation two times a day  chlorhexidine 2% Cloths 1 Application(s) Topical daily  dextrose 5%. 1000 milliLiter(s) (100 mL/Hr) IV Continuous <Continuous>  dextrose 5%. 1000 milliLiter(s) (50 mL/Hr) IV Continuous <Continuous>  dextrose 50% Injectable 12.5 Gram(s) IV Push once  dextrose 50% Injectable 25 Gram(s) IV Push once  dextrose 50% Injectable 25 Gram(s) IV Push once  glucagon  Injectable 1 milliGRAM(s) IntraMuscular once  insulin lispro (ADMELOG) corrective regimen sliding scale   SubCutaneous three times a day before meals  insulin lispro (ADMELOG) corrective regimen sliding scale   SubCutaneous at bedtime  iron sucrose IVPB 200 milliGRAM(s) IV Intermittent every 24 hours  metoprolol succinate ER 75 milliGRAM(s) Oral two times a day  omega-3-Acid Ethyl Esters 4 Gram(s) Oral daily  pantoprazole    Tablet 40 milliGRAM(s) Oral before breakfast  tamsulosin 0.4 milliGRAM(s) Oral at bedtime  tiotropium 2.5 MICROgram(s) Inhaler 2 Puff(s) Inhalation daily    MEDICATIONS  (PRN):  acetaminophen     Tablet .. 650 milliGRAM(s) Oral every 6 hours PRN Temp greater or equal to 38C (100.4F), Mild Pain (1 - 3)  albuterol    90 MICROgram(s) HFA Inhaler 2 Puff(s) Inhalation every 6 hours PRN Shortness of Breath and/or Wheezing  carboxymethylcellulose 0.5% (Preservative-Free) Ophthalmic Solution 1 Drop(s) Both EYES two times a day PRN dry eyes  dextrose Oral Gel 15 Gram(s) Oral once PRN Blood Glucose LESS THAN 70 milliGRAM(s)/deciliter  melatonin 3 milliGRAM(s) Oral at bedtime PRN Sleep  sodium chloride 0.65% Nasal 1 Spray(s) Both Nostrils every 6 hours PRN Nasal Congestion      Consultant(s) Notes Reviewed:  [X] YES  [ ] NO  Care Discussed with Other Providers [X] YES  [ ] NO  Imaging Personally Reviewed:  [X] YES  [ ] NO      LABS:                        7.4    7.15  )-----------( Clumped    ( 17 Jul 2023 07:28 )             24.4     07-17    139  |  105  |  42<H>  ----------------------------<  120<H>  4.9   |  23  |  1.69<H>    Ca    8.8      17 Jul 2023 07:28    TPro  5.6<L>  /  Alb  3.3  /  TBili  1.4<H>  /  DBili  x   /  AST  29  /  ALT  21  /  AlkPhos  92  07-17    PT/INR - ( 16 Jul 2023 07:25 )   PT: 12.6 sec;   INR: 1.09 ratio         PTT - ( 16 Jul 2023 07:25 )  PTT:25.5 sec  Iron --, TIBC --, %Sat --, Ferritin 43/ 07-17-23 @ 07:28  Iron --, TIBC --, %Sat --, Ferritin 38/ 07-16-23 @ 07:16    Urinalysis Basic - ( 17 Jul 2023 07:28 )    Color: x / Appearance: x / SG: x / pH: x  Gluc: 120 mg/dL / Ketone: x  / Bili: x / Urobili: x   Blood: x / Protein: x / Nitrite: x   Leuk Esterase: x / RBC: x / WBC x   Sq Epi: x / Non Sq Epi: x / Bacteria: x

## 2023-07-17 NOTE — PROGRESS NOTE ADULT - PROBLEM SELECTOR PLAN 3
Unclear hx of ?CHF, possibly has chronic HFpEF with severe AS -- TTE w/ unchanged mod to large pericardial effusion ; +severe AS  - CT surgery consulted for severe AS ; workup for TAVR in progress ; TAVR CT ordered, plan for LHC -postponed and cancelled due to anemia/thrombocytopenia and RLE hematoma.  - diuretic on HOLD given SHEYLA - Cr improving.   - strict I/Os, standing weights daily  - Cardiology following ; reccs appreciated

## 2023-07-17 NOTE — PROGRESS NOTE ADULT - PROBLEM SELECTOR PLAN 1
Patient states he gets weekly iron infusions outpatient. outpatient hematologist is Dr. Sylvester (), - NOT affilated with [facility] affiliation. Now seen by house heme svc on 7/15  DDx includes underlying hematologic dz , medications, mechanical sheering from AS    -Hgb cont to fluctuate. cont to transfuse as needed above 7. will consider IV iron infusion.   -HOLD heparin gtt now given hematoma .  HIT panel neg awaiting serotonin assay, PF4  - Additional w/u for hemolysis, perip smear etc as per franco rec.   -CT RLE   >Findings are consistent with a large quadriceps intramuscular hematoma-no clinical change noted.   - Surg no acute intervention. will monitor . holding AC and transfuse as needed. no evidence of vasc compromise distally   -per patient's son, patient is on reduced eliquis dose at home due to history of anemia/bleed.   - GI eval. pt's son reports has had some bleeding in distal colon which was not intervened?   -Monitor HH. Keep active T&S. Transfuse prbc prn.

## 2023-07-17 NOTE — PROGRESS NOTE ADULT - PROBLEM SELECTOR PLAN 11
- DVT ppx: off AC due to bleeding.   - Diet: DASH/CC  - Dispo: pending clinical course    -7/17 discussed and updated son  [phone] Uriel

## 2023-07-18 LAB
% ALBUMIN: 55.5 % — SIGNIFICANT CHANGE UP
% ALPHA 1: 9.7 % — SIGNIFICANT CHANGE UP
% ALPHA 2: 12.7 % — SIGNIFICANT CHANGE UP
% BETA: 12.7 % — SIGNIFICANT CHANGE UP
% GAMMA: 9.4 % — SIGNIFICANT CHANGE UP
% M SPIKE: SIGNIFICANT CHANGE UP
ADAMTS13 ACTIVITY: 65.7 % — LOW
ADAMTS13-COMMENT: SIGNIFICANT CHANGE UP
ALBUMIN SERPL ELPH-MCNC: 2.8 G/DL — LOW (ref 3.6–5.5)
ALBUMIN/GLOB SERPL ELPH: 1.3 RATIO — SIGNIFICANT CHANGE UP
ALPHA1 GLOB SERPL ELPH-MCNC: 0.5 G/DL — HIGH (ref 0.1–0.4)
ALPHA2 GLOB SERPL ELPH-MCNC: 0.6 G/DL — SIGNIFICANT CHANGE UP (ref 0.5–1)
B-GLOBULIN SERPL ELPH-MCNC: 0.6 G/DL — SIGNIFICANT CHANGE UP (ref 0.5–1)
BLD GP AB SCN SERPL QL: NEGATIVE — SIGNIFICANT CHANGE UP
GAMMA GLOBULIN: 0.5 G/DL — LOW (ref 0.6–1.6)
GLUCOSE BLDC GLUCOMTR-MCNC: 140 MG/DL — HIGH (ref 70–99)
GLUCOSE BLDC GLUCOMTR-MCNC: 145 MG/DL — HIGH (ref 70–99)
GLUCOSE BLDC GLUCOMTR-MCNC: 146 MG/DL — HIGH (ref 70–99)
GLUCOSE BLDC GLUCOMTR-MCNC: 147 MG/DL — HIGH (ref 70–99)
HCT VFR BLD CALC: 25.3 % — LOW (ref 39–50)
HEMOGLOBIN INTERPRETATION: SIGNIFICANT CHANGE UP
HGB A MFR BLD: 97.5 % — SIGNIFICANT CHANGE UP (ref 95.8–98)
HGB A2 MFR BLD: 2.5 % — SIGNIFICANT CHANGE UP (ref 2–3.2)
HGB BLD-MCNC: 7.7 G/DL — LOW (ref 13–17)
INTERPRETATION SERPL IFE-IMP: SIGNIFICANT CHANGE UP
M-SPIKE: SIGNIFICANT CHANGE UP (ref 0–0)
MCHC RBC-ENTMCNC: 26.6 PG — LOW (ref 27–34)
MCHC RBC-ENTMCNC: 30.4 GM/DL — LOW (ref 32–36)
MCV RBC AUTO: 87.2 FL — SIGNIFICANT CHANGE UP (ref 80–100)
NRBC # BLD: 0 /100 WBCS — SIGNIFICANT CHANGE UP (ref 0–0)
PLATELET # BLD AUTO: 84 K/UL — LOW (ref 150–400)
PROT PATTERN SERPL ELPH-IMP: SIGNIFICANT CHANGE UP
RBC # BLD: 2.9 M/UL — LOW (ref 4.2–5.8)
RBC # FLD: 19.4 % — HIGH (ref 10.3–14.5)
RH IG SCN BLD-IMP: POSITIVE — SIGNIFICANT CHANGE UP
WBC # BLD: 7.78 K/UL — SIGNIFICANT CHANGE UP (ref 3.8–10.5)
WBC # FLD AUTO: 7.78 K/UL — SIGNIFICANT CHANGE UP (ref 3.8–10.5)

## 2023-07-18 PROCEDURE — 73706 CT ANGIO LWR EXTR W/O&W/DYE: CPT | Mod: 26,RT

## 2023-07-18 PROCEDURE — 99232 SBSQ HOSP IP/OBS MODERATE 35: CPT

## 2023-07-18 PROCEDURE — 99233 SBSQ HOSP IP/OBS HIGH 50: CPT | Mod: GC

## 2023-07-18 PROCEDURE — 99233 SBSQ HOSP IP/OBS HIGH 50: CPT

## 2023-07-18 RX ADMIN — BECLOMETHASONE DIPROPIONATE 2 PUFF(S): 40 AEROSOL, METERED RESPIRATORY (INHALATION) at 09:43

## 2023-07-18 RX ADMIN — ATORVASTATIN CALCIUM 40 MILLIGRAM(S): 80 TABLET, FILM COATED ORAL at 21:18

## 2023-07-18 RX ADMIN — BECLOMETHASONE DIPROPIONATE 2 PUFF(S): 40 AEROSOL, METERED RESPIRATORY (INHALATION) at 21:18

## 2023-07-18 RX ADMIN — TIOTROPIUM BROMIDE 2 PUFF(S): 18 CAPSULE ORAL; RESPIRATORY (INHALATION) at 12:15

## 2023-07-18 RX ADMIN — Medication 75 MILLIGRAM(S): at 17:55

## 2023-07-18 RX ADMIN — Medication 4 GRAM(S): at 12:13

## 2023-07-18 RX ADMIN — Medication 100 MILLIGRAM(S): at 12:11

## 2023-07-18 RX ADMIN — PANTOPRAZOLE SODIUM 40 MILLIGRAM(S): 20 TABLET, DELAYED RELEASE ORAL at 05:02

## 2023-07-18 RX ADMIN — Medication 75 MILLIGRAM(S): at 05:03

## 2023-07-18 RX ADMIN — TAMSULOSIN HYDROCHLORIDE 0.4 MILLIGRAM(S): 0.4 CAPSULE ORAL at 21:18

## 2023-07-18 RX ADMIN — CHLORHEXIDINE GLUCONATE 1 APPLICATION(S): 213 SOLUTION TOPICAL at 12:22

## 2023-07-18 RX ADMIN — Medication 1 APPLICATION(S): at 08:22

## 2023-07-18 RX ADMIN — IRON SUCROSE 110 MILLIGRAM(S): 20 INJECTION, SOLUTION INTRAVENOUS at 18:01

## 2023-07-18 NOTE — DIETITIAN INITIAL EVALUATION ADULT - PERTINENT MEDS FT
MEDICATIONS  (STANDING):  allopurinol 100 milliGRAM(s) Oral daily  atorvastatin 40 milliGRAM(s) Oral at bedtime  beclomethasone  80 MICROgram(s) Inhaler 2 Puff(s) Inhalation two times a day  chlorhexidine 2% Cloths 1 Application(s) Topical daily  dextrose 5%. 1000 milliLiter(s) (50 mL/Hr) IV Continuous <Continuous>  dextrose 5%. 1000 milliLiter(s) (100 mL/Hr) IV Continuous <Continuous>  dextrose 50% Injectable 12.5 Gram(s) IV Push once  dextrose 50% Injectable 25 Gram(s) IV Push once  dextrose 50% Injectable 25 Gram(s) IV Push once  glucagon  Injectable 1 milliGRAM(s) IntraMuscular once  insulin lispro (ADMELOG) corrective regimen sliding scale   SubCutaneous three times a day before meals  insulin lispro (ADMELOG) corrective regimen sliding scale   SubCutaneous at bedtime  iron sucrose IVPB 200 milliGRAM(s) IV Intermittent every 24 hours  metoprolol succinate ER 75 milliGRAM(s) Oral two times a day  omega-3-Acid Ethyl Esters 4 Gram(s) Oral daily  pantoprazole    Tablet 40 milliGRAM(s) Oral before breakfast  tamsulosin 0.4 milliGRAM(s) Oral at bedtime  tiotropium 2.5 MICROgram(s) Inhaler 2 Puff(s) Inhalation daily    MEDICATIONS  (PRN):  acetaminophen     Tablet .. 650 milliGRAM(s) Oral every 6 hours PRN Temp greater or equal to 38C (100.4F), Mild Pain (1 - 3)  albuterol    90 MICROgram(s) HFA Inhaler 2 Puff(s) Inhalation every 6 hours PRN Shortness of Breath and/or Wheezing  AQUAPHOR (petrolatum Ointment) 1 Application(s) Topical two times a day PRN dry skin  carboxymethylcellulose 0.5% (Preservative-Free) Ophthalmic Solution 1 Drop(s) Both EYES two times a day PRN dry eyes  dextrose Oral Gel 15 Gram(s) Oral once PRN Blood Glucose LESS THAN 70 milliGRAM(s)/deciliter  melatonin 3 milliGRAM(s) Oral at bedtime PRN Sleep  sodium chloride 0.65% Nasal 1 Spray(s) Both Nostrils every 6 hours PRN Nasal Congestion

## 2023-07-18 NOTE — DIETITIAN INITIAL EVALUATION ADULT - OBTAIN DAILY WEIGHT
yes Alert and oriented to person, place, time/situation. normal mood and affect. no apparent risk to self or others.

## 2023-07-18 NOTE — PROGRESS NOTE ADULT - SUBJECTIVE AND OBJECTIVE BOX
ID: ID: [id number] M with hx. of Afib (on Eliquis), severe AS, mod-severe TR, CAD/MI (30 yrs ago, medically managed, no PCI), ?CHF, COPD (not on O2 at home), former heavy smoker, HTN, HLD, DM2, gout.   Was sent in for concern re outpatient echocardiogram which demonstrated worsening pericardial effusion. Effusion not safely accessible, no evidence of tamponade. A.S., now pending TAVR work up  Course c.b R thigh hematoma requiring PRBC 2U.    Patient seen and examined at bedside.    Overnight Events: NAEON, feeling well today, denies FC NV CP SOB. Still pending CTA RLE. States he feels unsteady on his feet and has not ambulated - primary team to have PT see pt. Tentatively planning on LHC tomorrow and TAVR thrusday but will depend on RLE clearance, stable Hb, and renal fxn.     Telemetry: AFib    Current Meds:  acetaminophen     Tablet .. 650 milliGRAM(s) Oral every 6 hours PRN  albuterol    90 MICROgram(s) HFA Inhaler 2 Puff(s) Inhalation every 6 hours PRN  allopurinol 100 milliGRAM(s) Oral daily  AQUAPHOR (petrolatum Ointment) 1 Application(s) Topical two times a day PRN  atorvastatin 40 milliGRAM(s) Oral at bedtime  beclomethasone  80 MICROgram(s) Inhaler 2 Puff(s) Inhalation two times a day  carboxymethylcellulose 0.5% (Preservative-Free) Ophthalmic Solution 1 Drop(s) Both EYES two times a day PRN  chlorhexidine 2% Cloths 1 Application(s) Topical daily  dextrose 5%. 1000 milliLiter(s) IV Continuous <Continuous>  dextrose 5%. 1000 milliLiter(s) IV Continuous <Continuous>  dextrose 50% Injectable 12.5 Gram(s) IV Push once  dextrose 50% Injectable 25 Gram(s) IV Push once  dextrose 50% Injectable 25 Gram(s) IV Push once  dextrose Oral Gel 15 Gram(s) Oral once PRN  glucagon  Injectable 1 milliGRAM(s) IntraMuscular once  insulin lispro (ADMELOG) corrective regimen sliding scale   SubCutaneous three times a day before meals  insulin lispro (ADMELOG) corrective regimen sliding scale   SubCutaneous at bedtime  iron sucrose IVPB 200 milliGRAM(s) IV Intermittent every 24 hours  melatonin 3 milliGRAM(s) Oral at bedtime PRN  metoprolol succinate ER 75 milliGRAM(s) Oral two times a day  omega-3-Acid Ethyl Esters 4 Gram(s) Oral daily  pantoprazole    Tablet 40 milliGRAM(s) Oral before breakfast  sodium chloride 0.65% Nasal 1 Spray(s) Both Nostrils every 6 hours PRN  tamsulosin 0.4 milliGRAM(s) Oral at bedtime  tiotropium 2.5 MICROgram(s) Inhaler 2 Puff(s) Inhalation daily      Vitals:  T(F): 99.4 (07-18), Max: 99.4 (07-18)  HR: 111 (07-18) (85 - 111)  BP: 104/65 (07-18) (97/60 - 143/89)  RR: 18 (07-18)  SpO2: 93% (07-18)  I&O's Summary    17 Jul 2023 07:01  -  18 Jul 2023 07:00  --------------------------------------------------------  IN: 0 mL / OUT: 1175 mL / NET: -1175 mL        Physical Exam:  Appearance: No acute distress; well appearing  Eyes: PERRL, EOMI, pink conjunctiva  HEENT: Normal oral mucosa  Cardiovascular: IIR, S1, S2, III/VI VIOLETA radiating to carotids, no rubs, or gallops; no edema; no JVD  Respiratory: Clear to auscultation bilaterally  Gastrointestinal: soft, non-tender, non-distended with normal bowel sounds  Musculoskeletal: R thigh with ace wrap, 2+ RLE edema   Neurologic: Non-focal  Lymphatic: No lymphadenopathy  Psychiatry: AAOx3, mood & affect appropriate  Skin: No rashes, ecchymoses, or cyanosis               7.7    7.78  )-----------( 84       ( 18 Jul 2023 06:19 )             25.3     07-17    139  |  105  |  42<H>  ----------------------------<  120<H>  4.9   |  23  |  1.69<H>    Ca    8.8      17 Jul 2023 07:28    TPro  5.6<L>  /  Alb  3.3  /  TBili  1.4<H>  /  DBili  x   /  AST  29  /  ALT  21  /  AlkPhos  92  07-1    New ECG(s): Personally reviewed      A/P    A/P  78 M with hx. of Afib (on Eliquis), severe AS, mod-severe TR, CAD/MI (30 yrs ago, medically managed, no PCI), ?CHF, COPD (not on O2 at home), former heavy smoker, HTN, HLD, DM2, gout.   Was sent in for concern re outpatient echocardiogram which demonstrated worsening pericardial effusion. Effusion not safely accessible, no evidence of tamponade. A.S., now pending TAVR work up  Course c.b R thigh hematoma requiring PRBC 2U.      #R Thigh Hematoma  - Noted on CT non con of the RLE  - Hb drop 2 pts, platelets downtrending   - LHC canceled  - Ok for heparin per heme 7/17  Plan:  - F/U RLE CTA  - DC Ace wrap  - F/U Vascular recs after CTA  - Hb goal >7  - F/U PT eval    #Severe Symptomatic Low Flow Low Gradient AS  - Pt with known sx of sev AS p/w GOFF  - Repeat TTE showing sev AS  - Structural cardiology consulted, pending eventual CT TAVR & LHC  - Hypervolemic on exam, likely multifactorial, judicious with diuretics as above  Plan:  - Pending LHC, aborted iso R thigh hematoma   - Can readdress when bleed ruled out  - F/U Structural recs     #AFib RVR  #RBBB  - Persistent per OSH documentation   - On apixaban 2.5 BID at home - unclear why on dose reduced; per son he previously was on Coumadin but had supra-therapeutic.  Apixaban on hold at present given effusion and need for LHC.  - CHADSVASC 5   - DC heparin gtt as above  - AF RVR likely compensatory iso blood loss, no need to tx unless hemodynamically unstable in which case would recommend blood   - Cont MTP Succ 75 BID    #Large Pericardial Effusion  - No clinical tamponade as patient is hemodynamically stable, with robust heart sounds and no obvious JVD. Outpatient TTE demonstrated LVEF of 55% with large pericardial effusion without tamponade physiology.   - Repeat TTE showed large effusion mainly around L ventricle; pericardial effusion cannot be safely tapped  Plan:  - Will continue to monitor effusion; continue TAVR workup in meantime  - Can repeat limited TTE in the outpatient setting     Jean Ríos PGY5  Cardiology Fellow    [facility] En[facility]

## 2023-07-18 NOTE — PROGRESS NOTE ADULT - SUBJECTIVE AND OBJECTIVE BOX
APPLE [id number]  78yMale  T(C): 37.4 (07-18-23 @ 10:51), Max: 37.4 (07-18-23 @ 10:51)  HR: 111 (07-18-23 @ 10:51) (85 - 111)  BP: 104/65 (07-18-23 @ 10:51) (97/60 - 143/89)  RR: 18 (07-18-23 @ 10:51) (18 - 18)  SpO2: 93% (07-18-23 @ 10:51) (93% - 97%)  Wt(kg): --  07-17 @ 07:01  -  07-18 @ 07:00  --------------------------------------------------------  IN: 0 mL / OUT: 1175 mL / NET: -1175 mL

## 2023-07-18 NOTE — DIETITIAN INITIAL EVALUATION ADULT - EDUCATION DIETARY MODIFICATIONS
Provided education on foods containing carbohydrates, foods containing proteins, and portion sizes. Stressed the importance of a balanced meal to maintain blood glucose. Described HbA1c and stressed importance of its normal levels. Recommended water consumption with avoidance of soda and juice. Discussed to avoid concentrated sweets. Discussed Na restriction, foods high in Na to avoid, reading food labels, tips for limiting Na in your diet.  Discussed Na intake in relation to fluid retention, edema and Wt gain. RD remains available for diet education review as needed./teach back/(1) partially meets; needs review/practice/verbalization

## 2023-07-18 NOTE — PROGRESS NOTE ADULT - PROBLEM SELECTOR PLAN 1
Patient states he gets weekly iron infusions outpatient. outpatient hematologist is Dr. Sylvester (), - NOT affilated with [facility] affiliation. Now seen by house heme svc on 7/15  DDx includes underlying hematologic dz , medications, mechanical sheering from AS    -Hgb cont to fluctuate. cont to transfuse as needed above 7. started on IV iron infusion.   -HOLD heparin gtt now given hematoma .  HIT panel neg awaiting serotonin assay, PF4   - Additional w/u for hemolysis, perip smear etc as per franco rec.   -CT RLE   >Findings are consistent with a large quadriceps intramuscular hematoma-no clinical change noted.   - Surg no acute intervention. will monitor . holding AC and transfuse as needed. no evidence of vasc compromise distally . will get CTA RLE.   -per patient's son, patient is on reduced eliquis dose at home due to history of anemia/bleed?.   - GI eval.    -Monitor HH. Keep active T&S. Transfuse prbc prn.

## 2023-07-18 NOTE — PROGRESS NOTE ADULT - PROBLEM SELECTOR PLAN 11
- DVT ppx: off AC due to bleeding.   - Diet: DASH/CC  - Dispo: pending clinical course    -7/17 discussed and updated son  [phone] Uriel

## 2023-07-18 NOTE — PROGRESS NOTE ADULT - SUBJECTIVE AND OBJECTIVE BOX
[facility] Division of Hospital Medicine  Fina Mccoy MD  Pager (M-F, 1P-5P): [phone]  Other Times:  [phone]    Patient is a 78y old  Male who presents with a chief complaint of worsening pericardial effusion (17 Jul 2023 15:33)      SUBJECTIVE / OVERNIGHT EVENTS:  feeling same. no significant changes.  afebrile. no acute overnight issues.    seen walking with nurse doing fairly well. RLE swelling seems improved.     ADDITIONAL REVIEW OF SYSTEMS: otherwise neg    MEDICATIONS  (STANDING):  allopurinol 100 milliGRAM(s) Oral daily  atorvastatin 40 milliGRAM(s) Oral at bedtime  beclomethasone  80 MICROgram(s) Inhaler 2 Puff(s) Inhalation two times a day  chlorhexidine 2% Cloths 1 Application(s) Topical daily  dextrose 5%. 1000 milliLiter(s) (100 mL/Hr) IV Continuous <Continuous>  dextrose 5%. 1000 milliLiter(s) (50 mL/Hr) IV Continuous <Continuous>  dextrose 50% Injectable 12.5 Gram(s) IV Push once  dextrose 50% Injectable 25 Gram(s) IV Push once  dextrose 50% Injectable 25 Gram(s) IV Push once  glucagon  Injectable 1 milliGRAM(s) IntraMuscular once  insulin lispro (ADMELOG) corrective regimen sliding scale   SubCutaneous three times a day before meals  insulin lispro (ADMELOG) corrective regimen sliding scale   SubCutaneous at bedtime  iron sucrose IVPB 200 milliGRAM(s) IV Intermittent every 24 hours  metoprolol succinate ER 75 milliGRAM(s) Oral two times a day  omega-3-Acid Ethyl Esters 4 Gram(s) Oral daily  pantoprazole    Tablet 40 milliGRAM(s) Oral before breakfast  tamsulosin 0.4 milliGRAM(s) Oral at bedtime  tiotropium 2.5 MICROgram(s) Inhaler 2 Puff(s) Inhalation daily    MEDICATIONS  (PRN):  acetaminophen     Tablet .. 650 milliGRAM(s) Oral every 6 hours PRN Temp greater or equal to 38C (100.4F), Mild Pain (1 - 3)  albuterol    90 MICROgram(s) HFA Inhaler 2 Puff(s) Inhalation every 6 hours PRN Shortness of Breath and/or Wheezing  AQUAPHOR (petrolatum Ointment) 1 Application(s) Topical two times a day PRN dry skin  carboxymethylcellulose 0.5% (Preservative-Free) Ophthalmic Solution 1 Drop(s) Both EYES two times a day PRN dry eyes  dextrose Oral Gel 15 Gram(s) Oral once PRN Blood Glucose LESS THAN 70 milliGRAM(s)/deciliter  melatonin 3 milliGRAM(s) Oral at bedtime PRN Sleep  sodium chloride 0.65% Nasal 1 Spray(s) Both Nostrils every 6 hours PRN Nasal Congestion      CAPILLARY BLOOD GLUCOSE      POCT Blood Glucose.: 146 mg/dL (18 Jul 2023 08:19)  POCT Blood Glucose.: 152 mg/dL (17 Jul 2023 21:23)  POCT Blood Glucose.: 161 mg/dL (17 Jul 2023 16:25)  POCT Blood Glucose.: 139 mg/dL (17 Jul 2023 11:41)    I&O's Summary    17 Jul 2023 07:01  -  18 Jul 2023 07:00  --------------------------------------------------------  IN: 0 mL / OUT: 1175 mL / NET: -1175 mL        PHYSICAL EXAM:  Vital Signs Last 24 Hrs  T(C): 37.4 (18 Jul 2023 10:51), Max: 37.4 (18 Jul 2023 10:51)  T(F): 99.4 (18 Jul 2023 10:51), Max: 99.4 (18 Jul 2023 10:51)  HR: 111 (18 Jul 2023 10:51) (85 - 111)  BP: 104/65 (18 Jul 2023 10:51) (97/60 - 143/89)  BP(mean): --  RR: 18 (18 Jul 2023 10:51) (18 - 18)  SpO2: 93% (18 Jul 2023 10:51) (93% - 97%)    Parameters below as of 18 Jul 2023 10:51  Patient On (Oxygen Delivery Method): nasal cannula  O2 Flow (L/min): 3      CONSTITUTIONAL: NAD  EYES: PERRLA; conjunctiva and sclera clear  ENMT: Moist oral mucosa  RESPIRATORY: Normal respiratory effort; lungs are clear to auscultation bilaterally  CARDIOVASCULAR: irregular, systolic murmur +b/l LE edema R>L;  Peripheral pulses are 2+ bilaterally  ABDOMEN: Nontender to palpation, normoactive bowel sounds, no rebound/guarding;   MUSCULOSKELETAL:  no clubbing or cyanosis of digits; no joint swelling or tenderness to palpation, moving all extremities   PSYCH: A+O to person, place, and time; affect appropriate  NEUROLOGY: non focal, patient ambulating without assist  SKIN: RLE venous stasis changes, several areas of ecchymosis on UEs (R>L) with some swelling    LABS:                        7.7    7.78  )-----------( 84       ( 18 Jul 2023 06:19 )             25.3     07-17    139  |  105  |  42<H>  ----------------------------<  120<H>  4.9   |  23  |  1.69<H>    Ca    8.8      17 Jul 2023 07:28    TPro  5.6<L>  /  Alb  3.3  /  TBili  1.4<H>  /  DBili  x   /  AST  29  /  ALT  21  /  AlkPhos  92  07-17          Urinalysis Basic - ( 17 Jul 2023 07:28 )    Color: x / Appearance: x / SG: x / pH: x  Gluc: 120 mg/dL / Ketone: x  / Bili: x / Urobili: x   Blood: x / Protein: x / Nitrite: x   Leuk Esterase: x / RBC: x / WBC x   Sq Epi: x / Non Sq Epi: x / Bacteria: x          RADIOLOGY & ADDITIONAL TESTS:  Results Reviewed:   Imaging Personally Reviewed:  Electrocardiogram Personally Reviewed:    COORDINATION OF CARE:  Care Discussed with Consultants/Other Providers [Y/N]:  Prior or Outpatient Records Reviewed [Y/N]:

## 2023-07-18 NOTE — PROGRESS NOTE ADULT - PROBLEM SELECTOR PLAN 3
Unclear hx of ?CHF, possibly has chronic HFpEF with severe AS -- TTE w/ unchanged mod to large pericardial effusion ; +severe AS  - CT surgery consulted for severe AS ; workup for TAVR in progress ; TAVR CT ordered, plan for LHC -postponed  due to anemia/thrombocytopenia and RLE hematoma.  - diuretic on HOLD given SHEYLA - Cr improving.   - strict I/Os, standing weights daily  - Cardiology following ; reccs appreciated

## 2023-07-18 NOTE — DIETITIAN INITIAL EVALUATION ADULT - NS FNS DIET ORDER
Diet, Regular:   Consistent Carbohydrate {No Snacks} (CSTCHO)  DASH/TLC {Sodium & Cholesterol Restricted} (DASH) (07-04-23 @ 02:20) [Active]

## 2023-07-18 NOTE — DIETITIAN INITIAL EVALUATION ADULT - REASON INDICATOR FOR ASSESSMENT
Length of Stay  Information obtained from: Review of pt's current medical record and interview with pt in his assigned room on [facility]MONTI

## 2023-07-18 NOTE — DIETITIAN INITIAL EVALUATION ADULT - ORAL INTAKE PTA/DIET HISTORY
Pt confirms no known food allergies/intolerances. Reports having a good appetite and PO intakes at home. Reports monitoring his salt and sugar intake at home. Pt denies nausea, vomiting, diarrhea, or constipation. Denies difficulty chewing/swallowing. Denies any vitamin/mineral use at home, pt reports not taking oral nutritional supplement at home as well.

## 2023-07-18 NOTE — DIETITIAN INITIAL EVALUATION ADULT - NSFNSADHERENCEPTAFT_GEN_A_CORE
-- Pt reports taking metformin at home for diabetes. Did not monitor his finger stick levels at home.  States he does not want to have to start taking insulin at home. Current A1c=6.1 % (07-05-23), indicates good glycemic control for age (>/= 76 y.o). Ordered for sliding scale insulin currently

## 2023-07-18 NOTE — PROGRESS NOTE ADULT - SUBJECTIVE AND OBJECTIVE BOX
[facility] DIVISION OF KIDNEY DISEASES AND HYPERTENSION -- FOLLOW UP NOTE  --------------------------------------------------------------------------------  Chief Complaint:    24 hour events/subjective:        PAST HISTORY  --------------------------------------------------------------------------------  No significant changes to PMH, PSH, FHx, SHx, unless otherwise noted    ALLERGIES & MEDICATIONS  --------------------------------------------------------------------------------  Allergies    penicillins (Unknown)    Intolerances      Standing Inpatient Medications  allopurinol 100 milliGRAM(s) Oral daily  atorvastatin 40 milliGRAM(s) Oral at bedtime  beclomethasone  80 MICROgram(s) Inhaler 2 Puff(s) Inhalation two times a day  chlorhexidine 2% Cloths 1 Application(s) Topical daily  dextrose 5%. 1000 milliLiter(s) IV Continuous <Continuous>  dextrose 5%. 1000 milliLiter(s) IV Continuous <Continuous>  dextrose 50% Injectable 12.5 Gram(s) IV Push once  dextrose 50% Injectable 25 Gram(s) IV Push once  dextrose 50% Injectable 25 Gram(s) IV Push once  glucagon  Injectable 1 milliGRAM(s) IntraMuscular once  insulin lispro (ADMELOG) corrective regimen sliding scale   SubCutaneous three times a day before meals  insulin lispro (ADMELOG) corrective regimen sliding scale   SubCutaneous at bedtime  iron sucrose IVPB 200 milliGRAM(s) IV Intermittent every 24 hours  metoprolol succinate ER 75 milliGRAM(s) Oral two times a day  omega-3-Acid Ethyl Esters 4 Gram(s) Oral daily  pantoprazole    Tablet 40 milliGRAM(s) Oral before breakfast  tamsulosin 0.4 milliGRAM(s) Oral at bedtime  tiotropium 2.5 MICROgram(s) Inhaler 2 Puff(s) Inhalation daily    PRN Inpatient Medications  acetaminophen     Tablet .. 650 milliGRAM(s) Oral every 6 hours PRN  albuterol    90 MICROgram(s) HFA Inhaler 2 Puff(s) Inhalation every 6 hours PRN  AQUAPHOR (petrolatum Ointment) 1 Application(s) Topical two times a day PRN  carboxymethylcellulose 0.5% (Preservative-Free) Ophthalmic Solution 1 Drop(s) Both EYES two times a day PRN  dextrose Oral Gel 15 Gram(s) Oral once PRN  melatonin 3 milliGRAM(s) Oral at bedtime PRN  sodium chloride 0.65% Nasal 1 Spray(s) Both Nostrils every 6 hours PRN      REVIEW OF SYSTEMS  --------------------------------------------------------------------------------  Gen: No weight changes, fatigue, fevers/chills, weakness  Skin: No rashes  Head/Eyes/Ears/Mouth: No headache; Normal hearing; Normal vision w/o blurriness; No sinus pain/discomfort, sore throat  Respiratory: No dyspnea, cough, wheezing, hemoptysis  CV: No chest pain, PND, orthopnea  GI: No abdominal pain, diarrhea, constipation, nausea, vomiting, melena, hematochezia  : No increased frequency, dysuria, hematuria, nocturia  MSK: No joint pain/swelling; no back pain; no edema  Neuro: No dizziness/lightheadedness, weakness, seizures, numbness, tingling  Heme: No easy bruising or bleeding  Endo: No heat/cold intolerance  Psych: No significant nervousness, anxiety, stress, depression    All other systems were reviewed and are negative, except as noted.    VITALS/PHYSICAL EXAM  --------------------------------------------------------------------------------  T(C): 37.4 (07-18-23 @ 10:51), Max: 37.4 (07-18-23 @ 10:51)  HR: 111 (07-18-23 @ 10:51) (85 - 111)  BP: 104/65 (07-18-23 @ 10:51) (97/60 - 143/89)  RR: 18 (07-18-23 @ 10:51) (18 - 18)  SpO2: 93% (07-18-23 @ 10:51) (93% - 97%)  Wt(kg): --        07-17-23 @ 07:01  -  07-18-23 @ 07:00  --------------------------------------------------------  IN: 0 mL / OUT: 1175 mL / NET: -1175 mL      Physical Exam:  	Gen: NAD, well-appearing  	HEENT: PERRL, supple neck, clear oropharynx  	Pulm: CTA B/L  	CV: RRR, S1S2; no rub  	Back: No spinal or CVA tenderness; no sacral edema  	Abd: +BS, soft, nontender/nondistended  	: No suprapubic tenderness  	UE: Warm, FROM, no clubbing, intact strength; no edema; no asterixis  	LE: Warm, FROM, no clubbing, intact strength; no edema  	Neuro: No focal deficits, intact gait  	Psych: Normal affect and mood  	Skin: Warm, without rashes  	Vascular access:    LABS/STUDIES  --------------------------------------------------------------------------------              7.7    7.78  >-----------<  84       [07-18-23 @ 06:19]              25.3     139  |  105  |  42  ----------------------------<  120      [07-17-23 @ 07:28]  4.9   |  23  |  1.69        Ca     8.8     [07-17-23 @ 07:28]    TPro  5.6  /  Alb  3.3  /  TBili  1.4  /  DBili  x   /  AST  29  /  ALT  21  /  AlkPhos  92  [07-17-23 @ 07:28]          Creatinine Trend:  SCr 1.69 [07-17 @ 07:28]  SCr 1.65 [07-15 @ 09:53]  SCr 1.48 [07-14 @ 07:20]  SCr 1.56 [07-13 @ 06:02]  SCr 1.56 [07-12 @ 07:38]    Urinalysis - [07-17-23 @ 07:28]      Color  / Appearance  / SG  / pH       Gluc 120 / Ketone   / Bili  / Urobili        Blood  / Protein  / Leuk Est  / Nitrite       RBC  / WBC  / Hyaline  / Gran  / Sq Epi  / Non Sq Epi  / Bacteria       Iron 55, TIBC 310, %sat 18      [07-11-23 @ 06:40]  Ferritin 43      [07-17-23 @ 07:28]    HBsAg Nonreact      [07-08-23 @ 06:48]  HCV 0.10, Nonreact      [07-05-23 @ 06:29]    C3 Complement 133      [07-08-23 @ 06:48]  C4 Complement 32      [07-08-23 @ 06:48]  Immunofixation Serum:   Weak IgG Lambda Band Identified    Reference Range: None Detected      [07-16-23 @ 07:16]  SPEP Interpretation: Weak Gamma-Migrating Paraprotein Identified      [07-16-23 @ 07:16]

## 2023-07-18 NOTE — DIETITIAN INITIAL EVALUATION ADULT - ADD RECOMMEND
1. Continue Consistent Carbohydrate DASH Restriction. Defer diet/texture modification to medical team/SLP as indicated  2. Reinforce provided diet education as needed prior to discharge.   3. Monitor PO intake, GI tolerance, skin integrity and labs. RD remains available if needed, pt is aware.

## 2023-07-18 NOTE — PROGRESS NOTE ADULT - PROBLEM SELECTOR PLAN 1
Patient with CKD in setting of HTN, Tobacco Abuse Hx and Heart Disease. Per Javi/OLGA review, appears he has had a SCr ranging from 1.4-2.0 dating back to 2017. Most recent SCr prior to admission was 1.42 on 10/7/22.  LAst few days this admission 1/5-1.7 range. Ongoing anemia and cardiac issues with pericardial eff and unclear anemia.  IgG lambda band noted    UA reviewed. UPCR of 0.1  Renal US from 7/6 demonstrated "Both kidneys are echogenic, compatible with medical renal disease. No renal mass, hydronephrosis or calculus is visualized sonographically." He does have bilateral renal cysts.     If needs contrast studies, ok to proceed as crt stable.  TAVR workup on hold due to anemia at this point    Gloria Hilton MD  Office   Contact me directly via [employer] Teams     (After 5 pm or on weekends please page the on-call fellow/attending, can check [email or web address] for schedule. Login is jean-pierre ballard, schedule under [facility] medicine, psych, derm)

## 2023-07-18 NOTE — DIETITIAN INITIAL EVALUATION ADULT - REASON FOR ADMISSION
Chart Reviewed, Events Noted  "78M w/ hx of Afib (on Eliquis), severe AS, mod-severe TR, CAD/MI (30 yrs ago, medically managed, no PCI), ?CHF, COPD (not on O2 at home), former heavy smoker, HTN, HLD, DM2, gout, prior moderate pericardial effusion, now presenting with large pericardial effusion w/o tamponade physiology on outpt TTE."

## 2023-07-18 NOTE — PROGRESS NOTE ADULT - ASSESSMENT
78M (alt MRN [id number]) w/ hx of Afib (on Eliquis), severe AS, mod-severe TR, CAD/MI (30 yrs ago, medically managed, no PCI), ?CHF, COPD (not on O2 at home), former heavy smoker, HTN, HLD, DM2, gout, prior moderate pericardial effusion, now presenting with large pericardial effusion w/o tamponade physiology on outpt TTE.

## 2023-07-18 NOTE — DIETITIAN INITIAL EVALUATION ADULT - PERTINENT LABORATORY DATA
07-17    139  |  105  |  42<H>  ----------------------------<  120<H>  4.9   |  23  |  1.69<H>    Ca    8.8      17 Jul 2023 07:28    TPro  5.6<L>  /  Alb  3.3  /  TBili  1.4<H>  /  DBili  x   /  AST  29  /  ALT  21  /  AlkPhos  92  07-17  3)  POCT Blood Glucose.: 140 mg/dL (07-18-23 @ 11:33)  POCT Blood Glucose.: 146 mg/dL (07-18-23 @ 08:19)  POCT Blood Glucose.: 152 mg/dL (07-17-23 @ 21:23)  POCT Blood Glucose.: 161 mg/dL (07-17-23 @ 16:25)    A1C with Estimated Average Glucose Result: 6.1 % (07-05-23 @ 06:29)

## 2023-07-18 NOTE — DIETITIAN INITIAL EVALUATION ADULT - OTHER INFO
Weights:  --Drug Dosing Weight: 83 kg/ 183 pounds  (7/14/23)  -- Daily Weights: 83.9 kg/ 185 pounds (07-18),  83.9 kg/ 185 pounds  (07-17), 81 kg/178.6 pounds  (07-14)  -- Pt reports his UBW is about 175 pounds   -- [facility]: [facility]), 191.4 pounds (8/17/22)  --  Weight fluctuations in setting of fluid shifts (diureses, edema ). Will continue to monitor weight trends as available/able.   -- IBW: 136 pounds %IBW: 136%

## 2023-07-19 LAB
ALBUMIN SERPL ELPH-MCNC: 3.5 G/DL — SIGNIFICANT CHANGE UP (ref 3.3–5)
ALP SERPL-CCNC: 100 U/L — SIGNIFICANT CHANGE UP (ref 40–120)
ALT FLD-CCNC: 18 U/L — SIGNIFICANT CHANGE UP (ref 10–45)
ANION GAP SERPL CALC-SCNC: 11 MMOL/L — SIGNIFICANT CHANGE UP (ref 5–17)
APTT BLD: 27.3 SEC — LOW (ref 27.5–35.5)
AST SERPL-CCNC: 21 U/L — SIGNIFICANT CHANGE UP (ref 10–40)
BILIRUB SERPL-MCNC: 1.4 MG/DL — HIGH (ref 0.2–1.2)
BUN SERPL-MCNC: 42 MG/DL — HIGH (ref 7–23)
CALCIUM SERPL-MCNC: 8.9 MG/DL — SIGNIFICANT CHANGE UP (ref 8.4–10.5)
CHLORIDE SERPL-SCNC: 104 MMOL/L — SIGNIFICANT CHANGE UP (ref 96–108)
CO2 SERPL-SCNC: 24 MMOL/L — SIGNIFICANT CHANGE UP (ref 22–31)
CREAT SERPL-MCNC: 1.69 MG/DL — HIGH (ref 0.5–1.3)
EGFR: 41 ML/MIN/1.73M2 — LOW
GLUCOSE BLDC GLUCOMTR-MCNC: 140 MG/DL — HIGH (ref 70–99)
GLUCOSE BLDC GLUCOMTR-MCNC: 142 MG/DL — HIGH (ref 70–99)
GLUCOSE BLDC GLUCOMTR-MCNC: 180 MG/DL — HIGH (ref 70–99)
GLUCOSE SERPL-MCNC: 134 MG/DL — HIGH (ref 70–99)
HCT VFR BLD CALC: 24.7 % — LOW (ref 39–50)
HGB BLD-MCNC: 7.5 G/DL — LOW (ref 13–17)
INR BLD: 1.15 RATIO — SIGNIFICANT CHANGE UP (ref 0.88–1.16)
MAGNESIUM SERPL-MCNC: 1.7 MG/DL — SIGNIFICANT CHANGE UP (ref 1.6–2.6)
MCHC RBC-ENTMCNC: 26.7 PG — LOW (ref 27–34)
MCHC RBC-ENTMCNC: 30.4 GM/DL — LOW (ref 32–36)
MCV RBC AUTO: 87.9 FL — SIGNIFICANT CHANGE UP (ref 80–100)
NRBC # BLD: 0 /100 WBCS — SIGNIFICANT CHANGE UP (ref 0–0)
PLATELET # BLD AUTO: SIGNIFICANT CHANGE UP K/UL (ref 150–400)
POTASSIUM SERPL-MCNC: 5.1 MMOL/L — SIGNIFICANT CHANGE UP (ref 3.5–5.3)
POTASSIUM SERPL-SCNC: 5.1 MMOL/L — SIGNIFICANT CHANGE UP (ref 3.5–5.3)
PROT SERPL-MCNC: 6 G/DL — SIGNIFICANT CHANGE UP (ref 6–8.3)
PROTHROM AB SERPL-ACNC: 13.2 SEC — SIGNIFICANT CHANGE UP (ref 10.5–13.4)
RBC # BLD: 2.81 M/UL — LOW (ref 4.2–5.8)
RBC # FLD: 19.8 % — HIGH (ref 10.3–14.5)
SODIUM SERPL-SCNC: 139 MMOL/L — SIGNIFICANT CHANGE UP (ref 135–145)
WBC # BLD: 7.36 K/UL — SIGNIFICANT CHANGE UP (ref 3.8–10.5)
WBC # FLD AUTO: 7.36 K/UL — SIGNIFICANT CHANGE UP (ref 3.8–10.5)

## 2023-07-19 PROCEDURE — 99232 SBSQ HOSP IP/OBS MODERATE 35: CPT

## 2023-07-19 PROCEDURE — 93010 ELECTROCARDIOGRAM REPORT: CPT

## 2023-07-19 PROCEDURE — 99152 MOD SED SAME PHYS/QHP 5/>YRS: CPT

## 2023-07-19 PROCEDURE — 93454 CORONARY ARTERY ANGIO S&I: CPT | Mod: 26

## 2023-07-19 PROCEDURE — 99233 SBSQ HOSP IP/OBS HIGH 50: CPT

## 2023-07-19 RX ADMIN — Medication 75 MILLIGRAM(S): at 05:40

## 2023-07-19 RX ADMIN — BECLOMETHASONE DIPROPIONATE 2 PUFF(S): 40 AEROSOL, METERED RESPIRATORY (INHALATION) at 09:14

## 2023-07-19 RX ADMIN — PANTOPRAZOLE SODIUM 40 MILLIGRAM(S): 20 TABLET, DELAYED RELEASE ORAL at 05:40

## 2023-07-19 RX ADMIN — ATORVASTATIN CALCIUM 40 MILLIGRAM(S): 80 TABLET, FILM COATED ORAL at 21:37

## 2023-07-19 RX ADMIN — TIOTROPIUM BROMIDE 2 PUFF(S): 18 CAPSULE ORAL; RESPIRATORY (INHALATION) at 13:37

## 2023-07-19 RX ADMIN — TAMSULOSIN HYDROCHLORIDE 0.4 MILLIGRAM(S): 0.4 CAPSULE ORAL at 21:37

## 2023-07-19 RX ADMIN — BECLOMETHASONE DIPROPIONATE 2 PUFF(S): 40 AEROSOL, METERED RESPIRATORY (INHALATION) at 21:37

## 2023-07-19 RX ADMIN — Medication 4 GRAM(S): at 13:37

## 2023-07-19 RX ADMIN — IRON SUCROSE 110 MILLIGRAM(S): 20 INJECTION, SOLUTION INTRAVENOUS at 18:15

## 2023-07-19 RX ADMIN — Medication 100 MILLIGRAM(S): at 13:37

## 2023-07-19 NOTE — PROGRESS NOTE ADULT - ASSESSMENT
78M (alt MRN [id number]) w/ hx of Afib (on Eliquis), severe AS, mod-severe TR, CAD/MI (30 yrs ago, medically managed, no PCI), ?CHF, COPD (not on O2 at home), former heavy smoker, HTN, HLD, DM2, gout, prior moderate pericardial effusion, now presenting with large pericardial effusion w/o tamponade physiology on outpt TTE.

## 2023-07-19 NOTE — PROGRESS NOTE ADULT - SUBJECTIVE AND OBJECTIVE BOX
[facility] Division of Hospital Medicine  Fina Mccoy MD  Pager (M-F, 6P-9B): [phone]  Other Times:  [phone]    Patient is a 78y old  Male who presents with a chief complaint of worsening pericardial effusion (19 Jul 2023 11:05)      SUBJECTIVE / OVERNIGHT EVENTS:  feeling ok. no changes.  left arm swelling ?improved. afebrile     ADDITIONAL REVIEW OF SYSTEMS: otherwise neg    MEDICATIONS  (STANDING):  allopurinol 100 milliGRAM(s) Oral daily  atorvastatin 40 milliGRAM(s) Oral at bedtime  beclomethasone  80 MICROgram(s) Inhaler 2 Puff(s) Inhalation two times a day  chlorhexidine 2% Cloths 1 Application(s) Topical daily  dextrose 5%. 1000 milliLiter(s) (100 mL/Hr) IV Continuous <Continuous>  dextrose 5%. 1000 milliLiter(s) (50 mL/Hr) IV Continuous <Continuous>  dextrose 50% Injectable 12.5 Gram(s) IV Push once  dextrose 50% Injectable 25 Gram(s) IV Push once  dextrose 50% Injectable 25 Gram(s) IV Push once  glucagon  Injectable 1 milliGRAM(s) IntraMuscular once  insulin lispro (ADMELOG) corrective regimen sliding scale   SubCutaneous three times a day before meals  insulin lispro (ADMELOG) corrective regimen sliding scale   SubCutaneous at bedtime  iron sucrose IVPB 200 milliGRAM(s) IV Intermittent every 24 hours  metoprolol succinate ER 75 milliGRAM(s) Oral two times a day  omega-3-Acid Ethyl Esters 4 Gram(s) Oral daily  pantoprazole    Tablet 40 milliGRAM(s) Oral before breakfast  tamsulosin 0.4 milliGRAM(s) Oral at bedtime  tiotropium 2.5 MICROgram(s) Inhaler 2 Puff(s) Inhalation daily    MEDICATIONS  (PRN):  acetaminophen     Tablet .. 650 milliGRAM(s) Oral every 6 hours PRN Temp greater or equal to 38C (100.4F), Mild Pain (1 - 3)  albuterol    90 MICROgram(s) HFA Inhaler 2 Puff(s) Inhalation every 6 hours PRN Shortness of Breath and/or Wheezing  AQUAPHOR (petrolatum Ointment) 1 Application(s) Topical two times a day PRN dry skin  carboxymethylcellulose 0.5% (Preservative-Free) Ophthalmic Solution 1 Drop(s) Both EYES two times a day PRN dry eyes  dextrose Oral Gel 15 Gram(s) Oral once PRN Blood Glucose LESS THAN 70 milliGRAM(s)/deciliter  melatonin 3 milliGRAM(s) Oral at bedtime PRN Sleep  sodium chloride 0.65% Nasal 1 Spray(s) Both Nostrils every 6 hours PRN Nasal Congestion      CAPILLARY BLOOD GLUCOSE      POCT Blood Glucose.: 140 mg/dL (19 Jul 2023 08:22)  POCT Blood Glucose.: 147 mg/dL (18 Jul 2023 21:30)  POCT Blood Glucose.: 145 mg/dL (18 Jul 2023 16:45)    I&O's Summary    18 Jul 2023 07:01  -  19 Jul 2023 07:00  --------------------------------------------------------  IN: 0 mL / OUT: 100 mL / NET: -100 mL        PHYSICAL EXAM:  Vital Signs Last 24 Hrs  T(C): 36.7 (19 Jul 2023 12:29), Max: 36.9 (18 Jul 2023 21:14)  T(F): 98 (19 Jul 2023 12:29), Max: 98.5 (18 Jul 2023 21:14)  HR: 99 (19 Jul 2023 12:29) (86 - 111)  BP: 98/65 (19 Jul 2023 12:29) (98/65 - 105/70)  BP(mean): --  RR: 18 (19 Jul 2023 12:29) (18 - 18)  SpO2: 98% (19 Jul 2023 12:29) (92% - 98%)    Parameters below as of 19 Jul 2023 12:29  Patient On (Oxygen Delivery Method): room air    CONSTITUTIONAL: NAD  EYES: PERRLA; conjunctiva and sclera clear  ENMT: Moist oral mucosa  RESPIRATORY: Normal respiratory effort; lungs are clear to auscultation bilaterally  CARDIOVASCULAR: irregular, systolic murmur +b/l LE edema R>L;  Peripheral pulses are 2+ bilaterally  ABDOMEN: Nontender to palpation, normoactive bowel sounds, no rebound/guarding;   MUSCULOSKELETAL:  no clubbing or cyanosis of digits; no joint swelling or tenderness to palpation, moving all extremities   PSYCH: A+O to person, place, and time; affect appropriate  NEUROLOGY: non focal, patient ambulating without assist  SKIN: RLE venous stasis changes, several areas of ecchymosis on UEs , LUE sweling    LABS:                        7.5    7.36  )-----------( Clumped    ( 19 Jul 2023 06:44 )             24.7     07-19    139  |  104  |  42<H>  ----------------------------<  134<H>  5.1   |  24  |  1.69<H>    Ca    8.9      19 Jul 2023 06:44  Mg     1.7     07-19    TPro  6.0  /  Alb  3.5  /  TBili  1.4<H>  /  DBili  x   /  AST  21  /  ALT  18  /  AlkPhos  100  07-19    PT/INR - ( 19 Jul 2023 06:44 )   PT: 13.2 sec;   INR: 1.15 ratio         PTT - ( 19 Jul 2023 06:44 )  PTT:27.3 sec      Urinalysis Basic - ( 19 Jul 2023 06:44 )    Color: x / Appearance: x / SG: x / pH: x  Gluc: 134 mg/dL / Ketone: x  / Bili: x / Urobili: x   Blood: x / Protein: x / Nitrite: x   Leuk Esterase: x / RBC: x / WBC x   Sq Epi: x / Non Sq Epi: x / Bacteria: x          RADIOLOGY & ADDITIONAL TESTS:  Results Reviewed:   Imaging Personally Reviewed:  Electrocardiogram Personally Reviewed:    COORDINATION OF CARE:  Care Discussed with Consultants/Other Providers [Y/N]:  Prior or Outpatient Records Reviewed [Y/N]:

## 2023-07-19 NOTE — CHART NOTE - NSCHARTNOTEFT_GEN_A_CORE
[facility] with moderate disease as below   No urgent intervention as CKD and asx, pending TAVR  Structural team aware  NPO after midnight in case of TAVR tomorrow 7/20         Cath Lab Report    Diagnostic Cardiologist:       Rex Wood MD   Fellow:                        Shelbie Viveros   Fellow:                        Adolph Duran, Shelbie   Referring Physician:           Maritza Mccoy MD     Procedures Performed   Procedures:               1.    Diagnostic Coronary Angiography     2.    Left Heart Cath   3.    Arterial Access - Left Femoral   4.    Ultrasound Guided Access     Indications:                Aortic Stenosis     Diagnostic Conclusions:     Significant distal LCX lesion and otherwise mild nonobstructive CAD     Procedure Narrative:   The risks and alternatives of the procedures and conscious sedation  were explained to the patient and informed consent was  obtained. The patient was brought to the cath lab and placed on the  exam table.  Access   Left femoral artery:   The puncture site was infiltrated with 1% Lidocaine. Vascular access  was obtained using modified seldinger technique.    Diagnostic Findings:     Coronary Angiography   The coronary circulation is right dominant.      LM   Left main artery: Angiography shows no disease.      LAD   Proximal left anterior descending: There is a 40 % stenosis.      CX   Distal circumflex: There is an 80 % stenosis.      RCA   Proximal right coronary artery: There is a 40 % stenosis.

## 2023-07-19 NOTE — PROGRESS NOTE ADULT - PROBLEM SELECTOR PLAN 1
Patient states he gets weekly iron infusions outpatient. outpatient hematologist is Dr. Sylvester (), - NOT affilated with [facility] affiliation. Now seen by house heme svc on 7/15  DDx includes underlying hematologic dz , medications, mechanical sheering from AS, bleeding    -Hgb cont to fluctuate. cont to transfuse as needed above 7. started on IV iron infusion.   -HOLD heparin gtt now given hematoma .  HIT panel neg -awaiting serotonin assay, PF4   - Additional w/u for hemolysis, perip smear etc as per franco rec.   -CT RLE   >Findings are consistent with a large quadriceps intramuscular hematoma-no clinical change noted.   - Surg no acute intervention. repeat CTA RLE stable without bleeding.    -per patient's son, patient is on reduced eliquis dose at home due to history of anemia/bleed?.   - GI eval appreciated. no plans for acute intervention given elevated risk and no current evidence of bleeding.   - Patient may need to challenged with AC again at some point to see if pt can tolerate for eventual TAVR +/- stents if pt has CAD on [facility]  -Monitor HH. Keep active T&S. Transfuse prbc prn.

## 2023-07-19 NOTE — PROGRESS NOTE ADULT - ASSESSMENT
1) pt seen as outpatient  2) had egd at [facility] and [facility] negat  3) high risk for repeat endoscpic evalation  4) ppi daily conservative magnet  5) will follow with you  6) multifactoral anemia

## 2023-07-19 NOTE — PROGRESS NOTE ADULT - PROBLEM SELECTOR PLAN 3
Unclear hx of ?CHF, possibly has chronic HFpEF with severe AS -- TTE w/ unchanged mod to large pericardial effusion ; +severe AS  - CT surgery consulted for severe AS ; workup for TAVR in progress ; TAVR CT ordered, plan for LHC -postponed  due to anemia/thrombocytopenia and RLE hematoma   - per discussion pt likely stable to proceed now with LHC. no evidence of ongoing bleed and h/h relatively stable. likely will be diagnostic only given still concerns about if pt can tolerate full AC.   - diuretic on HOLD given SHEYLA - Cr improving.   - strict I/Os, standing weights daily  - Cardiology following ; reccs appreciated

## 2023-07-19 NOTE — PROGRESS NOTE ADULT - SUBJECTIVE AND OBJECTIVE BOX
ID: 78 M with hx. of Afib (on Eliquis), severe AS, mod-severe TR, CAD/MI (30 yrs ago, medically managed, no PCI), ?CHF, COPD (not on O2 at home), former heavy smoker, HTN, HLD, DM2, gout.   Was sent in for concern re outpatient echocardiogram which demonstrated worsening pericardial effusion. Effusion not safely accessible, no evidence of tamponade. A.S., now pending TAVR work up  Course c.b R thigh hematoma requiring PRBC 2U now resolved with stable RLE CTA    Patient seen and examined at bedside.    Overnight Events: NAEON, feeling well today, denies FC NV CP SOB. RLE pain improved. CTA RLE neg for bleed. Plan for C today with Dr. Wood     Telemetry: AF         Current Meds:  acetaminophen     Tablet .. 650 milliGRAM(s) Oral every 6 hours PRN  albuterol    90 MICROgram(s) HFA Inhaler 2 Puff(s) Inhalation every 6 hours PRN  allopurinol 100 milliGRAM(s) Oral daily  AQUAPHOR (petrolatum Ointment) 1 Application(s) Topical two times a day PRN  atorvastatin 40 milliGRAM(s) Oral at bedtime  beclomethasone  80 MICROgram(s) Inhaler 2 Puff(s) Inhalation two times a day  carboxymethylcellulose 0.5% (Preservative-Free) Ophthalmic Solution 1 Drop(s) Both EYES two times a day PRN  chlorhexidine 2% Cloths 1 Application(s) Topical daily  dextrose 5%. 1000 milliLiter(s) IV Continuous <Continuous>  dextrose 5%. 1000 milliLiter(s) IV Continuous <Continuous>  dextrose 50% Injectable 12.5 Gram(s) IV Push once  dextrose 50% Injectable 25 Gram(s) IV Push once  dextrose 50% Injectable 25 Gram(s) IV Push once  dextrose Oral Gel 15 Gram(s) Oral once PRN  glucagon  Injectable 1 milliGRAM(s) IntraMuscular once  insulin lispro (ADMELOG) corrective regimen sliding scale   SubCutaneous three times a day before meals  insulin lispro (ADMELOG) corrective regimen sliding scale   SubCutaneous at bedtime  iron sucrose IVPB 200 milliGRAM(s) IV Intermittent every 24 hours  melatonin 3 milliGRAM(s) Oral at bedtime PRN  metoprolol succinate ER 75 milliGRAM(s) Oral two times a day  omega-3-Acid Ethyl Esters 4 Gram(s) Oral daily  pantoprazole    Tablet 40 milliGRAM(s) Oral before breakfast  sodium chloride 0.65% Nasal 1 Spray(s) Both Nostrils every 6 hours PRN  tamsulosin 0.4 milliGRAM(s) Oral at bedtime  tiotropium 2.5 MICROgram(s) Inhaler 2 Puff(s) Inhalation daily      Vitals:  T(F): 97.6 (07-19), Max: 98.5 (07-18)  HR: 86 (07-19) (86 - 111)  BP: 105/70 (07-19) (104/68 - 105/70)  RR: 18 (07-19)  SpO2: 93% (07-19)  I&O's Summary    18 Jul 2023 07:01  -  19 Jul 2023 07:00  --------------------------------------------------------  IN: 0 mL / OUT: 100 mL / NET: -100 mL        Physical Exam:  Appearance: No acute distress; well appearing  Eyes: PERRL, EOMI, pink conjunctiva  HEENT: Normal oral mucosa  Cardiovascular: IIR, S1, S2, III/VI VIOLETA radiating to carotids, no rubs, or gallops; no edema; no JVD  Respiratory: Clear to auscultation bilaterally  Gastrointestinal: soft, non-tender, non-distended with normal bowel sounds  Musculoskeletal:, 2+ RLE edema   Neurologic: Non-focal  Lymphatic: No lymphadenopathy  Psychiatry: AAOx3, mood & affect appropriate  Skin: No rashes, ecchymoses, or cyanosis                          7.5    7.36  )-----------( Clumped    ( 19 Jul 2023 06:44 )             24.7     07-19    139  |  104  |  42<H>  ----------------------------<  134<H>  5.1   |  24  |  1.69<H>    Ca    8.9      19 Jul 2023 06:44  Mg     1.7     07-19    TPro  6.0  /  Alb  3.5  /  TBili  1.4<H>  /  DBili  x   /  AST  21  /  ALT  18  /  AlkPhos  100  07-19    PT/INR - ( 19 Jul 2023 06:44 )   PT: 13.2 sec;   INR: 1.15 ratio    PTT - ( 19 Jul 2023 06:44 )  PTT:27.3 sec    New ECG(s): Personally reviewed      A/P  78 M with hx. of Afib (on Eliquis), severe AS, mod-severe TR, CAD/MI (30 yrs ago, medically managed, no PCI), ?CHF, COPD (not on O2 at home), former heavy smoker, HTN, HLD, DM2, gout.   Was sent in for concern re outpatient echocardiogram which demonstrated worsening pericardial effusion. Effusion not safely accessible, no evidence of tamponade. A.S., now pending TAVR work up  Course c.b R thigh hematoma requiring PRBC 2U.      #R Thigh Hematoma  - Noted on CT non con of the RLE  - Hb drop 2 pts, platelets downtrending   - LHC canceled  - Ok for heparin per heme 7/17  Plan:  - F/U RLE CTA - neg for bleed  - DC Ace wrap  - F/U Vascular recs after CTA  - Hb goal >7  - F/U PT eval    #Severe Symptomatic Low Flow Low Gradient AS  - Pt with known sx of sev AS p/w GOFF  - Repeat TTE showing sev AS  - Structural cardiology consulted, pending eventual CT TAVR & LHC  - Hypervolemic on exam, likely multifactorial, judicious with diuretics as above  Plan:  - LHC today with Dr. Wood   - Can readdress when bleed ruled out  - F/U Structural recs     #AFib RVR  #RBBB  - Persistent per OSH documentation   - On apixaban 2.5 BID at home - unclear why on dose reduced; per son he previously was on Coumadin but had supra-therapeutic.  Apixaban on hold at present given effusion and need for LHC.  - CHADSVASC 5   - DC heparin gtt as above  - AF RVR likely compensatory iso blood loss, no need to tx unless hemodynamically unstable in which case would recommend blood   - Cont MTP Succ 75 BID    #Large Pericardial Effusion  - No clinical tamponade as patient is hemodynamically stable, with robust heart sounds and no obvious JVD. Outpatient TTE demonstrated LVEF of 55% with large pericardial effusion without tamponade physiology.   - Repeat TTE showed large effusion mainly around L ventricle; pericardial effusion cannot be safely tapped  Plan:  - Will continue to monitor effusion; continue TAVR workup in meantime  - Can repeat limited TTE in the outpatient setting       Jean Ríos PGY5  Cardiology Fellow    CHAVA Enriquez

## 2023-07-19 NOTE — PROGRESS NOTE ADULT - SUBJECTIVE AND OBJECTIVE BOX
Patient is a 78y Male     Patient is a 78y old  Male who presents with a chief complaint of worsening pericardial effusion (18 Jul 2023 16:07)      HPI:  78M (alt MRN [id number]) w/ hx of Afib (on Eliquis), severe AS, mod-severe TR, CAD/MI (30 yrs ago, medically managed, no PCI), ?CHF, COPD (not on O2 at home), former heavy smoker, HTN, HLD, DM2, gout, presenting with worsening pericardial effusion. Sent in from his cardiologist's office. He had a TTE on day of presentation (7/3/23) that showed a "large circumferential pericardial effusion up to 2.6 cm without evidence of tamponade physiology" (and mildly reduced RV systolic function was also noted). His prior TTE on 10/3/22 had shown a "moderate pericardial effusion, measuring about 1.0 cm superior to the RA; otherwise small circumferential pericardial effusion." He is unclear what is the cause of pericardial effusion and he is unsure if he has a hx of CHF. Reported that at baseline he has shortness of breath after walking 50 ft and swelling in both legs. Noted that the swelling in his legs today appear to be around his average size. Denied f/c/n/v, CP, SOB at rest, abdominal pain, dysuria, hematuria, hematochezia, melena, diarrhea, constipation.    In ED: Afebrile, HR 80s-90s, SBP 140s-150s, RR 16-20, 88% on RA, % on 1-2L O2NC. Labs notable for hsTrop 30->27, SCr 1.36, proBNP 2.3k; VBG pH 7.34, pCO2 61, HCO3 33, lactate 0.8. Given Lasix 40mg IV x1. Admitted to Medicine for further management. (04 Jul 2023 00:24)      PAST MEDICAL & SURGICAL HISTORY:  COPD (chronic obstructive pulmonary disease)      Former smoker      HTN (hypertension)      HLD (hyperlipidemia)      CAD (coronary artery disease)      Chronic atrial fibrillation      Diabetes mellitus, type 2      Gout      Severe aortic stenosis      Pericardial effusion      TR (tricuspid regurgitation)      No significant past surgical history          MEDICATIONS  (STANDING):  allopurinol 100 milliGRAM(s) Oral daily  atorvastatin 40 milliGRAM(s) Oral at bedtime  beclomethasone  80 MICROgram(s) Inhaler 2 Puff(s) Inhalation two times a day  chlorhexidine 2% Cloths 1 Application(s) Topical daily  dextrose 5%. 1000 milliLiter(s) (50 mL/Hr) IV Continuous <Continuous>  dextrose 5%. 1000 milliLiter(s) (100 mL/Hr) IV Continuous <Continuous>  dextrose 50% Injectable 12.5 Gram(s) IV Push once  dextrose 50% Injectable 25 Gram(s) IV Push once  dextrose 50% Injectable 25 Gram(s) IV Push once  glucagon  Injectable 1 milliGRAM(s) IntraMuscular once  insulin lispro (ADMELOG) corrective regimen sliding scale   SubCutaneous three times a day before meals  insulin lispro (ADMELOG) corrective regimen sliding scale   SubCutaneous at bedtime  iron sucrose IVPB 200 milliGRAM(s) IV Intermittent every 24 hours  metoprolol succinate ER 75 milliGRAM(s) Oral two times a day  omega-3-Acid Ethyl Esters 4 Gram(s) Oral daily  pantoprazole    Tablet 40 milliGRAM(s) Oral before breakfast  tamsulosin 0.4 milliGRAM(s) Oral at bedtime  tiotropium 2.5 MICROgram(s) Inhaler 2 Puff(s) Inhalation daily      Allergies    penicillins (Unknown)    Intolerances        SOCIAL HISTORY:  Denies ETOh,Smoking,     FAMILY HISTORY:  FH: breast cancer (Mother)        REVIEW OF SYSTEMS:    CONSTITUTIONAL: No weakness, fevers or chills  EYES/ENT: No visual changes;  No vertigo or throat pain   NECK: No pain or stiffness  RESPIRATORY: No cough, wheezing, hemoptysis; No shortness of breath  CARDIOVASCULAR: No chest pain or palpitations  GASTROINTESTINAL: No abdominal or epigastric pain. No nausea, vomiting, or hematemesis; No diarrhea or constipation. No melena or hematochezia.  GENITOURINARY: No dysuria, frequency or hematuria  NEUROLOGICAL: No numbness or weakness  SKIN: No itching, burning, rashes, or lesions   All other review of systems is negative unless indicated above.    VITAL:  T(C): , Max: 37.4 (07-18-23 @ 10:51)  T(F): , Max: 99.4 (07-18-23 @ 10:51)  HR: 86 (07-19-23 @ 05:06)  BP: 105/70 (07-19-23 @ 05:06)  BP(mean): --  RR: 18 (07-19-23 @ 05:06)  SpO2: 93% (07-19-23 @ 05:06)  Wt(kg): --    I and O's:    07-17 @ 07:01  -  07-18 @ 07:00  --------------------------------------------------------  IN: 0 mL / OUT: 1175 mL / NET: -1175 mL    07-18 @ 07:01  -  07-19 @ 07:00  --------------------------------------------------------  IN: 0 mL / OUT: 100 mL / NET: -100 mL          PHYSICAL EXAM:    Constitutional: NAD  HEENT: PERRLA,   Neck: No JVD  Respiratory: CTA B/L  Cardiovascular: S1 and S2  Gastrointestinal: BS+, soft, NT/ND  Extremities: No peripheral edema  Neurological: A/O x 3, no focal deficits  Psychiatric: Normal mood, normal affect  : No Reeder  Skin: No rashes  Access: Not applicable  Back: No CVA tenderness    LABS:                        7.5    7.36  )-----------( Clumped    ( 19 Jul 2023 06:44 )             24.7     07-19    139  |  104  |  42<H>  ----------------------------<  134<H>  5.1   |  24  |  1.69<H>    Ca    8.9      19 Jul 2023 06:44  Mg     1.7     07-19    TPro  6.0  /  Alb  3.5  /  TBili  1.4<H>  /  DBili  x   /  AST  21  /  ALT  18  /  AlkPhos  100  07-19          RADIOLOGY & ADDITIONAL STUDIES:

## 2023-07-19 NOTE — PROGRESS NOTE ADULT - PROBLEM SELECTOR PLAN 11
- DVT ppx: off AC due to bleeding.   - Diet: DASH/CC  - Dispo: pending clinical course      discussed and updated son  [phone] Uriel - Jen.

## 2023-07-20 LAB
ALBUMIN SERPL ELPH-MCNC: 3.3 G/DL — SIGNIFICANT CHANGE UP (ref 3.3–5)
ALP SERPL-CCNC: 97 U/L — SIGNIFICANT CHANGE UP (ref 40–120)
ALT FLD-CCNC: 16 U/L — SIGNIFICANT CHANGE UP (ref 10–45)
ANION GAP SERPL CALC-SCNC: 11 MMOL/L — SIGNIFICANT CHANGE UP (ref 5–17)
AST SERPL-CCNC: 15 U/L — SIGNIFICANT CHANGE UP (ref 10–40)
BILIRUB SERPL-MCNC: 1.4 MG/DL — HIGH (ref 0.2–1.2)
BUN SERPL-MCNC: 42 MG/DL — HIGH (ref 7–23)
CALCIUM SERPL-MCNC: 8.7 MG/DL — SIGNIFICANT CHANGE UP (ref 8.4–10.5)
CHLORIDE SERPL-SCNC: 105 MMOL/L — SIGNIFICANT CHANGE UP (ref 96–108)
CO2 SERPL-SCNC: 22 MMOL/L — SIGNIFICANT CHANGE UP (ref 22–31)
CREAT SERPL-MCNC: 1.82 MG/DL — HIGH (ref 0.5–1.3)
EGFR: 38 ML/MIN/1.73M2 — LOW
GLUCOSE BLDC GLUCOMTR-MCNC: 132 MG/DL — HIGH (ref 70–99)
GLUCOSE BLDC GLUCOMTR-MCNC: 141 MG/DL — HIGH (ref 70–99)
GLUCOSE BLDC GLUCOMTR-MCNC: 144 MG/DL — HIGH (ref 70–99)
GLUCOSE BLDC GLUCOMTR-MCNC: 151 MG/DL — HIGH (ref 70–99)
GLUCOSE BLDC GLUCOMTR-MCNC: 155 MG/DL — HIGH (ref 70–99)
GLUCOSE BLDC GLUCOMTR-MCNC: 158 MG/DL — HIGH (ref 70–99)
GLUCOSE SERPL-MCNC: 123 MG/DL — HIGH (ref 70–99)
HCT VFR BLD CALC: 24.8 % — LOW (ref 39–50)
HGB BLD-MCNC: 7.7 G/DL — LOW (ref 13–17)
MCHC RBC-ENTMCNC: 27.5 PG — SIGNIFICANT CHANGE UP (ref 27–34)
MCHC RBC-ENTMCNC: 31 GM/DL — LOW (ref 32–36)
MCV RBC AUTO: 88.6 FL — SIGNIFICANT CHANGE UP (ref 80–100)
METHYLMALONATE SERPL-SCNC: 250 NMOL/L — SIGNIFICANT CHANGE UP (ref 0–378)
NRBC # BLD: 0 /100 WBCS — SIGNIFICANT CHANGE UP (ref 0–0)
PLATELET # BLD AUTO: 98 K/UL — LOW (ref 150–400)
POTASSIUM SERPL-MCNC: 4.8 MMOL/L — SIGNIFICANT CHANGE UP (ref 3.5–5.3)
POTASSIUM SERPL-SCNC: 4.8 MMOL/L — SIGNIFICANT CHANGE UP (ref 3.5–5.3)
PROT SERPL-MCNC: 5.5 G/DL — LOW (ref 6–8.3)
RBC # BLD: 2.8 M/UL — LOW (ref 4.2–5.8)
RBC # FLD: 20.1 % — HIGH (ref 10.3–14.5)
SODIUM SERPL-SCNC: 138 MMOL/L — SIGNIFICANT CHANGE UP (ref 135–145)
WBC # BLD: 6.86 K/UL — SIGNIFICANT CHANGE UP (ref 3.8–10.5)
WBC # FLD AUTO: 6.86 K/UL — SIGNIFICANT CHANGE UP (ref 3.8–10.5)

## 2023-07-20 PROCEDURE — 99233 SBSQ HOSP IP/OBS HIGH 50: CPT

## 2023-07-20 PROCEDURE — 93971 EXTREMITY STUDY: CPT | Mod: 26,LT

## 2023-07-20 RX ADMIN — ATORVASTATIN CALCIUM 40 MILLIGRAM(S): 80 TABLET, FILM COATED ORAL at 21:32

## 2023-07-20 RX ADMIN — Medication 100 MILLIGRAM(S): at 12:40

## 2023-07-20 RX ADMIN — PANTOPRAZOLE SODIUM 40 MILLIGRAM(S): 20 TABLET, DELAYED RELEASE ORAL at 06:09

## 2023-07-20 RX ADMIN — BECLOMETHASONE DIPROPIONATE 2 PUFF(S): 40 AEROSOL, METERED RESPIRATORY (INHALATION) at 21:33

## 2023-07-20 RX ADMIN — Medication 4 GRAM(S): at 12:40

## 2023-07-20 RX ADMIN — TIOTROPIUM BROMIDE 2 PUFF(S): 18 CAPSULE ORAL; RESPIRATORY (INHALATION) at 12:40

## 2023-07-20 RX ADMIN — BECLOMETHASONE DIPROPIONATE 2 PUFF(S): 40 AEROSOL, METERED RESPIRATORY (INHALATION) at 09:18

## 2023-07-20 RX ADMIN — TAMSULOSIN HYDROCHLORIDE 0.4 MILLIGRAM(S): 0.4 CAPSULE ORAL at 21:32

## 2023-07-20 RX ADMIN — Medication 75 MILLIGRAM(S): at 17:48

## 2023-07-20 RX ADMIN — IRON SUCROSE 110 MILLIGRAM(S): 20 INJECTION, SOLUTION INTRAVENOUS at 17:47

## 2023-07-20 RX ADMIN — Medication 75 MILLIGRAM(S): at 06:09

## 2023-07-20 RX ADMIN — CHLORHEXIDINE GLUCONATE 1 APPLICATION(S): 213 SOLUTION TOPICAL at 12:40

## 2023-07-20 NOTE — PHYSICAL THERAPY INITIAL EVALUATION ADULT - ACTIVE RANGE OF MOTION EXAMINATION, REHAB EVAL
Pt reports limited B shoulder ROM/bilateral  lower extremity Active ROM was WFL (within functional limits)

## 2023-07-20 NOTE — PROGRESS NOTE ADULT - SUBJECTIVE AND OBJECTIVE BOX
78 M Afib (on Eliquis), CAD, COPD (not on O2 at home), DM2, h/o recurrent pericardial effusions admitted with enlarging pericardial effusion c/o SOB found to have severe AS.  Pt also has a h/o anemia, ? GIB and has recent right thigh hematoma that is resolving.  Although patient is a candidate for TAVR, he is high risk given his many chronic comorbidities and acute issues (pericardial effusion, thigh hematoma).  Would address the acute issue prior to consideration from TAVR especially as etiology of pt's SOB is multifactorial and not solely explained by AS.

## 2023-07-20 NOTE — PROGRESS NOTE ADULT - PROBLEM SELECTOR PLAN 3
- Unclear hx of ?CHF, possibly has chronic HFpEF with severe AS -- TTE w/ unchanged mod to large pericardial effusion ; +severe AS  - LHC performed on 7/19 showing mod dz (40%) and 80% lesion LCx -no intervention performed.   - strutural heart and CT surgery consulted for severe AS : Given complex med situation (anemia, bleeding, SHEYLA on CKD, pericardial effusion, CAD) and safety of resuming full AC would defer valve intervention at this time.    - diuretic still on HOLD given SHEYLA.   - strict I/Os, standing weights daily  - Cardiology following ; reccs appreciated

## 2023-07-20 NOTE — PHYSICAL THERAPY INITIAL EVALUATION ADULT - GENERAL OBSERVATIONS, REHAB EVAL
Pt received semi-reclined in bed with +tele, +IVL on R UE, +AV fistula on LUE, observed increased R LE swelling. Pt received semi-reclined in bed with +tele, +IVL on R UE, +pink band on LUE, observed increased R LE swelling.

## 2023-07-20 NOTE — PROGRESS NOTE ADULT - PROBLEM SELECTOR PLAN 4
- diuresis remains on hold. Cr slight up - possible contrast induced from C on 7/19.   - Nephrology following   - Hep C non reactive. f/up HBsAg, serum immunofixation, C3 and C4.   - Renal US shows both kidneys are echogenic, compatible with medical renal disease. No renal mass, hydronephrosis or calculus  - monitor BMP, Is/Os

## 2023-07-20 NOTE — PROGRESS NOTE ADULT - ASSESSMENT
1) pt with anemia  2) unable to identifiy cause of gib  3) conservaitve magnemnt  4) high risk repeat endoscopic evalation  5) agree with cardiac intervenion per team as indicated  6) will follow with you

## 2023-07-20 NOTE — PROGRESS NOTE ADULT - NSPROGADDITIONALINFOA_GEN_ALL_CORE
Case and plan discussed with ACP  d/w sarah Trevino over phone daily Case and plan discussed with ACP  d/w son Uriel over phone daily    D/w card, GI. awaiting heme f/u    Overall very complex case . Due to recent events (RLE hematoma/anemia/thrombocytopenia) much of cardiac interventions unfortunately delayed (pericardial effusion/PCI/TAVR) .   At present anemia/thrombocytopenia stable without evidence of ongoing bleeding. Pt has had chronic h/o iron def anemia and some concerns for possible slow GIB though previous GI w/u unrevealing. Hematology also concerned about possible intrinsic hematological abnormalities potentially contributing as well. Patient likely will need PCI and TAVR though only if we can ensure pt can tolerate AC/Antiplt agents post intervention. Per GI (Dr. Mccray) no plans for endoscopic intervention at this time given lack of evidence of bleeding and high risk . no absolute contraindication for resuming AC but risk vs benefit. Awaiting on heme re: ok to challenge with AC and what AC. HIT Ab neg though still pending serotonin assay.     re: pericardial effusion . repeat echos has been reassuring stable effusion. unclear whether pt is symptomatic from this as he hadnt been prior to coming. previous eval from card/IR unable to find safe window for drainage. Repeat CT recommended by card and IR re-eval for possible drainage if safe window. if IR unable next consideration would be CTS though if pt is asymptomatic/high risk for pericardial window don't think we should pursue unless pt becomes more symptomatic.

## 2023-07-20 NOTE — PROGRESS NOTE ADULT - PROBLEM SELECTOR PLAN 11
- DVT ppx: off AC due to bleeding.   - Diet: DASH/CC  - Dispo: pending clinical course      discussed and updated son  [phone] Uriel - daily update.

## 2023-07-20 NOTE — PROGRESS NOTE ADULT - ASSESSMENT
78M (alt MRN [id number]) w/ hx of Afib (on Eliquis), severe AS, mod-severe TR, CAD/MI (30 yrs ago, medically managed, no PCI), ?CHF, COPD (not on O2 at home), former heavy smoker, HTN, HLD, DM2, gout, prior moderate pericardial effusion, now presenting with large pericardial effusion w/o tamponade physiology on outpt TTE.

## 2023-07-20 NOTE — PROGRESS NOTE ADULT - SUBJECTIVE AND OBJECTIVE BOX
Subjective  No acute events reported overnight.  The patient is ambulating very short distances during which time he has mild shortness of breath.  Does not feel lightheaded, dizzy nor is he experiencing palpitations.  Continues to have discomfort/heaviness in his right leg and swelling which is unchanged in nature.  No fevers, chills or sweats.    Telemetry  Atrial fibrillation with rates in the 80s to 110s    Review of systems  14 point review of systems is otherwise unremarkable except what is described above in the history of present illness    Physical examination  No apparent distress, alert and oriented x3, appropriate affect  JVD is not elevated, supple, no lymphadenopathy  Clear to auscultation bilateral with no wheezing rhonchi crackles  Regular rate and rhythm with 3 out of 6 crescendo decrescendo murmur heard loudest at the right upper sternal border rating to the carotids   Positive bowel sound, soft, nontender, nondistended, no mass and guarding or rebound tenderness  No clubbing or cyanosis  Right thigh greater than left thigh  1+ DP/PT pulses bilaterally  2+ edema below right knee on right lower leg    Assessment/plan  Severe low-flow low gradient aortic valve stenosis  Pericardial effusion    -- Previously discussed ith the patient, his wife and son who are at his bedside concerning events that have transpired to date and findings on his echocardiogram study.  It was discussed that the patient has multiple issues that are likely contributing to his shortness of breath/dyspnea upon exertion  --The patient has severe low-flow low gradient with normal EF along with severe one-vessel obstructive coronary artery disease/left circumflex artery.  Patient's case was discussed with the valve team.  Due to the patient's comorbidities it is recommended that the patient get stronger prior to proceeding.  The patient should also be monitored as an outpatient to make sure that his anemia and thrombocytopenia are stable in nature following reinitiation of full dose anticoagulation therapy as per hematology teams.  It is also important to make sure that the patient does not have further bleeding into his right thigh/anterior compartment.  --Discussed with the patient and his family what is severe low-flow low gradient aortic valve stenosis.  The associated signs and symptoms of severe aortic valve stenosis were reviewed.  The natural pathophysiology of untreated severe aortic valve stenosis was gone over.  The various treatment options were gone over along with their pros/cons and risks/benefits including medical therapy, TAVR and SAVR.  Due to the patient age and comorbidities he is felt to be an appropriate candidate to undergo TAVR.  Indications and details of the TAVR procedure reviewed.  Benefits and risks were gone over.  Risks include but are not limited to infection, bleeding, arrhythmia, TIA/stroke, hemodynamic instability, vascular injury, need for surgery and death.    -- The patient has longstanding history of anemia requiring iron transfusions for which she is followed by outpatient hematologist/Dr. Sylvester.  Due to history of low iron/ferritin he has been evaluated extensively by GI as an outpatient.  Per report endoscopy/colonoscopy/capsule study was unremarkable in nature.  Due to concern for continued blood loss that is why he was placed on the lower dose of Eliquis 2.5 mg twice a day.  --Prior to proceeding with any further work-up for TAVR the patient will need to be cleared by hematology and gastroenterology.  --The patient underwent cardiac catheterization on 7/19/2023 that demonstrated severe one-vessel obstructive coronary artery disease in the left circumflex artery.  At this time intervention has been deferred due to concern that addition of antiplatelet therapy would place the patient at too high risk of bleeding.  --There is concern that there may be clot in his left atrial appendage.  In the past the patient was on Eliquis 2.5 mg twice a day.  It appears that he was being underdosed.  --It is not clear if the patient has been ruled out for compartment syndrome due to intramuscular hematoma.  -- The patient was found to have a pericardial effusion.  Further assessment/work-up as per primary cardiology team.  -- Continue telemetry monitoring.    All questions and concerns of the patient were addressed.  Plan for reassessment for TAVR as an outpatient in the next 3 to 4 weeks following discharge.    Findings and plan were discussed with cardiology/Dr. Enriquez and cardiac surgery/Dr. Bob.    Greater than 35 minutes were spent on total encounter.  The necessity of the time spent during the encounter on this date of service was due to education, assessment and coordination of care.

## 2023-07-20 NOTE — PHYSICAL THERAPY INITIAL EVALUATION ADULT - ADDITIONAL COMMENTS
Per patient, he lives in an apartment with his wife. Patient states he uses an elevator and there is one small step to get into his bathroom. Patient reports he was independent with occasional use of a cane for walking and independent in ADL's prior to admission. Patient expresses he owns a cane, rolling walker and shower chair.

## 2023-07-20 NOTE — PROGRESS NOTE ADULT - SUBJECTIVE AND OBJECTIVE BOX
[facility] Division of Hospital Medicine  Fina Mccoy MD  Pager (M-F, 9X-5P): [phone]  Other Times:  [phone]    Patient is a 78y old  Male who presents with a chief complaint of worsening pericardial effusion (20 Jul 2023 11:41)      SUBJECTIVE / OVERNIGHT EVENTS:  feeling same. able to ambulate around yesterday. some mild dyspnea toward end but overall tolerated.  afebrile.  no acute overnight issues     ADDITIONAL REVIEW OF SYSTEMS: otherwise neg    MEDICATIONS  (STANDING):  allopurinol 100 milliGRAM(s) Oral daily  atorvastatin 40 milliGRAM(s) Oral at bedtime  beclomethasone  80 MICROgram(s) Inhaler 2 Puff(s) Inhalation two times a day  chlorhexidine 2% Cloths 1 Application(s) Topical daily  dextrose 5%. 1000 milliLiter(s) (100 mL/Hr) IV Continuous <Continuous>  dextrose 5%. 1000 milliLiter(s) (50 mL/Hr) IV Continuous <Continuous>  dextrose 50% Injectable 12.5 Gram(s) IV Push once  dextrose 50% Injectable 25 Gram(s) IV Push once  dextrose 50% Injectable 25 Gram(s) IV Push once  glucagon  Injectable 1 milliGRAM(s) IntraMuscular once  insulin lispro (ADMELOG) corrective regimen sliding scale   SubCutaneous three times a day before meals  insulin lispro (ADMELOG) corrective regimen sliding scale   SubCutaneous at bedtime  iron sucrose IVPB 200 milliGRAM(s) IV Intermittent every 24 hours  metoprolol succinate ER 75 milliGRAM(s) Oral two times a day  omega-3-Acid Ethyl Esters 4 Gram(s) Oral daily  pantoprazole    Tablet 40 milliGRAM(s) Oral before breakfast  tamsulosin 0.4 milliGRAM(s) Oral at bedtime  tiotropium 2.5 MICROgram(s) Inhaler 2 Puff(s) Inhalation daily    MEDICATIONS  (PRN):  acetaminophen     Tablet .. 650 milliGRAM(s) Oral every 6 hours PRN Temp greater or equal to 38C (100.4F), Mild Pain (1 - 3)  albuterol    90 MICROgram(s) HFA Inhaler 2 Puff(s) Inhalation every 6 hours PRN Shortness of Breath and/or Wheezing  AQUAPHOR (petrolatum Ointment) 1 Application(s) Topical two times a day PRN dry skin  carboxymethylcellulose 0.5% (Preservative-Free) Ophthalmic Solution 1 Drop(s) Both EYES two times a day PRN dry eyes  dextrose Oral Gel 15 Gram(s) Oral once PRN Blood Glucose LESS THAN 70 milliGRAM(s)/deciliter  melatonin 3 milliGRAM(s) Oral at bedtime PRN Sleep  sodium chloride 0.65% Nasal 1 Spray(s) Both Nostrils every 6 hours PRN Nasal Congestion      CAPILLARY BLOOD GLUCOSE      POCT Blood Glucose.: 155 mg/dL (20 Jul 2023 08:14)  POCT Blood Glucose.: 132 mg/dL (20 Jul 2023 06:23)  POCT Blood Glucose.: 141 mg/dL (20 Jul 2023 00:08)  POCT Blood Glucose.: 180 mg/dL (19 Jul 2023 21:10)  POCT Blood Glucose.: 142 mg/dL (19 Jul 2023 12:45)    I&O's Summary    19 Jul 2023 07:01  -  20 Jul 2023 07:00  --------------------------------------------------------  IN: 0 mL / OUT: 200 mL / NET: -200 mL        PHYSICAL EXAM:  Vital Signs Last 24 Hrs  T(C): 36.9 (20 Jul 2023 04:53), Max: 37.2 (20 Jul 2023 00:26)  T(F): 98.5 (20 Jul 2023 04:53), Max: 98.9 (20 Jul 2023 00:26)  HR: 99 (20 Jul 2023 10:10) (77 - 120)  BP: 105/65 (20 Jul 2023 10:10) (98/65 - 130/84)  BP(mean): --  RR: 18 (20 Jul 2023 04:53) (16 - 18)  SpO2: 94% (20 Jul 2023 10:10) (94% - 100%)    Parameters below as of 20 Jul 2023 10:10  Patient On (Oxygen Delivery Method): room air    CONSTITUTIONAL: NAD  EYES: PERRLA; conjunctiva and sclera clear  ENMT: Moist oral mucosa  RESPIRATORY: Normal respiratory effort; lungs are clear to auscultation bilaterally  CARDIOVASCULAR: irregular, systolic murmur +b/l LE edema R>L;  Peripheral pulses are 2+ bilaterally  ABDOMEN: Nontender to palpation, normoactive bowel sounds, no rebound/guarding;   MUSCULOSKELETAL:  no clubbing or cyanosis of digits; no joint swelling or tenderness to palpation, moving all extremities   PSYCH: A+O to person, place, and time; affect appropriate  NEUROLOGY: non focal, patient ambulating without assist  SKIN: RLE venous stasis changes, several areas of ecchymosis on UEs , LUE sweling    LABS:                        7.7    6.86  )-----------( 98       ( 20 Jul 2023 06:13 )             24.8     07-20    138  |  105  |  42<H>  ----------------------------<  123<H>  4.8   |  22  |  1.82<H>    Ca    8.7      20 Jul 2023 06:13  Mg     1.7     07-19    TPro  5.5<L>  /  Alb  3.3  /  TBili  1.4<H>  /  DBili  x   /  AST  15  /  ALT  16  /  AlkPhos  97  07-20    PT/INR - ( 19 Jul 2023 06:44 )   PT: 13.2 sec;   INR: 1.15 ratio         PTT - ( 19 Jul 2023 06:44 )  PTT:27.3 sec      Urinalysis Basic - ( 20 Jul 2023 06:13 )    Color: x / Appearance: x / SG: x / pH: x  Gluc: 123 mg/dL / Ketone: x  / Bili: x / Urobili: x   Blood: x / Protein: x / Nitrite: x   Leuk Esterase: x / RBC: x / WBC x   Sq Epi: x / Non Sq Epi: x / Bacteria: x          RADIOLOGY & ADDITIONAL TESTS:  Results Reviewed:   Imaging Personally Reviewed:  Electrocardiogram Personally Reviewed:    COORDINATION OF CARE:  Care Discussed with Consultants/Other Providers [Y/N]:  Prior or Outpatient Records Reviewed [Y/N]:

## 2023-07-20 NOTE — PROGRESS NOTE ADULT - SUBJECTIVE AND OBJECTIVE BOX
ID:  78 M with hx. of Afib (on Eliquis), severe AS, mod-severe TR, CAD/MI (30 yrs ago, medically managed, no PCI), ?CHF, COPD (not on O2 at home), former heavy smoker, HTN, HLD, DM2, gout.  Was sent in for concern re outpatient echocardiogram which demonstrated worsening pericardial effusion. Effusion not safely accessible, no evidence of tamponade. A.S., now pending TAVR work up Course c.b R thigh hematoma requiring PRBC 2U now resolved with stable RLE CTA    Patient seen and examined at bedside.    Overnight Events: NAEON, ambulated well with minimal SOB, LHC completed yesterday notable for LCx disease that was not intervened due to asx and low Hb/plt. Per discussion with structural team and CTS pt too high risk for TAVR at this time and clinically improving. Would like to get repeat CT chest non con to assess pericardial effusion in case of possible IR drainage. If not a candidate will work on medical optimization and outpatient return for TAVR    Telemetry: AF     Current Meds:  acetaminophen     Tablet .. 650 milliGRAM(s) Oral every 6 hours PRN  albuterol    90 MICROgram(s) HFA Inhaler 2 Puff(s) Inhalation every 6 hours PRN  allopurinol 100 milliGRAM(s) Oral daily  AQUAPHOR (petrolatum Ointment) 1 Application(s) Topical two times a day PRN  atorvastatin 40 milliGRAM(s) Oral at bedtime  beclomethasone  80 MICROgram(s) Inhaler 2 Puff(s) Inhalation two times a day  carboxymethylcellulose 0.5% (Preservative-Free) Ophthalmic Solution 1 Drop(s) Both EYES two times a day PRN  chlorhexidine 2% Cloths 1 Application(s) Topical daily  dextrose 5%. 1000 milliLiter(s) IV Continuous <Continuous>  dextrose 5%. 1000 milliLiter(s) IV Continuous <Continuous>  dextrose 50% Injectable 12.5 Gram(s) IV Push once  dextrose 50% Injectable 25 Gram(s) IV Push once  dextrose 50% Injectable 25 Gram(s) IV Push once  dextrose Oral Gel 15 Gram(s) Oral once PRN  glucagon  Injectable 1 milliGRAM(s) IntraMuscular once  insulin lispro (ADMELOG) corrective regimen sliding scale   SubCutaneous three times a day before meals  insulin lispro (ADMELOG) corrective regimen sliding scale   SubCutaneous at bedtime  iron sucrose IVPB 200 milliGRAM(s) IV Intermittent every 24 hours  melatonin 3 milliGRAM(s) Oral at bedtime PRN  metoprolol succinate ER 75 milliGRAM(s) Oral two times a day  omega-3-Acid Ethyl Esters 4 Gram(s) Oral daily  pantoprazole    Tablet 40 milliGRAM(s) Oral before breakfast  sodium chloride 0.65% Nasal 1 Spray(s) Both Nostrils every 6 hours PRN  tamsulosin 0.4 milliGRAM(s) Oral at bedtime  tiotropium 2.5 MICROgram(s) Inhaler 2 Puff(s) Inhalation daily      Vitals:  T(F): 98.5 (07-20), Max: 98.9 (07-20)  HR: 99 (07-20) (77 - 120)  BP: 105/65 (07-20) (98/65 - 130/84)  RR: 18 (07-20)  SpO2: 94% (07-20)  I&O's Summary    19 Jul 2023 07:01  -  20 Jul 2023 07:00  --------------------------------------------------------  IN: 0 mL / OUT: 200 mL / NET: -200 mL        Physical Exam:  Appearance: No acute distress; well appearing  Eyes: PERRL, EOMI, pink conjunctiva  HEENT: Normal oral mucosa  Cardiovascular: IIR, S1, S2, III/VI VIOLETA radiating to carotids, no rubs, or gallops; no edema; no JVD  Respiratory: Clear to auscultation bilaterally  Gastrointestinal: soft, non-tender, non-distended with normal bowel sounds  Musculoskeletal:, 2+ RLE edema   Neurologic: Non-focal  Lymphatic: No lymphadenopathy  Psychiatry: AAOx3, mood & affect appropriate  Skin: No rashes, ecchymoses, or cyanosis                            7.7    6.86  )-----------( 98       ( 20 Jul 2023 06:13 )             24.8     07-20    138  |  105  |  42<H>  ----------------------------<  123<H>  4.8   |  22  |  1.82<H>    Ca    8.7      20 Jul 2023 06:13  Mg     1.7     07-19    TPro  5.5<L>  /  Alb  3.3  /  TBili  1.4<H>  /  DBili  x   /  AST  15  /  ALT  16  /  AlkPhos  97  07-20    PT/INR - ( 19 Jul 2023 06:44 )   PT: 13.2 sec;   INR: 1.15 ratio    PTT - ( 19 Jul 2023 06:44 )  PTT:27.3 sec      A/P  78 M with hx. of Afib (on Eliquis), severe AS, mod-severe TR, CAD/MI (30 yrs ago, medically managed, no PCI), ?CHF, COPD (not on O2 at home), former heavy smoker, HTN, HLD, DM2, gout.   Was sent in for concern re outpatient echocardiogram which demonstrated worsening pericardial effusion. Effusion not safely accessible, no evidence of tamponade. A.S., now pending TAVR work up  Course c.b R thigh hematoma requiring PRBC 2U.      #R Thigh Hematoma - resolved   - Noted on CT non con of the RLE  - Hb drop 2 pts, platelets downtrending - now stable   - Ok for heparin per heme 7/17  Plan:  - F/U RLE CTA - neg for bleed  - Hb goal >7  - F/U PT eval  - Clinically improved     #Severe Symptomatic Low Flow Low Gradient AS  - Pt with known sx of sev AS p/w GOFF  - Repeat TTE showing sev AS  - Structural cardiology consulted, pending eventual CT TAVR & LHC  - Hypervolemic on exam, likely multifactorial, judicious with diuretics as above  Plan:  - LHC 7/19 with Dr. Wood    > 40% LAD 80% LCx 40% RCA - no PCI as asymptomatic and high bleeding risk   - Structural team now opting for outpatient TAVR after pt recovers and blood counts remain stable  - F/U final structural recs    #AFib RVR  #RBBB  - Persistent per OSH documentation   - On apixaban 2.5 BID at home - unclear why on dose reduced; per son he previously was on Coumadin but had supra-therapeutic.  Apixaban on hold at present given effusion and need for LHC.  - CHADSVASC 5   - DC heparin gtt as above    > Can likely restart heparin once pericardial effusion plan finalized after CT chest today  - AF RVR likely compensatory iso blood loss, no need to tx unless hemodynamically unstable in which case would recommend blood   - Cont MTP Succ 75 BID    #Large Pericardial Effusion  - No clinical tamponade as patient is hemodynamically stable, with robust heart sounds and no obvious JVD. Outpatient TTE demonstrated LVEF of 55% with large pericardial effusion without tamponade physiology.   - Repeat TTE showed large effusion mainly around L ventricle; pericardial effusion cannot be safely tapped  Plan:  - Repeat CT Chest today 7/20  - Reconsult IR for drainage after CT   - If stable than can restart heparin / oral AC      Jean Ríos PGY5  Cardiology Fellow    [facility] Enriquez

## 2023-07-20 NOTE — PROGRESS NOTE ADULT - PROBLEM SELECTOR PLAN 1
Patient states he gets weekly iron infusions outpatient. outpatient hematologist is Dr. Sylvester (), - NOT affilated with [facility] affiliation. Now seen by house heme kerry on 7/15  DDx includes underlying hematologic dz , medications, mechanical sheering from AS, bleeding    -Hgb has stablized around mid 7's. no active sign of bleeding. RLE swelling improving.   -cont to transfuse as needed above 7. started on IV iron infusion x5 days   -All AC on hold now but will need to consider challenging him back on AC given multiple cardiac issues that needs to be addressed. (ie TAVR, CAD) . Hep gtt(serotonin assay pending still) vs argatroban gtt?   - HIT panel neg -awaiting serotonin assay, PF4 still pending.   - Additional w/u for hemolysis, perip smear etc as per franco rec.   -CT RLE   >Findings are consistent with a large quadriceps intramuscular hematoma-no clinical change noted.   ( Surg no acute intervention. repeat CTA RLE stable without bleeding.)    -per patient's son, patient is on reduced eliquis dose at home due to history of anemia/bleed?.   - GI eval (Dr Mccray) appreciated. no plans for acute intervention given elevated risk and no current evidence of bleeding though if rebleeds on AC may need to revisit this.   -Monitor HH. Keep active T&S. Transfuse prbc prn.

## 2023-07-20 NOTE — PROGRESS NOTE ADULT - PROBLEM SELECTOR PLAN 2
- Found to have "large circumferential pericardial effusion up to 2.6 cm without evidence of tamponade physiology" on outpt TTE (7/3/23).- Remains asymptomatic. repeat again 7/17 remains unchanged   - Prior TTE (10/3/22) showed "moderate pericardial effusion, measuring about 1.0 cm superior to the RA; otherwise small circumferential pericardial effusion." Unclear underlying etiology of effusion, but possibly in setting of CHF.  - off Lasix given SHEYLA.  strict I/Os, monitor UOP  - Previous recommendation as per card/IR drainage deferred given lack of overt symptoms. needs to revisit now given complicated picture of multifactorial issues - Anemia, bleeding, CAD, severe AS .   -Card rec : CT chest to eval effusion

## 2023-07-20 NOTE — PHYSICAL THERAPY INITIAL EVALUATION ADULT - PERTINENT HX OF CURRENT PROBLEM, REHAB EVAL
78M (alt MRN [id number]) w/ hx of Afib (on Eliquis), severe AS, mod-severe TR, CAD/MI (30 yrs ago, medically managed, no PCI), ?CHF, COPD (not on O2 at home), former heavy smoker, HTN, HLD, DM2, gout, presenting with worsening pericardial effusion. Sent in from his cardiologist's office. He had a TTE on day of presentation (7/3/23) that showed a "large circumferential pericardial effusion up to 2.6 cm without evidence of tamponade physiology" (and mildly reduced RV systolic function was also noted). His prior TTE on 10/3/22 had shown a "moderate pericardial effusion, measuring about 1.0 cm superior to the RA; otherwise small circumferential pericardial effusion." He is unclear what is the cause of pericardial effusion and he is unsure if he has a hx of CHF. Reported that at baseline he has shortness of breath after walking 50 ft and swelling in both legs. Noted that the swelling in his legs today appear to be around his average size. Denied f/c/n/v, CP, SOB at rest, abdominal pain, dysuria, hematuria, hematochezia, melena, diarrhea, constipation. Hospital course: chest x-ray 7/3- clear lungs 78M (alt MRN [id number]) w/ hx of Afib (on Eliquis), severe AS, mod-severe TR, CAD/MI (30 yrs ago, medically managed, no PCI), ?CHF, COPD (not on O2 at home), former heavy smoker, HTN, HLD, DM2, gout, presenting with worsening pericardial effusion. Sent in from his cardiologist's office. He had a TTE on day of presentation (7/3/23) that showed a "large circumferential pericardial effusion up to 2.6 cm without evidence of tamponade physiology" (and mildly reduced RV systolic function was also noted). His prior TTE on 10/3/22 had shown a "moderate pericardial effusion, measuring about 1.0 cm superior to the RA; otherwise small circumferential pericardial effusion." He is unclear what is the cause of pericardial effusion and he is unsure if he has a hx of CHF. Reported that at baseline he has shortness of breath after walking 50 ft and swelling in both legs. Noted that the swelling in his legs today appear to be around his average size. Denied f/c/n/v, CP, SOB at rest, abdominal pain, dysuria, hematuria, hematochezia, melena, diarrhea, constipation. Hospital course: chest x-ray 7/3- clear lungs; VA duplex carotid 7/6-  Multifocal calcific plaque without duplex evidence of hemodynamically significant stenosis of either carotid artery.  Measurement of carotid stenosis is based on velocity parameters that correlate the residual internal carotid diameter with that of the more distal vessel in accordance with a method such as the [place] [employer] Symptomatic Carotid Endarterectomy Trial (NASCET); CT chest 7/10- Moderate-sized circumferential pericardial effusion; CT abdomen and pelvis 7/12-  Calcified (Agatston calcium score 1466) trileaflet  aortic valve.  Aortic and peripheral access vessel measurements as reported above. Large pericardial effusion; recommend correlation with echocardiogram for assessment of hemodynamic significance. There is a filling defect in the left atrial appendage (LILIA) with near complete resolution on delayed images, likely representing slow flow, however thrombus cannot be excluded at the very tip of the LILIA (evaluation limited by adjacent pericardial effusion). 78M (alt MRN [id number]) w/ hx of Afib (on Eliquis), severe AS, mod-severe TR, CAD/MI (30 yrs ago, medically managed, no PCI), ?CHF, COPD (not on O2 at home), former heavy smoker, HTN, HLD, DM2, gout, presenting with worsening pericardial effusion. Sent in from his cardiologist's office. He had a TTE on day of presentation (7/3/23) that showed a "large circumferential pericardial effusion up to 2.6 cm without evidence of tamponade physiology" (and mildly reduced RV systolic function was also noted). His prior TTE on 10/3/22 had shown a "moderate pericardial effusion, measuring about 1.0 cm superior to the RA; otherwise small circumferential pericardial effusion." He is unclear what is the cause of pericardial effusion and he is unsure if he has a hx of CHF. Reported that at baseline he has shortness of breath after walking 50 ft and swelling in both legs. Noted that the swelling in his legs today appear to be around his average size. Denied f/c/n/v, CP, SOB at rest, abdominal pain, dysuria, hematuria, hematochezia, melena, diarrhea, constipation. Hospital course: 7/4 +diuresis & consider Pericardial window/Drain if no improvement; 7/6 structural heart consulted for aortic stenosis, pending TAVR; 7/7: IR procedured (Pericardial drainage) was "discontinued" by Katie MEJIAS today, Procedure NOT DONE; 7/9: TAVR CT ordered, Eliquis on hold ; on Heparin gtt given pending invasive procedure. 7/10: Unable to perform LHC today. Pending CT angio then LHC pending renal function. Treating effusion with diuretics. 7/11: IV extravasation during CT angio, test aborted- ice/elevate. Reinserted IV today and repeat CT performed. 7/13: LHC "held" today - pt received Contrast with CTA last evening -> anticipate LHC tomorrow; +Right thigh edema and pain - Duplex negative for DVT, 7/14 Cath cancelled> ( + ) Hematoma noted on CT of RT LE, heparin gtt stopped; Sx called> No acute surgical intervention, Hb 6.7 s/p 1 unit PRBC (split 2* cards hx); 7/16: Hg 7.0> s/p 1u pRBC split into 1/2 units today -> Hg 7.9; GI eval (Dr Mccray) appreciated. no plans for acute intervention given elevated risk and no current evidence of bleeding though if rebleeds on AC may need to revisit this. +large circumferential pericardial effusion up to 2.6 cm without evidence of tamponade physiology" on outpt TTE (7/3/23).- Remains asymptomatic. repeat again 7/17 remains unchanged;  7/18  CT Angio RLE: no evidence for active hemorrhage. 7/19: s/p dx LHC: severe LCx dz via LFA (low stick). No intervention; 40% LAD 80% LCx 40% RCA - no PCI as asymptomatic and high bleeding risk  to be d/w structural 7/20 CT Chest to eval pericardial Effusion; Will need IR reconsult to eval for pericardiocentesis.

## 2023-07-20 NOTE — PROGRESS NOTE ADULT - SUBJECTIVE AND OBJECTIVE BOX
Patient is a 78y Male     Patient is a 78y old  Male who presents with a chief complaint of worsening pericardial effusion (19 Jul 2023 12:35)      HPI:  78M (alt MRN [id number]) w/ hx of Afib (on Eliquis), severe AS, mod-severe TR, CAD/MI (30 yrs ago, medically managed, no PCI), ?CHF, COPD (not on O2 at home), former heavy smoker, HTN, HLD, DM2, gout, presenting with worsening pericardial effusion. Sent in from his cardiologist's office. He had a TTE on day of presentation (7/3/23) that showed a "large circumferential pericardial effusion up to 2.6 cm without evidence of tamponade physiology" (and mildly reduced RV systolic function was also noted). His prior TTE on 10/3/22 had shown a "moderate pericardial effusion, measuring about 1.0 cm superior to the RA; otherwise small circumferential pericardial effusion." He is unclear what is the cause of pericardial effusion and he is unsure if he has a hx of CHF. Reported that at baseline he has shortness of breath after walking 50 ft and swelling in both legs. Noted that the swelling in his legs today appear to be around his average size. Denied f/c/n/v, CP, SOB at rest, abdominal pain, dysuria, hematuria, hematochezia, melena, diarrhea, constipation.    In ED: Afebrile, HR 80s-90s, SBP 140s-150s, RR 16-20, 88% on RA, % on 1-2L O2NC. Labs notable for hsTrop 30->27, SCr 1.36, proBNP 2.3k; VBG pH 7.34, pCO2 61, HCO3 33, lactate 0.8. Given Lasix 40mg IV x1. Admitted to Medicine for further management. (04 Jul 2023 00:24)      PAST MEDICAL & SURGICAL HISTORY:  COPD (chronic obstructive pulmonary disease)      Former smoker      HTN (hypertension)      HLD (hyperlipidemia)      CAD (coronary artery disease)      Chronic atrial fibrillation      Diabetes mellitus, type 2      Gout      Severe aortic stenosis      Pericardial effusion      TR (tricuspid regurgitation)      No significant past surgical history          MEDICATIONS  (STANDING):  allopurinol 100 milliGRAM(s) Oral daily  atorvastatin 40 milliGRAM(s) Oral at bedtime  beclomethasone  80 MICROgram(s) Inhaler 2 Puff(s) Inhalation two times a day  chlorhexidine 2% Cloths 1 Application(s) Topical daily  dextrose 5%. 1000 milliLiter(s) (100 mL/Hr) IV Continuous <Continuous>  dextrose 5%. 1000 milliLiter(s) (50 mL/Hr) IV Continuous <Continuous>  dextrose 50% Injectable 12.5 Gram(s) IV Push once  dextrose 50% Injectable 25 Gram(s) IV Push once  dextrose 50% Injectable 25 Gram(s) IV Push once  glucagon  Injectable 1 milliGRAM(s) IntraMuscular once  insulin lispro (ADMELOG) corrective regimen sliding scale   SubCutaneous three times a day before meals  insulin lispro (ADMELOG) corrective regimen sliding scale   SubCutaneous at bedtime  iron sucrose IVPB 200 milliGRAM(s) IV Intermittent every 24 hours  metoprolol succinate ER 75 milliGRAM(s) Oral two times a day  omega-3-Acid Ethyl Esters 4 Gram(s) Oral daily  pantoprazole    Tablet 40 milliGRAM(s) Oral before breakfast  tamsulosin 0.4 milliGRAM(s) Oral at bedtime  tiotropium 2.5 MICROgram(s) Inhaler 2 Puff(s) Inhalation daily      Allergies    penicillins (Unknown)    Intolerances        SOCIAL HISTORY:  Denies ETOh,Smoking,     FAMILY HISTORY:  FH: breast cancer (Mother)        REVIEW OF SYSTEMS:    CONSTITUTIONAL: No weakness, fevers or chills  EYES/ENT: No visual changes;  No vertigo or throat pain   NECK: No pain or stiffness  RESPIRATORY: No cough, wheezing, hemoptysis; No shortness of breath  CARDIOVASCULAR: No chest pain or palpitations  GASTROINTESTINAL: No abdominal or epigastric pain. No nausea, vomiting, or hematemesis; No diarrhea or constipation. No melena or hematochezia.  GENITOURINARY: No dysuria, frequency or hematuria  NEUROLOGICAL: No numbness or weakness  SKIN: No itching, burning, rashes, or lesions   All other review of systems is negative unless indicated above.    VITAL:  T(C): , Max: 37.2 (07-20-23 @ 00:26)  T(F): , Max: 98.9 (07-20-23 @ 00:26)  HR: 120 (07-20-23 @ 04:53)  BP: 122/75 (07-20-23 @ 04:53)  BP(mean): --  RR: 18 (07-20-23 @ 04:53)  SpO2: 96% (07-20-23 @ 04:53)  Wt(kg): --    I and O's:    07-18 @ 07:01  -  07-19 @ 07:00  --------------------------------------------------------  IN: 0 mL / OUT: 100 mL / NET: -100 mL    07-19 @ 07:01  -  07-20 @ 07:00  --------------------------------------------------------  IN: 0 mL / OUT: 200 mL / NET: -200 mL      Height (cm): 167.6 (07-19 @ 13:35)  Weight (kg): 85.1 (07-19 @ 13:35)  BMI (kg/m2): 30.3 (07-19 @ 13:35)  BSA (m2): 1.95 (07-19 @ 13:35)    PHYSICAL EXAM:    Constitutional: NAD  HEENT: PERRLA,   Neck: No JVD  Respiratory: CTA B/L  Cardiovascular: S1 and S2  Gastrointestinal: BS+, soft, NT/ND  Extremities: No peripheral edema  Neurological: A/O x 3, no focal deficits  Psychiatric: Normal mood, normal affect  : No Reeder  Skin: No rashes  Access: Not applicable  Back: No CVA tenderness    LABS:                        7.7    6.86  )-----------( 98       ( 20 Jul 2023 06:13 )             24.8     07-20    138  |  105  |  42<H>  ----------------------------<  123<H>  4.8   |  22  |  1.82<H>    Ca    8.7      20 Jul 2023 06:13  Mg     1.7     07-19    TPro  5.5<L>  /  Alb  3.3  /  TBili  1.4<H>  /  DBili  x   /  AST  15  /  ALT  16  /  AlkPhos  97  07-20          RADIOLOGY & ADDITIONAL STUDIES:

## 2023-07-20 NOTE — PROGRESS NOTE ADULT - PROBLEM SELECTOR PLAN 6
Hx of Afib (on Eliquis at home). CHADSVASC score of 5-6.  - All AC Eliquis/hep gtt now HELD given concern for worsening anemia, thrombocytopenia, hematoma.   - may consider restart AC challenge whether pt can safely resume for eventual cardiac interventions.   - c/w home metoprolol succinate 75mg bid  - monitor on telemetry

## 2023-07-21 LAB
ALBUMIN SERPL ELPH-MCNC: 3.2 G/DL — LOW (ref 3.3–5)
ALP SERPL-CCNC: 93 U/L — SIGNIFICANT CHANGE UP (ref 40–120)
ALT FLD-CCNC: 15 U/L — SIGNIFICANT CHANGE UP (ref 10–45)
ANION GAP SERPL CALC-SCNC: 10 MMOL/L — SIGNIFICANT CHANGE UP (ref 5–17)
AST SERPL-CCNC: 17 U/L — SIGNIFICANT CHANGE UP (ref 10–40)
BILIRUB SERPL-MCNC: 1.6 MG/DL — HIGH (ref 0.2–1.2)
BUN SERPL-MCNC: 44 MG/DL — HIGH (ref 7–23)
CALCIUM SERPL-MCNC: 8.7 MG/DL — SIGNIFICANT CHANGE UP (ref 8.4–10.5)
CHLORIDE SERPL-SCNC: 105 MMOL/L — SIGNIFICANT CHANGE UP (ref 96–108)
CO2 SERPL-SCNC: 21 MMOL/L — LOW (ref 22–31)
CREAT SERPL-MCNC: 1.63 MG/DL — HIGH (ref 0.5–1.3)
EGFR: 43 ML/MIN/1.73M2 — LOW
GLUCOSE BLDC GLUCOMTR-MCNC: 127 MG/DL — HIGH (ref 70–99)
GLUCOSE BLDC GLUCOMTR-MCNC: 132 MG/DL — HIGH (ref 70–99)
GLUCOSE BLDC GLUCOMTR-MCNC: 133 MG/DL — HIGH (ref 70–99)
GLUCOSE BLDC GLUCOMTR-MCNC: 138 MG/DL — HIGH (ref 70–99)
GLUCOSE SERPL-MCNC: 105 MG/DL — HIGH (ref 70–99)
HCT VFR BLD CALC: 25.1 % — LOW (ref 39–50)
HGB BLD-MCNC: 7.8 G/DL — LOW (ref 13–17)
MAGNESIUM SERPL-MCNC: 1.6 MG/DL — SIGNIFICANT CHANGE UP (ref 1.6–2.6)
MCHC RBC-ENTMCNC: 28.1 PG — SIGNIFICANT CHANGE UP (ref 27–34)
MCHC RBC-ENTMCNC: 31.1 GM/DL — LOW (ref 32–36)
MCV RBC AUTO: 90.3 FL — SIGNIFICANT CHANGE UP (ref 80–100)
NRBC # BLD: 0 /100 WBCS — SIGNIFICANT CHANGE UP (ref 0–0)
PLATELET # BLD AUTO: SIGNIFICANT CHANGE UP K/UL (ref 150–400)
POTASSIUM SERPL-MCNC: 4.4 MMOL/L — SIGNIFICANT CHANGE UP (ref 3.5–5.3)
POTASSIUM SERPL-SCNC: 4.4 MMOL/L — SIGNIFICANT CHANGE UP (ref 3.5–5.3)
PROT SERPL-MCNC: 5.6 G/DL — LOW (ref 6–8.3)
RBC # BLD: 2.78 M/UL — LOW (ref 4.2–5.8)
RBC # FLD: 21.2 % — HIGH (ref 10.3–14.5)
SODIUM SERPL-SCNC: 136 MMOL/L — SIGNIFICANT CHANGE UP (ref 135–145)
WBC # BLD: 6.67 K/UL — SIGNIFICANT CHANGE UP (ref 3.8–10.5)
WBC # FLD AUTO: 6.67 K/UL — SIGNIFICANT CHANGE UP (ref 3.8–10.5)

## 2023-07-21 PROCEDURE — 99232 SBSQ HOSP IP/OBS MODERATE 35: CPT | Mod: GC

## 2023-07-21 PROCEDURE — 71250 CT THORAX DX C-: CPT | Mod: 26

## 2023-07-21 PROCEDURE — 99233 SBSQ HOSP IP/OBS HIGH 50: CPT

## 2023-07-21 PROCEDURE — 99233 SBSQ HOSP IP/OBS HIGH 50: CPT | Mod: NC

## 2023-07-21 PROCEDURE — 93970 EXTREMITY STUDY: CPT | Mod: 26

## 2023-07-21 RX ORDER — MAGNESIUM SULFATE 500 MG/ML
2 VIAL (ML) INJECTION ONCE
Refills: 0 | Status: COMPLETED | OUTPATIENT
Start: 2023-07-21 | End: 2023-07-21

## 2023-07-21 RX ORDER — FUROSEMIDE 40 MG
40 TABLET ORAL DAILY
Refills: 0 | Status: DISCONTINUED | OUTPATIENT
Start: 2023-07-21 | End: 2023-07-23

## 2023-07-21 RX ADMIN — IRON SUCROSE 110 MILLIGRAM(S): 20 INJECTION, SOLUTION INTRAVENOUS at 19:26

## 2023-07-21 RX ADMIN — BECLOMETHASONE DIPROPIONATE 2 PUFF(S): 40 AEROSOL, METERED RESPIRATORY (INHALATION) at 22:38

## 2023-07-21 RX ADMIN — TIOTROPIUM BROMIDE 2 PUFF(S): 18 CAPSULE ORAL; RESPIRATORY (INHALATION) at 11:35

## 2023-07-21 RX ADMIN — TAMSULOSIN HYDROCHLORIDE 0.4 MILLIGRAM(S): 0.4 CAPSULE ORAL at 22:38

## 2023-07-21 RX ADMIN — ATORVASTATIN CALCIUM 40 MILLIGRAM(S): 80 TABLET, FILM COATED ORAL at 22:37

## 2023-07-21 RX ADMIN — Medication 100 MILLIGRAM(S): at 11:33

## 2023-07-21 RX ADMIN — Medication 4 GRAM(S): at 11:34

## 2023-07-21 RX ADMIN — Medication 75 MILLIGRAM(S): at 06:05

## 2023-07-21 RX ADMIN — BECLOMETHASONE DIPROPIONATE 2 PUFF(S): 40 AEROSOL, METERED RESPIRATORY (INHALATION) at 09:00

## 2023-07-21 RX ADMIN — CHLORHEXIDINE GLUCONATE 1 APPLICATION(S): 213 SOLUTION TOPICAL at 11:38

## 2023-07-21 RX ADMIN — Medication 25 GRAM(S): at 17:38

## 2023-07-21 RX ADMIN — PANTOPRAZOLE SODIUM 40 MILLIGRAM(S): 20 TABLET, DELAYED RELEASE ORAL at 06:04

## 2023-07-21 NOTE — PROGRESS NOTE ADULT - PROBLEM SELECTOR PLAN 1
Patient states he gets weekly iron infusions outpatient. outpatient hematologist is Dr. Sylvester () who is not affiliated with [facility], but has [facility] affiliation. Now seen by [place] hematology service on 7/15.  Per patient's son, patient is on reduced eliquis dose at home due to history of anemia/bleed?.   DDx includes underlying hematologic dz , medications, mechanical sheering from AS, bleeding    - CT RLE   >Findings are consistent with a large quadriceps intramuscular hematoma-no clinical change noted.   (Evaluated by surgery team> no acute intervention rec. repeat CTA RLE stable without bleeding.)    - Hgb has stablized around mid 7's. no active sign of bleeding. RLE swelling improving.   - Monitor HH. Transfuse prbc as needed for goal Hgb >7. Keep active T&S. started on IV iron infusion x5 days   - All AC on hold now but will need to consider challenging him back on AC given multiple cardiac issues that needs to be addressed. (ie TAVR, CAD) ,?Hep gtt (serotonin assay pending still) vs argatroban gtt?   - HIT panel neg -awaiting serotonin assay, PF4 ab neg.  - Additional w/u for hemolysis, perip smear etc as per heme rec.   - GI eval (Dr Mccray) appreciated. no plans for acute GI intervention given elevated risk and no current evidence of bleeding though if rebleeds on AC may need to revisit this. Patient states he gets weekly iron infusions outpatient. outpatient hematologist is Dr. Sylvester () who is not affiliated with [facility], but has [facility] affiliation. Now seen by [place] hematology service on 7/15.  Per patient's son, patient is on reduced eliquis dose at home due to history of anemia/bleed?.   DDx includes underlying hematologic dz , medications, mechanical sheering from AS, bleeding    - CT RLE   >Findings are consistent with a large quadriceps intramuscular hematoma-no clinical change noted.   (Evaluated by surgery team> no acute intervention rec. repeat CTA RLE stable without bleeding.)    - Hgb has stable around mid 7's. no active sign of bleeding. RLE swelling improving.   - Monitor HH. Transfuse prbc as needed for goal Hgb >7. Keep active T&S. started on IV iron infusion x5 days   - All AC on hold now but will need to consider challenging him back on AC given multiple cardiac issues that needs to be addressed. (ie TAVR, CAD) ,?Hep gtt (serotonin assay pending still) vs argatroban gtt?   - HIT panel neg -awaiting serotonin assay, PF4 ab neg.  - Additional w/u for hemolysis, perip smear etc as per heme rec.   - GI eval (Dr Mccray) appreciated. no plans for acute GI intervention given elevated risk and no current evidence of bleeding though if rebleeds on AC may need to revisit this.

## 2023-07-21 NOTE — PROGRESS NOTE ADULT - PROBLEM SELECTOR PLAN 1
Patient with CKD in setting of HTN, Tobacco Abuse Hx and Heart Disease. Per Javi/OLGA review, appears he has had a SCr ranging from 1.4-2.0 dating back to 2017. Most recent SCr prior to admission was 1.42 on 10/7/22.  LAst few days this admission 1/5-1.7 range. Ongoing anemia and cardiac issues with pericardial eff and unclear anemia.  IgG lambda band noted    UA reviewed. UPCR of 0.1  Renal US from 7/6 demonstrated "Both kidneys are echogenic, compatible with medical renal disease. No renal mass, hydronephrosis or calculus is visualized sonographically." He does have bilateral renal cysts.   LHC was done and no elevation in s.cr. No PCI for now. TAVR as an outpt.  Renal function is stable post contrast 48 hours so far. Patient with CKD in setting of HTN, Tobacco Abuse Hx and Heart Disease.   Per Javi/ OLGA review, appears he has had a SCr ranging from 1.4-2.0 dating back to 2017. Most recent SCr prior to admission was 1.42 on 10/7/22.  LAst few days this admission 1/5-1.7 range. Ongoing anemia and cardiac issues with pericardial eff and unclear anemia.  IgG lambda band noted    UA reviewed. UPCR of 0.1  Renal US from 7/6 demonstrated "Both kidneys are echogenic, compatible with medical renal disease. No renal mass, hydronephrosis or calculus is visualized sonographically." He does have bilateral renal cysts.   LHC was done and no elevation in s.cr. No PCI for now. TAVR as an outpt.  Renal function is stable post contrast 48 hours so far.

## 2023-07-21 NOTE — PROGRESS NOTE ADULT - ASSESSMENT
case d/w medicine attending yeserday  has had colon in past and egd/capsule negative  no overt gib  if needed can rescoope with understanding of risk  plan is for a/c with challening inpatinet  defer to team re: plan and risk benefit alternative discussion (r/b/a

## 2023-07-21 NOTE — PROGRESS NOTE ADULT - SUBJECTIVE AND OBJECTIVE BOX
Patient is a 78y Male     Patient is a 78y old  Male who presents with a chief complaint of worsening pericardial effusion (20 Jul 2023 12:03)      HPI:  78M (alt MRN [id number]) w/ hx of Afib (on Eliquis), severe AS, mod-severe TR, CAD/MI (30 yrs ago, medically managed, no PCI), ?CHF, COPD (not on O2 at home), former heavy smoker, HTN, HLD, DM2, gout, presenting with worsening pericardial effusion. Sent in from his cardiologist's office. He had a TTE on day of presentation (7/3/23) that showed a "large circumferential pericardial effusion up to 2.6 cm without evidence of tamponade physiology" (and mildly reduced RV systolic function was also noted). His prior TTE on 10/3/22 had shown a "moderate pericardial effusion, measuring about 1.0 cm superior to the RA; otherwise small circumferential pericardial effusion." He is unclear what is the cause of pericardial effusion and he is unsure if he has a hx of CHF. Reported that at baseline he has shortness of breath after walking 50 ft and swelling in both legs. Noted that the swelling in his legs today appear to be around his average size. Denied f/c/n/v, CP, SOB at rest, abdominal pain, dysuria, hematuria, hematochezia, melena, diarrhea, constipation.    In ED: Afebrile, HR 80s-90s, SBP 140s-150s, RR 16-20, 88% on RA, % on 1-2L O2NC. Labs notable for hsTrop 30->27, SCr 1.36, proBNP 2.3k; VBG pH 7.34, pCO2 61, HCO3 33, lactate 0.8. Given Lasix 40mg IV x1. Admitted to Medicine for further management. (04 Jul 2023 00:24)      PAST MEDICAL & SURGICAL HISTORY:  COPD (chronic obstructive pulmonary disease)      Former smoker      HTN (hypertension)      HLD (hyperlipidemia)      CAD (coronary artery disease)      Chronic atrial fibrillation      Diabetes mellitus, type 2      Gout      Severe aortic stenosis      Pericardial effusion      TR (tricuspid regurgitation)      No significant past surgical history          MEDICATIONS  (STANDING):  allopurinol 100 milliGRAM(s) Oral daily  atorvastatin 40 milliGRAM(s) Oral at bedtime  beclomethasone  80 MICROgram(s) Inhaler 2 Puff(s) Inhalation two times a day  chlorhexidine 2% Cloths 1 Application(s) Topical daily  dextrose 5%. 1000 milliLiter(s) (100 mL/Hr) IV Continuous <Continuous>  dextrose 5%. 1000 milliLiter(s) (50 mL/Hr) IV Continuous <Continuous>  dextrose 50% Injectable 12.5 Gram(s) IV Push once  dextrose 50% Injectable 25 Gram(s) IV Push once  dextrose 50% Injectable 25 Gram(s) IV Push once  glucagon  Injectable 1 milliGRAM(s) IntraMuscular once  insulin lispro (ADMELOG) corrective regimen sliding scale   SubCutaneous three times a day before meals  insulin lispro (ADMELOG) corrective regimen sliding scale   SubCutaneous at bedtime  iron sucrose IVPB 200 milliGRAM(s) IV Intermittent every 24 hours  metoprolol succinate ER 75 milliGRAM(s) Oral two times a day  omega-3-Acid Ethyl Esters 4 Gram(s) Oral daily  pantoprazole    Tablet 40 milliGRAM(s) Oral before breakfast  tamsulosin 0.4 milliGRAM(s) Oral at bedtime  tiotropium 2.5 MICROgram(s) Inhaler 2 Puff(s) Inhalation daily      Allergies    penicillins (Unknown)    Intolerances        SOCIAL HISTORY:  Denies ETOh,Smoking,     FAMILY HISTORY:  FH: breast cancer (Mother)        REVIEW OF SYSTEMS:    CONSTITUTIONAL: No weakness, fevers or chills  EYES/ENT: No visual changes;  No vertigo or throat pain   NECK: No pain or stiffness  RESPIRATORY: No cough, wheezing, hemoptysis; No shortness of breath  CARDIOVASCULAR: No chest pain or palpitations  GASTROINTESTINAL: No abdominal or epigastric pain. No nausea, vomiting, or hematemesis; No diarrhea or constipation. No melena or hematochezia.  GENITOURINARY: No dysuria, frequency or hematuria  NEUROLOGICAL: No numbness or weakness  SKIN: No itching, burning, rashes, or lesions   All other review of systems is negative unless indicated above.    VITAL:  T(C): , Max: 37.1 (07-20-23 @ 13:18)  T(F): , Max: 98.7 (07-20-23 @ 13:18)  HR: 93 (07-21-23 @ 05:50)  BP: 113/70 (07-21-23 @ 05:50)  BP(mean): --  RR: 19 (07-21-23 @ 04:49)  SpO2: 96% (07-21-23 @ 04:49)  Wt(kg): --    I and O's:    07-19 @ 07:01  -  07-20 @ 07:00  --------------------------------------------------------  IN: 0 mL / OUT: 200 mL / NET: -200 mL    07-20 @ 07:01  -  07-21 @ 07:00  --------------------------------------------------------  IN: 0 mL / OUT: 560 mL / NET: -560 mL          PHYSICAL EXAM:    Constitutional: NAD  HEENT: PERRLA,   Neck: No JVD  Respiratory: CTA B/L  Cardiovascular: S1 and S2  Gastrointestinal: BS+, soft, NT/ND  Extremities: No peripheral edema  Neurological: A/O x 3, no focal deficits  Psychiatric: Normal mood, normal affect  : No Reeder  Skin: No rashes  Access: Not applicable  Back: No CVA tenderness    LABS:                        7.8    6.67  )-----------( Clumped    ( 21 Jul 2023 07:18 )             25.1     07-21    136  |  105  |  44<H>  ----------------------------<  105<H>  4.4   |  21<L>  |  1.63<H>    Ca    8.7      21 Jul 2023 07:18  Mg     1.6     07-21    TPro  5.6<L>  /  Alb  3.2<L>  /  TBili  1.6<H>  /  DBili  x   /  AST  17  /  ALT  15  /  AlkPhos  93  07-21          RADIOLOGY & ADDITIONAL STUDIES:

## 2023-07-21 NOTE — PROGRESS NOTE ADULT - PROBLEM SELECTOR PLAN 4
- diuresis remains on hold. Cr slight up - possible contrast induced from C on 7/19.   - Nephrology following   - Hep C non reactive. f/up HBsAg, serum immunofixation, C3 and C4.   - Renal US shows both kidneys are echogenic, compatible with medical renal disease. No renal mass, hydronephrosis or calculus  - monitor BMP, Is/Os - Cr slight up - possible contrast induced from [facility] on 7/19.   - Nephrology following   - Diuretics as per nephro/cards  - Hep C non reactive. f/up HBsAg, serum immunofixation, C3 and C4.   - Renal US shows both kidneys are echogenic, compatible with medical renal disease. No renal mass, hydronephrosis or calculus  - monitor BMP, Is/Os

## 2023-07-21 NOTE — PROGRESS NOTE ADULT - SUBJECTIVE AND OBJECTIVE BOX
ID: 78 M with hx. of Afib (on Eliquis), severe AS, mod-severe TR, CAD/MI (30 yrs ago, medically managed, no PCI), ?CHF, COPD (not on O2 at home), former heavy smoker, HTN, HLD, DM2, gout.  Was sent in for concern re outpatient echocardiogram which demonstrated worsening pericardial effusion. Effusion not safely accessible, no evidence of tamponade. A.S., now pending TAVR outpatient curse c.b R thigh hematoma.    Patient seen and examined at bedside.    Overnight Events: NAEON. Feeling ok today. Denotes more BLE edema, appears volume overloaded, recommend starting lasix 40 IV QDay. CT chest still pending for possible IR drainage.     Telemetry: AF     Current Meds:  acetaminophen     Tablet .. 650 milliGRAM(s) Oral every 6 hours PRN  albuterol    90 MICROgram(s) HFA Inhaler 2 Puff(s) Inhalation every 6 hours PRN  allopurinol 100 milliGRAM(s) Oral daily  AQUAPHOR (petrolatum Ointment) 1 Application(s) Topical two times a day PRN  atorvastatin 40 milliGRAM(s) Oral at bedtime  beclomethasone  80 MICROgram(s) Inhaler 2 Puff(s) Inhalation two times a day  carboxymethylcellulose 0.5% (Preservative-Free) Ophthalmic Solution 1 Drop(s) Both EYES two times a day PRN  chlorhexidine 2% Cloths 1 Application(s) Topical daily  dextrose 5%. 1000 milliLiter(s) IV Continuous <Continuous>  dextrose 5%. 1000 milliLiter(s) IV Continuous <Continuous>  dextrose 50% Injectable 12.5 Gram(s) IV Push once  dextrose 50% Injectable 25 Gram(s) IV Push once  dextrose 50% Injectable 25 Gram(s) IV Push once  dextrose Oral Gel 15 Gram(s) Oral once PRN  glucagon  Injectable 1 milliGRAM(s) IntraMuscular once  insulin lispro (ADMELOG) corrective regimen sliding scale   SubCutaneous three times a day before meals  insulin lispro (ADMELOG) corrective regimen sliding scale   SubCutaneous at bedtime  iron sucrose IVPB 200 milliGRAM(s) IV Intermittent every 24 hours  melatonin 3 milliGRAM(s) Oral at bedtime PRN  metoprolol succinate ER 75 milliGRAM(s) Oral two times a day  omega-3-Acid Ethyl Esters 4 Gram(s) Oral daily  pantoprazole    Tablet 40 milliGRAM(s) Oral before breakfast  sodium chloride 0.65% Nasal 1 Spray(s) Both Nostrils every 6 hours PRN  tamsulosin 0.4 milliGRAM(s) Oral at bedtime  tiotropium 2.5 MICROgram(s) Inhaler 2 Puff(s) Inhalation daily      Vitals:  T(F): 98.4 (07-21), Max: 98.7 (07-20)  HR: 93 (07-21) (92 - 113)  BP: 115/81 (07-21) (95/57 - 118/74)  RR: 18 (07-21)  SpO2: 97% (07-21)  I&O's Summary    20 Jul 2023 07:01  -  21 Jul 2023 07:00  --------------------------------------------------------  IN: 0 mL / OUT: 560 mL / NET: -560 mL    Appearance: No acute distress; well appearing  Eyes: PERRL, EOMI, pink conjunctiva  HEENT: Normal oral mucosa  Cardiovascular: IIR, S1, S2, III/VI VIOLETA radiating to carotids, no rubs, or gallops, mild JVD   Respiratory: Bibasilar crackles,   Gastrointestinal: soft, non-tender, non-distended with normal bowel sounds  Musculoskeletal:, 2+ RLE edema; 1+ LLE edema, appears wrose  Neurologic: Non-focal  Lymphatic: No lymphadenopathy  Psychiatry: AAOx3, mood & affect appropriate  Skin: No rashes, ecchymoses, or cyanosis                            7.8    6.67  )-----------( Clumped    ( 21 Jul 2023 07:18 )             25.1     07-21    136  |  105  |  44<H>  ----------------------------<  105<H>  4.4   |  21<L>  |  1.63<H>    Ca    8.7      21 Jul 2023 07:18  Mg     1.6     07-21    TPro  5.6<L>  /  Alb  3.2<L>  /  TBili  1.6<H>  /  DBili  x   /  AST  17  /  ALT  15  /  AlkPhos  93  07-21    New ECG(s): Personally reviewed    A/P  78 M with hx. of Afib (on Eliquis), severe AS, mod-severe TR, CAD/MI (30 yrs ago, medically managed, no PCI), ?CHF, COPD (not on O2 at home), former heavy smoker, HTN, HLD, DM2, gout.   Was sent in for concern re outpatient echocardiogram which demonstrated worsening pericardial effusion. Effusion not safely accessible, no evidence of tamponade. A.S., now pending TAVR work up  Course c.b R thigh hematoma requiring PRBC 2U.      #Large Pericardial Effusion  - No clinical tamponade as patient is hemodynamically stable, with robust heart sounds and no obvious JVD. Outpatient TTE demonstrated LVEF of 55% with large pericardial effusion without tamponade physiology.   - Repeat TTE showed large effusion mainly around L ventricle; pericardial effusion cannot be safely tapped  Plan:  - Repeat CT Chest today   - Reconsult IR for drainage after CT   - If stable than can restart heparin / oral AC    #Severe Symptomatic Low Flow Low Gradient AS  - Pt with known sx of sev AS p/w GOFF  - Repeat TTE showing sev AS  - Structural cardiology consulted, pending eventual CT TAVR & LHC  - Hypervolemic on exam, likely multifactorial, judicious with diuretics as above  Plan:  - LHC 7/19 with Dr. Wood    > 40% LAD 80% LCx 40% RCA - no PCI as asymptomatic and high bleeding risk   - Structural team now opting for outpatient TAVR after pt recovers and blood counts remain stable  - F/U final structural recs  - Start lasix 40 IV QDay for vol overload     #AFib RVR - improved  #RBBB  - Persistent per OSH documentation   - On apixaban 2.5 BID at home - unclear why on dose reduced; per son he previously was on Coumadin but had supra-therapeutic.  Apixaban on hold at present given effusion and need for LHC.  - CHADSVASC 5   - DC heparin gtt as above    > Can likely restart heparin once pericardial effusion plan finalized after CT chest today  - AF RVR likely compensatory iso blood loss, no need to tx unless hemodynamically unstable in which case would recommend blood   - Cont MTP Succ 75 BID    #R Thigh Hematoma - resolved   - Noted on CT non con of the RLE  - Hb drop 2 pts, platelets downtrending - now stable   - Ok for heparin per heme 7/17  Plan:  - F/U RLE CTA - neg for bleed  - Hb goal >7  - F/U PT eval  - Clinically improved         Jean Ríos PGY5  Cardiology Fellow    [facility]

## 2023-07-21 NOTE — PROGRESS NOTE ADULT - PROBLEM SELECTOR PLAN 3
- Unclear hx of ?CHF, possibly has chronic HFpEF with severe AS -- TTE w/ unchanged mod to large pericardial effusion ; +severe AS  - LHC performed on 7/19 showing mod dz (40%) and 80% lesion LCx -no intervention performed.   - structural heart and CT surgery teams consulted for severe AS : Given complex med situation (anemia, bleeding, SHEYLA on CKD, pericardial effusion, CAD) and safety of resuming full AC would defer valve intervention at this time.    - diuretic still on HOLD given SHEYLA.   - strict I/Os, standing weights daily  - Cardiology following ; reccs appreciated - Unclear hx of ?CHF, possibly has chronic HFpEF with severe AS -- TTE w/ unchanged mod to large pericardial effusion ; +severe AS  - LHC performed on 7/19 showing mod dz (40%) and 80% lesion LCx -no intervention performed.   - structural heart and CT surgery teams consulted for severe AS : Given complex med situation (anemia, bleeding, SHEYLA on CKD, pericardial effusion, CAD) and safety of resuming full AC would defer valve intervention at this time.    - Lasix was on hold due to SHEYLA, now patient volume overloaded > START Lasix 40mg IV qd per cards recs.    - strict I/Os, standing weights daily  - Cardiology following ; reccs appreciated

## 2023-07-21 NOTE — PROGRESS NOTE ADULT - PROBLEM SELECTOR PLAN 5
Presented with O2 sat down to 88% on RA. Improved on 1-2L O2NC. Suspect in setting of acute on chronic CHF.  - diuretic on HOLD for now/.   - management of CHF/pericardial effusion as above  - monitor respiratory status, wean off supplemental O2 as tolerated

## 2023-07-21 NOTE — PROGRESS NOTE ADULT - ASSESSMENT
78M (alt MRN [id number]) w/ hx of Afib (on Eliquis), severe AS, mod-severe TR, CAD/MI (30 yrs ago, medically managed, no PCI), ?CHF, COPD (not on O2 at home), former heavy smoker, HTN, HLD, DM2, gout, prior moderate pericardial effusion, now presenting with large pericardial effusion w/o tamponade physiology on outpt TTE.

## 2023-07-21 NOTE — PROGRESS NOTE ADULT - ASSESSMENT
Pt with CKD   AS and pericardial effusion.  LHC was done on 19th and it has shown stenotic coronaries. But no PCI was done.  S. cr was 1.6 and it was the same before contrast study.  Planned to have out patient TAVR.

## 2023-07-21 NOTE — PROGRESS NOTE ADULT - SUBJECTIVE AND OBJECTIVE BOX
[facility] Division of Hospital Medicine  Kaleigh Cerrato MD M-F, 8A-5P: MS Teams, Pager: [phone]  Other Times: Ext: [phone], Pager: [phone]      Patient is a 78y old  Male who presents with a chief complaint of worsening pericardial effusion (2023 09:01)      SUBJECTIVE / OVERNIGHT EVENTS:      ADDITIONAL REVIEW OF SYSTEMS:    MEDICATIONS  (STANDING):  allopurinol 100 milliGRAM(s) Oral daily  atorvastatin 40 milliGRAM(s) Oral at bedtime  beclomethasone  80 MICROgram(s) Inhaler 2 Puff(s) Inhalation two times a day  chlorhexidine 2% Cloths 1 Application(s) Topical daily  dextrose 5%. 1000 milliLiter(s) (100 mL/Hr) IV Continuous <Continuous>  dextrose 5%. 1000 milliLiter(s) (50 mL/Hr) IV Continuous <Continuous>  dextrose 50% Injectable 12.5 Gram(s) IV Push once  dextrose 50% Injectable 25 Gram(s) IV Push once  dextrose 50% Injectable 25 Gram(s) IV Push once  glucagon  Injectable 1 milliGRAM(s) IntraMuscular once  insulin lispro (ADMELOG) corrective regimen sliding scale   SubCutaneous three times a day before meals  insulin lispro (ADMELOG) corrective regimen sliding scale   SubCutaneous at bedtime  iron sucrose IVPB 200 milliGRAM(s) IV Intermittent every 24 hours  metoprolol succinate ER 75 milliGRAM(s) Oral two times a day  omega-3-Acid Ethyl Esters 4 Gram(s) Oral daily  pantoprazole    Tablet 40 milliGRAM(s) Oral before breakfast  tamsulosin 0.4 milliGRAM(s) Oral at bedtime  tiotropium 2.5 MICROgram(s) Inhaler 2 Puff(s) Inhalation daily    MEDICATIONS  (PRN):  acetaminophen     Tablet .. 650 milliGRAM(s) Oral every 6 hours PRN Temp greater or equal to 38C (100.4F), Mild Pain (1 - 3)  albuterol    90 MICROgram(s) HFA Inhaler 2 Puff(s) Inhalation every 6 hours PRN Shortness of Breath and/or Wheezing  AQUAPHOR (petrolatum Ointment) 1 Application(s) Topical two times a day PRN dry skin  carboxymethylcellulose 0.5% (Preservative-Free) Ophthalmic Solution 1 Drop(s) Both EYES two times a day PRN dry eyes  dextrose Oral Gel 15 Gram(s) Oral once PRN Blood Glucose LESS THAN 70 milliGRAM(s)/deciliter  melatonin 3 milliGRAM(s) Oral at bedtime PRN Sleep  sodium chloride 0.65% Nasal 1 Spray(s) Both Nostrils every 6 hours PRN Nasal Congestion      CAPILLARY BLOOD GLUCOSE      POCT Blood Glucose.: 138 mg/dL (2023 08:19)  POCT Blood Glucose.: 151 mg/dL (2023 21:17)  POCT Blood Glucose.: 144 mg/dL (2023 16:40)  POCT Blood Glucose.: 158 mg/dL (2023 11:59)      I&O's Summary    2023 07:01  -  2023 07:00  --------------------------------------------------------  IN: 0 mL / OUT: 560 mL / NET: -560 mL        Daily     Daily Weight in k.3 (2023 08:00)    PHYSICAL EXAM:  Vital Signs Last 24 Hrs  T(C): 36.8 (2023 04:49), Max: 37.1 (2023 13:18)  T(F): 98.3 (2023 04:49), Max: 98.7 (2023 13:18)  HR: 93 (2023 05:50) (92 - 113)  BP: 113/70 (2023 05:50) (95/57 - 118/74)  BP(mean): --  RR: 19 (2023 04:49) (18 - 19)  SpO2: 96% (2023 04:49) (92% - 97%)    Parameters below as of 2023 04:49  Patient On (Oxygen Delivery Method): nasal cannula  O2 Flow (L/min): 2    CONSTITUTIONAL: NAD, well-developed, well-groomed  EYES: PERRLA; conjunctiva and sclera clear  ENMT: Moist oral mucosa, no pharyngeal injection or exudates; normal dentition  NECK: Supple, no palpable masses; no thyromegaly  RESPIRATORY: Normal respiratory effort; lungs are clear to auscultation bilaterally  CARDIOVASCULAR: Regular rate and rhythm, normal S1 and S2, no murmur/rub/gallop; No lower extremity edema; Peripheral pulses are 2+ bilaterally  ABDOMEN: Nontender to palpation, normoactive bowel sounds, no rebound/guarding; No hepatosplenomegaly  MUSCULOSKELETAL:  no clubbing or cyanosis of digits; no joint swelling or tenderness to palpation, moving all extremities   PSYCH: A+O to person, place, and time; affect appropriate  NEUROLOGY: CN 2-12 are intact and symmetric; no gross sensory/motor deficits   SKIN: No rashes; no palpable lesions    LABS:                        7.8    6.67  )-----------( Clumped    ( 2023 07:18 )             25.1     07-    136  |  105  |  44<H>  ----------------------------<  105<H>  4.4   |  21<L>  |  1.63<H>    Ca    8.7      2023 07:18  Mg     1.6     07-    TPro  5.6<L>  /  Alb  3.2<L>  /  TBili  1.6<H>  /  DBili  x   /  AST  17  /  ALT  15  /  AlkPhos  93  07-    LIVER FUNCTIONS - ( 2023 07:18 )  Alb: 3.2 g/dL / Pro: 5.6 g/dL / ALK PHOS: 93 U/L / ALT: 15 U/L / AST: 17 U/L / GGT: x                 Urinalysis Basic - ( 2023 07:18 )    Color: x / Appearance: x / SG: x / pH: x  Gluc: 105 mg/dL / Ketone: x  / Bili: x / Urobili: x   Blood: x / Protein: x / Nitrite: x   Leuk Esterase: x / RBC: x / WBC x   Sq Epi: x / Non Sq Epi: x / Bacteria: x            RADIOLOGY & ADDITIONAL TESTS:  Results Reviewed:   Imaging Personally Reviewed:  Electrocardiogram Personally Reviewed:    COORDINATION OF CARE:  Care Discussed with Consultants/Other Providers [Y/N]:  Prior or Outpatient Records Reviewed [Y/N]:   [facility] Division of Hospital Medicine  Kaleigh Cerrato MD M-F, 8A-5P: MS Teams, Pager: [phone]  Other Times: Ext: [phone], Pager: [phone]      Patient is a 78y old  Male who presents with a chief complaint of worsening pericardial effusion (2023 09:01)      SUBJECTIVE / OVERNIGHT EVENTS:  Tele: Afib 90-110s rate    ADDITIONAL REVIEW OF SYSTEMS:    MEDICATIONS  (STANDING):  allopurinol 100 milliGRAM(s) Oral daily  atorvastatin 40 milliGRAM(s) Oral at bedtime  beclomethasone  80 MICROgram(s) Inhaler 2 Puff(s) Inhalation two times a day  chlorhexidine 2% Cloths 1 Application(s) Topical daily  dextrose 5%. 1000 milliLiter(s) (100 mL/Hr) IV Continuous <Continuous>  dextrose 5%. 1000 milliLiter(s) (50 mL/Hr) IV Continuous <Continuous>  dextrose 50% Injectable 12.5 Gram(s) IV Push once  dextrose 50% Injectable 25 Gram(s) IV Push once  dextrose 50% Injectable 25 Gram(s) IV Push once  glucagon  Injectable 1 milliGRAM(s) IntraMuscular once  insulin lispro (ADMELOG) corrective regimen sliding scale   SubCutaneous three times a day before meals  insulin lispro (ADMELOG) corrective regimen sliding scale   SubCutaneous at bedtime  iron sucrose IVPB 200 milliGRAM(s) IV Intermittent every 24 hours  metoprolol succinate ER 75 milliGRAM(s) Oral two times a day  omega-3-Acid Ethyl Esters 4 Gram(s) Oral daily  pantoprazole    Tablet 40 milliGRAM(s) Oral before breakfast  tamsulosin 0.4 milliGRAM(s) Oral at bedtime  tiotropium 2.5 MICROgram(s) Inhaler 2 Puff(s) Inhalation daily    MEDICATIONS  (PRN):  acetaminophen     Tablet .. 650 milliGRAM(s) Oral every 6 hours PRN Temp greater or equal to 38C (100.4F), Mild Pain (1 - 3)  albuterol    90 MICROgram(s) HFA Inhaler 2 Puff(s) Inhalation every 6 hours PRN Shortness of Breath and/or Wheezing  AQUAPHOR (petrolatum Ointment) 1 Application(s) Topical two times a day PRN dry skin  carboxymethylcellulose 0.5% (Preservative-Free) Ophthalmic Solution 1 Drop(s) Both EYES two times a day PRN dry eyes  dextrose Oral Gel 15 Gram(s) Oral once PRN Blood Glucose LESS THAN 70 milliGRAM(s)/deciliter  melatonin 3 milliGRAM(s) Oral at bedtime PRN Sleep  sodium chloride 0.65% Nasal 1 Spray(s) Both Nostrils every 6 hours PRN Nasal Congestion      CAPILLARY BLOOD GLUCOSE      POCT Blood Glucose.: 138 mg/dL (2023 08:19)  POCT Blood Glucose.: 151 mg/dL (2023 21:17)  POCT Blood Glucose.: 144 mg/dL (2023 16:40)  POCT Blood Glucose.: 158 mg/dL (2023 11:59)      I&O's Summary    2023 07:01  -  2023 07:00  --------------------------------------------------------  IN: 0 mL / OUT: 560 mL / NET: -560 mL        Daily     Daily Weight in k.3 (2023 08:00)    PHYSICAL EXAM:  Vital Signs Last 24 Hrs  T(C): 36.8 (2023 04:49), Max: 37.1 (2023 13:18)  T(F): 98.3 (2023 04:49), Max: 98.7 (2023 13:18)  HR: 93 (2023 05:50) (92 - 113)  BP: 113/70 (2023 05:50) (95/57 - 118/74)  BP(mean): --  RR: 19 (2023 04:49) (18 - 19)  SpO2: 96% (2023 04:49) (92% - 97%)    Parameters below as of 2023 04:49  Patient On (Oxygen Delivery Method): nasal cannula  O2 Flow (L/min): 2    CONSTITUTIONAL: NAD, well-developed, well-groomed  EYES: PERRLA; conjunctiva and sclera clear  ENMT: Moist oral mucosa, no pharyngeal injection or exudates; normal dentition  NECK: Supple, no palpable masses; no thyromegaly  RESPIRATORY: Normal respiratory effort; lungs are clear to auscultation bilaterally  CARDIOVASCULAR: Regular rate and rhythm, normal S1 and S2, no murmur/rub/gallop; No lower extremity edema; Peripheral pulses are 2+ bilaterally  ABDOMEN: Nontender to palpation, normoactive bowel sounds, no rebound/guarding; No hepatosplenomegaly  MUSCULOSKELETAL:  no clubbing or cyanosis of digits; no joint swelling or tenderness to palpation, moving all extremities   PSYCH: A+O to person, place, and time; affect appropriate  NEUROLOGY: CN 2-12 are intact and symmetric; no gross sensory/motor deficits   SKIN: No rashes; no palpable lesions    LABS:                        7.8    6.67  )-----------( Clumped    ( 2023 07:18 )             25.1     07-    136  |  105  |  44<H>  ----------------------------<  105<H>  4.4   |  21<L>  |  1.63<H>    Ca    8.7      2023 07:18  Mg     1.6     07-    TPro  5.6<L>  /  Alb  3.2<L>  /  TBili  1.6<H>  /  DBili  x   /  AST  17  /  ALT  15  /  AlkPhos  93  07-    LIVER FUNCTIONS - ( 2023 07:18 )  Alb: 3.2 g/dL / Pro: 5.6 g/dL / ALK PHOS: 93 U/L / ALT: 15 U/L / AST: 17 U/L / GGT: x                 Urinalysis Basic - ( 2023 07:18 )    Color: x / Appearance: x / SG: x / pH: x  Gluc: 105 mg/dL / Ketone: x  / Bili: x / Urobili: x   Blood: x / Protein: x / Nitrite: x   Leuk Esterase: x / RBC: x / WBC x   Sq Epi: x / Non Sq Epi: x / Bacteria: x            RADIOLOGY & ADDITIONAL TESTS:  Results Reviewed:   Imaging Personally Reviewed:  Electrocardiogram Personally Reviewed:    COORDINATION OF CARE:  Care Discussed with Consultants/Other Providers [Y/N]:  Prior or Outpatient Records Reviewed [Y/N]:   [facility] Division of Hospital Medicine  Kaleigh Cerrato MD M-F, 8A-5P: MS Teams, Pager: [phone]  Other Times: Ext: [phone], Pager: [phone]      Patient is a 78y old  Male who presents with a chief complaint of worsening pericardial effusion (2023 09:01)      SUBJECTIVE / OVERNIGHT EVENTS:  Tele: Afib 90-110s rate  Patient was examined today. He states that he noticed "something traveling up a vein on his right lower leg" earlier today. Still has right thigh pain which is fluctuating. Rest of the the ROS negative. Denies headache, dizziness, chest pain, dyspnea, abdominal pain, palpitations.     ADDITIONAL REVIEW OF SYSTEMS:    MEDICATIONS  (STANDING):  allopurinol 100 milliGRAM(s) Oral daily  atorvastatin 40 milliGRAM(s) Oral at bedtime  beclomethasone  80 MICROgram(s) Inhaler 2 Puff(s) Inhalation two times a day  chlorhexidine 2% Cloths 1 Application(s) Topical daily  dextrose 5%. 1000 milliLiter(s) (100 mL/Hr) IV Continuous <Continuous>  dextrose 5%. 1000 milliLiter(s) (50 mL/Hr) IV Continuous <Continuous>  dextrose 50% Injectable 12.5 Gram(s) IV Push once  dextrose 50% Injectable 25 Gram(s) IV Push once  dextrose 50% Injectable 25 Gram(s) IV Push once  glucagon  Injectable 1 milliGRAM(s) IntraMuscular once  insulin lispro (ADMELOG) corrective regimen sliding scale   SubCutaneous three times a day before meals  insulin lispro (ADMELOG) corrective regimen sliding scale   SubCutaneous at bedtime  iron sucrose IVPB 200 milliGRAM(s) IV Intermittent every 24 hours  metoprolol succinate ER 75 milliGRAM(s) Oral two times a day  omega-3-Acid Ethyl Esters 4 Gram(s) Oral daily  pantoprazole    Tablet 40 milliGRAM(s) Oral before breakfast  tamsulosin 0.4 milliGRAM(s) Oral at bedtime  tiotropium 2.5 MICROgram(s) Inhaler 2 Puff(s) Inhalation daily    MEDICATIONS  (PRN):  acetaminophen     Tablet .. 650 milliGRAM(s) Oral every 6 hours PRN Temp greater or equal to 38C (100.4F), Mild Pain (1 - 3)  albuterol    90 MICROgram(s) HFA Inhaler 2 Puff(s) Inhalation every 6 hours PRN Shortness of Breath and/or Wheezing  AQUAPHOR (petrolatum Ointment) 1 Application(s) Topical two times a day PRN dry skin  carboxymethylcellulose 0.5% (Preservative-Free) Ophthalmic Solution 1 Drop(s) Both EYES two times a day PRN dry eyes  dextrose Oral Gel 15 Gram(s) Oral once PRN Blood Glucose LESS THAN 70 milliGRAM(s)/deciliter  melatonin 3 milliGRAM(s) Oral at bedtime PRN Sleep  sodium chloride 0.65% Nasal 1 Spray(s) Both Nostrils every 6 hours PRN Nasal Congestion      CAPILLARY BLOOD GLUCOSE      POCT Blood Glucose.: 138 mg/dL (2023 08:19)  POCT Blood Glucose.: 151 mg/dL (2023 21:17)  POCT Blood Glucose.: 144 mg/dL (2023 16:40)  POCT Blood Glucose.: 158 mg/dL (2023 11:59)      I&O's Summary    2023 07:01  -  2023 07:00  --------------------------------------------------------  IN: 0 mL / OUT: 560 mL / NET: -560 mL        Daily     Daily Weight in k.3 (2023 08:00)    PHYSICAL EXAM:  Vital Signs Last 24 Hrs  T(C): 36.8 (2023 04:49), Max: 37.1 (2023 13:18)  T(F): 98.3 (2023 04:49), Max: 98.7 (2023 13:18)  HR: 93 (2023 05:50) (92 - 113)  BP: 113/70 (2023 05:50) (95/57 - 118/74)  BP(mean): --  RR: 19 (2023 04:49) (18 - 19)  SpO2: 96% (2023 04:49) (92% - 97%)    Parameters below as of 2023 04:49  Patient On (Oxygen Delivery Method): nasal cannula  O2 Flow (L/min): 2    CONSTITUTIONAL: NAD, well-developed  EYES: PERRLA; conjunctiva and sclera clear  ENMT: Moist oral mucosa  RESPIRATORY: Normal respiratory effort; lungs are clear to auscultation bilaterally  CARDIOVASCULAR: Regular rate and rhythm, normal S1 and S2, no murmur/rub/gallop;   + RLE edema;  Peripheral pulses are 2+ bilaterally  ABDOMEN: Nontender to palpation, normoactive bowel sounds, no rebound/guarding;   MUSCULOSKELETAL:  no clubbing or cyanosis of digits; no joint swelling or tenderness to palpation, moving all extremities   PSYCH: A+O to person, place, and time; affect appropriate  NEUROLOGY: non focal, patient ambulating without assist  SKIN: RLE venous stasis changes, several areas of ecchymosis on UEs (R>L) with some swelling     LABS:                        7.8    6.67  )-----------( Clumped    ( 2023 07:18 )             25.1     07-    136  |  105  |  44<H>  ----------------------------<  105<H>  4.4   |  21<L>  |  1.63<H>    Ca    8.7      2023 07:18  Mg     1.6     -    TPro  5.6<L>  /  Alb  3.2<L>  /  TBili  1.6<H>  /  DBili  x   /  AST  17  /  ALT  15  /  AlkPhos  93  07-21    LIVER FUNCTIONS - ( 2023 07:18 )  Alb: 3.2 g/dL / Pro: 5.6 g/dL / ALK PHOS: 93 U/L / ALT: 15 U/L / AST: 17 U/L / GGT: x                 Urinalysis Basic - ( 2023 07:18 )    Color: x / Appearance: x / SG: x / pH: x  Gluc: 105 mg/dL / Ketone: x  / Bili: x / Urobili: x   Blood: x / Protein: x / Nitrite: x   Leuk Esterase: x / RBC: x / WBC x   Sq Epi: x / Non Sq Epi: x / Bacteria: x            RADIOLOGY & ADDITIONAL TESTS:  Results Reviewed:   Imaging Personally Reviewed:  Electrocardiogram Personally Reviewed:    COORDINATION OF CARE:  Care Discussed with Consultants/Other Providers [Y/N]:  Prior or Outpatient Records Reviewed [Y/N]:   [facility] Division of Hospital Medicine  Kaleigh Cerrato MD M-F, 8A-5P: MS Teams, Pager: [phone]  Other Times: Ext: [phone], Pager: [phone]      Patient is a 78y old  Male who presents with a chief complaint of worsening pericardial effusion (2023 09:01)      SUBJECTIVE / OVERNIGHT EVENTS:  Tele: Afib 90-110s rate  Patient was examined today. He states that he noticed "something traveling up a vein on his right lower leg" earlier today. Still has right thigh pain which is fluctuating. Rest of the the ROS negative. Denies headache, dizziness, chest pain, dyspnea, abdominal pain, palpitations.     ADDITIONAL REVIEW OF SYSTEMS:    MEDICATIONS  (STANDING):  allopurinol 100 milliGRAM(s) Oral daily  atorvastatin 40 milliGRAM(s) Oral at bedtime  beclomethasone  80 MICROgram(s) Inhaler 2 Puff(s) Inhalation two times a day  chlorhexidine 2% Cloths 1 Application(s) Topical daily  dextrose 5%. 1000 milliLiter(s) (100 mL/Hr) IV Continuous <Continuous>  dextrose 5%. 1000 milliLiter(s) (50 mL/Hr) IV Continuous <Continuous>  dextrose 50% Injectable 12.5 Gram(s) IV Push once  dextrose 50% Injectable 25 Gram(s) IV Push once  dextrose 50% Injectable 25 Gram(s) IV Push once  glucagon  Injectable 1 milliGRAM(s) IntraMuscular once  insulin lispro (ADMELOG) corrective regimen sliding scale   SubCutaneous three times a day before meals  insulin lispro (ADMELOG) corrective regimen sliding scale   SubCutaneous at bedtime  iron sucrose IVPB 200 milliGRAM(s) IV Intermittent every 24 hours  metoprolol succinate ER 75 milliGRAM(s) Oral two times a day  omega-3-Acid Ethyl Esters 4 Gram(s) Oral daily  pantoprazole    Tablet 40 milliGRAM(s) Oral before breakfast  tamsulosin 0.4 milliGRAM(s) Oral at bedtime  tiotropium 2.5 MICROgram(s) Inhaler 2 Puff(s) Inhalation daily    MEDICATIONS  (PRN):  acetaminophen     Tablet .. 650 milliGRAM(s) Oral every 6 hours PRN Temp greater or equal to 38C (100.4F), Mild Pain (1 - 3)  albuterol    90 MICROgram(s) HFA Inhaler 2 Puff(s) Inhalation every 6 hours PRN Shortness of Breath and/or Wheezing  AQUAPHOR (petrolatum Ointment) 1 Application(s) Topical two times a day PRN dry skin  carboxymethylcellulose 0.5% (Preservative-Free) Ophthalmic Solution 1 Drop(s) Both EYES two times a day PRN dry eyes  dextrose Oral Gel 15 Gram(s) Oral once PRN Blood Glucose LESS THAN 70 milliGRAM(s)/deciliter  melatonin 3 milliGRAM(s) Oral at bedtime PRN Sleep  sodium chloride 0.65% Nasal 1 Spray(s) Both Nostrils every 6 hours PRN Nasal Congestion      CAPILLARY BLOOD GLUCOSE      POCT Blood Glucose.: 138 mg/dL (2023 08:19)  POCT Blood Glucose.: 151 mg/dL (2023 21:17)  POCT Blood Glucose.: 144 mg/dL (2023 16:40)  POCT Blood Glucose.: 158 mg/dL (2023 11:59)      I&O's Summary    2023 07:01  -  2023 07:00  --------------------------------------------------------  IN: 0 mL / OUT: 560 mL / NET: -560 mL        Daily     Daily Weight in k.3 (2023 08:00)    PHYSICAL EXAM:  Vital Signs Last 24 Hrs  T(C): 36.8 (2023 04:49), Max: 37.1 (2023 13:18)  T(F): 98.3 (2023 04:49), Max: 98.7 (2023 13:18)  HR: 93 (2023 05:50) (92 - 113)  BP: 113/70 (2023 05:50) (95/57 - 118/74)  BP(mean): --  RR: 19 (2023 04:49) (18 - 19)  SpO2: 96% (2023 04:49) (92% - 97%)    Parameters below as of 2023 04:49  Patient On (Oxygen Delivery Method): nasal cannula  O2 Flow (L/min): 2    CONSTITUTIONAL: NAD, well-developed  EYES: PERRLA; conjunctiva and sclera clear  ENMT: Moist oral mucosa  RESPIRATORY: Normal respiratory effort; lungs are clear to auscultation bilaterally  CARDIOVASCULAR: Irregular R, no murmur/rub/gallop;   + RLE edema;  Peripheral pulses are 2+ bilaterally  ABDOMEN: Nontender to palpation, normoactive bowel sounds, no rebound/guarding;   MUSCULOSKELETAL:  no clubbing or cyanosis of digits; no joint swelling or tenderness to palpation, moving all extremities   PSYCH: A+O to person, place, and time; affect appropriate  NEUROLOGY: non focal, patient ambulating without assist  SKIN: RLE venous stasis changes, several areas of ecchymosis on UEs (R>L) with some swelling     LABS:                        7.8    6.67  )-----------( Clumped    ( 2023 07:18 )             25.1     07-21    136  |  105  |  44<H>  ----------------------------<  105<H>  4.4   |  21<L>  |  1.63<H>    Ca    8.7      2023 07:18  Mg     1.6     07-    TPro  5.6<L>  /  Alb  3.2<L>  /  TBili  1.6<H>  /  DBili  x   /  AST  17  /  ALT  15  /  AlkPhos  93  07-21    LIVER FUNCTIONS - ( 2023 07:18 )  Alb: 3.2 g/dL / Pro: 5.6 g/dL / ALK PHOS: 93 U/L / ALT: 15 U/L / AST: 17 U/L / GGT: x                 Urinalysis Basic - ( 2023 07:18 )    Color: x / Appearance: x / SG: x / pH: x  Gluc: 105 mg/dL / Ketone: x  / Bili: x / Urobili: x   Blood: x / Protein: x / Nitrite: x   Leuk Esterase: x / RBC: x / WBC x   Sq Epi: x / Non Sq Epi: x / Bacteria: x            RADIOLOGY & ADDITIONAL TESTS:  Results Reviewed:   Imaging Personally Reviewed:  Electrocardiogram Personally Reviewed:    COORDINATION OF CARE:  Care Discussed with Consultants/Other Providers [Y/N]:  Prior or Outpatient Records Reviewed [Y/N]:

## 2023-07-21 NOTE — PROGRESS NOTE ADULT - SUBJECTIVE AND OBJECTIVE BOX
[facility] DIVISION OF KIDNEY DISEASES AND HYPERTENSION -- FOLLOW UP NOTE  --------------------------------------------------------------------------------    Chief Complaint:  Symptomatic AS, Progressive dyspnea.    24 hour events/subjective:  no acute overnight events.  No fresh complaints.      PAST HISTORY  --------------------------------------------------------------------------------  No significant changes to PMH, PSH, FHx, SHx, unless otherwise noted    ALLERGIES & MEDICATIONS  --------------------------------------------------------------------------------  Allergies    penicillins (Unknown)    Intolerances      Standing Inpatient Medications  allopurinol 100 milliGRAM(s) Oral daily  atorvastatin 40 milliGRAM(s) Oral at bedtime  beclomethasone  80 MICROgram(s) Inhaler 2 Puff(s) Inhalation two times a day  chlorhexidine 2% Cloths 1 Application(s) Topical daily  dextrose 5%. 1000 milliLiter(s) IV Continuous <Continuous>  dextrose 5%. 1000 milliLiter(s) IV Continuous <Continuous>  dextrose 50% Injectable 12.5 Gram(s) IV Push once  dextrose 50% Injectable 25 Gram(s) IV Push once  dextrose 50% Injectable 25 Gram(s) IV Push once  glucagon  Injectable 1 milliGRAM(s) IntraMuscular once  insulin lispro (ADMELOG) corrective regimen sliding scale   SubCutaneous three times a day before meals  insulin lispro (ADMELOG) corrective regimen sliding scale   SubCutaneous at bedtime  iron sucrose IVPB 200 milliGRAM(s) IV Intermittent every 24 hours  metoprolol succinate ER 75 milliGRAM(s) Oral two times a day  omega-3-Acid Ethyl Esters 4 Gram(s) Oral daily  pantoprazole    Tablet 40 milliGRAM(s) Oral before breakfast  tamsulosin 0.4 milliGRAM(s) Oral at bedtime  tiotropium 2.5 MICROgram(s) Inhaler 2 Puff(s) Inhalation daily    PRN Inpatient Medications  acetaminophen     Tablet .. 650 milliGRAM(s) Oral every 6 hours PRN  albuterol    90 MICROgram(s) HFA Inhaler 2 Puff(s) Inhalation every 6 hours PRN  AQUAPHOR (petrolatum Ointment) 1 Application(s) Topical two times a day PRN  carboxymethylcellulose 0.5% (Preservative-Free) Ophthalmic Solution 1 Drop(s) Both EYES two times a day PRN  dextrose Oral Gel 15 Gram(s) Oral once PRN  melatonin 3 milliGRAM(s) Oral at bedtime PRN  sodium chloride 0.65% Nasal 1 Spray(s) Both Nostrils every 6 hours PRN      REVIEW OF SYSTEMS  --------------------------------------------------------------------------------  ROS was negative    VITALS/PHYSICAL EXAM  --------------------------------------------------------------------------------  T(C): 36.9 (23 @ 13:06), Max: 36.9 (23 @ 21:55)  HR: 93 (23 @ 13:06) (92 - 111)  BP: 115/81 (23 @ 13:06) (95/57 - 118/74)  RR: 18 (23 @ 13:06) (18 - 19)  SpO2: 97% (23 @ 13:06) (92% - 97%)  Wt(kg): --      Daily     Daily Weight in k.3 (2023 08:00)  I&O's Summary    2023 07:01  -  2023 07:00  --------------------------------------------------------  IN: 0 mL / OUT: 560 mL / NET: -560 mL          23 @ 07:01  -  23 @ 07:00  --------------------------------------------------------  IN: 0 mL / OUT: 560 mL / NET: -560 mL        Physical Exam:              Gen: NAD   	HEENT: anicteric  	Pulm: CTA B/L   	CV: RRR, VIOLETA in the aortic area and muffled heart sounds.  	Back: No dependent edema  	Abd: soft, nontender, nondistended  	: No avelino  	LE: Warm,pedal edema +  	Neuro: no asterixis  	Skin: Warm, without rashes  	Vascular access: none    LABS/STUDIES  --------------------------------------------------------------------------------              7.8    6.67  >-----------<  Clumped    [23 @ 07:18]              25.1     136  |  105  |  44  ----------------------------<  105      [23 @ 07:18]  4.4   |  21  |  1.63        Ca     8.7     [23 @ 07:18]      Mg     1.6     [23 07:18]    TPro  5.6  /  Alb  3.2  /  TBili  1.6  /  DBili  x   /  AST  17  /  ALT  15  /  AlkPhos  93  [23 07:18]          Creatinine Trend:  SCr 1.63 [ 07:18]  SCr 1.82 [ 06:13]  SCr 1.69 [ 06:44]  SCr 1.69 [ 07:28]  SCr 1.65 [07-15 @ 09:53]    Urinalysis - [23 07:18]      Color  / Appearance  / SG  / pH       Gluc 105 / Ketone   / Bili  / Urobili        Blood  / Protein  / Leuk Est  / Nitrite       RBC  / WBC  / Hyaline  / Gran  / Sq Epi  / Non Sq Epi  / Bacteria       Iron 55, TIBC 310, %sat 18      [23 @ 06:40]  Ferritin 43      [23 07:28]    HBsAg Nonreact      [23 @ 06:48]  HCV 0.10, Nonreact      [23 06:29]    C3 Complement 133      [23 06:48]  C4 Complement 32      [23 @ 06:48]  Immunofixation Serum:   Weak IgG Lambda Band Identified    Reference Range: None Detected      [23 07:16]  SPEP Interpretation: Weak Gamma-Migrating Paraprotein Identified      [23 07:16]      Radiology  --------------------------------------------------------------------------------    --------------------------------------------------------------------------------

## 2023-07-21 NOTE — PROGRESS NOTE ADULT - PROBLEM SELECTOR PLAN 6
Hx of Afib (on Eliquis at home). CHADSVASC score of 5-6.  - All AC Eliquis/hep gtt now HELD given concern for worsening anemia, thrombocytopenia, hematoma.   - May consider restarting AC challenge pending hematology team recs, whether pt can safely resume for eventual cardiac interventions.   - c/w home metoprolol succinate 75mg bid  - monitor on telemetry

## 2023-07-21 NOTE — PROGRESS NOTE ADULT - SUBJECTIVE AND OBJECTIVE BOX
Subjective  No acute events reported overnight.  The patient notes that he is able to almost lay flat in bed.  Complaining of worsening bilateral lower extremity swelling.  He ambulated 2 times yesterday in the hallway for short distances during which time he felt mildly short of breath and had some fatigue.  Does not feel lightheaded, dizzy nor is he experiencing palpitations.  Continues to have discomfort/heaviness in his right.  No fevers, chills or sweats.    Telemetry  Irregular irregular    Review of systems  14 point review of systems is otherwise unremarkable except what is described above in the history of present illness    Physical examination  No apparent distress, alert and oriented x3, appropriate affect  JVD is not elevated, supple, no lymphadenopathy  Clear to auscultation bilateral with no wheezing rhonchi crackles  Irregular irregular with 3 out of 6 crescendo decrescendo murmur heard loudest at the right upper sternal border rating to the carotids   Positive bowel sound, soft, nontender, nondistended, no mass and guarding or rebound tenderness  No clubbing or cyanosis  Right thigh greater than left thigh  1+ DP/PT pulses bilaterally  2+ edema right and left legs below the knee (right greater than left)    Assessment/plan  Severe low-flow low gradient aortic valve stenosis  Pericardial effusion    -- Discussion was had with the patient and his son concerning events that have transpired during his hospitalization and findings on imaging studies.  It was discussed that the patient has multiple issues that are likely contributing to his shortness of breath/dyspnea upon exertion.  --The patient's case was discussed during multidisciplinary valve team and due to his comorbidities and acute issues including but not limited to need for further medical optimization, assessment to deem safety to receive full dose anticoagulation therapy, deconditioned status secondary to prolonged hospitalization it is recommended that he be reassessed as an outpatient in approximately 2 to 3 weeks following discharge.  If he is clinically doing well at that time plan to proceed with TAVR.  --Prior to proceeding with any further work-up for TAVR the patient will need to be cleared by hematology and gastroenterology.  --The patient should be rechallenged on full dose anticoagulation therapy prior to discharge to make sure that his CBC is stable in nature and that he has no further bleeds  --The patient has severe low-flow low gradient with normal EF along with severe one-vessel obstructive coronary artery disease/left circumflex artery.    --Discussed with the patient and his family what is severe low-flow low gradient aortic valve stenosis.  The associated signs and symptoms of severe aortic valve stenosis were reviewed.  The natural pathophysiology of untreated severe aortic valve stenosis was gone over.  The various treatment options were gone over along with their pros/cons and risks/benefits including medical therapy, TAVR and SAVR.  Due to the patient age and comorbidities he is felt to be an appropriate candidate to undergo TAVR.  Indications and details of the TAVR procedure reviewed.  Benefits and risks were gone over.  Risks include but are not limited to infection, bleeding, arrhythmia, TIA/stroke, hemodynamic instability, vascular injury, need for surgery and death.    -- The patient has longstanding history of anemia requiring iron transfusions for which she is followed by outpatient hematologist/Dr. Sylvester.  Due to history of low iron/ferritin he has been evaluated extensively by GI as an outpatient.  Per report endoscopy/colonoscopy/capsule study was unremarkable in nature.  Due to concern for continued blood loss that is why he was placed on the lower dose of Eliquis 2.5 mg twice a day.  --The patient underwent cardiac catheterization on 7/19/2023 that demonstrated severe one-vessel obstructive coronary artery disease in the left circumflex artery.  At this time intervention has been deferred due to concern that addition of antiplatelet therapy would place the patient at too high risk of bleeding.  --There is concern that there may be clot in his left atrial appendage.  In the past the patient was on Eliquis 2.5 mg twice a day.  It appears that he was being underdosed.  --It is not clear if the patient has been ruled out for compartment syndrome due to intramuscular hematoma.  -- The patient was found to have a pericardial effusion.  Further assessment/work-up as per primary cardiology team.  -- Recommend patient be on pharmacologic DVT prophylaxis if he is not on full dose anticoagulation therapy.  He is at high risk for development of DVT.  -- Continue telemetry monitoring.    All questions and concerns of the patient and his son were addressed.  Plan for reassessment for TAVR as an outpatient in the next 3 to 4 weeks following discharge.    Findings and plan were discussed with cardiology/Dr. Enriquez and cardiac surgery/Dr. Bob.    Greater than 35 minutes were spent on total encounter.  The necessity of the time spent during the encounter on this date of service was due to education, assessment and coordination of care.

## 2023-07-21 NOTE — PROGRESS NOTE ADULT - PROBLEM SELECTOR PLAN 2
- Found to have "large circumferential pericardial effusion up to 2.6 cm without evidence of tamponade physiology" on outpt TTE (7/3/23).- Remains asymptomatic. repeat again 7/17 remains unchanged   - Prior TTE (10/3/22) showed "moderate pericardial effusion, measuring about 1.0 cm superior to the RA; otherwise small circumferential pericardial effusion." Unclear underlying etiology of effusion, but possibly in setting of CHF.  - off Lasix given SHEYLA.  strict I/Os, monitor UOP  - Previous recommendation as per card/IR drainage deferred given lack of overt symptoms.   >     > Needs re-evaluation now given complicated picture of multifactorial issues - Anemia, bleeding, CAD, severe AS .   -Card rec : CT chest to eval effusion - F/U, IR eval - Found to have "large circumferential pericardial effusion up to 2.6 cm without evidence of tamponade physiology" on outpt TTE (7/3/23).- Remains asymptomatic. repeat again 7/17 remains unchanged   - Prior TTE (10/3/22) showed "moderate pericardial effusion, measuring about 1.0 cm superior to the RA; otherwise small circumferential pericardial effusion." Unclear underlying etiology of effusion, but possibly in setting of CHF.  - Lasix was on hold due to SHEYLA, now patient volume overloaded > START Lasix 40mg IV qd per cards recs.  strict I/Os, monitor UOP  - Previous recommendation as per card/IR drainage deferred given lack of overt symptoms.   >     > Needs re-evaluation now given complicated picture of multifactorial issues - Anemia, bleeding, CAD, severe AS .   -Card rec : CT chest to eval effusion - F/U, IR eval

## 2023-07-22 LAB
ANION GAP SERPL CALC-SCNC: 12 MMOL/L — SIGNIFICANT CHANGE UP (ref 5–17)
BUN SERPL-MCNC: 40 MG/DL — HIGH (ref 7–23)
CALCIUM SERPL-MCNC: 8.6 MG/DL — SIGNIFICANT CHANGE UP (ref 8.4–10.5)
CHLORIDE SERPL-SCNC: 104 MMOL/L — SIGNIFICANT CHANGE UP (ref 96–108)
CO2 SERPL-SCNC: 20 MMOL/L — LOW (ref 22–31)
CREAT SERPL-MCNC: 1.5 MG/DL — HIGH (ref 0.5–1.3)
EGFR: 48 ML/MIN/1.73M2 — LOW
G6PD RBC-CCNC: 18.9 U/G HGB — SIGNIFICANT CHANGE UP (ref 7–20.5)
GLUCOSE BLDC GLUCOMTR-MCNC: 112 MG/DL — HIGH (ref 70–99)
GLUCOSE BLDC GLUCOMTR-MCNC: 130 MG/DL — HIGH (ref 70–99)
GLUCOSE BLDC GLUCOMTR-MCNC: 132 MG/DL — HIGH (ref 70–99)
GLUCOSE BLDC GLUCOMTR-MCNC: 134 MG/DL — HIGH (ref 70–99)
GLUCOSE SERPL-MCNC: 111 MG/DL — HIGH (ref 70–99)
HCT VFR BLD CALC: 27 % — LOW (ref 39–50)
HGB BLD-MCNC: 8.3 G/DL — LOW (ref 13–17)
MAGNESIUM SERPL-MCNC: 2.1 MG/DL — SIGNIFICANT CHANGE UP (ref 1.6–2.6)
MCHC RBC-ENTMCNC: 28.2 PG — SIGNIFICANT CHANGE UP (ref 27–34)
MCHC RBC-ENTMCNC: 30.7 GM/DL — LOW (ref 32–36)
MCV RBC AUTO: 91.8 FL — SIGNIFICANT CHANGE UP (ref 80–100)
NRBC # BLD: 0 /100 WBCS — SIGNIFICANT CHANGE UP (ref 0–0)
OF RESULTS REVIEWED: NORMAL — SIGNIFICANT CHANGE UP
PLATELET # BLD AUTO: 97 K/UL — LOW (ref 150–400)
POTASSIUM SERPL-MCNC: 4.7 MMOL/L — SIGNIFICANT CHANGE UP (ref 3.5–5.3)
POTASSIUM SERPL-SCNC: 4.7 MMOL/L — SIGNIFICANT CHANGE UP (ref 3.5–5.3)
RBC # BLD: 2.94 M/UL — LOW (ref 4.2–5.8)
RBC # FLD: 21.9 % — HIGH (ref 10.3–14.5)
SODIUM SERPL-SCNC: 136 MMOL/L — SIGNIFICANT CHANGE UP (ref 135–145)
WBC # BLD: 7.55 K/UL — SIGNIFICANT CHANGE UP (ref 3.8–10.5)
WBC # FLD AUTO: 7.55 K/UL — SIGNIFICANT CHANGE UP (ref 3.8–10.5)

## 2023-07-22 PROCEDURE — 99233 SBSQ HOSP IP/OBS HIGH 50: CPT

## 2023-07-22 RX ADMIN — ATORVASTATIN CALCIUM 40 MILLIGRAM(S): 80 TABLET, FILM COATED ORAL at 21:46

## 2023-07-22 RX ADMIN — BECLOMETHASONE DIPROPIONATE 2 PUFF(S): 40 AEROSOL, METERED RESPIRATORY (INHALATION) at 21:46

## 2023-07-22 RX ADMIN — Medication 75 MILLIGRAM(S): at 05:33

## 2023-07-22 RX ADMIN — Medication 75 MILLIGRAM(S): at 17:32

## 2023-07-22 RX ADMIN — Medication 4 GRAM(S): at 11:38

## 2023-07-22 RX ADMIN — TIOTROPIUM BROMIDE 2 PUFF(S): 18 CAPSULE ORAL; RESPIRATORY (INHALATION) at 12:15

## 2023-07-22 RX ADMIN — TAMSULOSIN HYDROCHLORIDE 0.4 MILLIGRAM(S): 0.4 CAPSULE ORAL at 21:46

## 2023-07-22 RX ADMIN — PANTOPRAZOLE SODIUM 40 MILLIGRAM(S): 20 TABLET, DELAYED RELEASE ORAL at 05:33

## 2023-07-22 RX ADMIN — Medication 100 MILLIGRAM(S): at 11:40

## 2023-07-22 RX ADMIN — Medication 40 MILLIGRAM(S): at 05:34

## 2023-07-22 RX ADMIN — CHLORHEXIDINE GLUCONATE 1 APPLICATION(S): 213 SOLUTION TOPICAL at 11:39

## 2023-07-22 NOTE — PROGRESS NOTE ADULT - SUBJECTIVE AND OBJECTIVE BOX
[facility] Division of Hospital Medicine  Kaleigh Cerrato MD M-F, 8A-5P: MS Teams, Pager: [phone]  Other Times: Ext: [phone], Pager: [phone]      Patient is a 78y old  Male who presents with a chief complaint of worsening pericardial effusion (22 Jul 2023 10:01)      SUBJECTIVE / OVERNIGHT EVENTS:  Patient was examined this morning. He is sleeping comfortably.   Duplex US report reviewed. CT chest report reviewed. IR and hematology teams reconsulted for re-eval.     ADDITIONAL REVIEW OF SYSTEMS:    MEDICATIONS  (STANDING):  allopurinol 100 milliGRAM(s) Oral daily  atorvastatin 40 milliGRAM(s) Oral at bedtime  beclomethasone  80 MICROgram(s) Inhaler 2 Puff(s) Inhalation two times a day  chlorhexidine 2% Cloths 1 Application(s) Topical daily  dextrose 5%. 1000 milliLiter(s) (100 mL/Hr) IV Continuous <Continuous>  dextrose 5%. 1000 milliLiter(s) (50 mL/Hr) IV Continuous <Continuous>  dextrose 50% Injectable 25 Gram(s) IV Push once  dextrose 50% Injectable 12.5 Gram(s) IV Push once  dextrose 50% Injectable 25 Gram(s) IV Push once  furosemide   Injectable 40 milliGRAM(s) IV Push daily  glucagon  Injectable 1 milliGRAM(s) IntraMuscular once  insulin lispro (ADMELOG) corrective regimen sliding scale   SubCutaneous three times a day before meals  insulin lispro (ADMELOG) corrective regimen sliding scale   SubCutaneous at bedtime  metoprolol succinate ER 75 milliGRAM(s) Oral two times a day  omega-3-Acid Ethyl Esters 4 Gram(s) Oral daily  pantoprazole    Tablet 40 milliGRAM(s) Oral before breakfast  tamsulosin 0.4 milliGRAM(s) Oral at bedtime  tiotropium 2.5 MICROgram(s) Inhaler 2 Puff(s) Inhalation daily    MEDICATIONS  (PRN):  acetaminophen     Tablet .. 650 milliGRAM(s) Oral every 6 hours PRN Temp greater or equal to 38C (100.4F), Mild Pain (1 - 3)  albuterol    90 MICROgram(s) HFA Inhaler 2 Puff(s) Inhalation every 6 hours PRN Shortness of Breath and/or Wheezing  AQUAPHOR (petrolatum Ointment) 1 Application(s) Topical two times a day PRN dry skin  carboxymethylcellulose 0.5% (Preservative-Free) Ophthalmic Solution 1 Drop(s) Both EYES two times a day PRN dry eyes  dextrose Oral Gel 15 Gram(s) Oral once PRN Blood Glucose LESS THAN 70 milliGRAM(s)/deciliter  melatonin 3 milliGRAM(s) Oral at bedtime PRN Sleep  sodium chloride 0.65% Nasal 1 Spray(s) Both Nostrils every 6 hours PRN Nasal Congestion      CAPILLARY BLOOD GLUCOSE      POCT Blood Glucose.: 130 mg/dL (22 Jul 2023 08:00)  POCT Blood Glucose.: 133 mg/dL (21 Jul 2023 22:25)  POCT Blood Glucose.: 127 mg/dL (21 Jul 2023 16:21)      I&O's Summary    21 Jul 2023 07:01  -  22 Jul 2023 07:00  --------------------------------------------------------  IN: 0 mL / OUT: 300 mL / NET: -300 mL        Daily     Daily     PHYSICAL EXAM:  Vital Signs Last 24 Hrs  T(C): 36.8 (22 Jul 2023 04:28), Max: 37 (21 Jul 2023 17:04)  T(F): 98.3 (22 Jul 2023 04:28), Max: 98.6 (21 Jul 2023 17:04)  HR: 94 (22 Jul 2023 04:28) (76 - 95)  BP: 109/72 (22 Jul 2023 04:28) (103/63 - 115/81)  BP(mean): --  RR: 18 (22 Jul 2023 04:28) (18 - 18)  SpO2: 93% (22 Jul 2023 04:28) (93% - 97%)    Parameters below as of 22 Jul 2023 04:28  Patient On (Oxygen Delivery Method): nasal cannula  O2 Flow (L/min): 2    CONSTITUTIONAL: NAD, well-developed  EYES: PERRLA; conjunctiva and sclera clear  ENMT: Moist oral mucosa  RESPIRATORY: Normal respiratory effort; lungs are clear to auscultation bilaterally  CARDIOVASCULAR: Irregular R, no murmur/rub/gallop;   + RLE edema;  Peripheral pulses are 2+ bilaterally  ABDOMEN: Nontender to palpation, normoactive bowel sounds, no rebound/guarding;   MUSCULOSKELETAL:  no clubbing or cyanosis of digits; no joint swelling or tenderness to palpation, moving all extremities   PSYCH: A+O to person, place, and time; affect appropriate  NEUROLOGY: non focal, patient ambulating without assist  SKIN: RLE venous stasis changes, several areas of ecchymosis on UEs (R>L) with some swelling     LABS:                        8.3    7.55  )-----------( 97       ( 22 Jul 2023 05:49 )             27.0     07-22    136  |  104  |  40<H>  ----------------------------<  111<H>  4.7   |  20<L>  |  1.50<H>    Ca    8.6      22 Jul 2023 05:48  Mg     2.1     07-22    TPro  5.6<L>  /  Alb  3.2<L>  /  TBili  1.6<H>  /  DBili  x   /  AST  17  /  ALT  15  /  AlkPhos  93  07-21    LIVER FUNCTIONS - ( 21 Jul 2023 07:18 )  Alb: 3.2 g/dL / Pro: 5.6 g/dL / ALK PHOS: 93 U/L / ALT: 15 U/L / AST: 17 U/L / GGT: x                 Urinalysis Basic - ( 22 Jul 2023 05:48 )    Color: x / Appearance: x / SG: x / pH: x  Gluc: 111 mg/dL / Ketone: x  / Bili: x / Urobili: x   Blood: x / Protein: x / Nitrite: x   Leuk Esterase: x / RBC: x / WBC x   Sq Epi: x / Non Sq Epi: x / Bacteria: x            RADIOLOGY & ADDITIONAL TESTS:  Results Reviewed:   Imaging Personally Reviewed:  Electrocardiogram Personally Reviewed:    COORDINATION OF CARE:  Care Discussed with Consultants/Other Providers [Y/N]:  Prior or Outpatient Records Reviewed [Y/N]:   [facility] Division of Hospital Medicine  Kaleigh Cerrato MD M-F, 8A-5P: MS Teams, Pager  Other Times: please use [facility] extension /[email or web address] Pager      Patient is a 78y old  Male who presents with a chief complaint of worsening pericardial effusion (22 Jul 2023 10:01)      SUBJECTIVE / OVERNIGHT EVENTS:  Patient was examined this morning. He is sleeping comfortably.   Duplex US report reviewed. CT chest report reviewed. IR and hematology teams reconsulted for re-eval.     ADDITIONAL REVIEW OF SYSTEMS:    MEDICATIONS  (STANDING):  allopurinol 100 milliGRAM(s) Oral daily  atorvastatin 40 milliGRAM(s) Oral at bedtime  beclomethasone  80 MICROgram(s) Inhaler 2 Puff(s) Inhalation two times a day  chlorhexidine 2% Cloths 1 Application(s) Topical daily  dextrose 5%. 1000 milliLiter(s) (100 mL/Hr) IV Continuous <Continuous>  dextrose 5%. 1000 milliLiter(s) (50 mL/Hr) IV Continuous <Continuous>  dextrose 50% Injectable 25 Gram(s) IV Push once  dextrose 50% Injectable 12.5 Gram(s) IV Push once  dextrose 50% Injectable 25 Gram(s) IV Push once  furosemide   Injectable 40 milliGRAM(s) IV Push daily  glucagon  Injectable 1 milliGRAM(s) IntraMuscular once  insulin lispro (ADMELOG) corrective regimen sliding scale   SubCutaneous three times a day before meals  insulin lispro (ADMELOG) corrective regimen sliding scale   SubCutaneous at bedtime  metoprolol succinate ER 75 milliGRAM(s) Oral two times a day  omega-3-Acid Ethyl Esters 4 Gram(s) Oral daily  pantoprazole    Tablet 40 milliGRAM(s) Oral before breakfast  tamsulosin 0.4 milliGRAM(s) Oral at bedtime  tiotropium 2.5 MICROgram(s) Inhaler 2 Puff(s) Inhalation daily    MEDICATIONS  (PRN):  acetaminophen     Tablet .. 650 milliGRAM(s) Oral every 6 hours PRN Temp greater or equal to 38C (100.4F), Mild Pain (1 - 3)  albuterol    90 MICROgram(s) HFA Inhaler 2 Puff(s) Inhalation every 6 hours PRN Shortness of Breath and/or Wheezing  AQUAPHOR (petrolatum Ointment) 1 Application(s) Topical two times a day PRN dry skin  carboxymethylcellulose 0.5% (Preservative-Free) Ophthalmic Solution 1 Drop(s) Both EYES two times a day PRN dry eyes  dextrose Oral Gel 15 Gram(s) Oral once PRN Blood Glucose LESS THAN 70 milliGRAM(s)/deciliter  melatonin 3 milliGRAM(s) Oral at bedtime PRN Sleep  sodium chloride 0.65% Nasal 1 Spray(s) Both Nostrils every 6 hours PRN Nasal Congestion      CAPILLARY BLOOD GLUCOSE      POCT Blood Glucose.: 130 mg/dL (22 Jul 2023 08:00)  POCT Blood Glucose.: 133 mg/dL (21 Jul 2023 22:25)  POCT Blood Glucose.: 127 mg/dL (21 Jul 2023 16:21)      I&O's Summary    21 Jul 2023 07:01  -  22 Jul 2023 07:00  --------------------------------------------------------  IN: 0 mL / OUT: 300 mL / NET: -300 mL        Daily     Daily     PHYSICAL EXAM:  Vital Signs Last 24 Hrs  T(C): 36.8 (22 Jul 2023 04:28), Max: 37 (21 Jul 2023 17:04)  T(F): 98.3 (22 Jul 2023 04:28), Max: 98.6 (21 Jul 2023 17:04)  HR: 94 (22 Jul 2023 04:28) (76 - 95)  BP: 109/72 (22 Jul 2023 04:28) (103/63 - 115/81)  BP(mean): --  RR: 18 (22 Jul 2023 04:28) (18 - 18)  SpO2: 93% (22 Jul 2023 04:28) (93% - 97%)    Parameters below as of 22 Jul 2023 04:28  Patient On (Oxygen Delivery Method): nasal cannula  O2 Flow (L/min): 2    CONSTITUTIONAL: NAD, well-developed  EYES: PERRLA; conjunctiva and sclera clear  ENMT: Moist oral mucosa  RESPIRATORY: Normal respiratory effort; lungs are clear to auscultation bilaterally  CARDIOVASCULAR: Irregular R, no murmur/rub/gallop;   + RLE edema;  Peripheral pulses are 2+ bilaterally  ABDOMEN: Nontender to palpation, normoactive bowel sounds, no rebound/guarding;   MUSCULOSKELETAL:  no clubbing or cyanosis of digits; no joint swelling or tenderness to palpation, moving all extremities   PSYCH: A+O to person, place, and time; affect appropriate  NEUROLOGY: non focal, patient ambulating without assist  SKIN: RLE venous stasis changes, several areas of ecchymosis on UEs (R>L) with some swelling     LABS:                        8.3    7.55  )-----------( 97       ( 22 Jul 2023 05:49 )             27.0     07-22    136  |  104  |  40<H>  ----------------------------<  111<H>  4.7   |  20<L>  |  1.50<H>    Ca    8.6      22 Jul 2023 05:48  Mg     2.1     07-22    TPro  5.6<L>  /  Alb  3.2<L>  /  TBili  1.6<H>  /  DBili  x   /  AST  17  /  ALT  15  /  AlkPhos  93  07-21    LIVER FUNCTIONS - ( 21 Jul 2023 07:18 )  Alb: 3.2 g/dL / Pro: 5.6 g/dL / ALK PHOS: 93 U/L / ALT: 15 U/L / AST: 17 U/L / GGT: x                 Urinalysis Basic - ( 22 Jul 2023 05:48 )    Color: x / Appearance: x / SG: x / pH: x  Gluc: 111 mg/dL / Ketone: x  / Bili: x / Urobili: x   Blood: x / Protein: x / Nitrite: x   Leuk Esterase: x / RBC: x / WBC x   Sq Epi: x / Non Sq Epi: x / Bacteria: x            RADIOLOGY & ADDITIONAL TESTS:  Results Reviewed:   Imaging Personally Reviewed:  Electrocardiogram Personally Reviewed:    COORDINATION OF CARE:  Care Discussed with Consultants/Other Providers [Y/N]:  Prior or Outpatient Records Reviewed [Y/N]:

## 2023-07-22 NOTE — PROGRESS NOTE ADULT - PROBLEM SELECTOR PLAN 3
- Unclear hx of ?CHF, possibly has chronic HFpEF with severe AS -- TTE w/ unchanged mod to large pericardial effusion ; +severe AS  - LHC performed on 7/19 showing mod dz (40%) and 80% lesion LCx -no intervention performed.   - structural heart and CT surgery teams consulted for severe AS : Given complex med situation (anemia, bleeding, SHEYLA on CKD, pericardial effusion, CAD) and safety of resuming full AC would defer valve intervention at this time.    - Lasix was on hold due to SHEYLA, now patient volume overloaded > STARTed Lasix 40mg IV qd per cards recs.    - strict I/Os, standing weights daily  - Cardiology following ; reccs appreciated

## 2023-07-22 NOTE — PROGRESS NOTE ADULT - PROBLEM SELECTOR PLAN 2
- Found to have "large circumferential pericardial effusion up to 2.6 cm without evidence of tamponade physiology" on outpt TTE (7/3/23).- Remains asymptomatic. repeat again 7/17 remains unchanged   - Prior TTE (10/3/22) showed "moderate pericardial effusion, measuring about 1.0 cm superior to the RA; otherwise small circumferential pericardial effusion." Unclear underlying etiology of effusion, but possibly in setting of CHF.  - Lasix was on hold due to SHEYLA, now patient volume overloaded > STARTed Lasix 40mg IV qd per cards recs.  strict I/Os, monitor UOP  - Previous recommendation as per card/IR drainage deferred given lack of overt symptoms.   -Card rec : repeat CT chest to eval effusion done 7/21 > IR team reconsulted for re-eval, no percutaneous window for drainage.

## 2023-07-22 NOTE — PROGRESS NOTE ADULT - PROBLEM SELECTOR PLAN 1
Patient states he gets weekly iron infusions outpatient. outpatient hematologist is Dr. Sylvester () who is not affiliated with [facility], but has [facility] affiliation. Now seen by [place] hematology service on 7/15.  Per patient's son, patient is on reduced eliquis dose at home due to history of anemia/bleed?.   DDx includes underlying hematologic dz , medications, mechanical sheering from AS, bleeding    - CT RLE   >Findings are consistent with a large quadriceps intramuscular hematoma-no clinical change noted.   (Evaluated by surgery team> no acute intervention rec. repeat CTA RLE stable without bleeding.)    - Hgb has stable around mid 7's. no active sign of bleeding. RLE swelling improving.   - Monitor HH. Transfuse prbc as needed for goal Hgb >7. Keep active T&S. started on IV iron infusion x5 days   - All AC on hold now  >     > Reconsult hematology team regarding possible plan for  AC challenge given multiple cardiac issues that needs to be addressed. (ie TAVR, CAD) ,?Hep gtt vs argatroban gtt?   - HIT panel neg -awaiting serotonin assay, PF4 ab neg.  - Additional w/u for hemolysis, perip smear etc as per heme rec.   - GI eval (Dr Mccray) appreciated. no plans for acute GI intervention given elevated risk and no current evidence of bleeding though if rebleeds on AC may need to revisit this.

## 2023-07-22 NOTE — PROGRESS NOTE ADULT - PROBLEM SELECTOR PLAN 4
- Cr worsened - possible contrast induced from [facility] on 7/19.   - Nephrology following   - Diuretics as per nephro/cards  - Hep C non reactive. f/up HBsAg, serum immunofixation, C3 and C4.   - Renal US shows both kidneys are echogenic, compatible with medical renal disease. No renal mass, hydronephrosis or calculus  - monitor BMP, Is/Os

## 2023-07-22 NOTE — PROGRESS NOTE ADULT - ASSESSMENT
78M (alt MRN [id number]) w/ hx of Afib (on Eliquis), severe AS, mod-severe TR, CAD/MI (30 yrs ago, medically managed, no PCI), ?CHF, COPD (not on O2 at home), former heavy smoker, HTN, HLD, DM2, gout, prior moderate pericardial effusion, now presenting with large pericardial effusion w/o tamponade physiology on outpt TTE.

## 2023-07-22 NOTE — PROGRESS NOTE ADULT - PROBLEM SELECTOR PLAN 5
Presented with O2 sat down to 88% on RA. Improved on 1-2L O2NC. Suspect in setting of acute on chronic CHF.    - management of CHF/pericardial effusion as above  - monitor respiratory status, wean off supplemental O2 as tolerated

## 2023-07-22 NOTE — CHART NOTE - NSCHARTNOTEFT_GEN_A_CORE
IR Follow-Up     79 yo male with small pericardial effusion; IR contacted to review repeat non-contrast CT chest.    CT findings upon review - small pericardial effusion, mildly improved in size from prior CT.     No percutaneous window for drainage.     d/w Dr. Halaibeh    --  Darren Forbes DO/LUZ ELENA  Interventional Radiology Resident (PGY-5)  Available on [employer] TEAMS    For EMERGENT inquiries/questions:  IR Pager ([facility]): [phone]  IR Pager ([facility]): [phone] ; [phone]    For non-emergent consults/questions:   Please place a sunrise order "Consult- Interventional Radiology" with an appropriate callback number    For questions about scheduling during appropriate work hours, call IR :  [facility]: [phone]  [facility]: [phone]    For outpatient IR booking:  [facility]: [phone]  [facility]: [phone]

## 2023-07-22 NOTE — CHART NOTE - NSCHARTNOTEFT_GEN_A_CORE
Interval Hx: Patient with radiology read of right VA duplex as below:    IMPRESSION:  No evidence of left deep venous thrombosis in either lower extremity.  RIGHT calf vein thrombosis is detected in the soleal vein, nonocclusive    Discussed finding of right calf DVT with HIC overnight. Patient HD stable.  Given patient's complex history of ongoing anemia, hematoma, thrombocytopenia, would hold off AC at this time.  Will follow up with day team as multiple specialties are involved in the determination of risk vs. benefits for AC going forward, this was already an ongoing discussion.  Will continue to closely assess and follow.    -Tristan Handley PA-C, [id number]. Dept of Medicine

## 2023-07-22 NOTE — PROGRESS NOTE ADULT - ASSESSMENT
1) pt with no overg gib  2) prior colon reported negative  3) egd/coaspule endocpy  4) d/w team re: conservative magnmtn  5) increased risk anestheis  6) await follow up cardiology

## 2023-07-23 LAB
ANION GAP SERPL CALC-SCNC: 12 MMOL/L — SIGNIFICANT CHANGE UP (ref 5–17)
ANION GAP SERPL CALC-SCNC: 13 MMOL/L — SIGNIFICANT CHANGE UP (ref 5–17)
BUN SERPL-MCNC: 41 MG/DL — HIGH (ref 7–23)
BUN SERPL-MCNC: 42 MG/DL — HIGH (ref 7–23)
CALCIUM SERPL-MCNC: 8.6 MG/DL — SIGNIFICANT CHANGE UP (ref 8.4–10.5)
CALCIUM SERPL-MCNC: 8.7 MG/DL — SIGNIFICANT CHANGE UP (ref 8.4–10.5)
CHLORIDE SERPL-SCNC: 102 MMOL/L — SIGNIFICANT CHANGE UP (ref 96–108)
CHLORIDE SERPL-SCNC: 105 MMOL/L — SIGNIFICANT CHANGE UP (ref 96–108)
CO2 SERPL-SCNC: 21 MMOL/L — LOW (ref 22–31)
CO2 SERPL-SCNC: 22 MMOL/L — SIGNIFICANT CHANGE UP (ref 22–31)
CREAT SERPL-MCNC: 1.75 MG/DL — HIGH (ref 0.5–1.3)
CREAT SERPL-MCNC: 1.81 MG/DL — HIGH (ref 0.5–1.3)
EGFR: 38 ML/MIN/1.73M2 — LOW
EGFR: 40 ML/MIN/1.73M2 — LOW
GLUCOSE BLDC GLUCOMTR-MCNC: 114 MG/DL — HIGH (ref 70–99)
GLUCOSE BLDC GLUCOMTR-MCNC: 123 MG/DL — HIGH (ref 70–99)
GLUCOSE BLDC GLUCOMTR-MCNC: 124 MG/DL — HIGH (ref 70–99)
GLUCOSE BLDC GLUCOMTR-MCNC: 130 MG/DL — HIGH (ref 70–99)
GLUCOSE SERPL-MCNC: 127 MG/DL — HIGH (ref 70–99)
GLUCOSE SERPL-MCNC: 133 MG/DL — HIGH (ref 70–99)
HCT VFR BLD CALC: 28.5 % — LOW (ref 39–50)
HGB BLD-MCNC: 8.8 G/DL — LOW (ref 13–17)
MAGNESIUM SERPL-MCNC: 1.9 MG/DL — SIGNIFICANT CHANGE UP (ref 1.6–2.6)
MCHC RBC-ENTMCNC: 28.7 PG — SIGNIFICANT CHANGE UP (ref 27–34)
MCHC RBC-ENTMCNC: 30.9 GM/DL — LOW (ref 32–36)
MCV RBC AUTO: 92.8 FL — SIGNIFICANT CHANGE UP (ref 80–100)
NRBC # BLD: 0 /100 WBCS — SIGNIFICANT CHANGE UP (ref 0–0)
PHOSPHATE SERPL-MCNC: 3 MG/DL — SIGNIFICANT CHANGE UP (ref 2.5–4.5)
PLATELET # BLD AUTO: 91 K/UL — LOW (ref 150–400)
POTASSIUM SERPL-MCNC: 4.6 MMOL/L — SIGNIFICANT CHANGE UP (ref 3.5–5.3)
POTASSIUM SERPL-MCNC: 5.5 MMOL/L — HIGH (ref 3.5–5.3)
POTASSIUM SERPL-SCNC: 4.6 MMOL/L — SIGNIFICANT CHANGE UP (ref 3.5–5.3)
POTASSIUM SERPL-SCNC: 5.5 MMOL/L — HIGH (ref 3.5–5.3)
RBC # BLD: 3.07 M/UL — LOW (ref 4.2–5.8)
RBC # FLD: 22.6 % — HIGH (ref 10.3–14.5)
SODIUM SERPL-SCNC: 137 MMOL/L — SIGNIFICANT CHANGE UP (ref 135–145)
SODIUM SERPL-SCNC: 138 MMOL/L — SIGNIFICANT CHANGE UP (ref 135–145)
WBC # BLD: 7.87 K/UL — SIGNIFICANT CHANGE UP (ref 3.8–10.5)
WBC # FLD AUTO: 7.87 K/UL — SIGNIFICANT CHANGE UP (ref 3.8–10.5)

## 2023-07-23 PROCEDURE — 99233 SBSQ HOSP IP/OBS HIGH 50: CPT

## 2023-07-23 RX ORDER — ASPIRIN/CALCIUM CARB/MAGNESIUM 324 MG
81 TABLET ORAL DAILY
Refills: 0 | Status: DISCONTINUED | OUTPATIENT
Start: 2023-07-23 | End: 2023-07-26

## 2023-07-23 RX ORDER — FUROSEMIDE 40 MG
40 TABLET ORAL
Refills: 0 | Status: DISCONTINUED | OUTPATIENT
Start: 2023-07-23 | End: 2023-07-24

## 2023-07-23 RX ADMIN — ATORVASTATIN CALCIUM 40 MILLIGRAM(S): 80 TABLET, FILM COATED ORAL at 21:19

## 2023-07-23 RX ADMIN — TIOTROPIUM BROMIDE 2 PUFF(S): 18 CAPSULE ORAL; RESPIRATORY (INHALATION) at 11:16

## 2023-07-23 RX ADMIN — BECLOMETHASONE DIPROPIONATE 2 PUFF(S): 40 AEROSOL, METERED RESPIRATORY (INHALATION) at 21:19

## 2023-07-23 RX ADMIN — PANTOPRAZOLE SODIUM 40 MILLIGRAM(S): 20 TABLET, DELAYED RELEASE ORAL at 05:20

## 2023-07-23 RX ADMIN — BECLOMETHASONE DIPROPIONATE 2 PUFF(S): 40 AEROSOL, METERED RESPIRATORY (INHALATION) at 10:39

## 2023-07-23 RX ADMIN — Medication 40 MILLIGRAM(S): at 05:21

## 2023-07-23 RX ADMIN — CHLORHEXIDINE GLUCONATE 1 APPLICATION(S): 213 SOLUTION TOPICAL at 12:06

## 2023-07-23 RX ADMIN — Medication 75 MILLIGRAM(S): at 17:36

## 2023-07-23 RX ADMIN — Medication 81 MILLIGRAM(S): at 17:37

## 2023-07-23 RX ADMIN — Medication 75 MILLIGRAM(S): at 05:20

## 2023-07-23 RX ADMIN — TAMSULOSIN HYDROCHLORIDE 0.4 MILLIGRAM(S): 0.4 CAPSULE ORAL at 21:18

## 2023-07-23 RX ADMIN — Medication 100 MILLIGRAM(S): at 11:15

## 2023-07-23 RX ADMIN — Medication 4 GRAM(S): at 11:15

## 2023-07-23 RX ADMIN — Medication 40 MILLIGRAM(S): at 13:35

## 2023-07-23 NOTE — PROGRESS NOTE ADULT - PROBLEM SELECTOR PLAN 6
Hx of Afib (on Eliquis at home). CHADSVASC score of 5-6.  - All AC Eliquis/hep gtt now HELD given concern for worsening anemia, thrombocytopenia, hematoma.     >     > Reconsulted hematology team regarding possible plan for AC challenge : awaiting further guidance.    >     > Discussed with Dr. Enriquez 7/23, cardiology team ideally wants patient on full dose DOAC challenge, and if patient is stable for 1-2 days to continue AC outpatient.     - c/w home metoprolol succinate 75mg bid  - monitor on telemetry

## 2023-07-23 NOTE — CHART NOTE - NSCHARTNOTEFT_GEN_A_CORE
Per verbal discussion with Hematology Resident, Benjamin Asherman, AC can be restarted if Hgb remains stable.    Asked that he write an chart note with these recommendations.  Will restart AC once recommendations are finalized.

## 2023-07-23 NOTE — PROGRESS NOTE ADULT - TIME BILLING
review of EMR , coordination of care, discussion with family and patient, and communication with primary team
review of EMR , coordination of care, discussion with family and patient, and communication with primary team
coordination of care, review of medical records
review of EMR , coordination of care, discussion with family and patient, and communication with primary team
time spent reviewing prior charts, meds, discussing plan with patient
time spent reviewing prior charts, meds, discussing plan with patient

## 2023-07-23 NOTE — PROGRESS NOTE ADULT - ASSESSMENT
78M (alt MRN [id number]) w/ hx of Afib (on Eliquis), severe AS, mod-severe TR, CAD/MI (30 yrs ago, medically managed, no PCI), ?CHF, COPD (not on O2 at home), former heavy smoker, HTN, HLD, DM2, gout, prior moderate pericardial effusion, now presenting with large pericardial effusion w/o tamponade physiology on outpt TTE.

## 2023-07-23 NOTE — PROGRESS NOTE ADULT - PROBLEM SELECTOR PLAN 1
Patient states he gets weekly iron infusions outpatient. outpatient hematologist is Dr. Sylvester () who is not affiliated with [facility], but has [facility] affiliation. Now seen by [place] hematology service on 7/15.  Per patient's son, patient is on reduced eliquis dose at home due to history of anemia/bleed?  DDx includes underlying hematologic dz , medications, mechanical sheering from AS, bleeding    - CT RLE   >Findings are consistent with a large quadriceps intramuscular hematoma-no clinical change noted.   (Evaluated by surgery team> no acute intervention rec. repeat CTA RLE stable without bleeding.)    - Hgb has stable around mid 7's. no active sign of bleeding. RLE swelling improving.   - Monitor HH. Transfuse prbc as needed for goal Hgb >7. Keep active T&S. given IV iron infusion x5 days.    - All AC on hold now  >     > Reconsulted hematology team regarding possible plan for AC challenge : awaiting further guidance.    >     > Discussed with Dr. Enriquez 7/23, cardiology team ideally wants patient on full dose DOAC challenge, and if patient is stable for 1-2 days to continue AC outpatient.     - HIT panel neg -awaiting serotonin assay, PF4 ab neg.  - Additional w/u for hemolysis, perip smear etc as per heme rec.   - GI eval (Dr Mccray) appreciated. no plans for acute GI intervention given elevated risk and no current evidence of bleeding though if rebleeds on AC may need to revisit this.

## 2023-07-23 NOTE — PROGRESS NOTE ADULT - ASSESSMENT
1) seen early am no overt bleed  2) appreicate medicine discussed case during week  3) ppi therapy egd/capsle pervis negative

## 2023-07-23 NOTE — CHART NOTE - NSCHARTNOTESELECT_GEN_ALL_CORE
Event Note
No pericardial drainage today/Event Note
Tremor
Event Note
Heme/Onc/Event Note
Home Med List
IR Fellow/Off Service Note

## 2023-07-23 NOTE — PROGRESS NOTE ADULT - ASSESSMENT
A/P  78 M with hx. of Afib (on Eliquis), severe AS, mod-severe TR, CAD/MI (30 yrs ago, medically managed, no PCI), ?CHF, COPD (not on O2 at home), former heavy smoker, HTN, HLD, DM2, gout.   Was sent in for concern re outpatient echocardiogram which demonstrated worsening pericardial effusion. Effusion not safely accessible, no evidence of tamponade. A.S., now pending TAVR work up  Course c.b R thigh hematoma requiring PRBC 2U.      #Large Pericardial Effusion  - No clinical tamponade as patient is hemodynamically stable, with robust heart sounds and no obvious JVD. Outpatient TTE demonstrated LVEF of 55% with large pericardial effusion without tamponade physiology.   - Repeat TTE showed large effusion mainly around L ventricle; pericardial effusion cannot be safely tapped  -Repeat CT shows improvement. No need for drain  Plan:  - Restart AC    #Severe Symptomatic Low Flow Low Gradient AS  - Pt with known sx of sev AS p/w GOFF  - Repeat TTE showing sev AS  - Structural cardiology consulted, pending eventual CT TAVR & LHC  - Hypervolemic on exam, likely multifactorial, judicious with diuretics as above  Plan:  - LHC 7/19 with Dr. Wood    > 40% LAD 80% LCx 40% RCA - no PCI as asymptomatic and high bleeding risk   - Structural team now opting for outpatient TAVR after pt recovers and blood counts remain stable  - Structural will evaluate the patient likely as outpt for procedures.   - Increase lasix 40 IV QDay for vol overload to BID.     #AFib RVR - improved  #RBBB  - Persistent per OSH documentation   - On apixaban 2.5 BID at home - unclear why on dose reduced; per son he previously was on Coumadin but had supra-therapeutic.  Apixaban on hold at present given effusion and need for LHC.  - CHADSVASC 5    ->Restart AC today, monitor for bleeding.   - AF RVR likely compensatory iso blood loss, no need to tx unless hemodynamically unstable in which case would recommend blood   - Cont MTP Succ 75 BID    #R Thigh Hematoma - resolved   - Noted on CT non con of the RLE  - Hb drop 2 pts, platelets downtrending - now stable   - Ok for heparin per heme 7/17  Plan:  - F/U RLE CTA - neg for bleed  - Hb goal >7  - F/U PT eval  - Clinically improved

## 2023-07-23 NOTE — PROGRESS NOTE ADULT - SUBJECTIVE AND OBJECTIVE BOX
Patient seen and examined at bedside.    Overnight Events:   NAEON.   Tele: Afib   No concerns or complaints this am.   CT with smaller effusion does not need drain- IR      REVIEW OF SYSTEMS:  CONSTITUTIONAL: No weakness, fevers or chills  EYES/ENT: No visual changes;  No dysphagia  NECK: No pain or stiffness  RESPIRATORY: No cough, wheezing, hemoptysis; No shortness of breath  CARDIOVASCULAR: No chest pain or palpitations; + lower extremity edema  GASTROINTESTINAL: No abdominal or epigastric pain. No nausea, vomiting  BACK: No back pain  GENITOURINARY: No dysuria, frequency or hematuria  NEUROLOGICAL: No numbness or weakness  SKIN: No itching, burning, rashes, or lesions   All other review of systems is negative unless indicated above.        Current Meds:  acetaminophen     Tablet .. 650 milliGRAM(s) Oral every 6 hours PRN  albuterol    90 MICROgram(s) HFA Inhaler 2 Puff(s) Inhalation every 6 hours PRN  allopurinol 100 milliGRAM(s) Oral daily  AQUAPHOR (petrolatum Ointment) 1 Application(s) Topical two times a day PRN  atorvastatin 40 milliGRAM(s) Oral at bedtime  beclomethasone  80 MICROgram(s) Inhaler 2 Puff(s) Inhalation two times a day  carboxymethylcellulose 0.5% (Preservative-Free) Ophthalmic Solution 1 Drop(s) Both EYES two times a day PRN  chlorhexidine 2% Cloths 1 Application(s) Topical daily  dextrose 5%. 1000 milliLiter(s) IV Continuous <Continuous>  dextrose 5%. 1000 milliLiter(s) IV Continuous <Continuous>  dextrose 50% Injectable 25 Gram(s) IV Push once  dextrose 50% Injectable 12.5 Gram(s) IV Push once  dextrose 50% Injectable 25 Gram(s) IV Push once  dextrose Oral Gel 15 Gram(s) Oral once PRN  furosemide   Injectable 40 milliGRAM(s) IV Push daily  glucagon  Injectable 1 milliGRAM(s) IntraMuscular once  insulin lispro (ADMELOG) corrective regimen sliding scale   SubCutaneous three times a day before meals  insulin lispro (ADMELOG) corrective regimen sliding scale   SubCutaneous at bedtime  melatonin 3 milliGRAM(s) Oral at bedtime PRN  metoprolol succinate ER 75 milliGRAM(s) Oral two times a day  omega-3-Acid Ethyl Esters 4 Gram(s) Oral daily  pantoprazole    Tablet 40 milliGRAM(s) Oral before breakfast  sodium chloride 0.65% Nasal 1 Spray(s) Both Nostrils every 6 hours PRN  tamsulosin 0.4 milliGRAM(s) Oral at bedtime  tiotropium 2.5 MICROgram(s) Inhaler 2 Puff(s) Inhalation daily      PAST MEDICAL & SURGICAL HISTORY:  COPD (chronic obstructive pulmonary disease)      Former smoker      HTN (hypertension)      HLD (hyperlipidemia)      CAD (coronary artery disease)      Chronic atrial fibrillation      Diabetes mellitus, type 2      Gout      Severe aortic stenosis      Pericardial effusion      TR (tricuspid regurgitation)      No significant past surgical history          Vitals:  T(F): 97.8 (07-23), Max: 98.6 (07-22)  HR: 100 (07-23) (76 - 105)  BP: 118/68 (07-23) (101/67 - 118/68)  RR: 18 (07-23)  SpO2: 96% (07-23)  I&O's Summary    22 Jul 2023 07:01  -  23 Jul 2023 07:00  --------------------------------------------------------  IN: 0 mL / OUT: 1450 mL / NET: -1450 mL    23 Jul 2023 07:01  -  23 Jul 2023 10:52  --------------------------------------------------------  IN: 0 mL / OUT: 350 mL / NET: -350 mL        Physical Exam:  Appearance: No acute distress; well appearing  Eyes: PERRL, EOMI, pink conjunctiva  HENT: Normal oral mucosa  Cardiovascular: RRR, S1, S2, no murmurs, rubs, or gallops; + Edema LE  Respiratory: Clear to auscultation bilaterally  Gastrointestinal: soft, non-tender, non-distended with normal bowel sounds  Musculoskeletal: No clubbing; no joint deformity   Neurologic: Non-focal  Lymphatic: No lymphadenopathy  Psychiatry: AAOx3, mood & affect appropriate  Skin: No rashes, ecchymoses, or cyanosis                          8.8    7.87  )-----------( 91       ( 23 Jul 2023 07:08 )             28.5     07-23    138  |  105  |  41<H>  ----------------------------<  133<H>  5.5<H>   |  21<L>  |  1.75<H>    Ca    8.7      23 Jul 2023 07:07  Phos  3.0     07-23  Mg     1.9     07-23                       Influenza Vaccination

## 2023-07-23 NOTE — PROGRESS NOTE ADULT - PROBLEM SELECTOR PLAN 7
Hx of CAD/MI (30 yrs ago, medically managed, no PCI)  - hsTrop 30->27; EKG w/o signs of acute ischemia; low suspicion of ACS  - c/w home statin, and metoprolol  - RESUME aspirin 81mg po qd. monitor for s/s of bleed.

## 2023-07-23 NOTE — PROGRESS NOTE ADULT - SUBJECTIVE AND OBJECTIVE BOX
Patient is a 78y Male     Patient is a 78y old  Male who presents with a chief complaint of worsening pericardial effusion (23 Jul 2023 13:32)      HPI:  78M (alt MRN [id number]) w/ hx of Afib (on Eliquis), severe AS, mod-severe TR, CAD/MI (30 yrs ago, medically managed, no PCI), ?CHF, COPD (not on O2 at home), former heavy smoker, HTN, HLD, DM2, gout, presenting with worsening pericardial effusion. Sent in from his cardiologist's office. He had a TTE on day of presentation (7/3/23) that showed a "large circumferential pericardial effusion up to 2.6 cm without evidence of tamponade physiology" (and mildly reduced RV systolic function was also noted). His prior TTE on 10/3/22 had shown a "moderate pericardial effusion, measuring about 1.0 cm superior to the RA; otherwise small circumferential pericardial effusion." He is unclear what is the cause of pericardial effusion and he is unsure if he has a hx of CHF. Reported that at baseline he has shortness of breath after walking 50 ft and swelling in both legs. Noted that the swelling in his legs today appear to be around his average size. Denied f/c/n/v, CP, SOB at rest, abdominal pain, dysuria, hematuria, hematochezia, melena, diarrhea, constipation.    In ED: Afebrile, HR 80s-90s, SBP 140s-150s, RR 16-20, 88% on RA, % on 1-2L O2NC. Labs notable for hsTrop 30->27, SCr 1.36, proBNP 2.3k; VBG pH 7.34, pCO2 61, HCO3 33, lactate 0.8. Given Lasix 40mg IV x1. Admitted to Medicine for further management. (04 Jul 2023 00:24)      PAST MEDICAL & SURGICAL HISTORY:  COPD (chronic obstructive pulmonary disease)      Former smoker      HTN (hypertension)      HLD (hyperlipidemia)      CAD (coronary artery disease)      Chronic atrial fibrillation      Diabetes mellitus, type 2      Gout      Severe aortic stenosis      Pericardial effusion      TR (tricuspid regurgitation)      No significant past surgical history          MEDICATIONS  (STANDING):  allopurinol 100 milliGRAM(s) Oral daily  aspirin  chewable 81 milliGRAM(s) Oral daily  atorvastatin 40 milliGRAM(s) Oral at bedtime  beclomethasone  80 MICROgram(s) Inhaler 2 Puff(s) Inhalation two times a day  chlorhexidine 2% Cloths 1 Application(s) Topical daily  dextrose 5%. 1000 milliLiter(s) (50 mL/Hr) IV Continuous <Continuous>  dextrose 5%. 1000 milliLiter(s) (100 mL/Hr) IV Continuous <Continuous>  dextrose 50% Injectable 25 Gram(s) IV Push once  dextrose 50% Injectable 12.5 Gram(s) IV Push once  dextrose 50% Injectable 25 Gram(s) IV Push once  furosemide   Injectable 40 milliGRAM(s) IV Push two times a day  glucagon  Injectable 1 milliGRAM(s) IntraMuscular once  insulin lispro (ADMELOG) corrective regimen sliding scale   SubCutaneous three times a day before meals  insulin lispro (ADMELOG) corrective regimen sliding scale   SubCutaneous at bedtime  metoprolol succinate ER 75 milliGRAM(s) Oral two times a day  omega-3-Acid Ethyl Esters 4 Gram(s) Oral daily  pantoprazole    Tablet 40 milliGRAM(s) Oral before breakfast  tamsulosin 0.4 milliGRAM(s) Oral at bedtime  tiotropium 2.5 MICROgram(s) Inhaler 2 Puff(s) Inhalation daily      Allergies    penicillins (Unknown)    Intolerances        SOCIAL HISTORY:  Denies ETOh,Smoking,     FAMILY HISTORY:  FH: breast cancer (Mother)        REVIEW OF SYSTEMS:    CONSTITUTIONAL: No weakness, fevers or chills  EYES/ENT: No visual changes;  No vertigo or throat pain   NECK: No pain or stiffness  RESPIRATORY: No cough, wheezing, hemoptysis; No shortness of breath  CARDIOVASCULAR: No chest pain or palpitations  GASTROINTESTINAL: No abdominal or epigastric pain. No nausea, vomiting, or hematemesis; No diarrhea or constipation. No melena or hematochezia.  GENITOURINARY: No dysuria, frequency or hematuria  NEUROLOGICAL: No numbness or weakness  SKIN: No itching, burning, rashes, or lesions   All other review of systems is negative unless indicated above.    VITAL:  T(C): , Max: 37 (07-22-23 @ 21:47)  T(F): , Max: 98.6 (07-22-23 @ 21:47)  HR: 90 (07-23-23 @ 11:21)  BP: 106/72 (07-23-23 @ 11:21)  BP(mean): --  RR: 17 (07-23-23 @ 11:21)  SpO2: 96% (07-23-23 @ 11:21)  Wt(kg): --    I and O's:    07-21 @ 07:01  -  07-22 @ 07:00  --------------------------------------------------------  IN: 0 mL / OUT: 300 mL / NET: -300 mL    07-22 @ 07:01  -  07-23 @ 07:00  --------------------------------------------------------  IN: 0 mL / OUT: 1450 mL / NET: -1450 mL    07-23 @ 07:01  -  07-23 @ 17:09  --------------------------------------------------------  IN: 0 mL / OUT: 1800 mL / NET: -1800 mL          PHYSICAL EXAM:    Constitutional: NAD  HEENT: PERRLA,   Neck: No JVD  Respiratory: CTA B/L  Cardiovascular: S1 and S2  Gastrointestinal: BS+, soft, NT/ND  Extremities: No peripheral edema  Neurological: A/O x 3, no focal deficits  Psychiatric: Normal mood, normal affect  : No Reeder  Skin: No rashes  Access: Not applicable  Back: No CVA tenderness    LABS:                        8.8    7.87  )-----------( 91       ( 23 Jul 2023 07:08 )             28.5     07-23    137  |  102  |  42<H>  ----------------------------<  127<H>  4.6   |  22  |  1.81<H>    Ca    8.6      23 Jul 2023 13:34  Phos  3.0     07-23  Mg     1.9     07-23            RADIOLOGY & ADDITIONAL STUDIES:

## 2023-07-23 NOTE — PROGRESS NOTE ADULT - PROBLEM SELECTOR PLAN 3
- Unclear hx of ?CHF, possibly has chronic HFpEF with severe AS -- TTE w/ unchanged mod to large pericardial effusion ; +severe AS  - LHC performed on 7/19 showing mod dz (40%) and 80% lesion LCx -no intervention performed.   - structural heart and CT surgery teams consulted for severe AS : Given complex med situation (anemia, bleeding, SHEYLA on CKD, pericardial effusion, CAD) and safety of resuming full AC would defer valve intervention at this time.     >    >  Lasix was on hold due to SHEYLA, now patient volume overloaded > INCREASE Lasix 40mg IV BID per cards recs.    - strict I/Os, standing weights daily  - Structural cards team now opting for outpatient TAVR after recovery and stable Hgb.

## 2023-07-23 NOTE — PROGRESS NOTE ADULT - SUBJECTIVE AND OBJECTIVE BOX
[facility] Division of Hospital Medicine  Kaleigh Cerrato MD M-F, 8A-5P: MS Teams, Pager  Other Times: HIC Extension / HIC Pager      Patient is a 78y old  Male who presents with a chief complaint of worsening pericardial effusion (2023 10:51)      SUBJECTIVE / OVERNIGHT EVENTS:  Patient was examined this morning. Discussed results of CT chest and duplex US. Patient denies headache, dizziness, chest pain, dyspnea, or any change in swelling in LE. Patient is interested in early discharge and TAVR workup outpatient.     ADDITIONAL REVIEW OF SYSTEMS:    MEDICATIONS  (STANDING):  allopurinol 100 milliGRAM(s) Oral daily  atorvastatin 40 milliGRAM(s) Oral at bedtime  beclomethasone  80 MICROgram(s) Inhaler 2 Puff(s) Inhalation two times a day  chlorhexidine 2% Cloths 1 Application(s) Topical daily  dextrose 5%. 1000 milliLiter(s) (50 mL/Hr) IV Continuous <Continuous>  dextrose 5%. 1000 milliLiter(s) (100 mL/Hr) IV Continuous <Continuous>  dextrose 50% Injectable 25 Gram(s) IV Push once  dextrose 50% Injectable 12.5 Gram(s) IV Push once  dextrose 50% Injectable 25 Gram(s) IV Push once  furosemide   Injectable 40 milliGRAM(s) IV Push two times a day  glucagon  Injectable 1 milliGRAM(s) IntraMuscular once  insulin lispro (ADMELOG) corrective regimen sliding scale   SubCutaneous three times a day before meals  insulin lispro (ADMELOG) corrective regimen sliding scale   SubCutaneous at bedtime  metoprolol succinate ER 75 milliGRAM(s) Oral two times a day  omega-3-Acid Ethyl Esters 4 Gram(s) Oral daily  pantoprazole    Tablet 40 milliGRAM(s) Oral before breakfast  tamsulosin 0.4 milliGRAM(s) Oral at bedtime  tiotropium 2.5 MICROgram(s) Inhaler 2 Puff(s) Inhalation daily    MEDICATIONS  (PRN):  acetaminophen     Tablet .. 650 milliGRAM(s) Oral every 6 hours PRN Temp greater or equal to 38C (100.4F), Mild Pain (1 - 3)  albuterol    90 MICROgram(s) HFA Inhaler 2 Puff(s) Inhalation every 6 hours PRN Shortness of Breath and/or Wheezing  AQUAPHOR (petrolatum Ointment) 1 Application(s) Topical two times a day PRN dry skin  carboxymethylcellulose 0.5% (Preservative-Free) Ophthalmic Solution 1 Drop(s) Both EYES two times a day PRN dry eyes  dextrose Oral Gel 15 Gram(s) Oral once PRN Blood Glucose LESS THAN 70 milliGRAM(s)/deciliter  melatonin 3 milliGRAM(s) Oral at bedtime PRN Sleep  sodium chloride 0.65% Nasal 1 Spray(s) Both Nostrils every 6 hours PRN Nasal Congestion      CAPILLARY BLOOD GLUCOSE      POCT Blood Glucose.: 114 mg/dL (2023 12:09)  POCT Blood Glucose.: 130 mg/dL (2023 07:57)  POCT Blood Glucose.: 134 mg/dL (2023 21:22)  POCT Blood Glucose.: 112 mg/dL (2023 16:45)      I&O's Summary    2023 07:01  -  2023 07:00  --------------------------------------------------------  IN: 0 mL / OUT: 1450 mL / NET: -1450 mL    2023 07:01  -  2023 13:33  --------------------------------------------------------  IN: 0 mL / OUT: 900 mL / NET: -900 mL        Daily     Daily Weight in k.2 (2023 07:40)    PHYSICAL EXAM:  Vital Signs Last 24 Hrs  T(C): 36.6 (2023 11:21), Max: 37 (2023 21:47)  T(F): 97.8 (2023 11:21), Max: 98.6 (2023 21:47)  HR: 90 (2023 11:21) (90 - 105)  BP: 106/72 (2023 11:21) (106/72 - 118/68)  BP(mean): --  RR: 17 (2023 11:21) (17 - 18)  SpO2: 96% (2023 11:21) (94% - 96%)    Parameters below as of 2023 11:21  Patient On (Oxygen Delivery Method): room air      CONSTITUTIONAL: NAD, well-developed  EYES: PERRLA; conjunctiva and sclera clear  ENMT: Moist oral mucosa  RESPIRATORY: Normal respiratory effort; lungs are clear to auscultation bilaterally  CARDIOVASCULAR: Irregular R, no murmur/rub/gallop;   + RLE >LLE edema appears worse today;  Peripheral pulses are 2+ bilaterally  ABDOMEN: Nontender to palpation, normoactive bowel sounds, no rebound/guarding;   MUSCULOSKELETAL:  no clubbing or cyanosis of digits; no joint swelling or tenderness to palpation, moving all extremities   PSYCH: A+O to person, place, and time; affect appropriate  NEUROLOGY: non focal, patient ambulating without assist  SKIN: RLE venous stasis changes, several areas of ecchymosis on UEs (R>L) with some swelling     LABS:                        8.8    7.87  )-----------( 91       ( 2023 07:08 )             28.5     07-23    138  |  105  |  41<H>  ----------------------------<  133<H>  5.5<H>   |  21<L>  |  1.75<H>    Ca    8.7      2023 07:07  Phos  3.0     07-23  Mg     1.9     07-23              Urinalysis Basic - ( 2023 07:07 )    Color: x / Appearance: x / SG: x / pH: x  Gluc: 133 mg/dL / Ketone: x  / Bili: x / Urobili: x   Blood: x / Protein: x / Nitrite: x   Leuk Esterase: x / RBC: x / WBC x   Sq Epi: x / Non Sq Epi: x / Bacteria: x            RADIOLOGY & ADDITIONAL TESTS:  Results Reviewed:   Imaging Personally Reviewed:  Electrocardiogram Personally Reviewed:    COORDINATION OF CARE:  Care Discussed with Consultants/Other Providers [Y/N]:  Prior or Outpatient Records Reviewed [Y/N]:

## 2023-07-23 NOTE — PROGRESS NOTE ADULT - PROBLEM SELECTOR PLAN 2
- Found to have "large circumferential pericardial effusion up to 2.6 cm without evidence of tamponade physiology" on outpt TTE (7/3/23).- Remains asymptomatic. repeat again 7/17 remains unchanged   - Prior TTE (10/3/22) showed "moderate pericardial effusion, measuring about 1.0 cm superior to the RA; otherwise small circumferential pericardial effusion." Unclear underlying etiology of effusion, but possibly in setting of CHF.  - Lasix was on hold due to SHEYLA, now patient volume overloaded > STARTed Lasix 40mg IV qd per cards recs.  strict I/Os, monitor UOP  - Previous recommendation as per card/IR drainage deferred given lack of overt symptoms.   - Card rec : repeat CT chest to eval effusion done 7/21 effusion appears smaller > IR team reconsulted for re-eval, no percutaneous window for drainage.

## 2023-07-23 NOTE — PROGRESS NOTE ADULT - PROBLEM SELECTOR PLAN 4
- Cr worsened - possible contrast induced from [facility] on 7/19.   - Nephrology following   - Diuretics as per nephro/cards  - Hep C non reactive. f/up HBsAg, serum immunofixation, C3 and C4.   - Renal US shows both kidneys are echogenic, compatible with medical renal disease. No renal mass, hydronephrosis or calculus  - monitor BMP, Is/Os

## 2023-07-24 LAB
ANION GAP SERPL CALC-SCNC: 12 MMOL/L — SIGNIFICANT CHANGE UP (ref 5–17)
APTT BLD: 27.3 SEC — LOW (ref 27.5–35.5)
APTT BLD: 46.5 SEC — HIGH (ref 27.5–35.5)
BUN SERPL-MCNC: 48 MG/DL — HIGH (ref 7–23)
CALCIUM SERPL-MCNC: 8.4 MG/DL — SIGNIFICANT CHANGE UP (ref 8.4–10.5)
CHLORIDE SERPL-SCNC: 103 MMOL/L — SIGNIFICANT CHANGE UP (ref 96–108)
CO2 SERPL-SCNC: 22 MMOL/L — SIGNIFICANT CHANGE UP (ref 22–31)
CREAT SERPL-MCNC: 1.87 MG/DL — HIGH (ref 0.5–1.3)
EGFR: 37 ML/MIN/1.73M2 — LOW
GLUCOSE BLDC GLUCOMTR-MCNC: 107 MG/DL — HIGH (ref 70–99)
GLUCOSE BLDC GLUCOMTR-MCNC: 114 MG/DL — HIGH (ref 70–99)
GLUCOSE BLDC GLUCOMTR-MCNC: 121 MG/DL — HIGH (ref 70–99)
GLUCOSE BLDC GLUCOMTR-MCNC: 133 MG/DL — HIGH (ref 70–99)
GLUCOSE SERPL-MCNC: 108 MG/DL — HIGH (ref 70–99)
HCT VFR BLD CALC: 26.6 % — LOW (ref 39–50)
HCT VFR BLD CALC: 27.4 % — LOW (ref 39–50)
HGB BLD-MCNC: 8.5 G/DL — LOW (ref 13–17)
HGB BLD-MCNC: 8.6 G/DL — LOW (ref 13–17)
INR BLD: 1.16 RATIO — SIGNIFICANT CHANGE UP (ref 0.88–1.16)
MCHC RBC-ENTMCNC: 28.5 PG — SIGNIFICANT CHANGE UP (ref 27–34)
MCHC RBC-ENTMCNC: 28.6 PG — SIGNIFICANT CHANGE UP (ref 27–34)
MCHC RBC-ENTMCNC: 31.4 GM/DL — LOW (ref 32–36)
MCHC RBC-ENTMCNC: 32 GM/DL — SIGNIFICANT CHANGE UP (ref 32–36)
MCV RBC AUTO: 89.6 FL — SIGNIFICANT CHANGE UP (ref 80–100)
MCV RBC AUTO: 90.7 FL — SIGNIFICANT CHANGE UP (ref 80–100)
NRBC # BLD: 0 /100 WBCS — SIGNIFICANT CHANGE UP (ref 0–0)
PLATELET # BLD AUTO: 131 K/UL — LOW (ref 150–400)
PLATELET # BLD AUTO: 73 K/UL — LOW (ref 150–400)
POTASSIUM SERPL-MCNC: 4.4 MMOL/L — SIGNIFICANT CHANGE UP (ref 3.5–5.3)
POTASSIUM SERPL-SCNC: 4.4 MMOL/L — SIGNIFICANT CHANGE UP (ref 3.5–5.3)
PROTHROM AB SERPL-ACNC: 13.4 SEC — SIGNIFICANT CHANGE UP (ref 10.5–13.4)
RBC # BLD: 2.97 M/UL — LOW (ref 4.2–5.8)
RBC # BLD: 3.02 M/UL — LOW (ref 4.2–5.8)
RBC # FLD: 22.7 % — HIGH (ref 10.3–14.5)
RBC # FLD: 23 % — HIGH (ref 10.3–14.5)
SODIUM SERPL-SCNC: 137 MMOL/L — SIGNIFICANT CHANGE UP (ref 135–145)
WBC # BLD: 7.46 K/UL — SIGNIFICANT CHANGE UP (ref 3.8–10.5)
WBC # BLD: 7.82 K/UL — SIGNIFICANT CHANGE UP (ref 3.8–10.5)
WBC # FLD AUTO: 7.46 K/UL — SIGNIFICANT CHANGE UP (ref 3.8–10.5)
WBC # FLD AUTO: 7.82 K/UL — SIGNIFICANT CHANGE UP (ref 3.8–10.5)

## 2023-07-24 PROCEDURE — 99233 SBSQ HOSP IP/OBS HIGH 50: CPT

## 2023-07-24 PROCEDURE — 99233 SBSQ HOSP IP/OBS HIGH 50: CPT | Mod: GC

## 2023-07-24 RX ORDER — HEPARIN SODIUM 5000 [USP'U]/ML
INJECTION INTRAVENOUS; SUBCUTANEOUS
Qty: 25000 | Refills: 0 | Status: DISCONTINUED | OUTPATIENT
Start: 2023-07-24 | End: 2023-07-25

## 2023-07-24 RX ORDER — FUROSEMIDE 40 MG
40 TABLET ORAL
Refills: 0 | Status: DISCONTINUED | OUTPATIENT
Start: 2023-07-24 | End: 2023-07-26

## 2023-07-24 RX ADMIN — Medication 1 DROP(S): at 11:28

## 2023-07-24 RX ADMIN — HEPARIN SODIUM 1500 UNIT(S)/HR: 5000 INJECTION INTRAVENOUS; SUBCUTANEOUS at 14:52

## 2023-07-24 RX ADMIN — Medication 40 MILLIGRAM(S): at 05:45

## 2023-07-24 RX ADMIN — Medication 81 MILLIGRAM(S): at 11:29

## 2023-07-24 RX ADMIN — Medication 75 MILLIGRAM(S): at 05:45

## 2023-07-24 RX ADMIN — CHLORHEXIDINE GLUCONATE 1 APPLICATION(S): 213 SOLUTION TOPICAL at 11:16

## 2023-07-24 RX ADMIN — PANTOPRAZOLE SODIUM 40 MILLIGRAM(S): 20 TABLET, DELAYED RELEASE ORAL at 05:45

## 2023-07-24 RX ADMIN — TAMSULOSIN HYDROCHLORIDE 0.4 MILLIGRAM(S): 0.4 CAPSULE ORAL at 21:26

## 2023-07-24 RX ADMIN — HEPARIN SODIUM 1700 UNIT(S)/HR: 5000 INJECTION INTRAVENOUS; SUBCUTANEOUS at 22:28

## 2023-07-24 RX ADMIN — Medication 4 GRAM(S): at 11:29

## 2023-07-24 RX ADMIN — Medication 40 MILLIGRAM(S): at 14:55

## 2023-07-24 RX ADMIN — Medication 100 MILLIGRAM(S): at 11:30

## 2023-07-24 RX ADMIN — Medication 75 MILLIGRAM(S): at 17:13

## 2023-07-24 RX ADMIN — BECLOMETHASONE DIPROPIONATE 2 PUFF(S): 40 AEROSOL, METERED RESPIRATORY (INHALATION) at 21:27

## 2023-07-24 RX ADMIN — TIOTROPIUM BROMIDE 2 PUFF(S): 18 CAPSULE ORAL; RESPIRATORY (INHALATION) at 11:27

## 2023-07-24 RX ADMIN — ATORVASTATIN CALCIUM 40 MILLIGRAM(S): 80 TABLET, FILM COATED ORAL at 21:26

## 2023-07-24 RX ADMIN — BECLOMETHASONE DIPROPIONATE 2 PUFF(S): 40 AEROSOL, METERED RESPIRATORY (INHALATION) at 10:16

## 2023-07-24 NOTE — PROGRESS NOTE ADULT - SUBJECTIVE AND OBJECTIVE BOX
Patient is a 78y Male     Patient is a 78y old  Male who presents with a chief complaint of worsening pericardial effusion (23 Jul 2023 17:08)      HPI:  78M (alt MRN [id number]) w/ hx of Afib (on Eliquis), severe AS, mod-severe TR, CAD/MI (30 yrs ago, medically managed, no PCI), ?CHF, COPD (not on O2 at home), former heavy smoker, HTN, HLD, DM2, gout, presenting with worsening pericardial effusion. Sent in from his cardiologist's office. He had a TTE on day of presentation (7/3/23) that showed a "large circumferential pericardial effusion up to 2.6 cm without evidence of tamponade physiology" (and mildly reduced RV systolic function was also noted). His prior TTE on 10/3/22 had shown a "moderate pericardial effusion, measuring about 1.0 cm superior to the RA; otherwise small circumferential pericardial effusion." He is unclear what is the cause of pericardial effusion and he is unsure if he has a hx of CHF. Reported that at baseline he has shortness of breath after walking 50 ft and swelling in both legs. Noted that the swelling in his legs today appear to be around his average size. Denied f/c/n/v, CP, SOB at rest, abdominal pain, dysuria, hematuria, hematochezia, melena, diarrhea, constipation.    In ED: Afebrile, HR 80s-90s, SBP 140s-150s, RR 16-20, 88% on RA, % on 1-2L O2NC. Labs notable for hsTrop 30->27, SCr 1.36, proBNP 2.3k; VBG pH 7.34, pCO2 61, HCO3 33, lactate 0.8. Given Lasix 40mg IV x1. Admitted to Medicine for further management. (04 Jul 2023 00:24)      PAST MEDICAL & SURGICAL HISTORY:  COPD (chronic obstructive pulmonary disease)      Former smoker      HTN (hypertension)      HLD (hyperlipidemia)      CAD (coronary artery disease)      Chronic atrial fibrillation      Diabetes mellitus, type 2      Gout      Severe aortic stenosis      Pericardial effusion      TR (tricuspid regurgitation)      No significant past surgical history          MEDICATIONS  (STANDING):  allopurinol 100 milliGRAM(s) Oral daily  aspirin  chewable 81 milliGRAM(s) Oral daily  atorvastatin 40 milliGRAM(s) Oral at bedtime  beclomethasone  80 MICROgram(s) Inhaler 2 Puff(s) Inhalation two times a day  chlorhexidine 2% Cloths 1 Application(s) Topical daily  dextrose 5%. 1000 milliLiter(s) (50 mL/Hr) IV Continuous <Continuous>  dextrose 5%. 1000 milliLiter(s) (100 mL/Hr) IV Continuous <Continuous>  dextrose 50% Injectable 25 Gram(s) IV Push once  dextrose 50% Injectable 25 Gram(s) IV Push once  dextrose 50% Injectable 12.5 Gram(s) IV Push once  furosemide   Injectable 40 milliGRAM(s) IV Push two times a day  glucagon  Injectable 1 milliGRAM(s) IntraMuscular once  insulin lispro (ADMELOG) corrective regimen sliding scale   SubCutaneous three times a day before meals  insulin lispro (ADMELOG) corrective regimen sliding scale   SubCutaneous at bedtime  metoprolol succinate ER 75 milliGRAM(s) Oral two times a day  omega-3-Acid Ethyl Esters 4 Gram(s) Oral daily  pantoprazole    Tablet 40 milliGRAM(s) Oral before breakfast  tamsulosin 0.4 milliGRAM(s) Oral at bedtime  tiotropium 2.5 MICROgram(s) Inhaler 2 Puff(s) Inhalation daily      Allergies    penicillins (Unknown)    Intolerances        SOCIAL HISTORY:  Denies ETOh,Smoking,     FAMILY HISTORY:  FH: breast cancer (Mother)        REVIEW OF SYSTEMS:    CONSTITUTIONAL: No weakness, fevers or chills  EYES/ENT: No visual changes;  No vertigo or throat pain   NECK: No pain or stiffness  RESPIRATORY: No cough, wheezing, hemoptysis; No shortness of breath  CARDIOVASCULAR: No chest pain or palpitations  GASTROINTESTINAL: No abdominal or epigastric pain. No nausea, vomiting, or hematemesis; No diarrhea or constipation. No melena or hematochezia.  GENITOURINARY: No dysuria, frequency or hematuria  NEUROLOGICAL: No numbness or weakness  SKIN: No itching, burning, rashes, or lesions   All other review of systems is negative unless indicated above.    VITAL:  T(C): , Max: 36.9 (07-23-23 @ 21:17)  T(F): , Max: 98.5 (07-23-23 @ 21:17)  HR: 87 (07-24-23 @ 04:12)  BP: 104/64 (07-24-23 @ 04:12)  BP(mean): --  RR: 18 (07-24-23 @ 04:12)  SpO2: 97% (07-24-23 @ 04:12)  Wt(kg): --    I and O's:    07-22 @ 07:01  -  07-23 @ 07:00  --------------------------------------------------------  IN: 0 mL / OUT: 1450 mL / NET: -1450 mL    07-23 @ 07:01  -  07-24 @ 07:00  --------------------------------------------------------  IN: 480 mL / OUT: 2700 mL / NET: -2220 mL    07-24 @ 07:01  -  07-24 @ 08:50  --------------------------------------------------------  IN: 0 mL / OUT: 325 mL / NET: -325 mL          PHYSICAL EXAM:    Constitutional: NAD  HEENT: PERRLA,   Neck: No JVD  Respiratory: CTA B/L  Cardiovascular: S1 and S2  Gastrointestinal: BS+, soft, NT/ND  Extremities: No peripheral edema  Neurological: A/O x 3, no focal deficits  Psychiatric: Normal mood, normal affect  : No Reeder  Skin: No rashes  Access: Not applicable  Back: No CVA tenderness    LABS:                        8.5    7.46  )-----------( 73       ( 24 Jul 2023 05:56 )             26.6     07-24    137  |  103  |  48<H>  ----------------------------<  108<H>  4.4   |  22  |  1.87<H>    Ca    8.4      24 Jul 2023 05:58  Phos  3.0     07-23  Mg     1.9     07-23            RADIOLOGY & ADDITIONAL STUDIES:

## 2023-07-24 NOTE — PROGRESS NOTE ADULT - PROBLEM SELECTOR PLAN 2
- Found to have "large circumferential pericardial effusion up to 2.6 cm without evidence of tamponade physiology" on outpt TTE (7/3/23).- Remains asymptomatic. repeat again 7/17 remains unchanged   - Prior TTE (10/3/22) showed "moderate pericardial effusion, measuring about 1.0 cm superior to the RA; otherwise small circumferential pericardial effusion." Unclear underlying etiology of effusion, but possibly in setting of CHF.  - Lasix was on hold due to SHEYLA,  was then placed on lasix 40 IV BID for overload, which is now being transitioned to lasix 40 po BID   - Card rec : repeat CT chest to eval effusion done 7/21 effusion appears smaller > IR team reconsulted for re-eval, no percutaneous window for drainage.

## 2023-07-24 NOTE — PROGRESS NOTE ADULT - SUBJECTIVE AND OBJECTIVE BOX
ID:  78 M with hx. of Afib (on Eliquis), severe AS, mod-severe TR, CAD/MI (30 yrs ago, medically managed, no PCI), ?CHF, COPD (not on O2 at home), former heavy smoker, HTN, HLD, DM2, gout.  Was sent in for concern re outpatient echocardiogram which demonstrated worsening pericardial effusion. Effusion not safely accessible, no evidence of tamponade. A.S., now pending TAVR work up Course c.b R thigh hematoma requiring PRBC 2U now resolved with stable RLE CTA    Patient seen and examined at bedside.    Overnight Events: NAEON feeling well today, ambulated well without issues, denies FC NV CP SOB    Telemetry:     Current Meds:  acetaminophen     Tablet .. 650 milliGRAM(s) Oral every 6 hours PRN  albuterol    90 MICROgram(s) HFA Inhaler 2 Puff(s) Inhalation every 6 hours PRN  allopurinol 100 milliGRAM(s) Oral daily  AQUAPHOR (petrolatum Ointment) 1 Application(s) Topical two times a day PRN  aspirin  chewable 81 milliGRAM(s) Oral daily  atorvastatin 40 milliGRAM(s) Oral at bedtime  beclomethasone  80 MICROgram(s) Inhaler 2 Puff(s) Inhalation two times a day  carboxymethylcellulose 0.5% (Preservative-Free) Ophthalmic Solution 1 Drop(s) Both EYES two times a day PRN  chlorhexidine 2% Cloths 1 Application(s) Topical daily  dextrose 5%. 1000 milliLiter(s) IV Continuous <Continuous>  dextrose 5%. 1000 milliLiter(s) IV Continuous <Continuous>  dextrose 50% Injectable 25 Gram(s) IV Push once  dextrose 50% Injectable 25 Gram(s) IV Push once  dextrose 50% Injectable 12.5 Gram(s) IV Push once  dextrose Oral Gel 15 Gram(s) Oral once PRN  furosemide   Injectable 40 milliGRAM(s) IV Push two times a day  glucagon  Injectable 1 milliGRAM(s) IntraMuscular once  insulin lispro (ADMELOG) corrective regimen sliding scale   SubCutaneous three times a day before meals  insulin lispro (ADMELOG) corrective regimen sliding scale   SubCutaneous at bedtime  melatonin 3 milliGRAM(s) Oral at bedtime PRN  metoprolol succinate ER 75 milliGRAM(s) Oral two times a day  omega-3-Acid Ethyl Esters 4 Gram(s) Oral daily  pantoprazole    Tablet 40 milliGRAM(s) Oral before breakfast  sodium chloride 0.65% Nasal 1 Spray(s) Both Nostrils every 6 hours PRN  tamsulosin 0.4 milliGRAM(s) Oral at bedtime  tiotropium 2.5 MICROgram(s) Inhaler 2 Puff(s) Inhalation daily      Vitals:  T(F): 97.9 (07-24), Max: 98.5 (07-23)  HR: 96 (07-24) (87 - 104)  BP: 107/72 (07-24) (104/64 - 117/74)  RR: 18 (07-24)  SpO2: 95% (07-24)  I&O's Summary    23 Jul 2023 07:01  -  24 Jul 2023 07:00  --------------------------------------------------------  IN: 480 mL / OUT: 2700 mL / NET: -2220 mL    24 Jul 2023 07:01  -  24 Jul 2023 12:55  --------------------------------------------------------  IN: 0 mL / OUT: 575 mL / NET: -575 mL        Physical Exam:  Appearance: No acute distress; well appearing  Eyes: PERRL, EOMI, pink conjunctiva  HEENT: Normal oral mucosa  Cardiovascular: RRR, S1, S2, no murmurs, rubs, or gallops; no edema; no JVD  Respiratory: Clear to auscultation bilaterally  Gastrointestinal: soft, non-tender, non-distended with normal bowel sounds  Musculoskeletal: No clubbing; no joint deformity   Neurologic: Non-focal  Lymphatic: No lymphadenopathy  Psychiatry: AAOx3, mood & affect appropriate  Skin: No rashes, ecchymoses, or cyanosis                          8.5    7.46  )-----------( 73       ( 24 Jul 2023 05:56 )             26.6     07-24    137  |  103  |  48<H>  ----------------------------<  108<H>  4.4   |  22  |  1.87<H>    Ca    8.4      24 Jul 2023 05:58  Phos  3.0     07-23  Mg     1.9     07-23                    New ECG(s): Personally reviewed        A/P      Jean Ríos PGY5  Cardiology Fellow    CHAVA ID:  78 M with hx. of Afib (on Eliquis), severe AS, mod-severe TR, CAD/MI (30 yrs ago, medically managed, no PCI), ?CHF, COPD (not on O2 at home), former heavy smoker, HTN, HLD, DM2, gout.  Was sent in for concern re outpatient echocardiogram which demonstrated worsening pericardial effusion. Effusion not safely accessible, no evidence of tamponade. A.S., now pending TAVR work up Course c.b R thigh hematoma requiring PRBC 2U now resolved with stable RLE CTA    Patient seen and examined at bedside.    Overnight Events: NAEON feeling well today, ambulated well without issues, denies FC NV CP SOB. Still has RLE bruising and BLE edema. I asked nursing to put on ACE wraps or compression stockings to help with dependent edema.     Telemetry: AF     Current Meds:  acetaminophen     Tablet .. 650 milliGRAM(s) Oral every 6 hours PRN  albuterol    90 MICROgram(s) HFA Inhaler 2 Puff(s) Inhalation every 6 hours PRN  allopurinol 100 milliGRAM(s) Oral daily  AQUAPHOR (petrolatum Ointment) 1 Application(s) Topical two times a day PRN  aspirin  chewable 81 milliGRAM(s) Oral daily  atorvastatin 40 milliGRAM(s) Oral at bedtime  beclomethasone  80 MICROgram(s) Inhaler 2 Puff(s) Inhalation two times a day  carboxymethylcellulose 0.5% (Preservative-Free) Ophthalmic Solution 1 Drop(s) Both EYES two times a day PRN  chlorhexidine 2% Cloths 1 Application(s) Topical daily  dextrose 5%. 1000 milliLiter(s) IV Continuous <Continuous>  dextrose 5%. 1000 milliLiter(s) IV Continuous <Continuous>  dextrose 50% Injectable 25 Gram(s) IV Push once  dextrose 50% Injectable 25 Gram(s) IV Push once  dextrose 50% Injectable 12.5 Gram(s) IV Push once  dextrose Oral Gel 15 Gram(s) Oral once PRN  furosemide   Injectable 40 milliGRAM(s) IV Push two times a day  glucagon  Injectable 1 milliGRAM(s) IntraMuscular once  insulin lispro (ADMELOG) corrective regimen sliding scale   SubCutaneous three times a day before meals  insulin lispro (ADMELOG) corrective regimen sliding scale   SubCutaneous at bedtime  melatonin 3 milliGRAM(s) Oral at bedtime PRN  metoprolol succinate ER 75 milliGRAM(s) Oral two times a day  omega-3-Acid Ethyl Esters 4 Gram(s) Oral daily  pantoprazole    Tablet 40 milliGRAM(s) Oral before breakfast  sodium chloride 0.65% Nasal 1 Spray(s) Both Nostrils every 6 hours PRN  tamsulosin 0.4 milliGRAM(s) Oral at bedtime  tiotropium 2.5 MICROgram(s) Inhaler 2 Puff(s) Inhalation daily      Vitals:  T(F): 97.9 (07-24), Max: 98.5 (07-23)  HR: 96 (07-24) (87 - 104)  BP: 107/72 (07-24) (104/64 - 117/74)  RR: 18 (07-24)  SpO2: 95% (07-24)  I&O's Summary    23 Jul 2023 07:01  -  24 Jul 2023 07:00  --------------------------------------------------------  IN: 480 mL / OUT: 2700 mL / NET: -2220 mL    24 Jul 2023 07:01  -  24 Jul 2023 12:55  --------------------------------------------------------  IN: 0 mL / OUT: 575 mL / NET: -575 mL        Physical Exam:  Appearance: No acute distress; well appearing  Eyes: PERRL, EOMI, pink conjunctiva  HEENT: Normal oral mucosa  Cardiovascular: RRR, S1, S2, no murmurs, rubs, or gallops; no edema; no JVD  Respiratory: Clear to auscultation bilaterally  Gastrointestinal: soft, non-tender, non-distended with normal bowel sounds  Musculoskeletal: No clubbing; no joint deformity   Neurologic: Non-focal  Lymphatic: No lymphadenopathy  Psychiatry: AAOx3, mood & affect appropriate  Skin: No rashes, ecchymoses, or cyanosis                          8.5    7.46  )-----------( 73       ( 24 Jul 2023 05:56 )             26.6     07-24    137  |  103  |  48<H>  ----------------------------<  108<H>  4.4   |  22  |  1.87<H>    Ca    8.4      24 Jul 2023 05:58  Phos  3.0     07-23  Mg     1.9     07-23                    New ECG(s): Personally reviewed        A/P      Jean Ríos PGY5  Cardiology Fellow    CHAVA ID:  78 M with hx. of Afib (on Eliquis), severe AS, mod-severe TR, CAD/MI (30 yrs ago, medically managed, no PCI), ?CHF, COPD (not on O2 at home), former heavy smoker, HTN, HLD, DM2, gout.  Was sent in for concern re outpatient echocardiogram which demonstrated worsening pericardial effusion. Effusion not safely accessible, no evidence of tamponade. A.S., now pending TAVR work up Course c.b R thigh hematoma requiring PRBC 2U now resolved with stable RLE CTA    Patient seen and examined at bedside.    Overnight Events: NAEON feeling well today, ambulated well without issues, denies FC NV CP SOB. Still has RLE bruising and BLE edema. I asked nursing to put on ACE wraps or compression stockings to help with dependent edema.     Telemetry: AF     Current Meds:  acetaminophen     Tablet .. 650 milliGRAM(s) Oral every 6 hours PRN  albuterol    90 MICROgram(s) HFA Inhaler 2 Puff(s) Inhalation every 6 hours PRN  allopurinol 100 milliGRAM(s) Oral daily  AQUAPHOR (petrolatum Ointment) 1 Application(s) Topical two times a day PRN  aspirin  chewable 81 milliGRAM(s) Oral daily  atorvastatin 40 milliGRAM(s) Oral at bedtime  beclomethasone  80 MICROgram(s) Inhaler 2 Puff(s) Inhalation two times a day  carboxymethylcellulose 0.5% (Preservative-Free) Ophthalmic Solution 1 Drop(s) Both EYES two times a day PRN  chlorhexidine 2% Cloths 1 Application(s) Topical daily  dextrose 5%. 1000 milliLiter(s) IV Continuous <Continuous>  dextrose 5%. 1000 milliLiter(s) IV Continuous <Continuous>  dextrose 50% Injectable 25 Gram(s) IV Push once  dextrose 50% Injectable 25 Gram(s) IV Push once  dextrose 50% Injectable 12.5 Gram(s) IV Push once  dextrose Oral Gel 15 Gram(s) Oral once PRN  furosemide   Injectable 40 milliGRAM(s) IV Push two times a day  glucagon  Injectable 1 milliGRAM(s) IntraMuscular once  insulin lispro (ADMELOG) corrective regimen sliding scale   SubCutaneous three times a day before meals  insulin lispro (ADMELOG) corrective regimen sliding scale   SubCutaneous at bedtime  melatonin 3 milliGRAM(s) Oral at bedtime PRN  metoprolol succinate ER 75 milliGRAM(s) Oral two times a day  omega-3-Acid Ethyl Esters 4 Gram(s) Oral daily  pantoprazole    Tablet 40 milliGRAM(s) Oral before breakfast  sodium chloride 0.65% Nasal 1 Spray(s) Both Nostrils every 6 hours PRN  tamsulosin 0.4 milliGRAM(s) Oral at bedtime  tiotropium 2.5 MICROgram(s) Inhaler 2 Puff(s) Inhalation daily      Vitals:  T(F): 97.9 (07-24), Max: 98.5 (07-23)  HR: 96 (07-24) (87 - 104)  BP: 107/72 (07-24) (104/64 - 117/74)  RR: 18 (07-24)  SpO2: 95% (07-24)  I&O's Summary    23 Jul 2023 07:01  -  24 Jul 2023 07:00  --------------------------------------------------------  IN: 480 mL / OUT: 2700 mL / NET: -2220 mL    24 Jul 2023 07:01  -  24 Jul 2023 12:55  --------------------------------------------------------  IN: 0 mL / OUT: 575 mL / NET: -575 mL        Physical Exam:    Appearance: No acute distress; well appearing  Eyes: PERRL, EOMI, pink conjunctiva  HEENT: Normal oral mucosa  Cardiovascular: IIR, S1, S2, III/VI VIOLETA radiating to carotids, no rubs, or gallops, mild JVD   Respiratory: Bibasilar crackles,   Gastrointestinal: soft, non-tender, non-distended with normal bowel sounds  Musculoskeletal:, 3+ RLE edema; 2+ LLE edema, appears wrose  Neurologic: Non-focal  Lymphatic: No lymphadenopathy  Psychiatry: AAOx3, mood & affect appropriate  Skin: No rashes, ecchymoses, or cyanosis                          8.5    7.46  )-----------( 73       ( 24 Jul 2023 05:56 )             26.6     07-24    137  |  103  |  48<H>  ----------------------------<  108<H>  4.4   |  22  |  1.87<H>    Ca    8.4      24 Jul 2023 05:58  Phos  3.0     07-23  Mg     1.9     07-23    New ECG(s): Personally reviewed    A/P  78 M with hx. of Afib (on Eliquis), severe AS, mod-severe TR, CAD/MI (30 yrs ago, medically managed, no PCI), ?CHF, COPD (not on O2 at home), former heavy smoker, HTN, HLD, DM2, gout.   Was sent in for concern re outpatient echocardiogram which demonstrated worsening pericardial effusion. Effusion not safely accessible, no evidence of tamponade. A.S., now pending TAVR work up  Course c.b R thigh hematoma requiring PRBC 2U.      #Large Pericardial Effusion  - No clinical tamponade as patient is hemodynamically stable, with robust heart sounds and no obvious JVD. Outpatient TTE demonstrated LVEF of 55% with large pericardial effusion without tamponade physiology.   - Repeat TTE showed large effusion mainly around L ventricle; pericardial effusion cannot be safely tapped  -Repeat CT shows improvement. No need for drain  Plan:  - Restart AC    #Severe Symptomatic Low Flow Low Gradient AS  - Pt with known sx of sev AS p/w GOFF  - Repeat TTE showing sev AS  - Structural cardiology consulted, pending eventual CT TAVR & LHC  - Hypervolemic on exam, likely multifactorial, judicious with diuretics as above  Plan:  - LHC 7/19 with Dr. Wood    > 40% LAD 80% LCx 40% RCA - no PCI as asymptomatic and high bleeding risk   - Structural team now opting for outpatient TAVR after pt recovers and blood counts remain stable  - Structural will evaluate the patient likely as outpt for procedures.  - Would change to lasix 40 PO BID and place BLE compression stockings for edema     #AFib RVR - improved  #RBBB  - Persistent per OSH documentation   - On apixaban 2.5 BID at home - unclear why on dose reduced; per son he previously was on Coumadin but had supra-therapeutic.  Apixaban on hold at present given effusion and need for LHC.  - CHADSVASC 5    ->Restart AC today, monitor for bleeding.   - AF RVR likely compensatory iso blood loss, no need to tx unless hemodynamically unstable in which case would recommend blood   - Cont MTP Succ 75 BID    #R Thigh Hematoma - resolved   - Noted on CT non con of the RLE  - Hb drop 2 pts, platelets downtrending - now stable   - Ok for heparin per heme 7/17  Plan:  - F/U RLE CTA - neg for bleed  - Hb goal >7  - F/U PT eval  - Clinically improved       Jean Ríos PGY5  Cardiology Fellow    CHAVA Paez  ID:  78 M with hx. of Afib (on Eliquis), severe AS, mod-severe TR, CAD/MI (30 yrs ago, medically managed, no PCI), ?CHF, COPD (not on O2 at home), former heavy smoker, HTN, HLD, DM2, gout.  Was sent in for concern re outpatient echocardiogram which demonstrated worsening pericardial effusion. Effusion not safely accessible, no evidence of tamponade. A.S., now pending TAVR work up Course c.b R thigh hematoma requiring PRBC 2U now resolved with stable RLE CTA    Patient seen and examined at bedside.    Overnight Events: NAEON feeling well today, ambulated well without issues, denies FC NV CP SOB. Still has RLE bruising and BLE edema. I asked nursing to put on ACE wraps or compression stockings to help with dependent edema.     Telemetry: AF     Current Meds:  acetaminophen     Tablet .. 650 milliGRAM(s) Oral every 6 hours PRN  albuterol    90 MICROgram(s) HFA Inhaler 2 Puff(s) Inhalation every 6 hours PRN  allopurinol 100 milliGRAM(s) Oral daily  AQUAPHOR (petrolatum Ointment) 1 Application(s) Topical two times a day PRN  aspirin  chewable 81 milliGRAM(s) Oral daily  atorvastatin 40 milliGRAM(s) Oral at bedtime  beclomethasone  80 MICROgram(s) Inhaler 2 Puff(s) Inhalation two times a day  carboxymethylcellulose 0.5% (Preservative-Free) Ophthalmic Solution 1 Drop(s) Both EYES two times a day PRN  chlorhexidine 2% Cloths 1 Application(s) Topical daily  dextrose 5%. 1000 milliLiter(s) IV Continuous <Continuous>  dextrose 5%. 1000 milliLiter(s) IV Continuous <Continuous>  dextrose 50% Injectable 25 Gram(s) IV Push once  dextrose 50% Injectable 25 Gram(s) IV Push once  dextrose 50% Injectable 12.5 Gram(s) IV Push once  dextrose Oral Gel 15 Gram(s) Oral once PRN  furosemide   Injectable 40 milliGRAM(s) IV Push two times a day  glucagon  Injectable 1 milliGRAM(s) IntraMuscular once  insulin lispro (ADMELOG) corrective regimen sliding scale   SubCutaneous three times a day before meals  insulin lispro (ADMELOG) corrective regimen sliding scale   SubCutaneous at bedtime  melatonin 3 milliGRAM(s) Oral at bedtime PRN  metoprolol succinate ER 75 milliGRAM(s) Oral two times a day  omega-3-Acid Ethyl Esters 4 Gram(s) Oral daily  pantoprazole    Tablet 40 milliGRAM(s) Oral before breakfast  sodium chloride 0.65% Nasal 1 Spray(s) Both Nostrils every 6 hours PRN  tamsulosin 0.4 milliGRAM(s) Oral at bedtime  tiotropium 2.5 MICROgram(s) Inhaler 2 Puff(s) Inhalation daily    REVIEW OF SYSTEMS:  CONSTITUTIONAL: No weakness, fevers or chills  EYES/ENT: No visual changes;  No dysphagia  NECK: No pain or stiffness  RESPIRATORY: No cough, wheezing, hemoptysis; No shortness of breath  CARDIOVASCULAR: No chest pain or palpitations; + lower extremity edema  GASTROINTESTINAL: No abdominal or epigastric pain. No nausea, vomiting  BACK: No back pain  GENITOURINARY: No dysuria, frequency or hematuria  NEUROLOGICAL: No numbness or weakness  SKIN: No itching, burning, rashes, or lesions   All other review of systems is negative unless indicated above.    Vitals:  T(F): 97.9 (07-24), Max: 98.5 (07-23)  HR: 96 (07-24) (87 - 104)  BP: 107/72 (07-24) (104/64 - 117/74)  RR: 18 (07-24)  SpO2: 95% (07-24)  I&O's Summary    23 Jul 2023 07:01  -  24 Jul 2023 07:00  --------------------------------------------------------  IN: 480 mL / OUT: 2700 mL / NET: -2220 mL    24 Jul 2023 07:01  -  24 Jul 2023 12:55  --------------------------------------------------------  IN: 0 mL / OUT: 575 mL / NET: -575 mL        Physical Exam:    Appearance: No acute distress; well appearing  Eyes: PERRL, EOMI, pink conjunctiva  HEENT: Normal oral mucosa  Cardiovascular: IIR, S1, S2, III/VI VIOLETA radiating to carotids, no rubs, or gallops, mild JVD   Respiratory: Bibasilar crackles,   Gastrointestinal: soft, non-tender, non-distended with normal bowel sounds  Musculoskeletal:, 3+ RLE edema; 2+ LLE edema, appears wrose  Neurologic: Non-focal  Lymphatic: No lymphadenopathy  Psychiatry: AAOx3, mood & affect appropriate  Skin: No rashes, ecchymoses, or cyanosis                          8.5    7.46  )-----------( 73       ( 24 Jul 2023 05:56 )             26.6     07-24    137  |  103  |  48<H>  ----------------------------<  108<H>  4.4   |  22  |  1.87<H>    Ca    8.4      24 Jul 2023 05:58  Phos  3.0     07-23  Mg     1.9     07-23    New ECG(s): Personally reviewed    A/P  78 M with hx. of Afib (on Eliquis), severe AS, mod-severe TR, CAD/MI (30 yrs ago, medically managed, no PCI), ?CHF, COPD (not on O2 at home), former heavy smoker, HTN, HLD, DM2, gout.   Was sent in for concern re outpatient echocardiogram which demonstrated worsening pericardial effusion. Effusion not safely accessible, no evidence of tamponade. A.S., now pending TAVR work up  Course c.b R thigh hematoma requiring PRBC 2U.      #Large Pericardial Effusion  - No clinical tamponade as patient is hemodynamically stable, with robust heart sounds and no obvious JVD. Outpatient TTE demonstrated LVEF of 55% with large pericardial effusion without tamponade physiology.   - Repeat TTE showed large effusion mainly around L ventricle; pericardial effusion cannot be safely tapped  -Repeat CT shows improvement. No need for drain  Plan:  - Restart AC    #Severe Symptomatic Low Flow Low Gradient AS  - Pt with known sx of sev AS p/w GOFF  - Repeat TTE showing sev AS  - Structural cardiology consulted, pending eventual CT TAVR & LHC  - Hypervolemic on exam, likely multifactorial, judicious with diuretics as above  Plan:  - LHC 7/19 with Dr. Wood    > 40% LAD 80% LCx 40% RCA - no PCI as asymptomatic and high bleeding risk   - Structural team now opting for outpatient TAVR after pt recovers and blood counts remain stable  - Structural will evaluate the patient likely as outpt for procedures.  - Would change to lasix 40 PO BID and place BLE compression stockings for edema     #AFib RVR - improved  #RBBB  - Persistent per OSH documentation   - On apixaban 2.5 BID at home - unclear why on dose reduced; per son he previously was on Coumadin but had supra-therapeutic.  Apixaban on hold at present given effusion and need for LHC.  - CHADSVASC 5    ->Restart AC today, monitor for bleeding.   - AF RVR likely compensatory iso blood loss, no need to tx unless hemodynamically unstable in which case would recommend blood   - Cont MTP Succ 75 BID    #R Thigh Hematoma - resolved   - Noted on CT non con of the RLE  - Hb drop 2 pts, platelets downtrending - now stable   - Ok for heparin per heme 7/17  Plan:  - F/U RLE CTA - neg for bleed  - Hb goal >7  - F/U PT eval  - Clinically improved       Jean Ríos PGY5  Cardiology Fellow

## 2023-07-24 NOTE — PROGRESS NOTE ADULT - PROBLEM SELECTOR PLAN 4
- Cr worsened - possible contrast induced from [facility] on 7/19.   - Nephrology following   - Diuretics as per nephro/cards  - Hep C non reactive. f/up HBsAg, serum immunofixation, C3 and C4.   - Renal US shows both kidneys are echogenic, compatible with medical renal disease. No renal mass, hydronephrosis or calculus  - monitor BMP, Is/Os

## 2023-07-24 NOTE — PROGRESS NOTE ADULT - SUBJECTIVE AND OBJECTIVE BOX
[facility], formerly [facility] Division of Hospital Medicine  Kp Navarrete MD  Available via MS Teams  Pager: [phone]    SUBJECTIVE / OVERNIGHT EVENTS:  - no overnight events, denies any shortness of breath, dizziness, lightheadedness. States he still has worsening LE swelling, but otherwise has no cough, abdominal pain, headaches, nausea, vomiting.     MEDICATIONS  (STANDING):  allopurinol 100 milliGRAM(s) Oral daily  aspirin  chewable 81 milliGRAM(s) Oral daily  atorvastatin 40 milliGRAM(s) Oral at bedtime  beclomethasone  80 MICROgram(s) Inhaler 2 Puff(s) Inhalation two times a day  chlorhexidine 2% Cloths 1 Application(s) Topical daily  dextrose 5%. 1000 milliLiter(s) (50 mL/Hr) IV Continuous <Continuous>  dextrose 5%. 1000 milliLiter(s) (100 mL/Hr) IV Continuous <Continuous>  dextrose 50% Injectable 25 Gram(s) IV Push once  dextrose 50% Injectable 25 Gram(s) IV Push once  dextrose 50% Injectable 12.5 Gram(s) IV Push once  furosemide    Tablet 40 milliGRAM(s) Oral two times a day  glucagon  Injectable 1 milliGRAM(s) IntraMuscular once  heparin  Infusion.  Unit(s)/Hr (15 mL/Hr) IV Continuous <Continuous>  insulin lispro (ADMELOG) corrective regimen sliding scale   SubCutaneous three times a day before meals  insulin lispro (ADMELOG) corrective regimen sliding scale   SubCutaneous at bedtime  metoprolol succinate ER 75 milliGRAM(s) Oral two times a day  omega-3-Acid Ethyl Esters 4 Gram(s) Oral daily  pantoprazole    Tablet 40 milliGRAM(s) Oral before breakfast  tamsulosin 0.4 milliGRAM(s) Oral at bedtime  tiotropium 2.5 MICROgram(s) Inhaler 2 Puff(s) Inhalation daily    MEDICATIONS  (PRN):  acetaminophen     Tablet .. 650 milliGRAM(s) Oral every 6 hours PRN Temp greater or equal to 38C (100.4F), Mild Pain (1 - 3)  albuterol    90 MICROgram(s) HFA Inhaler 2 Puff(s) Inhalation every 6 hours PRN Shortness of Breath and/or Wheezing  AQUAPHOR (petrolatum Ointment) 1 Application(s) Topical two times a day PRN dry skin  carboxymethylcellulose 0.5% (Preservative-Free) Ophthalmic Solution 1 Drop(s) Both EYES two times a day PRN dry eyes  dextrose Oral Gel 15 Gram(s) Oral once PRN Blood Glucose LESS THAN 70 milliGRAM(s)/deciliter  melatonin 3 milliGRAM(s) Oral at bedtime PRN Sleep  sodium chloride 0.65% Nasal 1 Spray(s) Both Nostrils every 6 hours PRN Nasal Congestion      I&O's Summary    23 Jul 2023 07:01  -  24 Jul 2023 07:00  --------------------------------------------------------  IN: 480 mL / OUT: 2700 mL / NET: -2220 mL    24 Jul 2023 07:01  -  24 Jul 2023 13:36  --------------------------------------------------------  IN: 0 mL / OUT: 575 mL / NET: -575 mL        PHYSICAL EXAM:  Vital Signs Last 24 Hrs  T(C): 36.6 (24 Jul 2023 12:29), Max: 36.9 (23 Jul 2023 21:17)  T(F): 97.9 (24 Jul 2023 12:29), Max: 98.5 (23 Jul 2023 21:17)  HR: 96 (24 Jul 2023 12:29) (87 - 104)  BP: 107/72 (24 Jul 2023 12:29) (104/64 - 117/74)  BP(mean): --  RR: 18 (24 Jul 2023 12:29) (18 - 18)  SpO2: 95% (24 Jul 2023 12:29) (95% - 97%)    Parameters below as of 24 Jul 2023 12:29  Patient On (Oxygen Delivery Method): room air      CONSTITUTIONAL: NAD, well-developed  EYES: PERRLA; conjunctiva and sclera clear  ENMT: Moist oral mucosa  RESPIRATORY: Normal respiratory effort; lungs are clear to auscultation bilaterally  CARDIOVASCULAR: Irregular R, no murmur/rub/gallop; + b/l LE edema, L>R, Peripheral pulses are 2+ bilaterally  ABDOMEN: Nontender to palpation, normoactive bowel sounds, no rebound/guarding;   MUSCULOSKELETAL:  no clubbing or cyanosis of digits; no joint swelling or tenderness to palpation, moving all extremities   PSYCH: A+O to person, place, and time; affect appropriate  NEUROLOGY: non focal, patient ambulating without assist  SKIN: RLE venous stasis changes, several areas of ecchymosis on UEs with some swelling       LABS:                        8.5    7.46  )-----------( 73       ( 24 Jul 2023 05:56 )             26.6     07-24    137  |  103  |  48<H>  ----------------------------<  108<H>  4.4   |  22  |  1.87<H>    Ca    8.4      24 Jul 2023 05:58  Phos  3.0     07-23  Mg     1.9     07-23            Urinalysis Basic - ( 24 Jul 2023 05:58 )    Color: x / Appearance: x / SG: x / pH: x  Gluc: 108 mg/dL / Ketone: x  / Bili: x / Urobili: x   Blood: x / Protein: x / Nitrite: x   Leuk Esterase: x / RBC: x / WBC x   Sq Epi: x / Non Sq Epi: x / Bacteria: x            RADIOLOGY & ADDITIONAL TESTS:  New Results Reviewed Today: cbc cmp  New Imaging Personally Reviewed Today: n/a  New Electrocardiogram Personally Reviewed Today: n/a  Prior or Outpatient Records Reviewed Today: n/a     COMMUNICATION:  Care Discussed with Consultants/Other Providers and Details of Discussion: hematology, cardiology   Discussions with Patient/Family: n/a   PCP Communication: n/a

## 2023-07-24 NOTE — PROGRESS NOTE ADULT - ASSESSMENT
78M (alt MRN [id number]) w/ hx of Afib (on Eliquis), severe AS, mod-severe TR, CAD/MI (30 yrs ago, medically managed, no PCI), ?CHF, COPD (not on O2 at home), former heavy smoker, HTN, HLD, DM2, gout, prior moderate pericardial effusion, now presenting with large pericardial effusion w/o tamponade physiology on outpt TTE.

## 2023-07-24 NOTE — PROGRESS NOTE ADULT - PROBLEM SELECTOR PLAN 6
Hx of Afib (on Eliquis at home). CHADSVASC score of 5-6.  - c/w home metoprolol succinate 75mg bid  - monitor on telemetry  - AC was held (see above), but current plan is to challenge patient with full dose ac, will start heparin gtt, monitor both hemoglobin and platelet. If patient is stable for >48 hours, will consider starting DOAC prior to discharge. patient in agreement with plan, thoroughly explained risks and benefits of AC

## 2023-07-24 NOTE — PROGRESS NOTE ADULT - NSPROGADDITIONALINFOA_GEN_ALL_CORE
d/w acp, cardiology, hematology    Kp Navarrete MD  [facility] Division of Hospital Medicine  Available via MS Teams  Pager: [phone]

## 2023-07-24 NOTE — PROGRESS NOTE ADULT - PROBLEM SELECTOR PLAN 1
Patient states he gets weekly iron infusions outpatient. outpatient hematologist is Dr. Sylvester () who is not affiliated with [facility], but has [facility] affiliation. Now seen by [place] hematology service on 7/15.  Per patient's son, patient is on reduced eliquis dose at home due to history of anemia/bleed?  DDx includes underlying hematologic dz , medications, mechanical sheering from AS, bleeding    - CT RLE   >Findings are consistent with a large quadriceps intramuscular hematoma-no clinical change noted.   (Evaluated by surgery team> no acute intervention rec. repeat CTA RLE stable without bleeding.)    - HIT panel negative, PF4 negative, I discussed this with hematology fellow Dr. Salinas, unlikely to be HIT at this time.   - previous physician spoke with Dr. Enriquez 7/23, cardiology team ideally wants patient on full dose DOAC challenge, and if patient is stable for 1-2 days to continue AC outpatient.   -- GI eval (Dr Mccray) appreciated. no plans for acute GI intervention given elevated risk and no current evidence of bleeding though if rebleeds on AC may need to revisit this.  - current plan - challenge patient with full dose ac, will start heparin gtt, monitor both hemoglobin and platelet. If patient is stable for >48 hours, will consider starting DOAC prior to discharge. patient in agreement with plan, thoroughly explained risks and benefits of AC

## 2023-07-24 NOTE — PROGRESS NOTE ADULT - ASSESSMENT
1) no sign of ovet gib  2) hgb is stable  3) d/w hospitalist r/b/a of colon high and yeild low  4) egd/capsule with me were normal  5) defer to team re: trial of a/c  6) pt understands plan and agrees with rx/ dx

## 2023-07-25 LAB
ANION GAP SERPL CALC-SCNC: 10 MMOL/L — SIGNIFICANT CHANGE UP (ref 5–17)
APTT BLD: 144.6 SEC — CRITICAL HIGH (ref 24.5–35.6)
APTT BLD: 33.8 SEC — SIGNIFICANT CHANGE UP (ref 24.5–35.6)
APTT BLD: 68.6 SEC — HIGH (ref 27.5–35.5)
BUN SERPL-MCNC: 52 MG/DL — HIGH (ref 7–23)
CALCIUM SERPL-MCNC: 8.5 MG/DL — SIGNIFICANT CHANGE UP (ref 8.4–10.5)
CHLORIDE SERPL-SCNC: 104 MMOL/L — SIGNIFICANT CHANGE UP (ref 96–108)
CO2 SERPL-SCNC: 25 MMOL/L — SIGNIFICANT CHANGE UP (ref 22–31)
CREAT SERPL-MCNC: 1.81 MG/DL — HIGH (ref 0.5–1.3)
EGFR: 38 ML/MIN/1.73M2 — LOW
GLUCOSE BLDC GLUCOMTR-MCNC: 118 MG/DL — HIGH (ref 70–99)
GLUCOSE BLDC GLUCOMTR-MCNC: 120 MG/DL — HIGH (ref 70–99)
GLUCOSE BLDC GLUCOMTR-MCNC: 123 MG/DL — HIGH (ref 70–99)
GLUCOSE BLDC GLUCOMTR-MCNC: 128 MG/DL — HIGH (ref 70–99)
GLUCOSE SERPL-MCNC: 132 MG/DL — HIGH (ref 70–99)
HCT VFR BLD CALC: 28 % — LOW (ref 39–50)
HCT VFR BLD CALC: 29.7 % — LOW (ref 39–50)
HGB BLD-MCNC: 8.5 G/DL — LOW (ref 13–17)
HGB BLD-MCNC: 8.9 G/DL — LOW (ref 13–17)
MAGNESIUM SERPL-MCNC: 1.7 MG/DL — SIGNIFICANT CHANGE UP (ref 1.6–2.6)
MCHC RBC-ENTMCNC: 28.1 PG — SIGNIFICANT CHANGE UP (ref 27–34)
MCHC RBC-ENTMCNC: 28.2 PG — SIGNIFICANT CHANGE UP (ref 27–34)
MCHC RBC-ENTMCNC: 30 GM/DL — LOW (ref 32–36)
MCHC RBC-ENTMCNC: 30.4 GM/DL — LOW (ref 32–36)
MCV RBC AUTO: 92.7 FL — SIGNIFICANT CHANGE UP (ref 80–100)
MCV RBC AUTO: 94 FL — SIGNIFICANT CHANGE UP (ref 80–100)
NRBC # BLD: 0 /100 WBCS — SIGNIFICANT CHANGE UP (ref 0–0)
PHOSPHATE SERPL-MCNC: 3.3 MG/DL — SIGNIFICANT CHANGE UP (ref 2.5–4.5)
PLATELET # BLD AUTO: 118 K/UL — LOW (ref 150–400)
PLATELET # BLD AUTO: SIGNIFICANT CHANGE UP K/UL (ref 150–400)
POTASSIUM SERPL-MCNC: 4.4 MMOL/L — SIGNIFICANT CHANGE UP (ref 3.5–5.3)
POTASSIUM SERPL-SCNC: 4.4 MMOL/L — SIGNIFICANT CHANGE UP (ref 3.5–5.3)
RBC # BLD: 3.02 M/UL — LOW (ref 4.2–5.8)
RBC # BLD: 3.16 M/UL — LOW (ref 4.2–5.8)
RBC # FLD: 23.2 % — HIGH (ref 10.3–14.5)
RBC # FLD: 23.3 % — HIGH (ref 10.3–14.5)
SODIUM SERPL-SCNC: 139 MMOL/L — SIGNIFICANT CHANGE UP (ref 135–145)
WBC # BLD: 6.5 K/UL — SIGNIFICANT CHANGE UP (ref 3.8–10.5)
WBC # BLD: 6.62 K/UL — SIGNIFICANT CHANGE UP (ref 3.8–10.5)
WBC # FLD AUTO: 6.5 K/UL — SIGNIFICANT CHANGE UP (ref 3.8–10.5)
WBC # FLD AUTO: 6.62 K/UL — SIGNIFICANT CHANGE UP (ref 3.8–10.5)

## 2023-07-25 PROCEDURE — 99233 SBSQ HOSP IP/OBS HIGH 50: CPT | Mod: GC

## 2023-07-25 PROCEDURE — 99233 SBSQ HOSP IP/OBS HIGH 50: CPT

## 2023-07-25 RX ORDER — APIXABAN 2.5 MG/1
5 TABLET, FILM COATED ORAL EVERY 12 HOURS
Refills: 0 | Status: DISCONTINUED | OUTPATIENT
Start: 2023-07-25 | End: 2023-07-26

## 2023-07-25 RX ADMIN — HEPARIN SODIUM 1700 UNIT(S)/HR: 5000 INJECTION INTRAVENOUS; SUBCUTANEOUS at 07:28

## 2023-07-25 RX ADMIN — CHLORHEXIDINE GLUCONATE 1 APPLICATION(S): 213 SOLUTION TOPICAL at 13:09

## 2023-07-25 RX ADMIN — Medication 100 MILLIGRAM(S): at 13:08

## 2023-07-25 RX ADMIN — PANTOPRAZOLE SODIUM 40 MILLIGRAM(S): 20 TABLET, DELAYED RELEASE ORAL at 06:50

## 2023-07-25 RX ADMIN — HEPARIN SODIUM 1700 UNIT(S)/HR: 5000 INJECTION INTRAVENOUS; SUBCUTANEOUS at 05:21

## 2023-07-25 RX ADMIN — Medication 40 MILLIGRAM(S): at 13:09

## 2023-07-25 RX ADMIN — Medication 4 GRAM(S): at 13:09

## 2023-07-25 RX ADMIN — TIOTROPIUM BROMIDE 2 PUFF(S): 18 CAPSULE ORAL; RESPIRATORY (INHALATION) at 13:08

## 2023-07-25 RX ADMIN — APIXABAN 5 MILLIGRAM(S): 2.5 TABLET, FILM COATED ORAL at 17:38

## 2023-07-25 RX ADMIN — HEPARIN SODIUM 1400 UNIT(S)/HR: 5000 INJECTION INTRAVENOUS; SUBCUTANEOUS at 14:08

## 2023-07-25 RX ADMIN — Medication 75 MILLIGRAM(S): at 05:22

## 2023-07-25 RX ADMIN — Medication 75 MILLIGRAM(S): at 17:27

## 2023-07-25 RX ADMIN — Medication 81 MILLIGRAM(S): at 13:08

## 2023-07-25 RX ADMIN — BECLOMETHASONE DIPROPIONATE 2 PUFF(S): 40 AEROSOL, METERED RESPIRATORY (INHALATION) at 22:50

## 2023-07-25 RX ADMIN — TAMSULOSIN HYDROCHLORIDE 0.4 MILLIGRAM(S): 0.4 CAPSULE ORAL at 22:50

## 2023-07-25 RX ADMIN — HEPARIN SODIUM 0 UNIT(S)/HR: 5000 INJECTION INTRAVENOUS; SUBCUTANEOUS at 13:05

## 2023-07-25 RX ADMIN — Medication 40 MILLIGRAM(S): at 05:26

## 2023-07-25 RX ADMIN — HEPARIN SODIUM 1700 UNIT(S)/HR: 5000 INJECTION INTRAVENOUS; SUBCUTANEOUS at 09:33

## 2023-07-25 RX ADMIN — ATORVASTATIN CALCIUM 40 MILLIGRAM(S): 80 TABLET, FILM COATED ORAL at 22:50

## 2023-07-25 RX ADMIN — BECLOMETHASONE DIPROPIONATE 2 PUFF(S): 40 AEROSOL, METERED RESPIRATORY (INHALATION) at 09:35

## 2023-07-25 NOTE — PROVIDER CONTACT NOTE (CRITICAL VALUE NOTIFICATION) - ASSESSMENT
Pt. A&O X4, VSS, on 1-2L oxygen. Pt. showing no signs of bruising or bleeding.
VSS. no complaints of cp or sob. no s/s of bleeding
A&Ox4. No c/o chest pain, sob or dizziness. VSS.
pt a&ox4, no signs of active bleeding. on 2 L NC , afib on tele. denies pain or discomfort
Pt a&0x4. VSS. No active s/s of bleeding observed.
pt denies pain, VSS, hep gtt currently running at 12 ml/hr. pt complains of bloody nose, provider aware.
Pt a&ox4. VSS. No active s/s of bleeding observed.
A&Ox4. No visible s/s bleeding. Left arm ecchymotic.

## 2023-07-25 NOTE — PROGRESS NOTE ADULT - PROBLEM SELECTOR PLAN 1
Patient states he gets weekly iron infusions outpatient. outpatient hematologist is Dr. Sylvester () who is not affiliated with [facility], but has [facility] affiliation. Now seen by [place] hematology service on 7/15.  Per patient's son, patient is on reduced eliquis dose at home due to history of anemia/bleed?  DDx includes underlying hematologic dz , medications, mechanical sheering from AS, bleeding    - CT RLE   >Findings are consistent with a large quadriceps intramuscular hematoma-no clinical change noted.   (Evaluated by surgery team> no acute intervention rec. repeat CTA RLE stable without bleeding.)    - HIT panel negative, PF4 negative, I discussed this with hematology fellow Dr. Salinas, unlikely to be HIT at this time.   - previous physician spoke with Dr. Enriquez 7/23, cardiology team ideally wants patient on full dose DOAC challenge, and if patient is stable for 1-2 days to continue AC outpatient.   -- GI eval (Dr Mccray) appreciated. no plans for acute GI intervention given elevated risk and no current evidence of bleeding though if rebleeds on AC may need to revisit this.  - current plan has been on heparin gtt with stable platelet and cbc. if todays PM bloodwork is stable, will start eliquis 5mg BID and monitor until 7/26. patient in agreement with plan, thoroughly explained risks and benefits of AC

## 2023-07-25 NOTE — PROGRESS NOTE ADULT - ASSESSMENT
1) doac per cardiology  2) copd per pulm  3) cardiac maximization per cardiology  4) no overt gib  5) no acute drop in hgb  6) pt had reecent egd/capsule negative, colon recent past, would defer enodoscopic evalation unless high risk or overt bleeding

## 2023-07-25 NOTE — PROGRESS NOTE ADULT - PROBLEM SELECTOR PLAN 6
Hx of Afib (on Eliquis at home). CHADSVASC score of 5-6.  - c/w home metoprolol succinate 75mg bid  - monitor on telemetry  - heparin gtt now, hope to transition to eliquis 7/26

## 2023-07-25 NOTE — PROGRESS NOTE ADULT - PROBLEM SELECTOR PLAN 3
- Unclear hx of ?CHF, possibly has chronic HFpEF with severe AS -- TTE w/ unchanged mod to large pericardial effusion ; +severe AS  - LHC performed on 7/19 showing mod dz (40%) and 80% lesion LCx -no intervention performed.   - structural heart and CT surgery teams consulted for severe AS : Given complex med situation (anemia, bleeding, SHEYLA on CKD, pericardial effusion, CAD) and safety of resuming full AC would defer valve intervention at this time.   - strict I/Os, standing weights daily  - Structural cards team now opting for outpatient TAVR after recovery and stable Hgb.  - cards recs appreciated - transition to lasix 40mg po bid.

## 2023-07-25 NOTE — PROVIDER CONTACT NOTE (CRITICAL VALUE NOTIFICATION) - NAME OF MD/NP/PA/DO NOTIFIED:
RANDELL Thakkar
MARIE Obregon
ACP Ana Gerber
RANDELL Thakkar
toby power
ACP Ana Gerber
Aileen Richardson ACP
MARIE Obregon

## 2023-07-25 NOTE — PROVIDER CONTACT NOTE (CRITICAL VALUE NOTIFICATION) - SITUATION
ptt 144.6
Hgb 6.7
Pt. aptt came back as 167
patients aPTT resulted at 140.7
APTT >200
Hgb 6.5, Hct 20.9
Hgb 7.0
pt aPTT resulted at 142.6

## 2023-07-25 NOTE — PROGRESS NOTE ADULT - SUBJECTIVE AND OBJECTIVE BOX
Patient is a 78y Male     Patient is a 78y old  Male who presents with a chief complaint of worsening pericardial effusion (24 Jul 2023 13:35)      HPI:  78M (alt MRN [id number]) w/ hx of Afib (on Eliquis), severe AS, mod-severe TR, CAD/MI (30 yrs ago, medically managed, no PCI), ?CHF, COPD (not on O2 at home), former heavy smoker, HTN, HLD, DM2, gout, presenting with worsening pericardial effusion. Sent in from his cardiologist's office. He had a TTE on day of presentation (7/3/23) that showed a "large circumferential pericardial effusion up to 2.6 cm without evidence of tamponade physiology" (and mildly reduced RV systolic function was also noted). His prior TTE on 10/3/22 had shown a "moderate pericardial effusion, measuring about 1.0 cm superior to the RA; otherwise small circumferential pericardial effusion." He is unclear what is the cause of pericardial effusion and he is unsure if he has a hx of CHF. Reported that at baseline he has shortness of breath after walking 50 ft and swelling in both legs. Noted that the swelling in his legs today appear to be around his average size. Denied f/c/n/v, CP, SOB at rest, abdominal pain, dysuria, hematuria, hematochezia, melena, diarrhea, constipation.    In ED: Afebrile, HR 80s-90s, SBP 140s-150s, RR 16-20, 88% on RA, % on 1-2L O2NC. Labs notable for hsTrop 30->27, SCr 1.36, proBNP 2.3k; VBG pH 7.34, pCO2 61, HCO3 33, lactate 0.8. Given Lasix 40mg IV x1. Admitted to Medicine for further management. (04 Jul 2023 00:24)      PAST MEDICAL & SURGICAL HISTORY:  COPD (chronic obstructive pulmonary disease)      Former smoker      HTN (hypertension)      HLD (hyperlipidemia)      CAD (coronary artery disease)      Chronic atrial fibrillation      Diabetes mellitus, type 2      Gout      Severe aortic stenosis      Pericardial effusion      TR (tricuspid regurgitation)      No significant past surgical history          MEDICATIONS  (STANDING):  allopurinol 100 milliGRAM(s) Oral daily  aspirin  chewable 81 milliGRAM(s) Oral daily  atorvastatin 40 milliGRAM(s) Oral at bedtime  beclomethasone  80 MICROgram(s) Inhaler 2 Puff(s) Inhalation two times a day  chlorhexidine 2% Cloths 1 Application(s) Topical daily  dextrose 5%. 1000 milliLiter(s) (50 mL/Hr) IV Continuous <Continuous>  dextrose 5%. 1000 milliLiter(s) (100 mL/Hr) IV Continuous <Continuous>  dextrose 50% Injectable 12.5 Gram(s) IV Push once  dextrose 50% Injectable 25 Gram(s) IV Push once  dextrose 50% Injectable 25 Gram(s) IV Push once  furosemide    Tablet 40 milliGRAM(s) Oral two times a day  glucagon  Injectable 1 milliGRAM(s) IntraMuscular once  heparin  Infusion.  Unit(s)/Hr (15 mL/Hr) IV Continuous <Continuous>  insulin lispro (ADMELOG) corrective regimen sliding scale   SubCutaneous three times a day before meals  insulin lispro (ADMELOG) corrective regimen sliding scale   SubCutaneous at bedtime  metoprolol succinate ER 75 milliGRAM(s) Oral two times a day  omega-3-Acid Ethyl Esters 4 Gram(s) Oral daily  pantoprazole    Tablet 40 milliGRAM(s) Oral before breakfast  tamsulosin 0.4 milliGRAM(s) Oral at bedtime  tiotropium 2.5 MICROgram(s) Inhaler 2 Puff(s) Inhalation daily      Allergies    penicillins (Unknown)    Intolerances        SOCIAL HISTORY:  Denies ETOh,Smoking,     FAMILY HISTORY:  FH: breast cancer (Mother)        REVIEW OF SYSTEMS:    CONSTITUTIONAL: No weakness, fevers or chills  EYES/ENT: No visual changes;  No vertigo or throat pain   NECK: No pain or stiffness  RESPIRATORY: No cough, wheezing, hemoptysis; No shortness of breath  CARDIOVASCULAR: No chest pain or palpitations  GASTROINTESTINAL: No abdominal or epigastric pain. No nausea, vomiting, or hematemesis; No diarrhea or constipation. No melena or hematochezia.  GENITOURINARY: No dysuria, frequency or hematuria  NEUROLOGICAL: No numbness or weakness  SKIN: No itching, burning, rashes, or lesions   All other review of systems is negative unless indicated above.    VITAL:  T(C): , Max: 36.8 (07-24-23 @ 20:55)  T(F): , Max: 98.2 (07-24-23 @ 20:55)  HR: 87 (07-25-23 @ 04:14)  BP: 111/76 (07-25-23 @ 04:14)  BP(mean): --  RR: 18 (07-25-23 @ 04:14)  SpO2: 96% (07-25-23 @ 04:14)  Wt(kg): --    I and O's:    07-23 @ 07:01  -  07-24 @ 07:00  --------------------------------------------------------  IN: 480 mL / OUT: 2700 mL / NET: -2220 mL    07-24 @ 07:01  -  07-25 @ 07:00  --------------------------------------------------------  IN: 1171 mL / OUT: 1725 mL / NET: -554 mL          PHYSICAL EXAM:    Constitutional: NAD  HEENT: PERRLA,   Neck: No JVD  Respiratory: CTA B/L  Cardiovascular: S1 and S2  Gastrointestinal: BS+, soft, NT/ND  Extremities: No peripheral edema  Neurological: A/O x 3, no focal deficits  Psychiatric: Normal mood, normal affect  : No Reeder  Skin: No rashes  Access: Not applicable  Back: No CVA tenderness    LABS:                        8.5    6.62  )-----------( 118      ( 25 Jul 2023 05:03 )             28.0     07-25    139  |  104  |  52<H>  ----------------------------<  132<H>  4.4   |  25  |  1.81<H>    Ca    8.5      25 Jul 2023 05:03  Phos  3.3     07-25  Mg     1.7     07-25            RADIOLOGY & ADDITIONAL STUDIES:

## 2023-07-25 NOTE — PROGRESS NOTE ADULT - NSPROGADDITIONALINFOA_GEN_ALL_CORE
d/w acp    Kp Navarrete MD  [facility] Division of Hospital Medicine  Available via MS Teams  Pager: [phone]

## 2023-07-25 NOTE — PROVIDER CONTACT NOTE (CRITICAL VALUE NOTIFICATION) - BACKGROUND
Pt admitted for acute pericarditis. PMH AFib, severe AS, CAD/MI, CHF, COPD, HTN, HLD, DM. Pt has RLE hematoma. Recently transfused on 7/14 for anemia.
Pt admitted for acute pericarditis. PMH AFib, severe AS, CAD/MI, CHF, COPD, HTN, HLD, DM. Pt transfused 7/14; has RLE hematoma.
Admitted for Pericardial Effusion.
Pt admitted for acute pericarditis.
pt admitted for pericardial effusion, a&ox4, hx of afib, HTN, HLD,  TR
pt admitted for acute pericarditis, a&ox4, hx of afib, HTN, HLD, DM2
Pt. admitted for pericardial effusion, pending pericardiocentesis
Admitted for Pericardial Effusion, TAVAR workup.

## 2023-07-25 NOTE — PROGRESS NOTE ADULT - PROBLEM SELECTOR PLAN 4
- Cr worsened - possible contrast induced from [facility] on 7/19.   - Nephrology following   - Diuretics as per nephro/cards  - Hep C non reactive. f/up HBsAg, serum immunofixation, C3 and C4.   - Renal US shows both kidneys are echogenic, compatible with medical renal disease. No renal mass, hydronephrosis or calculus  - monitor BMP, Is/Os

## 2023-07-25 NOTE — PROGRESS NOTE ADULT - SUBJECTIVE AND OBJECTIVE BOX
Subjective  No acute events reported overnight.  The patient is currently on a heparin drip.  Notes that he had some bleeding at his IV/heparin site earlier today.  Currently feeling a little bit better.  Ambulating short distances in the hallway with mild dyspnea upon exertion.  He feels that his lower extremity swelling is mildly improved.  Denies any fevers, chills, sweats, lighted sensation, dizzy or palpitations.    Telemetry  Irregular irregular with rates in the 80s to 120s    Review of systems  14 point review of systems is otherwise unremarkable except what is described above in the history of present illness    Physical examination  No apparent distress, alert and oriented x3, appropriate affect  JVD is not elevated, supple, no lymphadenopathy  Clear to auscultation bilateral with no wheezing rhonchi crackles  Irregular irregular with 3 out of 6 crescendo decrescendo murmur heard loudest at the right upper sternal border rating to the carotids   Positive bowel sound, soft, nontender, nondistended, no mass and guarding or rebound tenderness  No clubbing or cyanosis  Right thigh greater than left thigh  1+ DP/PT pulses bilaterally  1+ bilateral lower extremity edema  Right and left legs which are wrapped  No focal deficits    Assessment/plan  Severe low-flow low gradient aortic valve stenosis  Pericardial effusion    -- Discussion was had with the patient and previously his son concerning events that have transpired during his hospitalization and findings on imaging studies.  It was discussed that the patient has multiple issues that are likely contributing to his shortness of breath/dyspnea upon exertion.  --The patient's case was discussed during multidisciplinary valve team and due to his comorbidities and acute issues including but not limited to need for further medical optimization, assessment to deem safety to receive full dose anticoagulation therapy, deconditioned status secondary to prolonged hospitalization it is recommended that he be reassessed as an outpatient in approximately 2 to 3 weeks following discharge.  If he is clinically doing well at that time plan to proceed with TAVR.  -- Patient is currently receiving a heparin drip.  If he is stable on heparin plan is for patient to be transition to oral anticoagulation therapy.  --The patient has severe low-flow low gradient with normal EF along with severe one-vessel obstructive coronary artery disease/left circumflex artery.    --Discussed with the patient and his family what is severe low-flow low gradient aortic valve stenosis.  The associated signs and symptoms of severe aortic valve stenosis were reviewed.  The natural pathophysiology of untreated severe aortic valve stenosis was gone over.  The various treatment options were gone over along with their pros/cons and risks/benefits including medical therapy, TAVR and SAVR.  Due to the patients age and comorbidities he is felt to be an appropriate candidate to undergo TAVR.  Indications and details of the TAVR procedure reviewed.  Benefits and risks were gone over.  Risks include but are not limited to infection, bleeding, arrhythmia, TIA/stroke, hemodynamic instability, vascular injury, need for surgery and death.    --The patient has longstanding history of anemia requiring iron transfusions for which she is followed by outpatient hematologist/Dr. Sylvester.  Due to history of low iron/ferritin he has been evaluated extensively by GI as an outpatient.  Per report endoscopy/colonoscopy/capsule study was unremarkable in nature.  --Cardiac catheterization on 7/19/2023 that demonstrated severe one-vessel obstructive coronary artery disease in the left circumflex artery.  At this time intervention has been deferred due to concern that addition of antiplatelet therapy would place the patient at too high risk of bleeding.  --There is concern that there may be clot in his left atrial appendage.  In the past the patient was on Eliquis 2.5 mg twice a day.  It appears that he was being underdosed and should be transition to Eliquis 5 mg twice a day.  -- The patient was found to have a pericardial effusion.  No clinical tamponade.  He is hemodynamically stable.  Repeat CT scan showed reduction in pericardial effusion size.  Further assessment/work-up as per primary cardiology team.  --Continue aspirin 81 mg daily.  --The patient is still volume overloaded.  He is on furosemide 40 mg IV twice a day.  --Continue atorvastatin 40 mg daily.  -- Recommend patient be on pharmacologic DVT prophylaxis if he is not on full dose anticoagulation therapy.  He is at high risk for development of DVT.  -- Continue telemetry monitoring.  Aim for potassium greater than 4 magnesium greater than 2.    All questions and concerns of the patient were addressed.  Plan for reassessment for TAVR as an outpatient in the next 2 to 3 weeks following discharge.    Greater than 35 minutes were spent on total encounter.  The necessity of the time spent during the encounter on this date of service was due to education, assessment and coordination of care.

## 2023-07-25 NOTE — PROVIDER CONTACT NOTE (CRITICAL VALUE NOTIFICATION) - ACTION/TREATMENT ORDERED:
Pt in cath lab at time critical value reported. Heparin stopped. Await return to unit. Heparin d/c' ed upon returning to unit.
1/2 unit PRBC started, no reaction noted.
provider made aware, full anticoagulation nomogram to be followed. hep gtt held for 60 mins and to be restarted at 12 ml/hr.
PA notified and aware. As per PA, draw repeat CBC.
heparin protocol followed. no new order at this time
Provider notified. Instructed this RN to follow Nomogram.
PA notified and aware. As per PA, transfuse 1 unit PRBCs- 1/2 unit at a time.
provider made aware of aPTT, heparin nomogram being followed. gtt on hold for 60 minutes, to be restarted at decreased rate of 9ml/hr

## 2023-07-25 NOTE — PROGRESS NOTE ADULT - ASSESSMENT
78M ([place] MRN [id number]) w/ hx of Afib (on Eliquis), severe AS, mod-severe TR, CAD/MI (30 yrs ago, medically managed, no PCI), ?CHF, COPD (not on O2 at home), former heavy smoker, HTN, HLD, DM2, gout, prior moderate pericardial effusion, now presenting with large pericardial effusion w/o tamponade physiology on outpt TTE.

## 2023-07-25 NOTE — PROVIDER CONTACT NOTE (CRITICAL VALUE NOTIFICATION) - RECOMMENDATIONS
Heparin FAC nomogram followed
follow heparin full anticoagulation nomogram
Notify provider.
Notify provider. Transfuse?
Notify Provider
contact provider
PRBC ordered
follow full anticoagulation nomogram

## 2023-07-25 NOTE — PROGRESS NOTE ADULT - SUBJECTIVE AND OBJECTIVE BOX
[facility] Division of Hospital Medicine  Kp Navarrete MD  Available via MS Teams  Pager: [phone]    SUBJECTIVE / OVERNIGHT EVENTS:  no reported episodes of bleeding, states LE swellign isimproved. denies any n/v/abd pain/cough/cp/headaches     MEDICATIONS  (STANDING):  allopurinol 100 milliGRAM(s) Oral daily  aspirin  chewable 81 milliGRAM(s) Oral daily  atorvastatin 40 milliGRAM(s) Oral at bedtime  beclomethasone  80 MICROgram(s) Inhaler 2 Puff(s) Inhalation two times a day  chlorhexidine 2% Cloths 1 Application(s) Topical daily  dextrose 5%. 1000 milliLiter(s) (50 mL/Hr) IV Continuous <Continuous>  dextrose 5%. 1000 milliLiter(s) (100 mL/Hr) IV Continuous <Continuous>  dextrose 50% Injectable 12.5 Gram(s) IV Push once  dextrose 50% Injectable 25 Gram(s) IV Push once  dextrose 50% Injectable 25 Gram(s) IV Push once  furosemide    Tablet 40 milliGRAM(s) Oral two times a day  glucagon  Injectable 1 milliGRAM(s) IntraMuscular once  heparin  Infusion.  Unit(s)/Hr (15 mL/Hr) IV Continuous <Continuous>  insulin lispro (ADMELOG) corrective regimen sliding scale   SubCutaneous three times a day before meals  insulin lispro (ADMELOG) corrective regimen sliding scale   SubCutaneous at bedtime  metoprolol succinate ER 75 milliGRAM(s) Oral two times a day  omega-3-Acid Ethyl Esters 4 Gram(s) Oral daily  pantoprazole    Tablet 40 milliGRAM(s) Oral before breakfast  tamsulosin 0.4 milliGRAM(s) Oral at bedtime  tiotropium 2.5 MICROgram(s) Inhaler 2 Puff(s) Inhalation daily    MEDICATIONS  (PRN):  acetaminophen     Tablet .. 650 milliGRAM(s) Oral every 6 hours PRN Temp greater or equal to 38C (100.4F), Mild Pain (1 - 3)  albuterol    90 MICROgram(s) HFA Inhaler 2 Puff(s) Inhalation every 6 hours PRN Shortness of Breath and/or Wheezing  AQUAPHOR (petrolatum Ointment) 1 Application(s) Topical two times a day PRN dry skin  carboxymethylcellulose 0.5% (Preservative-Free) Ophthalmic Solution 1 Drop(s) Both EYES two times a day PRN dry eyes  dextrose Oral Gel 15 Gram(s) Oral once PRN Blood Glucose LESS THAN 70 milliGRAM(s)/deciliter  melatonin 3 milliGRAM(s) Oral at bedtime PRN Sleep  sodium chloride 0.65% Nasal 1 Spray(s) Both Nostrils every 6 hours PRN Nasal Congestion      I&O's Summary    24 Jul 2023 07:01  -  25 Jul 2023 07:00  --------------------------------------------------------  IN: 1171 mL / OUT: 1725 mL / NET: -554 mL    25 Jul 2023 07:01  -  25 Jul 2023 13:55  --------------------------------------------------------  IN: 0 mL / OUT: 300 mL / NET: -300 mL        PHYSICAL EXAM:  Vital Signs Last 24 Hrs  T(C): 36.8 (25 Jul 2023 13:00), Max: 36.8 (24 Jul 2023 20:55)  T(F): 98.3 (25 Jul 2023 13:00), Max: 98.3 (25 Jul 2023 13:00)  HR: 106 (25 Jul 2023 13:00) (84 - 106)  BP: 117/72 (25 Jul 2023 13:00) (101/63 - 117/72)  BP(mean): --  RR: 18 (25 Jul 2023 13:00) (18 - 18)  SpO2: 94% (25 Jul 2023 13:00) (94% - 99%)    Parameters below as of 25 Jul 2023 13:00  Patient On (Oxygen Delivery Method): room air      CONSTITUTIONAL: NAD, well-developed  EYES: PERRLA; conjunctiva and sclera clear  ENMT: Moist oral mucosa  RESPIRATORY: Normal respiratory effort; lungs are clear to auscultation bilaterally  CARDIOVASCULAR: Irregular R, no murmur/rub/gallop; + b/l LE edema, L>R, Peripheral pulses are 2+ bilaterally  ABDOMEN: Nontender to palpation, normoactive bowel sounds, no rebound/guarding;   MUSCULOSKELETAL:  no clubbing or cyanosis of digits; no joint swelling or tenderness to palpation, moving all extremities   PSYCH: A+O to person, place, and time; affect appropriate  NEUROLOGY: non focal, patient ambulating without assist  SKIN: RLE venous stasis changes, several areas of ecchymosis on UEs with some swelling     LABS:                        8.5    6.62  )-----------( 118      ( 25 Jul 2023 05:03 )             28.0     07-25    139  |  104  |  52<H>  ----------------------------<  132<H>  4.4   |  25  |  1.81<H>    Ca    8.5      25 Jul 2023 05:03  Phos  3.3     07-25  Mg     1.7     07-25      PT/INR - ( 24 Jul 2023 15:26 )   PT: 13.4 sec;   INR: 1.16 ratio         PTT - ( 25 Jul 2023 11:39 )  PTT:144.6 sec      Urinalysis Basic - ( 25 Jul 2023 05:03 )    Color: x / Appearance: x / SG: x / pH: x  Gluc: 132 mg/dL / Ketone: x  / Bili: x / Urobili: x   Blood: x / Protein: x / Nitrite: x   Leuk Esterase: x / RBC: x / WBC x   Sq Epi: x / Non Sq Epi: x / Bacteria: x            RADIOLOGY & ADDITIONAL TESTS:  New Results Reviewed Today: cbc cmp  New Imaging Personally Reviewed Today: n/a  New Electrocardiogram Personally Reviewed Today: n/a  Prior or Outpatient Records Reviewed Today: n/a    COMMUNICATION:  Care Discussed with Consultants/Other Providers and Details of Discussion: n/a  Discussions with Patient/Family: n/a  PCP Communication: n/a

## 2023-07-25 NOTE — PROVIDER CONTACT NOTE (CRITICAL VALUE NOTIFICATION) - PERSON GIVING RESULT:
Dejon Lee, Lab
Laura Hawk, Lab
Darek Higgins - LAB
Bashir Christensen
Darek Higgins
Darek Higgins
Charline Chavarria
Nathan Veloz

## 2023-07-25 NOTE — PROGRESS NOTE ADULT - PROBLEM SELECTOR PROBLEM 2
CHF, acute on chronic
Pericardial effusion
CHF, acute on chronic
Pericardial effusion
Pericardial effusion
CHF, acute on chronic
Pericardial effusion
CHF, acute on chronic
Pericardial effusion

## 2023-07-25 NOTE — PROGRESS NOTE ADULT - SUBJECTIVE AND OBJECTIVE BOX
ID: 78 M with hx. of Afib (on Eliquis), severe AS, mod-severe TR, CAD/MI (30 yrs ago, medically managed, no PCI), ?CHF, COPD (not on O2 at home), former heavy smoker, HTN, HLD, DM2, gout.  Was sent in for concern re outpatient echocardiogram which demonstrated worsening pericardial effusion. Effusion not safely accessible, no evidence of tamponade. A.S., now pending TAVR work up Course c.b R thigh hematoma requiring PRBC 2U now resolved with stable RLE CTA    Patient seen and examined at bedside.    Overnight Events: NAEON, ambulated well, eager to DC. States he has no complaints at this time. BLE edema improving with ace wraps and lasix     Telemetry: AF     REVIEW OF SYSTEMS:  CONSTITUTIONAL: No weakness, fevers or chills  EYES/ENT: No visual changes;  No dysphagia  NECK: No pain or stiffness  RESPIRATORY: No cough, wheezing, hemoptysis; No shortness of breath  CARDIOVASCULAR: No chest pain or palpitations; + lower extremity edema  GASTROINTESTINAL: No abdominal or epigastric pain. No nausea, vomiting  BACK: No back pain  GENITOURINARY: No dysuria, frequency or hematuria  NEUROLOGICAL: No numbness or weakness  SKIN: No itching, burning, rashes, or lesions   All other review of systems is negative unless indicated above.    Current Meds:  acetaminophen     Tablet .. 650 milliGRAM(s) Oral every 6 hours PRN  albuterol    90 MICROgram(s) HFA Inhaler 2 Puff(s) Inhalation every 6 hours PRN  allopurinol 100 milliGRAM(s) Oral daily  AQUAPHOR (petrolatum Ointment) 1 Application(s) Topical two times a day PRN  aspirin  chewable 81 milliGRAM(s) Oral daily  atorvastatin 40 milliGRAM(s) Oral at bedtime  beclomethasone  80 MICROgram(s) Inhaler 2 Puff(s) Inhalation two times a day  carboxymethylcellulose 0.5% (Preservative-Free) Ophthalmic Solution 1 Drop(s) Both EYES two times a day PRN  chlorhexidine 2% Cloths 1 Application(s) Topical daily  dextrose 5%. 1000 milliLiter(s) IV Continuous <Continuous>  dextrose 5%. 1000 milliLiter(s) IV Continuous <Continuous>  dextrose 50% Injectable 12.5 Gram(s) IV Push once  dextrose 50% Injectable 25 Gram(s) IV Push once  dextrose 50% Injectable 25 Gram(s) IV Push once  dextrose Oral Gel 15 Gram(s) Oral once PRN  furosemide    Tablet 40 milliGRAM(s) Oral two times a day  glucagon  Injectable 1 milliGRAM(s) IntraMuscular once  heparin  Infusion.  Unit(s)/Hr IV Continuous <Continuous>  insulin lispro (ADMELOG) corrective regimen sliding scale   SubCutaneous three times a day before meals  insulin lispro (ADMELOG) corrective regimen sliding scale   SubCutaneous at bedtime  melatonin 3 milliGRAM(s) Oral at bedtime PRN  metoprolol succinate ER 75 milliGRAM(s) Oral two times a day  omega-3-Acid Ethyl Esters 4 Gram(s) Oral daily  pantoprazole    Tablet 40 milliGRAM(s) Oral before breakfast  sodium chloride 0.65% Nasal 1 Spray(s) Both Nostrils every 6 hours PRN  tamsulosin 0.4 milliGRAM(s) Oral at bedtime  tiotropium 2.5 MICROgram(s) Inhaler 2 Puff(s) Inhalation daily      Vitals:  T(F): 98.1 (07-25), Max: 98.2 (07-24)  HR: 87 (07-25) (87 - 103)  BP: 111/76 (07-25) (101/63 - 111/76)  RR: 18 (07-25)  SpO2: 96% (07-25)  I&O's Summary    24 Jul 2023 07:01  -  25 Jul 2023 07:00  --------------------------------------------------------  IN: 1171 mL / OUT: 1725 mL / NET: -554 mL    Physical Exam:  Appearance: No acute distress; well appearing  Eyes: PERRL, EOMI, pink conjunctiva  HEENT: Normal oral mucosa  Cardiovascular: IIR, S1, S2, III/VI VIOLETA radiating to carotids, no rubs, or gallops, mild JVD   Respiratory: Bibasilar crackles,   Gastrointestinal: soft, non-tender, non-distended with normal bowel sounds  Musculoskeletal:, 3+ RLE edema; 2+ LLE edema, ACE wraps on BLE with mild improvement   Neurologic: Non-focal  Lymphatic: No lymphadenopathy  Psychiatry: AAOx3, mood & affect appropriate  Skin: No rashes, ecchymoses, or cyanosis                            8.5    6.62  )-----------( 118      ( 25 Jul 2023 05:03 )             28.0     07-25    139  |  104  |  52<H>  ----------------------------<  132<H>  4.4   |  25  |  1.81<H>    Ca    8.5      25 Jul 2023 05:03  Phos  3.3     07-25  Mg     1.7     07-25      PT/INR - ( 24 Jul 2023 15:26 )   PT: 13.4 sec;   INR: 1.16 ratio    PTT - ( 25 Jul 2023 05:03 )  PTT:68.6 sec    A/P  78 M with hx. of Afib (on Eliquis), severe AS, mod-severe TR, CAD/MI (30 yrs ago, medically managed, no PCI), ?CHF, COPD (not on O2 at home), former heavy smoker, HTN, HLD, DM2, gout.   Was sent in for concern re outpatient echocardiogram which demonstrated worsening pericardial effusion. Effusion not safely accessible, no evidence of tamponade. A.S., now pending TAVR work up  Course c.b R thigh hematoma requiring PRBC 2U.      #Large Pericardial Effusion  - No clinical tamponade as patient is hemodynamically stable, with robust heart sounds and no obvious JVD. Outpatient TTE demonstrated LVEF of 55% with large pericardial effusion without tamponade physiology.   - Repeat TTE showed large effusion mainly around L ventricle; pericardial effusion cannot be safely tapped  -Repeat CT shows improvement. No need for drain  Plan:  - Restart AC - please switch to DOAC    #Severe Symptomatic Low Flow Low Gradient AS  - Pt with known sx of sev AS p/w GOFF  - Repeat TTE showing sev AS  - Structural cardiology consulted, pending eventual CT TAVR & LHC  - Hypervolemic on exam, likely multifactorial, judicious with diuretics as above  Plan:  - LHC 7/19 with Dr. Wood    > 40% LAD 80% LCx 40% RCA - no PCI as asymptomatic and high bleeding risk   - Structural team now opting for outpatient TAVR after pt recovers and blood counts remain stable  - Structural will evaluate the patient likely as outpt for procedures.  - Would cont lasix 40 PO BID and place BLE compression stockings for edema until seen by outpatient caridologist    #AFib RVR - improved  #RBBB  - Persistent per OSH documentation   - On apixaban 2.5 BID at home - unclear why on dose reduced; per son he previously was on Coumadin but had supra-therapeutic.  Apixaban on hold at present given effusion and need for LHC.  - CHADSVASC 5   - AF RVR likely compensatory iso blood loss, no need to tx unless hemodynamically unstable in which case would recommend blood   - Cont MTP Succ 75 BID    #R Thigh Hematoma - resolved   - Noted on CT non con of the RLE  - Hb drop 2 pts, platelets downtrending - now stable   - Ok for heparin per heme 7/17  Plan:  - F/U RLE CTA - neg for bleed  - Clinically improved     #CAD   - 40% LAD 80% LCx 40% RCA - no PCI as asymptomatic and high bleeding risk   - Cont AAS/Statin   - Can determine need for PCI with his cardiologist Dr. Jimenes as an outpatient     Patient appears to be clinically stable for outpatient follow up at this time, no further cardiac work up needed,     Jean Ríos PGY5  Cardiology Fellow    CHAVA Paez ID: 78 M with hx. of Afib (on Eliquis), severe AS, mod-severe TR, CAD/MI (30 yrs ago, medically managed, no PCI), ?CHF, COPD (not on O2 at home), former heavy smoker, HTN, HLD, DM2, gout.  Was sent in for concern re outpatient echocardiogram which demonstrated worsening pericardial effusion. Effusion not safely accessible, no evidence of tamponade. A.S., now pending TAVR work up Course c.b R thigh hematoma requiring PRBC 2U now resolved with stable RLE CTA    Patient seen and examined at bedside.    Overnight Events: NAEON, ambulated well, eager to DC. States he has no complaints at this time. BLE edema improving with ace wraps and lasix     Telemetry: AF     REVIEW OF SYSTEMS:  CONSTITUTIONAL: No weakness, fevers or chills  EYES/ENT: No visual changes;  No dysphagia  NECK: No pain or stiffness  RESPIRATORY: No cough, wheezing, hemoptysis; No shortness of breath  CARDIOVASCULAR: No chest pain or palpitations; + lower extremity edema  GASTROINTESTINAL: No abdominal or epigastric pain. No nausea, vomiting  BACK: No back pain  GENITOURINARY: No dysuria, frequency or hematuria  NEUROLOGICAL: No numbness or weakness  SKIN: No itching, burning, rashes, or lesions   All other review of systems is negative unless indicated above.    Current Meds:  acetaminophen     Tablet .. 650 milliGRAM(s) Oral every 6 hours PRN  albuterol    90 MICROgram(s) HFA Inhaler 2 Puff(s) Inhalation every 6 hours PRN  allopurinol 100 milliGRAM(s) Oral daily  AQUAPHOR (petrolatum Ointment) 1 Application(s) Topical two times a day PRN  aspirin  chewable 81 milliGRAM(s) Oral daily  atorvastatin 40 milliGRAM(s) Oral at bedtime  beclomethasone  80 MICROgram(s) Inhaler 2 Puff(s) Inhalation two times a day  carboxymethylcellulose 0.5% (Preservative-Free) Ophthalmic Solution 1 Drop(s) Both EYES two times a day PRN  chlorhexidine 2% Cloths 1 Application(s) Topical daily  dextrose 5%. 1000 milliLiter(s) IV Continuous <Continuous>  dextrose 5%. 1000 milliLiter(s) IV Continuous <Continuous>  dextrose 50% Injectable 12.5 Gram(s) IV Push once  dextrose 50% Injectable 25 Gram(s) IV Push once  dextrose 50% Injectable 25 Gram(s) IV Push once  dextrose Oral Gel 15 Gram(s) Oral once PRN  furosemide    Tablet 40 milliGRAM(s) Oral two times a day  glucagon  Injectable 1 milliGRAM(s) IntraMuscular once  heparin  Infusion.  Unit(s)/Hr IV Continuous <Continuous>  insulin lispro (ADMELOG) corrective regimen sliding scale   SubCutaneous three times a day before meals  insulin lispro (ADMELOG) corrective regimen sliding scale   SubCutaneous at bedtime  melatonin 3 milliGRAM(s) Oral at bedtime PRN  metoprolol succinate ER 75 milliGRAM(s) Oral two times a day  omega-3-Acid Ethyl Esters 4 Gram(s) Oral daily  pantoprazole    Tablet 40 milliGRAM(s) Oral before breakfast  sodium chloride 0.65% Nasal 1 Spray(s) Both Nostrils every 6 hours PRN  tamsulosin 0.4 milliGRAM(s) Oral at bedtime  tiotropium 2.5 MICROgram(s) Inhaler 2 Puff(s) Inhalation daily      Vitals:  T(F): 98.1 (07-25), Max: 98.2 (07-24)  HR: 87 (07-25) (87 - 103)  BP: 111/76 (07-25) (101/63 - 111/76)  RR: 18 (07-25)  SpO2: 96% (07-25)  I&O's Summary    24 Jul 2023 07:01  -  25 Jul 2023 07:00  --------------------------------------------------------  IN: 1171 mL / OUT: 1725 mL / NET: -554 mL    Physical Exam:  Appearance: No acute distress; well appearing  Eyes: PERRL, EOMI, pink conjunctiva  HEENT: Normal oral mucosa  Cardiovascular: IIR, S1, S2, III/VI VIOLETA radiating to carotids, no rubs, or gallops, mild JVD   Respiratory: Bibasilar crackles,   Gastrointestinal: soft, non-tender, non-distended with normal bowel sounds  Musculoskeletal:, 3+ RLE edema; 2+ LLE edema, ACE wraps on BLE with mild improvement   Neurologic: Non-focal  Lymphatic: No lymphadenopathy  Psychiatry: AAOx3, mood & affect appropriate  Skin: No rashes, ecchymoses, or cyanosis                            8.5    6.62  )-----------( 118      ( 25 Jul 2023 05:03 )             28.0     07-25    139  |  104  |  52<H>  ----------------------------<  132<H>  4.4   |  25  |  1.81<H>    Ca    8.5      25 Jul 2023 05:03  Phos  3.3     07-25  Mg     1.7     07-25      PT/INR - ( 24 Jul 2023 15:26 )   PT: 13.4 sec;   INR: 1.16 ratio    PTT - ( 25 Jul 2023 05:03 )  PTT:68.6 sec    A/P  78 M with hx. of Afib (on Eliquis), severe AS, mod-severe TR, CAD/MI (30 yrs ago, medically managed, no PCI), ?CHF, COPD (not on O2 at home), former heavy smoker, HTN, HLD, DM2, gout.   Was sent in for concern re outpatient echocardiogram which demonstrated worsening pericardial effusion. Effusion not safely accessible, no evidence of tamponade. A.S., now pending TAVR work up  Course c.b R thigh hematoma requiring PRBC 2U.      #Large Pericardial Effusion  - No clinical tamponade as patient is hemodynamically stable, with robust heart sounds and no obvious JVD. Outpatient TTE demonstrated LVEF of 55% with large pericardial effusion without tamponade physiology.   - Repeat TTE showed large effusion mainly around L ventricle; pericardial effusion cannot be safely tapped  -Repeat CT shows improvement. No need for drain  Plan:  - Restart AC - please switch to DOAC    #Severe Symptomatic Low Flow Low Gradient AS  - Pt with known sx of sev AS p/w GOFF  - Repeat TTE showing sev AS  - Structural cardiology consulted, pending eventual CT TAVR & LHC  - Hypervolemic on exam, likely multifactorial, judicious with diuretics as above  Plan:  - LHC 7/19 with Dr. Wood    > 40% LAD 80% LCx 40% RCA - no PCI as asymptomatic and high bleeding risk   - Structural team now opting for outpatient TAVR after pt recovers and blood counts remain stable  - Structural will evaluate the patient likely as outpt for procedures.  - Would cont lasix 40 PO BID and place BLE compression stockings for edema until seen by outpatient caridologist    #AFib RVR - improved  #RBBB  - Persistent per OSH documentation   - On apixaban 2.5 BID at home - unclear why on dose reduced; per son he previously was on Coumadin but had supra-therapeutic.  Apixaban on hold at present given effusion and need for LHC.  - CHADSVASC 5   - AF RVR likely compensatory iso blood loss, no need to tx unless hemodynamically unstable in which case would recommend blood   - Cont MTP Succ 75 BID    #R Thigh Hematoma - resolved   - Noted on CT non con of the RLE  - Hb drop 2 pts, platelets downtrending - now stable   - Ok for heparin per heme 7/17  Plan:  - F/U RLE CTA - neg for bleed  - Clinically improved     #CAD   - 40% LAD 80% LCx 40% RCA - no PCI as asymptomatic and high bleeding risk   - Cont AAS/Statin   - Can determine need for PCI with his cardiologist Dr. Jimenes as an outpatient     Patient appears to be clinically stable for outpatient follow up at this time, no further cardiac work up needed,     Jean Ríos PGY5  Cardiology Fellow

## 2023-07-25 NOTE — PROGRESS NOTE ADULT - PROBLEM SELECTOR PROBLEM 1
Chronic kidney disease, unspecified CKD stage
Anemia
Chronic kidney disease, unspecified CKD stage
Pericardial effusion
Chronic kidney disease, unspecified CKD stage
Pericardial effusion
Chronic kidney disease, unspecified CKD stage
Pericardial effusion
Pericardial effusion
Chronic kidney disease, unspecified CKD stage
Pericardial effusion
Pericardial effusion
Anemia
Pericardial effusion
Anemia
Pericardial effusion
Anemia
Pericardial effusion
Pericardial effusion
Anemia
Pericardial effusion
Anemia
Pericardial effusion

## 2023-07-26 ENCOUNTER — TRANSCRIPTION ENCOUNTER (OUTPATIENT)
Age: 78
End: 2023-07-26

## 2023-07-26 VITALS
DIASTOLIC BLOOD PRESSURE: 70 MMHG | TEMPERATURE: 98 F | RESPIRATION RATE: 18 BRPM | OXYGEN SATURATION: 96 % | HEART RATE: 91 BPM | SYSTOLIC BLOOD PRESSURE: 117 MMHG

## 2023-07-26 LAB
ANION GAP SERPL CALC-SCNC: 12 MMOL/L — SIGNIFICANT CHANGE UP (ref 5–17)
BUN SERPL-MCNC: 48 MG/DL — HIGH (ref 7–23)
CALCIUM SERPL-MCNC: 9 MG/DL — SIGNIFICANT CHANGE UP (ref 8.4–10.5)
CHLORIDE SERPL-SCNC: 102 MMOL/L — SIGNIFICANT CHANGE UP (ref 96–108)
CLOSURE TME COLL+EPINEP BLD: SIGNIFICANT CHANGE UP (ref 150–400)
CO2 SERPL-SCNC: 25 MMOL/L — SIGNIFICANT CHANGE UP (ref 22–31)
CREAT SERPL-MCNC: 1.73 MG/DL — HIGH (ref 0.5–1.3)
EGFR: 40 ML/MIN/1.73M2 — LOW
GLUCOSE BLDC GLUCOMTR-MCNC: 106 MG/DL — HIGH (ref 70–99)
GLUCOSE BLDC GLUCOMTR-MCNC: 114 MG/DL — HIGH (ref 70–99)
GLUCOSE SERPL-MCNC: 112 MG/DL — HIGH (ref 70–99)
HCT VFR BLD CALC: 32.1 % — LOW (ref 39–50)
HGB BLD-MCNC: 9.9 G/DL — LOW (ref 13–17)
MCHC RBC-ENTMCNC: 28.7 PG — SIGNIFICANT CHANGE UP (ref 27–34)
MCHC RBC-ENTMCNC: 30.8 GM/DL — LOW (ref 32–36)
MCV RBC AUTO: 93 FL — SIGNIFICANT CHANGE UP (ref 80–100)
NRBC # BLD: 0 /100 WBCS — SIGNIFICANT CHANGE UP (ref 0–0)
PLATELET # BLD AUTO: 107 K/UL — LOW (ref 150–400)
POTASSIUM SERPL-MCNC: 4.4 MMOL/L — SIGNIFICANT CHANGE UP (ref 3.5–5.3)
POTASSIUM SERPL-SCNC: 4.4 MMOL/L — SIGNIFICANT CHANGE UP (ref 3.5–5.3)
PS EXPRESSION INTERPRETATION: SIGNIFICANT CHANGE UP
PS EXPRESSION PF4 30 MCG/100U HEPARIN: 0 % — SIGNIFICANT CHANGE UP
PS EXPRESSION PF4 30 MCG: 0 % — SIGNIFICANT CHANGE UP
PS EXPRESSION PF4 30 MCG: 3 % — SIGNIFICANT CHANGE UP
PS EXPRESSION RESULT: NEGATIVE — SIGNIFICANT CHANGE UP
RBC # BLD: 3.45 M/UL — LOW (ref 4.2–5.8)
RBC # FLD: 23.3 % — HIGH (ref 10.3–14.5)
SODIUM SERPL-SCNC: 139 MMOL/L — SIGNIFICANT CHANGE UP (ref 135–145)
UNFRACTIONATED HEPARIN INTERPRETATION: SIGNIFICANT CHANGE UP
UNFRACTIONATED HEPARIN RESULT: NEGATIVE — SIGNIFICANT CHANGE UP
UNFRACTIONATED HEPARIN-HIGH DOSE: 0 % — SIGNIFICANT CHANGE UP
UNFRACTIONATED HEPARIN-LOW DOSE: 0 % — SIGNIFICANT CHANGE UP
WBC # BLD: 6.66 K/UL — SIGNIFICANT CHANGE UP (ref 3.8–10.5)
WBC # FLD AUTO: 6.66 K/UL — SIGNIFICANT CHANGE UP (ref 3.8–10.5)

## 2023-07-26 PROCEDURE — 81003 URINALYSIS AUTO W/O SCOPE: CPT

## 2023-07-26 PROCEDURE — 93454 CORONARY ARTERY ANGIO S&I: CPT

## 2023-07-26 PROCEDURE — 99239 HOSP IP/OBS DSCHRG MGMT >30: CPT

## 2023-07-26 PROCEDURE — 85397 CLOTTING FUNCT ACTIVITY: CPT

## 2023-07-26 PROCEDURE — 83540 ASSAY OF IRON: CPT

## 2023-07-26 PROCEDURE — 83921 ORGANIC ACID SINGLE QUANT: CPT

## 2023-07-26 PROCEDURE — 86901 BLOOD TYPING SEROLOGIC RH(D): CPT

## 2023-07-26 PROCEDURE — 97162 PT EVAL MOD COMPLEX 30 MIN: CPT

## 2023-07-26 PROCEDURE — 85246 CLOT FACTOR VIII VW ANTIGEN: CPT

## 2023-07-26 PROCEDURE — 85245 CLOT FACTOR VIII VW RISTOCTN: CPT

## 2023-07-26 PROCEDURE — 86850 RBC ANTIBODY SCREEN: CPT

## 2023-07-26 PROCEDURE — 94010 BREATHING CAPACITY TEST: CPT

## 2023-07-26 PROCEDURE — 93308 TTE F-UP OR LMTD: CPT

## 2023-07-26 PROCEDURE — 86022 PLATELET ANTIBODIES: CPT

## 2023-07-26 PROCEDURE — P9011: CPT

## 2023-07-26 PROCEDURE — 85384 FIBRINOGEN ACTIVITY: CPT

## 2023-07-26 PROCEDURE — 86160 COMPLEMENT ANTIGEN: CPT

## 2023-07-26 PROCEDURE — 71250 CT THORAX DX C-: CPT

## 2023-07-26 PROCEDURE — 93005 ELECTROCARDIOGRAM TRACING: CPT

## 2023-07-26 PROCEDURE — 86334 IMMUNOFIX E-PHORESIS SERUM: CPT

## 2023-07-26 PROCEDURE — 82330 ASSAY OF CALCIUM: CPT

## 2023-07-26 PROCEDURE — 36415 COLL VENOUS BLD VENIPUNCTURE: CPT

## 2023-07-26 PROCEDURE — 84155 ASSAY OF PROTEIN SERUM: CPT

## 2023-07-26 PROCEDURE — 84156 ASSAY OF PROTEIN URINE: CPT

## 2023-07-26 PROCEDURE — 84132 ASSAY OF SERUM POTASSIUM: CPT

## 2023-07-26 PROCEDURE — 82947 ASSAY GLUCOSE BLOOD QUANT: CPT

## 2023-07-26 PROCEDURE — 80048 BASIC METABOLIC PNL TOTAL CA: CPT

## 2023-07-26 PROCEDURE — 87340 HEPATITIS B SURFACE AG IA: CPT

## 2023-07-26 PROCEDURE — 82435 ASSAY OF BLOOD CHLORIDE: CPT

## 2023-07-26 PROCEDURE — 87640 STAPH A DNA AMP PROBE: CPT

## 2023-07-26 PROCEDURE — 93306 TTE W/DOPPLER COMPLETE: CPT

## 2023-07-26 PROCEDURE — 84165 PROTEIN E-PHORESIS SERUM: CPT

## 2023-07-26 PROCEDURE — 86900 BLOOD TYPING SEROLOGIC ABO: CPT

## 2023-07-26 PROCEDURE — 93970 EXTREMITY STUDY: CPT

## 2023-07-26 PROCEDURE — 75574 CT ANGIO HRT W/3D IMAGE: CPT

## 2023-07-26 PROCEDURE — 82728 ASSAY OF FERRITIN: CPT

## 2023-07-26 PROCEDURE — C1894: CPT

## 2023-07-26 PROCEDURE — 84295 ASSAY OF SERUM SODIUM: CPT

## 2023-07-26 PROCEDURE — 36430 TRANSFUSION BLD/BLD COMPNT: CPT

## 2023-07-26 PROCEDURE — C1769: CPT

## 2023-07-26 PROCEDURE — 74174 CTA ABD&PLVS W/CONTRAST: CPT

## 2023-07-26 PROCEDURE — 85018 HEMOGLOBIN: CPT

## 2023-07-26 PROCEDURE — 85555 RBC OSMOTIC FRAGILITY: CPT

## 2023-07-26 PROCEDURE — 94640 AIRWAY INHALATION TREATMENT: CPT

## 2023-07-26 PROCEDURE — 99233 SBSQ HOSP IP/OBS HIGH 50: CPT | Mod: GC

## 2023-07-26 PROCEDURE — 82962 GLUCOSE BLOOD TEST: CPT

## 2023-07-26 PROCEDURE — 85610 PROTHROMBIN TIME: CPT

## 2023-07-26 PROCEDURE — 73706 CT ANGIO LWR EXTR W/O&W/DYE: CPT

## 2023-07-26 PROCEDURE — 86985 SPLIT BLOOD OR PRODUCTS: CPT

## 2023-07-26 PROCEDURE — 93880 EXTRACRANIAL BILAT STUDY: CPT

## 2023-07-26 PROCEDURE — 87641 MR-STAPH DNA AMP PROBE: CPT

## 2023-07-26 PROCEDURE — 83880 ASSAY OF NATRIURETIC PEPTIDE: CPT

## 2023-07-26 PROCEDURE — 85049 AUTOMATED PLATELET COUNT: CPT

## 2023-07-26 PROCEDURE — C1887: CPT

## 2023-07-26 PROCEDURE — 83735 ASSAY OF MAGNESIUM: CPT

## 2023-07-26 PROCEDURE — 84484 ASSAY OF TROPONIN QUANT: CPT

## 2023-07-26 PROCEDURE — 86923 COMPATIBILITY TEST ELECTRIC: CPT

## 2023-07-26 PROCEDURE — 83550 IRON BINDING TEST: CPT

## 2023-07-26 PROCEDURE — 99285 EMERGENCY DEPT VISIT HI MDM: CPT | Mod: 25

## 2023-07-26 PROCEDURE — 82570 ASSAY OF URINE CREATININE: CPT

## 2023-07-26 PROCEDURE — 71275 CT ANGIOGRAPHY CHEST: CPT

## 2023-07-26 PROCEDURE — 85025 COMPLETE CBC W/AUTO DIFF WBC: CPT

## 2023-07-26 PROCEDURE — 93971 EXTREMITY STUDY: CPT

## 2023-07-26 PROCEDURE — 82607 VITAMIN B-12: CPT

## 2023-07-26 PROCEDURE — 83605 ASSAY OF LACTIC ACID: CPT

## 2023-07-26 PROCEDURE — 83036 HEMOGLOBIN GLYCOSYLATED A1C: CPT

## 2023-07-26 PROCEDURE — 80053 COMPREHEN METABOLIC PANEL: CPT

## 2023-07-26 PROCEDURE — 85027 COMPLETE CBC AUTOMATED: CPT

## 2023-07-26 PROCEDURE — 84100 ASSAY OF PHOSPHORUS: CPT

## 2023-07-26 PROCEDURE — 85014 HEMATOCRIT: CPT

## 2023-07-26 PROCEDURE — 82746 ASSAY OF FOLIC ACID SERUM: CPT

## 2023-07-26 PROCEDURE — 76770 US EXAM ABDO BACK WALL COMP: CPT

## 2023-07-26 PROCEDURE — 82803 BLOOD GASES ANY COMBINATION: CPT

## 2023-07-26 PROCEDURE — 86803 HEPATITIS C AB TEST: CPT

## 2023-07-26 PROCEDURE — 0275U HEM HEPRN NDUC TRMBCTPNA SRM: CPT

## 2023-07-26 PROCEDURE — 73700 CT LOWER EXTREMITY W/O DYE: CPT

## 2023-07-26 PROCEDURE — 83010 ASSAY OF HAPTOGLOBIN QUANT: CPT

## 2023-07-26 PROCEDURE — 71045 X-RAY EXAM CHEST 1 VIEW: CPT

## 2023-07-26 PROCEDURE — 83020 HEMOGLOBIN ELECTROPHORESIS: CPT

## 2023-07-26 PROCEDURE — 96374 THER/PROPH/DIAG INJ IV PUSH: CPT

## 2023-07-26 PROCEDURE — 97116 GAIT TRAINING THERAPY: CPT

## 2023-07-26 PROCEDURE — 76705 ECHO EXAM OF ABDOMEN: CPT

## 2023-07-26 PROCEDURE — 82955 ASSAY OF G6PD ENZYME: CPT

## 2023-07-26 PROCEDURE — 83615 LACTATE (LD) (LDH) ENZYME: CPT

## 2023-07-26 PROCEDURE — 85730 THROMBOPLASTIN TIME PARTIAL: CPT

## 2023-07-26 PROCEDURE — 97530 THERAPEUTIC ACTIVITIES: CPT

## 2023-07-26 RX ORDER — ATORVASTATIN CALCIUM 80 MG/1
1 TABLET, FILM COATED ORAL
Qty: 30 | Refills: 0
Start: 2023-07-26

## 2023-07-26 RX ORDER — METOPROLOL TARTRATE 50 MG
3 TABLET ORAL
Qty: 180 | Refills: 0
Start: 2023-07-26 | End: 2023-08-24

## 2023-07-26 RX ORDER — PANTOPRAZOLE SODIUM 20 MG/1
1 TABLET, DELAYED RELEASE ORAL
Qty: 0 | Refills: 0 | DISCHARGE
Start: 2023-07-26

## 2023-07-26 RX ORDER — OMEGA-3 ACID ETHYL ESTERS 1 G
4 CAPSULE ORAL
Qty: 0 | Refills: 0 | DISCHARGE
Start: 2023-07-26

## 2023-07-26 RX ORDER — APIXABAN 2.5 MG/1
1 TABLET, FILM COATED ORAL
Qty: 30 | Refills: 3
Start: 2023-07-26

## 2023-07-26 RX ORDER — BECLOMETHASONE DIPROPIONATE 40 UG/1
2 AEROSOL, METERED RESPIRATORY (INHALATION)
Qty: 0 | Refills: 0 | DISCHARGE
Start: 2023-07-26

## 2023-07-26 RX ORDER — FUROSEMIDE 40 MG
1 TABLET ORAL
Qty: 60 | Refills: 2 | DISCHARGE
Start: 2023-07-26 | End: 2023-10-23

## 2023-07-26 RX ORDER — FUROSEMIDE 40 MG
1 TABLET ORAL
Qty: 60 | Refills: 2
Start: 2023-07-26 | End: 2023-10-23

## 2023-07-26 RX ADMIN — Medication 40 MILLIGRAM(S): at 13:08

## 2023-07-26 RX ADMIN — PANTOPRAZOLE SODIUM 40 MILLIGRAM(S): 20 TABLET, DELAYED RELEASE ORAL at 06:10

## 2023-07-26 RX ADMIN — BECLOMETHASONE DIPROPIONATE 2 PUFF(S): 40 AEROSOL, METERED RESPIRATORY (INHALATION) at 08:48

## 2023-07-26 RX ADMIN — Medication 100 MILLIGRAM(S): at 11:19

## 2023-07-26 RX ADMIN — TIOTROPIUM BROMIDE 2 PUFF(S): 18 CAPSULE ORAL; RESPIRATORY (INHALATION) at 11:21

## 2023-07-26 RX ADMIN — APIXABAN 5 MILLIGRAM(S): 2.5 TABLET, FILM COATED ORAL at 06:12

## 2023-07-26 RX ADMIN — Medication 81 MILLIGRAM(S): at 11:19

## 2023-07-26 RX ADMIN — CHLORHEXIDINE GLUCONATE 1 APPLICATION(S): 213 SOLUTION TOPICAL at 11:33

## 2023-07-26 RX ADMIN — Medication 40 MILLIGRAM(S): at 06:09

## 2023-07-26 RX ADMIN — Medication 4 GRAM(S): at 11:20

## 2023-07-26 NOTE — PHARMACOTHERAPY INTERVENTION NOTE - COMMENTS
Counseled patient on the following inpatient/discharge medications names (brand/generic), indication, and possible side effects:    eliquis 2.5 mg twice daily at home --> to 5 mg twice daily   lasix 40 mg once daily at home --> to 40 mg twice daily   metoprolol succinate 75 mg twice daily     Patient was provided with a medication card for their new medication. Patient questions and concerns were answered and addressed. Patient demonstrated understanding.    Rianna Ziegler  PharmD Candidate of 2024  [facility]   Counseled patient on the following inpatient/discharge (changes) medications names (brand/generic), indication, and possible side effects:    eliquis 2.5 mg twice daily at home --> to 5 mg twice daily   lasix 40 mg once daily at home --> to 40 mg twice daily   metoprolol succinate 75 mg twice daily     Patient was provided with a medication card for their new medication. Patient questions and concerns were answered and addressed. Patient demonstrated understanding.    Rianna Ziegler  PharmD Candidate of 2024  [facility]

## 2023-07-26 NOTE — DISCHARGE NOTE PROVIDER - ATTENDING DISCHARGE PHYSICAL EXAMINATION:
CONSTITUTIONAL: NAD, well-developed  EYES: PERRLA; conjunctiva and sclera clear  ENMT: Moist oral mucosa  RESPIRATORY: Normal respiratory effort; lungs are clear to auscultation bilaterally  CARDIOVASCULAR: Irregular R, no murmur/rub/gallop; + b/l LE edema, L>R, Peripheral pulses are 2+ bilaterally  ABDOMEN: Nontender to palpation, normoactive bowel sounds, no rebound/guarding;   MUSCULOSKELETAL:  no clubbing or cyanosis of digits; no joint swelling or tenderness to palpation, moving all extremities   PSYCH: A+O to person, place, and time; affect appropriate  NEUROLOGY: non focal, patient ambulating without assist  SKIN: RLE venous stasis changes, Lower extremity swelling improved several areas of ecchymosis on UEs with some swelling

## 2023-07-26 NOTE — PROGRESS NOTE ADULT - ASSESSMENT
1) hgb stable  2) prior egd/capsule negative  3) colon in pat normal  4) high risk repeat endosopic evaion  5) ppi tharpy  6) a/c per prerna

## 2023-07-26 NOTE — DISCHARGE NOTE PROVIDER - CARE PROVIDER_API CALL
Jimmie Dan  Interventional Cardiology  [address], [address] [place] [address]  Phone: [phone]  Fax: [phone]  Established Patient  Follow Up Time: 1 week    RUT BURGESS, ERROL NORTON  [address] [facility] [address], [place] [address]  Phone: [phone]  Fax: ([phone]  Established Patient  Follow Up Time: 1 week    Devonte Whiteside  Cardiology  [address] [place] [address]  Phone: [phone]  Fax: [phone]  Established Patient  Follow Up Time: 1 week    Reggie Sylvester  Hematology  [address] [place] [address]  Phone: [phone]  Fax: [phone]  Established Patient  Follow Up Time: 1 week    Dario Jimenes  Cardiovascular Disease  [address] [place] [address] [address]  [place] [address]  Phone: [phone]  Fax: [phone]  Established Patient  Follow Up Time: 1 week

## 2023-07-26 NOTE — PROGRESS NOTE ADULT - SUBJECTIVE AND OBJECTIVE BOX
Patient is a 78y Male     Patient is a 78y old  Male who presents with a chief complaint of worsening pericardial effusion (25 Jul 2023 13:55)      HPI:  78M (alt MRN [id number]) w/ hx of Afib (on Eliquis), severe AS, mod-severe TR, CAD/MI (30 yrs ago, medically managed, no PCI), ?CHF, COPD (not on O2 at home), former heavy smoker, HTN, HLD, DM2, gout, presenting with worsening pericardial effusion. Sent in from his cardiologist's office. He had a TTE on day of presentation (7/3/23) that showed a "large circumferential pericardial effusion up to 2.6 cm without evidence of tamponade physiology" (and mildly reduced RV systolic function was also noted). His prior TTE on 10/3/22 had shown a "moderate pericardial effusion, measuring about 1.0 cm superior to the RA; otherwise small circumferential pericardial effusion." He is unclear what is the cause of pericardial effusion and he is unsure if he has a hx of CHF. Reported that at baseline he has shortness of breath after walking 50 ft and swelling in both legs. Noted that the swelling in his legs today appear to be around his average size. Denied f/c/n/v, CP, SOB at rest, abdominal pain, dysuria, hematuria, hematochezia, melena, diarrhea, constipation.    In ED: Afebrile, HR 80s-90s, SBP 140s-150s, RR 16-20, 88% on RA, % on 1-2L O2NC. Labs notable for hsTrop 30->27, SCr 1.36, proBNP 2.3k; VBG pH 7.34, pCO2 61, HCO3 33, lactate 0.8. Given Lasix 40mg IV x1. Admitted to Medicine for further management. (04 Jul 2023 00:24)      PAST MEDICAL & SURGICAL HISTORY:  COPD (chronic obstructive pulmonary disease)      Former smoker      HTN (hypertension)      HLD (hyperlipidemia)      CAD (coronary artery disease)      Chronic atrial fibrillation      Diabetes mellitus, type 2      Gout      Severe aortic stenosis      Pericardial effusion      TR (tricuspid regurgitation)      No significant past surgical history          MEDICATIONS  (STANDING):  allopurinol 100 milliGRAM(s) Oral daily  apixaban 5 milliGRAM(s) Oral every 12 hours  aspirin  chewable 81 milliGRAM(s) Oral daily  atorvastatin 40 milliGRAM(s) Oral at bedtime  beclomethasone  80 MICROgram(s) Inhaler 2 Puff(s) Inhalation two times a day  chlorhexidine 2% Cloths 1 Application(s) Topical daily  dextrose 5%. 1000 milliLiter(s) (100 mL/Hr) IV Continuous <Continuous>  dextrose 5%. 1000 milliLiter(s) (50 mL/Hr) IV Continuous <Continuous>  dextrose 50% Injectable 12.5 Gram(s) IV Push once  dextrose 50% Injectable 25 Gram(s) IV Push once  dextrose 50% Injectable 25 Gram(s) IV Push once  furosemide    Tablet 40 milliGRAM(s) Oral two times a day  glucagon  Injectable 1 milliGRAM(s) IntraMuscular once  insulin lispro (ADMELOG) corrective regimen sliding scale   SubCutaneous three times a day before meals  insulin lispro (ADMELOG) corrective regimen sliding scale   SubCutaneous at bedtime  metoprolol succinate ER 75 milliGRAM(s) Oral two times a day  omega-3-Acid Ethyl Esters 4 Gram(s) Oral daily  pantoprazole    Tablet 40 milliGRAM(s) Oral before breakfast  tamsulosin 0.4 milliGRAM(s) Oral at bedtime  tiotropium 2.5 MICROgram(s) Inhaler 2 Puff(s) Inhalation daily      Allergies    penicillins (Unknown)    Intolerances        SOCIAL HISTORY:  Denies ETOh,Smoking,     FAMILY HISTORY:  FH: breast cancer (Mother)        REVIEW OF SYSTEMS:    CONSTITUTIONAL: No weakness, fevers or chills  EYES/ENT: No visual changes;  No vertigo or throat pain   NECK: No pain or stiffness  RESPIRATORY: No cough, wheezing, hemoptysis; No shortness of breath  CARDIOVASCULAR: No chest pain or palpitations  GASTROINTESTINAL: No abdominal or epigastric pain. No nausea, vomiting, or hematemesis; No diarrhea or constipation. No melena or hematochezia.  GENITOURINARY: No dysuria, frequency or hematuria  NEUROLOGICAL: No numbness or weakness  SKIN: No itching, burning, rashes, or lesions   All other review of systems is negative unless indicated above.    VITAL:  T(C): , Max: 36.9 (07-25-23 @ 17:03)  T(F): , Max: 98.4 (07-25-23 @ 17:03)  HR: 84 (07-26-23 @ 04:58)  BP: 102/65 (07-26-23 @ 04:58)  BP(mean): --  RR: 18 (07-26-23 @ 04:58)  SpO2: 94% (07-26-23 @ 04:58)  Wt(kg): --    I and O's:    07-24 @ 07:01  -  07-25 @ 07:00  --------------------------------------------------------  IN: 1171 mL / OUT: 1725 mL / NET: -554 mL    07-25 @ 07:01  -  07-26 @ 07:00  --------------------------------------------------------  IN: 1164 mL / OUT: 1702 mL / NET: -538 mL    07-26 @ 07:01  -  07-26 @ 09:39  --------------------------------------------------------  IN: 0 mL / OUT: 200 mL / NET: -200 mL          PHYSICAL EXAM:    Constitutional: NAD  HEENT: PERRLA,   Neck: No JVD  Respiratory: CTA B/L  Cardiovascular: S1 and S2  Gastrointestinal: BS+, soft, NT/ND  Extremities: No peripheral edema  Neurological: A/O x 3, no focal deficits  Psychiatric: Normal mood, normal affect  : No Reeder  Skin: No rashes  Access: Not applicable  Back: No CVA tenderness    LABS:                        9.9    6.66  )-----------( 107      ( 26 Jul 2023 06:47 )             32.1     07-26    139  |  102  |  48<H>  ----------------------------<  112<H>  4.4   |  25  |  1.73<H>    Ca    9.0      26 Jul 2023 06:47  Phos  3.3     07-25  Mg     1.7     07-25            RADIOLOGY & ADDITIONAL STUDIES:

## 2023-07-26 NOTE — DISCHARGE NOTE PROVIDER - NSDCFUADDAPPT_GEN_ALL_CORE_FT
APPTS ARE READY TO BE MADE: [x] YES    Best Family or Patient Contact (if needed):    Additional Information about above appointments (if needed):    1:   2:   3:     Other comments or requests:    APPTS ARE READY TO BE MADE: [x] YES    Best Family or Patient Contact (if needed):    Additional Information about above appointments (if needed):    1:   2:   3:     Other comments or requests:       Patient/Caregiver was provided with follow up request details and prefers to call the providers office on their own to schedule.

## 2023-07-26 NOTE — PROGRESS NOTE ADULT - REASON FOR ADMISSION
worsening pericardial effusion

## 2023-07-26 NOTE — DISCHARGE NOTE PROVIDER - HOSPITAL COURSE
78M (alt MRN [id number]) w/ hx of Afib (on Eliquis), severe AS, mod-severe TR, CAD/MI (30 yrs ago, medically managed, no PCI), ?CHF, COPD (not on O2 at home), former heavy smoker, HTN, HLD, DM2, gout, prior moderate pericardial effusion, now presenting with large pericardial effusion w/o tamponade physiology on outpt TTE.    #Anemia.   -Patient states he gets weekly iron infusions outpatient. outpatient hematologist is Dr. Sylvester () who is not affiliated with [facility], but has [facility] affiliation. Now seen by [facility] hematology service on 7/15.  -He was previously on eliquis 2.5BID because he had anemia but workup was negative.   - CT RLE  here shows large quadriceps intramuscular hematoma, Evaluated by surgery team and recommended no acute intervention rec. repeat CTA RLE stable without bleeding  - HIT panel negative, PF4 negative, I discussed this with hematology fellow Dr. Salinas, unlikely to be HIT at this time.   - No further workup by GI (Dr. Mccray)  - Patient has tolerated full day of heparin gtt, and is tolerating eliquis 5mg BID with stable h/h and platelets, Medically stable from an anemia and thrombocytopenia standpoint. Will need bloodwork in a week  (discussed this with patient and patient's son)    #DVT  -RIGHT calf vein thrombosis is detected in the soleal vein, nonocclusive.  -will need surveillance US in 2 weeks  -Is on eliquis 5mg BID for afib    #Pericardial effusion.    - Found to have "large circumferential pericardial effusion up to 2.6 cm without evidence of tamponade physiology" on outpt TTE (7/3/23)  - Remains asymptomatic. repeat again 7/17 remains unchanged   - Prior TTE (10/3/22) showed "moderate pericardial effusion, measuring about 1.0 cm superior to the RA; otherwise small circumferential pericardial effusion." Unclear underlying etiology of effusion, but possibly in setting of CHF.  - Lasix was on hold due to SHEYLA,  was then placed on lasix 40 IV BID for overload, which is now lasix 40 po BID   - Card rec : repeat CT chest to eval effusion done 7/21 effusion appears smaller > IR team reconsulted for re-eval, no percutaneous window for drainage.  - cards recs appreciated - keep on lasix 40mg PO BID for now    #Chronic atrial fibrillation.   - Hx of Afib (on Eliquis at home). CHADSVASC score of 5-6.  - c/w home metoprolol succinate 75mg bid  - monitor on telemetry  - heparin gtt now, transitioned to eliquis 7/25, tolerated well. dc on eliquis 5mg BID     #CHF, acute on chronic.    - Unclear hx of ?CHF, possibly has chronic HFpEF with severe AS -- TTE w/ unchanged mod to large pericardial effusion ; +severe AS  - LHC performed on 7/19 showing mod dz (40%) and 80% lesion LCx -no intervention performed.   - structural heart and CT surgery teams consulted for severe AS : Given complex med situation (anemia, bleeding, SHEYLA on CKD, pericardial effusion, CAD) and safety of resuming full AC would defer valve intervention at this time.   - strict I/Os, standing weights daily  - Structural cards team now opting for outpatient TAVR after recovery and stable Hgb.  - cards recs appreciated - transition to lasix 40mg po bid.    #SHEYLA (acute kidney injury).   - Cr worsened - possible contrast induced from LHC on 7/19.   - Nephrology following   - Diuretics as per nephro/cards  - Hep C non reactive. f/up HBsAg, serum immunofixation, C3 and C4.   - Renal US shows both kidneys are echogenic, compatible with medical renal disease. No renal mass, hydronephrosis or calculus  - monitor BMP, Is/Os.  - stopping metformin on discharge (explained this to the son)     #Acute hypoxemic respiratory failure.   - Presented with O2 sat down to 88% on RA. Improved on 1-2L O2NC and now off oxygen all together. Suspect in setting of acute on chronic CHF.  - management of CHF/pericardial effusion as above  - monitor respiratory status, wean off supplemental O2 as tolerated.    #CAD (coronary artery disease).   - Hx of CAD/MI (30 yrs ago, medically managed, no PCI)  - hsTrop 30->27; EKG w/o signs of acute ischemia; low suspicion of ACS  - c/w home statin, and metoprolol  - RESUMED aspirin 81mg po qd. monitor for s/s of bleed.    #Diabetes mellitus, type 2.   ·  Plan: Hx of DM2. At home, on Metformin 500mg BID  - monitor FS qAC and qHS  - low ISS for now  - HbA1c 6.1.  - given improvement in a1c, worsening CKD, and concerns from polypharmacy, will stop metformin 500mg BID    # Gout.   - c/w home allopurinol.    Hemodynamically stable for discharge with close outpatient follow up with pcp, cardiology, structural cardiology, hematology. high risk of decompensation and rehospitalization.

## 2023-07-26 NOTE — PROGRESS NOTE ADULT - ATTENDING COMMENTS
78 year old man with atrial fibrillation on apixaban, severe low flow low gradient AS, CAD, COPD, HTN, DM initially sent in for pericardial effusion which was determined not accessible for drainage and does not cause hemodynamic compromise. Had right thigh hematoma and thrombocytopenia, anemia. Symptomatically improving, edema gradually improving with continued improvement on oral regimen. Coronary angiography demonstrated circumflex lesion to be managed medically. Structural heart team plans outpatient follow up and reassessment to consider TAVR. Continuing diuresis on oral furosemide to 40mg BID. Etiology of pericardial effusion is still unclear. Plan discharge, no cardiology objection to today on current regimen and oral anticoagulation with apixaban.    To contact call Cardiology Fellow or Attending as listed on [email or web address] password: [email or web address].
78 year old man with atrial fibrillation on apixaban, severe low flow low gradient AS, CAD, COPD, HTN, DM initially sent in for pericardial effusion which was determined not accessible for drainage and does not cause hemodynamic compromise. Had right thigh hematoma and thrombocytopenia, anemia. Symptomatically improving, edema gradually improving with continued improvement on oral regimen. Coronary angiography demonstrated circumflex lesion to be managed medically. Structural heart team plans outpatient follow up and reassessment to consider TAVR. Continuing diuresis on oral furosemide to 40mg BID. Etiology of pericardial effusion is still unclear. Plan discharge, today on current regimen and oral anticoagulation with apixaban.    To contact call Cardiology Fellow or Attending as listed on [email or web address] password: "[employer].
78M with Afib on Eliquis, severe low flow low gradient AS, CAD, COPD, HTN, HLD, DM who was initially sent in for pericardial effusion, course c/b R thigh hematoma and thrombocytopenia/anemia.    He feels okay today in no acute distress. He has 2-3+ pitting edema in R leg up to the knees and 2+ pitting edema up to mid-shin in L leg.  CTA R leg with stable hematoma and no evidence of active bleed    1) Pericardial effusion  2) Severe LFLG AS  3) Afib  4) CAD  5) Thigh hematoma, anemia  - Agree with fellow's A/P as above: plan for LHC with Dr. Wood today for TAVR work-up  - Repeat TTE with large pericardial effusion on 7/17/23 noted and reviewed with interventional/structural attending Dr. Dan, no plans to tap at this time. Continue to monitor for HD stability.   - on atorvastatin 40mg daily  - on Torpol 75mg BID
78M with Afib on Eliquis, severe low flow low gradient AS, CAD, COPD, HTN, HLD, DM who was initially sent in for pericardial effusion, course c/b R thigh hematoma and thrombocytopenia/anemia.    He feels okay today in no acute distress. He has 2-3+ pitting edema in R leg up to the knees and 2+ pitting edema up to mid-shin in L leg.  CTA R leg with stable hematoma and no evidence of active bleed    1) Pericardial effusion  2) Severe LFLG AS  3) Afib  4) CAD, distal LCx 80% lesion  5) Thigh hematoma, anemia  - Agree with fellow's A/P as above: plan discussed with structural heart team; plan to see pt as outpatient in 3-4 weeks for reassessment and will proceed with TAVR if pt is doing well at that time. He is currently deemed to be too high risk given his chronic comorbidities and acute medical issues (pericardial effusion and thigh hematoma).  - Repeat TTE with large pericardial effusion on 7/17/23 noted and reviewed with interventional/structural attending Dr. Dan, no plans to tap at this time. At this time, the etiology of pericardial effusion is still unclear. Would repeat non-con Chest CT and have IR evaluate to see if pericardial effusion is amenable to tapping. If not, can consider thoracic surgery consult.  - on atorvastatin 40mg daily  - on Torpol 75mg BID
78M with Afib on Eliquis, severe low flow low gradient AS, CAD, COPD, HTN, HLD, DM who was initially sent in for pericardial effusion, course c/b R thigh hematoma and thrombocytopenia/anemia.    He feels okay today in no acute distress. He has 2-3+ pitting edema in R leg up to the knees and 2+ pitting edema up to mid-shin in L leg. He has bibasilar crackles on exam.  CTA R leg with stable hematoma and no evidence of active bleed    1) Pericardial effusion  2) Severe LFLG AS  3) Afib  4) CAD, distal LCx 80% lesion  5) Thigh hematoma, anemia  - Agree with fellow's A/P as above: plan discussed with structural heart team; plan to see pt as outpatient in 3-4 weeks for reassessment and will proceed with TAVR if pt is doing well at that time. He is currently deemed to be too high risk given his chronic comorbidities and acute medical issues (pericardial effusion and thigh hematoma).  - Start Lasix 40mg IV daily for volume overload  - Repeat TTE with large pericardial effusion on 7/17/23 noted and reviewed with interventional/structural attending Dr. Dan, no plans to tap at this time. At this time, the etiology of pericardial effusion is still unclear. Would repeat non-con Chest CT and have IR evaluate to see if pericardial effusion is amenable to tapping. If not, can consider thoracic surgery consult.  - Once no issues with bleeding, he needs to be rechallenged with AC to see if he can tolerate  - on atorvastatin 40mg daily  - on Torpol 75mg BID
78-yr-old man with severe aortic stenosis in addition to other cardiopulmonary comorbidities seen in follow up for anemia and thrombocytopenia. Patient endorses that he has been on weekly iron infusion for over two years. Complete upper and lower GI workup including capsule endoscopy reportedly have failed to find any bleeding source. Patient states that his mother and sister also needed IV iron routinely. Chemistry labs are somewhat suggestive of iron deficiency. Peripheral smear shows numerous small platelet clumps attesting pseudothrombocytopenia; red cell morphology suspicious for fragmentation hemolysis; however LDH and haptoglobin are WNL.  B12/Folate normal. 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 Please send the w/u as suggested in fellow's note; please add an U/A. Patient will benefit from a dose of iron infusion. He must have a repeat in-house GI w/u. Will follow up.
78M with Afib on Eliquis, severe low flow low gradient AS, CAD, COPD, HTN, HLD, DM who was initially sent in for pericardial effusion, course c/b R thigh hematoma and thrombocytopenia/anemia.    He feels okay today in no acute distress. He has 2-3+ pitting edema in R leg up to the knees and 2+ pitting edema up to mid-shin in L leg.    -Agree with fellow's A/P as above: current plan to obtain CTA R leg to assess thigh hematoma. Pending this study, vascular surgery evaluation, and stabilization of Hgb/plts, will consider rechallenging with AC and then possibly resuming work-up for TAVR   -Repeat TTE with large pericardial effusion on 7/17/23 noted and reviewed with interventional/structural attending Dr. Dan, no plans to tap at this time. Continue to monitor for HD stability.
78M with Afib on Eliquis, severe low flow low gradient AS, CAD, COPD, HTN, HLD, DM who was initially sent in for pericardial effusion, course c/b R thigh hematoma and thrombocytopenia/anemia.    He feels okay today in no acute distress. He has 2-3+ pitting edema in R leg up to the knees and 2+ pitting edema up to mid-shin in L leg. He has bibasilar crackles on exam.  CTA R leg with stable hematoma and no evidence of active bleed  Repeat CT Chest showed stable moderate pericardial effusion measuring 1.9 cm. Evaluated by IR, no window for drainage.    1) Pericardial effusion  2) Severe LFLG AS  3) Afib  4) CAD, distal LCx 80% lesion  5) Thigh hematoma, anemia  - Agree with fellow's A/P as above: plan discussed with structural heart team; plan to see pt as outpatient in 3-4 weeks for reassessment and will proceed with TAVR if pt is doing well at that time. He is currently deemed to be too high risk given his chronic comorbidities and acute medical issues (pericardial effusion and thigh hematoma).  - Increase Lasix to 40mg IV BID for volume overload  - Repeat TTE with large pericardial effusion on 7/17/23 noted and reviewed with interventional/structural attending Dr. Dan, no plans to tap at this time. At this time, the etiology of pericardial effusion is still unclear. Repeat Chest CT reviewed by IR, no plans for drainage  - Once no issues with bleeding, he needs to be rechallenged with AC to see if he can tolerate, pending hematology recs  - on atorvastatin 40mg daily  - on Torpol 75mg BID
79 y/o man with AF (on Eliquis), severe AS, mod-severe TR, CAD/MI, COPD, HTN, HLD, DM2 with large pericardial effusion.  --TTE 7/5 with moderate-large circumferential pericardial effusion--check interval repeat limited TTE to re-evaluate.  --Monitor hemodynamics closely given pericardial effusion.  --EF approximately 60% on TTE but patient with severe AS; monitor volume status closely as receiving blood products for anemia.  --Anemia work-up/close monitoring; patient remains in AF and is off AC given anemia/bleeding/hematoma.  --Surgery follow-up for hematoma management.    --Structural Heart work-up on hold for now given concurrent medical issues.    --Monitor closely on telemetry.  --Will follow.
78 year old man with atrial fibrillation on apixaban, severe low flow low gradient AS, CAD, COPD, HTN, DM initially sent in for pericardial effusion which was determined not accessible for drainage and does not cause hemodynamic compromise. Had right thigh hematoma and thrombocytopenia, anemia. Symptomatically improving, but remains with 3+ pitting edema. Coronary angiography demonstrated circumflex lesion to be managed medically. Structural heart team plans outpatient follow up and reassessment to consider TAVR. Seeking more aggressive diuresis with Increase furosemide to 40mg IV BID for volume overload. Etiology of pericardial effusion is still unclear. Once no issues with bleeding, need re-challenge with anticoagulation.    To contact call Cardiology Fellow or Attending as listed on [email or web address] password: [email or web address].
79 y/o man with AF (on Eliquis), severe AS, mod-severe TR, CAD/MI, COPD, HTN, HLD, DM2 with large pericardial effusion.  --TTE 7/5 with moderate-large circumferential pericardial effusion--check interval repeat limited TTE to re-evaluate pericardial effusion.  --Monitor hemodynamics closely given pericardial effusion; HR generally has been low 100s with SBP 100s-115; patient comfortable without significant complaints.  --EF approximately 60% on TTE but patient with severe AS; monitor volume status closely as receiving blood products for anemia.  --Anemia work-up/close monitoring; patient remains in AF and is off AC given anemia/bleeding/hematoma-receiveing more PRBCs.  --Surgery follow-up for hematoma management.    --Structural Heart work-up on hold for now given concurrent medical issues.    --Monitor closely on telemetry.  --Will follow.
78M with Afib on Eliquis, severe low flow low gradient AS, CAD, COPD, HTN, HLD, DM who was initially sent in for pericardial effusion, course c/b R thigh hematoma and thrombocytopenia/anemia.    He feels okay today in no acute distress. He has 2-3+ pitting edema in R leg up to the knees and 2+ pitting edema up to mid-shin in L leg.    -Agree with fellow's A/P as above: current plan to obtain CTA R leg to assess thigh hematoma. Pending this study, vascular surgery evaluation, and stabilization of Hgb/plts, will consider rechallenging with AC and then possibly resuming work-up for TAVR   -Repeat TTE with large pericardial effusion on 7/17/23 noted and reviewed with interventional/structural attending Dr. Dan, no plans to tap at this time. Continue to monitor for HD stability.
78M w/ hx of Afib (on Eliquis), severe AS, mod-severe TR, CAD/MI (30 yrs ago, medically managed, no PCI), HFpEF (TTE 6/3 EF 55%), CKD, COPD (not on O2 at home), former heavy smoker (over 25 years agO), HTN, HLD, DM2, gout, presenting with worsening pericardial effusion. Patient was sent in from his cardiologist's office. Of note, he had a TTE on day of presentation (7/3/23) that showed a "large circumferential pericardial effusion up to 2.6 cm without evidence of tamponade physiology" (and mildly reduced RV systolic function was also noted). His prior TTE on 10/3/22 had shown a "moderate pericardial effusion, measuring about 1.0 cm superior to the RA; otherwise small circumferential pericardial effusion." Etiology of pericardial effusion is unclear. Cardiology is considering pericardiocentesis and CT TVAR, renal was consulted to evaluate the patient for contrast tolerance given CKD. Discussed with patient, family risk benefit,, not SOB, no other new complaints other than frustrated with pace of w/u.    1.  CKD--w/u as outlined.  Given tight AS.  volume optimization to reduced contrast risk challenging although initial CT angiogram risk relatively low and could give LOW pericontrast volume NSS 1hr before and 6 hrs post 1cc/kg/hr.  Cr remaining stable.  Will have to observe closely for evidence of HD requirement which does not exist at this time.  Limit contrast volume, frequency, RAASi, other nephrotoxins    discussed with med team  Darek Edmond MD  contact me on TEAMS
I have seen this patient with the fellow and agree with their assessment and plan. I was physically present for significant portions of the evaluation and management (E/M) service provided.  I agree with the above history, physical, and plan which I have reviewed and edited where appropriate.    Stable CKD   can f.u with us as outpatient    reconsult as needed    Gloria Hilton MD  Office   Contact me directly via [employer] Teams     (After 5 pm or on weekends please page the on-call fellow/attending, can check [email or web address] for schedule. Login is jean-pierre ballard, schedule under [facility] medicine, psych, derm)    For weekend coverage, call Demetri Barnes(fellow) and Dr Verónica Limon( attending)
78M w/ hx of Afib (on Eliquis), severe AS, mod-severe TR, CAD/MI (30 yrs ago, medically managed, no PCI), HFpEF (TTE 6/3 EF 55%), CKD, COPD (not on O2 at home), former heavy smoker (over 25 years agO), HTN, HLD, DM2, gout, presenting with worsening pericardial effusion. Patient was sent in from his cardiologist's office. Of note, he had a TTE on day of presentation (7/3/23) that showed a "large circumferential pericardial effusion up to 2.6 cm without evidence of tamponade physiology" (and mildly reduced RV systolic function was also noted). His prior TTE on 10/3/22 had shown a "moderate pericardial effusion, measuring about 1.0 cm superior to the RA; otherwise small circumferential pericardial effusion." Etiology of pericardial effusion is unclear. Cardiology no longer considering pericardiocentesis but still needs CT TVAR, renal was consulted to evaluate the patient for contrast tolerance given CKD. Discussed again with patient, family risk benefit,, not SOB, no other new complaints other than frustrated with pace of w/u.    1.  CKD--w/u as outlined but thus far unrevealing although cardiorenal component likely.  .  Given tight AS.  volume optimization to reduced contrast risk challenging although initial CT angiogram risk relatively low and could give LOW pericontrast volume NSS 1hr before and 6 hrs post 1cc/kg/hr.  Cr remaining stable.  Will have to observe closely for evidence of HD requirement which does not exist at this time.  Limit contrast volume, frequency, RAASi, other nephrotoxins    discussed with med team  Darek Edmond MD  contact me on TEAMS
78M w/ hx of Afib (on Eliquis), severe AS, mod-severe TR, CAD/MI (30 yrs ago, medically managed, no PCI), HFpEF (TTE 6/3 EF 55%), CKD, COPD (not on O2 at home), former heavy smoker (over 25 years agO), HTN, HLD, DM2, gout, presenting with worsening pericardial effusion. Patient was sent in from his cardiologist's office. Of note, he had a TTE on day of presentation (7/3/23) that showed a "large circumferential pericardial effusion up to 2.6 cm without evidence of tamponade physiology" (and mildly reduced RV systolic function was also noted). His prior TTE on 10/3/22 had shown a "moderate pericardial effusion, measuring about 1.0 cm superior to the RA; otherwise small circumferential pericardial effusion." Etiology of pericardial effusion is unclear. Cardiology no longer considering pericardiocentesis but still needs CT TVAR, renal was consulted to evaluate the patient for contrast tolerance given CKD. Discussed again with patient, family risk benefit,, not SOB, no other new complaints other than frustrated with pace of w/u.  HIS CT performed but now large hematoma (likely from prior fall, low platelets).    1.  CKD--w/u as outlined but thus far unrevealing although cardiorenal component likely.  Cr overall improved, stable with contrast protection protocal.  Given tight AS.  volume optimization to reduced contrast risk challenging although initial CT angiogram risk relatively low and could give LOW pericontrast volume NSS 1hr before and 6 hrs post 1cc/kg/hr.  Cr remaining stable.  Will have to observe closely for evidence of HD requirement which does not exist at this time.  Limit contrast volume, frequency, RAASi, other nephrotoxins  2.  Hematoma, thigh--20ug DDAVP may be helpful q24h to enhance 2 related hemostasis challenge    discussed with med team  Darek Edmond MD  contact me on TEAMS
78M w/ hx of Afib (on Eliquis), severe AS, mod-severe TR, CAD/MI (30 yrs ago, medically managed, no PCI), HFpEF (TTE 6/3 EF 55%), CKD, COPD (not on O2 at home), former heavy smoker (over 25 years agO), HTN, HLD, DM2, gout, presenting with worsening pericardial effusion. Patient was sent in from his cardiologist's office. Of note, he had a TTE on day of presentation (7/3/23) that showed a "large circumferential pericardial effusion up to 2.6 cm without evidence of tamponade physiology" (and mildly reduced RV systolic function was also noted). His prior TTE on 10/3/22 had shown a "moderate pericardial effusion, measuring about 1.0 cm superior to the RA; otherwise small circumferential pericardial effusion." Etiology of pericardial effusion is unclear. Cardiology is considering pericardiocentesis and CT TVAR, renal was consulted to evaluate the patient for contrast tolerance given CKD. Discussed with patient, family risk benefit,, not SOB, no other new comlaints  1.  CKD--w/u as outlined.  Given tight AS.  volume optimization to reduced contrast risk challenging although initial CT angiogram risk relatively low and could give LOW pericontrast volume NSS 1hr before and 6 hrs post 1cc/kg/hr.  Cr remaining stable.  Will have to observe closely for evidence of HD requirement which does not exist at this time.  Limit contrast volume, frequency, RAASi, other nephrotoxins    discussed with med team  Darek Edmond MD  contact me on TEAMS

## 2023-07-26 NOTE — DISCHARGE NOTE NURSING/CASE MANAGEMENT/SOCIAL WORK - NSDCPEPT PROEDHF_GEN_ALL_CORE
This is a historical note converted from Ariana. Formatting and pictures may have been removed.  Please reference Ariana for original formatting and attached multimedia. Procedure Name  Left Sacroiliac Joint Injection  ?   Pre-Procedure Diagnoses:  1. Chronic pain syndrome  2. Left SI joint pain  3. Left sacroiliitis  4. Low back pain  ?   Post-Procedure Diagnoses:  1. Chronic pain syndrome  2. Left SI joint pain  3. Left sacroiliitis  4. Low back pain  ?   Anesthesia:  Local and MAC  ?  Estimated Blood Loss:  None  ?  Complications:  None  ?  Informed Consent:  The procedure, risks, benefits, and alternatives were discussed with the patient.? There were no contraindications to the procedure.? The patient expressed understanding and agreed to proceed.? Fully informed written consent was obtained.?  ?  Description of the Procedure:  The patient was taken to the operating room.? IV access was obtained prior to the start of the procedure.? The patient was positioned prone on the fluoroscopy table.? Continuous hemodynamic monitoring was initiated and continued throughout the duration of the procedure.? The skin overlying the lumbosacral spine was prepped with Chloraprep and draped into a sterile field.? Fluoroscopy was used to identify the location of the left SI joint.? Skin anesthesia was achieved using?1?mL of 1% lidocaine over the injection site.? A 22 gauge 3.5 inch Quinke spinal needle was slowly inserted and advanced?under intermittent fluoroscopy?through the SI joint capsule.? Proper needle position was confirmed under AP, oblique, and lateral fluoroscopic views.? Negative aspiration was confirmed.? Then a combination of?20 mg of Kenalog and?1 mL of?0.5% bupivacaine?was easily injected.? There was no pain on injection.? The needle was removed intact and bleeding was nil.??Sterile bandages were applied.? The patient was taken to the recovery room for further observation in stable condition.? The patient was then  discharged home without any complications.   Call primary care provider for follow up after discharge/Activities as tolerated/Low salt diet/Monitor weight daily/Report signs and symptoms to primary care provider

## 2023-07-26 NOTE — PROGRESS NOTE ADULT - ATTENDING SUPERVISION STATEMENT
Fellow
Resident
Resident
Fellow
Resident
Resident

## 2023-07-26 NOTE — DISCHARGE NOTE PROVIDER - NSDCMRMEDTOKEN_GEN_ALL_CORE_FT
allopurinol 100 mg oral tablet: 1 tab(s) orally once a day  Aspir 81 oral delayed release tablet: 1 tab(s) orally once a day  atorvastatin 40 mg oral tablet: 1 tab(s) orally once a day  Eliquis 2.5 mg oral tablet: 1 tab(s) orally 2 times a day  Fish Oil oral capsule: 4 cap(s) orally once a day  furosemide 40 mg oral tablet: 1 tab(s) orally once a day (per pt, generally takes once a day unless his weight &gt;175lbs, then he would take twice a day)  metFORMIN 500 mg oral tablet: 1 tab(s) orally 2 times a day  metoprolol succinate 25 mg oral tablet, extended release: 1 tab(s) orally (pt stated taking 75mg twice a day, but unclear need to clarify)  metoprolol succinate 50 mg oral tablet, extended release: 1 tab(s) orally (pt stated taking 75mg twice a day, but unclear need to clarify)  pantoprazole 40 mg oral delayed release tablet: 1 tab(s) orally once a day  Qvar 80 mcg/inh inhalation aerosol: 2 puff(s) inhaled once a day  Qvar 80 mcg/inh inhalation aerosol: 1 puff(s) inhaled 2 times a day  Spiriva 18 mcg inhalation capsule: 1 cap(s) inhaled once a day  tamsulosin 0.4 mg oral capsule: 1 cap(s) orally once a day   allopurinol 100 mg oral tablet: 1 tab(s) orally once a day  apixaban 5 mg oral tablet: 1 tab(s) orally every 12 hours  Aspir 81 oral delayed release tablet: 1 tab(s) orally once a day  atorvastatin 40 mg oral tablet: 1 tab(s) orally once a day (at bedtime)  beclomethasone 80 mcg/inh inhalation aerosol: 2 puff(s) inhaled 2 times a day  furosemide 40 mg oral tablet: 1 tab(s) orally 2 times a day  metoprolol succinate 25 mg oral tablet, extended release: 3 tab(s) orally 2 times a day  omega-3 polyunsaturated fatty acids ethyl esters 1000 mg oral capsule: 4 cap(s) orally once a day  pantoprazole 40 mg oral delayed release tablet: 1 tab(s) orally once a day (before a meal)  Spiriva 18 mcg inhalation capsule: 1 cap(s) inhaled once a day  tamsulosin 0.4 mg oral capsule: 1 cap(s) orally once a day

## 2023-07-26 NOTE — DISCHARGE NOTE PROVIDER - PROVIDER TOKENS
PROVIDER:[[id number]],FOLLOWUP:[1 week],ESTABLISHEDPATIENT:[T]],PROVIDER:[TOKEN:[[id number]],FOLLOWUP:[1 week],ESTABLISHEDPATIENT:[T]],PROVIDER:[TOKEN:[[id number]:[1 week],ESTABLISHEDPATIENT:[T]],PROVIDER:[[id number]],FOLLOWUP:[1 week],ESTABLISHEDPATIENT:[T]],PROVIDER:[TOKEN:[[id number]],FOLLOWUP:[1 week],ESTABLISHEDPATIENT:[T]]

## 2023-07-26 NOTE — PROGRESS NOTE ADULT - PROVIDER SPECIALTY LIST ADULT
Cardiology
Hospitalist
Nephrology
Structural Heart
Surgery
CT Surgery
Cardiology
Gastroenterology
Gastroenterology
Heme/Onc
Hospitalist
Internal Medicine
Nephrology
Structural Heart
Cardiology
Gastroenterology
Intervent Radiology
Nephrology
Structural Heart
Cardiology
Cardiology
Gastroenterology
Nephrology
Internal Medicine
Hospitalist
Internal Medicine
Hospitalist

## 2023-07-26 NOTE — DISCHARGE NOTE PROVIDER - NSDCCPCAREPLAN_GEN_ALL_CORE_FT
PRINCIPAL DISCHARGE DIAGNOSIS  Diagnosis: Acute pericardial effusion  Assessment and Plan of Treatment: On outpatient echocardiogram, we saw that you had fluid around your heart. Repeat, this fluid went down (likely from the water pill lasix), and subsequently, the effusion was too small to drain.  -Please continue to take furosemide or lasix 40mg orally twice a day.   -Please see your cardiologist in 1-2 weeks         SECONDARY DISCHARGE DIAGNOSES  Diagnosis: Anemia  Assessment and Plan of Treatment: you had anemia, or low hemoglobin. This likely happened becuase of a large quadriceps hematoma in your right leg/thigh area. Subsequently the hematoma remains unchanged and has not enlarged.   - We started eliquis 5mg twice a day, and your hemoglobin has remained stable.   -Please be careful that you do not fall down.  -Please continue to wrap both of your legs until you see your cardiologist  - Please see Dr. Sylvester in one week to make sure that your hemoglobin and your platelets are stable.    Diagnosis: Heart failure  Assessment and Plan of Treatment: - Your sonogram showed HF with preserved ejection fraction. It also showed severe aortic stenosis (narrowing of the aortic valve).   - LHC performed on 7/19 showing mod dz (40%) and 80% lesion LCx -no intervention performed.   - Please follow up with the Structural cardiology team for outpatient TAVR after recovery  - Please continue taking lasix/furosemide 40mg every 12 hours  - Please follow up with your cardiologist within 1-2 weeks for followup of your heart failure and LE swelling    Diagnosis: Chronic atrial fibrillation  Assessment and Plan of Treatment: Your CHADSVASC score is 5, which puts you at a high risk for strokes and clots.   - please continue taking home metoprolol succinate 75mg bid  - monitor on telemetry  - Please take eliquis 5mg twice a day      Diagnosis: Diabetes mellitus with hyperglycemia  Assessment and Plan of Treatment: Your diabetes was controlle dhere with insulin as needed.  - Your kidney funciton is getting worse but your a1c is well controlled. Please do not take metformin anymore.    Diagnosis: Deep vein thrombosis  Assessment and Plan of Treatment: On imaging, we found a thrombosis in the right lower extremity. It is below the knee.  -Please follow up and obtian an US of the legs in 2 weeks.

## 2023-07-26 NOTE — DISCHARGE NOTE PROVIDER - CARE PROVIDERS DIRECT ADDRESSES
,DirectAddress_Unknown,DirectAddress_Unknown,DirectAddress_Unknown,DirectAddress_Unknown,[email or web address]

## 2023-07-26 NOTE — PROGRESS NOTE ADULT - SUBJECTIVE AND OBJECTIVE BOX
ID: 78 M with hx. of Afib (on Eliquis), severe AS, mod-severe TR, CAD/MI (30 yrs ago, medically managed, no PCI), ?CHF, COPD (not on O2 at home), former heavy smoker, HTN, HLD, DM2, gout.  Was sent in for concern re outpatient echocardiogram which demonstrated worsening pericardial effusion. Effusion not safely accessible, no evidence of tamponade. A.S., now pending TAVR work up Course c.b R thigh hematoma requiring PRBC 2U now resolved with stable RLE CTA    Patient seen and examined at bedside.    Overnight Events: NAEON, edema continues to improve, eager to DC (should go today per primary), denies FC NV CP SOB    Telemetry: AF     Review of Systems:     REVIEW OF SYSTEMS:  CONSTITUTIONAL: No weakness, fevers or chills  EYES/ENT: No visual changes;  No dysphagia  NECK: No pain or stiffness  RESPIRATORY: No cough, wheezing, hemoptysis; No shortness of breath  CARDIOVASCULAR: No chest pain or palpitations; + lower extremity edema  GASTROINTESTINAL: No abdominal or epigastric pain. No nausea, vomiting  BACK: No back pain  GENITOURINARY: No dysuria, frequency or hematuria  NEUROLOGICAL: No numbness or weakness  SKIN: No itching, burning, rashes, or lesions   All other review of systems is negative unless indicated above.    Current Meds:  acetaminophen     Tablet .. 650 milliGRAM(s) Oral every 6 hours PRN  albuterol    90 MICROgram(s) HFA Inhaler 2 Puff(s) Inhalation every 6 hours PRN  allopurinol 100 milliGRAM(s) Oral daily  apixaban 5 milliGRAM(s) Oral every 12 hours  AQUAPHOR (petrolatum Ointment) 1 Application(s) Topical two times a day PRN  aspirin  chewable 81 milliGRAM(s) Oral daily  atorvastatin 40 milliGRAM(s) Oral at bedtime  beclomethasone  80 MICROgram(s) Inhaler 2 Puff(s) Inhalation two times a day  carboxymethylcellulose 0.5% (Preservative-Free) Ophthalmic Solution 1 Drop(s) Both EYES two times a day PRN  chlorhexidine 2% Cloths 1 Application(s) Topical daily  dextrose 5%. 1000 milliLiter(s) IV Continuous <Continuous>  dextrose 5%. 1000 milliLiter(s) IV Continuous <Continuous>  dextrose 50% Injectable 12.5 Gram(s) IV Push once  dextrose 50% Injectable 25 Gram(s) IV Push once  dextrose 50% Injectable 25 Gram(s) IV Push once  dextrose Oral Gel 15 Gram(s) Oral once PRN  furosemide    Tablet 40 milliGRAM(s) Oral two times a day  glucagon  Injectable 1 milliGRAM(s) IntraMuscular once  insulin lispro (ADMELOG) corrective regimen sliding scale   SubCutaneous three times a day before meals  insulin lispro (ADMELOG) corrective regimen sliding scale   SubCutaneous at bedtime  melatonin 3 milliGRAM(s) Oral at bedtime PRN  metoprolol succinate ER 75 milliGRAM(s) Oral two times a day  omega-3-Acid Ethyl Esters 4 Gram(s) Oral daily  pantoprazole    Tablet 40 milliGRAM(s) Oral before breakfast  sodium chloride 0.65% Nasal 1 Spray(s) Both Nostrils every 6 hours PRN  tamsulosin 0.4 milliGRAM(s) Oral at bedtime  tiotropium 2.5 MICROgram(s) Inhaler 2 Puff(s) Inhalation daily      Vitals:  T(F): 97.7 (07-26), Max: 98.4 (07-25)  HR: 91 (07-26) (84 - 106)  BP: 117/70 (07-26) (102/65 - 127/81)  RR: 18 (07-26)  SpO2: 96% (07-26)  I&O's Summary    25 Jul 2023 07:01  -  26 Jul 2023 07:00  --------------------------------------------------------  IN: 1164 mL / OUT: 1702 mL / NET: -538 mL    26 Jul 2023 07:01  -  26 Jul 2023 12:26  --------------------------------------------------------  IN: 240 mL / OUT: 650 mL / NET: -410 mL          Physical Exam:  Appearance: No acute distress; well appearing  Eyes: PERRL, EOMI, pink conjunctiva  HEENT: Normal oral mucosa  Cardiovascular: IIR, S1, S2, III/VI VIOLETA radiating to carotids, no rubs, or gallops, no JVD   Respiratory: Bibasilar crackles,   Gastrointestinal: soft, non-tender, non-distended with normal bowel sounds  Musculoskeletal:, 3+ RLE edema; 2+ LLE edema, ACE wraps on BLE with mild improvement   Neurologic: Non-focal  Lymphatic: No lymphadenopathy  Psychiatry: AAOx3, mood & affect appropriate  Skin: No rashes, ecchymoses, or cyanosis                          9.9    6.66  )-----------( 107      ( 26 Jul 2023 06:47 )             32.1     07-26    139  |  102  |  48<H>  ----------------------------<  112<H>  4.4   |  25  |  1.73<H>    Ca    9.0      26 Jul 2023 06:47  Phos  3.3     07-25  Mg     1.7     07-25      PT/INR - ( 24 Jul 2023 15:26 )   PT: 13.4 sec;   INR: 1.16 ratio    PTT - ( 25 Jul 2023 20:26 )  PTT:33.8 sec    New ECG(s): Personally reviewed    A/P  78 M with hx. of Afib (on Eliquis), severe AS, mod-severe TR, CAD/MI (30 yrs ago, medically managed, no PCI), ?CHF, COPD (not on O2 at home), former heavy smoker, HTN, HLD, DM2, gout.   Was sent in for concern re outpatient echocardiogram which demonstrated worsening pericardial effusion. Effusion not safely accessible, no evidence of tamponade. A.S., now pending TAVR work up  Course c.b R thigh hematoma requiring PRBC 2U.      #Large Pericardial Effusion  - No clinical tamponade as patient is hemodynamically stable, with robust heart sounds and no obvious JVD. Outpatient TTE demonstrated LVEF of 55% with large pericardial effusion without tamponade physiology.   - Repeat TTE showed large effusion mainly around L ventricle; pericardial effusion cannot be safely tapped  -Repeat CT shows improvement. No need for drain  Plan:  - Cont home doac  - Eval outpatient     #Severe Symptomatic Low Flow Low Gradient AS  - Pt with known sx of sev AS p/w GOFF  - Repeat TTE showing sev AS  - Structural cardiology consulted, pending eventual CT TAVR & LHC  - Hypervolemic on exam, likely multifactorial, judicious with diuretics as above  Plan:  - LHC 7/19 with Dr. Wood    > 40% LAD 80% LCx 40% RCA - no PCI as asymptomatic and high bleeding risk   - Structural team now opting for outpatient TAVR after pt recovers and blood counts remain stable  - Would cont lasix 40 PO BID and place BLE compression stockings for edema until seen by outpatient cardiologist    #AFib RVR - improved  #RBBB  - Persistent per OSH documentation   - On apixaban 2.5 BID at home - unclear why on dose reduced; per son he previously was on Coumadin but had supra-therapeutic.  Apixaban on hold at present given effusion and need for LHC.  - CHADSVASC 5   - AF RVR likely compensatory iso blood loss, no need to tx unless hemodynamically unstable in which case would recommend blood   - Cont MTP Succ 75 BID    #R Thigh Hematoma - resolved   - Noted on CT non con of the RLE  - Hb drop 2 pts, platelets downtrending - now stable   - Ok for heparin per heme 7/17  Plan:  - F/U RLE CTA - neg for bleed  - Clinically improved     #CAD   - 40% LAD 80% LCx 40% RCA - no PCI as asymptomatic and high bleeding risk   - Cont AAS/Statin   - Can determine need for PCI with his cardiologist Dr. Jimenes as an outpatient     Patient appears to be clinically stable for outpatient follow up at this time, no further cardiac work up needed,     Jean Ríos PGY5  Cardiology Fellow    CHAVA Paez

## 2023-07-26 NOTE — DISCHARGE NOTE NURSING/CASE MANAGEMENT/SOCIAL WORK - PATIENT PORTAL LINK FT
You can access the FollowMyHealth Patient Portal offered by [facility] by registering at the following website: [email or web address]. By joining [employer]’s FollowMyHealth portal, you will also be able to view your health information using other applications (apps) compatible with our system.

## 2023-07-27 PROBLEM — J44.9 CHRONIC OBSTRUCTIVE PULMONARY DISEASE, UNSPECIFIED: Chronic | Status: ACTIVE | Noted: 2022-10-01

## 2023-07-27 PROBLEM — M10.9 GOUT, UNSPECIFIED: Chronic | Status: ACTIVE | Noted: 2022-10-01

## 2023-07-27 PROBLEM — I35.0 NONRHEUMATIC AORTIC (VALVE) STENOSIS: Chronic | Status: ACTIVE | Noted: 2022-10-01

## 2023-08-03 ENCOUNTER — APPOINTMENT (OUTPATIENT)
Dept: CARE COORDINATION | Facility: HOME HEALTH | Age: 78
End: 2023-08-03
Payer: MEDICARE

## 2023-08-03 DIAGNOSIS — Z86.39 PERSONAL HISTORY OF OTHER ENDOCRINE, NUTRITIONAL AND METABOLIC DISEASE: ICD-10-CM

## 2023-08-03 DIAGNOSIS — T14.8XXA OTHER INJURY OF UNSPECIFIED BODY REGION, INITIAL ENCOUNTER: ICD-10-CM

## 2023-08-03 DIAGNOSIS — Z87.448 PERSONAL HISTORY OF OTHER DISEASES OF URINARY SYSTEM: ICD-10-CM

## 2023-08-03 DIAGNOSIS — I35.0 NONRHEUMATIC AORTIC (VALVE) STENOSIS: ICD-10-CM

## 2023-08-03 DIAGNOSIS — Z86.79 PERSONAL HISTORY OF OTHER DISEASES OF THE CIRCULATORY SYSTEM: ICD-10-CM

## 2023-08-03 DIAGNOSIS — Z87.891 PERSONAL HISTORY OF NICOTINE DEPENDENCE: ICD-10-CM

## 2023-08-03 DIAGNOSIS — Z87.09 PERSONAL HISTORY OF OTHER DISEASES OF THE RESPIRATORY SYSTEM: ICD-10-CM

## 2023-08-03 DIAGNOSIS — Z86.2 PERSONAL HISTORY OF DISEASES OF THE BLOOD AND BLOOD-FORMING ORGANS AND CERTAIN DISORDERS INVOLVING THE IMMUNE MECHANISM: ICD-10-CM

## 2023-08-03 DIAGNOSIS — Z86.718 PERSONAL HISTORY OF OTHER VENOUS THROMBOSIS AND EMBOLISM: ICD-10-CM

## 2023-08-03 PROCEDURE — 99349 HOME/RES VST EST MOD MDM 40: CPT | Mod: 95

## 2023-08-09 ENCOUNTER — APPOINTMENT (OUTPATIENT)
Dept: CARDIOLOGY | Facility: CLINIC | Age: 78
End: 2023-08-09
Payer: MEDICARE

## 2023-08-09 VITALS
BODY MASS INDEX: 27.64 KG/M2 | SYSTOLIC BLOOD PRESSURE: 98 MMHG | RESPIRATION RATE: 17 BRPM | HEART RATE: 106 BPM | DIASTOLIC BLOOD PRESSURE: 64 MMHG | WEIGHT: 172 LBS | TEMPERATURE: 98.8 F | HEIGHT: 66 IN | OXYGEN SATURATION: 84 %

## 2023-08-09 PROCEDURE — 99215 OFFICE O/P EST HI 40 MIN: CPT | Mod: 25

## 2023-08-09 PROCEDURE — 93000 ELECTROCARDIOGRAM COMPLETE: CPT

## 2023-08-09 RX ORDER — FUROSEMIDE 40 MG/1
40 TABLET ORAL TWICE DAILY
Qty: 180 | Refills: 1 | Status: ACTIVE | COMMUNITY
Start: 2023-08-09 | End: 1900-01-01

## 2023-08-09 RX ORDER — ASPIRIN 81 MG/1
81 TABLET, COATED ORAL
Qty: 90 | Refills: 1 | Status: ACTIVE | COMMUNITY
Start: 2023-08-09 | End: 1900-01-01

## 2023-08-09 RX ORDER — ATORVASTATIN CALCIUM 40 MG/1
40 TABLET, FILM COATED ORAL
Qty: 1 | Refills: 1 | Status: ACTIVE | COMMUNITY
Start: 2023-08-09 | End: 1900-01-01

## 2023-08-09 RX ORDER — MELOXICAM 7.5 MG/1
7.5 TABLET ORAL
Qty: 60 | Refills: 2 | Status: DISCONTINUED | COMMUNITY
Start: 2022-06-21 | End: 2023-08-09

## 2023-08-09 RX ORDER — APIXABAN 5 MG/1
5 TABLET, FILM COATED ORAL
Qty: 180 | Refills: 1 | Status: ACTIVE | COMMUNITY
Start: 2023-08-09 | End: 1900-01-01

## 2023-08-09 NOTE — REASON FOR VISIT
[FreeTextEntry1] : 78M Severe AS, Chronic atrial fibrillation, HFpEF, Chronic anemia, CAD - medially managed, recent hospitalization for large pericardial effusion without tamponade presenting for post-hospital discharge  Patient denies any chest pain or shortness of breath Denies any orthopnea or PND Has been compliant with his diuretics and his home weight is actually lower than his discharge weight Is able to ambulate around the house from his walker Was evaluated by structural cardiology in the hospital and is scheduled to see them in the office tomorrow

## 2023-08-09 NOTE — ASSESSMENT
[FreeTextEntry1] : 1. CAD: No angina at rest or with light activity - recent C with some CAD- with distal LCx 80%  -Medically manage in setting of chronic anemia and hematoma -Continue on aspirin and atorvastatin 40 mg daily -Continue metoprolol at current dose  2.  Heart failure with preserved ejection fraction -Continue diuretics with Lasix 40 mg p.o. twice a day.  Discussed fluid restriction to 1.5 L/day -Continue to measure daily standing weight  3.  Large pericardial effusion -Unclear etiology -Clinically hemodynamically stable at this time -Will repeat an echocardiogram in 2 weeks to assess for any hemodynamic changes   4.  Chronic atrial fibrillation -Rate controlled. Continue on Eliquis -Continue on metoprolol 75 mg XL twice a day  5.  Severe aortic stenosis -Scheduled to see Dr. Dan in structural heart clinic tomorrow  Return to clinic in 3 to 4 weeks

## 2023-08-09 NOTE — PHYSICAL EXAM
[Well Developed] : well developed [Well Nourished] : well nourished [No Acute Distress] : no acute distress [Normal Conjunctiva] : normal conjunctiva [No Carotid Bruit] : no carotid bruit [Elevated JVD ____cm] : elevated JVD ~Vcm [Normal S1, S2] : normal S1, S2 [No Murmur] : no murmur [No Rub] : no rub [No Gallop] : no gallop [Clear Lung Fields] : clear lung fields [Good Air Entry] : good air entry [No Respiratory Distress] : no respiratory distress  [Soft] : abdomen soft [Non Tender] : non-tender [No Masses/organomegaly] : no masses/organomegaly [Normal Bowel Sounds] : normal bowel sounds [Normal Gait] : normal gait [No Cyanosis] : no cyanosis [No Clubbing] : no clubbing [No Varicosities] : no varicosities [No Rash] : no rash [No Skin Lesions] : no skin lesions [Moves all extremities] : moves all extremities [No Focal Deficits] : no focal deficits [Normal Speech] : normal speech [Alert and Oriented] : alert and oriented [Normal memory] : normal memory [de-identified] : legs bandaged in dressing

## 2023-08-15 PROBLEM — Z87.891 FORMER SMOKER: Status: ACTIVE | Noted: 2023-08-15

## 2023-08-15 PROBLEM — Z87.448 HISTORY OF CHRONIC KIDNEY DISEASE: Status: RESOLVED | Noted: 2023-08-15 | Resolved: 2023-08-15

## 2023-08-15 PROBLEM — Z86.79 HISTORY OF HYPERTENSION: Status: RESOLVED | Noted: 2023-08-15 | Resolved: 2023-08-15

## 2023-08-15 PROBLEM — I35.0 SEVERE AORTIC STENOSIS: Status: RESOLVED | Noted: 2023-08-15 | Resolved: 2023-08-15

## 2023-08-15 PROBLEM — Z86.39 HISTORY OF HYPERLIPIDEMIA: Status: RESOLVED | Noted: 2023-08-15 | Resolved: 2023-08-15

## 2023-08-15 PROBLEM — Z86.79 HISTORY OF TRICUSPID VALVE DISEASE: Status: RESOLVED | Noted: 2023-08-15 | Resolved: 2023-08-15

## 2023-08-15 PROBLEM — Z86.79 HISTORY OF ATRIAL FIBRILLATION: Status: RESOLVED | Noted: 2023-08-15 | Resolved: 2023-08-15

## 2023-08-15 PROBLEM — Z87.09 HISTORY OF COPD: Status: RESOLVED | Noted: 2023-08-15 | Resolved: 2023-08-15

## 2023-08-15 PROBLEM — Z86.39 HISTORY OF DIABETES MELLITUS: Status: RESOLVED | Noted: 2023-08-15 | Resolved: 2023-08-15

## 2023-08-15 PROBLEM — Z86.718 HISTORY OF DVT (DEEP VEIN THROMBOSIS): Status: RESOLVED | Noted: 2023-08-15 | Resolved: 2023-08-15

## 2023-08-15 PROBLEM — Z86.79 HISTORY OF CHF (CONGESTIVE HEART FAILURE): Status: RESOLVED | Noted: 2023-08-15 | Resolved: 2023-08-15

## 2023-08-15 PROBLEM — Z86.2 HISTORY OF ANEMIA: Status: RESOLVED | Noted: 2023-08-15 | Resolved: 2023-08-15

## 2023-08-15 PROBLEM — T14.8XXA INTRAMUSCULAR HEMATOMA: Status: RESOLVED | Noted: 2023-08-15 | Resolved: 2023-08-15

## 2023-08-15 NOTE — ASSESSMENT
[FreeTextEntry1] : "78 year old Male (alt MRN [id number]) w/ hx of Afib (on Eliquis), severe AS, mod-severe TR,CAD/MI (30 yrs ago, medically managed, no PCI), ?CHF, COPD (not on O2 at home), former heavy smoker, HTN, HLD, DM2, gout, prior moderate pericardial effusion, now presenting with large pericardial effusion w/o tamponade physiology on outpt TTE. Anemia. Patient states he gets weekly iron infusions outpatient. outpatient hematologist is Dr. Sylvester () who is not affiliated with [facility],but has [facility] affiliation. Now seen by [place] hematology service on 7/15.  He was previously on eliquis 2.5BID because he had anemia but workup was negative. CT RLE  here shows large quadriceps intramuscular hematoma, Evaluated by surgery team and recommended no acute intervention rec. repeat CTA RLE stable without bleeding. HIT panel negative, PF4 negative, I discussed this with hematology fellow Dr. Salinas, unlikely to be HIT at this time. No further workup by GI (Dr. Mccray) Patient has tolerated full day of heparin gtt, and is tolerating eliquis 5mg BID with stable h/h and platelets, Medically stable from an anemia and thrombocytopenia standpoint. Will need bloodwork in a week  (discussed this with patient and patient's son). DVT RIGHT calf vein thrombosis is detected in the soleal vein, nonocclusive. will need surveillance US in 2 week. Is on eliquis 5mg BID for afib. Pericardial effusion.  Found to have "large circumferential pericardial effusion up to 2.6 cm without evidence of tamponade physiology" on outpt TTE (7/3/23). Remains asymptomatic. repeat again 7/17 remains unchanged. Prior TTE (10/3/22) showed "moderate pericardial effusion, measuring about 1.0 cm superior to the RA; otherwise small circumferential pericardial effusion." Unclear underlying etiology of effusion, but possibly in setting of CHF. Lasix was on hold due to SHEYLA, was then placed on lasix 40 IV BID for overload, which is now lasix 40 po BID. Card rec : repeat CT chest to eval effusion done 7/21 effusion appears smaller > IR team reconsulted for re-eval, no percutaneous window for drainage. Cards recs appreciated - keep on lasix 40mg PO BID for now.  Chronic atrial fibrillation. Hx of Afib (on Eliquis at home). CHADSVASC score of 5-6. c/w home metoprolol succinate 75mg bid.   CHF, acute on chronic. Unclear hx of ?CHF, possibly has chronic HFpEF with severe AS -- TTE w/unchanged mod to large pericardial effusion ; +severe AS.  LHC performed on 7/19 showing mod dz (40%) and 80% lesion LCx -no intervention performed.S tructural heart and CT surgery teams consulted for severe AS : Given complex med situation (anemia, bleeding, SHEYLA on CKD, pericardial effusion, CAD) and safety of resuming full AC would defer valve intervention at this time. Structural cards team now opting for outpatient TAVR after recovery and stable Hgb.  Continue Lasix.  SHEYLA (acute kidney injury). Cr worsened - possible contrast induced from LHC on 7/19. Nephrology following. Diuretics as per nephro/cards. Hep C non reactive. f/up HBsAg, serum immunofixation, C3 and C4.  Renal US shows both kidneys are echogenic, compatible with medical renal disease. No renal mass, hydronephrosis or calculus. Stopping metformin on discharge (explained this to the son). #Acute hypoxemic respiratory failure. Presented with O2 sat down to 88% on RA. Improved on 1-2L O2NC and now off oxygen all together. Suspect in setting of acute on chronic CHF. Management of CHF/pericardial effusion as above. CAD (coronary artery disease). Hx of CAD/MI (30 yrs ago, medically managed, no PCI) Trop 30->27; EKG w/o signs of acute ischemia; low suspicion of ACS,  c/w home statin, and metoprolol. Resumed  aspirin 81mg po qd. monitor for s/s of bleed. Diabetes mellitus, type 2. Plan: Hx of DM2. At home, on Metformin 500mg BID, monitor FS qAC and qHS,  low ISS for now. HbA1c 6.1. Given improvement in a1c, worsening CKD, and concerns from polypharmacy, will stop metformin 500mg BID.  Gout. c/w home allopurinol. Hemodynamically stable for discharge with close outpatient follow up with pcp, cardiology, structural cardiology, hematology. high risk of decompensation and rehospitalization." The patient was discharged home in stable condition with home care services and follow up care.  Pericardial effusion, Right LE Heamtoma Continue established treatment plan with follow up Monitor for worsening  signs and symptoms  and reviewed when to call medical provider Pt verbalized good understanding Follow up with Medical providers: PCP, Cardiology  AS, Mod-Severe TR, CHF, CAD Continue established treatment plan with follow up Continue CV meds: Lasix, ASA Monitor for worsening  signs and symptoms  and reviewed when to call medical provider Pt verbalized good understanding Follow up with Medical providers: PCP, Cardiology  AFib: stable Continue established treatment plan with follow up Continue Eliquis, Metoprolol Monitor for worsening  signs and symptoms  and reviewed when to call medical provider Pt verbalized good understanding Bleeding precautions reviewed with the pt  Follow up with PCP and Cardiologist  DM: stable Continue established treatment plan with follow up Hold Metformin until MD appointment Monitor for signs and symptoms of hypo/hyperglycemia Monitor FS Follow up with PCP, Endocrinology   HTN: stable Continue established treatment plan with follow up Continue BP meds:  Metoprolol Monitor for worsening  signs and symptoms  and reviewed when to call medical provider Pt verbalized good understanding Follow up with PCP  HLD: stable Continue established treatment plan with follow up Continue Lipitor Lipid panel and bloodwork follow up as per PCP Pt verbalized good understanding Follow up with Medical providers: PCP, Cardiology   Pt will need continued Home Care Services RN This patient is Enrolled in the [facility] Follow Up Program through [employer] Recommended  referral to assist with PCP follow up appointment CN reviewed with the pt that pt is under the [facility] program for home care until pt is seen by the PCP.    Reviewed with pt and Home Care RN if symptoms worsen pt will need to call 911 or EMS to be evaluated at local ED.  Pt and Home Care RN verbalized good understanding. CN will be assigned to sign Home Care orders post-discharge for continuity of care.

## 2023-08-15 NOTE — PHYSICAL EXAM
[No Acute Distress] : no acute distress [Well Nourished] : well nourished [No Respiratory Distress] : no respiratory distress  [No Accessory Muscle Use] : no accessory muscle use [de-identified] : Limited Visual Physical Exam during TeleHealth Visit [de-identified] : Awake and alert [de-identified] : Calm

## 2023-08-15 NOTE — PLAN
[FreeTextEntry1] : CV and pulmonary status stable Patient advised to continue with medication regimen as directed  Medication education discussed in full detail with + teach back.  Encouraged verbalization of fears and concerns.  Report all symptoms not relieved by rest or medication Educated on monitoring blood pressure Maintain Balanced diet  Exercise as appropriate Patient educated on s/s of when to call medical providers with + teach back.  Reminded of NP role and advised to call with any questions/concerns.  Follow up with medical providers as scheduled Instructed the patient to call TCM Team or CCC with any questions or concerns.

## 2023-08-15 NOTE — HISTORY OF PRESENT ILLNESS
[Home] : at home, [unfilled] , at the time of the visit. [Other Location: e.g. Home (Enter Location, City,State)___] : at [unfilled] [Other:____] : [unfilled] [Verbal consent obtained from patient] : the patient, [unfilled] [FreeTextEntry1] : Follow up post discharge s/p recent hospitalization [de-identified] : This patient is Enrolled in the [facility] Follow Up Program through [facility] for Continuity of Care S/P Recent Hospitalization until seen by PCP. Copied As per [facility] Discharge Summary  "78 year old Male (alt MRN [id number]) w/ hx of Afib (on Eliquis), severe AS, mod-severe TR,CAD/MI (30 yrs ago, medically managed, no PCI), ?CHF, COPD (not on O2 at home), former heavy smoker, HTN, HLD, DM2, gout, prior moderate pericardial effusion, now presenting with large pericardial effusion w/o tamponade physiology on outpt TTE. Anemia. Patient states he gets weekly iron infusions outpatient. outpatient hematologist is Dr. Sylvester () who is not affiliated with [facility],but has [facility] affiliation. Now seen by [facility] hematology service on 7/15.  He was previously on eliquis 2.5BID because he had anemia but workup was negative. CT RLE  here shows large quadriceps intramuscular hematoma, Evaluated by surgery team and recommended no acute intervention rec. repeat CTA RLE stable without bleeding. HIT panel negative, PF4 negative, I discussed this with hematology fellow Dr. Salinas, unlikely to be HIT at this time. No further workup by GI (Dr. Mccray) Patient has tolerated full day of heparin gtt, and is tolerating eliquis 5mg BID with stable h/h and platelets, Medically stable from an anemia and thrombocytopenia standpoint. Will need bloodwork in a week  (discussed this with patient and patient's son). DVT RIGHT calf vein thrombosis is detected in the soleal vein, nonocclusive. will need surveillance US in 2 week. Is on eliquis 5mg BID for afib. Pericardial effusion.  Found to have "large circumferential pericardial effusion up to 2.6 cm without evidence of tamponade physiology" on outpt TTE (7/3/23). Remains asymptomatic. repeat again 7/17 remains unchanged. Prior TTE (10/3/22) showed "moderate pericardial effusion, measuring about 1.0 cm superior to the RA; otherwise small circumferential pericardial effusion." Unclear underlying etiology of effusion, but possibly in setting of CHF. Lasix was on hold due to SHEYLA, was then placed on lasix 40 IV BID for overload, which is now lasix 40 po BID. Card rec : repeat CT chest to eval effusion done 7/21 effusion appears smaller > IR team reconsulted for re-eval, no percutaneous window for drainage. Cards recs appreciated - keep on lasix 40mg PO BID for now.  Chronic atrial fibrillation. Hx of Afib (on Eliquis at home). CHADSVASC score of 5-6. c/w home metoprolol succinate 75mg bid.   CHF, acute on chronic. Unclear hx of ?CHF, possibly has chronic HFpEF with severe AS -- TTE w/unchanged mod to large pericardial effusion ; +severe AS.  LHC performed on 7/19 showing mod dz (40%) and 80% lesion LCx -no intervention performed.S tructural heart and CT surgery teams consulted for severe AS : Given complex med situation (anemia, bleeding, SHEYLA on CKD, pericardial effusion, CAD) and safety of resuming full AC would defer valve intervention at this time. Structural cards team now opting for outpatient TAVR after recovery and stable Hgb.  Continue Lasix.  SHEYLA (acute kidney injury). Cr worsened - possible contrast induced from LHC on 7/19. Nephrology following. Diuretics as per nephro/cards. Hep C non reactive. f/up HBsAg, serum immunofixation, C3 and C4.  Renal US shows both kidneys are echogenic, compatible with medical renal disease. No renal mass, hydronephrosis or calculus. Stopping metformin on discharge (explained this to the son). #Acute hypoxemic respiratory failure. Presented with O2 sat down to 88% on RA. Improved on 1-2L O2NC and now off oxygen all together. Suspect in setting of acute on chronic CHF. Management of CHF/pericardial effusion as above. CAD (coronary artery disease). Hx of CAD/MI (30 yrs ago, medically managed, no PCI) Trop 30->27; EKG w/o signs of acute ischemia; low suspicion of ACS,  c/w home statin, and metoprolol. Resumed  aspirin 81mg po qd. monitor for s/s of bleed. Diabetes mellitus, type 2. Plan: Hx of DM2. At home, on Metformin 500mg BID, monitor FS qAC and qHS,  low ISS for now. HbA1c 6.1. Given improvement in a1c, worsening CKD, and concerns from polypharmacy, will stop metformin 500mg BID.  Gout. c/w home allopurinol. Hemodynamically stable for discharge with close outpatient follow up with pcp, cardiology, structural cardiology, hematology. high risk of decompensation and rehospitalization." The patient was discharged home in stable condition with home care services and follow up care.  During the TeleHealth the patient was in no acute distress.  Able to speak with complete sentences and no audible wheeze noted. The patient denies chest pain, shortness of breath, cough, hemoptysis, fever, palpitations, syncope  CN reviewed that pt  is under the [facility] program for home care until pt is seen by  PCP.   CN will be assigned to sign Home Care orders post-discharge.

## 2023-10-12 ENCOUNTER — APPOINTMENT (OUTPATIENT)
Dept: CARDIOLOGY | Facility: CLINIC | Age: 78
End: 2023-10-12
Payer: MEDICARE

## 2023-10-12 VITALS
WEIGHT: 154 LBS | DIASTOLIC BLOOD PRESSURE: 80 MMHG | OXYGEN SATURATION: 90 % | HEIGHT: 66 IN | BODY MASS INDEX: 24.75 KG/M2 | SYSTOLIC BLOOD PRESSURE: 121 MMHG | HEART RATE: 109 BPM

## 2023-10-12 DIAGNOSIS — N40.1 BENIGN PROSTATIC HYPERPLASIA WITH LOWER URINARY TRACT SYMPMS: ICD-10-CM

## 2023-10-12 DIAGNOSIS — R60.0 LOCALIZED EDEMA: ICD-10-CM

## 2023-10-12 DIAGNOSIS — I50.30 UNSPECIFIED DIASTOLIC (CONGESTIVE) HEART FAILURE: ICD-10-CM

## 2023-10-12 DIAGNOSIS — I31.39 OTHER PERICARDIAL EFFUSION (NONINFLAMMATORY): ICD-10-CM

## 2023-10-12 DIAGNOSIS — I42.9 CARDIOMYOPATHY, UNSPECIFIED: ICD-10-CM

## 2023-10-12 DIAGNOSIS — M10.9 GOUT, UNSPECIFIED: ICD-10-CM

## 2023-10-12 DIAGNOSIS — U07.1 COVID-19: ICD-10-CM

## 2023-10-12 DIAGNOSIS — I45.10 UNSPECIFIED RIGHT BUNDLE-BRANCH BLOCK: ICD-10-CM

## 2023-10-12 DIAGNOSIS — I48.20 CHRONIC ATRIAL FIBRILLATION, UNSP: ICD-10-CM

## 2023-10-12 DIAGNOSIS — I25.118 ATHEROSCLEROTIC HEART DISEASE OF NATIVE CORONARY ARTERY WITH OTHER FORMS OF ANGINA PECTORIS: ICD-10-CM

## 2023-10-12 DIAGNOSIS — I35.0 NONRHEUMATIC AORTIC (VALVE) STENOSIS: ICD-10-CM

## 2023-10-12 PROCEDURE — 93000 ELECTROCARDIOGRAM COMPLETE: CPT

## 2023-10-12 PROCEDURE — 99215 OFFICE O/P EST HI 40 MIN: CPT | Mod: 25

## 2023-10-12 RX ORDER — ALLOPURINOL 100 MG/1
100 TABLET ORAL DAILY
Refills: 0 | Status: ACTIVE | COMMUNITY

## 2023-10-12 RX ORDER — BECLOMETHASONE DIPROPIONATE 80 UG/1
AEROSOL, METERED RESPIRATORY (INHALATION)
Refills: 0 | Status: ACTIVE | COMMUNITY

## 2023-10-12 RX ORDER — PANTOPRAZOLE 40 MG/1
40 TABLET, DELAYED RELEASE ORAL
Refills: 0 | Status: ACTIVE | COMMUNITY

## 2023-10-12 RX ORDER — TAMSULOSIN HYDROCHLORIDE 0.4 MG/1
0.4 CAPSULE ORAL
Refills: 0 | Status: ACTIVE | COMMUNITY

## 2023-10-12 RX ORDER — TIOTROPIUM BROMIDE 18 UG/1
18 CAPSULE ORAL; RESPIRATORY (INHALATION)
Refills: 0 | Status: ACTIVE | COMMUNITY

## 2023-10-29 PROBLEM — I35.0 AORTIC STENOSIS: Status: ACTIVE | Noted: 2023-10-29

## 2023-10-29 PROBLEM — I31.39 PERICARDIAL EFFUSION: Status: ACTIVE | Noted: 2023-08-15

## 2023-10-29 PROBLEM — I50.30 (HFPEF) HEART FAILURE WITH PRESERVED EJECTION FRACTION: Status: ACTIVE | Noted: 2023-08-09

## 2023-10-29 PROBLEM — I48.20 CHRONIC ATRIAL FIBRILLATION: Status: ACTIVE | Noted: 2023-08-09

## 2023-10-29 PROBLEM — I42.9 CARDIOMYOPATHY, UNSPECIFIED TYPE: Status: ACTIVE | Noted: 2023-08-09

## 2023-10-29 PROBLEM — I45.10 RBBB: Status: ACTIVE | Noted: 2023-10-12

## 2023-10-29 PROBLEM — I25.118 CORONARY ARTERY DISEASE WITH OTHER FORM OF ANGINA PECTORIS: Status: ACTIVE | Noted: 2023-08-09

## 2023-11-06 ENCOUNTER — RX RENEWAL (OUTPATIENT)
Age: 78
End: 2023-11-06

## 2023-11-07 ENCOUNTER — OUTPATIENT (OUTPATIENT)
Dept: OUTPATIENT SERVICES | Facility: HOSPITAL | Age: 78
LOS: 1 days | End: 2023-11-07
Payer: MEDICARE

## 2023-11-07 VITALS
HEIGHT: 66 IN | DIASTOLIC BLOOD PRESSURE: 78 MMHG | HEART RATE: 83 BPM | RESPIRATION RATE: 16 BRPM | TEMPERATURE: 98 F | SYSTOLIC BLOOD PRESSURE: 116 MMHG | OXYGEN SATURATION: 95 % | WEIGHT: 156.97 LBS

## 2023-11-07 DIAGNOSIS — Z98.890 OTHER SPECIFIED POSTPROCEDURAL STATES: Chronic | ICD-10-CM

## 2023-11-07 DIAGNOSIS — I35.0 NONRHEUMATIC AORTIC (VALVE) STENOSIS: ICD-10-CM

## 2023-11-07 DIAGNOSIS — G47.33 OBSTRUCTIVE SLEEP APNEA (ADULT) (PEDIATRIC): ICD-10-CM

## 2023-11-07 DIAGNOSIS — J44.9 CHRONIC OBSTRUCTIVE PULMONARY DISEASE, UNSPECIFIED: ICD-10-CM

## 2023-11-07 DIAGNOSIS — Z29.9 ENCOUNTER FOR PROPHYLACTIC MEASURES, UNSPECIFIED: ICD-10-CM

## 2023-11-07 DIAGNOSIS — Z01.818 ENCOUNTER FOR OTHER PREPROCEDURAL EXAMINATION: ICD-10-CM

## 2023-11-07 LAB
A1C WITH ESTIMATED AVERAGE GLUCOSE RESULT: 6.2 % — HIGH (ref 4–5.6)
A1C WITH ESTIMATED AVERAGE GLUCOSE RESULT: 6.2 % — HIGH (ref 4–5.6)
ANION GAP SERPL CALC-SCNC: 13 MMOL/L — SIGNIFICANT CHANGE UP (ref 5–17)
ANION GAP SERPL CALC-SCNC: 13 MMOL/L — SIGNIFICANT CHANGE UP (ref 5–17)
BLD GP AB SCN SERPL QL: NEGATIVE — SIGNIFICANT CHANGE UP
BLD GP AB SCN SERPL QL: NEGATIVE — SIGNIFICANT CHANGE UP
BUN SERPL-MCNC: 39 MG/DL — HIGH (ref 7–23)
BUN SERPL-MCNC: 39 MG/DL — HIGH (ref 7–23)
CALCIUM SERPL-MCNC: 10 MG/DL — SIGNIFICANT CHANGE UP (ref 8.4–10.5)
CALCIUM SERPL-MCNC: 10 MG/DL — SIGNIFICANT CHANGE UP (ref 8.4–10.5)
CHLORIDE SERPL-SCNC: 100 MMOL/L — SIGNIFICANT CHANGE UP (ref 96–108)
CHLORIDE SERPL-SCNC: 100 MMOL/L — SIGNIFICANT CHANGE UP (ref 96–108)
CO2 SERPL-SCNC: 28 MMOL/L — SIGNIFICANT CHANGE UP (ref 22–31)
CO2 SERPL-SCNC: 28 MMOL/L — SIGNIFICANT CHANGE UP (ref 22–31)
CREAT SERPL-MCNC: 1.76 MG/DL — HIGH (ref 0.5–1.3)
CREAT SERPL-MCNC: 1.76 MG/DL — HIGH (ref 0.5–1.3)
EGFR: 39 ML/MIN/1.73M2 — LOW
EGFR: 39 ML/MIN/1.73M2 — LOW
ESTIMATED AVERAGE GLUCOSE: 131 MG/DL — HIGH (ref 68–114)
ESTIMATED AVERAGE GLUCOSE: 131 MG/DL — HIGH (ref 68–114)
GLUCOSE SERPL-MCNC: 105 MG/DL — HIGH (ref 70–99)
GLUCOSE SERPL-MCNC: 105 MG/DL — HIGH (ref 70–99)
HCT VFR BLD CALC: 42.6 % — SIGNIFICANT CHANGE UP (ref 39–50)
HCT VFR BLD CALC: 42.6 % — SIGNIFICANT CHANGE UP (ref 39–50)
HGB BLD-MCNC: 13.6 G/DL — SIGNIFICANT CHANGE UP (ref 13–17)
HGB BLD-MCNC: 13.6 G/DL — SIGNIFICANT CHANGE UP (ref 13–17)
MCHC RBC-ENTMCNC: 28.8 PG — SIGNIFICANT CHANGE UP (ref 27–34)
MCHC RBC-ENTMCNC: 28.8 PG — SIGNIFICANT CHANGE UP (ref 27–34)
MCHC RBC-ENTMCNC: 31.9 GM/DL — LOW (ref 32–36)
MCHC RBC-ENTMCNC: 31.9 GM/DL — LOW (ref 32–36)
MCV RBC AUTO: 90.1 FL — SIGNIFICANT CHANGE UP (ref 80–100)
MCV RBC AUTO: 90.1 FL — SIGNIFICANT CHANGE UP (ref 80–100)
NRBC # BLD: 0 /100 WBCS — SIGNIFICANT CHANGE UP (ref 0–0)
NRBC # BLD: 0 /100 WBCS — SIGNIFICANT CHANGE UP (ref 0–0)
PLATELET # BLD AUTO: SIGNIFICANT CHANGE UP K/UL (ref 150–400)
PLATELET # BLD AUTO: SIGNIFICANT CHANGE UP K/UL (ref 150–400)
POTASSIUM SERPL-MCNC: 4.4 MMOL/L — SIGNIFICANT CHANGE UP (ref 3.5–5.3)
POTASSIUM SERPL-MCNC: 4.4 MMOL/L — SIGNIFICANT CHANGE UP (ref 3.5–5.3)
POTASSIUM SERPL-SCNC: 4.4 MMOL/L — SIGNIFICANT CHANGE UP (ref 3.5–5.3)
POTASSIUM SERPL-SCNC: 4.4 MMOL/L — SIGNIFICANT CHANGE UP (ref 3.5–5.3)
RBC # BLD: 4.73 M/UL — SIGNIFICANT CHANGE UP (ref 4.2–5.8)
RBC # BLD: 4.73 M/UL — SIGNIFICANT CHANGE UP (ref 4.2–5.8)
RBC # FLD: 15.9 % — HIGH (ref 10.3–14.5)
RBC # FLD: 15.9 % — HIGH (ref 10.3–14.5)
RH IG SCN BLD-IMP: POSITIVE — SIGNIFICANT CHANGE UP
RH IG SCN BLD-IMP: POSITIVE — SIGNIFICANT CHANGE UP
SODIUM SERPL-SCNC: 141 MMOL/L — SIGNIFICANT CHANGE UP (ref 135–145)
SODIUM SERPL-SCNC: 141 MMOL/L — SIGNIFICANT CHANGE UP (ref 135–145)
WBC # BLD: 7.56 K/UL — SIGNIFICANT CHANGE UP (ref 3.8–10.5)
WBC # BLD: 7.56 K/UL — SIGNIFICANT CHANGE UP (ref 3.8–10.5)
WBC # FLD AUTO: 7.56 K/UL — SIGNIFICANT CHANGE UP (ref 3.8–10.5)
WBC # FLD AUTO: 7.56 K/UL — SIGNIFICANT CHANGE UP (ref 3.8–10.5)

## 2023-11-07 PROCEDURE — G0463: CPT

## 2023-11-07 PROCEDURE — 71046 X-RAY EXAM CHEST 2 VIEWS: CPT

## 2023-11-07 PROCEDURE — 93880 EXTRACRANIAL BILAT STUDY: CPT | Mod: 26

## 2023-11-07 PROCEDURE — 85027 COMPLETE CBC AUTOMATED: CPT

## 2023-11-07 PROCEDURE — 86900 BLOOD TYPING SEROLOGIC ABO: CPT

## 2023-11-07 PROCEDURE — 83036 HEMOGLOBIN GLYCOSYLATED A1C: CPT

## 2023-11-07 PROCEDURE — 80048 BASIC METABOLIC PNL TOTAL CA: CPT

## 2023-11-07 PROCEDURE — 93880 EXTRACRANIAL BILAT STUDY: CPT

## 2023-11-07 PROCEDURE — 86923 COMPATIBILITY TEST ELECTRIC: CPT

## 2023-11-07 PROCEDURE — 87640 STAPH A DNA AMP PROBE: CPT

## 2023-11-07 PROCEDURE — 86850 RBC ANTIBODY SCREEN: CPT

## 2023-11-07 PROCEDURE — 71046 X-RAY EXAM CHEST 2 VIEWS: CPT | Mod: 26

## 2023-11-07 PROCEDURE — 87641 MR-STAPH DNA AMP PROBE: CPT

## 2023-11-07 PROCEDURE — 86901 BLOOD TYPING SEROLOGIC RH(D): CPT

## 2023-11-07 RX ORDER — SODIUM CHLORIDE 9 MG/ML
3 INJECTION INTRAMUSCULAR; INTRAVENOUS; SUBCUTANEOUS EVERY 8 HOURS
Refills: 0 | Status: DISCONTINUED | OUTPATIENT
Start: 2023-11-15 | End: 2023-11-15

## 2023-11-07 RX ORDER — LIDOCAINE HCL 20 MG/ML
0.2 VIAL (ML) INJECTION ONCE
Refills: 0 | Status: DISCONTINUED | OUTPATIENT
Start: 2023-11-15 | End: 2023-11-15

## 2023-11-07 RX ORDER — CHLORHEXIDINE GLUCONATE 213 G/1000ML
1 SOLUTION TOPICAL ONCE
Refills: 0 | Status: DISCONTINUED | OUTPATIENT
Start: 2023-11-15 | End: 2023-11-15

## 2023-11-07 NOTE — H&P PST ADULT - ASSESSMENT
CAPRINI VTE 2.0 SCORE [CLOT updated 2019]    AGE RELATED RISK FACTORS                                                       MOBILITY RELATED FACTORS  [ ] Age 41-60 years                                            (1 Point)                    [ ] Bed rest                                                        (1 Point)  [ ] Age: 61-74 years                                           (2 Points)                  [ ] Plaster cast                                                   (2 Points)  [ x] Age= 75 years                                              (3 Points)                    [ ] Bed bound for more than 72 hours                 (2 Points)    DISEASE RELATED RISK FACTORS                                               GENDER SPECIFIC FACTORS  [x ] Edema in the lower extremities                       (1 Point)              [ ] Pregnancy                                                     (1 Point)  [ ] Varicose veins                                               (1 Point)                     [ ] Post-partum < 6 weeks                                   (1 Point)             [x ] BMI > 25 Kg/m2                                            (1 Point)                     [ ] Hormonal therapy  or oral contraception          (1 Point)                 [ ] Sepsis (in the previous month)                        (1 Point)               [ ] History of pregnancy complications                 (1 point)  [ ] Pneumonia or serious lung disease                                               [ ] Unexplained or recurrent                     (1 Point)           (in the previous month)                               (1 Point)  [ ] Abnormal pulmonary function test                     (1 Point)                 SURGERY RELATED RISK FACTORS  [ ] Acute myocardial infarction                              (1 Point)               [ ]  Section                                             (1 Point)  [ ] Congestive heart failure (in the previous month)  (1 Point)      [ ] Minor surgery                                                  (1 Point)   [ ] Inflammatory bowel disease                             (1 Point)               [ ] Arthroscopic surgery                                        (2 Points)  [ ] Central venous access                                      (2 Points)                [ x] General surgery lasting more than 45 minutes (2 points)  [ ] Malignancy- Present or previous                   (2 Points)                [ ] Elective arthroplasty                                         (5 points)    [ ] Stroke (in the previous month)                          (5 Points)                                                                                                                                                           HEMATOLOGY RELATED FACTORS                                                 TRAUMA RELATED RISK FACTORS  [ ] Prior episodes of VTE                                     (3 Points)                [ ] Fracture of the hip, pelvis, or leg                       (5 Points)  [ ] Positive family history for VTE                         (3 Points)             [ ] Acute spinal cord injury (in the previous month)  (5 Points)  [ ] Prothrombin 47761 A                                     (3 Points)               [ ] Paralysis  (less than 1 month)                             (5 Points)  [ ] Factor V Leiden                                             (3 Points)                  [ ] Multiple Trauma within 1 month                        (5 Points)  [ ] Lupus anticoagulants                                     (3 Points)                                                           [ ] Anticardiolipin antibodies                               (3 Points)                                                       [ ] High homocysteine in the blood                      (3 Points)                                             [ ] Other congenital or acquired thrombophilia      (3 Points)                                                [ ] Heparin induced thrombocytopenia                  (3 Points)                                     Total Score [    7      ]  DASI: able to go up one flight of stairs or walk 1-2 blocks with difficulty due to dyspnea on exertion    Loose teeth: denies

## 2023-11-07 NOTE — H&P PST ADULT - PROBLEM SELECTOR PLAN 1
TAVR   Hold Eliquis 3 days prior to surgery   hold Metoprolol and Lasix day of surgery   Pre-op education provided - all questions answered. Pt verbalized understanding

## 2023-11-07 NOTE — H&P PST ADULT - HISTORY OF PRESENT ILLNESS
77 y/o M  with h/o Afib (on Eliquis), severe AS,  mod-severe TR, CAD/MI (30 yrs ago, medically managed, no PCI), CHF, COPD (not on O2 ), Former heavy smoker, HTN, HLD, HERIBERTO not compliant with CPAP and Gout. Pt report recent hospitalization (7/2023) for large pericardial effusion without tamponade which was determined not accessible for drainage and does not cause hemodynamic compromise h/o anemia on Iron infusion (2 PRBC during hospitalization). Today he presents to PST with c/o dyspnea upon exertion that he feels is getting worse in nature denies chest pain/tightness/discomfort, light-headed sensation, dizziness or palpitations. Now scheduled for TAVR on 11/15/23.

## 2023-11-07 NOTE — H&P PST ADULT - NSICDXPASTMEDICALHX_GEN_ALL_CORE_FT
PAST MEDICAL HISTORY:  2019 novel coronavirus disease (COVID-19)     CAD (coronary artery disease)     Chronic atrial fibrillation     COPD (chronic obstructive pulmonary disease)     Diabetes mellitus, type 2     Former smoker     Gout     HLD (hyperlipidemia)     HTN (hypertension)     HERIBERTO (obstructive sleep apnea)     Pericardial effusion     Severe aortic stenosis     TR (tricuspid regurgitation)

## 2023-11-08 LAB
MRSA PCR RESULT.: SIGNIFICANT CHANGE UP
MRSA PCR RESULT.: SIGNIFICANT CHANGE UP
S AUREUS DNA NOSE QL NAA+PROBE: SIGNIFICANT CHANGE UP
S AUREUS DNA NOSE QL NAA+PROBE: SIGNIFICANT CHANGE UP

## 2023-11-08 RX ORDER — APIXABAN 2.5 MG/1
1 TABLET, FILM COATED ORAL
Refills: 0 | DISCHARGE

## 2023-11-08 RX ORDER — METFORMIN HYDROCHLORIDE 850 MG/1
1 TABLET ORAL
Refills: 0 | DISCHARGE

## 2023-11-08 RX ORDER — OMEGA-3 ACID ETHYL ESTERS 1 G
4 CAPSULE ORAL
Refills: 0 | DISCHARGE

## 2023-11-08 RX ORDER — BECLOMETHASONE DIPROPIONATE 40 UG/1
2 AEROSOL, METERED RESPIRATORY (INHALATION)
Refills: 0 | DISCHARGE

## 2023-11-08 RX ORDER — METOPROLOL TARTRATE 50 MG
1 TABLET ORAL
Refills: 0 | DISCHARGE

## 2023-11-08 RX ORDER — ALBUTEROL 90 UG/1
2 AEROSOL, METERED ORAL
Refills: 0 | DISCHARGE

## 2023-11-08 RX ORDER — FUROSEMIDE 40 MG
1 TABLET ORAL
Refills: 0 | DISCHARGE

## 2023-11-08 RX ORDER — BECLOMETHASONE DIPROPIONATE 40 UG/1
1 AEROSOL, METERED RESPIRATORY (INHALATION)
Refills: 0 | DISCHARGE

## 2023-11-08 RX ORDER — PANTOPRAZOLE SODIUM 20 MG/1
1 TABLET, DELAYED RELEASE ORAL
Refills: 0 | DISCHARGE

## 2023-11-08 RX ORDER — ATORVASTATIN CALCIUM 80 MG/1
1 TABLET, FILM COATED ORAL
Refills: 0 | DISCHARGE

## 2023-11-14 ENCOUNTER — TRANSCRIPTION ENCOUNTER (OUTPATIENT)
Age: 78
End: 2023-11-14

## 2023-11-15 ENCOUNTER — APPOINTMENT (OUTPATIENT)
Dept: CARDIOTHORACIC SURGERY | Facility: HOSPITAL | Age: 78
End: 2023-11-15

## 2023-11-15 ENCOUNTER — INPATIENT (INPATIENT)
Facility: HOSPITAL | Age: 78
LOS: 0 days | Discharge: HOME | DRG: 267 | End: 2023-11-16
Attending: STUDENT IN AN ORGANIZED HEALTH CARE EDUCATION/TRAINING PROGRAM | Admitting: STUDENT IN AN ORGANIZED HEALTH CARE EDUCATION/TRAINING PROGRAM
Payer: MEDICARE

## 2023-11-15 VITALS
WEIGHT: 160.72 LBS | SYSTOLIC BLOOD PRESSURE: 132 MMHG | OXYGEN SATURATION: 95 % | TEMPERATURE: 98 F | DIASTOLIC BLOOD PRESSURE: 71 MMHG | RESPIRATION RATE: 18 BRPM | HEART RATE: 96 BPM

## 2023-11-15 DIAGNOSIS — I35.0 NONRHEUMATIC AORTIC (VALVE) STENOSIS: ICD-10-CM

## 2023-11-15 DIAGNOSIS — Z95.2 PRESENCE OF PROSTHETIC HEART VALVE: ICD-10-CM

## 2023-11-15 DIAGNOSIS — Z98.890 OTHER SPECIFIED POSTPROCEDURAL STATES: Chronic | ICD-10-CM

## 2023-11-15 PROBLEM — Z87.891 PERSONAL HISTORY OF NICOTINE DEPENDENCE: Chronic | Status: ACTIVE | Noted: 2023-07-04

## 2023-11-15 PROBLEM — M10.9 GOUT, UNSPECIFIED: Chronic | Status: ACTIVE | Noted: 2023-07-04

## 2023-11-15 PROBLEM — I10 ESSENTIAL (PRIMARY) HYPERTENSION: Chronic | Status: ACTIVE | Noted: 2023-07-04

## 2023-11-15 PROBLEM — I48.20 CHRONIC ATRIAL FIBRILLATION, UNSPECIFIED: Chronic | Status: ACTIVE | Noted: 2023-07-04

## 2023-11-15 PROBLEM — E11.9 TYPE 2 DIABETES MELLITUS WITHOUT COMPLICATIONS: Chronic | Status: ACTIVE | Noted: 2023-07-04

## 2023-11-15 PROBLEM — E78.5 HYPERLIPIDEMIA, UNSPECIFIED: Chronic | Status: ACTIVE | Noted: 2023-07-04

## 2023-11-15 PROBLEM — I07.1 RHEUMATIC TRICUSPID INSUFFICIENCY: Chronic | Status: ACTIVE | Noted: 2023-07-04

## 2023-11-15 PROBLEM — G47.33 OBSTRUCTIVE SLEEP APNEA (ADULT) (PEDIATRIC): Chronic | Status: ACTIVE | Noted: 2023-11-07

## 2023-11-15 LAB
GLUCOSE BLDC GLUCOMTR-MCNC: 103 MG/DL — HIGH (ref 70–99)
GLUCOSE BLDC GLUCOMTR-MCNC: 103 MG/DL — HIGH (ref 70–99)
GLUCOSE BLDC GLUCOMTR-MCNC: 118 MG/DL — HIGH (ref 70–99)
GLUCOSE BLDC GLUCOMTR-MCNC: 118 MG/DL — HIGH (ref 70–99)

## 2023-11-15 PROCEDURE — 71250 CT THORAX DX C-: CPT | Mod: 26

## 2023-11-15 PROCEDURE — 93306 TTE W/DOPPLER COMPLETE: CPT | Mod: 26

## 2023-11-15 PROCEDURE — 33361 REPLACE AORTIC VALVE PERQ: CPT | Mod: Q0,62

## 2023-11-15 PROCEDURE — 93010 ELECTROCARDIOGRAM REPORT: CPT

## 2023-11-15 PROCEDURE — 33361 REPLACE AORTIC VALVE PERQ: CPT | Mod: 62,Q0

## 2023-11-15 DEVICE — CATH VASCULAR INFINITI PIGTAIL 4FR 145 DEGREE 6SH MICRO LOOP: Type: IMPLANTABLE DEVICE | Status: FUNCTIONAL

## 2023-11-15 DEVICE — SUT PERCLOSE PROGLIDE 6FR: Type: IMPLANTABLE DEVICE | Status: FUNCTIONAL

## 2023-11-15 DEVICE — GWIRE INQWIRE JTIP .035X260MM: Type: IMPLANTABLE DEVICE | Status: FUNCTIONAL

## 2023-11-15 DEVICE — GWIRE GUID  0.035INX150CM: Type: IMPLANTABLE DEVICE | Status: FUNCTIONAL

## 2023-11-15 DEVICE — PACING CATH PACEL RIGHT HEART CURVE 5FR: Type: IMPLANTABLE DEVICE | Status: FUNCTIONAL

## 2023-11-15 DEVICE — WIRE GD CONFIDA BRECKER CRV .035X260: Type: IMPLANTABLE DEVICE | Status: FUNCTIONAL

## 2023-11-15 DEVICE — GUIDEWIRE AMPLATZ SUPER-STIFF SHORT TAPER .035" X 260CM: Type: IMPLANTABLE DEVICE | Status: FUNCTIONAL

## 2023-11-15 DEVICE — CATH VASCULAR EXPO FEMORAL LEFT CURVE (FL4) 0.045" X 5FR X 100CM: Type: IMPLANTABLE DEVICE | Status: FUNCTIONAL

## 2023-11-15 DEVICE — CATH VASCULAR EXPO VENTRICULAR PIGTAIL CURVE (PIG 145) 0.045" X 5FR X 10CM: Type: IMPLANTABLE DEVICE | Status: FUNCTIONAL

## 2023-11-15 DEVICE — VLV TRANS CATH W/COMM SYS SAPIEN 3 ULTRA 26MM: Type: IMPLANTABLE DEVICE | Status: FUNCTIONAL

## 2023-11-15 DEVICE — CATH VASCULAR EXPO VENTRICULAR PIGTAIL CURVE (PIG) 0.045" X 5FR X 100CM: Type: IMPLANTABLE DEVICE | Status: FUNCTIONAL

## 2023-11-15 RX ORDER — ASPIRIN/CALCIUM CARB/MAGNESIUM 324 MG
81 TABLET ORAL DAILY
Refills: 0 | Status: DISCONTINUED | OUTPATIENT
Start: 2023-11-15 | End: 2023-11-15

## 2023-11-15 RX ORDER — CEFUROXIME AXETIL 250 MG
1500 TABLET ORAL EVERY 8 HOURS
Refills: 0 | Status: DISCONTINUED | OUTPATIENT
Start: 2023-11-15 | End: 2023-11-16

## 2023-11-15 RX ORDER — ASPIRIN/CALCIUM CARB/MAGNESIUM 324 MG
81 TABLET ORAL DAILY
Refills: 0 | Status: DISCONTINUED | OUTPATIENT
Start: 2023-11-15 | End: 2023-11-16

## 2023-11-15 RX ADMIN — Medication 81 MILLIGRAM(S): at 23:16

## 2023-11-15 RX ADMIN — Medication 100 MILLIGRAM(S): at 21:55

## 2023-11-15 NOTE — PRE-ANESTHESIA EVALUATION ADULT - MALLAMPATI CLASS
BHI Biopsychosocial Assessment    Current Level of Psychosocial Functioning     Independent XXX  Dependent    Minimal Assist     Comments:      Psychosocial High Risk Factors (check all that apply)    Unable to obtain meds   Chronic illness/pain    Substance abuse XXX  Lack of Family Support   Financial stress XXX  Isolation   Inadequate Community Resources  Suicide attempt(s) XXX  Not taking medications   Victim of crime   Developmental Delay   Unable to manage personal needs    Age 72 or older   Homeless  No transportation XXX  Readmission within 30 days  Unemployment  Traumatic Event    Comments:   Sexual Orientation:  Did not say    Patient Strengths: Positive leisure activities, positive social support, communication, open to treatment    Patient Barriers: transportation, difficulty solving problems, inappropriate/potentially harmful leisure activities (marijuana use)    Plan of Care     medication management, group/individual therapies, family meetings, psycho -education, treatment team meetings to assist with stabilization    Initial Discharge Plan:  Patient plans to be discharged to grandmother's home following release, hopes to return to [place] where patient originally lived. Patient does not have follow-up provider, open to follow-up with [facility]. Clinical Summary:  Patient is a 25year old male admitted following suicidal statement of cutting throat if taken to group home. Prior to this statement, patient had attempted suicide via self-harm and by train before changing mind both times. Patient reports suicidal thoughts since March 2nd. Patient reports he was picked up by police and was found to be carrying drug paraphernalia. Patient acknowledges hearing whispers that encourage him to think before acting.  Patient is originally from [place] and moved to be closer to family (grandmother and brother with whom he Class II - visualization of the soft palate, fauces, and uvula

## 2023-11-15 NOTE — PRE-OP CHECKLIST - NS PREOP CHK HIBICLENS NA
-per hematology consult, hydroxyurea on hold, no indication for treatment for now  -goal Hct <42% per CYTO-PV trial #1:

## 2023-11-15 NOTE — CHART NOTE - NSCHARTNOTEFT_GEN_A_CORE
Patient received in PACU post TAVR . Patient awake and alert.     120/73       76      96% RA  Gen - NAD  Lungs- CTA b/l   Heart- S1,S2  Abd - soft , non tender  Ext - bilateral groins with DSD intact.       79 y/o M  with h/o Afib (on Eliquis), severe AS,  mod-severe TR, CAD/MI (30 yrs ago, medically managed, no PCI), CHF, COPD (not on O2 ), Former heavy smoker, HTN, HLD, HERIBERTO not compliant with CPAP and Gout. Pt report recent hospitalization (7/2023) for large pericardial effusion without tamponade which was determined not accessible for drainage and does not cause hemodynamic compromise h/o anemia on Iron infusion (2 PRBC during hospitalization). Patient with severe AS.     11/15 s/p TAVR - via RFA perclosed x 2 , LFC vascade x 1 , LFA perclose x 1   -  ECHO in am   - CT chest non contrast to evaluate the percicardial effusion and d/w IR if they would drain it   - 26 mm brent valve   - CEFUROXIME X 2 MORE DOSES   - aspirin in 6 hrs (11pm)  - hold off on eliquis pending the CT scan and paln for pericardial effusion     EKG -baseline , qtc 414       Maurice MEJIAS   ext [id number]

## 2023-11-15 NOTE — PATIENT PROFILE ADULT - NSPROPTRIGHTCAREGIVER_GEN_A_NUR
03/06/17 1000   Weaning Parameters   Spontaneous Breathing Trial Complete Yes   Resp Rate Observed 22   Ve 12.9      RSBI 38   SBT passed. declines

## 2023-11-15 NOTE — PRE-ANESTHESIA EVALUATION ADULT - NSANTHPMHFT_GEN_ALL_CORE
77 y/o M  with h/o Afib (on Eliquis), severe AS,  mod-severe TR, CAD/MI (30 yrs ago, medically managed, no PCI), CHF, COPD (not on O2 ), Former heavy smoker, HTN, HLD, HERIBERTO not compliant with CPAP and Gout. Pt report recent hospitalization (7/2023) for large pericardial effusion without tamponade which was determined not accessible for drainage and does not cause hemodynamic compromise h/o anemia on Iron infusion (2 PRBC during hospitalization). Today he presents to PST with c/o dyspnea upon exertion that he feels is getting worse in nature denies chest pain/tightness/discomfort, light-headed sensation, dizziness or palpitations. Now scheduled for TAVR on 11/15/23. 77 yo M w/ PMHx of Afib, severe AS, mod-severe TR, CAD s/p MI (~30 yrs ago, medically managed, no PCI), CHF, COPD, Former heavy smoker, HTN, HLD, HERIBERTO (not compliant with CPAP) and Gout. Pt report recent hospitalization (7/2023) for large pericardial effusion without tamponade which was determined not accessible for drainage and does not cause hemodynamic compromise h/o anemia on Iron infusion (2 PRBC during hospitalization). Worsening c/o dyspnea upon exertion that he feels is getting worse in nature, presents now for ALFRED.    Denies active CP/SOB/Orthopnea

## 2023-11-15 NOTE — PROGRESS NOTE ADULT - PROBLEM SELECTOR PLAN 1
Groins stable  Bedrest 4 hours  EKG daily  ECHO am  Hold AC (AFib) IR drain pericardial effusion tomorrow  MCOT on DC

## 2023-11-15 NOTE — PROGRESS NOTE ADULT - ASSESSMENT
79 y/o M  with h/o Afib (on Eliquis), severe AS,  mod-severe TR, CAD/MI (30 yrs ago, medically managed, no PCI), CHF, COPD (not on O2 ), Former heavy smoker, HTN, HLD, HERIBERTO not compliant with CPAP and Gout, 7/2023) for large pericardial effusion without tamponade which was determined not accessible for drainage and does not cause hemodynamic compromise h/o anemia on Iron infusion (2 PRBC during hospitalization).   11/15 S/P  TAVR  CT chest assess pericardial  effusion>IR eval for drain tomorrow Groins stable Supplemental O2 ECHO.EKG am

## 2023-11-15 NOTE — PATIENT PROFILE ADULT - FALL HARM RISK - HARM RISK INTERVENTIONS

## 2023-11-15 NOTE — PRE-ANESTHESIA EVALUATION ADULT - NSRADCARDRESULTSFT_GEN_ALL_CORE
< from: Cardiac Catheterization (07.19.23 @ 16:32) >    Diagnostic Findings:     Coronary Angiography   The coronary circulation is right dominant.     LM   Left main artery: Angiography shows no disease.      LAD   Proximal left anterior descending: There is a 40 % stenosis.      CX   Distal circumflex: There is an 80 % stenosis.      RCA   Proximal right coronary artery: There is a 40 % stenosis.        < end of copied text >    < from: TTE Limited W or WO Ultrasound Enhancing Agent (07.17.23 @ 09:07) >    CONCLUSIONS:      1. Left ventricular endocardium is not well visualized; however, the left ventricular systolic function appears grossly normal.   2. Large pericardial effusion with organized material seen lateral and inferolataral to the left ventricle. The effusion measures up to approximately 2.5 cm lateral to the LV. The pericardial effusion is small-moderate around the remainder of hte heart. The effusion measures up to approximately 1.5 cm adjacent to the right ventricular apex and 1.4 cm inferior to the right ventricle. The IVC is dilated (diameter~2.7 cm) without respiratory variation in size consistent with elevated RA pressure. No other echocardiographic evidence of pericardial tamponade seen.   3. Limited TTEperformed to evaluate the pericardium.   4. Compared to the transthoracic echocardiogram performed on 7/5/2023 the pericardial effusion is similar in size. More fluid is seen inferior to the right ventricle on subcostal view on today's study.. Findings were discussed with Dr. Ubaldo Ríos on 7/17/2023 at 10:05 am.    < end of copied text >

## 2023-11-16 ENCOUNTER — APPOINTMENT (OUTPATIENT)
Dept: ELECTROPHYSIOLOGY | Facility: CLINIC | Age: 78
End: 2023-11-16
Payer: MEDICARE

## 2023-11-16 ENCOUNTER — TRANSCRIPTION ENCOUNTER (OUTPATIENT)
Age: 78
End: 2023-11-16

## 2023-11-16 VITALS
TEMPERATURE: 98 F | OXYGEN SATURATION: 92 % | DIASTOLIC BLOOD PRESSURE: 72 MMHG | SYSTOLIC BLOOD PRESSURE: 130 MMHG | HEART RATE: 128 BPM | RESPIRATION RATE: 18 BRPM

## 2023-11-16 LAB
ALBUMIN SERPL ELPH-MCNC: 3.2 G/DL — LOW (ref 3.3–5)
ALBUMIN SERPL ELPH-MCNC: 3.2 G/DL — LOW (ref 3.3–5)
ALP SERPL-CCNC: 88 U/L — SIGNIFICANT CHANGE UP (ref 40–120)
ALP SERPL-CCNC: 88 U/L — SIGNIFICANT CHANGE UP (ref 40–120)
ALT FLD-CCNC: 22 U/L — SIGNIFICANT CHANGE UP (ref 10–45)
ALT FLD-CCNC: 22 U/L — SIGNIFICANT CHANGE UP (ref 10–45)
ANION GAP SERPL CALC-SCNC: 7 MMOL/L — SIGNIFICANT CHANGE UP (ref 5–17)
ANION GAP SERPL CALC-SCNC: 7 MMOL/L — SIGNIFICANT CHANGE UP (ref 5–17)
AST SERPL-CCNC: 24 U/L — SIGNIFICANT CHANGE UP (ref 10–40)
AST SERPL-CCNC: 24 U/L — SIGNIFICANT CHANGE UP (ref 10–40)
BASOPHILS # BLD AUTO: 0.02 K/UL — SIGNIFICANT CHANGE UP (ref 0–0.2)
BASOPHILS # BLD AUTO: 0.02 K/UL — SIGNIFICANT CHANGE UP (ref 0–0.2)
BASOPHILS NFR BLD AUTO: 0.4 % — SIGNIFICANT CHANGE UP (ref 0–2)
BASOPHILS NFR BLD AUTO: 0.4 % — SIGNIFICANT CHANGE UP (ref 0–2)
BILIRUB SERPL-MCNC: 0.8 MG/DL — SIGNIFICANT CHANGE UP (ref 0.2–1.2)
BILIRUB SERPL-MCNC: 0.8 MG/DL — SIGNIFICANT CHANGE UP (ref 0.2–1.2)
BUN SERPL-MCNC: 34 MG/DL — HIGH (ref 7–23)
BUN SERPL-MCNC: 34 MG/DL — HIGH (ref 7–23)
CALCIUM SERPL-MCNC: 9 MG/DL — SIGNIFICANT CHANGE UP (ref 8.4–10.5)
CALCIUM SERPL-MCNC: 9 MG/DL — SIGNIFICANT CHANGE UP (ref 8.4–10.5)
CHLORIDE SERPL-SCNC: 105 MMOL/L — SIGNIFICANT CHANGE UP (ref 96–108)
CHLORIDE SERPL-SCNC: 105 MMOL/L — SIGNIFICANT CHANGE UP (ref 96–108)
CO2 SERPL-SCNC: 31 MMOL/L — SIGNIFICANT CHANGE UP (ref 22–31)
CO2 SERPL-SCNC: 31 MMOL/L — SIGNIFICANT CHANGE UP (ref 22–31)
CREAT SERPL-MCNC: 1.39 MG/DL — HIGH (ref 0.5–1.3)
CREAT SERPL-MCNC: 1.39 MG/DL — HIGH (ref 0.5–1.3)
EGFR: 52 ML/MIN/1.73M2 — LOW
EGFR: 52 ML/MIN/1.73M2 — LOW
EOSINOPHIL # BLD AUTO: 0.11 K/UL — SIGNIFICANT CHANGE UP (ref 0–0.5)
EOSINOPHIL # BLD AUTO: 0.11 K/UL — SIGNIFICANT CHANGE UP (ref 0–0.5)
EOSINOPHIL NFR BLD AUTO: 2.2 % — SIGNIFICANT CHANGE UP (ref 0–6)
EOSINOPHIL NFR BLD AUTO: 2.2 % — SIGNIFICANT CHANGE UP (ref 0–6)
GLUCOSE SERPL-MCNC: 115 MG/DL — HIGH (ref 70–99)
GLUCOSE SERPL-MCNC: 115 MG/DL — HIGH (ref 70–99)
HCT VFR BLD CALC: 33.2 % — LOW (ref 39–50)
HCT VFR BLD CALC: 33.2 % — LOW (ref 39–50)
HGB BLD-MCNC: 10.5 G/DL — LOW (ref 13–17)
HGB BLD-MCNC: 10.5 G/DL — LOW (ref 13–17)
IMM GRANULOCYTES NFR BLD AUTO: 0.6 % — SIGNIFICANT CHANGE UP (ref 0–0.9)
IMM GRANULOCYTES NFR BLD AUTO: 0.6 % — SIGNIFICANT CHANGE UP (ref 0–0.9)
LYMPHOCYTES # BLD AUTO: 0.75 K/UL — LOW (ref 1–3.3)
LYMPHOCYTES # BLD AUTO: 0.75 K/UL — LOW (ref 1–3.3)
LYMPHOCYTES # BLD AUTO: 14.7 % — SIGNIFICANT CHANGE UP (ref 13–44)
LYMPHOCYTES # BLD AUTO: 14.7 % — SIGNIFICANT CHANGE UP (ref 13–44)
MCHC RBC-ENTMCNC: 28.8 PG — SIGNIFICANT CHANGE UP (ref 27–34)
MCHC RBC-ENTMCNC: 28.8 PG — SIGNIFICANT CHANGE UP (ref 27–34)
MCHC RBC-ENTMCNC: 31.6 GM/DL — LOW (ref 32–36)
MCHC RBC-ENTMCNC: 31.6 GM/DL — LOW (ref 32–36)
MCV RBC AUTO: 91 FL — SIGNIFICANT CHANGE UP (ref 80–100)
MCV RBC AUTO: 91 FL — SIGNIFICANT CHANGE UP (ref 80–100)
MONOCYTES # BLD AUTO: 0.55 K/UL — SIGNIFICANT CHANGE UP (ref 0–0.9)
MONOCYTES # BLD AUTO: 0.55 K/UL — SIGNIFICANT CHANGE UP (ref 0–0.9)
MONOCYTES NFR BLD AUTO: 10.8 % — SIGNIFICANT CHANGE UP (ref 2–14)
MONOCYTES NFR BLD AUTO: 10.8 % — SIGNIFICANT CHANGE UP (ref 2–14)
NEUTROPHILS # BLD AUTO: 3.65 K/UL — SIGNIFICANT CHANGE UP (ref 1.8–7.4)
NEUTROPHILS # BLD AUTO: 3.65 K/UL — SIGNIFICANT CHANGE UP (ref 1.8–7.4)
NEUTROPHILS NFR BLD AUTO: 71.3 % — SIGNIFICANT CHANGE UP (ref 43–77)
NEUTROPHILS NFR BLD AUTO: 71.3 % — SIGNIFICANT CHANGE UP (ref 43–77)
NRBC # BLD: 0 /100 WBCS — SIGNIFICANT CHANGE UP (ref 0–0)
NRBC # BLD: 0 /100 WBCS — SIGNIFICANT CHANGE UP (ref 0–0)
PLATELET # BLD AUTO: SIGNIFICANT CHANGE UP K/UL (ref 150–400)
PLATELET # BLD AUTO: SIGNIFICANT CHANGE UP K/UL (ref 150–400)
POTASSIUM SERPL-MCNC: 4.1 MMOL/L — SIGNIFICANT CHANGE UP (ref 3.5–5.3)
POTASSIUM SERPL-MCNC: 4.1 MMOL/L — SIGNIFICANT CHANGE UP (ref 3.5–5.3)
POTASSIUM SERPL-SCNC: 4.1 MMOL/L — SIGNIFICANT CHANGE UP (ref 3.5–5.3)
POTASSIUM SERPL-SCNC: 4.1 MMOL/L — SIGNIFICANT CHANGE UP (ref 3.5–5.3)
PROT SERPL-MCNC: 5.4 G/DL — LOW (ref 6–8.3)
PROT SERPL-MCNC: 5.4 G/DL — LOW (ref 6–8.3)
RBC # BLD: 3.65 M/UL — LOW (ref 4.2–5.8)
RBC # BLD: 3.65 M/UL — LOW (ref 4.2–5.8)
RBC # FLD: 16.1 % — HIGH (ref 10.3–14.5)
RBC # FLD: 16.1 % — HIGH (ref 10.3–14.5)
SODIUM SERPL-SCNC: 143 MMOL/L — SIGNIFICANT CHANGE UP (ref 135–145)
SODIUM SERPL-SCNC: 143 MMOL/L — SIGNIFICANT CHANGE UP (ref 135–145)
WBC # BLD: 5.11 K/UL — SIGNIFICANT CHANGE UP (ref 3.8–10.5)
WBC # BLD: 5.11 K/UL — SIGNIFICANT CHANGE UP (ref 3.8–10.5)
WBC # FLD AUTO: 5.11 K/UL — SIGNIFICANT CHANGE UP (ref 3.8–10.5)
WBC # FLD AUTO: 5.11 K/UL — SIGNIFICANT CHANGE UP (ref 3.8–10.5)

## 2023-11-16 PROCEDURE — 99239 HOSP IP/OBS DSCHRG MGMT >30: CPT

## 2023-11-16 PROCEDURE — C1889: CPT

## 2023-11-16 PROCEDURE — 82962 GLUCOSE BLOOD TEST: CPT

## 2023-11-16 PROCEDURE — C1760: CPT

## 2023-11-16 PROCEDURE — C1887: CPT

## 2023-11-16 PROCEDURE — 94640 AIRWAY INHALATION TREATMENT: CPT

## 2023-11-16 PROCEDURE — C1769: CPT

## 2023-11-16 PROCEDURE — 71250 CT THORAX DX C-: CPT

## 2023-11-16 PROCEDURE — 85025 COMPLETE CBC W/AUTO DIFF WBC: CPT

## 2023-11-16 PROCEDURE — 93010 ELECTROCARDIOGRAM REPORT: CPT

## 2023-11-16 PROCEDURE — L8699: CPT

## 2023-11-16 PROCEDURE — 93306 TTE W/DOPPLER COMPLETE: CPT

## 2023-11-16 PROCEDURE — 76000 FLUOROSCOPY <1 HR PHYS/QHP: CPT

## 2023-11-16 PROCEDURE — 80053 COMPREHEN METABOLIC PANEL: CPT

## 2023-11-16 PROCEDURE — 93306 TTE W/DOPPLER COMPLETE: CPT | Mod: 26

## 2023-11-16 PROCEDURE — 93005 ELECTROCARDIOGRAM TRACING: CPT

## 2023-11-16 RX ORDER — FUROSEMIDE 40 MG
40 TABLET ORAL
Refills: 0 | Status: DISCONTINUED | OUTPATIENT
Start: 2023-11-16 | End: 2023-11-16

## 2023-11-16 RX ORDER — OMEGA-3 ACID ETHYL ESTERS 1 G
2 CAPSULE ORAL
Qty: 0 | Refills: 0 | DISCHARGE

## 2023-11-16 RX ORDER — APIXABAN 2.5 MG/1
1 TABLET, FILM COATED ORAL
Qty: 0 | Refills: 0 | DISCHARGE

## 2023-11-16 RX ORDER — MOMETASONE FUROATE 220 UG/1
2 INHALANT RESPIRATORY (INHALATION) EVERY 12 HOURS
Refills: 0 | Status: DISCONTINUED | OUTPATIENT
Start: 2023-11-16 | End: 2023-11-16

## 2023-11-16 RX ORDER — BECLOMETHASONE DIPROPIONATE 40 UG/1
1 AEROSOL, METERED RESPIRATORY (INHALATION)
Qty: 0 | Refills: 0 | DISCHARGE

## 2023-11-16 RX ORDER — FUROSEMIDE 40 MG
40 TABLET ORAL DAILY
Refills: 0 | Status: DISCONTINUED | OUTPATIENT
Start: 2023-11-16 | End: 2023-11-16

## 2023-11-16 RX ORDER — TAMSULOSIN HYDROCHLORIDE 0.4 MG/1
0.4 CAPSULE ORAL AT BEDTIME
Refills: 0 | Status: DISCONTINUED | OUTPATIENT
Start: 2023-11-16 | End: 2023-11-16

## 2023-11-16 RX ORDER — METOPROLOL TARTRATE 50 MG
3 TABLET ORAL
Qty: 0 | Refills: 0 | DISCHARGE
Start: 2023-11-16

## 2023-11-16 RX ORDER — ALLOPURINOL 300 MG
100 TABLET ORAL DAILY
Refills: 0 | Status: DISCONTINUED | OUTPATIENT
Start: 2023-11-16 | End: 2023-11-16

## 2023-11-16 RX ORDER — APIXABAN 2.5 MG/1
5 TABLET, FILM COATED ORAL EVERY 12 HOURS
Refills: 0 | Status: DISCONTINUED | OUTPATIENT
Start: 2023-11-16 | End: 2023-11-16

## 2023-11-16 RX ORDER — PANTOPRAZOLE SODIUM 20 MG/1
40 TABLET, DELAYED RELEASE ORAL
Refills: 0 | Status: DISCONTINUED | OUTPATIENT
Start: 2023-11-16 | End: 2023-11-16

## 2023-11-16 RX ORDER — METOPROLOL TARTRATE 50 MG
75 TABLET ORAL
Refills: 0 | Status: DISCONTINUED | OUTPATIENT
Start: 2023-11-16 | End: 2023-11-16

## 2023-11-16 RX ORDER — PANTOPRAZOLE SODIUM 20 MG/1
1 TABLET, DELAYED RELEASE ORAL
Qty: 0 | Refills: 0 | DISCHARGE

## 2023-11-16 RX ORDER — TIOTROPIUM BROMIDE 18 UG/1
1 CAPSULE ORAL; RESPIRATORY (INHALATION)
Refills: 0 | DISCHARGE

## 2023-11-16 RX ORDER — ATORVASTATIN CALCIUM 80 MG/1
40 TABLET, FILM COATED ORAL AT BEDTIME
Refills: 0 | Status: DISCONTINUED | OUTPATIENT
Start: 2023-11-16 | End: 2023-11-16

## 2023-11-16 RX ORDER — TIOTROPIUM BROMIDE 18 UG/1
2 CAPSULE ORAL; RESPIRATORY (INHALATION) DAILY
Refills: 0 | Status: DISCONTINUED | OUTPATIENT
Start: 2023-11-16 | End: 2023-11-16

## 2023-11-16 RX ADMIN — Medication 81 MILLIGRAM(S): at 12:41

## 2023-11-16 RX ADMIN — TIOTROPIUM BROMIDE 2 PUFF(S): 18 CAPSULE ORAL; RESPIRATORY (INHALATION) at 12:41

## 2023-11-16 RX ADMIN — Medication 75 MILLIGRAM(S): at 11:02

## 2023-11-16 RX ADMIN — Medication 100 MILLIGRAM(S): at 12:41

## 2023-11-16 NOTE — DISCHARGE NOTE NURSING/CASE MANAGEMENT/SOCIAL WORK - PATIENT PORTAL LINK FT
You can access the FollowMyHealth Patient Portal offered by [facility] by registering at the following website: [email or web address]. By joining [facility]’s FollowMyHealth portal, you will also be able to view your health information using other applications (apps) compatible with our system.

## 2023-11-16 NOTE — DISCHARGE NOTE PROVIDER - NSDCFUADDAPPT_GEN_ALL_CORE_FT
Follow up with Dr. huitron at CTS office at [facility], call [phone] to confirm appointment.  Follow up with your Cardiologist in 1-2 weeks, please call the office to schedule an appointment.  Follow up with your PCP in 1-2 weeks, call the office to schedule an appointment.  Follow up with Dr. Bob on Monday 11/20 at 11:15AM in CTS office at [facility], call [phone] to confirm appointment.  Follow up with Dr. Dan in 1 month for repeat echocardiogram, please call the office to schedule an appointment.   Follow up with your Cardiologist Dr. Sebastian in 2 weeks, please call the office to schedule an appointment.  Follow up with your PCP Dr Navarrete in 2 weeks, call the office to schedule an appointment.

## 2023-11-16 NOTE — DISCHARGE NOTE PROVIDER - CARE PROVIDER_API CALL
Yonny Bob  Thoracic and Cardiac Surgery  [address] [place] [address]  Phone: [phone]  Fax: [phone]  Scheduled Appointment: 11/20/2023 11:15 AM    Jimmie Dan  Interventional Cardiology  [address] [place] [address]  Phone: [phone]  Fax: [phone]  Follow Up Time: 1 month    Jean Sebastian  Cardiovascular Disease  [address] [place] [address]  Phone: [phone]  Fax: [phone]  Follow Up Time: 2 weeks    RUT BURGESS, [address] [address]  Phone: ()-  Fax: ()-  Follow Up Time: 2 weeks

## 2023-11-16 NOTE — CONSULT NOTE ADULT - SUBJECTIVE AND OBJECTIVE BOX
CHIEF COMPLAINT: s/p TAVR    HISTORY OF PRESENT ILLNESS:  79 y/o M  with h/o Afib (on Eliquis), severe AS,  mod-severe TR, CAD/MI (30 yrs ago, medically managed, no PCI), CHF, COPD (not on O2 ), Former heavy smoker, HTN, HLD, HERIBERTO not compliant with CPAP and Gout. Pt report recent hospitalization (7/2023) for large pericardial effusion without tamponade which was determined not accessible for drainage and does not cause hemodynamic compromise h/o anemia on Iron infusion (2 PRBC during hospitalization).  with c/o dyspnea upon exertion that he feels is getting worse in nature denies chest pain/tightness/discomfort, light-headed sensation, dizziness or palpitations. Now s/p TAVR on 11/15/23.       Allergies    penicillins (Unknown)  penicillin (Unknown)    Intolerances    	    MEDICATIONS:  aspirin  chewable 81 milliGRAM(s) Oral daily    cefuroxime  IVPB 1500 milliGRAM(s) IV Intermittent every 8 hours  clindamycin IVPB 900 milliGRAM(s) IV Intermittent once                PAST MEDICAL & SURGICAL HISTORY:  AF (atrial fibrillation)      Hypertension      MI (myocardial infarction)      BPH (benign prostatic hyperplasia)      GERD (gastroesophageal reflux disease)      Sleep apnea      Gout      Aortic stenosis      COPD (chronic obstructive pulmonary disease)      COPD (chronic obstructive pulmonary disease)      Former smoker      HTN (hypertension)      HLD (hyperlipidemia)      CAD (coronary artery disease)      Chronic atrial fibrillation      Diabetes mellitus, type 2      Gout      Severe aortic stenosis      Pericardial effusion      TR (tricuspid regurgitation)      2019 novel coronavirus disease (COVID-19)      HERIBERTO (obstructive sleep apnea)      No significant past surgical history      H/O colonoscopy          FAMILY HISTORY:  No pertinent family history in first degree relatives    FH: breast cancer (Mother)        SOCIAL HISTORY:    former smoker. indep in adl    REVIEW OF SYSTEMS:  See HPI, otherwise complete 10 point review of systems negative    [ ] All others negative	      PHYSICAL EXAM:  T(C): 36.6 (11-16-23 @ 04:53), Max: 36.8 (11-15-23 @ 12:01)  HR: 81 (11-16-23 @ 04:53) (70 - 96)  BP: 110/75 (11-16-23 @ 04:53) (100/64 - 132/71)  RR: 18 (11-16-23 @ 04:53) (16 - 20)  SpO2: 95% (11-16-23 @ 04:53) (91% - 97%)  Wt(kg): --  I&O's Summary    15 Nov 2023 07:01  -  16 Nov 2023 07:00  --------------------------------------------------------  IN: 290 mL / OUT: 300 mL / NET: -10 mL        Appearance: No Acute Distress	  HEENT:  Normal oral mucosa, PERRL, EOMI	  Cardiovascular: Normal S1 S2, No JVD, No murmurs/rubs/gallops  Respiratory: Lungs clear to auscultation bilaterally  Gastrointestinal:  Soft, Non-tender, + BS	  Skin: No rashes, No ecchymoses, No cyanosis	  Neurologic: Non-focal  Extremities: No clubbing, cyanosis or edema  Vascular: Peripheral pulses palpable 2+ bilaterally  Psychiatry: A & O x 3, Mood & affect appropriate    Laboratory Data:	 	    CBC Full  -  ( 16 Nov 2023 06:15 )  WBC Count : 5.11 K/uL  Hemoglobin : 10.5 g/dL  Hematocrit : 33.2 %  Platelet Count - Automated : Clumped K/uL  Mean Cell Volume : 91.0 fl  Mean Cell Hemoglobin : 28.8 pg  Mean Cell Hemoglobin Concentration : 31.6 gm/dL  Auto Neutrophil # : 3.65 K/uL  Auto Lymphocyte # : 0.75 K/uL  Auto Monocyte # : 0.55 K/uL  Auto Eosinophil # : 0.11 K/uL  Auto Basophil # : 0.02 K/uL  Auto Neutrophil % : 71.3 %  Auto Lymphocyte % : 14.7 %  Auto Monocyte % : 10.8 %  Auto Eosinophil % : 2.2 %  Auto Basophil % : 0.4 %    11-16    143  |  105  |  34<H>  ----------------------------<  115<H>  4.1   |  31  |  1.39<H>    Ca    9.0      16 Nov 2023 06:15    TPro  5.4<L>  /  Alb  3.2<L>  /  TBili  0.8  /  DBili  x   /  AST  24  /  ALT  22  /  AlkPhos  88  11-16      proBNP:   Lipid Profile:   HgA1c:   TSH:       CARDIAC MARKERS:            Interpretation of Telemetry: 	    ECG:  	  RADIOLOGY:  OTHER: 	    PREVIOUS DIAGNOSTIC TESTING:    [ ] Echocardiogram:  [ ] Catheterization:  [ ] Stress Test:  	    Assessment:  79 y/o M  with h/o Afib (on Eliquis), severe AS,  mod-severe TR, CAD/MI (30 yrs ago, medically managed, no PCI), CHF, COPD (not on O2 ), Former heavy smoker, HTN, HLD, HERIBERTO not compliant with CPAP and Gout, large pericardial effusion without tamponade, now s/p TAVR on 11/15/23.     Recs:  cardiac stable  s/p TAVR  cw medical management with aspirin and statin for CAD  f/u post TAVR echo  diuretics to maintain euvolemic  resume AC for persistent afib when able  f/u CT chest for possible IR drainage of large pericardial effusion  telemetry monitoring  care per cts  will follow          Greater than 90 minutes spent on total encounter; more than 50% of the visit was spent counseling and/or coordinating care by the attending physician.   Advanced care planning forms were discussed. Code status including forceful chest compressions, defibrillation and intubation were discussed. The risks benefits and alternatives to pertinent cardiac procedures and interventions were discussed in detail and all questions were answered. Duration: 15 minutes.  	  Jean Sebastian MD   Cardiovascular Diseases  [phone]

## 2023-11-16 NOTE — DISCHARGE NOTE NURSING/CASE MANAGEMENT/SOCIAL WORK - NSDCFUADDAPPT_GEN_ALL_CORE_FT
Follow up with Dr. Bob on Monday 11/20 at 11:15AM in CTS office at [facility], call [phone] to confirm appointment.  Follow up with Dr. Dan in 1 month for repeat echocardiogram, please call the office to schedule an appointment.   Follow up with your Cardiologist Dr. Sebastian in 2 weeks, please call the office to schedule an appointment.  Follow up with your PCP Dr Navarrete in 2 weeks, call the office to schedule an appointment.

## 2023-11-16 NOTE — DISCHARGE NOTE PROVIDER - NSDCCPCAREPLAN_GEN_ALL_CORE_FT
PRINCIPAL DISCHARGE DIAGNOSIS  Diagnosis: S/P TAVR (transcatheter aortic valve replacement)  Assessment and Plan of Treatment: 1. Daily Shower  2. Weight yourself daily.  3. Regular diet - low fat, low cholesterol, no added salt.  4. Increase Activity as tolerated.  5. Please let your dentist/physician know about your valve and assess the need for prophylactic antibiotics before any invasive procedures     PRINCIPAL DISCHARGE DIAGNOSIS  Diagnosis: S/P TAVR (transcatheter aortic valve replacement)  Assessment and Plan of Treatment: 1. Daily Shower  2. Weight yourself daily.  3. Diabetic diet - low fat, low cholesterol, no added salt.  4. Increase Activity as tolerated.  5. Please let your dentist/physician know about your valve and assess the need for prophylactic antibiotics before any invasive procedures

## 2023-11-16 NOTE — DISCHARGE NOTE PROVIDER - PROVIDER TOKENS
PROVIDER:[TOKEN:[[id number]],SCHEDULEDAPPT:[11/20/2023],SCHEDULEDAPPTTIME:[11:15 AM]],PROVIDER:[TOKEN:[[id number]],FOLLOWUP:[1 month]],PROVIDER:[TOKEN:[[id number]],FOLLOWUP:[2 weeks]],PROVIDER:[TOKEN:[[id number]],FOLLOWUP:[2 weeks]]

## 2023-11-16 NOTE — DISCHARGE NOTE PROVIDER - HOSPITAL COURSE
77 y/o M  with h/o Afib (on Eliquis), severe AS,  mod-severe TR, CAD/MI (30 yrs ago, medically managed, no PCI), CHF, COPD (not on O2 ), Former heavy smoker, HTN, HLD, HERIBERTO not compliant with CPAP and Gout, 7/2023) for large pericardial effusion without tamponade which was determined not accessible for drainage and does not cause hemodynamic compromise h/o anemia on Iron infusion (2 PRBC during hospitalization).   11/15 S/P  TAVR  CT chest assess pericardial  effusion>IR eval for drain tomorrow Groins stable Supplemental O2 ECHO.EKG am  11/16 VSS, IR consulted for pericardial drain - no safe percutaneous window, pericardial effusion slightly smaller in size compared to 7/2023 CT chest. Cleared to resume home Eliquis 5 bid and beta-blocker for chronic Afib per Dr. Dan. Pt cleared for discharge home today with MCOT.

## 2023-11-16 NOTE — CONSULT NOTE ADULT - SUBJECTIVE AND OBJECTIVE BOX
Interventional Radiology    Evaluate for Procedure:     HPI: 79 y/o M  with h/o Afib (on Eliquis), severe AS,  mod-severe TR, CAD/MI (30 yrs ago, medically managed, no PCI), CHF, COPD (not on O2 ), Former heavy smoker, HTN, HLD, HERIBERTO not compliant with CPAP and Gout, 7/2023) for large pericardial effusion without tamponade which was determined not accessible for drainage and does not cause hemodynamic compromise h/o anemia on Iron infusion (2 PRBC during hospitalization). 11/15 S/P TAVR CT chest assess pericardial  effusion. IR being consulted for pericardial effusion.    Allergies: penicillins (Unknown)  penicillin (Unknown)    Medications (Abx/Cardiac/Anticoagulation/Blood Products)    aspirin  chewable: 81 milliGRAM(s) Oral (11-15 @ 23:16)  cefuroxime  IVPB: 100 mL/Hr IV Intermittent (11-15 @ 21:55)    Data:    72.9  T(C): 36.6  HR: 81  BP: 110/75  RR: 18  SpO2: 95%    -WBC 5.11 / HgB 10.5 / Hct 33.2 / Plt Clumped  -Na 143 / Cl 105 / BUN 34 / Glucose 115  -K 4.1 / CO2 31 / Cr 1.39  -ALT 22 / Alk Phos 88 / T.Bili 0.8  -INR -- / PTT 33.8    Radiology: < from: CT Chest No Cont (11.15.23 @ 18:17) >    ACC: [id number] EXAM:  CT CHEST   ORDERED BY: JOSE MIGUEL HICKMAN     PROCEDURE DATE:  11/15/2023    ******PRELIMINARY REPORT******          INTERPRETATION:  Moderate pericardial effusion, similar in size to   7/21/23.    < end of copied text >      Assessment/Plan:   79 y/o M  with h/o Afib (on Eliquis), severe AS,  mod-severe TR, CAD/MI (30 yrs ago, medically managed, no PCI), CHF, COPD (not on O2 ), Former heavy smoker, HTN, HLD, HERIBERTO not compliant with CPAP and Gout, 7/2023) for large pericardial effusion without tamponade which was determined not accessible for drainage and does not cause hemodynamic compromise h/o anemia on Iron infusion (2 PRBC during hospitalization). 11/15 S/P TAVR CT chest assess pericardial  effusion. IR being consulted for pericardial effusion drainage.      - case and imaging discussed with Dr. Ragland  - pericardial effusion slightly smaller in size compared to 7/2023 CT chest  - no safe percutaneous window for IR to drain  - patient is hemodynamically stable  - no IR intervention possible at this time  - d/w primary team

## 2023-11-16 NOTE — PROGRESS NOTE ADULT - SUBJECTIVE AND OBJECTIVE BOX
*****Structural Heart Team*****    Subjective:    The patient is resting comfortably in a chair, currently offering no complaints.      PAST MEDICAL & SURGICAL HISTORY:  AF (atrial fibrillation)    Hypertension    MI (myocardial infarction)    BPH (benign prostatic hyperplasia)    GERD (gastroesophageal reflux disease)    Sleep apnea    Gout    Aortic stenosis    COPD (chronic obstructive pulmonary disease)    COPD (chronic obstructive pulmonary disease)    Former smoker    HTN (hypertension)    HLD (hyperlipidemia)    CAD (coronary artery disease)    Chronic atrial fibrillation    Diabetes mellitus, type 2    Gout    Severe aortic stenosis    Pericardial effusion    TR (tricuspid regurgitation)    2019 novel coronavirus disease (COVID-19)    HERIBERTO (obstructive sleep apnea)    No significant past surgical history    H/O colonoscopy          T(C): 36.6 (11-16-23 @ 04:53), Max: 36.8 (11-15-23 @ 12:01)  HR: 81 (11-16-23 @ 04:53) (70 - 96)  BP: 110/75 (11-16-23 @ 04:53) (100/64 - 132/71)  RR: 18 (11-16-23 @ 04:53) (16 - 20)  SpO2: 95% (11-16-23 @ 04:53) (91% - 97%)  Wt(kg): --  11-15 @ 07:01  -  11-16 @ 07:00  --------------------------------------------------------  IN: 290 mL / OUT: 300 mL / NET: -10 mL    11-16 @ 07:01  -  11-16 @ 10:25  --------------------------------------------------------  IN: 120 mL / OUT: 0 mL / NET: 120 mL      MEDICATIONS  (STANDING):  allopurinol 100 milliGRAM(s) Oral daily  apixaban 5 milliGRAM(s) Oral every 12 hours  aspirin  chewable 81 milliGRAM(s) Oral daily  atorvastatin 40 milliGRAM(s) Oral at bedtime  cefuroxime  IVPB 1500 milliGRAM(s) IV Intermittent every 8 hours  clindamycin IVPB 900 milliGRAM(s) IV Intermittent once  furosemide    Tablet 40 milliGRAM(s) Oral two times a day  mometasone 220 MICROgram(s) Inhaler 2 Puff(s) Inhalation every 12 hours  pantoprazole    Tablet 40 milliGRAM(s) Oral before breakfast  tamsulosin 0.4 milliGRAM(s) Oral at bedtime  tiotropium 2.5 MICROgram(s) Inhaler 2 Puff(s) Inhalation daily    MEDICATIONS  (PRN):      Review of Symptoms:  Constitutional: Awake, Alert, Follows commands  Respiratory: Currently denies  Cardiac: Denies CP, Denies Palpitations, Chronic Effusion  Gastrointestinal: Denies Pain, Denies N/V, tolerating po intake  Vascular: Negative  Extremities: + Edema, No joint pain or swelling  Neurological: Negative  Endocrine: No heat or cold intolerance, No excessive thirst  Heme/Onc: Negative    Exam:  General: A/Ox3, FERMIN, NAD  HEENT: Supple, No JVD, Trachea midline, no masses  Pulmonary: CTAB, = Chest Excursion, no accessory muscle use  Cor: S1S2, Irregular, No Murmur noted  ECG: AFib  Groin/Wound: B/L soft, No Hematoma  Gastrointestinal: Soft, NT/ND, + Bowel Sounds  Neuro: = motor and sensory B/L, No focal deficits  Vascular: 1+ Pulses B/L, No Edema  Extremities: No joint pain or swelling  Skin: Warm/Dry/Normal color, Normal turgor, no rashes                          10.5   5.11  )-----------( Clumped    ( 16 Nov 2023 06:15 )             33.2   11-16    143  |  105  |  34<H>  ----------------------------<  115<H>  4.1   |  31  |  1.39<H>    Ca    9.0      16 Nov 2023 06:15    TPro  5.4<L>  /  Alb  3.2<L>  /  TBili  0.8  /  DBili  x   /  AST  24  /  ALT  22  /  AlkPhos  88  11-16      Imaging Reviewed:    Echocardiogram: POD#1 Pending        Assesment/Plan:    78M S/P TAVR for Severe Aortic Stenosis, Pericardial Effusion, Chronic AFib, Chronic Diastolic Heart Failure  1.) S/P TAVR: ASA 81 mg po daily,Continue to Monitor Groins. Ambulate as tolerated. Will follow up on POD #1 TTE, If it looks OK, will send home today.  2.) Patient will be discharged on an MCOT for a period of 30days to monitor for rhythm changes post TAVR, which was discussed at length with the patient, and any questions were answered.  3.) Pericardial Effusion: Patient has chronic Pericardial effusion which has been previously noted. He had a repeat Chest CT yesterday which demonstrates that the Pericardial Effusion has decreased in size. Patient and imaging evaluated by IR, and there are no plans to drain. Continue to monitor.  4.) Chronic AFib: Restart apixiban 5 mg BID. Will also restart his Toprol XL 75 mg po daily. Heart rate increases to 110's. Discussed with Dr. Dan.  5.) Chronic HFpEF: Monitor Daily weight, Restart furosemide 40 mg BID  6.) Discharge Plan: Patient is to follow up with Dr. Bob in 1 week post discharge. He should then follow up with the [facility]  in 30 days, with a repeat Transthoracic Echo to be done at that visit.    RANDELL Connelly  [id number]
  Subjective: "Doing great, the procedure went well"  Supine in bed, bedrest x 4 hours    Tele:         AFib 100                       T(C): 36.7 (11-15-23 @ 18:38), Max: 36.8 (11-15-23 @ 12:01)  HR: 80 (11-15-23 @ 18:38) (70 - 96)  BP: 116/58 (11-15-23 @ 18:38) (105/67 - 132/71)  RR: 17 (11-15-23 @ 18:38) (16 - 20)  SpO2: 97% (11-15-23 @ 18:38) (93% - 97%)                          CAPILLARY BLOOD GLUCOSE      POCT Blood Glucose.: 103 mg/dL (15 Nov 2023 17:39)  POCT Blood Glucose.: 118 mg/dL (15 Nov 2023 10:40)               Assessment    Neuro: alert, no deficits    Pulm: Supplemental O2  Breath sounds clear bilaterally    CV:  S1  S2 irreg  irreg    Abd: soft  non tender Reports BM yesterday    Extremities: no edema  B/L groins stable, DSD in place no swelling or hematoma noted    CT< from: CT Chest No Cont (11.15.23 @ 18:17) >  INTERPRETATION:  Moderate pericardial effusion, similar in size to   7/21/23.      MEDICATIONS  (STANDING):  aspirin  chewable 81 milliGRAM(s) Oral daily  cefuroxime  IVPB 1500 milliGRAM(s) IV Intermittent every 8 hours  clindamycin IVPB 900 milliGRAM(s) IV Intermittent once       PAST MEDICAL & SURGICAL HISTORY:  AF (atrial fibrillation)      Hypertension      MI (myocardial infarction)      BPH (benign prostatic hyperplasia)      GERD (gastroesophageal reflux disease)      Sleep apnea      Gout      Aortic stenosis      COPD (chronic obstructive pulmonary disease)      COPD (chronic obstructive pulmonary disease)      Former smoker      HTN (hypertension)      HLD (hyperlipidemia)      CAD (coronary artery disease)      Chronic atrial fibrillation      Diabetes mellitus, type 2      Gout      Severe aortic stenosis      Pericardial effusion      TR (tricuspid regurgitation)      2019 novel coronavirus disease (COVID-19)      HERIBERTO (obstructive sleep apnea)      No significant past surgical history      H/O colonoscopy

## 2023-11-16 NOTE — DISCHARGE NOTE PROVIDER - NSDCPNSUBOBJ_GEN_ALL_CORE
Vital Signs Last 24 Hrs  T(C): 36.6 (16 Nov 2023 04:53), Max: 36.8 (15 Nov 2023 12:01)  T(F): 97.9 (16 Nov 2023 04:53), Max: 98.2 (15 Nov 2023 12:01)  HR: 81 (16 Nov 2023 04:53) (70 - 96)  BP: 110/75 (16 Nov 2023 04:53) (100/64 - 132/71)  BP(mean): 76 (15 Nov 2023 20:26) (76 - 76)  RR: 18 (16 Nov 2023 04:53) (16 - 20)  SpO2: 95% (16 Nov 2023 04:53) (91% - 97%)  Patient On (Oxygen Delivery Method): nasal cannula  O2 Flow (L/min): 2    PHYSICAL EXAM  Neurology: A&Ox3, NAD  CV : RRR+S1S2  Lungs: Respirations non-labored, B/L BS clear, diminished at bases  Abdomen: Soft, NT/ND, +BSx4Q, (-)N/V/D  : Voiding without difficulty  Extremities: B/L LE no edema, negative calf tenderness, +PP                         B/L groins soft, CDI DIANNA no bleeding no hematoma    45 minutes face to face spent on total encounter, patient counseling and discharge instructions.    Vital Signs Last 24 Hrs  T(C): 36.6 (16 Nov 2023 04:53), Max: 36.8 (15 Nov 2023 12:01)  T(F): 97.9 (16 Nov 2023 04:53), Max: 98.2 (15 Nov 2023 12:01)  HR: 81 (16 Nov 2023 04:53) (70 - 96)  BP: 110/75 (16 Nov 2023 04:53) (100/64 - 132/71)  BP(mean): 76 (15 Nov 2023 20:26) (76 - 76)  RR: 18 (16 Nov 2023 04:53) (16 - 20)  SpO2: 95% (16 Nov 2023 04:53) (91% - 97%)  Patient On (Oxygen Delivery Method): nasal cannula  O2 Flow (L/min): 2    PHYSICAL EXAM  Neurology: A&Ox3, NAD  CV : IRR+S1S2  Lungs: Respirations non-labored, B/L BS CTA  Abdomen: Soft, NT/ND, +BSx4Q, (-)N/V/D  : Voiding without difficulty  Extremities: B/L LE trace edema, negative calf tenderness, +PP                         B/L groins soft, CDI DIANNA no bleeding no hematoma    45 minutes face to face spent on total encounter, patient counseling and discharge instructions.

## 2023-11-16 NOTE — DISCHARGE NOTE PROVIDER - CARE PROVIDERS DIRECT ADDRESSES
,[email or web address],DirectAddress_Unknown,DirectAddress_Unknown,DirectAddress_Unknown

## 2023-11-16 NOTE — DISCHARGE NOTE PROVIDER - NSDCMRMEDTOKEN_GEN_ALL_CORE_FT
albuterol 90 mcg/inh inhalation aerosol: 2 puff(s) inhaled every 4 hours  alcohol swabs : Apply topically to affected area 4 times a day   allopurinol 100 mg oral tablet: 1 tab(s) orally once a day  apixaban 5 mg oral tablet: 1 tab(s) orally every 12 hours  Aspir 81 oral delayed release tablet: 1 tab(s) orally once a day (at bedtime)  atorvastatin 40 mg oral tablet: 1 tab(s) orally once a day  atorvastatin 40 mg oral tablet: 1 tab(s) orally once a day (at bedtime)  beclomethasone 80 mcg/inh inhalation aerosol: 2 puff(s) inhaled 2 times a day  Eliquis 2.5 mg oral tablet: 1 tab(s) orally 2 times a day  Fish Oil 500 mg oral capsule: 2 cap(s) orally 2 times a day  furosemide 40 mg oral tablet: 1 tab(s) orally once a day  glucometer (per patient&#x27;s insurance): 1 application subcutaneous 2 times a day   lancets: 1 application subcutaneously 4 times a day   METFORMIN 500MG TAB: 1 tab(s) orally 2 times a day  metoprolol succinate 25 mg oral tablet, extended release: 3 tab(s) orally 2 times a day  metoprolol tartrate 75 mg oral tablet: 1 tab(s) orally every 12 hours  omega-3 polyunsaturated fatty acids ethyl esters 1000 mg oral capsule: 4 cap(s) orally once a day  pantoprazole 40 mg oral delayed release tablet: 1 tab(s) orally once a day (before a meal)  Protonix 40 mg oral delayed release tablet: 1 tab(s) orally once a day  Qvar 80 mcg/inh inhalation aerosol: 1 puff(s) inhaled 2 times a day  Spiriva 18 mcg inhalation capsule: 1 cap(s) inhaled once a day (at bedtime)  Spiriva 18 mcg inhalation capsule: 1 cap(s) inhaled once a day  tamsulosin 0.4 mg oral capsule: 1 cap(s) orally once a day  test strips (per patient&#x27;s insurance): 1 application subcutaneously 4 times a day. ** Compatible with patient&#x27;s glucometer **   albuterol 90 mcg/inh inhalation aerosol: 2 puff(s) inhaled every 4 hours  alcohol swabs : Apply topically to affected area 4 times a day   allopurinol 100 mg oral tablet: 1 tab(s) orally once a day  apixaban 5 mg oral tablet: 1 tab(s) orally every 12 hours  Aspir 81 oral delayed release tablet: 1 tab(s) orally once a day (at bedtime)  atorvastatin 40 mg oral tablet: 1 tab(s) orally once a day  beclomethasone 80 mcg/inh inhalation aerosol: 2 puff(s) inhaled 2 times a day  furosemide 40 mg oral tablet: 1 tab(s) orally 2 times a day  glucometer (per patient&#x27;s insurance): 1 application subcutaneous 2 times a day   lancets: 1 application subcutaneously 4 times a day   METFORMIN 500MG TAB: 1 tab(s) orally 2 times a day  metoprolol succinate 25 mg oral tablet, extended release: 3 tab(s) orally 2 times a day  omega-3 polyunsaturated fatty acids ethyl esters 1000 mg oral capsule: 4 cap(s) orally once a day  pantoprazole 40 mg oral delayed release tablet: 1 tab(s) orally once a day (before a meal)  Spiriva 18 mcg inhalation capsule: 1 cap(s) inhaled once a day  tamsulosin 0.4 mg oral capsule: 1 cap(s) orally once a day  test strips (per patient&#x27;s insurance): 1 application subcutaneously 4 times a day. ** Compatible with patient&#x27;s glucometer **

## 2023-11-17 ENCOUNTER — APPOINTMENT (OUTPATIENT)
Dept: CARE COORDINATION | Facility: HOME HEALTH | Age: 78
End: 2023-11-17

## 2023-11-17 VITALS
DIASTOLIC BLOOD PRESSURE: 60 MMHG | BODY MASS INDEX: 25.18 KG/M2 | RESPIRATION RATE: 16 BRPM | HEART RATE: 97 BPM | SYSTOLIC BLOOD PRESSURE: 107 MMHG | OXYGEN SATURATION: 94 % | WEIGHT: 156 LBS

## 2023-11-17 PROBLEM — U07.1 COVID-19: Chronic | Status: ACTIVE | Noted: 2023-11-07

## 2023-11-17 PROBLEM — J44.9 CHRONIC OBSTRUCTIVE PULMONARY DISEASE, UNSPECIFIED: Chronic | Status: ACTIVE | Noted: 2023-07-04

## 2023-11-17 PROBLEM — Z95.2 S/P TAVR (TRANSCATHETER AORTIC VALVE REPLACEMENT): Status: ACTIVE | Noted: 2023-11-15

## 2023-11-17 RX ORDER — METFORMIN HYDROCHLORIDE 500 MG/1
500 TABLET, COATED ORAL
Qty: 60 | Refills: 0 | Status: DISCONTINUED | COMMUNITY
End: 2023-11-17

## 2023-11-17 RX ORDER — ALBUTEROL 90 MCG
90 AEROSOL (GRAM) INHALATION 4 TIMES DAILY
Refills: 0 | Status: ACTIVE | COMMUNITY

## 2023-11-20 ENCOUNTER — APPOINTMENT (OUTPATIENT)
Dept: CARDIOTHORACIC SURGERY | Facility: CLINIC | Age: 78
End: 2023-11-20
Payer: MEDICARE

## 2023-11-20 ENCOUNTER — NON-APPOINTMENT (OUTPATIENT)
Age: 78
End: 2023-11-20

## 2023-11-20 VITALS
WEIGHT: 156 LBS | HEART RATE: 88 BPM | RESPIRATION RATE: 16 BRPM | DIASTOLIC BLOOD PRESSURE: 80 MMHG | HEIGHT: 66 IN | BODY MASS INDEX: 25.07 KG/M2 | TEMPERATURE: 98.3 F | OXYGEN SATURATION: 96 % | SYSTOLIC BLOOD PRESSURE: 128 MMHG

## 2023-11-20 DIAGNOSIS — Z95.2 PRESENCE OF PROSTHETIC HEART VALVE: ICD-10-CM

## 2023-11-20 PROCEDURE — 99213 OFFICE O/P EST LOW 20 MIN: CPT

## 2023-11-25 PROBLEM — I25.10 ATHEROSCLEROTIC HEART DISEASE OF NATIVE CORONARY ARTERY WITHOUT ANGINA PECTORIS: Chronic | Status: ACTIVE | Noted: 2023-07-04

## 2023-11-25 PROBLEM — I31.39 OTHER PERICARDIAL EFFUSION (NONINFLAMMATORY): Chronic | Status: ACTIVE | Noted: 2023-07-04

## 2023-12-12 RX ORDER — METOPROLOL SUCCINATE 25 MG/1
25 TABLET, EXTENDED RELEASE ORAL
Qty: 180 | Refills: 0 | Status: DISCONTINUED | COMMUNITY
Start: 2023-08-09 | End: 2023-12-12

## 2023-12-14 ENCOUNTER — APPOINTMENT (OUTPATIENT)
Dept: CARDIOLOGY | Facility: CLINIC | Age: 78
End: 2023-12-14

## 2023-12-15 ENCOUNTER — NON-APPOINTMENT (OUTPATIENT)
Age: 78
End: 2023-12-15

## 2023-12-18 ENCOUNTER — TRANSCRIPTION ENCOUNTER (OUTPATIENT)
Age: 78
End: 2023-12-18

## 2023-12-20 ENCOUNTER — OUTPATIENT (OUTPATIENT)
Dept: OUTPATIENT SERVICES | Facility: HOSPITAL | Age: 78
LOS: 1 days | End: 2023-12-20
Payer: MEDICARE

## 2023-12-20 ENCOUNTER — TRANSCRIPTION ENCOUNTER (OUTPATIENT)
Age: 78
End: 2023-12-20

## 2023-12-20 VITALS — HEART RATE: 85 BPM | HEIGHT: 66 IN | WEIGHT: 158.07 LBS

## 2023-12-20 VITALS
HEART RATE: 78 BPM | OXYGEN SATURATION: 98 % | SYSTOLIC BLOOD PRESSURE: 144 MMHG | RESPIRATION RATE: 16 BRPM | DIASTOLIC BLOOD PRESSURE: 78 MMHG

## 2023-12-20 DIAGNOSIS — I47.10 SUPRAVENTRICULAR TACHYCARDIA, UNSPECIFIED: ICD-10-CM

## 2023-12-20 DIAGNOSIS — Z98.890 OTHER SPECIFIED POSTPROCEDURAL STATES: Chronic | ICD-10-CM

## 2023-12-20 LAB
ANION GAP SERPL CALC-SCNC: 10 MMOL/L — SIGNIFICANT CHANGE UP (ref 5–17)
ANION GAP SERPL CALC-SCNC: 10 MMOL/L — SIGNIFICANT CHANGE UP (ref 5–17)
BUN SERPL-MCNC: 36 MG/DL — HIGH (ref 7–23)
BUN SERPL-MCNC: 36 MG/DL — HIGH (ref 7–23)
CALCIUM SERPL-MCNC: 9.4 MG/DL — SIGNIFICANT CHANGE UP (ref 8.4–10.5)
CALCIUM SERPL-MCNC: 9.4 MG/DL — SIGNIFICANT CHANGE UP (ref 8.4–10.5)
CHLORIDE SERPL-SCNC: 102 MMOL/L — SIGNIFICANT CHANGE UP (ref 96–108)
CHLORIDE SERPL-SCNC: 102 MMOL/L — SIGNIFICANT CHANGE UP (ref 96–108)
CO2 SERPL-SCNC: 29 MMOL/L — SIGNIFICANT CHANGE UP (ref 22–31)
CO2 SERPL-SCNC: 29 MMOL/L — SIGNIFICANT CHANGE UP (ref 22–31)
CREAT SERPL-MCNC: 1.35 MG/DL — HIGH (ref 0.5–1.3)
CREAT SERPL-MCNC: 1.35 MG/DL — HIGH (ref 0.5–1.3)
EGFR: 54 ML/MIN/1.73M2 — LOW
EGFR: 54 ML/MIN/1.73M2 — LOW
GLUCOSE SERPL-MCNC: 98 MG/DL — SIGNIFICANT CHANGE UP (ref 70–99)
GLUCOSE SERPL-MCNC: 98 MG/DL — SIGNIFICANT CHANGE UP (ref 70–99)
HCT VFR BLD CALC: 36.8 % — LOW (ref 39–50)
HCT VFR BLD CALC: 36.8 % — LOW (ref 39–50)
HGB BLD-MCNC: 11.7 G/DL — LOW (ref 13–17)
HGB BLD-MCNC: 11.7 G/DL — LOW (ref 13–17)
MCHC RBC-ENTMCNC: 29.6 PG — SIGNIFICANT CHANGE UP (ref 27–34)
MCHC RBC-ENTMCNC: 29.6 PG — SIGNIFICANT CHANGE UP (ref 27–34)
MCHC RBC-ENTMCNC: 31.8 GM/DL — LOW (ref 32–36)
MCHC RBC-ENTMCNC: 31.8 GM/DL — LOW (ref 32–36)
MCV RBC AUTO: 93.2 FL — SIGNIFICANT CHANGE UP (ref 80–100)
MCV RBC AUTO: 93.2 FL — SIGNIFICANT CHANGE UP (ref 80–100)
NRBC # BLD: 0 /100 WBCS — SIGNIFICANT CHANGE UP (ref 0–0)
NRBC # BLD: 0 /100 WBCS — SIGNIFICANT CHANGE UP (ref 0–0)
PLATELET # BLD AUTO: SIGNIFICANT CHANGE UP K/UL (ref 150–400)
PLATELET # BLD AUTO: SIGNIFICANT CHANGE UP K/UL (ref 150–400)
POTASSIUM SERPL-MCNC: 4.3 MMOL/L — SIGNIFICANT CHANGE UP (ref 3.5–5.3)
POTASSIUM SERPL-MCNC: 4.3 MMOL/L — SIGNIFICANT CHANGE UP (ref 3.5–5.3)
POTASSIUM SERPL-SCNC: 4.3 MMOL/L — SIGNIFICANT CHANGE UP (ref 3.5–5.3)
POTASSIUM SERPL-SCNC: 4.3 MMOL/L — SIGNIFICANT CHANGE UP (ref 3.5–5.3)
RBC # BLD: 3.95 M/UL — LOW (ref 4.2–5.8)
RBC # BLD: 3.95 M/UL — LOW (ref 4.2–5.8)
RBC # FLD: 17.3 % — HIGH (ref 10.3–14.5)
RBC # FLD: 17.3 % — HIGH (ref 10.3–14.5)
SODIUM SERPL-SCNC: 141 MMOL/L — SIGNIFICANT CHANGE UP (ref 135–145)
SODIUM SERPL-SCNC: 141 MMOL/L — SIGNIFICANT CHANGE UP (ref 135–145)
WBC # BLD: 5.32 K/UL — SIGNIFICANT CHANGE UP (ref 3.8–10.5)
WBC # BLD: 5.32 K/UL — SIGNIFICANT CHANGE UP (ref 3.8–10.5)
WBC # FLD AUTO: 5.32 K/UL — SIGNIFICANT CHANGE UP (ref 3.8–10.5)
WBC # FLD AUTO: 5.32 K/UL — SIGNIFICANT CHANGE UP (ref 3.8–10.5)

## 2023-12-20 PROCEDURE — C9600: CPT | Mod: LC

## 2023-12-20 PROCEDURE — C1874: CPT

## 2023-12-20 PROCEDURE — 80048 BASIC METABOLIC PNL TOTAL CA: CPT

## 2023-12-20 PROCEDURE — 92928 PRQ TCAT PLMT NTRAC ST 1 LES: CPT | Mod: LC

## 2023-12-20 PROCEDURE — C1725: CPT

## 2023-12-20 PROCEDURE — 99152 MOD SED SAME PHYS/QHP 5/>YRS: CPT

## 2023-12-20 PROCEDURE — 85027 COMPLETE CBC AUTOMATED: CPT

## 2023-12-20 PROCEDURE — C1894: CPT

## 2023-12-20 PROCEDURE — C1887: CPT

## 2023-12-20 PROCEDURE — 93005 ELECTROCARDIOGRAM TRACING: CPT

## 2023-12-20 PROCEDURE — 93228 REMOTE 30 DAY ECG REV/REPORT: CPT

## 2023-12-20 PROCEDURE — C1769: CPT

## 2023-12-20 PROCEDURE — 93010 ELECTROCARDIOGRAM REPORT: CPT | Mod: 76

## 2023-12-20 RX ORDER — CLOPIDOGREL BISULFATE 75 MG/1
1 TABLET, FILM COATED ORAL
Qty: 90 | Refills: 3
Start: 2023-12-20 | End: 2024-12-13

## 2023-12-20 RX ORDER — SODIUM CHLORIDE 9 MG/ML
1000 INJECTION INTRAMUSCULAR; INTRAVENOUS; SUBCUTANEOUS
Refills: 0 | Status: DISCONTINUED | OUTPATIENT
Start: 2023-12-20 | End: 2024-01-04

## 2023-12-20 RX ORDER — DAPAGLIFLOZIN 10 MG/1
1 TABLET, FILM COATED ORAL
Refills: 0 | DISCHARGE

## 2023-12-20 RX ORDER — BECLOMETHASONE DIPROPIONATE 40 UG/1
1 AEROSOL, METERED RESPIRATORY (INHALATION)
Refills: 0 | DISCHARGE

## 2023-12-20 RX ORDER — APIXABAN 2.5 MG/1
1 TABLET, FILM COATED ORAL
Qty: 30 | Refills: 3
Start: 2023-12-20

## 2023-12-20 RX ORDER — TAMSULOSIN HYDROCHLORIDE 0.4 MG/1
0.4 CAPSULE ORAL AT BEDTIME
Refills: 0 | Status: DISCONTINUED | OUTPATIENT
Start: 2023-12-20 | End: 2024-01-04

## 2023-12-20 RX ORDER — METOPROLOL TARTRATE 50 MG
100 TABLET ORAL EVERY 12 HOURS
Refills: 0 | Status: DISCONTINUED | OUTPATIENT
Start: 2023-12-20 | End: 2024-01-04

## 2023-12-20 RX ORDER — FUROSEMIDE 40 MG
40 TABLET ORAL
Refills: 0 | Status: DISCONTINUED | OUTPATIENT
Start: 2023-12-20 | End: 2024-01-04

## 2023-12-20 RX ORDER — PANTOPRAZOLE SODIUM 20 MG/1
40 TABLET, DELAYED RELEASE ORAL
Refills: 0 | Status: DISCONTINUED | OUTPATIENT
Start: 2023-12-20 | End: 2024-01-04

## 2023-12-20 RX ORDER — ASPIRIN/CALCIUM CARB/MAGNESIUM 324 MG
1 TABLET ORAL
Refills: 0 | DISCHARGE

## 2023-12-20 RX ORDER — ATORVASTATIN CALCIUM 80 MG/1
40 TABLET, FILM COATED ORAL AT BEDTIME
Refills: 0 | Status: DISCONTINUED | OUTPATIENT
Start: 2023-12-20 | End: 2024-01-04

## 2023-12-20 RX ORDER — ASPIRIN/CALCIUM CARB/MAGNESIUM 324 MG
81 TABLET ORAL DAILY
Refills: 0 | Status: DISCONTINUED | OUTPATIENT
Start: 2023-12-21 | End: 2024-01-04

## 2023-12-20 RX ORDER — TIOTROPIUM BROMIDE 18 UG/1
2 CAPSULE ORAL; RESPIRATORY (INHALATION) DAILY
Refills: 0 | Status: DISCONTINUED | OUTPATIENT
Start: 2023-12-20 | End: 2024-01-04

## 2023-12-20 RX ORDER — TAMSULOSIN HYDROCHLORIDE 0.4 MG/1
1 CAPSULE ORAL
Refills: 0 | DISCHARGE

## 2023-12-20 RX ORDER — METFORMIN HYDROCHLORIDE 850 MG/1
1 TABLET ORAL
Qty: 0 | Refills: 0 | DISCHARGE

## 2023-12-20 RX ORDER — ALLOPURINOL 300 MG
100 TABLET ORAL DAILY
Refills: 0 | Status: DISCONTINUED | OUTPATIENT
Start: 2023-12-20 | End: 2024-01-04

## 2023-12-20 RX ORDER — ALLOPURINOL 300 MG
1 TABLET ORAL
Refills: 0 | DISCHARGE

## 2023-12-20 RX ORDER — TIOTROPIUM BROMIDE 18 UG/1
1 CAPSULE ORAL; RESPIRATORY (INHALATION)
Qty: 0 | Refills: 0 | DISCHARGE

## 2023-12-20 RX ORDER — METOPROLOL TARTRATE 50 MG
1 TABLET ORAL
Refills: 0 | DISCHARGE

## 2023-12-20 RX ORDER — CLOPIDOGREL BISULFATE 75 MG/1
75 TABLET, FILM COATED ORAL DAILY
Refills: 0 | Status: DISCONTINUED | OUTPATIENT
Start: 2023-12-21 | End: 2024-01-04

## 2023-12-20 RX ORDER — ATORVASTATIN CALCIUM 80 MG/1
1 TABLET, FILM COATED ORAL
Qty: 0 | Refills: 0 | DISCHARGE

## 2023-12-20 RX ORDER — ASPIRIN/CALCIUM CARB/MAGNESIUM 324 MG
1 TABLET ORAL
Qty: 7 | Refills: 0
Start: 2023-12-20 | End: 2023-12-26

## 2023-12-20 RX ADMIN — SODIUM CHLORIDE 100 MILLILITER(S): 9 INJECTION INTRAMUSCULAR; INTRAVENOUS; SUBCUTANEOUS at 16:40

## 2023-12-20 RX ADMIN — Medication 30 MILLILITER(S): at 16:25

## 2023-12-20 NOTE — H&P CARDIOLOGY - HISTORY OF PRESENT ILLNESS
77 y/o M  with h/o Afib (on Eliquis), severe AS,  mod-severe TR, CAD/MI (30 yrs ago, medically managed, no PCI), CHF, COPD (not on O2 ), Former heavy smoker, HTN, HLD, HERIBERTO not compliant with CPAP and Gout, 7/2023) for large pericardial effusion without tamponade which was determined not accessible for drainage and does not cause hemodynamic compromise h/o anemia on Iron infusion (2 PRBC during hospitalization).   11/15 S/P  TAVR  CT chest assess pericardial  effusion>IR eval for drain tomorrow Groins stable Supplemental O2 ECHO.EKG am  11/16 VSS, IR consulted for pericardial drain - no safe percutaneous window, pericardial effusion slightly smaller in size compared to 7/2023 CT chest. Cleared to resume home Eliquis 5 bid and beta-blocker     < from: TTE W or WO Ultrasound Enhancing Agent (11.16.23 @ 07:58) >  CONCLUSIONS:      1. Left ventricular cavity is small. Left ventricular wall thickness is normal. Left ventricular systolic function is normal with an ejection fraction of 70 % by Khoury's method of disks. There are no regional wall motion abnormalities seen.   2. There is moderate (grade 2) left ventricular diastolic dysfunction.   3. Mildly enlarged right ventricular cavity size, wall thickness, and systolic function.   4. The left atrium is severely dilated.   5. The right atrium is severely dilated in size.   6. ( 26mm SAPIEN3 Ultra Resilia THV).Is well seated with normal function.. No intravalvular regurgitation No paravalvular regurgitation.   7. Estimated pulmonary artery systolic pressure is 49 mmHg.   8. Compared to the transthoracic echocardiogram performed on 11/15/2023 the post TAVR valve images are similar, thepericardial effusion is smaller on todays study.      < end of copied text >  < from: Cardiac Catheterization (11.15.23 @ 15:33) >  Status post successful percutaneous placement of a Shahana 3 Ultra 26mm  pericardial tissue heart valve through the right  common femoral artery     < end of copied text >  < from: Cardiac Catheterization (07.19.23 @ 16:32) >  Diagnostic Conclusions:     Significant distal LCX lesion and otherwise mild nonobstructive CAD       < end of copied text >   79 y/o M  with h/o Afib (on Eliquis, last dose 12/18 in PM ), severe AS s/p TAVR 11/2023,  mod-severe TR, CAD/MI (30 yrs ago, last [facility] with significant LCx lesion, medically managed, no PCI), CHF, COPD (not on O2 ), Former heavy smoker, HTN, HLD, HERIBERTO not compliant with CPAP and Gout,  patient was found to have large pericardial effusion without tamponade which was determined not accessible for drainage and does not cause hemodynamic compromise h/o anemia on Iron infusion (2 PRBC during hospitalization) presents for [facility].  During hospitalization post TAVR, CT chest was done to assess pericardial  effusion>IR eval for drain - no safe percutaneous window, pericardial effusion slightly smaller in size compared to 7/2023 CT chest. Patient was cleared to resume home Eliquis 5 bid and beta-blocker and discharged. Now presents for further ischemic intervention with Dr. Dan.     Currently denies chest pain, palpitations, orthopnea or PND.     < from: TTE W or WO Ultrasound Enhancing Agent (11.16.23 @ 07:58) >  CONCLUSIONS:      1. Left ventricular cavity is small. Left ventricular wall thickness is normal. Left ventricular systolic function is normal with an ejection fraction of 70 % by Khoury's method of disks. There are no regional wall motion abnormalities seen.   2. There is moderate (grade 2) left ventricular diastolic dysfunction.   3. Mildly enlarged right ventricular cavity size, wall thickness, and systolic function.   4. The left atrium is severely dilated.   5. The right atrium is severely dilated in size.   6. ( 26mm SAPIEN3 Ultra Resilia THV).Is well seated with normal function.. No intravalvular regurgitation No paravalvular regurgitation.   7. Estimated pulmonary artery systolic pressure is 49 mmHg.   8. Compared to the transthoracic echocardiogram performed on 11/15/2023 the post TAVR valve images are similar, thepericardial effusion is smaller on todays study.      < end of copied text >  < from: Cardiac Catheterization (11.15.23 @ 15:33) >  Status post successful percutaneous placement of a Shahana 3 Ultra 26mm  pericardial tissue heart valve through the right  common femoral artery     < end of copied text >  < from: Cardiac Catheterization (07.19.23 @ 16:32) >  Diagnostic Conclusions:     Significant distal LCX lesion and otherwise mild nonobstructive CAD       < end of copied text >   79 y/o M  with h/o Afib (on Eliquis, last dose 12/18 in PM ), severe AS s/p TAVR 11/2023,  mod-severe TR, CAD/MI (30 yrs ago, last [facility] with significant LCx lesion, medically managed, no PCI), CHF, COPD (not on O2 ), Former heavy smoker, HTN, HLD, HERIBERTO not compliant with CPAP and Gout,  patient was found to have large pericardial effusion without tamponade which was determined not accessible for drainage and does not cause hemodynamic compromise h/o anemia on Iron infusion (2 PRBC during hospitalization) presents for [facility].  During hospitalization post TAVR, CT chest was done to assess pericardial  effusion>IR eval for drain - no safe percutaneous window, pericardial effusion slightly smaller in size compared to 7/2023 CT chest. Patient was cleared to resume home Eliquis 5 bid and beta-blocker and discharged. Now presents for further ischemic intervention with Dr. Dan.     Currently denies chest pain, palpitations, orthopnea or PND.     < from: TTE W or WO Ultrasound Enhancing Agent (11.16.23 @ 07:58) >  CONCLUSIONS:      1. Left ventricular cavity is small. Left ventricular wall thickness is normal. Left ventricular systolic function is normal with an ejection fraction of 70 % by Khoury's method of disks. There are no regional wall motion abnormalities seen.   2. There is moderate (grade 2) left ventricular diastolic dysfunction.   3. Mildly enlarged right ventricular cavity size, wall thickness, and systolic function.   4. The left atrium is severely dilated.   5. The right atrium is severely dilated in size.   6. ( 26mm SAPIEN3 Ultra Resilia THV).Is well seated with normal function.. No intravalvular regurgitation No paravalvular regurgitation.   7. Estimated pulmonary artery systolic pressure is 49 mmHg.   8. Compared to the transthoracic echocardiogram performed on 11/15/2023 the post TAVR valve images are similar, thepericardial effusion is smaller on todays study.      < end of copied text >  < from: Cardiac Catheterization (11.15.23 @ 15:33) >  Status post successful percutaneous placement of a Shahana 3 Ultra 26mm  pericardial tissue heart valve through the right  common femoral artery     < end of copied text >  < from: Cardiac Catheterization (07.19.23 @ 16:32) >  Diagnostic Conclusions:     Significant distal LCX lesion and otherwise mild nonobstructive CAD       < end of copied text >

## 2023-12-20 NOTE — ASU DISCHARGE PLAN (ADULT/PEDIATRIC) - PROVIDER TOKENS
PROVIDER:[TOKEN:[[id number]],FOLLOWUP:[1 week],ESTABLISHEDPATIENT:[T]] PROVIDER:[TOKEN:[[id number]],FOLLOWUP:[1 week],ESTABLISHEDPATIENT:[T]]

## 2023-12-20 NOTE — H&P CARDIOLOGY - NSICDXPASTMEDICALHX_GEN_ALL_CORE_FT
PAST MEDICAL HISTORY:  2019 novel coronavirus disease (COVID-19)     AF (atrial fibrillation)     Aortic stenosis     BPH (benign prostatic hyperplasia)     CAD (coronary artery disease)     Chronic atrial fibrillation     COPD (chronic obstructive pulmonary disease)     COPD (chronic obstructive pulmonary disease)     Diabetes mellitus, type 2     Former smoker     GERD (gastroesophageal reflux disease)     Gout     Gout     HLD (hyperlipidemia)     HTN (hypertension)     Hypertension     MI (myocardial infarction)     HERIBERTO (obstructive sleep apnea)     Pericardial effusion     Severe aortic stenosis     Sleep apnea     TR (tricuspid regurgitation)

## 2023-12-20 NOTE — ASU PATIENT PROFILE, ADULT - FALL HARM RISK - HARM RISK INTERVENTIONS
Assistance with ambulation/Assistance OOB with selected safe patient handling equipment/Communicate Risk of Fall with Harm to all staff/Reinforce activity limits and safety measures with patient and family/Tailored Fall Risk Interventions/Visual Cue: Yellow wristband and red socks/Bed in lowest position, wheels locked, appropriate side rails in place/Call bell, personal items and telephone in reach/Instruct patient to call for assistance before getting out of bed or chair/Non-slip footwear when patient is out of bed/[place] to call system/Physically safe environment - no spills, clutter or unnecessary equipment/Purposeful Proactive Rounding/Room/bathroom lighting operational, light cord in reach Assistance with ambulation/Assistance OOB with selected safe patient handling equipment/Communicate Risk of Fall with Harm to all staff/Reinforce activity limits and safety measures with patient and family/Tailored Fall Risk Interventions/Visual Cue: Yellow wristband and red socks/Bed in lowest position, wheels locked, appropriate side rails in place/Call bell, personal items and telephone in reach/Instruct patient to call for assistance before getting out of bed or chair/Non-slip footwear when patient is out of bed/[place] to call system/Physically safe environment - no spills, clutter or unnecessary equipment/Purposeful Proactive Rounding/Room/bathroom lighting operational, light cord in reach

## 2023-12-20 NOTE — H&P CARDIOLOGY - NSICDXFAMILYHX_GEN_ALL_CORE_FT
FAMILY HISTORY:  No pertinent family history in first degree relatives    Mother  Still living? Unknown  FH: breast cancer, Age at diagnosis: Age Unknown

## 2023-12-20 NOTE — CHART NOTE - NSCHARTNOTEFT_GEN_A_CORE
Removal of Femoral Sheath    Pulses in the right lower extremity are palpable. The patient was placed in the supine position. The insertion site was identified and the sutures were removed per protocol.  The ___6_ Macedonian femoral sheath was then removed. Direct pressure was applied for  __20____ minutes.     Monitoring of the right groin and both lower extremities including neuro-vascular checks and vital signs every 15 minutes x 4, then every 30 minutes x 2, then every 1 hour was ordered.    Complications: None/Other    Comments: sheat removed by Edinson Marquez with + hemostasis. sheath removal success without evidence of hematoma/ bleeding or oozing Removal of Femoral Sheath    Pulses in the right lower extremity are palpable. The patient was placed in the supine position. The insertion site was identified and the sutures were removed per protocol.  The ___6_ Serbian femoral sheath was then removed. Direct pressure was applied for  __20____ minutes.     Monitoring of the right groin and both lower extremities including neuro-vascular checks and vital signs every 15 minutes x 4, then every 30 minutes x 2, then every 1 hour was ordered.    Complications: None/Other    Comments: sheat removed by Edinson Marquez with + hemostasis. sheath removal success without evidence of hematoma/ bleeding or oozing

## 2023-12-20 NOTE — ASU DISCHARGE PLAN (ADULT/PEDIATRIC) - NS MD DC FALL RISK RISK
For information on Fall & Injury Prevention, visit: [email or web address] OR  [email or web address] OR  https://www.cdc.gov/steadi/patient.html For information on Fall & Injury Prevention, visit: [email or web address] OR  [email or web address] OR  https://www.cdc.gov/steadi/patient.html

## 2023-12-20 NOTE — ASU DISCHARGE PLAN (ADULT/PEDIATRIC) - CARE PROVIDER_API CALL
Jean Sebastian  Cardiovascular Disease  [address] [place] [address]  Phone: [phone]  Fax: [phone]  Established Patient  Follow Up Time: 1 week   Jean Sebastian  Cardiovascular Disease  [address] [place] [id number]  Phone: [phone]  Fax: [phone]  Established Patient  Follow Up Time: 1 week

## 2024-01-02 NOTE — PROGRESS NOTE ADULT - PROBLEM SELECTOR PLAN 3
Occupational Therapy    Visit Type: initial evaluation  Co-treat with: Physical therapist    Relevant History/Co-morbidities: Patient is an 80 y/o Male with medical history of HTN, T2DM, seizure disorder, Alzheimer's dementia admitted for hypernatremia    SUBJECTIVE  Patient agreed to participate in therapy this date.  RN in agreement to work with patient for therapy session.  Patient verbally agrees to allow the following to be present during session: daughter and significant other  Patient / Family Goal: unable to state    Pain   Patient reports pain is not an issue/concern.    OBJECTIVE     Cognitive Status   Level of Consciousness   - lethargic and drowsy  Arousal Alertness: sundowning.  Affect/Behavior    - cooperative and confused  Orientation    - Oriented to: person   - Disoriented to: place, time and situation  Functional Communication   - Forms of Communication: verbal  Following Direction   - follows one step commands inconsistently     Range of Motion (ROM)   (degrees unless noted; active unless noted; norms in ( ); negative=lacking to 0, positive=beyond 0)  WFL: LUE, RUE    Strength  (out of 5 unless noted, standard test position unless noted)   WFL: LUE, RUE        Bed Mobility  - Rolling left: total assist - non-dependent  - Rolling right: total assist - non-dependent  - Side-lying to sit: total assist - non-dependent  Transfers  Assistive devices: gait belt, hand hold  - Sit to stand: maximal assist      Activities of Daily Living (ADLs)  Upper Body Dressing:  - Assist: total assist - non-dependent  Lower Body Dressing:   - Assist: total assist - non-dependent  Interventions    Training provided: activity tolerance, ADL training, balance retraining and bed mobility trainingTreatment session and patients performance was limited by sundowning effect; per patients spouse, patient is usually like this at home in the afternoon.   Skilled input: verbal instruction/cues  Verbal Consent: Writer verbally  educated and received verbal consent for hand placement, positioning of patient, and techniques to be performed today from patient for clothing adjustments for techniques as described above and how they are pertinent to the patient's plan of care.         Education:   - Present and ready to learn: patient's family  Education provided during session:  - Results of above outlined education: Verbalizes understanding and Demonstrates understanding    ASSESSMENT   Patient will benefit from inpatient skilled therapy to address current assessed functional limitations and impairments.  Interferring components: lines/equipment, decreased activity tolerance and cognitive deficits (sundowning)    Discharge needs based on today's assessment:  - Current level of function: slightly below baseline level of function  - Therapy needs at discharge: therapy 5 or more times per week  - Activities of daily living (ADLs) requiring support at discharge: dressing, toileting, bathing, bed mobility, transfers, continence, ambulation, grooming and feeding  - Impairments that require further therapy intervention: balance, activity tolerance, safety awareness and cognition  AM-PAC  - Prior Level of Function: Needs > MOD A (AMPAC <12)       Key: MOD A=moderate assistance, IND/MOD I=independent/modified independent  - Generalized Current Level of Function     - Current Self-Cares: 8       Scoring Key= >21 Modified Independent; 20-21 Supervision; 18-19 Minimal assist; 13-17 Moderate assist; 9-12 Max assist; <9 Total assist        Personal Occupations Profile Affected: bathing/showering, lower body dressing, upper body dressing, functional mobility/transfers, toileting/toilet hygiene, feeding, personal hygiene/grooming      Clinical decision making: Low - Patient has few limitations (1-3), comorbidities and/or complexities, as noted in problem focused assessment noted above, that impact their occupational profile.  Resulting in few treatment  options and no task modification consistent with low clinical decision making complexity.    PLAN (while hospitalized)  Suggestions for next session as indicated:   OT Frequency: 3-5 x per week         Interventions: ADL retraining, therapeutic activity, balance, activity tolerance training, functional transfer training and upper extremity strengthening/ROM  Agreement to plan and goals: patient agrees with goals and treatment plan      GOALS  Long Term Goals: (to be met by time of discharge from hospital)  Feeding: Patient will complete feeding tasks minimal assist.  Grooming: Patient will complete grooming tasks in sitting minimal assist.  Upper body dressing: Patient will complete upper body dressing in sitting moderate assist.  Sit (edge of bed): Patient will sit at edge of bed for 5 min siting edge of bed for pt to complete UB dressing/grooming, supervision.   Training: family and patient training to be completed.   Home program: patient independent with home program as instructed.   : Basic UE exercises in all 4 quadrants -AROM.    Documented in the chart in the following areas: Assessment/Plan.    Patient at End of Session:   Location: in bed  Safety measures: alarm system in place/re-engaged, call light within reach, equipment intact, bed rails x2 and lines intact  Handoff to: nurse      Therapy procedure time and total treatment time can be found documented on the Time Entry flowsheet   - Unclear hx of ?CHF, possibly has chronic HFpEF with severe AS -- TTE w/ unchanged mod to large pericardial effusion ; +severe AS  - LHC performed on 7/19 showing mod dz (40%) and 80% lesion LCx -no intervention performed.   - structural heart and CT surgery teams consulted for severe AS : Given complex med situation (anemia, bleeding, SHEYLA on CKD, pericardial effusion, CAD) and safety of resuming full AC would defer valve intervention at this time.   - strict I/Os, standing weights daily  - Structural cards team now opting for outpatient TAVR after recovery and stable Hgb.  - cards recs appreciated - transition to lasix 40mg po bid.

## 2024-01-09 NOTE — DIETITIAN INITIAL EVALUATION ADULT - PROBLEM SELECTOR PROBLEM 7
Basilio PRIYANKA Edwards presents to Urgent Care Patient arriving with: alone with complaint of periumbilical abdominal pain since yesterday. Had diarrhea which is now resolved. Denies fever, chills, urinary sx.      Can leave detailed message on mobile phone:    Mobile [phone]       Type 2 diabetes mellitus

## 2024-01-30 NOTE — PROGRESS NOTE ADULT - SUBJECTIVE AND OBJECTIVE BOX
APPLE [id number]  75yMale  T(C): 36.7 (01-14-21 @ 09:53), Max: 36.9 (01-13-21 @ 16:48)  HR: 110 (01-14-21 @ 09:53) (88 - 123)  BP: 106/74 (01-14-21 @ 09:53) (106/74 - 130/89)  RR: 17 (01-14-21 @ 09:53) (16 - 18)  SpO2: 95% (01-14-21 @ 09:53) (95% - 100%)  Wt(kg): --  01-13 @ 07:01  -  01-14 @ 07:00  --------------------------------------------------------  IN: 50 mL / OUT: 150 mL / NET: -100 mL       urine retention

## 2024-04-11 ENCOUNTER — RX RENEWAL (OUTPATIENT)
Age: 79
End: 2024-04-11

## 2024-04-15 NOTE — PROGRESS NOTE ADULT - PROBLEM SELECTOR PLAN 7
Hx of CAD/MI (30 yrs ago, medically managed, no PCI)  - hsTrop 30->27; EKG w/o signs of acute ischemia; low suspicion of ACS  - c/w home statin, and metoprolol  - RESUMED aspirin 81mg po qd. monitor for s/s of bleed. Student

## 2024-05-08 NOTE — CONSULT NOTE ADULT - MUSCULOSKELETAL
You were seen in the emergency department today for evaluation of skin tear to the left hand.  We recommend you have the dressing changed daily.  Continue to place antibiotic ointment on the skin tear.  If there is any sign of purulent drainage, increased swelling, warmth or increased redness to the area we recommend you follow-up with primary care physician for possible antibiotics if necessary however at this time do not feel they are necessary.  Return to the emergency department anytime with any new or worsening symptoms.  
details…

## 2024-09-04 NOTE — DISCHARGE NOTE ADULT - CARE PLAN
Patient contacted as reminder of upcoming appointment with Dr. Gary on 9/5/2024 with arrival time of 1300. Patient aware, no further questions at this time.    Principal Discharge DX:	Anemia due to blood loss  Goal:	No source of bleeding identified with endoscopy and coloscopy.  Instructions for follow-up, activity and diet:	You will need capsule study as outpatient to identify source of bleeding.   Follow up with your GI doctor for capsule study. Please call to arrange appointment as soon as possible.  Secondary Diagnosis:	AF (atrial fibrillation)  Goal:	Do not take coumadin until source of bleeding has been identified with capsule study.  Instructions for follow-up, activity and diet:	Continue Aspirin. Continue Coreg at decreased dose.   Follow up with your cardiologist for further monitoring in 1. Please call to arrange appointment.  Secondary Diagnosis:	CHF (congestive heart failure)  Goal:	Ensure medication compliance. Take Lasix every other day for now until you see your cardiologist.  Instructions for follow-up, activity and diet:	Follow up with your primary care physician for further monitoring in 1 week. Please call to arrange appointment.  Secondary Diagnosis:	Acute kidney injury  Goal:	Now resolved. Monitor renal function  Instructions for follow-up, activity and diet:	Follow up with your primary care physician for further monitoring in 1-2 weeks. Please call to arrange appointment.  Secondary Diagnosis:	Diabetes  Goal:	Ensure medication compliance to maintain A1C<7  Instructions for follow-up, activity and diet:	Continue metformin.  Follow up with your primary care physician for further monitoring in 1-2 weeks. Please call to arrange appointment.   Continue diet modification. Avoid complex carbohydrates such as bread, pasta, cereal, white rice, white potatoes, etc. Avoid concentrated sugar as found in desserts, candy, soda, juice, etc. Consume a diet based on lean protein (chicken, fish) and vegetables.

## 2024-10-30 NOTE — DIETITIAN INITIAL EVALUATION ADULT - OTHER CALCULATIONS
,
-- Defer fluid needs to medical team  -- Estimated calorie/protein needs are based on IBW of 136 pounds

## 2025-02-21 NOTE — CONSULT NOTE ADULT - ATTENDING COMMENTS
Been feeling bad with cold symptoms.Was at nephews  many family members sick.   78M w/ hx of Afib (on Eliquis), severe AS, mod-severe TR, CAD/MI (30 yrs ago, medically managed, no PCI), HFpEF (TTE 6/3 EF 55%), CKD, COPD (not on O2 at home), former heavy smoker (over 25 years agO), HTN, HLD, DM2, gout, presenting with worsening pericardial effusion. Patient was sent in from his cardiologist's office. Of note, he had a TTE on day of presentation (7/3/23) that showed a "large circumferential pericardial effusion up to 2.6 cm without evidence of tamponade physiology" (and mildly reduced RV systolic function was also noted). His prior TTE on 10/3/22 had shown a "moderate pericardial effusion, measuring about 1.0 cm superior to the RA; otherwise small circumferential pericardial effusion." Etiology of pericardial effusion is unclear. Cardiology is considering pericardiocentesis and CT TVAR, renal was consulted to evaluate the patient for contrast tolerance given CKD. Discussed wit5h patient risk benefit, family at bedside, not SOB, other new comlaints  1.  CKD--w/u as outlined.  Given tight AS volume optimization rto reduced contrast risk challenging although initial CT angiogram risk relatively low and could give LOW pericontrast volume NSS 1hr before and 6 hrs post 1cc/kg/hr.  Will have to observe closely for evidence of HD requirement which does notexist at this time.  Limit contrast volume, frequency, RAASi, other nephrotoxins    discussed with med team  Darek Edmond MD  contact me on TEAMS

## 2025-03-17 NOTE — H&P ADULT - PROBLEM SELECTOR PLAN 7
Detail Level: Detailed Depth Of Biopsy: dermis Was A Bandage Applied: Yes Size Of Lesion In Cm: 0 Biopsy Type: H and E Biopsy Method: Dermablade Anesthesia Type: 1% lidocaine without epinephrine and a 1:10 solution of 8.4% sodium bicarbonate Anesthesia Volume In Cc: 0.5 Hemostasis: Electrocautery Wound Care: No ointment Dressing: Band-Aid Destruction After The Procedure: No Type Of Destruction Used: Curettage Curettage Text: The wound bed was treated with curettage after the biopsy was performed. Cryotherapy Text: The wound bed was treated with cryotherapy after the biopsy was performed. Electrodesiccation Text: The wound bed was treated with electrodesiccation after the biopsy was performed. Electrodesiccation And Curettage Text: The wound bed was treated with electrodesiccation and curettage after the biopsy was performed. Silver Nitrate Text: The wound bed was treated with silver nitrate after the biopsy was performed. Lab: [id number] Medical Necessity Information: It is in your best interest to select a reason for this procedure from the list below. All of these items fulfill various CMS LCD requirements except the new and changing color options. room air Consent: Written consent was obtained and risks were reviewed including but not limited to scarring, infection, bleeding, scabbing, incomplete removal, nerve damage and allergy to anesthesia. Post-Care Instructions: I reviewed with the patient in detail post-care instructions. Patient is to keep the biopsy site dry overnight, and then apply bacitracin twice daily until healed. Patient may apply hydrogen peroxide soaks to remove any crusting. Notification Instructions: Patient will be notified of biopsy results. However, patient instructed to call the office if not contacted within 2 weeks. Billing Type: Third-Party Bill Information: Selecting Yes will display possible errors in your note based on the variables you have selected. This validation is only offered as a suggestion for you. PLEASE NOTE THAT THE VALIDATION TEXT WILL BE REMOVED WHEN YOU FINALIZE YOUR NOTE. IF YOU WANT TO FAX A PRELIMINARY NOTE YOU WILL NEED TO TOGGLE THIS TO 'NO' IF YOU DO NOT WANT IT IN YOUR FAXED NOTE. Hx of COPD.   - C/w home meds HTN. Normotensive   - C/w home meds HTN. Normotensive   - Hold valsartan in the setting of SHEYLA.   - C/w carvedilol

## (undated) DEVICE — DRAIN CHANNEL 19FR ROUND FULL FLUTED

## (undated) DEVICE — PREP CHLORAPREP HI-LITE ORANGE 26ML

## (undated) DEVICE — CHEST DRAIN PLEUR-EVAC WET/WET ADULT-PEDS SINGLE (QUICK)

## (undated) DEVICE — ELCTR HEX BLADE

## (undated) DEVICE — DRAPE 1/2 SHEET 40X57"

## (undated) DEVICE — SUT SILK 0 30" TIES

## (undated) DEVICE — DRSG OPSITE 13.75 X 4"

## (undated) DEVICE — PACING CABLE A/V TEMP SCREW DOWN 6FT

## (undated) DEVICE — SUT SURGICAL STEEL 6 30" BP-1

## (undated) DEVICE — GLV 6.5 PROTEXIS (WHITE)

## (undated) DEVICE — SYR ASEPTO

## (undated) DEVICE — SUT PROLENE 4-0 36" SH

## (undated) DEVICE — GOWN LG

## (undated) DEVICE — FOLEY HOLDER STATLOCK 2 WAY ADULT

## (undated) DEVICE — BULLDOG SPRING CLIP 6MM SOFT/SOFT

## (undated) DEVICE — BLADE SCALPEL SAFETYLOCK #11

## (undated) DEVICE — PACING CABLE TEMP MEDTRONIC WITH PAC-LOC

## (undated) DEVICE — CONNECTOR STRAIGHT 3/8 X 1/2"

## (undated) DEVICE — GLV 7.5 PROTEXIS (WHITE)

## (undated) DEVICE — SUT POLYSORB 2 30" GS-26

## (undated) DEVICE — STEALTH CLAMP INSERT FIBRA/FIBRA 60MM

## (undated) DEVICE — SUT PROLENE 3-0 36" SH

## (undated) DEVICE — GLV 8 PROTEXIS (WHITE)

## (undated) DEVICE — SUT PROLENE 4-0 36" BB

## (undated) DEVICE — SUT PLEDGET PRE PUNCH 4.8 X 9.5 X 1.5 MM

## (undated) DEVICE — SUCTION TUBE CARDIAC SOFT TIP 6FR SHAFT 10FR TIP 6"

## (undated) DEVICE — BLADE SCALPEL SAFETYLOCK #15

## (undated) DEVICE — DRAPE IOBAN 23" X 23"

## (undated) DEVICE — BLADE SCALPEL SAFETYLOCK #10

## (undated) DEVICE — DRAPE 3/4 SHEET W REINFORCEMENT 56X77"

## (undated) DEVICE — FOLEY TRAY 16FR 5CC LF LUBRISIL ADVANCE TEMP CLOSED

## (undated) DEVICE — STEALTH CLAMP INSERT FIBRA/FIBRA 90MM

## (undated) DEVICE — DRSG MASTISOL

## (undated) DEVICE — GLV 8 RADIATION

## (undated) DEVICE — VENTING ADAPTER "Y" (RED/BLUE) 7.5"

## (undated) DEVICE — SUT SOFSILK 2 60" TIES

## (undated) DEVICE — FOLEY TRAY 16FR 5CC LTX UMETER CLOSED

## (undated) DEVICE — DRAPE TOWEL BLUE 17" X 24"

## (undated) DEVICE — Device

## (undated) DEVICE — SUT BLUNT SZ 5

## (undated) DEVICE — STAPLER SKIN VISI-STAT 35 WIDE

## (undated) DEVICE — TUBING SUCTION 20FT

## (undated) DEVICE — WARMING BLANKET FULL UNDERBODY

## (undated) DEVICE — SPECIMEN CONTAINER 100ML

## (undated) DEVICE — VESSEL LOOP MAXI-RED  0.120" X 16"

## (undated) DEVICE — SUT POLYSORB 2-0 30" GS-21 UNDYED

## (undated) DEVICE — SUT BIOSYN 4-0 18" P-12

## (undated) DEVICE — SUT PROLENE 5-0 36" RB-1

## (undated) DEVICE — DRAPE LIGHT HANDLE COVER (BLUE)

## (undated) DEVICE — WARMING BLANKET DUO-THERM HYPER/HYPOTHERM ADULT

## (undated) DEVICE — VISITEC 4X4

## (undated) DEVICE — NDL PERC BASEPLT 18GX7CM

## (undated) DEVICE — TOURNIQUET SET 12FR (1 RED, 1 BLUE, 1 SNARE) 7"

## (undated) DEVICE — DRAPE FEMORAL ANGIOGRAPHY W TROUGH

## (undated) DEVICE — SUT BOOT STANDARD (ASSORTED) 5 PAIR

## (undated) DEVICE — SPONGE DISSECTOR PEANUT

## (undated) DEVICE — SUMP INTRACARDIAC 20FR 1/4" ADULT

## (undated) DEVICE — TUBING PRESSURE 100"

## (undated) DEVICE — PACK UNIVERSAL CARDIAC

## (undated) DEVICE — GOWN TRIMAX XXL

## (undated) DEVICE — DRAPE INSTRUMENT POUCH 6.75" X 11"

## (undated) DEVICE — SUT PDS II 0 27" OS-6

## (undated) DEVICE — DRSG OPSITE 2.5 X 2"

## (undated) DEVICE — DRSG TEGADERM 6"X8"

## (undated) DEVICE — SUT PDS II 3-0 36" SH

## (undated) DEVICE — SOL IRR POUR NS 0.9% 500ML

## (undated) DEVICE — SOL NORMOSOL-R PH7.4 1000ML

## (undated) DEVICE — NDL COUNTER FOAM AND MAGNET 40-70

## (undated) DEVICE — LAP PAD 18 X 18"

## (undated) DEVICE — ELCTR BOVIE PENCIL HANDPIECE

## (undated) DEVICE — SUT PROLENE 5-0 30" RB-2

## (undated) DEVICE — DRSG STERISTRIPS 0.5 X 4"

## (undated) DEVICE — VENODYNE/SCD SLEEVE CALF LARGE

## (undated) DEVICE — SENSOR MYOCARDIAL TEMP 15MM

## (undated) DEVICE — CONNECTOR STRAIGHT 3/8 X 3/8" W LUER LOCK

## (undated) DEVICE — PACK CARDIAC YELLOW

## (undated) DEVICE — DRAPE MAYO STAND 30"

## (undated) DEVICE — MEDICATION LABELS W MARKER

## (undated) DEVICE — SUCTION YANKAUER NO CONTROL VENT

## (undated) DEVICE — ELCTR BOVIE PENCIL HANDPIECE ROCKER SWITCH 15FT

## (undated) DEVICE — GLV 6 PROTEXIS W HYDROGEL

## (undated) DEVICE — TUBING ATS SUCTION LINE

## (undated) DEVICE — PACK UNIVERSAL CARDIAC SUPPLEMNTAL B

## (undated) DEVICE — GLV 7 PROTEXIS (WHITE)